# Patient Record
Sex: FEMALE | Race: WHITE | NOT HISPANIC OR LATINO | Employment: OTHER | URBAN - NONMETROPOLITAN AREA
[De-identification: names, ages, dates, MRNs, and addresses within clinical notes are randomized per-mention and may not be internally consistent; named-entity substitution may affect disease eponyms.]

---

## 2017-01-06 DIAGNOSIS — I27.0 PRIMARY PULMONARY HYPERTENSION (H): Primary | ICD-10-CM

## 2017-01-06 RX ORDER — SPIRONOLACTONE 25 MG/1
TABLET ORAL
Qty: 45 TABLET | Refills: 3 | Status: SHIPPED | OUTPATIENT
Start: 2017-01-06 | End: 2017-11-27

## 2017-02-22 ENCOUNTER — TRANSFERRED RECORDS (OUTPATIENT)
Dept: HEALTH INFORMATION MANAGEMENT | Facility: HOSPITAL | Age: 82
End: 2017-02-22

## 2017-03-21 ENCOUNTER — ALLIED HEALTH/NURSE VISIT (OUTPATIENT)
Dept: CARDIOLOGY | Facility: CLINIC | Age: 82
End: 2017-03-21
Attending: INTERNAL MEDICINE
Payer: MEDICARE

## 2017-03-21 DIAGNOSIS — I42.8 NON-ISCHEMIC CARDIOMYOPATHY (H): Primary | ICD-10-CM

## 2017-03-21 PROCEDURE — 93295 DEV INTERROG REMOTE 1/2/MLT: CPT | Performed by: INTERNAL MEDICINE

## 2017-03-21 PROCEDURE — 93296 REM INTERROG EVL PM/IDS: CPT | Mod: ZF

## 2017-03-21 NOTE — MR AVS SNAPSHOT
After Visit Summary   3/21/2017    Jacklyn Hein    MRN: [id number]           Patient Information     Date Of Birth          1935        Visit Information        Provider Department      3/21/2017 6:00 AM UC ICD REMOTE [facility]        Today's Diagnoses     Non-ischemic cardiomyopathy (H)    -  1       Follow-ups after your visit        Your next 10 appointments already scheduled     Jun 27, 2017  1:30 PM CDT   (Arrive by 1:15 PM)   Return Visit with  PACEMAKER   [facility] ([facility])    [address]  [facility] [address]   [phone]              Who to contact     If you have questions or need follow up information about today's clinic visit or your schedule please contact [facility] directly at [phone].  Normal or non-critical lab and imaging results will be communicated to you by MyChart, letter or phone within 4 business days after the clinic has received the results. If you do not hear from us within 7 days, please contact the clinic through MyChart or phone. If you have a critical or abnormal lab result, we will notify you by phone as soon as possible.  Submit refill requests through [employer] or call your pharmacy and they will forward the refill request to us. Please allow 3 business days for your refill to be completed.          Additional Information About Your Visit        MyChart Information     [employer] gives you secure access to your electronic health record. If you see a primary care provider, you can also send messages to your care team and make appointments. If you have questions, please call your primary care clinic.  If you do not have a primary care provider, please call [phone] and they will assist you.        Care EveryWhere ID     This is your Care EveryWhere ID. This could be used by other organizations to access your [place] medical records  [id number]         Blood Pressure from Last 3 Encounters:    12/27/16 123/73   11/29/16 108/52   10/20/16 100/75    Weight from Last 3 Encounters:   12/27/16 74.8 kg (165 lb)   11/29/16 74.8 kg (165 lb)   10/20/16 75 kg (165 lb 6.4 oz)              We Performed the Following     INTERROGATION DEVICE EVAL REMOTE, PACER/ICD        Primary Care Provider Office Phone # Fax #    R Josh Post [phone]       [facility] [place] [address] [place], [place] [address] [address]        Thank you!     Thank you for choosing [facility]  for your care. Our goal is always to provide you with excellent care. Hearing back from our patients is one way we can continue to improve our services. Please take a few minutes to complete the written survey that you may receive in the mail after your visit with us. Thank you!             Your Updated Medication List - Protect others around you: Learn how to safely use, store and throw away your medicines at www.disposemymeds.org.          This list is accurate as of: 3/21/17  2:02 PM.  Always use your most recent med list.                   Brand Name Dispense Instructions for use    ACETAMINOPHEN PM  MG tablet   Generic drug:  diphenhydrAMINE-acetaminophen      Take 2 tablets by mouth nightly as needed.       alendronate 70 MG tablet    FOSAMAX    12 tablet    TAKE 1 TABLET BY MOUTH EVERY 7 DAYS. TAKE WITH 8 OUNCES OF WATER AND STAY UPRIGHT FOR AT LEAST 30 MINUTES AFTER TAKING.       aspirin 81 MG tablet      Take 1 tablet by mouth daily       calcitonin (salmon) 200 UNIT/ACT nasal spray    MIACALCIN    1 Bottle    Spray 1 spray into one nostril alternating nostrils daily Alternate nostril each day.       CALCIUM 600 + D PO      Take 1 tablet by mouth daily       carvedilol 12.5 MG tablet    COREG    180 tablet    TAKE 1 TABLET BY MOUTH 2 TIMES DAILY WITH MEALS       FISH OIL      1 tablet daily.       ibuprofen 600 MG tablet    ADVIL/MOTRIN    30 tablet    TAKE 1 TABLET BY MOUTH EVERY 6 HOURS AS NEEDED FOR MODERATE  TO SEVEREP AIN       lisinopril 2.5 MG tablet    PRINIVIL/Zestril    91 tablet    Take 1 tablet (2.5 mg) by mouth daily       MULTI-VITAMIN DAILY PO      Take 1 tablet by mouth daily       * order for DME     1 Device    Equipment being ordered: Lumbar brace       * order for DME     1 each    Equipment being ordered: Walker       spironolactone 25 MG tablet    ALDACTONE    45 tablet    TAKE 1/2 TABLET BY MOUTH DAILY       Vitamin D3 2000 UNITS Tabs      Take 1 tablet by mouth daily       * Notice:  This list has 2 medication(s) that are the same as other medications prescribed for you. Read the directions carefully, and ask your doctor or other care provider to review them with you.

## 2017-03-21 NOTE — PROGRESS NOTES
Remote ICD transmission received and reviewed.  Device transmission sent per MD orders.  Patient has a Medtronic multi lead ICD.  Normal ICD function.  1 AT/AF episode recorded - 2 seconds in duration.  No ventricular arrythmias recorded.  Presenting EGM = AS- @ 66 bpm.  AP = 2.6%.   = 98.5%.  BVP = 98.1%.  OptiVol fluid index is slightly above baseline.  Estimated battery longevity to JAMIE = 6.7 years.  Patient notified of interrogation results.  Patient reports that she is feeling well and denies complaints.  Plan for patient to return to clinic in 3 months as scheduled.    Remote ICD transmission

## 2017-05-02 ENCOUNTER — OFFICE VISIT (OUTPATIENT)
Dept: FAMILY MEDICINE | Facility: OTHER | Age: 82
End: 2017-05-02
Attending: FAMILY MEDICINE
Payer: MEDICARE

## 2017-05-02 VITALS
TEMPERATURE: 97.6 F | WEIGHT: 172 LBS | HEART RATE: 72 BPM | HEIGHT: 65 IN | OXYGEN SATURATION: 96 % | SYSTOLIC BLOOD PRESSURE: 106 MMHG | BODY MASS INDEX: 28.66 KG/M2 | DIASTOLIC BLOOD PRESSURE: 68 MMHG

## 2017-05-02 DIAGNOSIS — R06.09 DYSPNEA ON EXERTION: Primary | ICD-10-CM

## 2017-05-02 DIAGNOSIS — I42.8 NON-ISCHEMIC CARDIOMYOPATHY (H): ICD-10-CM

## 2017-05-02 DIAGNOSIS — Z71.89 ACP (ADVANCE CARE PLANNING): Chronic | ICD-10-CM

## 2017-05-02 LAB
ALBUMIN SERPL-MCNC: 3.3 G/DL (ref 3.4–5)
ALP SERPL-CCNC: 60 U/L (ref 40–150)
ALT SERPL W P-5'-P-CCNC: 15 U/L (ref 0–50)
ANION GAP SERPL CALCULATED.3IONS-SCNC: 9 MMOL/L (ref 3–14)
AST SERPL W P-5'-P-CCNC: 17 U/L (ref 0–45)
BASOPHILS # BLD AUTO: 0.1 10E9/L (ref 0–0.2)
BASOPHILS NFR BLD AUTO: 0.5 %
BILIRUB SERPL-MCNC: 0.3 MG/DL (ref 0.2–1.3)
BUN SERPL-MCNC: 38 MG/DL (ref 7–30)
CALCIUM SERPL-MCNC: 8.6 MG/DL (ref 8.5–10.1)
CHLORIDE SERPL-SCNC: 108 MMOL/L (ref 94–109)
CO2 SERPL-SCNC: 24 MMOL/L (ref 20–32)
CREAT SERPL-MCNC: 0.98 MG/DL (ref 0.52–1.04)
DIFFERENTIAL METHOD BLD: ABNORMAL
EOSINOPHIL # BLD AUTO: 0.8 10E9/L (ref 0–0.7)
EOSINOPHIL NFR BLD AUTO: 9 %
ERYTHROCYTE [DISTWIDTH] IN BLOOD BY AUTOMATED COUNT: 12.9 % (ref 10–15)
GFR SERPL CREATININE-BSD FRML MDRD: 54 ML/MIN/1.7M2
GLUCOSE SERPL-MCNC: 94 MG/DL (ref 70–99)
HCT VFR BLD AUTO: 38.4 % (ref 35–47)
HGB BLD-MCNC: 12.6 G/DL (ref 11.7–15.7)
IMM GRANULOCYTES # BLD: 0 10E9/L (ref 0–0.4)
IMM GRANULOCYTES NFR BLD: 0.3 %
LYMPHOCYTES # BLD AUTO: 2.2 10E9/L (ref 0.8–5.3)
LYMPHOCYTES NFR BLD AUTO: 23.8 %
MCH RBC QN AUTO: 29.3 PG (ref 26.5–33)
MCHC RBC AUTO-ENTMCNC: 32.8 G/DL (ref 31.5–36.5)
MCV RBC AUTO: 89 FL (ref 78–100)
MONOCYTES # BLD AUTO: 0.6 10E9/L (ref 0–1.3)
MONOCYTES NFR BLD AUTO: 6 %
NEUTROPHILS # BLD AUTO: 5.5 10E9/L (ref 1.6–8.3)
NEUTROPHILS NFR BLD AUTO: 60.4 %
NRBC # BLD AUTO: 0 10*3/UL
NRBC BLD AUTO-RTO: 0 /100
NT-PROBNP SERPL-MCNC: 409 PG/ML (ref 0–450)
PLATELET # BLD AUTO: 213 10E9/L (ref 150–450)
POTASSIUM SERPL-SCNC: 4.6 MMOL/L (ref 3.4–5.3)
PROT SERPL-MCNC: 7.1 G/DL (ref 6.8–8.8)
RBC # BLD AUTO: 4.3 10E12/L (ref 3.8–5.2)
SODIUM SERPL-SCNC: 141 MMOL/L (ref 133–144)
WBC # BLD AUTO: 9.2 10E9/L (ref 4–11)

## 2017-05-02 PROCEDURE — 80053 COMPREHEN METABOLIC PANEL: CPT | Mod: ZL | Performed by: FAMILY MEDICINE

## 2017-05-02 PROCEDURE — 36415 COLL VENOUS BLD VENIPUNCTURE: CPT | Mod: ZL | Performed by: FAMILY MEDICINE

## 2017-05-02 PROCEDURE — 93010 ELECTROCARDIOGRAM REPORT: CPT | Performed by: INTERNAL MEDICINE

## 2017-05-02 PROCEDURE — 99212 OFFICE O/P EST SF 10 MIN: CPT | Mod: 25

## 2017-05-02 PROCEDURE — 83880 ASSAY OF NATRIURETIC PEPTIDE: CPT | Mod: ZL | Performed by: FAMILY MEDICINE

## 2017-05-02 PROCEDURE — 71020 ZZHC CHEST TWO VIEWS, FRONT/LAT: CPT | Mod: TC

## 2017-05-02 PROCEDURE — 93005 ELECTROCARDIOGRAM TRACING: CPT

## 2017-05-02 PROCEDURE — 85025 COMPLETE CBC W/AUTO DIFF WBC: CPT | Mod: ZL | Performed by: FAMILY MEDICINE

## 2017-05-02 PROCEDURE — 99214 OFFICE O/P EST MOD 30 MIN: CPT | Mod: 25 | Performed by: FAMILY MEDICINE

## 2017-05-02 ASSESSMENT — PAIN SCALES - GENERAL: PAINLEVEL: NO PAIN (0)

## 2017-05-02 NOTE — PROGRESS NOTES
[facility]    Jacklyn Hein, 81 year old, female presents with   Chief Complaint   Patient presents with     Breathing Problem     shortness of breath, wheezing, coughing, runny nose. history of heart failure. symptoms worsened since January. Seen Cardiologist in January.    1) Severe LV systolic dysfunction - her symptoms have improved with CRT     - Class II NYHA symptoms  - continue present therapy     2) Left subclavian vein thrombus - resolved and off warfarin     3) S/P ICD for primary prevention - I have personally reviewed the ICD interrogation from today. Her device is functioning normal.         Gastrointestinal Problem     upset stomach. Duration- Since Saturday. loose stool with belching.Daughter felt similar nausea.     Musculoskeletal Problem     Lower/mid back pain. Duration- Years. Seen by surgeon. 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     Insomnia, unspecified 01/01/2011     LBBB (left bundle branch block) 01/01/2011     Neck pain, chronic 01/01/2011     Non-ischemic cardiomyopathy (H) 12/10/2013     Obesity, unspecified 01/01/2011     Osteopenia 01/01/2011     Pacemaker      Pain in joint, lower leg 07/31/2006     Pure hypercholesterolemia 06/07/2002     Unspecified essential hypertension 01/01/2011       PAST SURGICAL HISTORY:  Past Surgical History:   Procedure Laterality Date     APPENDECTOMY       ARTHROSCOPY KNEE RT/LT  2009    RT     CARDIAC SURGERY  05/06/2014    Pacemaker     Cataract extraction  2009    Bilateral     CHOLECYSTECTOMY      open      COLONOSCOPY  05/2001    Normal      COLONOSCOPY N/A 10/20/2016    Procedure: COLONOSCOPY;  Surgeon: Charan Montejo MD;  Location: [facility] OR     colonoscopy  with polypectomy      Repeat 3 years      CT CORONARY ANGIOGRAM      normal     HERPES ZOSTER PCR ([facility])       left eye scar tissue       normal echo      fen-phen use       x6       SURGICAL RADIOLOGY PROCEDURE N/A 2016    Procedure: SURGICAL RADIOLOGY PROCEDURE;  Surgeon: Provider, [facility];  Location: [facility] OR       MEDICATIONS:  Prior to Admission medications    Medication Sig Start Date End Date Taking? Authorizing Provider   spironolactone (ALDACTONE) 25 MG tablet TAKE 1/2 TABLET BY MOUTH DAILY 17  Yes ANTHONY Post MD   alendronate (FOSAMAX) 70 MG tablet TAKE 1 TABLET BY MOUTH EVERY 7 DAYS. TAKE WITH 8 OUNCES OF WATER AND STAY UPRIGHT FOR AT LEAST 30 MINUTES AFTER TAKING. 16  Yes ANTHONY Post MD   ibuprofen (ADVIL/MOTRIN) 600 MG tablet TAKE 1 TABLET BY MOUTH EVERY 6 HOURS AS NEEDED FOR MODERATE TO SEVEREP AIN 16  Yes ANTHONY Post MD   carvedilol (COREG) 12.5 MG tablet TAKE 1 TABLET BY MOUTH 2 TIMES DAILY WITH MEALS 10/28/16  Yes ANTHONY Post MD   order for DME Equipment being ordered: Walker 16  Yes ANTHONY Post MD   lisinopril (PRINIVIL,ZESTRIL) 2.5 MG tablet Take 1 tablet (2.5 mg) by mouth daily 16  Yes Ishmael Orlando MD   calcitonin, salmon, (MIACALCIN) 200 UNIT/ACT nasal spray Spray 1 spray into one nostril alternating nostrils daily Alternate nostril each day. 5/19/15  Yes ANTHONY Post MD   ORDER FOR DME Equipment being ordered: Lumbar brace 5/18/15  Yes ANTHONY Post MD   aspirin 81 MG tablet Take 1 tablet by mouth daily   Yes Reported, Patient   Cholecalciferol (VITAMIN D3) 2000 UNITS TABS Take 1 tablet by mouth daily   Yes Reported, Patient   Multiple Vitamin (MULTI-VITAMIN DAILY PO) Take 1 tablet by mouth daily   Yes Reported, Patient   Calcium Carbonate-Vitamin D (CALCIUM 600 + D OR) Take 1 tablet by mouth daily   Yes Reported, Patient   diphenhydrAMINE-acetaminophen (ACETAMINOPHEN PM)  MG tablet  "Take 2 tablets by mouth nightly as needed.   Yes Reported, Patient       ALLERGIES:   No Known Allergies    ROS:  Constitutional, neuro, ENT, endocrine, pulmonary, cardiac, gastrointestinal, genitourinary, musculoskeletal, integument and psychiatric systems are negative, except as otherwise noted.      EXAM:  /68 (BP Location: Left arm, Patient Position: Chair, Cuff Size: Adult Regular)  Pulse 72  Temp 97.6  F (36.4  C) (Tympanic)  Ht 5' 5\" (1.651 m)  Wt 172 lb (78 kg)  SpO2 96%  BMI 28.62 kg/m2 Body mass index is 28.62 kg/(m^2).   GENERAL APPEARANCE: healthy, alert and no distress  EYES: Eyes grossly normal to inspection, PERRL and conjunctivae and sclerae normal  NECK: no adenopathy, no asymmetry, masses, or scars and thyroid normal to palpation  RESP: lungs clear to auscultation - no rales, rhonchi or wheezes  CV: regular rates and rhythm, normal S1 S2, no S3 or S4 and no murmur, click or rub  ABDOMEN: soft, nontender, without hepatosplenomegaly or masses and bowel sounds normal  Lab/ X-ray EKG shows a paced rhythm, chest x-ray no infiltrate  Results for orders placed or performed in visit on 05/02/17 (from the past 24 hour(s))   CBC with platelets differential   Result Value Ref Range    WBC 9.2 4.0 - 11.0 10e9/L    RBC Count 4.30 3.8 - 5.2 10e12/L    Hemoglobin 12.6 11.7 - 15.7 g/dL    Hematocrit 38.4 35.0 - 47.0 %    MCV 89 78 - 100 fl    MCH 29.3 26.5 - 33.0 pg    MCHC 32.8 31.5 - 36.5 g/dL    RDW 12.9 10.0 - 15.0 %    Platelet Count 213 150 - 450 10e9/L    Diff Method Automated Method     % Neutrophils 60.4 %    % Lymphocytes 23.8 %    % Monocytes 6.0 %    % Eosinophils 9.0 %    % Basophils 0.5 %    % Immature Granulocytes 0.3 %    Nucleated RBCs 0 0 /100    Absolute Neutrophil 5.5 1.6 - 8.3 10e9/L    Absolute Lymphocytes 2.2 0.8 - 5.3 10e9/L    Absolute Monocytes 0.6 0.0 - 1.3 10e9/L    Absolute Eosinophils 0.8 (H) 0.0 - 0.7 10e9/L    Absolute Basophils 0.1 0.0 - 0.2 10e9/L    Abs Immature " Granulocytes 0.0 0 - 0.4 10e9/L    Absolute Nucleated RBC 0.0        ASSESSMENT/PLAN:    ICD-10-CM    1. Dyspnea on exertion R06.09 CBC with platelets differential     Comprehensive metabolic panel     N terminal pro BNP outpatient     XR Chest 2 Views     EKG 12-lead complete w/read - Clinics     CARDIOLOGY EVAL ADULT REFERRAL. Patient has a history of skin cardiomyopathy and congestive heart failure with a pacemaker.  Did see cardiology in January.  Will have patient see Dr. Garcia tomorrow in the clinic.  The labs can be reviewed at that point.  In regards to her  back she has significant spinal stenosis.  She needs to use better positioning when she is doing work in the kitchen.  Also regarding the GI upset probably a little gastroenteritis   2. ACP (advance care planning) Z71.89    3. CHF (congestive heart failure), NYHA class II (H) I50.9 See #1   4. Non-ischemic cardiomyopathy (H) I42.9 CARDIOLOGY EVAL ADULT REFERRAL         YUE Post MD  May 2, 2017

## 2017-05-02 NOTE — NURSING NOTE
"Chief Complaint   Patient presents with     Breathing Problem     shortness of breath, wheezing, coughing, runny nose. history of heart failure. symptoms worsened since January. Seen Cardiologist in January.      Gastrointestinal Problem     upset stomach. Duration- Since Saturday. loose stool with belching.     Musculoskeletal Problem     Lower/mid back pain. Duration- Years.        Initial /68 (BP Location: Left arm, Patient Position: Chair, Cuff Size: Adult Regular)  Pulse 72  Temp 97.6  F (36.4  C) (Tympanic)  Ht 5' 5\" (1.651 m)  Wt 172 lb (78 kg)  SpO2 96%  BMI 28.62 kg/m2 Estimated body mass index is 28.62 kg/(m^2) as calculated from the following:    Height as of this encounter: 5' 5\" (1.651 m).    Weight as of this encounter: 172 lb (78 kg).  Medication Reconciliation: complete     NURYS COTTO      "

## 2017-05-02 NOTE — MR AVS SNAPSHOT
After Visit Summary   5/2/2017    Jacklyn Hein    MRN: [id number]           Patient Information     Date Of Birth          1935        Visit Information        Provider Department      5/2/2017 1:15 PM ANTHONY Post MD [facility] [place]        Today's Diagnoses     Dyspnea on exertion    -  1    ACP (advance care planning)        CHF (congestive heart failure), NYHA class II (H)        Non-ischemic cardiomyopathy (H)          Care Instructions    See Dr. Garcia tomorrow        Follow-ups after your visit        Additional Services     CARDIOLOGY EVAL ADULT REFERRAL       Your provider has referred you to:  Rigo Garcia     Please be aware that coverage of these services is subject to the terms and limitations of your health insurance plan.  Call member services at your health plan with any benefit or coverage questions.      Type of Referral:  New Cardiology Consult    Timeframe requested:  Less than 1 week    Please bring the following to your appointment:  >>   Any x-rays, CTs or MRIs which have been performed.  Contact the facility where they were done to arrange for  prior to your scheduled appointment.    >>   List of current medications  >>   This referral request   >>   Any documents/labs given to you for this referral                  Your next 10 appointments already scheduled     May 03, 2017  3:00 PM CDT   (Arrive by 2:45 PM)   New Visit with Kodi Garcia, DO [facility]    [address] [place] [address]   [phone]            Jun 27, 2017  1:30 PM CDT   (Arrive by 1:15 PM)   Return Visit with  PACEMAKER   [facility] ([facility])    [address] [place] [address]   [phone]              Who to contact     If you have questions or need follow up information about today's clinic visit or your schedule please contact [facility] directly at [phone].  Normal or  "non-critical lab and imaging results will be communicated to you by MyChart, letter or phone within 4 business days after the clinic has received the results. If you do not hear from us within 7 days, please contact the clinic through [employer] or phone. If you have a critical or abnormal lab result, we will notify you by phone as soon as possible.  Submit refill requests through [employer] or call your pharmacy and they will forward the refill request to us. Please allow 3 business days for your refill to be completed.          Additional Information About Your Visit        [employer] Information     [employer] gives you secure access to your electronic health record. If you see a primary care provider, you can also send messages to your care team and make appointments. If you have questions, please call your primary care clinic.  If you do not have a primary care provider, please call [phone] and they will assist you.        Care EveryWhere ID     This is your Care EveryWhere ID. This could be used by other organizations to access your [place] medical records  [id number]        Your Vitals Were     Pulse Temperature Height Pulse Oximetry BMI (Body Mass Index)       72 97.6  F (36.4  C) (Tympanic) 5' 5\" (1.651 m) 96% 28.62 kg/m2        Blood Pressure from Last 3 Encounters:   05/02/17 106/68   12/27/16 123/73   11/29/16 108/52    Weight from Last 3 Encounters:   05/02/17 172 lb (78 kg)   12/27/16 165 lb (74.8 kg)   11/29/16 165 lb (74.8 kg)              We Performed the Following     CARDIOLOGY EVAL ADULT REFERRAL     CBC with platelets differential     Comprehensive metabolic panel     EKG 12-lead complete w/read - Clinics     N terminal pro BNP outpatient     XR Chest 2 Views          Today's Medication Changes          These changes are accurate as of: 5/2/17  1:56 PM.  If you have any questions, ask your nurse or doctor.               Stop taking these medicines if you haven't already. Please contact your care " team if you have questions.     FISH OIL   Stopped by:  ANTHONY Post MD                    Primary Care Provider Office Phone # Fax #    ANTHONY Post [phone]       [facility] [place] [address] [place] [address]        Thank you!     [facility] you [facility]  [facility] your [facility]. Our goal is always to provide you with excellent care. Hearing back from our patients is one way we can continue to improve our services. Please take a few minutes to complete the written survey that you may receive in the mail after your visit with us. Thank you!             Your Updated Medication List - Protect others around you: Learn how to safely use, store and throw away your medicines at www.disposemymeds.org.          This list is accurate as of: 5/2/17  1:56 PM.  Always use your most recent med list.                   Brand Name Dispense Instructions for use    ACETAMINOPHEN PM  MG tablet   Generic drug:  diphenhydrAMINE-acetaminophen      Take 2 tablets by mouth nightly as needed.       alendronate 70 MG tablet    FOSAMAX    12 tablet    TAKE 1 TABLET BY MOUTH EVERY 7 DAYS. TAKE WITH 8 OUNCES OF WATER AND STAY UPRIGHT FOR AT LEAST 30 MINUTES AFTER TAKING.       aspirin 81 MG tablet      Take 1 tablet by mouth daily       calcitonin (salmon) 200 UNIT/ACT nasal spray    MIACALCIN    1 Bottle    Spray 1 spray into one nostril alternating nostrils daily Alternate nostril each day.       CALCIUM 600 + D PO      Take 1 tablet by mouth daily       carvedilol 12.5 MG tablet    COREG    180 tablet    TAKE 1 TABLET BY MOUTH 2 TIMES DAILY WITH MEALS       ibuprofen 600 MG tablet    ADVIL/MOTRIN    30 tablet    TAKE 1 TABLET BY MOUTH EVERY 6 HOURS AS NEEDED FOR MODERATE TO SEVEREP AIN       lisinopril 2.5 MG tablet    PRINIVIL/Zestril    91 tablet    Take 1 tablet (2.5 mg) by mouth daily       MULTI-VITAMIN DAILY PO      Take 1 tablet by mouth daily       * order for DME     1  Device    Equipment being ordered: Lumbar brace       * order for DME     1 each    Equipment being ordered: Walker       spironolactone 25 MG tablet    ALDACTONE    45 tablet    TAKE 1/2 TABLET BY MOUTH DAILY       Vitamin D3 2000 UNITS Tabs      Take 1 tablet by mouth daily       * Notice:  This list has 2 medication(s) that are the same as other medications prescribed for you. Read the directions carefully, and ask your doctor or other care provider to review them with you.

## 2017-05-03 ENCOUNTER — OFFICE VISIT (OUTPATIENT)
Dept: CARDIOLOGY | Facility: OTHER | Age: 82
End: 2017-05-03
Attending: INTERNAL MEDICINE
Payer: COMMERCIAL

## 2017-05-03 VITALS
TEMPERATURE: 98.4 F | SYSTOLIC BLOOD PRESSURE: 108 MMHG | RESPIRATION RATE: 24 BRPM | HEART RATE: 80 BPM | OXYGEN SATURATION: 96 % | BODY MASS INDEX: 28.32 KG/M2 | WEIGHT: 170 LBS | HEIGHT: 65 IN | DIASTOLIC BLOOD PRESSURE: 60 MMHG

## 2017-05-03 DIAGNOSIS — I42.8 NON-ISCHEMIC CARDIOMYOPATHY (H): ICD-10-CM

## 2017-05-03 DIAGNOSIS — Z87.891 HISTORY OF TOBACCO ABUSE: ICD-10-CM

## 2017-05-03 DIAGNOSIS — R06.09 DYSPNEA ON EXERTION: ICD-10-CM

## 2017-05-03 DIAGNOSIS — I50.42 CHRONIC COMBINED SYSTOLIC AND DIASTOLIC CONGESTIVE HEART FAILURE (H): Primary | ICD-10-CM

## 2017-05-03 PROCEDURE — 99212 OFFICE O/P EST SF 10 MIN: CPT

## 2017-05-03 PROCEDURE — 99214 OFFICE O/P EST MOD 30 MIN: CPT | Performed by: INTERNAL MEDICINE

## 2017-05-03 PROCEDURE — 99203 OFFICE O/P NEW LOW 30 MIN: CPT

## 2017-05-03 ASSESSMENT — PAIN SCALES - GENERAL: PAINLEVEL: NO PAIN (0)

## 2017-05-03 NOTE — MR AVS SNAPSHOT
After Visit Summary   5/3/2017    Jacklyn Hein    MRN: [id number]           Patient Information     Date Of Birth          1935        Visit Information        Provider Department      5/3/2017 3:00 PM Kodi Garcia, DO [facility] [place]        Today's Diagnoses     Chronic combined systolic and diastolic congestive heart failure (H)    -  1    Dyspnea on exertion        Non-ischemic cardiomyopathy (H)          Care Instructions    You were seen by Dr. Garcia 5/3/2017     1. You will follow up in with Dr. Garcia in 1 month.    2. You will be scheduled for pulmonary function testing, you will be contacted by the hospital to schedule.     3. You will be scheduled for an Echocardiogram, you will be contacted by the imaging department to schedule.     4. Continue daily weights, please contact the Cardiology RN with a 2 pound weight gain in 24 hours or 5 pounds in one week.     5. Monitor salt intake, avoid adding salt at the dinner table and consider a salt substitute.     6. Have device check 6/27/17 with sergey to check your pacemaker.     7.  Monitor blood pressure at home while resting for at least 20 minutes.     8.  Continue all medications as prescribed.     Osiris SANFORD RN-BSN  Cardiology   [facility]  [phone]          Follow-ups after your visit        Your next 10 appointments already scheduled     Jun 27, 2017  1:30 PM CDT   (Arrive by 1:15 PM)   Return Visit with  PACEMAKER   [place] [facility] [facility] ([facility])    [address]  [place] [address]   [phone]              Who to contact     If you have questions or need follow up information about today's clinic visit or your schedule please contact [facility] directly at [phone].  Normal or non-critical lab and imaging results will be communicated to you by MyChart, letter or phone within 4 business days after the clinic has received the results. If you do not hear  "from us within 7 days, please contact the clinic through [facility] or phone. If you have a critical or abnormal lab result, we will notify you by phone as soon as possible.  Submit refill requests through [facility] [facility] or call your pharmacy and they will forward the refill request to us. Please allow 3 business days for your refill to be completed.          Additional Information About Your Visit        [email or web address] Information     [facility] gives you secure access to your electronic health record. If you see a primary care provider, you can also send messages to your care team and make appointments. If you have questions, please call your primary care clinic.  If you do not have a primary care provider, please call [phone] and they will assist you.        Care EveryWhere ID     This is your Care EveryWhere ID. This could be used by other organizations to access your [facility] medical records  [id number]        Your Vitals Were     Pulse Temperature Respirations Height Pulse Oximetry BMI (Body Mass Index)    80 98.4  F (36.9  C) (Tympanic) 24 1.651 m (5' 5\") 96% 28.29 kg/m2       Blood Pressure from Last 3 Encounters:   05/03/17 108/60   05/02/17 106/68   12/27/16 123/73    Weight from Last 3 Encounters:   05/03/17 77.1 kg (170 lb)   05/02/17 78 kg (172 lb)   12/27/16 74.8 kg (165 lb)              Today, you had the following     No orders found for display       Primary Care Provider Office Phone # Fax #    R Josh Post [phone]       [facility] [place] [address] [place] [id number]        Thank you!     [facility] you [facility]  [facility]. Our goal is always to provide you with excellent care. Hearing back from our patients is one way we can continue to improve our services. Please take a few minutes to complete the written survey that you may receive in the mail after your visit with us. Thank you!             Your Updated Medication List - Protect others " around you: Learn how to safely use, store and throw away your medicines at www.disposemymeds.org.          This list is accurate as of: 5/3/17  4:04 PM.  Always use your most recent med list.                   Brand Name Dispense Instructions for use    ACETAMINOPHEN PM  MG tablet   Generic drug:  diphenhydrAMINE-acetaminophen      Take 2 tablets by mouth nightly as needed.       alendronate 70 MG tablet    FOSAMAX    12 tablet    TAKE 1 TABLET BY MOUTH EVERY 7 DAYS. TAKE WITH 8 OUNCES OF WATER AND STAY UPRIGHT FOR AT LEAST 30 MINUTES AFTER TAKING.       aspirin 81 MG tablet      Take 1 tablet by mouth daily       CALCIUM 600 + D PO      Take 1 tablet by mouth daily       carvedilol 12.5 MG tablet    COREG    180 tablet    TAKE 1 TABLET BY MOUTH 2 TIMES DAILY WITH MEALS       ibuprofen 600 MG tablet    ADVIL/MOTRIN    30 tablet    TAKE 1 TABLET BY MOUTH EVERY 6 HOURS AS NEEDED FOR MODERATE TO SEVEREP AIN       lisinopril 2.5 MG tablet    PRINIVIL/Zestril    91 tablet    Take 1 tablet (2.5 mg) by mouth daily       MULTI-VITAMIN DAILY PO      Take 1 tablet by mouth daily       * order for DME     1 Device    Equipment being ordered: Lumbar brace       * order for DME     1 each    Equipment being ordered: Walker       spironolactone 25 MG tablet    ALDACTONE    45 tablet    TAKE 1/2 TABLET BY MOUTH DAILY       Vitamin D3 2000 UNITS Tabs      Take 1 tablet by mouth daily       * Notice:  This list has 2 medication(s) that are the same as other medications prescribed for you. Read the directions carefully, and ask your doctor or other care provider to review them with you.

## 2017-05-03 NOTE — NURSING NOTE
"Chief Complaint   Patient presents with     Establish Care     Referral Dr. FERNANDA Post, CHF afib       Initial /60 (BP Location: Right arm, Cuff Size: Adult Large)  Pulse 80  Temp 98.4  F (36.9  C) (Tympanic)  Resp 24  Ht 1.651 m (5' 5\")  Wt 77.1 kg (170 lb)  SpO2 96%  BMI 28.29 kg/m2 Estimated body mass index is 28.29 kg/(m^2) as calculated from the following:    Height as of this encounter: 1.651 m (5' 5\").    Weight as of this encounter: 77.1 kg (170 lb).  Medication Reconciliation: complete   Lamar Chatterjee      "

## 2017-05-03 NOTE — PROGRESS NOTES
Cardiology Consultation       Assessment & Plan    Shortness of breath which could be the result of chronic diastolic and systolic heart failure versus poor lung function.    She reports being short of breath for many months. He does have diastolic dysfunction as well as an ejection fraction of 35%.  She is on appropriate medications which include Coreg 12.5 mg, lisinopril 2.5 mg, Toprol-XL 12.5 mg and aspirin 81 mg. Ideally, she should on Coreg 25 mg for a twice a day with an increased dose of lisinopril. However, she does run low blood pressures.  The spironolactone could be discontinued.  Entresto was discussed with the patient as this medication pulse to improve symptoms of heart failure.  In addition, she should have pulmonary function test as she appears to have reduced air movement on her physical exam and she has a history of tobacco use. If this test comes back negative, would consider starting her on Entresto. She would have to check her blood pressures on a regular basis as this medication can lower her blood pressure. Her potassium would have to be checked as this medication can raise her potassium.  She would have to be off of lisinopril for a minimum of 36 hours prior to starting this medication. She'll be seen back in one month.    Thank you for lying me to participate in the care of this patient!        Patient Active Problem List    Diagnosis Date Noted     Lumbar spine pain 10/27/2015     Priority: Medium     Non-ischemic cardiomyopathy (H) 12/10/2013     Priority: Medium     CHF (congestive heart failure), NYHA class II (H) 12/10/2013     Priority: Medium     Automatic implantable cardioverter-defibrillator - [employer]onic multi lead ICD 11/11/2014     Implant date - 5/6/14  Problem list name updated by automated process. Provider to review       ACP (advance care planning) 10/26/2012     Advance Care Planning 5/2/2017: ACP Review of Chart / Resources Provided:  Reviewed chart for advance care plan.   Jacklyn Hein has been provided information and resources to begin or update their advance care plan.  Added by NURYS COTTO             LBBB (left bundle branch block) 01/01/2011     Insomnia 01/01/2011     Problem list name updated by automated process. Provider to review       Disorder of bone and cartilage 01/01/2011     Problem list name updated by automated process. Provider to review       Essential hypertension 01/01/2011     Problem list name updated by automated process. Provider to review       Neck pain, chronic 01/01/2011     Pure hypercholesterolemia 06/07/2002       Kodi Garcia    Reason for Consult   Reason for consult: shortness of breath    Primary Care Physician   ANTHONY Post    Chief Complaint   Shortness of breath with history of diastolic and systolic heart failure    History is obtained from the patient    History of Present Illness   Jacklyn Hein is a 81 year old female who I was asked to see the patient for shortness of breath.  She has diastolic and systolic heart failure with [place] heart classification of 3, with an AICD, nonischemic cardiomyopathy, and hypercholesterolemia.    Mrs. Hein is a 81-year-old female who is being seen by shortness of breath potentially related to heart failure versus her pulmonary function status.    She is known to have severe systolic heart failure with an ejection fraction in February of 2015 at 35%. She currently has an AICD in place that is BI-V. Previous to placement of this device she had an ejection fraction of just shy of 30%.  She is short of breath with minimal activity placing her at [place] heart classification of 3.  She appears relatively euvolemic.  She is on lisinopril 2.5 mg, Coreg 12.5 mg twice a day, aspirin 81 mg daily, and spironolactone 12.5 mg daily.  She reports her weight has largely been consistent and does not waver much. She does salt her food and is not careful with her salt intake. She does not  notice edema to her lower extremities.  She can walk approximately 10-15 feet before she can become short of breath.  She does not have any palpitations.  She does not have any chest pain.  She denies having a heart attack previously as she does have ischemic cardiomyopathy. Her shortness of breath has been going on for several months and this includes her prior visit in January of 2017.  She does have a remote history of smoking quitting approximately 20 years ago. She had a blood clot in her arm following placement of her pacemaker.  However, she's not had a blood count previous is time frame.    Past Medical History   I have reviewed this patient's medical history and updated it with pertinent information if needed.   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  Past Surgical History:   Procedure Laterality Date     APPENDECTOMY       ARTHROSCOPY KNEE RT/LT  2009    RT     CARDIAC SURGERY  05/06/2014    Pacemaker     Cataract extraction  2009    Bilateral     CHOLECYSTECTOMY      open      COLONOSCOPY  05/2001    Normal      COLONOSCOPY N/A 10/20/2016    Procedure: COLONOSCOPY;  Surgeon: Charan Montejo MD;  Location: [facility] OR     colonoscopy with polypectomy  2011    Repeat 3 years      CT CORONARY ANGIOGRAM  1999    normal      "HERPES ZOSTER PCR ([facility])       left eye scar tissue       normal echo      fen-phen use       x6       SURGICAL RADIOLOGY PROCEDURE N/A 2016    Procedure: SURGICAL RADIOLOGY PROCEDURE;  Surgeon: Provider, Generic Perianesthesia Nursing;  Location: [facility] OR       Prior to Admission Medications   Cannot display prior to admission medications because the patient has not been admitted in this contact.     Allergies   No Known Allergies    Social History   I have reviewed this patient's social history and updated it with pertinent information if needed. Jacklyn Hein  reports that she has quit smoking. Her smoking use included Cigarettes. She has never used smokeless tobacco. She reports that she drinks alcohol. She reports that she does not use illicit drugs.    Family History   I have reviewed this patient's family history and updated it with pertinent information if needed.   Family History   Problem Relation Age of Onset     CANCER Mother      lung (cause of death)      Peptic Ulcer Disease Father      gastric        Review of Systems   CONSTITUTIONAL:  negative  EYES:  negative  HEENT:  negative  RESPIRATORY:  Shortness of breath with exertion  CARDIOVASCULAR:  negative  GASTROINTESTINAL:  negative  GENITOURINARY:  Deferred   INTEGUMENT/BREAST:  deferred  HEMATOLOGIC/LYMPHATIC:  negative  MUSCULOSKELETAL:  negative  NEUROLOGICAL:  negative  BEHAVIOR/PSYCH:  negative    Physical Exam   Temp: 98.4  F (36.9  C) Temp src: Tympanic BP: 108/60 Pulse: 80   Resp: 24 SpO2: 96 %      Vital Signs with Ranges  /60 (BP Location: Right arm, Cuff Size: Adult Large)  Pulse 80  Temp 98.4  F (36.9  C) (Tympanic)  Resp 24  Ht 1.651 m (5' 5\")  Wt 77.1 kg (170 lb)  SpO2 96%  BMI 28.29 kg/m2  170 lbs 0 oz    Constitutional: awake, alert, cooperative, no apparent distress, and appears stated age  Eyes: Lids and lashes normal, sclera clear, conjunctiva normal  ENT: Normocephalic, without obvious " abnormality  Hematologic / Lymphatic: 2/4 pulses to her lower extremities  Respiratory: No increased work of breathing, reduced air exchange, clear to auscultation bilaterally, no crackles or wheezing  Cardiovascular: Normal apical impulse, regular rate and rhythm, normal S1 and S2, no S3 or S4, and no murmur noted  GI: No scars, normal bowel sounds, soft, non-distended, non-tender  Genitounirinary: deferred  Skin: no obvious bruises or rashes  Musculoskeletal: no lower extremity pitting edema present  Neurologic: Awake, alert, oriented to name, place and time.    Neuropsychiatric: appears calm    Data   No results found for this or any previous visit (from the past 24 hour(s)).  Most Recent 3 CBC's:  Recent Labs   Lab Test  05/02/17   1335  10/28/15   0755  10/28/14   0737   WBC  9.2  8.5  7.0   HGB  12.6  13.2  13.9   MCV  89  88  88   PLT  213  212  196     Most Recent 3 BMP's:  Recent Labs   Lab Test  05/02/17   1335  08/18/16   1045  10/28/15   0755  10/28/14   0737   NA  141   --   138  140   POTASSIUM  4.6  4.9  4.5  4.4   CHLORIDE  108   --   106  107   CO2  24   --   28  28   BUN  38*   --   24  30   CR  0.98  1.00  0.89  0.78   ANIONGAP  9   --   4  5   LORRAINE  8.6   --   8.9  8.9   GLC  94   --   99  100*     Most Recent 3 Creatinines:  Recent Labs   Lab Test  05/02/17   1335  08/18/16   1045  10/28/15   0755   CR  0.98  1.00  0.89     Most Recent 3 BNP's:  Recent Labs   Lab Test  05/02/17   1335  03/04/14   1016  12/30/13   1113   NTBNP  409  223  313     Most Recent Cholesterol Panel:  Recent Labs   Lab Test  10/28/15   0755   CHOL  217*   LDL  139*   HDL  54   TRIG  119     Most Recent TSH and T4:No lab results found.

## 2017-05-10 ENCOUNTER — HOSPITAL ENCOUNTER (OUTPATIENT)
Dept: ULTRASOUND IMAGING | Facility: HOSPITAL | Age: 82
Discharge: HOME | End: 2017-05-10
Attending: INTERNAL MEDICINE | Admitting: INTERNAL MEDICINE
Payer: MEDICARE

## 2017-05-10 PROCEDURE — 93306 TTE W/DOPPLER COMPLETE: CPT | Mod: 26 | Performed by: INTERNAL MEDICINE

## 2017-05-10 PROCEDURE — 93306 TTE W/DOPPLER COMPLETE: CPT | Mod: TC

## 2017-05-17 ENCOUNTER — HOSPITAL ENCOUNTER (OUTPATIENT)
Dept: RESPIRATORY THERAPY | Facility: HOSPITAL | Age: 82
Discharge: HOME | End: 2017-05-17
Attending: INTERNAL MEDICINE | Admitting: INTERNAL MEDICINE
Payer: MEDICARE

## 2017-05-17 DIAGNOSIS — R06.09 DYSPNEA ON EXERTION: ICD-10-CM

## 2017-05-17 DIAGNOSIS — Z87.891 HISTORY OF TOBACCO ABUSE: ICD-10-CM

## 2017-05-17 LAB
COHGB MFR BLD: 2.4 % (ref 0–2)
HGB BLD-MCNC: 11.2 G/DL (ref 11.7–15.7)

## 2017-05-17 PROCEDURE — 85018 HEMOGLOBIN: CPT | Performed by: INTERNAL MEDICINE

## 2017-05-17 PROCEDURE — 94729 DIFFUSING CAPACITY: CPT

## 2017-05-17 PROCEDURE — 25000125 ZZHC RX 250: Performed by: INTERNAL MEDICINE

## 2017-05-17 PROCEDURE — 36415 COLL VENOUS BLD VENIPUNCTURE: CPT | Performed by: INTERNAL MEDICINE

## 2017-05-17 PROCEDURE — 94060 EVALUATION OF WHEEZING: CPT | Mod: 26 | Performed by: INTERNAL MEDICINE

## 2017-05-17 PROCEDURE — 94060 EVALUATION OF WHEEZING: CPT

## 2017-05-17 PROCEDURE — 82375 ASSAY CARBOXYHB QUANT: CPT | Performed by: INTERNAL MEDICINE

## 2017-05-17 PROCEDURE — 94726 PLETHYSMOGRAPHY LUNG VOLUMES: CPT

## 2017-05-17 PROCEDURE — 94729 DIFFUSING CAPACITY: CPT | Mod: 26 | Performed by: INTERNAL MEDICINE

## 2017-05-17 PROCEDURE — 94726 PLETHYSMOGRAPHY LUNG VOLUMES: CPT | Mod: 26 | Performed by: INTERNAL MEDICINE

## 2017-05-17 RX ORDER — ALBUTEROL SULFATE 0.83 MG/ML
2.5 SOLUTION RESPIRATORY (INHALATION) ONCE
Status: COMPLETED | OUTPATIENT
Start: 2017-05-17 | End: 2017-05-17

## 2017-05-17 RX ADMIN — ALBUTEROL SULFATE 2.5 MG: 2.5 SOLUTION RESPIRATORY (INHALATION) at 14:57

## 2017-05-31 ENCOUNTER — OFFICE VISIT (OUTPATIENT)
Dept: CARDIOLOGY | Facility: OTHER | Age: 82
End: 2017-05-31
Attending: FAMILY MEDICINE
Payer: COMMERCIAL

## 2017-05-31 VITALS
SYSTOLIC BLOOD PRESSURE: 108 MMHG | TEMPERATURE: 98.6 F | WEIGHT: 170 LBS | BODY MASS INDEX: 28.32 KG/M2 | OXYGEN SATURATION: 95 % | HEART RATE: 74 BPM | HEIGHT: 65 IN | DIASTOLIC BLOOD PRESSURE: 56 MMHG

## 2017-05-31 DIAGNOSIS — I50.22 CHF (CONGESTIVE HEART FAILURE), NYHA CLASS II, CHRONIC, SYSTOLIC (H): ICD-10-CM

## 2017-05-31 DIAGNOSIS — E78.00 PURE HYPERCHOLESTEROLEMIA: ICD-10-CM

## 2017-05-31 DIAGNOSIS — I42.8 NON-ISCHEMIC CARDIOMYOPATHY (H): ICD-10-CM

## 2017-05-31 DIAGNOSIS — Z95.810 AUTOMATIC IMPLANTABLE CARDIOVERTER-DEFIBRILLATOR IN SITU: ICD-10-CM

## 2017-05-31 DIAGNOSIS — J43.9 PULMONARY EMPHYSEMA, UNSPECIFIED EMPHYSEMA TYPE (H): Primary | ICD-10-CM

## 2017-05-31 DIAGNOSIS — I10 ESSENTIAL HYPERTENSION: ICD-10-CM

## 2017-05-31 DIAGNOSIS — I50.22 CHRONIC SYSTOLIC CONGESTIVE HEART FAILURE (H): ICD-10-CM

## 2017-05-31 PROCEDURE — 99214 OFFICE O/P EST MOD 30 MIN: CPT | Performed by: INTERNAL MEDICINE

## 2017-05-31 PROCEDURE — 99212 OFFICE O/P EST SF 10 MIN: CPT

## 2017-05-31 ASSESSMENT — PAIN SCALES - GENERAL: PAINLEVEL: NO PAIN (0)

## 2017-05-31 NOTE — NURSING NOTE
"Chief Complaint   Patient presents with     RECHECK     Follow AFIB and CHF. Also pulmonary test results.       Initial /56 (BP Location: Left arm, Patient Position: Chair, Cuff Size: Adult Regular)  Pulse 74  Temp 98.6  F (37  C) (Tympanic)  Ht 1.651 m (5' 5\")  Wt 77.1 kg (170 lb)  SpO2 95%  BMI 28.29 kg/m2 Estimated body mass index is 28.29 kg/(m^2) as calculated from the following:    Height as of this encounter: 1.651 m (5' 5\").    Weight as of this encounter: 77.1 kg (170 lb).  Medication Reconciliation: complete   Lora Quarles LPN      "

## 2017-05-31 NOTE — PROGRESS NOTES
Cardiology Progress Note     Assessment & Plan   Jacklyn Hein is a 81 year old female who He is being seen by cardiology for dyspnea with chronic systolic heart failure.    Impression:  1.  Chronic systolic heart failure with an ejection fraction of 40%.  2. New diagnosis of severe COPD based on PFTs completed here on 5/17/17.  3. Nonischemic cardiomyopathy with [place] heart classification 2-3.  4.  She currently has an ICD in place.  This was placed 11/11/2014.  5.  Hypertension.  6.  Hyperglyceridemia.  7.  Remote history of tobacco abuse.    Plan:  #1.  It appears her symptoms are related to COPD.She is currently not on any breathing treatments.  She will be referred to pulmonary for treatment of COPD.Her appointment was made for June 6, 2017 at 10 AM.    #2. No changes made to her medications today. She'll continue on spironolactone 12.5 mg daily, Coreg 12.5 mg twice a day, Lisinopril 2.5 mg daily. These will be as her blood pressure is able to tolerate.  However, her blood pressures is 108/56.    #3.  She'll be seen in 1 year follow up or sooner should problems.    Kodi Garcia    Interval History   Mrs. Hein Is a 81-year-old who is being seen by cardiology in follow-up as she has a history of systolic heart failure at 40%. She reports being increasingly short of breath over the last few years.  She describes stability related to her heart failure and on her heart failure medications.  She is careful to watch her salt Intake and weight on a daily basis.  She has noted that she is dyspneic with exertion.  She has a remote history of tobacco abuse in which she initially started smoking for weight loss. Her weight has been stable.  She has not noted any swelling toher lower ext. Her ejection fraction has actually improved from 35% to 40%. He was noted she had poor air movement on exam.  A PFT as well as the above-noted echo were performed and she is here for follow-up.She went on to have  PFTs on 5/17/17 which showed severe COPD with a reversible component.      Physical Exam   Temp: 98.6  F (37  C) Temp src: Tympanic BP: 108/56 Pulse: 74     SpO2: 95 %      Vitals:    05/31/17 1257   Weight: 77.1 kg (170 lb)     Vital Signs with Ranges  Temp:  [98.6  F (37  C)] 98.6  F (37  C)  Pulse:  [74] 74  BP: (108)/(56) 108/56  SpO2:  [95 %] 95 %  [unfilled]    Incision/Surgical Site 05/06/14 Left Chest (Active)   Number of days:1121       Constitutional: awake, alert, cooperative, no apparent distress, and appears stated age  Eyes: Lids and lashes normal, pupils equal, sclera clear, conjunctiva normal  ENT: Normocephalic, without obvious abnormality, atraumatic.  Respiratory: Positive increased work of breathing, Poor air exchange, but clear to auscultation bilaterally, no crackles or wheezing  Cardiovascular: Normal apical impulse, regular rate and rhythm, normal S1 and S2, no S3 or S4, and no murmur noted  GI: No scars, normal bowel sounds, soft, non-distended, non-tender  Neurologic: Awake, alert, oriented to name, place and time. Neuropsychiatric: General: normal, calm and normal eye contact    Medications         Data   No results found for this or any previous visit (from the past 24 hour(s)).

## 2017-05-31 NOTE — MR AVS SNAPSHOT
After Visit Summary   5/31/2017    Jacklyn Hein    MRN: [id number]           Patient Information     Date Of Birth          1935        Visit Information        Provider Department      5/31/2017 1:00 PM Kodi Garcia, DO [facility]        Care Instructions    You were seen by Dr. Garcia, 5/31/2017.     1.  You will be referred to a pulmonologist this appointment will be on 6/6/17 Tuesday at 10:00 am with Dr. Newell. Report to the [facility] near the [facility].     2. Continue to monitor weights daily. Reduce the amount of sodium in your diet, as this can cause you to retain fluid. Also monitor the amount of fluids you take in, this can also cause increases swelling if you if you are drinking excessive amounts of water.     3. Continue the current medications as they have been prescribed.     You will follow up with Dr. Garcia in 1 year.       Please call the cardiology office with problems, questions, or concerns at [phone].    If you experience chest pain, chest pressure, chest tightness, shortness of breath, fainting, lightheadedness, nausea, vomiting, or other concerning symptoms, please report to the Emergency Department or call 911. These symptoms may be emergent, and best treated in the Emergency Department.       Osiris SANFORD RN-BSN  Cardiology   [facility]  [phone]          What Is Emphysema?  Emphysema is a lung disease that limits the movement of air in and out of your lungs, making breathing harder. Emphysema is most often caused by heavy, long-time cigarette smoking. Emphysema is one of a group of conditions called chronic obstructive pulmonary disease (COPD).  Healthy Lungs      Inside the lungs are branching airways made of stretchy tissue. Each airway is wrapped with bands of muscle that help keep it open. Air travels in and out of the lungs through these airways.    The tubes branch into smaller passages called  bronchioles. These end in clusters of balloon-like sacs called alveoli.    Blood vessels surrounding the alveoli move oxygen into the blood. At the same time, the alveoli remove carbon dioxide or waste from the blood. The carbon dioxide is then exhaled.  When You Have Emphysema      Airways become damaged. When the lung tissue loses its stretchiness, the surrounding airways collapse more easily and trap air in the lungs.     Damaged airways collapse when you exhale, causing air to get trapped in the alveoli. This trapped air makes breathing harder.    Over time, the air sacs lose their clustered shape and don't work well. And, less oxygen enters the blood.    The air sacs enlarge and the diaphragm flattens. This makes it even harder for the lungs to move air in and out.    1525-7943 [employer]. [address] [place] [address], [place] [address]. All rights reserved. This information is not intended as a substitute for professional medical care. Always follow your healthcare professional's instructions.        What is COPD?  COPD stands for chronic obstructive pulmonary disease. It means the airways in your lungs are blocked (obstructed). Because of this, it is hard to breathe. You may have trouble with daily activities because of shortness of breath. Over time the shortness of breath usually worsens making it more and more difficult to take care of yourself and take part in activities. Chronic bronchitis and emphysema are two common types of COPD.  What happens in chronic bronchitis?    The cells in the airways make more mucus than normal. The mucus builds up, narrowing the airways. This means less air travels into and out of the lungs. The lining of the airways may also become inflamed (swollen) and causes the airways to narrow even more.        What happens in emphysema?    The small airways are damaged and lose their stretchiness. The airways collapse when you exhale, causing air to get trapped in the air  sacs. This means that less oxygen enters the blood vessels and less oxygen is delivered to all of the cells of your body. This makes it hard to breathe.     Damage to cilia    Cilia are small hairs that line and protect the airways. Smoking damages the cilia. Damaged cilia can t sweep mucus and particles away. Some of the cilia are destroyed. This damage worsens COPD.  How did I get COPD?  Most people get COPD from smoking. Cigarette smoke damages lungs, which can develop into COPD over many years.  How COPD affects you  COPD makes you work harder to breathe. Air may get trapped in the lungs, which prevents your lungs from filling completely when you inhale (breathe in). It s harder to take deep breaths. Over time, your lungs may become enlarged. This makes it more difficult for the lungs to expand fully in the chest. These problems cause you to have shortness of breath (also called dyspnea). Wheezing (hoarse, whistling breathing), chronic cough, and fatigue (feeling tired and worn out) are also common.    4115-5814 [employer]. [address] [place] [address]. All rights reserved. This information is not intended as a substitute for professional medical care. Always follow your healthcare professional's instructions.                Follow-ups after your visit        Your next 10 appointments already scheduled     Jun 06, 2017 10:00 AM CDT   New Visit with Min Newlel MD   [facility] ([facility] )    [address] [address] [phone]            Jun 27, 2017  1:30 PM CDT   (Arrive by 1:15 PM)   Return Visit with  PACEMAKER   [facility] ([facility])    [address] [place] [address]   [phone]              Who to contact     If you have questions or need follow up information about today's clinic visit or your schedule please contact [facility] directly at [phone].  Normal or non-critical lab and imaging  "results will be communicated to you by MyChart, letter or phone within 4 business days after the clinic has received the results. If you do not hear from us within 7 days, please contact the clinic through TiVo or phone. If you have a critical or abnormal lab result, we will notify you by phone as soon as possible.  Submit refill requests through TiVo or call your pharmacy and they will forward the refill request to us. Please allow 3 business days for your refill to be completed.          Additional Information About Your Visit        [employer]  Information     TiVo gives you secure access to your electronic health record. If you see a primary care provider, you can also send messages to your care team and make appointments. If you have questions, please call your primary care clinic.  If you do not have a primary care provider, please call [phone] and they will assist you.        Care EveryWhere ID     This is your Care EveryWhere ID. This could be used by other organizations to access your [place] medical records  [id number]        Your Vitals Were     Pulse Temperature Height Pulse Oximetry BMI (Body Mass Index)       74 98.6  F (37  C) (Tympanic) 1.651 m (5' 5\") 95% 28.29 kg/m2        Blood Pressure from Last 3 Encounters:   05/31/17 108/56   05/03/17 108/60   05/02/17 106/68    Weight from Last 3 Encounters:   05/31/17 77.1 kg (170 lb)   05/03/17 77.1 kg (170 lb)   05/02/17 78 kg (172 lb)              Today, you had the following     No orders found for display       Primary Care Provider Office Phone # Fax #    R Josh Post [phone]       [facility] [place] [address] [place] [address]        Thank you!     Thank you for choosing [facility]  for your care. Our goal is always to provide you with excellent care. Hearing back from our patients is one way we can continue to improve our services. Please take a few minutes to complete the written " survey that you may receive in the mail after your visit with us. Thank you!             Your Updated Medication List - Protect others around you: Learn how to safely use, store and throw away your medicines at www.disposemymeds.org.          This list is accurate as of: 5/31/17  1:39 PM.  Always use your most recent med list.                   Brand Name Dispense Instructions for use    ACETAMINOPHEN PM  MG tablet   Generic drug:  diphenhydrAMINE-acetaminophen      Take 2 tablets by mouth nightly as needed.       alendronate 70 MG tablet    FOSAMAX    12 tablet    TAKE 1 TABLET BY MOUTH EVERY 7 DAYS. TAKE WITH 8 OUNCES OF WATER AND STAY UPRIGHT FOR AT LEAST 30 MINUTES AFTER TAKING.       aspirin 81 MG tablet      Take 1 tablet by mouth daily       CALCIUM 600 + D PO      Take 1 tablet by mouth daily       carvedilol 12.5 MG tablet    COREG    180 tablet    TAKE 1 TABLET BY MOUTH 2 TIMES DAILY WITH MEALS       ibuprofen 600 MG tablet    ADVIL/MOTRIN    30 tablet    TAKE 1 TABLET BY MOUTH EVERY 6 HOURS AS NEEDED FOR MODERATE TO SEVEREP AIN       lisinopril 2.5 MG tablet    PRINIVIL/Zestril    91 tablet    Take 1 tablet (2.5 mg) by mouth daily       MULTI-VITAMIN DAILY PO      Take 1 tablet by mouth daily       * order for DME     1 Device    Equipment being ordered: Lumbar brace       * order for DME     1 each    Equipment being ordered: Walker       spironolactone 25 MG tablet    ALDACTONE    45 tablet    TAKE 1/2 TABLET BY MOUTH DAILY       Vitamin D3 2000 UNITS Tabs      Take 1 tablet by mouth daily       * Notice:  This list has 2 medication(s) that are the same as other medications prescribed for you. Read the directions carefully, and ask your doctor or other care provider to review them with you.

## 2017-06-05 DIAGNOSIS — M54.50 MIDLINE LOW BACK PAIN WITHOUT SCIATICA: ICD-10-CM

## 2017-06-05 DIAGNOSIS — M81.0 OSTEOPOROSIS: ICD-10-CM

## 2017-06-06 ENCOUNTER — OFFICE VISIT (OUTPATIENT)
Dept: SLEEP MEDICINE | Facility: HOSPITAL | Age: 82
End: 2017-06-06
Attending: INTERNAL MEDICINE
Payer: COMMERCIAL

## 2017-06-06 VITALS
RESPIRATION RATE: 16 BRPM | BODY MASS INDEX: 27.99 KG/M2 | SYSTOLIC BLOOD PRESSURE: 98 MMHG | HEART RATE: 75 BPM | OXYGEN SATURATION: 95 % | HEIGHT: 65 IN | DIASTOLIC BLOOD PRESSURE: 58 MMHG | WEIGHT: 168 LBS

## 2017-06-06 DIAGNOSIS — J43.8 OTHER EMPHYSEMA (H): ICD-10-CM

## 2017-06-06 DIAGNOSIS — J44.0 CHRONIC OBSTRUCTIVE PULMONARY DISEASE WITH ACUTE LOWER RESPIRATORY INFECTION (H): Primary | ICD-10-CM

## 2017-06-06 PROCEDURE — 99213 OFFICE O/P EST LOW 20 MIN: CPT | Performed by: INTERNAL MEDICINE

## 2017-06-06 PROCEDURE — 99211 OFF/OP EST MAY X REQ PHY/QHP: CPT

## 2017-06-06 RX ORDER — ALBUTEROL SULFATE 90 UG/1
2 AEROSOL, METERED RESPIRATORY (INHALATION) EVERY 6 HOURS PRN
Qty: 1 INHALER | Refills: 1 | Status: SHIPPED | OUTPATIENT
Start: 2017-06-06 | End: 2017-10-17

## 2017-06-06 RX ORDER — BUDESONIDE AND FORMOTEROL FUMARATE DIHYDRATE 160; 4.5 UG/1; UG/1
2 AEROSOL RESPIRATORY (INHALATION) 2 TIMES DAILY
Qty: 1 INHALER | Refills: 1 | Status: SHIPPED | OUTPATIENT
Start: 2017-06-06 | End: 2017-06-06 | Stop reason: ALTCHOICE

## 2017-06-06 RX ORDER — BUDESONIDE AND FORMOTEROL FUMARATE DIHYDRATE 160; 4.5 UG/1; UG/1
2 AEROSOL RESPIRATORY (INHALATION) 2 TIMES DAILY
Qty: 1 INHALER | Refills: 1 | Status: SHIPPED | OUTPATIENT
Start: 2017-06-06 | End: 2017-11-03

## 2017-06-06 NOTE — MR AVS SNAPSHOT
After Visit Summary   6/6/2017    Jacklyn Hein    MRN: [id number]           Patient Information     Date Of Birth          1935        Visit Information        Provider Department      6/6/2017 10:00 AM Min Newell MD [facility]        Today's Diagnoses     Chronic obstructive pulmonary disease with acute lower respiratory infection (H)    -  1    Other emphysema (H)           Follow-ups after your visit        Your next 10 appointments already scheduled     Jun 27, 2017  1:00 PM CDT   Return Visit with Min Newell MD   [facility] ([facility] )    [address] [address]   [phone]            Jun 27, 2017  1:30 PM CDT   (Arrive by 1:15 PM)   Return Visit with [facility] ([facility])    [address] [facility] [address]   [phone]            Jun 01, 2018  1:00 PM CDT   (Arrive by 12:45 PM)   Return Visit with Kodi Garcia DO   [facility] ([facility])    [address] [place] [address]   [phone]              Who to contact     If you have questions or need follow up information about today's clinic visit or your schedule please contact [facility] directly at [phone].  Normal or non-critical lab and imaging results will be communicated to you by MyChart, letter or phone within 4 business days after the clinic has received the results. If you do not hear from us within 7 days, please contact the clinic through MyChart or phone. If you have a critical or abnormal lab result, we will notify you by phone as soon as possible.  Submit refill requests through Rezora or call your pharmacy and they will forward the refill request to us. Please allow 3 business days for your refill to be completed.          Additional Information About Your Visit        [employer] Information     Rezora gives you secure access to your electronic health record. If you see a primary care  "provider, you can also send messages to your care team and make appointments. If you have questions, please call your primary care clinic.  If you do not have a primary care provider, please call [phone] and they will assist you.        Care EveryWhere ID     This is your Care EveryWhere ID. This could be used by other organizations to access your [place] medical records  [id number]        Your Vitals Were     Pulse Respirations Height Pulse Oximetry BMI (Body Mass Index)       75 16 5' 5\" (1.651 m) 95% 27.96 kg/m2        Blood Pressure from Last 3 Encounters:   06/06/17 98/58   05/31/17 108/56   05/03/17 108/60    Weight from Last 3 Encounters:   06/06/17 168 lb (76.2 kg)   05/31/17 170 lb (77.1 kg)   05/03/17 170 lb (77.1 kg)              Today, you had the following     No orders found for display         Today's Medication Changes          These changes are accurate as of: 6/6/17 12:24 PM.  If you have any questions, ask your nurse or doctor.               Start taking these medicines.        Dose/Directions    albuterol 108 (90 BASE) MCG/ACT Inhaler   Commonly known as:  PROAIR HFA/PROVENTIL HFA/VENTOLIN HFA   Used for:  Other emphysema (H)        Dose:  2 puff   Inhale 2 puffs into the lungs every 6 hours as needed for shortness of breath / dyspnea or wheezing   Quantity:  1 Inhaler   Refills:  1       budesonide-formoterol 160-4.5 MCG/ACT Inhaler   Commonly known as:  SYMBICORT   Used for:  Other emphysema (H)        Dose:  2 puff   Inhale 2 puffs into the lungs 2 times daily   Quantity:  1 Inhaler   Refills:  1            Where to get your medicines      These medications were sent to [facility] - [address]  [address] [place] [address]     Phone:  [phone]     albuterol 108 (90 BASE) MCG/ACT Inhaler    budesonide-formoterol 160-4.5 MCG/ACT Inhaler                Primary Care Provider Office Phone # Fax #    R Josh Post [phone]    "    [facility] [place] [address] [facility]  [place] [address]        Thank you!     Thank you for choosing [facility]  for your care. Our goal is always to provide you with excellent care. Hearing back from our patients is one way we can continue to improve our services. Please take a few minutes to complete the written survey that you may receive in the mail after your visit with us. Thank you!             Your Updated Medication List - Protect others around you: Learn how to safely use, store and throw away your medicines at www.disposemymeds.org.          This list is accurate as of: 6/6/17 12:24 PM.  Always use your most recent med list.                   Brand Name Dispense Instructions for use    ACETAMINOPHEN PM  MG tablet   Generic drug:  diphenhydrAMINE-acetaminophen      Take 2 tablets by mouth nightly as needed.       albuterol 108 (90 BASE) MCG/ACT Inhaler    PROAIR HFA/PROVENTIL HFA/VENTOLIN HFA    1 Inhaler    Inhale 2 puffs into the lungs every 6 hours as needed for shortness of breath / dyspnea or wheezing       alendronate 70 MG tablet    FOSAMAX    12 tablet    TAKE 1 TABLET BY MOUTH EVERY 7 DAYS. TAKE WITH 8 OUNCES OF WATER AND STAY UPRIGHT FOR AT LEAST 30 MINUTES AFTER TAKING.       aspirin 81 MG tablet      Take 1 tablet by mouth daily       budesonide-formoterol 160-4.5 MCG/ACT Inhaler    SYMBICORT    1 Inhaler    Inhale 2 puffs into the lungs 2 times daily       CALCIUM 600 + D PO      Take 1 tablet by mouth daily       carvedilol 12.5 MG tablet    COREG    180 tablet    TAKE 1 TABLET BY MOUTH 2 TIMES DAILY WITH MEALS       ibuprofen 600 MG tablet    ADVIL/MOTRIN    30 tablet    TAKE 1 TABLET BY MOUTH EVERY 6 HOURS AS NEEDED FOR MODERATE TO SEVEREP AIN       lisinopril 2.5 MG tablet    PRINIVIL/Zestril    91 tablet    Take 1 tablet (2.5 mg) by mouth daily       MULTI-VITAMIN DAILY PO      Take 1 tablet by mouth daily       * order for DME     1 Device    Equipment being ordered:  Lumbar brace       * order for DME     1 each    Equipment being ordered: Walker       spironolactone 25 MG tablet    ALDACTONE    45 tablet    TAKE 1/2 TABLET BY MOUTH DAILY       Vitamin D3 2000 UNITS Tabs      Take 1 tablet by mouth daily       * Notice:  This list has 2 medication(s) that are the same as other medications prescribed for you. Read the directions carefully, and ask your doctor or other care provider to review them with you.

## 2017-06-07 RX ORDER — IBUPROFEN 600 MG/1
TABLET, FILM COATED ORAL
Qty: 30 TABLET | Refills: 3 | Status: SHIPPED | OUTPATIENT
Start: 2017-06-07 | End: 2017-11-27

## 2017-06-07 RX ORDER — ALENDRONATE SODIUM 70 MG/1
TABLET ORAL
Qty: 12 TABLET | Refills: 1 | Status: SHIPPED | OUTPATIENT
Start: 2017-06-07 | End: 2018-02-26

## 2017-06-29 ENCOUNTER — TELEPHONE (OUTPATIENT)
Dept: FAMILY MEDICINE | Facility: OTHER | Age: 82
End: 2017-06-29

## 2017-06-29 DIAGNOSIS — M54.50 NONSPECIFIC LOW BACK PAIN: Primary | ICD-10-CM

## 2017-07-05 ENCOUNTER — OFFICE VISIT (OUTPATIENT)
Dept: SLEEP MEDICINE | Facility: HOSPITAL | Age: 82
End: 2017-07-05
Attending: INTERNAL MEDICINE
Payer: MEDICARE

## 2017-07-05 VITALS
DIASTOLIC BLOOD PRESSURE: 68 MMHG | HEART RATE: 64 BPM | SYSTOLIC BLOOD PRESSURE: 120 MMHG | OXYGEN SATURATION: 96 % | RESPIRATION RATE: 12 BRPM

## 2017-07-05 DIAGNOSIS — J45.30 MILD PERSISTENT ASTHMA WITHOUT COMPLICATION: Primary | ICD-10-CM

## 2017-07-05 PROCEDURE — 99211 OFF/OP EST MAY X REQ PHY/QHP: CPT

## 2017-07-05 PROCEDURE — 99212 OFFICE O/P EST SF 10 MIN: CPT | Performed by: INTERNAL MEDICINE

## 2017-07-05 NOTE — PROGRESS NOTES
Nice response to Symbicort, dyspnea largely resolved suggesting asthma. Rarely uses her rescue inhaler. Some mild hoarseness.  /68  Pulse 64  Resp 12  SpO2 96%  resp no wheeze    A/ Prob asthma cont symbicort  F/u prn.

## 2017-07-05 NOTE — MR AVS SNAPSHOT
After Visit Summary   7/5/2017    Jacklyn Hein    MRN: [id number]           Patient Information     Date Of Birth          1935        Visit Information        Provider Department      7/5/2017 11:00 AM Min Newell MD [facility]        Today's Diagnoses     Mild persistent asthma without complication    -  1       Follow-ups after your visit        Your next 10 appointments already scheduled     Oct 10, 2017 11:30 AM CDT   (Arrive by 11:15 AM)   Return Visit with HC PACEMAKER   [facility] [place] ([facility] [place] )    [address]  [place] [address]   [phone]            Jun 01, 2018  1:00 PM CDT   (Arrive by 12:45 PM)   Return Visit with Kodi Garcia,    [facility] [place] ([facility] [place] )    [address]  [place] [address]   [phone]              Who to contact     If you have questions or need follow up information about today's clinic visit or your schedule please contact [facility] directly at [phone].  Normal or non-critical lab and imaging results will be communicated to you by Witelhart, letter or phone within 4 business days after the clinic has received the results. If you do not hear from us within 7 days, please contact the clinic through Sensoristt or phone. If you have a critical or abnormal lab result, we will notify you by phone as soon as possible.  Submit refill requests through Cloutex or call your pharmacy and they will forward the refill request to us. Please allow 3 business days for your refill to be completed.          Additional Information About Your Visit        MyChart Information     [employer] gives you secure access to your electronic health record. If you see a primary care provider, you can also send messages to your care team and make appointments. If you have questions, please call your primary care clinic.  If you do not have a primary care provider, please call [phone]  and they will assist you.        Care EveryWhere ID     This is your Care EveryWhere ID. This could be used by other organizations to access your [place] medical records  [id number]        Your Vitals Were     Pulse Respirations Pulse Oximetry             64 12 96%          Blood Pressure from Last 3 Encounters:   07/05/17 120/68   06/06/17 98/58   05/31/17 108/56    Weight from Last 3 Encounters:   06/06/17 168 lb (76.2 kg)   05/31/17 170 lb (77.1 kg)   05/03/17 170 lb (77.1 kg)              Today, you had the following     No orders found for display       Primary Care Provider Office Phone # Fax #    R Josh Post [phone]       [facility] [place] [address] [place] [address]        Equal Access to Services     ANA MITCHELL : Hadii aad ku hadasho Soheidy, waaxda luqadaha, qaybta kaalmada troyyasoniya, pastor pérez . So [facility] [phone].    ATENCIÓN: Si habla español, tiene a jacome disposición servicios gratuitos de asistencia lingüística. June al [phone].    We comply with applicable federal civil rights laws and [place] laws. We do not discriminate on the basis of race, color, national origin, age, disability sex, sexual orientation or gender identity.            Thank you!     Thank you for choosing [facility]  for your care. Our goal is always to provide you with excellent care. Hearing back from our patients is one way we can continue to improve our services. Please take a few minutes to complete the written survey that you may receive in the mail after your visit with us. Thank you!             Your Updated Medication List - Protect others around you: Learn how to safely use, store and throw away your medicines at www.disposemymeds.org.          This list is accurate as of: 7/5/17 11:14 AM.  Always use your most recent med list.                   Brand Name Dispense Instructions for use Diagnosis    ACETAMINOPHEN PM  MG tablet    Generic drug:  diphenhydrAMINE-acetaminophen      Take 2 tablets by mouth nightly as needed.        albuterol 108 (90 BASE) MCG/ACT Inhaler    PROAIR HFA/PROVENTIL HFA/VENTOLIN HFA    1 Inhaler    Inhale 2 puffs into the lungs every 6 hours as needed for shortness of breath / dyspnea or wheezing    Other emphysema (H)       alendronate 70 MG tablet    FOSAMAX    12 tablet    TAKE 1 TABLET BY MOUTH EVERY 7 DAYS. TAKE WITH 8 OUNCES OF WATER AND S SUSHMA UPRIGHT FOR AT LEAST 30 MINUTES AFTER TAKING.    Osteoporosis       aspirin 81 MG tablet      Take 1 tablet by mouth daily        budesonide-formoterol 160-4.5 MCG/ACT Inhaler    SYMBICORT    1 Inhaler    Inhale 2 puffs into the lungs 2 times daily    Other emphysema (H)       CALCIUM 600 + D PO      Take 1 tablet by mouth daily        carvedilol 12.5 MG tablet    COREG    180 tablet    TAKE 1 TABLET BY MOUTH 2 TIMES DAILY WITH MEALS    Non-ischemic cardiomyopathy (H)       ibuprofen 600 MG tablet    ADVIL/MOTRIN    30 tablet    TAKE 1 TABLET BY MOUTH EVERY 6 HOURS AS NEEDED FOR MODERATE TO SEVEREPAIN    Midline low back pain without sciatica       lisinopril 2.5 MG tablet    PRINIVIL/Zestril    91 tablet    Take 1 tablet (2.5 mg) by mouth daily    Non-ischemic cardiomyopathy (H)       MULTI-VITAMIN DAILY PO      Take 1 tablet by mouth daily        * order for DME     1 Device    Equipment being ordered: Lumbar brace    Midline low back pain without sciatica       * order for DME     1 each    Equipment being ordered: Walker    Chronic midline low back pain without sciatica, CHF (congestive heart failure), NYHA class II, chronic, systolic (H)       spironolactone 25 MG tablet    ALDACTONE    45 tablet    TAKE 1/2 TABLET BY MOUTH DAILY    Primary pulmonary hypertension (H)       Vitamin D3 2000 UNITS Tabs      Take 1 tablet by mouth daily        * Notice:  This list has 2 medication(s) that are the same as other medications prescribed for you. Read the directions  carefully, and ask your doctor or other care provider to review them with you.

## 2017-07-11 ENCOUNTER — ALLIED HEALTH/NURSE VISIT (OUTPATIENT)
Dept: CARDIOLOGY | Facility: CLINIC | Age: 82
End: 2017-07-11
Attending: INTERNAL MEDICINE
Payer: MEDICARE

## 2017-07-11 DIAGNOSIS — I42.8 NON-ISCHEMIC CARDIOMYOPATHY (H): Primary | ICD-10-CM

## 2017-07-11 PROCEDURE — 93295 DEV INTERROG REMOTE 1/2/MLT: CPT | Performed by: INTERNAL MEDICINE

## 2017-07-11 PROCEDURE — 93296 REM INTERROG EVL PM/IDS: CPT | Mod: ZF

## 2017-07-11 NOTE — PROGRESS NOTES
Pt sent in a routine remote ICD check for evaluation per MD order.  Her ICD check does not show any episodes of atrial or ventricular arrhythmias, she bi V paces 97.5% of the time, her heart rate histograms show good heart rate variation and her optivol is at baseline.  Her ICD battery estimates 6.4 years left and her ICD is functioning normally.  Pt was called with the results and she reports feeling well and she does not offer any complaints.  We will see her back in clinic on 10/10/17 @ 11:30 at the [place] location.      Remote ICD  .

## 2017-07-11 NOTE — MR AVS SNAPSHOT
After Visit Summary   7/11/2017    Jacklyn Hein    MRN: [id number]           Patient Information     Date Of Birth          1935        Visit Information        Provider Department      7/11/2017 6:00 AM UC ICD REMOTE [facility]        Today's Diagnoses     Non-ischemic cardiomyopathy (H)    -  1       Follow-ups after your visit        Your next 10 appointments already scheduled     Oct 10, 2017 11:30 AM CDT   (Arrive by 11:15 AM)   Return Visit with HC PACEMAKER   [facility] [place] ([facility] [place] )    [address]  [place] [address]   [phone]            Jun 01, 2018  1:00 PM CDT   (Arrive by 12:45 PM)   Return Visit with Kodi Garcia, DO   [facility] [place] ([facility] [place] )    [address]  [place] [address]   [phone]              Who to contact     If you have questions or need follow up information about today's clinic visit or your schedule please contact [facility] directly at [phone].  Normal or non-critical lab and imaging results will be communicated to you by [employer], letter or phone within 4 business days after the clinic has received the results. If you do not hear from us within 7 days, please contact the clinic through [employer] or phone. If you have a critical or abnormal lab result, we will notify you by phone as soon as possible.  Submit refill requests through [employer] or call your pharmacy and they will forward the refill request to us. Please allow 3 business days for your refill to be completed.          Additional Information About Your Visit        [employer] Information     [employer] gives you secure access to your electronic health record. If you see a primary care provider, you can also send messages to your care team and make appointments. If you have questions, please call your primary care clinic.  If you do not have a primary care provider, please call [phone] and  they will assist you.        Care EveryWhere ID     This is your Care EveryWhere ID. This could be used by other organizations to access your [facility] medical records  [id number]         Blood Pressure from Last 3 Encounters:   07/05/17 120/68   06/06/17 98/58   05/31/17 108/56    Weight from Last 3 Encounters:   06/06/17 76.2 kg (168 lb)   05/31/17 77.1 kg (170 lb)   05/03/17 77.1 kg (170 lb)              We Performed the Following     INTERROGATION DEVICE EVAL REMOTE, PACER/ICD        Primary Care Provider Office Phone # Fax #    R Josh Post [phone]       [facility] [place] [address] [place] [address]        Equal Access to Services     ANA MITCHELL : Hadii aad ku hadasho Soomaali, waaxda luqadaha, qaybta kaalmada adeegyada, waxay idiin hayaan troy pérez . So [facility] [phone].    ATENCIÓN: Si habla español, tiene a jacome disposición servicios gratuitos de asistencia lingüística. Llame al [phone].    We comply with applicable federal civil rights laws and [place] laws. We do not discriminate on the basis of race, color, national origin, age, disability sex, sexual orientation or gender identity.            Thank you!     Thank you for choosing [facility]  for your care. Our goal is always to provide you with excellent care. Hearing back from our patients is one way we can continue to improve our services. Please take a few minutes to complete the written survey that you may receive in the mail after your visit with us. Thank you!             Your Updated Medication List - Protect others around you: Learn how to safely use, store and throw away your medicines at www.disposemymeds.org.          This list is accurate as of: 7/11/17  8:21 AM.  Always use your most recent med list.                   Brand Name Dispense Instructions for use Diagnosis    ACETAMINOPHEN PM  MG tablet   Generic drug:  diphenhydrAMINE-acetaminophen      Take 2 tablets  by mouth nightly as needed.        albuterol 108 (90 BASE) MCG/ACT Inhaler    PROAIR HFA/PROVENTIL HFA/VENTOLIN HFA    1 Inhaler    Inhale 2 puffs into the lungs every 6 hours as needed for shortness of breath / dyspnea or wheezing    Other emphysema (H)       alendronate 70 MG tablet    FOSAMAX    12 tablet    TAKE 1 TABLET BY MOUTH EVERY 7 DAYS. TAKE WITH 8 OUNCES OF WATER AND S SUSHMA UPRIGHT FOR AT LEAST 30 MINUTES AFTER TAKING.    Osteoporosis       aspirin 81 MG tablet      Take 1 tablet by mouth daily        budesonide-formoterol 160-4.5 MCG/ACT Inhaler    SYMBICORT    1 Inhaler    Inhale 2 puffs into the lungs 2 times daily    Other emphysema (H)       CALCIUM 600 + D PO      Take 1 tablet by mouth daily        carvedilol 12.5 MG tablet    COREG    180 tablet    TAKE 1 TABLET BY MOUTH 2 TIMES DAILY WITH MEALS    Non-ischemic cardiomyopathy (H)       ibuprofen 600 MG tablet    ADVIL/MOTRIN    30 tablet    TAKE 1 TABLET BY MOUTH EVERY 6 HOURS AS NEEDED FOR MODERATE TO SEVEREPAIN    Midline low back pain without sciatica       lisinopril 2.5 MG tablet    PRINIVIL/Zestril    91 tablet    Take 1 tablet (2.5 mg) by mouth daily    Non-ischemic cardiomyopathy (H)       MULTI-VITAMIN DAILY PO      Take 1 tablet by mouth daily        * order for DME     1 Device    Equipment being ordered: Lumbar brace    Midline low back pain without sciatica       * order for DME     1 each    Equipment being ordered: Walker    Chronic midline low back pain without sciatica, CHF (congestive heart failure), NYHA class II, chronic, systolic (H)       spironolactone 25 MG tablet    ALDACTONE    45 tablet    TAKE 1/2 TABLET BY MOUTH DAILY    Primary pulmonary hypertension (H)       Vitamin D3 2000 UNITS Tabs      Take 1 tablet by mouth daily        * Notice:  This list has 2 medication(s) that are the same as other medications prescribed for you. Read the directions carefully, and ask your doctor or other care provider to review them with  you.

## 2017-07-31 DIAGNOSIS — I42.8 NON-ISCHEMIC CARDIOMYOPATHY (H): ICD-10-CM

## 2017-08-02 RX ORDER — CARVEDILOL 12.5 MG/1
TABLET ORAL
Qty: 180 TABLET | Refills: 2 | Status: SHIPPED | OUTPATIENT
Start: 2017-08-02 | End: 2018-04-26

## 2017-10-05 DIAGNOSIS — I42.8 NON-ISCHEMIC CARDIOMYOPATHY (H): ICD-10-CM

## 2017-10-05 RX ORDER — LISINOPRIL 2.5 MG/1
2.5 TABLET ORAL DAILY
Qty: 90 TABLET | Refills: 1 | Status: SHIPPED | OUTPATIENT
Start: 2017-10-05 | End: 2018-03-28

## 2017-10-10 ENCOUNTER — OFFICE VISIT (OUTPATIENT)
Dept: CARDIOLOGY | Facility: OTHER | Age: 82
End: 2017-10-10
Attending: INTERNAL MEDICINE
Payer: COMMERCIAL

## 2017-10-10 DIAGNOSIS — I42.8 NON-ISCHEMIC CARDIOMYOPATHY (H): Primary | ICD-10-CM

## 2017-10-10 PROCEDURE — 93284 PRGRMG EVAL IMPLANTABLE DFB: CPT | Mod: TC

## 2017-10-10 PROCEDURE — 93284 PRGRMG EVAL IMPLANTABLE DFB: CPT | Mod: 26 | Performed by: INTERNAL MEDICINE

## 2017-10-10 NOTE — PROGRESS NOTES
Patient seen in clinic for evaluation and iterative programming of her Medtronic multi lead ICD per MD orders.  Normal ICD function.  6 AT/AF episodes recorded - 15 seconds in duration.  No ventricular arrythmias recorded.  Intrinsic rhythm = NSR @ 65 bpm.  AP = 4.5%.  BVP = 97.8%.  OptiVol fluid index is near baseline.  Estimated battery longevity to JAMIE = 6.1 years.  Patient reports that she is feeling much better after being diagnosed with asthma and started on inhalers.  Plan for patient to send a remote transmission in 3 months and return to clinic in 6 months.    Multi lead ICD

## 2017-10-10 NOTE — MR AVS SNAPSHOT
After Visit Summary   10/10/2017    Jacklyn Hein    MRN: [id number]           Patient Information     Date Of Birth          1935        Visit Information        Provider Department      10/10/2017 11:30 AM HC PACEMAKER [facility] [place]        Today's Diagnoses     Non-ischemic cardiomyopathy (H)    -  1      Care Instructions    It was a pleasure to see you in clinic today.  Please do not hesitate to call with any questions or concerns.  You are scheduled for a remote transmission on 1/11/18.  We look forward to seeing you in clinic at your next device check in 6 months.    Desiree Luevano, RN, MS, CCRN  Electrophysiology Nurse Clinician  [facility]    During Business Hours Please Call:  [phone]  After Hours Please Call:  [phone] - select option #4 and ask for job code [id number]                        Follow-ups after your visit        Follow-up notes from your care team     Return in about 6 months (around 4/10/2018) for ICD Check.      Your next 10 appointments already scheduled     Apr 10, 2018  1:00 PM CDT   (Arrive by 12:45 PM)   Return Visit with HC PACEMAKER   [facility] [place] ([facility] [place] )    [address]  [place] [address]   [phone]            Jun 01, 2018  1:00 PM CDT   (Arrive by 12:45 PM)   Return Visit with Kodi Garcia, DO   [facility] [place] ([facility] [place] )    [address]  [place] [address]   [phone]              Who to contact     If you have questions or need follow up information about today's clinic visit or your schedule please contact [facility] directly at [phone].  Normal or non-critical lab and imaging results will be communicated to you by MyChart, letter or phone within 4 business days after the clinic has received the results. If you do not hear from us within 7 days, please contact the clinic through MyChart or phone. If you  have a critical or abnormal lab result, we will notify you by phone as soon as possible.  Submit refill requests through [employer] or call your pharmacy and they will forward the refill request to us. Please allow 3 business days for your refill to be completed.          Additional Information About Your Visit        [employer] Information     [employer] gives you secure access to your electronic health record. If you see a primary care provider, you can also send messages to your care team and make appointments. If you have questions, please call your primary care clinic.  If you do not have a primary care provider, please call [phone] and they will assist you.        Care EveryWhere ID     This is your Care EveryWhere ID. This could be used by other organizations to access your [facility] medical records  [id number]         Blood Pressure from Last 3 Encounters:   07/05/17 120/68   06/06/17 98/58   05/31/17 108/56    Weight from Last 3 Encounters:   06/06/17 76.2 kg (168 lb)   05/31/17 77.1 kg (170 lb)   05/03/17 77.1 kg (170 lb)              We Performed the Following     ICD DEVICE PROGRAMMING EVAL, MULTI LEAD ICD        Primary Care Provider Office Phone # Fax #    R Josh Post [phone]       [facility] [place] [address] [place] [address]        Equal Access to Services     BETH MITCHELL : Hadii aad ku hadasho Soomaali, waaxda luqadaha, qaybta kaalmada adeegyada, waxay idiin hayeliud pérez . So [facility] [phone].    ATENCIÓN: Si habla español, tiene a jacome disposición servicios gratuitos de asistencia lingüística. Llame al [phone].    We comply with applicable federal civil rights laws and [place] laws. We do not discriminate on the basis of race, color, national origin, age, disability, sex, sexual orientation, or gender identity.            Thank you!     [facility]. Our goal is always to provide you  with excellent care. Hearing back from our patients is one way we can continue to improve our services. Please take a few minutes to complete the written survey that you may receive in the mail after your visit with us. Thank you!             Your Updated Medication List - Protect others around you: Learn how to safely use, store and throw away your medicines at www.disposemymeds.org.          This list is accurate as of: 10/10/17 12:41 PM.  Always use your most recent med list.                   Brand Name Dispense Instructions for use Diagnosis    ACETAMINOPHEN PM  MG tablet   Generic drug:  diphenhydrAMINE-acetaminophen      Take 2 tablets by mouth nightly as needed.        albuterol 108 (90 BASE) MCG/ACT Inhaler    PROAIR HFA/PROVENTIL HFA/VENTOLIN HFA    1 Inhaler    Inhale 2 puffs into the lungs every 6 hours as needed for shortness of breath / dyspnea or wheezing    Other emphysema (H)       alendronate 70 MG tablet    FOSAMAX    12 tablet    TAKE 1 TABLET BY MOUTH EVERY 7 DAYS. TAKE WITH 8 OUNCES OF WATER AND S SUSHMA UPRIGHT FOR AT LEAST 30 MINUTES AFTER TAKING.    Osteoporosis       aspirin 81 MG tablet      Take 1 tablet by mouth daily        budesonide-formoterol 160-4.5 MCG/ACT Inhaler    SYMBICORT    1 Inhaler    Inhale 2 puffs into the lungs 2 times daily    Other emphysema (H)       CALCIUM 600 + D PO      Take 1 tablet by mouth daily        carvedilol 12.5 MG tablet    COREG    180 tablet    TAKE 1 TABLET BY MOUTH 2 TIMES DAILY WITH MEALS    Non-ischemic cardiomyopathy (H)       ibuprofen 600 MG tablet    ADVIL/MOTRIN    30 tablet    TAKE 1 TABLET BY MOUTH EVERY 6 HOURS AS NEEDED FOR MODERATE TO SEVEREPAIN    Midline low back pain without sciatica       lisinopril 2.5 MG tablet    PRINIVIL/Zestril    90 tablet    Take 1 tablet (2.5 mg) by mouth daily    Non-ischemic cardiomyopathy (H)       MULTI-VITAMIN DAILY PO      Take 1 tablet by mouth daily        * order for DME     1 Device    Equipment  being ordered: Lumbar brace    Midline low back pain without sciatica       * order for DME     1 each    Equipment being ordered: Walker    Chronic midline low back pain without sciatica, CHF (congestive heart failure), NYHA class II, chronic, systolic (H)       spironolactone 25 MG tablet    ALDACTONE    45 tablet    TAKE 1/2 TABLET BY MOUTH DAILY    Primary pulmonary hypertension (H)       Vitamin D3 2000 UNITS Tabs      Take 1 tablet by mouth daily        * Notice:  This list has 2 medication(s) that are the same as other medications prescribed for you. Read the directions carefully, and ask your doctor or other care provider to review them with you.

## 2017-10-17 DIAGNOSIS — J43.8 OTHER EMPHYSEMA (H): ICD-10-CM

## 2017-10-17 RX ORDER — ALBUTEROL SULFATE 90 UG/1
2 AEROSOL, METERED RESPIRATORY (INHALATION) EVERY 6 HOURS PRN
Qty: 1 INHALER | Refills: 1 | Status: SHIPPED | OUTPATIENT
Start: 2017-10-17 | End: 2020-02-28

## 2017-11-03 DIAGNOSIS — J43.8 OTHER EMPHYSEMA (H): ICD-10-CM

## 2017-11-07 RX ORDER — BUDESONIDE AND FORMOTEROL FUMARATE DIHYDRATE 160; 4.5 UG/1; UG/1
2 AEROSOL RESPIRATORY (INHALATION) 2 TIMES DAILY
Qty: 1 INHALER | Refills: 1 | Status: SHIPPED | OUTPATIENT
Start: 2017-11-07 | End: 2018-01-09

## 2017-11-27 DIAGNOSIS — I27.0 PRIMARY PULMONARY HYPERTENSION (H): ICD-10-CM

## 2017-11-27 DIAGNOSIS — G89.29 CHRONIC MIDLINE LOW BACK PAIN WITHOUT SCIATICA: Primary | ICD-10-CM

## 2017-11-27 DIAGNOSIS — M54.50 MIDLINE LOW BACK PAIN WITHOUT SCIATICA: ICD-10-CM

## 2017-11-27 DIAGNOSIS — M54.50 CHRONIC MIDLINE LOW BACK PAIN WITHOUT SCIATICA: Primary | ICD-10-CM

## 2017-11-30 RX ORDER — IBUPROFEN 600 MG/1
TABLET, FILM COATED ORAL
Qty: 30 TABLET | Refills: 1 | Status: SHIPPED | OUTPATIENT
Start: 2017-11-30 | End: 2018-01-02

## 2017-11-30 RX ORDER — SPIRONOLACTONE 25 MG/1
TABLET ORAL
Qty: 45 TABLET | Refills: 1 | Status: SHIPPED | OUTPATIENT
Start: 2017-11-30 | End: 2018-06-02

## 2017-11-30 NOTE — TELEPHONE ENCOUNTER
Patient states it wasn't her that called it was the pharmacy. Needs ibuprofen filled.  NURYS COTTO

## 2017-11-30 NOTE — TELEPHONE ENCOUNTER
Ibuprofen 600      Last Written Prescription Date: 6/7/2017  Last Fill Quantity: 30,  # refills: 3   Last Office Visit with [facility] or [facility] prescribing provider: 11/29/2016, Dr. FERNANDA Post, 5/31/2017 Dr. Garcia    spironolactone (ALDACTONE)  Last Written Prescription Date: 1/6/2017  Last Fill Quantity: 45,  # refills: 3   Last Office Visit with [facility], [facility] or [facility] prescribing provider: 11/29/2016 Dr FERNANDA Post, 5/31/2017 Dr. Garcia

## 2018-01-02 DIAGNOSIS — M54.50 CHRONIC MIDLINE LOW BACK PAIN WITHOUT SCIATICA: ICD-10-CM

## 2018-01-02 DIAGNOSIS — G89.29 CHRONIC MIDLINE LOW BACK PAIN WITHOUT SCIATICA: ICD-10-CM

## 2018-01-03 RX ORDER — IBUPROFEN 600 MG/1
TABLET, FILM COATED ORAL
Qty: 30 TABLET | Refills: 1 | Status: SHIPPED | OUTPATIENT
Start: 2018-01-03 | End: 2018-02-26

## 2018-01-03 NOTE — TELEPHONE ENCOUNTER
Ibuprofen       Last Written Prescription Date: 1  Last Fill Quantity: 11/30/2017,  # refills: 30   Last Office Visit with [facility] or [facility] prescribing provider: 5/02/2017

## 2018-01-09 DIAGNOSIS — J43.8 OTHER EMPHYSEMA (H): ICD-10-CM

## 2018-01-09 RX ORDER — BUDESONIDE AND FORMOTEROL FUMARATE DIHYDRATE 160; 4.5 UG/1; UG/1
2 AEROSOL RESPIRATORY (INHALATION) 2 TIMES DAILY
Qty: 1 INHALER | Refills: 1 | Status: SHIPPED | OUTPATIENT
Start: 2018-01-09 | End: 2018-03-14

## 2018-01-11 ENCOUNTER — ALLIED HEALTH/NURSE VISIT (OUTPATIENT)
Dept: CARDIOLOGY | Facility: CLINIC | Age: 83
End: 2018-01-11
Attending: INTERNAL MEDICINE
Payer: MEDICARE

## 2018-01-11 DIAGNOSIS — I50.22 CHRONIC SYSTOLIC CONGESTIVE HEART FAILURE, NYHA CLASS 2 (H): Primary | ICD-10-CM

## 2018-01-11 PROCEDURE — 93296 REM INTERROG EVL PM/IDS: CPT | Mod: ZF

## 2018-01-11 PROCEDURE — 93295 DEV INTERROG REMOTE 1/2/MLT: CPT | Performed by: INTERNAL MEDICINE

## 2018-01-11 NOTE — MR AVS SNAPSHOT
After Visit Summary   1/11/2018    Jacklyn Hein    MRN: [id number]           Patient Information     Date Of Birth          1935        Visit Information        Provider Department      1/11/2018 6:00 AM UC ICD REMOTE [facility]        Today's Diagnoses     Chronic systolic congestive heart failure, NYHA class 2 (H)    -  1       Follow-ups after your visit        Your next 10 appointments already scheduled     Apr 10, 2018  1:00 PM CDT   (Arrive by 12:45 PM)   Return Visit with HC PACEMAKER   [facility] [place] ([facility] [place] )    [address]  [place] [address]   [phone]            Jun 01, 2018  1:00 PM CDT   (Arrive by 12:45 PM)   Return Visit with Kodi Garcia,    [facility] [place] ([facility] [place] )    [address]  [place] [address]   [phone]              Who to contact     If you have questions or need follow up information about today's clinic visit or your schedule please contact [facility] directly at [phone].  Normal or non-critical lab and imaging results will be communicated to you by [employer], letter or phone within 4 business days after the clinic has received the results. If you do not hear from us within 7 days, please contact the clinic through [employer] or phone. If you have a critical or abnormal lab result, we will notify you by phone as soon as possible.  Submit refill requests through [employer] or call your pharmacy and they will forward the refill request to us. Please allow 3 business days for your refill to be completed.          Additional Information About Your Visit        MyChart Information     [employer] gives you secure access to your electronic health record. If you see a primary care provider, you can also send messages to your care team and make appointments. If you have questions, please call your primary care clinic.  If you do not have a primary care provider,  please call [phone] and they will assist you.        Care EveryWhere ID     This is your Care EveryWhere ID. This could be used by other organizations to access your [facility] medical records  [id number]         Blood Pressure from Last 3 Encounters:   07/05/17 120/68   06/06/17 98/58   05/31/17 108/56    Weight from Last 3 Encounters:   06/06/17 76.2 kg (168 lb)   05/31/17 77.1 kg (170 lb)   05/03/17 77.1 kg (170 lb)              We Performed the Following     INTERROGATION DEVICE EVAL REMOTE, PACER/ICD        Primary Care Provider Office Phone # Fax #    R Josh Post [phone]       [facility] [place] [address] [place], [place] [id number]        Equal Access to Services     [facility] STEPHEN : Hadii aad ku hadasho Soomaali, waaxda luqadaha, qaybta kaalmada adeegyada, pastor rangel hayeliud pérez . So [facility] [place] [facility] [phone].    ATENCIÓN: Si habla español, tiene a jacome disposición servicios gratuitos de asistencia lingüística. [facility] [phone].    We comply with applicable federal civil rights laws and [place] laws. We do not discriminate on the basis of race, color, national origin, age, disability, sex, sexual orientation, or gender identity.            Thank you!     Thank you for choosing [facility]  for your care. Our goal is always to provide you with excellent care. Hearing back from our patients is one way we can continue to improve our services. Please take a few minutes to complete the written survey that you may receive in the mail after your visit with us. Thank you!             Your Updated Medication List - Protect others around you: Learn how to safely use, store and throw away your medicines at www.disposemymeds.org.          This list is accurate as of: 1/11/18 10:11 AM.  Always use your most recent med list.                   Brand Name Dispense Instructions for use Diagnosis    ACETAMINOPHEN PM  MG tablet   Generic drug:   diphenhydrAMINE-acetaminophen      Take 2 tablets by mouth nightly as needed.        albuterol 108 (90 BASE) MCG/ACT Inhaler    PROAIR HFA/PROVENTIL HFA/VENTOLIN HFA    1 Inhaler    Inhale 2 puffs into the lungs every 6 hours as needed for shortness of breath / dyspnea or wheezing    Other emphysema (H)       alendronate 70 MG tablet    FOSAMAX    12 tablet    TAKE 1 TABLET BY MOUTH EVERY 7 DAYS. TAKE WITH 8 OUNCES OF WATER AND S SUSHMA UPRIGHT FOR AT LEAST 30 MINUTES AFTER TAKING.    Osteoporosis       aspirin 81 MG tablet      Take 1 tablet by mouth daily        budesonide-formoterol 160-4.5 MCG/ACT Inhaler    SYMBICORT    1 Inhaler    Inhale 2 puffs into the lungs 2 times daily    Other emphysema (H)       CALCIUM 600 + D PO      Take 1 tablet by mouth daily        carvedilol 12.5 MG tablet    COREG    180 tablet    TAKE 1 TABLET BY MOUTH 2 TIMES DAILY WITH MEALS    Non-ischemic cardiomyopathy (H)       ibuprofen 600 MG tablet    ADVIL/MOTRIN    30 tablet    TAKE 1 TABLET BY MOUTH EVERY 6 HOURS AS NEEDED FOR MODERATE TO SEVEREPAIN    Chronic midline low back pain without sciatica       lisinopril 2.5 MG tablet    PRINIVIL/Zestril    90 tablet    Take 1 tablet (2.5 mg) by mouth daily    Non-ischemic cardiomyopathy (H)       MULTI-VITAMIN DAILY PO      Take 1 tablet by mouth daily        * order for DME     1 Device    Equipment being ordered: Lumbar brace    Midline low back pain without sciatica       * order for DME     1 each    Equipment being ordered: Walker    Chronic midline low back pain without sciatica, CHF (congestive heart failure), NYHA class II, chronic, systolic (H)       spironolactone 25 MG tablet    ALDACTONE    45 tablet    TAKE 1/2 TABLET BY MOUTH DAILY    Primary pulmonary hypertension (H)       Vitamin D3 2000 UNITS Tabs      Take 1 tablet by mouth daily        * Notice:  This list has 2 medication(s) that are the same as other medications prescribed for you. Read the directions carefully, and  ask your doctor or other care provider to review them with you.

## 2018-01-11 NOTE — PROGRESS NOTES
Remote ICD transmission received and reviewed.  Device transmission sent per MD orders.  Patient has a Medtronic multi  lead ICD.  Normal ICD function.  No episodes recorded.  Presenting EGM = AS/ @ 62 bpm.  AP = 6.1%.   = 1.8%.  BVP = 97.7%.  OptiVol fluid index is near baseline.  Estimated battery longevity to JAMIE = 5.8 years.  RV lead impedance trends appear stable.  Patient notified of interrogation results.  Patient reports that she is feeling well and denies complaints.  Plan for patient to return to clinic in 3 months as scheduled.  4/10/2018    Remote ICD transmission

## 2018-01-29 ENCOUNTER — OFFICE VISIT (OUTPATIENT)
Dept: FAMILY MEDICINE | Facility: OTHER | Age: 83
End: 2018-01-29
Attending: FAMILY MEDICINE
Payer: COMMERCIAL

## 2018-01-29 VITALS
BODY MASS INDEX: 28.32 KG/M2 | WEIGHT: 170 LBS | OXYGEN SATURATION: 96 % | HEART RATE: 78 BPM | DIASTOLIC BLOOD PRESSURE: 70 MMHG | TEMPERATURE: 96.7 F | SYSTOLIC BLOOD PRESSURE: 122 MMHG | HEIGHT: 65 IN

## 2018-01-29 DIAGNOSIS — I10 ESSENTIAL HYPERTENSION: ICD-10-CM

## 2018-01-29 DIAGNOSIS — H61.21 IMPACTED CERUMEN OF RIGHT EAR: ICD-10-CM

## 2018-01-29 DIAGNOSIS — M54.6 CHRONIC MIDLINE THORACIC BACK PAIN: ICD-10-CM

## 2018-01-29 DIAGNOSIS — E78.00 HYPERCHOLESTEROLEMIA: Primary | ICD-10-CM

## 2018-01-29 DIAGNOSIS — G89.29 CHRONIC MIDLINE THORACIC BACK PAIN: ICD-10-CM

## 2018-01-29 PROCEDURE — G0463 HOSPITAL OUTPT CLINIC VISIT: HCPCS

## 2018-01-29 PROCEDURE — 99214 OFFICE O/P EST MOD 30 MIN: CPT | Performed by: FAMILY MEDICINE

## 2018-01-29 ASSESSMENT — ANXIETY QUESTIONNAIRES
5. BEING SO RESTLESS THAT IT IS HARD TO SIT STILL: NOT AT ALL
3. WORRYING TOO MUCH ABOUT DIFFERENT THINGS: NOT AT ALL
7. FEELING AFRAID AS IF SOMETHING AWFUL MIGHT HAPPEN: NOT AT ALL
1. FEELING NERVOUS, ANXIOUS, OR ON EDGE: NOT AT ALL
4. TROUBLE RELAXING: NOT AT ALL
GAD7 TOTAL SCORE: 0
2. NOT BEING ABLE TO STOP OR CONTROL WORRYING: NOT AT ALL
6. BECOMING EASILY ANNOYED OR IRRITABLE: NOT AT ALL

## 2018-01-29 ASSESSMENT — PAIN SCALES - GENERAL: PAINLEVEL: MILD PAIN (3)

## 2018-01-29 NOTE — MR AVS SNAPSHOT
After Visit Summary   1/29/2018    Jacklyn Hein    MRN: [id number]           Patient Information     Date Of Birth          1935        Visit Information        Provider Department      1/29/2018 2:15 PM ANTHONY Post MD [facility]        Today's Diagnoses     Hypercholesterolemia    -  1    Chronic midline thoracic back pain        Essential hypertension        Impacted cerumen of right ear           Follow-ups after your visit        Additional Services     OTOLARYNGOLOGY REFERRAL       [facility]: [facility] [phone]   [email or web address]    Please be aware that coverage of these services is subject to the terms and limitations of your health insurance plan.  Call member services at your health plan with any benefit or coverage questions.      Please bring the following to your appointment:  >>   Any x-rays, CTs or MRIs which have been performed.  Contact the facility where they were done to arrange for  prior to your scheduled appointment.  Any new CT, MRI or other procedures ordered by your specialist must be performed at a [place] facility or coordinated by your clinic's referral office.    >>   List of current medications   >>   This referral request   >>   Any documents/labs given to you for this referral                  Your next 10 appointments already scheduled     Apr 10, 2018  1:00 PM CDT   (Arrive by 12:45 PM)   Return Visit with  FELA   [facility] ([facility] [place] )    [address]  [place] [address]   [phone]            Jun 01, 2018  1:00 PM CDT   (Arrive by 12:45 PM)   Return Visit with Kodi Garcia,    [facility] ([facility] [place] )    [address]  [place] [address]   [phone]              Future tests that were ordered for you today     Open Future Orders  "       Priority Expected Expires Ordered    Basic metabolic panel Routine 2/1/2018 3/28/2018 1/29/2018    CT Thoracic Spine w/o Contrast Routine  1/29/2019 1/29/2018    CBC with platelets differential Routine 2/1/2018 3/28/2018 1/29/2018    Lipid Profile Routine 2/1/2018 3/28/2018 1/29/2018            Who to contact     If you have questions or need follow up information about today's clinic visit or your schedule please contact [facility] directly at [phone].  Normal or non-critical lab and imaging results will be communicated to you by Thoughtlyhart, letter or phone within 4 business days after the clinic has received the results. If you do not hear from us within 7 days, please contact the clinic through KXENt or phone. If you have a critical or abnormal lab result, we will notify you by phone as soon as possible.  Submit refill requests through [employer] or call your pharmacy and they will forward the refill request to us. Please allow 3 business days for your refill to be completed.          Additional Information About Your Visit        ThoughtlyharOwlTing ??? Information     [employer] gives you secure access to your electronic health record. If you see a primary care provider, you can also send messages to your care team and make appointments. If you have questions, please call your primary care clinic.  If you do not have a primary care provider, please call [phone] and they will assist you.        Care EveryWhere ID     This is your Care EveryWhere ID. This could be used by other organizations to access your [facility] medical records  [id number]        Your Vitals Were     Pulse Temperature Height Pulse Oximetry Breastfeeding? BMI (Body Mass Index)    78 96.7  F (35.9  C) (Tympanic) 5' 4.5\" (1.638 m) 96% No 28.73 kg/m2       Blood Pressure from Last 3 Encounters:   01/29/18 122/70   07/05/17 120/68   06/06/17 98/58    Weight from Last 3 Encounters:   01/29/18 170 lb (77.1 kg)   06/06/17 168 lb (76.2 kg) "   05/31/17 170 lb (77.1 kg)              We Performed the Following     OTOLARYNGOLOGY REFERRAL        Primary Care Provider Office Phone # Fax #    R Josh Post [phone]       [facility] [place] [address] [place] [address]        Equal Access to Services     [facility] : Hadii aad ku hadasho Soomaali, waaxda luqadaha, qaybta kaalmada adeegyada, waxay idiin hayaan adeeg khnataliiash la'aan . So [facility] [phone].    ATENCIÓN: Si habla español, tiene a jacome disposición servicios gratuitos de asistencia lingüística. Llame al [phone].    We comply with applicable federal civil rights laws and [place] laws. We do not discriminate on the basis of race, color, national origin, age, disability, sex, sexual orientation, or gender identity.            Thank you!     Thank you for choosing [facility]  for your care. Our goal is always to provide you with excellent care. Hearing back from our patients is one way we can continue to improve our services. Please take a few minutes to complete the written survey that you may receive in the mail after your visit with us. Thank you!             Your Updated Medication List - Protect others around you: Learn how to safely use, store and throw away your medicines at www.disposemymeds.org.          This list is accurate as of 1/29/18  2:28 PM.  Always use your most recent med list.                   Brand Name Dispense Instructions for use Diagnosis    ACETAMINOPHEN PM  MG tablet   Generic drug:  diphenhydrAMINE-acetaminophen      Take 2 tablets by mouth nightly as needed.        albuterol 108 (90 BASE) MCG/ACT Inhaler    PROAIR HFA/PROVENTIL HFA/VENTOLIN HFA    1 Inhaler    Inhale 2 puffs into the lungs every 6 hours as needed for shortness of breath / dyspnea or wheezing    Other emphysema (H)       alendronate 70 MG tablet    FOSAMAX    12 tablet    TAKE 1 TABLET BY MOUTH EVERY 7 DAYS. TAKE WITH 8 OUNCES OF WATER AND S  SUSHMA UPRIGHT FOR AT LEAST 30 MINUTES AFTER TAKING.    Osteoporosis       aspirin 81 MG tablet      Take 81 mg by mouth daily        budesonide-formoterol 160-4.5 MCG/ACT Inhaler    SYMBICORT    1 Inhaler    Inhale 2 puffs into the lungs 2 times daily    Other emphysema (H)       CALCIUM 600 + D PO      Take 1 tablet by mouth daily        carvedilol 12.5 MG tablet    COREG    180 tablet    TAKE 1 TABLET BY MOUTH 2 TIMES DAILY WITH MEALS    Non-ischemic cardiomyopathy (H)       ibuprofen 600 MG tablet    ADVIL/MOTRIN    30 tablet    TAKE 1 TABLET BY MOUTH EVERY 6 HOURS AS NEEDED FOR MODERATE TO SEVEREPAIN    Chronic midline low back pain without sciatica       lisinopril 2.5 MG tablet    PRINIVIL/Zestril    90 tablet    Take 1 tablet (2.5 mg) by mouth daily    Non-ischemic cardiomyopathy (H)       MULTI-VITAMIN DAILY PO      Take 1 tablet by mouth daily        * order for DME     1 Device    Equipment being ordered: Lumbar brace    Midline low back pain without sciatica       * order for DME     1 each    Equipment being ordered: Walker    Chronic midline low back pain without sciatica, CHF (congestive heart failure), NYHA class II, chronic, systolic (H)       spironolactone 25 MG tablet    ALDACTONE    45 tablet    TAKE 1/2 TABLET BY MOUTH DAILY    Primary pulmonary hypertension (H)       Vitamin D3 2000 UNITS Tabs      Take 1 tablet by mouth daily        * Notice:  This list has 2 medication(s) that are the same as other medications prescribed for you. Read the directions carefully, and ask your doctor or other care provider to review them with you.

## 2018-01-29 NOTE — NURSING NOTE
"Chief Complaint   Patient presents with     Musculoskeletal Problem     back pain        Initial /70 (BP Location: Right arm, Patient Position: Chair, Cuff Size: Adult Large)  Pulse 78  Temp 96.7  F (35.9  C) (Tympanic)  Ht 5' 4.5\" (1.638 m)  Wt 170 lb (77.1 kg)  SpO2 96%  Breastfeeding? No  BMI 28.73 kg/m2 Estimated body mass index is 28.73 kg/(m^2) as calculated from the following:    Height as of this encounter: 5' 4.5\" (1.638 m).    Weight as of this encounter: 170 lb (77.1 kg).  Medication Reconciliation: complete   Mayra Napier CMA(AAMA)   "

## 2018-01-29 NOTE — PROGRESS NOTES
SUBJECTIVE:   Jacklyn Hein is a 82 year old female who presents to clinic today for the following health issues:        Musculoskeletal problem/pain      Duration: 3-4 years     Description  Location: back     Intensity:  3/10    Accompanying signs and symptoms: sharp pains and feels like back is on fire when the pain is really bad     History  Previous similar problem: YES  Previous evaluation:  x-ray    Precipitating or alleviating factors:  Trauma or overuse: no   Aggravating factors include: any type of activity     Therapies tried and outcome: rest/inactivity    In the past has had kyphoplasty.  When she sits there is no pain but with standing does have pain.  She did see Dr. Price who did not feel she had any surgical lesion on her back.  She cannot get MRIs because of her pacemaker.  She is also concerned because she is on inhaled steroids now which really helps her breathing but she is also on Fosamax calcium and vitamin D.  She also did see physical medicine rehab and they recommended an exercise program that did not help.  She is wondering if she could be evaluated for ES injections    Her right ear is been plugged.  She also would like labs done but has eaten today.  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     Insomnia, unspecified 01/01/2011     LBBB (left bundle branch block) 01/01/2011     Neck pain, chronic 01/01/2011     Non-ischemic cardiomyopathy (H) 12/10/2013     Obesity, unspecified 01/01/2011     Osteopenia 01/01/2011     Pacemaker      Pain in joint, lower leg 07/31/2006     Pure hypercholesterolemia 06/07/2002     Unspecified essential hypertension 01/01/2011       PAST SURGICAL HISTORY:  Past Surgical History:   Procedure Laterality Date     APPENDECTOMY        ARTHROSCOPY KNEE RT/LT      RT     CARDIAC SURGERY  2014    Pacemaker     Cataract extraction  2009    Bilateral     CHOLECYSTECTOMY      open      COLONOSCOPY  2001    Normal      COLONOSCOPY N/A 10/20/2016    Procedure: COLONOSCOPY;  Surgeon: Charan Montejo MD;  Location: [facility]     colonoscopy with polypectomy      Repeat 3 years      CT CORONARY ANGIOGRAM      normal     HERPES ZOSTER PCR ([facility])       left eye scar tissue       normal echo      fen-phen use       x6       SURGICAL RADIOLOGY PROCEDURE N/A 2016    Procedure: SURGICAL RADIOLOGY PROCEDURE;  Surgeon: Provider, [facility]ianest[facility] Nursing;  Location: [facility] OR       MEDICATIONS:  Prior to Admission medications    Medication Sig Start Date End Date Taking? Authorizing Provider   budesonide-formoterol (SYMBICORT) 160-4.5 MCG/ACT Inhaler Inhale 2 puffs into the lungs 2 times daily 18  Yes Min Newell MD   ibuprofen (ADVIL/MOTRIN) 600 MG tablet TAKE 1 TABLET BY MOUTH EVERY 6 HOURS AS NEEDED FOR MODERATE TO SEVEREPAIN 1/3/18  Yes ANTHONY Post MD   spironolactone (ALDACTONE) 25 MG tablet TAKE 1/2 TABLET BY MOUTH DAILY 17  Yes ANTHONY Post MD   albuterol (PROAIR HFA/PROVENTIL HFA/VENTOLIN HFA) 108 (90 BASE) MCG/ACT Inhaler Inhale 2 puffs into the lungs every 6 hours as needed for shortness of breath / dyspnea or wheezing 10/17/17  Yes Min Newell MD   lisinopril (PRINIVIL/ZESTRIL) 2.5 MG tablet Take 1 tablet (2.5 mg) by mouth daily 10/5/17  Yes Ishmael Orlando MD   carvedilol (COREG) 12.5 MG tablet TAKE 1 TABLET BY MOUTH 2 TIMES DAILY WITH MEALS 17  Yes ANTHONY Post MD   alendronate (FOSAMAX) 70 MG tablet TAKE 1 TABLET BY MOUTH EVERY 7 DAYS. TAKE WITH 8 OUNCES OF WATER AND S SUSHMA UPRIGHT FOR AT LEAST 30 MINUTES AFTER TAKING. 17  Yes ANTHONY Post MD   order for DME Equipment being ordered: Walker 16  Yes ANTHONY Post MD   ORDER FOR DME Equipment being ordered:  "Lumbar brace 5/18/15  Yes ANTHONY Post MD   aspirin 81 MG tablet Take 81 mg by mouth daily    Yes Reported, Patient   Cholecalciferol (VITAMIN D3) 2000 UNITS TABS Take 1 tablet by mouth daily   Yes Reported, Patient   Multiple Vitamin (MULTI-VITAMIN DAILY PO) Take 1 tablet by mouth daily   Yes Reported, Patient   Calcium Carbonate-Vitamin D (CALCIUM 600 + D OR) Take 1 tablet by mouth daily   Yes Reported, Patient   diphenhydrAMINE-acetaminophen (ACETAMINOPHEN PM)  MG tablet Take 2 tablets by mouth nightly as needed.   Yes Reported, Patient       ALLERGIES:   No Known Allergies    ROS:  Constitutional, neuro, ENT, endocrine, pulmonary, cardiac, gastrointestinal, genitourinary, musculoskeletal, integument and psychiatric systems are negative, except as otherwise noted.      EXAM:  /70 (BP Location: Right arm, Patient Position: Chair, Cuff Size: Adult Large)  Pulse 78  Temp 96.7  F (35.9  C) (Tympanic)  Ht 5' 4.5\" (1.638 m)  Wt 170 lb (77.1 kg)  SpO2 96%  Breastfeeding? No  BMI 28.73 kg/m2 Body mass index is 28.73 kg/(m^2).   GENERAL APPEARANCE: healthy, alert and no distress  EYES: Eyes grossly normal to inspection, PERRL and conjunctivae and sclerae normal  HENT: nose and mouth without ulcers or lesions and right canal is occluded with wax left canal is normal and TM is normal  NECK: no adenopathy, no asymmetry, masses, or scars and thyroid normal to palpation  RESP: lungs clear to auscultation - no rales, rhonchi or wheezes  CV: regular rates and rhythm, normal S1 S2, no S3 or S4 and no murmur, click or rub  MS: Has midline thoracic spine tenderness  Lab/ X-ray  No results found for this or any previous visit (from the past 24 hour(s)).  SEBASTIÁN-7 SCORE 1/29/2018   Total Score 0     PHQ-9 SCORE 10/24/2014 10/27/2015 1/29/2018   Total Score 0 - -   Total Score - 0 0       ASSESSMENT/PLAN:    ICD-10-CM    1. Hypercholesterolemia E78.00 Lipid Profile   2. Chronic midline thoracic back pain M54.6 CT " Thoracic Spine w/o Contrast was ordered will call her with results.  She will come in for CBC BMP and lipid to check hypercholesterolemia and hypertension.  Also refer to ENT because of the right cerumen impaction.    G89.29    3. Essential hypertension I10 CBC with platelets differential     Basic metabolic panel   4. Impacted cerumen of right ear H61.21 OTOLARYNGOLOGY REFERRAL         YUE Post MD  January 29, 2018

## 2018-01-30 ASSESSMENT — ANXIETY QUESTIONNAIRES: GAD7 TOTAL SCORE: 0

## 2018-01-30 ASSESSMENT — PATIENT HEALTH QUESTIONNAIRE - PHQ9: SUM OF ALL RESPONSES TO PHQ QUESTIONS 1-9: 0

## 2018-01-31 ENCOUNTER — HOSPITAL ENCOUNTER (OUTPATIENT)
Dept: CT IMAGING | Facility: HOSPITAL | Age: 83
Discharge: HOME | End: 2018-01-31
Attending: FAMILY MEDICINE | Admitting: FAMILY MEDICINE
Payer: MEDICARE

## 2018-01-31 ENCOUNTER — OFFICE VISIT (OUTPATIENT)
Dept: OTOLARYNGOLOGY | Facility: OTHER | Age: 83
End: 2018-01-31
Attending: FAMILY MEDICINE
Payer: MEDICARE

## 2018-01-31 VITALS
SYSTOLIC BLOOD PRESSURE: 120 MMHG | WEIGHT: 170 LBS | OXYGEN SATURATION: 95 % | HEIGHT: 65 IN | BODY MASS INDEX: 28.32 KG/M2 | DIASTOLIC BLOOD PRESSURE: 64 MMHG | HEART RATE: 77 BPM | TEMPERATURE: 97.8 F

## 2018-01-31 DIAGNOSIS — G89.29 CHRONIC MIDLINE THORACIC BACK PAIN: ICD-10-CM

## 2018-01-31 DIAGNOSIS — H91.93 BILATERAL HEARING LOSS, UNSPECIFIED HEARING LOSS TYPE: ICD-10-CM

## 2018-01-31 DIAGNOSIS — M54.6 CHRONIC MIDLINE THORACIC BACK PAIN: ICD-10-CM

## 2018-01-31 DIAGNOSIS — H61.23 BILATERAL IMPACTED CERUMEN: Primary | ICD-10-CM

## 2018-01-31 PROCEDURE — G0463 HOSPITAL OUTPT CLINIC VISIT: HCPCS

## 2018-01-31 PROCEDURE — 69399 UNLISTED PX EXTERNAL EAR: CPT | Performed by: NURSE PRACTITIONER

## 2018-01-31 PROCEDURE — 72128 CT CHEST SPINE W/O DYE: CPT | Mod: TC

## 2018-01-31 PROCEDURE — 92504 EAR MICROSCOPY EXAMINATION: CPT | Performed by: NURSE PRACTITIONER

## 2018-01-31 PROCEDURE — 99213 OFFICE O/P EST LOW 20 MIN: CPT | Mod: 25 | Performed by: NURSE PRACTITIONER

## 2018-01-31 PROCEDURE — G0463 HOSPITAL OUTPT CLINIC VISIT: HCPCS | Mod: 25

## 2018-01-31 ASSESSMENT — PAIN SCALES - GENERAL: PAINLEVEL: NO PAIN (0)

## 2018-01-31 NOTE — MR AVS SNAPSHOT
After Visit Summary   1/31/2018    Jacklyn Hein    MRN: [id number]           Patient Information     Date Of Birth          1935        Visit Information        Provider Department      1/31/2018 2:00 PM Feng, Raine A, APRN CNP [facility] [place]        Today's Diagnoses     Impacted cerumen of right ear          Care Instructions    Ears cleaned today, they look good.    Recommend annual audiograms, sooner with any hearing changes.    Follow up in ENT as needed.     Thank you for allowing Raine Feng CNP and our ENT team to participate in your care.  If your medications are too expensive, please give the nurse a call.  We can possibly change this medication.  If you have a scheduling or an appointment question please contact [facility] Unit Coordinator at their direct line [phone].   ALL nursing questions or concerns can be directed to your ENT nurse at: [phone] - [place]            Follow-ups after your visit        Follow-up notes from your care team     Return if symptoms worsen or fail to improve.      Your next 10 appointments already scheduled     Apr 10, 2018  1:00 PM CDT   (Arrive by 12:45 PM)   Return Visit with HC PACEMAKER   [facility] [place] ([facility] [place] )    [address]  [place] [address]   [phone]            Jun 01, 2018  1:00 PM CDT   (Arrive by 12:45 PM)   Return Visit with Kodi Garcia,    [facility] ([facility] [place] )    [address]  [place] [address]   [phone]              Who to contact     If you have questions or need follow up information about today's clinic visit or your schedule please contact [facility] directly at [phone].  Normal or non-critical lab and imaging results will be communicated to you by MyChart, letter or phone within 4 business days after the clinic has received the results. If you do not hear from us within 7 days,  "please contact the clinic through [employer] or phone. If you have a critical or abnormal lab result, we will notify you by phone as soon as possible.  Submit refill requests through [employer] or call your pharmacy and they will forward the refill request to us. Please allow 3 business days for your refill to be completed.          Additional Information About Your Visit        Donordonuthart Information     [employer] gives you secure access to your electronic health record. If you see a primary care provider, you can also send messages to your care team and make appointments. If you have questions, please call your primary care clinic.  If you do not have a primary care provider, please call [phone] and they will assist you.        Care EveryWhere ID     This is your Care EveryWhere ID. This could be used by other organizations to access your [facility] medical records  [id number]        Your Vitals Were     Pulse Temperature Height Pulse Oximetry BMI (Body Mass Index)       77 97.8  F (36.6  C) (Tympanic) 5' 4.5\" (1.638 m) 95% 28.73 kg/m2        Blood Pressure from Last 3 Encounters:   01/31/18 120/64   01/29/18 122/70   07/05/17 120/68    Weight from Last 3 Encounters:   01/31/18 170 lb (77.1 kg)   01/29/18 170 lb (77.1 kg)   06/06/17 168 lb (76.2 kg)              Today, you had the following     No orders found for display       Primary Care Provider Office Phone # Fax #    R Josh Post [phone]       [facility] [place] [address] [place] [address]        Equal Access to Services     [facility]: Hadii faizan long hadashirma Soheidy, waaxda luqadaha, qaybta kaalmapastor zhou. So [facility] [phone].    ATENCIÓN: Si habla español, tiene a jacome disposición servicios gratuitos de asistencia lingüística. Llame al [phone].    We comply with applicable federal civil rights laws and [place] laws. We do not discriminate on the basis of race, color, " national origin, age, disability, sex, sexual orientation, or gender identity.            Thank you!     Thank you for choosing [facility]  for your care. Our goal is always to provide you with excellent care. Hearing back from our patients is one way we can continue to improve our services. Please take a few minutes to complete the written survey that you may receive in the mail after your visit with us. Thank you!             Your Updated Medication List - Protect others around you: Learn how to safely use, store and throw away your medicines at www.disposemymeds.org.          This list is accurate as of 1/31/18  2:31 PM.  Always use your most recent med list.                   Brand Name Dispense Instructions for use Diagnosis    ACETAMINOPHEN PM  MG tablet   Generic drug:  diphenhydrAMINE-acetaminophen      Take 2 tablets by mouth nightly as needed.        albuterol 108 (90 BASE) MCG/ACT Inhaler    PROAIR HFA/PROVENTIL HFA/VENTOLIN HFA    1 Inhaler    Inhale 2 puffs into the lungs every 6 hours as needed for shortness of breath / dyspnea or wheezing    Other emphysema (H)       alendronate 70 MG tablet    FOSAMAX    12 tablet    TAKE 1 TABLET BY MOUTH EVERY 7 DAYS. TAKE WITH 8 OUNCES OF WATER AND S SUSHMA UPRIGHT FOR AT LEAST 30 MINUTES AFTER TAKING.    Osteoporosis       aspirin 81 MG tablet      Take 81 mg by mouth daily        budesonide-formoterol 160-4.5 MCG/ACT Inhaler    SYMBICORT    1 Inhaler    Inhale 2 puffs into the lungs 2 times daily    Other emphysema (H)       CALCIUM 600 + D PO      Take 1 tablet by mouth daily        carvedilol 12.5 MG tablet    COREG    180 tablet    TAKE 1 TABLET BY MOUTH 2 TIMES DAILY WITH MEALS    Non-ischemic cardiomyopathy (H)       ibuprofen 600 MG tablet    ADVIL/MOTRIN    30 tablet    TAKE 1 TABLET BY MOUTH EVERY 6 HOURS AS NEEDED FOR MODERATE TO SEVEREPAIN    Chronic midline low back pain without sciatica       lisinopril 2.5 MG tablet     PRINIVIL/Zestril    90 tablet    Take 1 tablet (2.5 mg) by mouth daily    Non-ischemic cardiomyopathy (H)       MULTI-VITAMIN DAILY PO      Take 1 tablet by mouth daily        * order for DME     1 Device    Equipment being ordered: Lumbar brace    Midline low back pain without sciatica       * order for DME     1 each    Equipment being ordered: Walker    Chronic midline low back pain without sciatica, CHF (congestive heart failure), NYHA class II, chronic, systolic (H)       spironolactone 25 MG tablet    ALDACTONE    45 tablet    TAKE 1/2 TABLET BY MOUTH DAILY    Primary pulmonary hypertension (H)       Vitamin D3 2000 UNITS Tabs      Take 1 tablet by mouth daily        * Notice:  This list has 2 medication(s) that are the same as other medications prescribed for you. Read the directions carefully, and ask your doctor or other care provider to review them with you.

## 2018-01-31 NOTE — PATIENT INSTRUCTIONS
Ears cleaned today, they look good.    Recommend annual audiograms, sooner with any hearing changes.    Follow up in ENT as needed.     Thank you for allowing Raine Feng CNP and our ENT team to participate in your care.  If your medications are too expensive, please give the nurse a call.  We can possibly change this medication.  If you have a scheduling or an appointment question please contact [facility] Unit Coordinator at their direct line [phone].   ALL nursing questions or concerns can be directed to your ENT nurse at: [phone] - alex

## 2018-01-31 NOTE — NURSING NOTE
"Chief Complaint   Patient presents with     Ear Problem     ear cleaning       Initial /64 (BP Location: Right arm, Cuff Size: Adult Regular)  Pulse 77  Temp 97.8  F (36.6  C) (Tympanic)  Ht 5' 4.5\" (1.638 m)  Wt 170 lb (77.1 kg)  SpO2 95%  BMI 28.73 kg/m2 Estimated body mass index is 28.73 kg/(m^2) as calculated from the following:    Height as of this encounter: 5' 4.5\" (1.638 m).    Weight as of this encounter: 170 lb (77.1 kg).  Medication Reconciliation: complete     Camila Montaño      "

## 2018-01-31 NOTE — PROGRESS NOTES
Otolaryngology Note    Patient: Jacklyn Hein  : 1935         Chief Complaint:     Patient presents with:  Ear Problem: ear cleaning         History of Present Illness:     Jacklyn Hein is a 82 year old female seen today for ear cleaning. Over the last few weeks, feels her hearing has been more muffled  Recent ear irrigation in family practice was unsuccessful in complete right ear cerumen debridement.     Hearing loss has been gradual over the years with no acute changes. No fluctuating hearing loss.  No otalgia, otorrhea. Right ear feels full due the cerumen.   Vertigo: none  Tinnitus: none  There is no facial weakness, facial numbness or dysphagia.  No COM, otologic surgeries or trauma.  No history of significant noise exposure.   Dad with hearing loss, not sure when this started.     No current audiogram         Medications:     Current Outpatient Rx   Medication Sig Dispense Refill     budesonide-formoterol (SYMBICORT) 160-4.5 MCG/ACT Inhaler Inhale 2 puffs into the lungs 2 times daily 1 Inhaler 1     ibuprofen (ADVIL/MOTRIN) 600 MG tablet TAKE 1 TABLET BY MOUTH EVERY 6 HOURS AS NEEDED FOR MODERATE TO SEVEREPAIN 30 tablet 1     spironolactone (ALDACTONE) 25 MG tablet TAKE 1/2 TABLET BY MOUTH DAILY 45 tablet 1     albuterol (PROAIR HFA/PROVENTIL HFA/VENTOLIN HFA) 108 (90 BASE) MCG/ACT Inhaler Inhale 2 puffs into the lungs every 6 hours as needed for shortness of breath / dyspnea or wheezing 1 Inhaler 1     lisinopril (PRINIVIL/ZESTRIL) 2.5 MG tablet Take 1 tablet (2.5 mg) by mouth daily 90 tablet 1     carvedilol (COREG) 12.5 MG tablet TAKE 1 TABLET BY MOUTH 2 TIMES DAILY WITH MEALS 180 tablet 2     alendronate (FOSAMAX) 70 MG tablet TAKE 1 TABLET BY MOUTH EVERY 7 DAYS. TAKE WITH 8 OUNCES OF WATER AND S SUSHMA UPRIGHT FOR AT LEAST 30 MINUTES AFTER TAKING. 12 tablet 1     order for DME Equipment being ordered: Walker 1 each 0     ORDER FOR DME Equipment being ordered: Lumbar brace 1 Device 0      aspirin 81 MG tablet Take 81 mg by mouth daily        Cholecalciferol (VITAMIN D3) 2000 UNITS TABS Take 1 tablet by mouth daily       Multiple Vitamin (MULTI-VITAMIN DAILY PO) Take 1 tablet by mouth daily       Calcium Carbonate-Vitamin D (CALCIUM 600 + D OR) Take 1 tablet by mouth daily       diphenhydrAMINE-acetaminophen (ACETAMINOPHEN PM)  MG tablet Take 2 tablets by mouth nightly as needed.              Allergies:     Allergies: Review of patient's allergies indicates no known allergies.          Past Medical History:     Past Medical History:   Diagnosis Date     Angina pectoris 2011     CHF (congestive heart failure), NYHA class II (H) 12/10/2013     CHF (congestive heart failure), NYHA class III (H) 12/10/2013     Hyperhydrosis disorder 2011     Insomnia, unspecified 2011     LBBB (left bundle branch block) 2011     Neck pain, chronic 2011     Non-ischemic cardiomyopathy (H) 12/10/2013     Obesity, unspecified 2011     Osteopenia 2011     Pacemaker      Pain in joint, lower leg 2006     Pure hypercholesterolemia 2002     Unspecified essential hypertension 2011            Past Surgical History:     Past Surgical History:   Procedure Laterality Date     APPENDECTOMY       ARTHROSCOPY KNEE RT/LT      RT     CARDIAC SURGERY  2014    Pacemaker     Cataract extraction  2009    Bilateral     CHOLECYSTECTOMY      open      COLONOSCOPY  2001    Normal      COLONOSCOPY N/A 10/20/2016    Procedure: COLONOSCOPY;  Surgeon: Charan Montejo MD;  Location: [facility]     colonoscopy with polypectomy      Repeat 3 years      CT CORONARY ANGIOGRAM      normal     HERPES ZOSTER PCR ([facility])       left eye scar tissue       normal echo      fen-phen use       x6       SURGICAL RADIOLOGY PROCEDURE N/A 2016    Procedure: SURGICAL RADIOLOGY PROCEDURE;  Surgeon: Provider, Generic Perianesthesia Nursing;  Location: [facility] OR       ENT  "family history reviewed         Social History:     Social History   Substance Use Topics     Smoking status: Former Smoker     Packs/day: 1.00     Years: 15.00     Types: Cigarettes     Quit date: 2/1/1998     Smokeless tobacco: Never Used      Comment: Passive Exposure: No; Longest Tobacco Free: 15 years      Alcohol use Yes      Comment: Occasionally             Review of Systems:     ROS: 10 point ROS neg other than the symptoms noted above in the HPI.         Physical Exam:     /64 (BP Location: Right arm, Cuff Size: Adult Regular)  Pulse 77  Temp 97.8  F (36.6  C) (Tympanic)  Ht 5' 4.5\" (1.638 m)  Wt 170 lb (77.1 kg)  SpO2 95%  BMI 28.73 kg/m2  General - The patient is well nourished and well developed, and appears to have good nutritional status.  Alert and oriented to person and place, answers questions and cooperates with examination appropriately.   Head and Face - Normocephalic and atraumatic, with no gross asymmetry noted.  The facial nerve is intact, with strong symmetric movements.  Voice and Breathing - The patient was breathing comfortably without the use of accessory muscles. There was no wheezing, stridor. The patients voice was clear and strong, and had appropriate pitch and quality.  Ears - External ear normal. The ear canals were examined underneath the operating microscope and with an otologic speculum. The canals were cleaned of all debris with gently suctioning and use of alligator forceps. There is no granulation tissue or sign of cholesteatoma. The patient tolerates this well. TM's are intact without effusion. Patient noted improvement in plugged sensation right ear after removal.   Eyes - Extraocular movements intact, and the pupils were reactive to light. Sclera were not icteric or injected, conjunctiva were pink and moist.  Mouth - Examination of the oral cavity showed pink, healthy oral mucosa. Dentition in good condition. No lesions or ulcerations noted. The tongue was " mobile and midline.   Throat - The walls of the oropharynx were smooth, pink, moist, symmetric, and had no lesions or ulcerations.  The tonsillar pillars and soft palate were symmetric. The uvula was midline on elevation.    Neck - Normal midline excursion of the laryngotracheal complex during swallowing.  Full range of motion on passive movement.  Palpation of the occipital, submental, submandibular, internal jugular chain, and supraclavicular nodes did not demonstrate any abnormal lymph nodes or masses.  Palpation of the thyroid was soft and smooth, with no nodules or goiter appreciated.  The trachea was mobile and midline.  Nose - External contour is symmetric, no gross deflection or scars.  Nasal mucosa is pink and moist with no abnormal mucus.  No polyps, masses, or purulence noted on examination.         Assessment and Plan:       ICD-10-CM    1. Bilateral impacted cerumen H61.23    2. Bilateral hearing loss, unspecified hearing loss type H91.93      The ears were cleaned today. Aural hygiene for the ear canals was discussed.  Avoidance of Q-tips was highly encouraged. The patient was told to avoid flushing the ear canal as there is a risk of perforating the ear drum.      Recommend annual audiograms, sooner with any acute changes in hearing. The patient may return here as needed.      Raine Feng NP  ENT  [facility], [place]  [phone]

## 2018-01-31 NOTE — LETTER
2018         RE: Jacklyn Hein  [address]  [place] [address]        Dear Colleague,    Thank you for referring your patient, Jacklyn Hein, to the [facility] [place]. Please see a copy of my visit note below.    Otolaryngology Note    Patient: Jacklyn Hein  : 1935         Chief Complaint:     Patient presents with:  Ear Problem: ear cleaning         History of Present Illness:     Jacklyn Hein is a 82 year old female seen today for ear cleaning. Over the last few weeks, feels her hearing has been more muffled  Recent ear irrigation in family practice was unsuccessful in complete right ear cerumen debridement.     Hearing loss has been gradual over the years with no acute changes. No fluctuating hearing loss.  No otalgia, otorrhea. Right ear feels full due the cerumen.   Vertigo: none  Tinnitus: none  There is no facial weakness, facial numbness or dysphagia.  No COM, otologic surgeries or trauma.  No history of significant noise exposure.   Dad with hearing loss, not sure when this started.     No current audiogram         Medications:     Current Outpatient Rx   Medication Sig Dispense Refill     budesonide-formoterol (SYMBICORT) 160-4.5 MCG/ACT Inhaler Inhale 2 puffs into the lungs 2 times daily 1 Inhaler 1     ibuprofen (ADVIL/MOTRIN) 600 MG tablet TAKE 1 TABLET BY MOUTH EVERY 6 HOURS AS NEEDED FOR MODERATE TO SEVEREPAIN 30 tablet 1     spironolactone (ALDACTONE) 25 MG tablet TAKE 1/2 TABLET BY MOUTH DAILY 45 tablet 1     albuterol (PROAIR HFA/PROVENTIL HFA/VENTOLIN HFA) 108 (90 BASE) MCG/ACT Inhaler Inhale 2 puffs into the lungs every 6 hours as needed for shortness of breath / dyspnea or wheezing 1 Inhaler 1     lisinopril (PRINIVIL/ZESTRIL) 2.5 MG tablet Take 1 tablet (2.5 mg) by mouth daily 90 tablet 1     carvedilol (COREG) 12.5 MG tablet TAKE 1 TABLET BY MOUTH 2 TIMES DAILY WITH MEALS 180 tablet 2     alendronate (FOSAMAX) 70 MG tablet TAKE 1  TABLET BY MOUTH EVERY 7 DAYS. TAKE WITH 8 OUNCES OF WATER AND S SUSHMA UPRIGHT FOR AT LEAST 30 MINUTES AFTER TAKING. 12 tablet 1     order for DME Equipment being ordered: Walker 1 each 0     ORDER FOR DME Equipment being ordered: Lumbar brace 1 Device 0     aspirin 81 MG tablet Take 81 mg by mouth daily        Cholecalciferol (VITAMIN D3) 2000 UNITS TABS Take 1 tablet by mouth daily       Multiple Vitamin (MULTI-VITAMIN DAILY PO) Take 1 tablet by mouth daily       Calcium Carbonate-Vitamin D (CALCIUM 600 + D OR) Take 1 tablet by mouth daily       diphenhydrAMINE-acetaminophen (ACETAMINOPHEN PM)  MG tablet Take 2 tablets by mouth nightly as needed.              Allergies:     Allergies: Review of patient's allergies indicates no known allergies.          Past Medical History:     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     Insomnia, unspecified 01/01/2011     LBBB (left bundle branch block) 01/01/2011     Neck pain, chronic 01/01/2011     Non-ischemic cardiomyopathy (H) 12/10/2013     Obesity, unspecified 01/01/2011     Osteopenia 01/01/2011     Pacemaker      Pain in joint, lower leg 07/31/2006     Pure hypercholesterolemia 06/07/2002     Unspecified essential hypertension 01/01/2011            Past Surgical History:     Past Surgical History:   Procedure Laterality Date     APPENDECTOMY       ARTHROSCOPY KNEE RT/LT  2009    RT     CARDIAC SURGERY  05/06/2014    Pacemaker     Cataract extraction  2009    Bilateral     CHOLECYSTECTOMY      open      COLONOSCOPY  05/2001    Normal      COLONOSCOPY N/A 10/20/2016    Procedure: COLONOSCOPY;  Surgeon: Charan Montejo MD;  Location: [facility]     colonoscopy with polypectomy  2011    Repeat 3 years      CT CORONARY ANGIOGRAM  1999    normal     HERPES ZOSTER PCR ([facility])  "      left eye scar tissue       normal echo      fen-phen use       x6       SURGICAL RADIOLOGY PROCEDURE N/A 2016    Procedure: SURGICAL RADIOLOGY PROCEDURE;  Surgeon: Provider, Generic Perianesthesia Nursing;  Location: [facility] OR       ENT family history reviewed         Social History:     Social History   Substance Use Topics     Smoking status: Former Smoker     Packs/day: 1.00     Years: 15.00     Types: Cigarettes     Quit date: 1998     Smokeless tobacco: Never Used      Comment: Passive Exposure: No; Longest Tobacco Free: 15 years      Alcohol use Yes      Comment: Occasionally             Review of Systems:     ROS: 10 point ROS neg other than the symptoms noted above in the HPI.         Physical Exam:     /64 (BP Location: Right arm, Cuff Size: Adult Regular)  Pulse 77  Temp 97.8  F (36.6  C) (Tympanic)  Ht 5' 4.5\" (1.638 m)  Wt 170 lb (77.1 kg)  SpO2 95%  BMI 28.73 kg/m2  General - The patient is well nourished and well developed, and appears to have good nutritional status.  Alert and oriented to person and place, answers questions and cooperates with examination appropriately.   Head and Face - Normocephalic and atraumatic, with no gross asymmetry noted.  The facial nerve is intact, with strong symmetric movements.  Voice and Breathing - The patient was breathing comfortably without the use of accessory muscles. There was no wheezing, stridor. The patients voice was clear and strong, and had appropriate pitch and quality.  Ears - External ear normal. The ear canals were examined underneath the operating microscope and with an otologic speculum. The canals were cleaned of all debris with gently suctioning and use of alligator forceps. There is no granulation tissue or sign of cholesteatoma. The patient tolerates this well. TM's are intact without effusion. Patient noted improvement in plugged sensation right ear after removal.   Eyes - Extraocular movements intact, and the " pupils were reactive to light. Sclera were not icteric or injected, conjunctiva were pink and moist.  Mouth - Examination of the oral cavity showed pink, healthy oral mucosa. Dentition in good condition. No lesions or ulcerations noted. The tongue was mobile and midline.   Throat - The walls of the oropharynx were smooth, pink, moist, symmetric, and had no lesions or ulcerations.  The tonsillar pillars and soft palate were symmetric. The uvula was midline on elevation.    Neck - Normal midline excursion of the laryngotracheal complex during swallowing.  Full range of motion on passive movement.  Palpation of the occipital, submental, submandibular, internal jugular chain, and supraclavicular nodes did not demonstrate any abnormal lymph nodes or masses.  Palpation of the thyroid was soft and smooth, with no nodules or goiter appreciated.  The trachea was mobile and midline.  Nose - External contour is symmetric, no gross deflection or scars.  Nasal mucosa is pink and moist with no abnormal mucus.  No polyps, masses, or purulence noted on examination.         Assessment and Plan:       ICD-10-CM    1. Bilateral impacted cerumen H61.23    2. Bilateral hearing loss, unspecified hearing loss type H91.93      The ears were cleaned today. Aural hygiene for the ear canals was discussed.  Avoidance of Q-tips was highly encouraged. The patient was told to avoid flushing the ear canal as there is a risk of perforating the ear drum.      Recommend annual audiograms, sooner with any acute changes in hearing. The patient may return here as needed.      Raine Feng NP  ENT  [facility], [place]  [phone]      Again, thank you for allowing me to participate in the care of your patient.        Sincerely,        Raine Feng, SUSAN CNP

## 2018-02-01 ENCOUNTER — TELEPHONE (OUTPATIENT)
Dept: FAMILY MEDICINE | Facility: OTHER | Age: 83
End: 2018-02-01

## 2018-02-01 DIAGNOSIS — I10 ESSENTIAL HYPERTENSION: ICD-10-CM

## 2018-02-01 DIAGNOSIS — E78.2 MIXED HYPERLIPIDEMIA: Primary | ICD-10-CM

## 2018-02-01 DIAGNOSIS — E78.00 HYPERCHOLESTEROLEMIA: ICD-10-CM

## 2018-02-01 LAB
ANION GAP SERPL CALCULATED.3IONS-SCNC: 5 MMOL/L (ref 3–14)
BASOPHILS # BLD AUTO: 0.1 10E9/L (ref 0–0.2)
BASOPHILS NFR BLD AUTO: 0.9 %
BUN SERPL-MCNC: 26 MG/DL (ref 7–30)
CALCIUM SERPL-MCNC: 8.6 MG/DL (ref 8.5–10.1)
CHLORIDE SERPL-SCNC: 108 MMOL/L (ref 94–109)
CHOLEST SERPL-MCNC: 215 MG/DL
CO2 SERPL-SCNC: 28 MMOL/L (ref 20–32)
CREAT SERPL-MCNC: 0.93 MG/DL (ref 0.52–1.04)
DIFFERENTIAL METHOD BLD: NORMAL
EOSINOPHIL # BLD AUTO: 0.3 10E9/L (ref 0–0.7)
EOSINOPHIL NFR BLD AUTO: 4.8 %
ERYTHROCYTE [DISTWIDTH] IN BLOOD BY AUTOMATED COUNT: 13.1 % (ref 10–15)
GFR SERPL CREATININE-BSD FRML MDRD: 58 ML/MIN/1.7M2
GLUCOSE SERPL-MCNC: 93 MG/DL (ref 70–99)
HCT VFR BLD AUTO: 38.6 % (ref 35–47)
HDLC SERPL-MCNC: 59 MG/DL
HGB BLD-MCNC: 12.9 G/DL (ref 11.7–15.7)
IMM GRANULOCYTES # BLD: 0 10E9/L (ref 0–0.4)
IMM GRANULOCYTES NFR BLD: 0.3 %
LDLC SERPL CALC-MCNC: 137 MG/DL
LYMPHOCYTES # BLD AUTO: 1.3 10E9/L (ref 0.8–5.3)
LYMPHOCYTES NFR BLD AUTO: 19.5 %
MCH RBC QN AUTO: 30.1 PG (ref 26.5–33)
MCHC RBC AUTO-ENTMCNC: 33.4 G/DL (ref 31.5–36.5)
MCV RBC AUTO: 90 FL (ref 78–100)
MONOCYTES # BLD AUTO: 0.6 10E9/L (ref 0–1.3)
MONOCYTES NFR BLD AUTO: 8.4 %
NEUTROPHILS # BLD AUTO: 4.4 10E9/L (ref 1.6–8.3)
NEUTROPHILS NFR BLD AUTO: 66.1 %
NONHDLC SERPL-MCNC: 156 MG/DL
NRBC # BLD AUTO: 0 10*3/UL
NRBC BLD AUTO-RTO: 0 /100
PLATELET # BLD AUTO: 233 10E9/L (ref 150–450)
POTASSIUM SERPL-SCNC: 4.8 MMOL/L (ref 3.4–5.3)
RBC # BLD AUTO: 4.28 10E12/L (ref 3.8–5.2)
SODIUM SERPL-SCNC: 141 MMOL/L (ref 133–144)
TRIGL SERPL-MCNC: 94 MG/DL
WBC # BLD AUTO: 6.7 10E9/L (ref 4–11)

## 2018-02-01 PROCEDURE — 80061 LIPID PANEL: CPT | Mod: ZL | Performed by: FAMILY MEDICINE

## 2018-02-01 PROCEDURE — 80048 BASIC METABOLIC PNL TOTAL CA: CPT | Mod: ZL | Performed by: FAMILY MEDICINE

## 2018-02-01 PROCEDURE — 36415 COLL VENOUS BLD VENIPUNCTURE: CPT | Mod: ZL | Performed by: FAMILY MEDICINE

## 2018-02-01 PROCEDURE — 85025 COMPLETE CBC W/AUTO DIFF WBC: CPT | Mod: ZL | Performed by: FAMILY MEDICINE

## 2018-02-01 RX ORDER — ATORVASTATIN CALCIUM 10 MG/1
10 TABLET, FILM COATED ORAL DAILY
Qty: 90 TABLET | Refills: 1 | Status: SHIPPED | OUTPATIENT
Start: 2018-02-01 | End: 2018-04-26

## 2018-03-14 DIAGNOSIS — J43.8 OTHER EMPHYSEMA (H): ICD-10-CM

## 2018-03-20 RX ORDER — BUDESONIDE AND FORMOTEROL FUMARATE DIHYDRATE 160; 4.5 UG/1; UG/1
2 AEROSOL RESPIRATORY (INHALATION) 2 TIMES DAILY
Qty: 1 INHALER | Refills: 1 | Status: SHIPPED | OUTPATIENT
Start: 2018-03-20 | End: 2018-06-19

## 2018-03-28 DIAGNOSIS — I42.8 NON-ISCHEMIC CARDIOMYOPATHY (H): ICD-10-CM

## 2018-03-28 RX ORDER — LISINOPRIL 2.5 MG/1
2.5 TABLET ORAL DAILY
Qty: 90 TABLET | Refills: 1 | Status: SHIPPED | OUTPATIENT
Start: 2018-03-28 | End: 2018-12-26

## 2018-04-03 ENCOUNTER — OFFICE VISIT (OUTPATIENT)
Dept: CARDIOLOGY | Facility: OTHER | Age: 83
End: 2018-04-03
Attending: INTERNAL MEDICINE
Payer: COMMERCIAL

## 2018-04-03 DIAGNOSIS — I42.8 NON-ISCHEMIC CARDIOMYOPATHY (H): Primary | ICD-10-CM

## 2018-04-03 PROCEDURE — 93284 PRGRMG EVAL IMPLANTABLE DFB: CPT | Mod: 26 | Performed by: INTERNAL MEDICINE

## 2018-04-03 PROCEDURE — 93284 PRGRMG EVAL IMPLANTABLE DFB: CPT | Mod: TC

## 2018-04-03 NOTE — PATIENT INSTRUCTIONS
It was a pleasure to see you in clinic today. Please do not hesitate to call with any questions or concerns. You are scheduled for a remote ICD transmission on 7/10/18. This will happen automatically in the night. We will call in 1-2 business days to discuss the results with you. We look forward to seeing you in clinic at your next device check in 6 months.    Gilda Amado RN  Electrophysiology Nurse Clinician  [facility]  During business hours call:  [phone]  After business hours please call: [phone]- select option #4 and ask for job code [id number].

## 2018-04-03 NOTE — MR AVS SNAPSHOT
After Visit Summary   4/3/2018    Jacklyn Hein    MRN: [id number]           Patient Information     Date Of Birth          1935        Visit Information        Provider Department      4/3/2018 2:00 PM HC PACEMAKER [facility]        Today's Diagnoses     Non-ischemic cardiomyopathy (H)    -  1      Care Instructions    It was a pleasure to see you in clinic today. Please do not hesitate to call with any questions or concerns. You are scheduled for a remote ICD transmission on 7/10/18. This will happen automatically in the night. We will call in 1-2 business days to discuss the results with you. We look forward to seeing you in clinic at your next device check in 6 months.    Gilda Amado, RN  Electrophysiology Nurse Clinician  [facility]  During business hours call:  [phone]  After business hours please call: [phone]- select option #4 and ask for job code [id number].            Follow-ups after your visit        Your next 10 appointments already scheduled     Jun 01, 2018  1:00 PM CDT   (Arrive by 12:45 PM)   Return Visit with Kodi Garcia,    [facility] [place] ([facility] [place] )    [address]  [place] [address]   [phone]            Oct 09, 2018  1:30 PM CDT   (Arrive by 1:15 PM)   Return Visit with  PACEMAKER   [facility] [place] ([facility] [place] )    [address]  [place] [address]   [phone]              Who to contact     If you have questions or need follow up information about today's clinic visit or your schedule please contact [facility] directly at [phone].  Normal or non-critical lab and imaging results will be communicated to you by MyChart, letter or phone within 4 business days after the clinic has received the results. If you do not hear from us within 7 days, please contact the clinic through MyChart or phone. If you have a critical  or abnormal lab result, we will notify you by phone as soon as possible.  Submit refill requests through [employer] or call your pharmacy and they will forward the refill request to us. Please allow 3 business days for your refill to be completed.          Additional Information About Your Visit        [employer] [employer] Information     [employer] gives you secure access to your electronic health record. If you see a primary care provider, you can also send messages to your care team and make appointments. If you have questions, please call your primary care clinic.  If you do not have a primary care provider, please call [phone] and they will assist you.        Care EveryWhere ID     This is your Care EveryWhere ID. This could be used by other organizations to access your [place] medical records  [id number]         Blood Pressure from Last 3 Encounters:   01/31/18 120/64   01/29/18 122/70   07/05/17 120/68    Weight from Last 3 Encounters:   01/31/18 77.1 kg (170 lb)   01/29/18 77.1 kg (170 lb)   06/06/17 76.2 kg (168 lb)              We Performed the Following     ICD DEVICE PROGRAMMING EVAL, MULTI LEAD ICD        Primary Care Provider Office Phone # Fax #    R Josh Post [phone]       [address] [place] [address]        Equal Access to Services     ANA MITCHELL : Hadii aad ku hadasho Soomaali, waaxda luqadaha, qaybta kaalmada adeegyada, waxay minervain hayeliud pérez . So [facility] [phone].    ATENCIÓN: Si habla español, tiene a jacome disposición servicios gratuitos de asistencia lingüística. Llame al [phone].    We comply with applicable federal civil rights laws and [place] laws. We do not discriminate on the basis of race, color, national origin, age, disability, sex, sexual orientation, or gender identity.            Thank you!     Thank you for choosing [facility]  for your care. Our goal is always to provide you with excellent care. Hearing back from our  patients is one way we can continue to improve our services. Please take a few minutes to complete the written survey that you may receive in the mail after your visit with us. Thank you!             Your Updated Medication List - Protect others around you: Learn how to safely use, store and throw away your medicines at www.disposemymeds.org.          This list is accurate as of 4/3/18  2:05 PM.  Always use your most recent med list.                   Brand Name Dispense Instructions for use Diagnosis    ACETAMINOPHEN PM  MG tablet   Generic drug:  diphenhydrAMINE-acetaminophen      Take 2 tablets by mouth nightly as needed.        albuterol 108 (90 BASE) MCG/ACT Inhaler    PROAIR HFA/PROVENTIL HFA/VENTOLIN HFA    1 Inhaler    Inhale 2 puffs into the lungs every 6 hours as needed for shortness of breath / dyspnea or wheezing    Other emphysema (H)       alendronate 70 MG tablet    FOSAMAX    12 tablet    TAKE 1 TABLET BY MOUTH EVERY 7 DAYS. TAKE WITH 8 OUNCES OF WATER AND STAY UPRIGHT FOR AT LEAST 30 MINUTES AFTER TAKING.    Age-related osteoporosis without current pathological fracture       aspirin 81 MG tablet      Take 81 mg by mouth daily        atorvastatin 10 MG tablet    LIPITOR    90 tablet    Take 1 tablet (10 mg) by mouth daily    Mixed hyperlipidemia       budesonide-formoterol 160-4.5 MCG/ACT Inhaler    SYMBICORT    1 Inhaler    Inhale 2 puffs into the lungs 2 times daily    Other emphysema (H)       CALCIUM 600 + D PO      Take 1 tablet by mouth daily        carvedilol 12.5 MG tablet    COREG    180 tablet    TAKE 1 TABLET BY MOUTH 2 TIMES DAILY WITH MEALS    Non-ischemic cardiomyopathy (H)        MG tablet   Generic drug:  ibuprofen     30 tablet    TAKE 1 TABLET BY MOUTH EVERY 6 HOURS AS NEEDED FOR MODERATE TO SEVEREPAIN    Chronic midline low back pain without sciatica       lisinopril 2.5 MG tablet    PRINIVIL/Zestril    90 tablet    Take 1 tablet (2.5 mg) by mouth daily     Non-ischemic cardiomyopathy (H)       MULTI-VITAMIN DAILY PO      Take 1 tablet by mouth daily        * order for DME     1 Device    Equipment being ordered: Lumbar brace    Midline low back pain without sciatica       * order for DME     1 each    Equipment being ordered: Walker    Chronic midline low back pain without sciatica, CHF (congestive heart failure), NYHA class II, chronic, systolic (H)       spironolactone 25 MG tablet    ALDACTONE    45 tablet    TAKE 1/2 TABLET BY MOUTH DAILY    Primary pulmonary hypertension (H)       Vitamin D3 2000 UNITS Tabs      Take 1 tablet by mouth daily        * Notice:  This list has 2 medication(s) that are the same as other medications prescribed for you. Read the directions carefully, and ask your doctor or other care provider to review them with you.

## 2018-04-03 NOTE — PROGRESS NOTES
Preliminary Device Interrogation Results.  Final physician signed paceart report to be scanned and attached.    Pt seen in the [facility] for evaluation and iterative programming of a Medtronic, Bi-Ventricular lead ICD, per MD orders. Today her intrinsic rhythm is SR 72 bpm. Normal ICD function. 16 V-Sensing episodes recorded. The available marker channels show 1:1 AV conduction. No arrhythmias recorded. OptiVol thoracic impedance is above her reference line. She denies symptoms of fluid retention. AP= 6% BVP= 97.6% and VSR Pacing= 1.8%. No short v-v intervals recorded. Lead trends appear stable. Pt reports that she is feeling well. Battery estimates 5.5 years to [facility]. Plan for pt to send a remote transmission on 7/10/18 and RTC in 6 months.  Multi lead ICD

## 2018-04-11 DIAGNOSIS — M54.50 CHRONIC MIDLINE LOW BACK PAIN WITHOUT SCIATICA: ICD-10-CM

## 2018-04-11 DIAGNOSIS — G89.29 CHRONIC MIDLINE LOW BACK PAIN WITHOUT SCIATICA: ICD-10-CM

## 2018-04-11 NOTE — TELEPHONE ENCOUNTER
IBUPROFEN 600 MG TABLET   Last Written Prescription Date:  2/28/2018  Last Fill Quantity: 30,   # refills: 1  Last Office Visit: 1/29/2018  Future Office visit:    Next 5 appointments (look out 90 days)     Jun 01, 2018  1:00 PM CDT   (Arrive by 12:45 PM)   Return Visit with Kodi Garcia DO   [facility] ([facility] )    [address] [place] [address]   [phone]

## 2018-04-12 RX ORDER — IBUPROFEN 600 MG/1
TABLET ORAL
Qty: 30 TABLET | Refills: 1 | Status: SHIPPED | OUTPATIENT
Start: 2018-04-12 | End: 2018-07-16

## 2018-06-01 ENCOUNTER — OFFICE VISIT (OUTPATIENT)
Dept: CARDIOLOGY | Facility: OTHER | Age: 83
End: 2018-06-01
Attending: INTERNAL MEDICINE
Payer: COMMERCIAL

## 2018-06-01 VITALS
SYSTOLIC BLOOD PRESSURE: 143 MMHG | BODY MASS INDEX: 29.16 KG/M2 | TEMPERATURE: 97.7 F | OXYGEN SATURATION: 98 % | WEIGHT: 175 LBS | DIASTOLIC BLOOD PRESSURE: 45 MMHG | HEIGHT: 65 IN | HEART RATE: 67 BPM | RESPIRATION RATE: 24 BRPM

## 2018-06-01 DIAGNOSIS — I50.42 CHRONIC COMBINED SYSTOLIC AND DIASTOLIC CONGESTIVE HEART FAILURE, NYHA CLASS 2 (H): Primary | ICD-10-CM

## 2018-06-01 DIAGNOSIS — I44.7 LBBB (LEFT BUNDLE BRANCH BLOCK): ICD-10-CM

## 2018-06-01 DIAGNOSIS — I42.8 NON-ISCHEMIC CARDIOMYOPATHY (H): ICD-10-CM

## 2018-06-01 DIAGNOSIS — J44.9 CHRONIC OBSTRUCTIVE PULMONARY DISEASE, UNSPECIFIED COPD TYPE (H): ICD-10-CM

## 2018-06-01 DIAGNOSIS — Z95.810 AUTOMATIC IMPLANTABLE CARDIOVERTER-DEFIBRILLATOR IN SITU: ICD-10-CM

## 2018-06-01 DIAGNOSIS — R06.02 SOB (SHORTNESS OF BREATH): ICD-10-CM

## 2018-06-01 DIAGNOSIS — I10 ESSENTIAL HYPERTENSION: ICD-10-CM

## 2018-06-01 DIAGNOSIS — Z87.891 HISTORY OF TOBACCO ABUSE: ICD-10-CM

## 2018-06-01 DIAGNOSIS — J45.20 MILD INTERMITTENT ASTHMA, UNSPECIFIED WHETHER COMPLICATED: ICD-10-CM

## 2018-06-01 DIAGNOSIS — E78.00 PURE HYPERCHOLESTEROLEMIA: ICD-10-CM

## 2018-06-01 PROCEDURE — 99214 OFFICE O/P EST MOD 30 MIN: CPT | Performed by: INTERNAL MEDICINE

## 2018-06-01 PROCEDURE — G0463 HOSPITAL OUTPT CLINIC VISIT: HCPCS

## 2018-06-01 ASSESSMENT — PAIN SCALES - GENERAL: PAINLEVEL: NO PAIN (0)

## 2018-06-01 NOTE — MR AVS SNAPSHOT
After Visit Summary   6/1/2018    Jacklyn Hein    MRN: [id number]           Patient Information     Date Of Birth          1935        Visit Information        Provider Department      6/1/2018 1:00 PM Kodi Garcia, DO [facility]        Today's Diagnoses     Chronic combined systolic and diastolic congestive heart failure, NYHA class 2 (H)    -  1    Non-ischemic cardiomyopathy (H)        LBBB (left bundle branch block)          Care Instructions    You were seen by Dr. Garcia, 6/1/2018.      1.  Please continue all medications as they have been prescribed.       2. Please continue with exercise and activity as this have been shown to help with diastolic dysfunction or stiffening of the heart.      3. Please limit to sodium to 2500 mg per day.  Sodium causes water retention and swelling to the extremities.  Limit fluids to 2 Liters of fluid daily, as this will aide in controlling the symptoms of heart failure.      4. Please monitor you weight daily.  If you experience a 2-3 pound increase in weight in  24 hours, or 5 pounds in one week. Please call the cardiology office as you may need your medications adjusted or you may need to be seen.       You will follow up with Dr. Garcia in 1 year sooner if your symptoms worsen or change.         Please call the cardiology office with problems, questions, or concerns at [phone].     If you experience chest pain, chest pressure, chest tightness, shortness of breath, fainting, lightheadedness, nausea, vomiting, or other concerning symptoms, please report to the Emergency Department or call 911. These symptoms may be emergent, and best treated in the Emergency Department.         Osiris SANFORD RN-BSN  Cardiology   [facility]  [phone]                 What is Heart Failure?  The heart is a muscle that pumps oxygen-rich blood to all parts of the body. When you have heart failure, the heart is not able to pump as  well as it should. Blood and fluid may back up into the lungs, and some parts of the body don t get enough oxygen-rich blood to work normally. These problems lead to the symptoms you feel.  When you have heart failure  With heart failure, not enough oxygen-rich blood leaves the heart with each beat. There are 2 types of heart failure. Both affect the ventricles  ability to pump blood. You may have 1 or both types.       Systolic heart failure. The heart muscle becomes weak and enlarged. It can t pump enough oxygen-rich blood forward to the rest of the body when the ventricles contract. The measurement of how much blood your heart pushes out when it beats is called ejection fraction. In systolic heart failure, the ejection fraction is lower than normal. This can cause blood to back up into the lungs and cause shortness of breath and eventually ankle swelling (edema).  This is also called heart failure with reduced ejection fraction, or  HFrEF.    Diastolic heart failure. The heart muscle becomes stiff. It doesn t relax normally between contractions, which keeps the ventricles from filling with blood. Ejection fraction is often in the normal range. This can still lead to the backup of blood into the body and affect the organs such as the liver. This is also called heart failure with preserved ejection fraction or HFpEF.     Recognizing heart failure symptoms  When you have heart failure, you need to pay close attention to your body and how you feel, every single day. That way, if a problem occurs, you can get help before it becomes too severe. You'll need to watch for changes in your symptoms. As long as symptoms stay about the same from one day to the next, your heart failure is stable. But if symptoms start to get worse, it's time to take action.  Signs and symptoms of worsening heart failure include:    Rapid weight gain    Shortness of breath    Swelling (edema)    Fatigue  How heart failure affects your  body  When the heart doesn't pump enough blood, hormones (body chemicals) are sent to increase the amount of work the heart does. Some hormones make the heart grow larger. Others tell the heart to pump faster. As a result, the heart may pump more blood at first, but it can't keep up with the ongoing demands. So, the heart muscle becomes even more weak. Over time, even less blood is pumped through the heart. This leads to problems throughout the body as organs began to feel the effects of a long-term lack of oxygen. Eventually, if untreated, this can cause problems with your lungs, liver, kidneys, and loss of elasticity in the skin, and skin changes in the lower legs. A weak heart itself can eventually cause a severe decline in health and possible death if left untreated.  What is ejection fraction?  Ejection fraction (EF) measures how much blood the heart pumps out (ejects). This is measured to help diagnose heart failure. A healthy heart pumps at least half of the blood from the ventricles with each beat. This means a normal ejection fraction is around 50% to 70%. Your doctor will calculate ejection fraction from an echocardiogram.     My ejection fraction     Date: ______________________  Ejection fraction: _____________  Test used: __________________  Date Last Reviewed: 3/15/2016    2456-2082 [employer]. [address] [place] [address]. All rights reserved. This information is not intended as a substitute for professional medical care. Always follow your healthcare professional's instructions.                Follow-ups after your visit        Your next 10 appointments already scheduled     Oct 09, 2018  1:30 PM CDT   (Arrive by 1:15 PM)   Return Visit with  PACEMAKER   [facility] ([facility] )    [place] [address]   [phone]              Who to contact     If you have questions or need follow up information about today's clinic  "visit or your schedule please contact [facility] directly at [phone].  Normal or non-critical lab and imaging results will be communicated to you by MyChart, letter or phone within 4 business days after the clinic has received the results. If you do not hear from us within 7 days, please contact the clinic through [employer] or phone. If you have a critical or abnormal lab result, we will notify you by phone as soon as possible.  Submit refill requests through [employer] or call your pharmacy and they will forward the refill request to us. Please allow 3 business days for your refill to be completed.          Additional Information About Your Visit        [employer] Information     [employer] gives you secure access to your electronic health record. If you see a primary care provider, you can also send messages to your care team and make appointments. If you have questions, please call your primary care clinic.  If you do not have a primary care provider, please call [phone] and they will assist you.        Care EveryWhere ID     This is your Care EveryWhere ID. This could be used by other organizations to access your [facility] medical records  [id number]        Your Vitals Were     Pulse Temperature Respirations Height Pulse Oximetry BMI (Body Mass Index)    67 97.7  F (36.5  C) (Tympanic) 24 1.651 m (5' 5\") 98% 29.12 kg/m2       Blood Pressure from Last 3 Encounters:   06/01/18 143/45   01/31/18 120/64   01/29/18 122/70    Weight from Last 3 Encounters:   06/01/18 79.4 kg (175 lb)   01/31/18 77.1 kg (170 lb)   01/29/18 77.1 kg (170 lb)              Today, you had the following     No orders found for display       Primary Care Provider Office Phone # Fax #    R Josh Post [phone]       [facility] [address]        Equal Access to Services     ANA MITCHELL AH: Vannesa Lane, wafaviola espinosaqdandre, qaybta kaaltania zurita, pastor simmons " shaynenataliiakirt jamesaamargarita ah. So [facility] [phone].    ATENCIÓN: Si vinnie shultz, tiene a jacome disposición servicios gratuitos de asistencia lingüística. June diaz [phone].    We comply with applicable federal civil rights laws and [place] laws. We do not discriminate on the basis of race, color, national origin, age, disability, sex, sexual orientation, or gender identity.            Thank you!     Thank you for choosing [facility]  [facility] your care. Our goal is always to provide you with excellent care. Hearing back from our patients is one way we can continue to improve our services. Please take a few minutes to complete the written survey that you may receive in the mail after your visit with us. Thank you!             Your Updated Medication List - Protect others around you: Learn how to safely use, store and throw away your medicines at www.disposemymeds.org.          This list is accurate as of 6/1/18  1:40 PM.  Always use your most recent med list.                   Brand Name Dispense Instructions for use Diagnosis    ACETAMINOPHEN PM  MG tablet   Generic drug:  diphenhydrAMINE-acetaminophen      Take 2 tablets by mouth nightly as needed.        albuterol 108 (90 Base) MCG/ACT Inhaler    PROAIR HFA/PROVENTIL HFA/VENTOLIN HFA    1 Inhaler    Inhale 2 puffs into the lungs every 6 hours as needed for shortness of breath / dyspnea or wheezing    Other emphysema (H)       alendronate 70 MG tablet    FOSAMAX    12 tablet    TAKE 1 TABLET BY MOUTH EVERY 7 DAYS. TAKE WITH 8 OUNCES OF WATER AND STAY UPRIGHT FOR AT LEAST 30 MINUTES AFTER TAKING.    Age-related osteoporosis without current pathological fracture       aspirin 81 MG tablet      Take 81 mg by mouth daily        atorvastatin 10 MG tablet    LIPITOR    90 tablet    TAKE 1 TABLET BY MOUTH DAILY    Mixed hyperlipidemia       budesonide-formoterol 160-4.5 MCG/ACT Inhaler    SYMBICORT    1 Inhaler    Inhale 2 puffs into the lungs 2 times daily     Other emphysema (H)       CALCIUM 600 + D PO      Take 1 tablet by mouth daily        carvedilol 12.5 MG tablet    COREG    180 tablet    TAKE 1 TABLET BY MOUTH 2 TIMES DAILY WITH MEALS    Non-ischemic cardiomyopathy (H)        MG tablet   Generic drug:  ibuprofen     30 tablet    TAKE 1 TABLET BY MOUTH EVERY 6 HOURS AS NEEDED FOR MODERATE TO SEVEREPAIN    Chronic midline low back pain without sciatica       lisinopril 2.5 MG tablet    PRINIVIL/Zestril    90 tablet    Take 1 tablet (2.5 mg) by mouth daily    Non-ischemic cardiomyopathy (H)       MULTI-VITAMIN DAILY PO      Take 1 tablet by mouth daily        * order for DME     1 Device    Equipment being ordered: Lumbar brace    Midline low back pain without sciatica       * order for DME     1 each    Equipment being ordered: Walker    Chronic midline low back pain without sciatica, CHF (congestive heart failure), NYHA class II, chronic, systolic (H)       spironolactone 25 MG tablet    ALDACTONE    45 tablet    TAKE 1/2 TABLET BY MOUTH DAILY    Primary pulmonary hypertension (H)       Vitamin D3 2000 units Tabs      Take 1 tablet by mouth daily        * Notice:  This list has 2 medication(s) that are the same as other medications prescribed for you. Read the directions carefully, and ask your doctor or other care provider to review them with you.

## 2018-06-01 NOTE — PROGRESS NOTES
Cardiology Progress Note     Assessment & Plan   Jacklyn Hein is a 81 year old female who is being seen by cardiology for dyspnea with chronic systolic and diastolic heart failure.  She has a history of severe nonischemic cardiomyopathy status post ICD placement.  She has been short of breath with a diagnosis of asthma and her level of COPD has been downgraded by the pulmonologist to mild to moderate from the report which indicated severe.  For the most part, she is doing well today without significant complaints.     Impression:  1.  Chronic systolic and diastolic heart failure with an ejection fraction of 40% as noted on echo from 5/10/17.  2.  Reduction in severity of COPD from severe to mild to moderate per pulmonary and concern for asthma based on PFTs from 5/17/17.  3.  Nonischemic cardiomyopathy with New York heart classification 2.  4.  ICD placement on 11/11/2014.  5.  Hypertension.  6.  Hyperglyceridemia.  7.  Remote history of tobacco abuse quitting 25 years ago.  8.  Left bundle branch block.  9.  Shortness of breath now significantly improved.       Plan:  1.  Medications will be continued as prescribed.  2.  She has been encouraged to increase her activity, and strive for weight loss secondary to diastolic heart failure.  3.  Fluid restriction of 2 L or less a day.  4.  Sodium restriction at less than 2500 mg daily.  5.  Daily weights.  6.  She will continue to have her ICD checked here locally.    7.  She will be seen in approximately 1 year follow-up.    Kodi Garcia    Interval History   Jacklyn is being seen for a one-year follow-up.  She is being seen for a history of severe nonischemic cardiomyopathy with a previously ejection fraction at less than 35%.  More recently, her EF on 5/10/17 was 40%.  She also has diastolic heart failure, ICD placed relative to 11/11/16, her recent visit with pulmonary secondary to what was thought to be severe COPD but was downgraded to mild to moderate  with a greater concern for asthma, per the patient.    She was started on some breathing treatments for asthma by pulmonary.  With these treatments, she says she feels approximately she is hardly short of breath at all.  She is approximately 90% better.  With having diastolic and systolic heart failure, she has minimal to no lower extremity edema.  Her activity has been limited secondary to chronic back pain.  She describes gaining 5 pounds which she feels is unusual in her 80s.    She had her ICD checked on that she was 1/11/18.  It showed there she was bi-V paced 97.7% of the time with 5.8 years left on her battery.  Her device i was s described as functioning appropriately.    Today, her blood pressure is mildly elevated at 143/45 with a heart rate of 67.  She is currently on Coreg 12.5 twice a day, lisinopril 2.5 milligrams daily, and spironolactone 12.5 daily.  Previously, her blood pressure has been controlled and she feels her blood pressure has been controlled at home.  She has noticed complaints.        Physical Exam   Temp: 97.7  F (36.5  C) Temp src: Tympanic BP: 143/45 Pulse: 67   Resp: 24 SpO2: 98 %      Vitals:    06/01/18 1305   Weight: 79.4 kg (175 lb)     Vital Signs with Ranges  Temp:  [97.7  F (36.5  C)] 97.7  F (36.5  C)  Pulse:  [67] 67  Resp:  [24] 24  BP: (143)/(45) 143/45  SpO2:  [98 %] 98 %  ROS negative except that which is non-HPI.    Incision/Surgical Site 05/06/14 Left Chest (Active)   Number of days:1487       Constitutional: awake, alert, cooperative, no apparent distress, and appears stated age  Eyes: Lids and lashes normal, sclera clear, conjunctiva normal  ENT: Normocephalic, without obvious abnormality.  Respiratory: No increased work of breathing, good air exchange, clear to auscultation bilaterally, no crackles or wheezing  Cardiovascular: Normal apical impulse, regular rate and rhythm, normal S1 and S2, no S3 or S4, and no murmur noted  GI: No scars, normal bowel sounds,  soft.  Musculoskeletal: Scant lower extremity pitting edema present  Neurologic: Awake, alert, oriented to name, place and time.  Neuropsychiatric: General: normal, calm and normal eye contact    Medications         Data   No results found for this or any previous visit (from the past 24 hour(s)).

## 2018-06-01 NOTE — NURSING NOTE
"Chief Complaint   Patient presents with     RECHECK     1 year follow-up       Initial /45 (BP Location: Left arm, Patient Position: Left side, Cuff Size: Adult Large)  Pulse 67  Temp 97.7  F (36.5  C) (Tympanic)  Resp 24  Ht 1.651 m (5' 5\")  Wt 79.4 kg (175 lb)  SpO2 98%  BMI 29.12 kg/m2 Estimated body mass index is 29.12 kg/(m^2) as calculated from the following:    Height as of this encounter: 1.651 m (5' 5\").    Weight as of this encounter: 79.4 kg (175 lb).  Medication Reconciliation: complete    Lamar Chatterjee LPN    "

## 2018-06-01 NOTE — PATIENT INSTRUCTIONS
You were seen by Dr. Garcia, 6/1/2018.      1.  Please continue all medications as they have been prescribed.       2. Please continue with exercise and activity as this have been shown to help with diastolic dysfunction or stiffening of the heart.      3. Please limit to sodium to 2500 mg per day.  Sodium causes water retention and swelling to the extremities.  Limit fluids to 2 Liters of fluid daily, as this will aide in controlling the symptoms of heart failure.      4. Please monitor you weight daily.  If you experience a 2-3 pound increase in weight in  24 hours, or 5 pounds in one week. Please call the cardiology office as you may need your medications adjusted or you may need to be seen.       You will follow up with Dr. Garcia in 1 year sooner if your symptoms worsen or change.         Please call the cardiology office with problems, questions, or concerns at [phone].     If you experience chest pain, chest pressure, chest tightness, shortness of breath, fainting, lightheadedness, nausea, vomiting, or other concerning symptoms, please report to the Emergency Department or call 911. These symptoms may be emergent, and best treated in the Emergency Department.         Osiris SANFORD RN-BSN  Cardiology   [facility] [place] [facility]  [phone]                 What is Heart Failure?  The heart is a muscle that pumps oxygen-rich blood to all parts of the body. When you have heart failure, the heart is not able to pump as well as it should. Blood and fluid may back up into the lungs, and some parts of the body don t get enough oxygen-rich blood to work normally. These problems lead to the symptoms you feel.  When you have heart failure  With heart failure, not enough oxygen-rich blood leaves the heart with each beat. There are 2 types of heart failure. Both affect the ventricles  ability to pump blood. You may have 1 or both types.       Systolic heart failure. The heart muscle becomes weak and enlarged. It can t  pump enough oxygen-rich blood forward to the rest of the body when the ventricles contract. The measurement of how much blood your heart pushes out when it beats is called ejection fraction. In systolic heart failure, the ejection fraction is lower than normal. This can cause blood to back up into the lungs and cause shortness of breath and eventually ankle swelling (edema).  This is also called heart failure with reduced ejection fraction, or  HFrEF.    Diastolic heart failure. The heart muscle becomes stiff. It doesn t relax normally between contractions, which keeps the ventricles from filling with blood. Ejection fraction is often in the normal range. This can still lead to the backup of blood into the body and affect the organs such as the liver. This is also called heart failure with preserved ejection fraction or HFpEF.     Recognizing heart failure symptoms  When you have heart failure, you need to pay close attention to your body and how you feel, every single day. That way, if a problem occurs, you can get help before it becomes too severe. You'll need to watch for changes in your symptoms. As long as symptoms stay about the same from one day to the next, your heart failure is stable. But if symptoms start to get worse, it's time to take action.  Signs and symptoms of worsening heart failure include:    Rapid weight gain    Shortness of breath    Swelling (edema)    Fatigue  How heart failure affects your body  When the heart doesn't pump enough blood, hormones (body chemicals) are sent to increase the amount of work the heart does. Some hormones make the heart grow larger. Others tell the heart to pump faster. As a result, the heart may pump more blood at first, but it can't keep up with the ongoing demands. So, the heart muscle becomes even more weak. Over time, even less blood is pumped through the heart. This leads to problems throughout the body as organs began to feel the effects of a long-term lack  of oxygen. Eventually, if untreated, this can cause problems with your lungs, liver, kidneys, and loss of elasticity in the skin, and skin changes in the lower legs. A weak heart itself can eventually cause a severe decline in health and possible death if left untreated.  What is ejection fraction?  Ejection fraction (EF) measures how much blood the heart pumps out (ejects). This is measured to help diagnose heart failure. A healthy heart pumps at least half of the blood from the ventricles with each beat. This means a normal ejection fraction is around 50% to 70%. Your doctor will calculate ejection fraction from an echocardiogram.     My ejection fraction     Date: ______________________  Ejection fraction: _____________  Test used: __________________  Date Last Reviewed: 3/15/2016    4997-2482 [employer]. [address] [place] [address], [place] [address]. All rights reserved. This information is not intended as a substitute for professional medical care. Always follow your healthcare professional's instructions.

## 2018-07-10 ENCOUNTER — ALLIED HEALTH/NURSE VISIT (OUTPATIENT)
Dept: CARDIOLOGY | Facility: CLINIC | Age: 83
End: 2018-07-10
Attending: INTERNAL MEDICINE
Payer: MEDICARE

## 2018-07-10 DIAGNOSIS — I42.8 NON-ISCHEMIC CARDIOMYOPATHY (H): Primary | ICD-10-CM

## 2018-07-10 PROCEDURE — 93296 REM INTERROG EVL PM/IDS: CPT | Mod: ZF

## 2018-07-10 PROCEDURE — 93295 DEV INTERROG REMOTE 1/2/MLT: CPT | Performed by: INTERNAL MEDICINE

## 2018-07-10 NOTE — MR AVS SNAPSHOT
After Visit Summary   7/10/2018    Jacklyn Hein    MRN: [id number]           Patient Information     Date Of Birth          1935        Visit Information        Provider Department      7/10/2018 6:00 AM UC ICD REMOTE [facility]        Today's Diagnoses     Non-ischemic cardiomyopathy (H)    -  1       Follow-ups after your visit        Your next 10 appointments already scheduled     Oct 09, 2018  1:30 PM CDT   (Arrive by 1:15 PM)   Return Visit with HC PACEMAKER   [facility] [place] ([facility] [place] )    [address]  [place] [address]   [phone]            Jun 03, 2019  1:00 PM CDT   (Arrive by 12:45 PM)   Return Visit with Kodi Garcia, DO   [facility] [place] ([facility] [place] )    [address]  [place] [address]   [phone]              Who to contact     If you have questions or need follow up information about today's clinic visit or your schedule please contact [facility] directly at [phone].  Normal or non-critical lab and imaging results will be communicated to you by Garlikhart, letter or phone within 4 business days after the clinic has received the results. If you do not hear from us within 7 days, please contact the clinic through Pleyt or phone. If you have a critical or abnormal lab result, we will notify you by phone as soon as possible.  Submit refill requests through BzzAgent or call your pharmacy and they will forward the refill request to us. Please allow 3 business days for your refill to be completed.          Additional Information About Your Visit        Garlikhart Information     BzzAgent gives you secure access to your electronic health record. If you see a primary care provider, you can also send messages to your care team and make appointments. If you have questions, please call your primary care clinic.  If you do not have a primary care provider, please call [phone] and  they will assist you.        Care EveryWhere ID     This is your Care EveryWhere ID. This could be used by other organizations to access your [facility] medical records  [id number]         Blood Pressure from Last 3 Encounters:   06/01/18 143/45   01/31/18 120/64   01/29/18 122/70    Weight from Last 3 Encounters:   06/01/18 79.4 kg (175 lb)   01/31/18 77.1 kg (170 lb)   01/29/18 77.1 kg (170 lb)              We Performed the Following     INTERROGATION DEVICE EVAL REMOTE, PACER/ICD        Primary Care Provider Office Phone # Fax #    R Josh Post [phone]       [address] [place] [address]        Equal Access to Services     ANA MITCHELL : Hadii aad ku hadasho Sothaliaali, waaxda luqadaha, qaybta kaalmada adeegyada, pastor pérez . So [facility] [phone].    ATENCIÓN: Si habla español, tiene a jacome disposición servicios gratuitos de asistencia lingüística. [facility] [phone].    We comply with applicable federal civil rights laws and [place] laws. We do not discriminate on the basis of race, color, national origin, age, disability, sex, sexual orientation, or gender identity.            Thank you!     Thank you for choosing [facility]  for your care. Our goal is always to provide you with excellent care. Hearing back from our patients is one way we can continue to improve our services. Please take a few minutes to complete the written survey that you may receive in the mail after your visit with us. Thank you!             Your Updated Medication List - Protect others around you: Learn how to safely use, store and throw away your medicines at www.disposemymeds.org.          This list is accurate as of 7/10/18 11:59 PM.  Always use your most recent med list.                   Brand Name Dispense Instructions for use Diagnosis    ACETAMINOPHEN PM  MG tablet   Generic drug:  diphenhydrAMINE-acetaminophen      Take 2 tablets by mouth nightly as  needed.        albuterol 108 (90 Base) MCG/ACT Inhaler    PROAIR HFA/PROVENTIL HFA/VENTOLIN HFA    1 Inhaler    Inhale 2 puffs into the lungs every 6 hours as needed for shortness of breath / dyspnea or wheezing    Other emphysema (H)       alendronate 70 MG tablet    FOSAMAX    12 tablet    TAKE 1 TABLET BY MOUTH EVERY 7 DAYS. TAKE WITH 8 OUNCES OF WATER AND STAY UPRIGHT FOR AT LEAST 30 MINUTES AFTER TAKING.    Age-related osteoporosis without current pathological fracture       aspirin 81 MG tablet      Take 81 mg by mouth daily        atorvastatin 10 MG tablet    LIPITOR    90 tablet    TAKE 1 TABLET BY MOUTH DAILY    Mixed hyperlipidemia       CALCIUM 600 + D PO      Take 1 tablet by mouth daily        carvedilol 12.5 MG tablet    COREG    180 tablet    TAKE 1 TABLET BY MOUTH 2 TIMES DAILY WITH MEALS    Non-ischemic cardiomyopathy (H)       lisinopril 2.5 MG tablet    PRINIVIL/Zestril    90 tablet    Take 1 tablet (2.5 mg) by mouth daily    Non-ischemic cardiomyopathy (H)       MULTI-VITAMIN DAILY PO      Take 1 tablet by mouth daily        * order for DME     1 Device    Equipment being ordered: Lumbar brace    Midline low back pain without sciatica       * order for DME     1 each    Equipment being ordered: Walker    Chronic midline low back pain without sciatica, CHF (congestive heart failure), NYHA class II, chronic, systolic (H)       spironolactone 25 MG tablet    ALDACTONE    45 tablet    TAKE 1/2 TABLET BY MOUTH DAILY    Primary pulmonary hypertension (H)       SYMBICORT 160-4.5 MCG/ACT Inhaler   Generic drug:  budesonide-formoterol     10.2 g    INHALE 2 PUFFS INTO THE LUNGS 2 TIMES DAILY    Other emphysema (H)       Vitamin D3 2000 units Tabs      Take 1 tablet by mouth daily        * Notice:  This list has 2 medication(s) that are the same as other medications prescribed for you. Read the directions carefully, and ask your doctor or other care provider to review them with you.

## 2018-07-25 NOTE — PROGRESS NOTES
Preliminary Device Interrogation Results.  Final physician signed paceart report to be scanned and attached.    Scheduled Medtronic, Bi-Ventricular ICD, remote transmission received and reviewed. Device transmission sent per MD orders. Her presenting rhythm is as/ @ 64 bpm. No arrhythmias recorded. Normal device function. AP= 7%  BVP= 97.5% and VSR pacing= 1.8%. No short V-V intervals recorded. Lead trends appear stable. OptiVol thoracic impedance is near her baseline. Battery estimates 5 years to JAMIE. Patient notified of interrogation results.  Patient reports that she is feeling well and denies complaints.  Plan for patient to return to clinic in 3 months as scheduled.  Remote ICD transmission

## 2018-07-30 DIAGNOSIS — M81.0 AGE-RELATED OSTEOPOROSIS WITHOUT CURRENT PATHOLOGICAL FRACTURE: ICD-10-CM

## 2018-07-31 RX ORDER — ALENDRONATE SODIUM 70 MG/1
TABLET ORAL
Qty: 12 TABLET | Refills: 0 | Status: SHIPPED | OUTPATIENT
Start: 2018-07-31 | End: 2018-10-15

## 2018-07-31 NOTE — TELEPHONE ENCOUNTER
Alendronate Sodium      Last Written Prescription Date:  2/2/2018  Last Fill Quantity: 12,   # refills: 0  Last Office Visit: 1/29/2018  Future Office visit:    Next 5 appointments (look out 90 days)     Oct 09, 2018  1:30 PM CDT   (Arrive by 1:15 PM)   Return Visit with  PACEMAKER   [facility] ([facility] [place] )    [address] [place] [address]   [phone]

## 2018-09-12 DIAGNOSIS — G89.29 CHRONIC MIDLINE LOW BACK PAIN WITHOUT SCIATICA: ICD-10-CM

## 2018-09-12 DIAGNOSIS — M54.50 CHRONIC MIDLINE LOW BACK PAIN WITHOUT SCIATICA: ICD-10-CM

## 2018-09-13 RX ORDER — IBUPROFEN 600 MG/1
TABLET ORAL
Qty: 30 TABLET | Refills: 0 | Status: SHIPPED | OUTPATIENT
Start: 2018-09-13 | End: 2018-10-15

## 2018-10-01 ENCOUNTER — OFFICE VISIT (OUTPATIENT)
Dept: OTOLARYNGOLOGY | Facility: OTHER | Age: 83
End: 2018-10-01
Attending: NURSE PRACTITIONER
Payer: COMMERCIAL

## 2018-10-01 VITALS
TEMPERATURE: 97.1 F | OXYGEN SATURATION: 98 % | HEIGHT: 65 IN | WEIGHT: 175 LBS | SYSTOLIC BLOOD PRESSURE: 120 MMHG | HEART RATE: 72 BPM | DIASTOLIC BLOOD PRESSURE: 70 MMHG | BODY MASS INDEX: 29.16 KG/M2

## 2018-10-01 DIAGNOSIS — H61.23 BILATERAL IMPACTED CERUMEN: Primary | ICD-10-CM

## 2018-10-01 DIAGNOSIS — H90.5 SENSORINEURAL HEARING LOSS (SNHL), UNSPECIFIED LATERALITY: ICD-10-CM

## 2018-10-01 PROCEDURE — 92504 EAR MICROSCOPY EXAMINATION: CPT | Performed by: NURSE PRACTITIONER

## 2018-10-01 PROCEDURE — G0463 HOSPITAL OUTPT CLINIC VISIT: HCPCS

## 2018-10-01 PROCEDURE — 99213 OFFICE O/P EST LOW 20 MIN: CPT | Mod: 25 | Performed by: NURSE PRACTITIONER

## 2018-10-01 PROCEDURE — 69210 REMOVE IMPACTED EAR WAX UNI: CPT | Performed by: NURSE PRACTITIONER

## 2018-10-01 ASSESSMENT — PAIN SCALES - GENERAL: PAINLEVEL: NO PAIN (0)

## 2018-10-01 NOTE — PATIENT INSTRUCTIONS
Thank you for allowing Raine Feng CNP and our ENT team to participate in your care.  If your medications are too expensive, please give the nurse a call.  We can possibly change this medication.  If you have a scheduling or an appointment question please contact [facility] Unit Coordinator at their direct line [phone].   ALL nursing questions or concerns can be directed to your ENT nurse at: [phone] - [place]    Follow up with ENT as needed for routine ear cleaning.

## 2018-10-01 NOTE — MR AVS SNAPSHOT
After Visit Summary   10/1/2018    Jacklyn Hein    MRN: [id number]           Patient Information     Date Of Birth          1935        Visit Information        Provider Department      10/1/2018 11:15 AM Raine Feng APRN CNP [facility] [place]        Care Instructions    Thank you for allowing Raine Feng CNP and our ENT team to participate in your care.  If your medications are too expensive, please give the nurse a call.  We can possibly change this medication.  If you have a scheduling or an appointment question please contact [facility] Unit Coordinator at their direct line [phone].   ALL nursing questions or concerns can be directed to your ENT nurse at: [phone] - [place]    Follow up with ENT as needed for routine ear cleaning.              Follow-ups after your visit        Your next 10 appointments already scheduled     Oct 09, 2018  1:30 PM CDT   (Arrive by 1:15 PM)   Return Visit with  FELA   [facility] [place] [facility] [place] [facility]    [address]  [place] [address]   [phone]            Jun 03, 2019  1:00 PM CDT   (Arrive by 12:45 PM)   Return Visit with Kodi Garcia,    [facility] [place] ([facility] [place] )    [address]  [place] [address]   [phone]              Who to contact     If you have questions or need follow up information about today's clinic visit or your schedule please contact [facility] at [phone].  Normal or non-critical lab and imaging results will be communicated to you by MyChart, letter or phone within 4 business days after the clinic has received the results. If you do not hear from us within 7 days, please contact the clinic through MyChart or phone. If you have a critical or abnormal lab result, we will notify you by phone as soon as possible.  Submit refill requests through Mofibohart or  "call your pharmacy and they will forward the refill request to us. Please allow 3 business days for your refill to be completed.          Additional Information About Your Visit        MyChart Information     OYO Sportstoyshart gives you secure access to your electronic health record. If you see a primary care provider, you can also send messages to your care team and make appointments. If you have questions, please call your primary care clinic.  If you do not have a primary care provider, please call [phone] and they will assist you.        Care EveryWhere ID     This is your Care EveryWhere ID. This could be used by other organizations to access your [facility] medical records  [id number]        Your Vitals Were     Pulse Temperature Height Pulse Oximetry BMI (Body Mass Index)       72 97.1  F (36.2  C) (Tympanic) 5' 5\" (1.651 m) 98% 29.12 kg/m2        Blood Pressure from Last 3 Encounters:   10/01/18 120/70   06/01/18 143/45   01/31/18 120/64    Weight from Last 3 Encounters:   10/01/18 175 lb (79.4 kg)   06/01/18 175 lb (79.4 kg)   01/31/18 170 lb (77.1 kg)              Today, you had the following     No orders found for display       Primary Care Provider Office Phone # Fax #    R Josh Post [phone] [address] [id number]        Equal Access to Services     [facility]: Hadii faizan long hadasho Soheidy, waaxda luqadaha, qaybta kaalmada yudith, pastor pérez . So [facility] [phone].    ATENCIÓN: Si habla español, tiene a jacome disposición servicios gratuitos de asistencia lingüística. Alessiaame al [phone].    We comply with applicable federal civil rights laws and [place] laws. We do not discriminate on the basis of race, color, national origin, age, disability, sex, sexual orientation, or gender identity.            Thank you!     Thank you for choosing [facility]  for your care. Our goal is always to provide you with " excellent care. Hearing back from our patients is one way we can continue to improve our services. Please take a few minutes to complete the written survey that you may receive in the mail after your visit with us. Thank you!             Your Updated Medication List - Protect others around you: Learn how to safely use, store and throw away your medicines at www.disposemymeds.org.          This list is accurate as of 10/1/18 11:34 AM.  Always use your most recent med list.                   Brand Name Dispense Instructions for use Diagnosis    ACETAMINOPHEN PM  MG tablet   Generic drug:  diphenhydrAMINE-acetaminophen      Take 2 tablets by mouth nightly as needed.        albuterol 108 (90 Base) MCG/ACT inhaler    PROAIR HFA/PROVENTIL HFA/VENTOLIN HFA    1 Inhaler    Inhale 2 puffs into the lungs every 6 hours as needed for shortness of breath / dyspnea or wheezing    Other emphysema (H)       alendronate 70 MG tablet    FOSAMAX    12 tablet    TAKE 1 TABLET BY MOUTH EVERY 7 DAYS. TAKE WITH 8 OUNCES OF WATER AND STAY UPRIGHT FOR AT LEAST 30 MINUTES AFTER TAKING.    Age-related osteoporosis without current pathological fracture       aspirin 81 MG tablet      Take 81 mg by mouth daily        atorvastatin 10 MG tablet    LIPITOR    90 tablet    TAKE 1 TABLET BY MOUTH DAILY    Mixed hyperlipidemia       CALCIUM 600 + D PO      Take 1 tablet by mouth daily        carvedilol 12.5 MG tablet    COREG    180 tablet    TAKE 1 TABLET BY MOUTH 2 TIMES DAILY WITH MEALS    Non-ischemic cardiomyopathy (H)        MG tablet   Generic drug:  ibuprofen     30 tablet    TAKE 1 TABLET BY MOUTH EVERY 6 HOURS AS NEEDED FOR MODERATE TO SEVEREPAIN    Chronic midline low back pain without sciatica       lisinopril 2.5 MG tablet    PRINIVIL/Zestril    90 tablet    Take 1 tablet (2.5 mg) by mouth daily    Non-ischemic cardiomyopathy (H)       MULTI-VITAMIN DAILY PO      Take 1 tablet by mouth daily        * order for DME     1  Device    Equipment being ordered: Lumbar brace    Midline low back pain without sciatica       * order for DME     1 each    Equipment being ordered: Walker    Chronic midline low back pain without sciatica, CHF (congestive heart failure), NYHA class II, chronic, systolic (H)       spironolactone 25 MG tablet    ALDACTONE    45 tablet    TAKE 1/2 TABLET BY MOUTH DAILY    Primary pulmonary hypertension (H)       SYMBICORT 160-4.5 MCG/ACT Inhaler   Generic drug:  budesonide-formoterol     10.2 g    INHALE 2 PUFFS INTO THE LUNGS 2 TIMES DAILY    Other emphysema (H)       Vitamin D3 2000 units Tabs      Take 1 tablet by mouth daily        * Notice:  This list has 2 medication(s) that are the same as other medications prescribed for you. Read the directions carefully, and ask your doctor or other care provider to review them with you.

## 2018-10-01 NOTE — NURSING NOTE
"Chief Complaint   Patient presents with     RECHECK     Ear cleaning       Initial /70  Pulse 72  Temp 97.1  F (36.2  C) (Tympanic)  Ht 5' 5\" (1.651 m)  Wt 175 lb (79.4 kg)  SpO2 98%  BMI 29.12 kg/m2 Estimated body mass index is 29.12 kg/(m^2) as calculated from the following:    Height as of this encounter: 5' 5\" (1.651 m).    Weight as of this encounter: 175 lb (79.4 kg).  Medication Reconciliation: complete    Nubia Mcintosh LPN    "

## 2018-10-01 NOTE — LETTER
"    10/1/2018         RE: Jacklyn Hein  [address] [place] [address]        Dear Colleague,    Thank you for referring your patient, Jacklyn Hein, to the [facility]. Please see a copy of my visit note below.    Otolaryngology Progress Note           Chief Complaint:     Patient presents with:  RECHECK: Ear cleaning         History of Present Illness:     Jacklyn Hein is a 83 year old female here for an ear cleaning. I last saw her 1/31/18 for cerumen debridement. More recently, she has felt her right ear being more itchy and feeling plugged.    Hearing loss has been gradual over the years with no acute changes. No fluctuating hearing loss.  No otalgia, otorrhea.   Vertigo: none  Tinnitus: none  There is no facial weakness, facial numbness or dysphagia.  No COM, otologic surgeries or trauma.  No history of significant noise exposure.   Dad with hearing loss, not sure when this started.     No audiogram and she defers future audiogram.          Review of Systems:     See HPI         Physical Exam:     /70  Pulse 72  Temp 97.1  F (36.2  C) (Tympanic)  Ht 5' 5\" (1.651 m)  Wt 175 lb (79.4 kg)  SpO2 98%  BMI 29.12 kg/m2  General - The patient is well nourished and well developed, and appears to have good nutritional status.  Alert and oriented to person and place, interactive.  Head and Face - Normocephalic and atraumatic, with no gross asymmetry noted of the contour of the facial features.  The facial nerve is intact, with strong symmetric movements.  Neck-no palpable lymphadenopathy or thyroid mass.  Trachea is midline.  Eyes - Extraocular movements intact.   Ears- External ears normal. The ear canals were examined underneath the operating microscope and with an otologic speculum. The canals were cleaned of all debris with  use of alligator forceps. There is no granulation tissue or sign of cholesteatoma. The patient tolerates this well. TM's are intact " without effusion.  Nose - Nasal mucosa is pink and moist with no abnormal mucus.  The septum was grossly midline and non-obstructive, turbinates of normal size and position.  No polyps, masses, or purulence noted on examination.  Mouth - Examination of the oral cavity shows pink, healthy, moist mucosa. Dentition in good condition.  No lesions or ulceration noted. The tongue is mobile and midline.    Throat - The walls of the oropharynx were smooth, pink, moist, symmetric, and had no lesions or ulcerations.  The tonsillar pillars and soft palate were symmetric.  The uvula was midline on elevation.           Assessment and Plan:       ICD-10-CM    1. Bilateral impacted cerumen H61.23    2. Sensorineural hearing loss (SNHL), unspecified laterality H90.5      The ears were cleaned today. Aural hygiene for the ear canals was discussed.  Avoidance of Q-tips was highly encouraged. The patient was told to avoid flushing the ear canal as there is a risk of perforating the ear drum.      Recommend annual audiograms, sooner with any acute changes in hearing. (she defers audiograms at this time)    The patient may return here as needed.    Raine Feng NP  ENT  [facility] [place]  [phone]      Again, thank you for allowing me to participate in the care of your patient.        Sincerely,        SUSAN Leigh CNP

## 2018-10-09 ENCOUNTER — OFFICE VISIT (OUTPATIENT)
Dept: CARDIOLOGY | Facility: OTHER | Age: 83
End: 2018-10-09
Attending: FAMILY MEDICINE
Payer: MEDICARE

## 2018-10-09 ENCOUNTER — ALLIED HEALTH/NURSE VISIT (OUTPATIENT)
Dept: FAMILY MEDICINE | Facility: OTHER | Age: 83
End: 2018-10-09
Attending: FAMILY MEDICINE
Payer: COMMERCIAL

## 2018-10-09 DIAGNOSIS — I42.8 NON-ISCHEMIC CARDIOMYOPATHY (H): Primary | ICD-10-CM

## 2018-10-09 DIAGNOSIS — Z23 NEED FOR PROPHYLACTIC VACCINATION AND INOCULATION AGAINST INFLUENZA: Primary | ICD-10-CM

## 2018-10-09 PROCEDURE — 93284 PRGRMG EVAL IMPLANTABLE DFB: CPT | Mod: TC

## 2018-10-09 PROCEDURE — 93284 PRGRMG EVAL IMPLANTABLE DFB: CPT | Mod: 26 | Performed by: INTERNAL MEDICINE

## 2018-10-09 PROCEDURE — 90662 IIV NO PRSV INCREASED AG IM: CPT

## 2018-10-09 PROCEDURE — 90471 IMMUNIZATION ADMIN: CPT

## 2018-10-09 NOTE — PROGRESS NOTES
Injectable Influenza Immunization Documentation    1.  Is the person to be vaccinated sick today?   No    2. Does the person to be vaccinated have an allergy to a component   of the vaccine?   No  Egg Allergy Algorithm Link    3. Has the person to be vaccinated ever had a serious reaction   to influenza vaccine in the past?   No    4. Has the person to be vaccinated ever had Guillain-Barré syndrome?   No    Form completed by Lexi Vaughn LPN    Prior to injection verified patient identity using patient's name and date of birth.    Lexi Vaughn LPN

## 2018-10-09 NOTE — MR AVS SNAPSHOT
After Visit Summary   10/9/2018    Jacklyn Hein    MRN: [id number]           Patient Information     Date Of Birth          1935        Visit Information        Provider Department      10/9/2018 1:20 PM HC FLU SHOT CLINIC [facility] [place]        Today's Diagnoses     Need for prophylactic vaccination and inoculation against influenza    -  1       Follow-ups after your visit        Your next 10 appointments already scheduled     Oct 09, 2018  1:20 PM CDT   (Arrive by 1:05 PM)   Flu Shot with HC FLU SHOT CLINIC   [facility] [place] [facility] [place] [facility]    [address]  [place] [address]   [phone]            Oct 09, 2018  1:30 PM CDT   (Arrive by 1:15 PM)   Return Visit with HC PACEMAKER   [facility] [place] ([facility] [place] [facility]    [address]  [place] [address]   [phone]            Jun 03, 2019  1:00 PM CDT   (Arrive by 12:45 PM)   Return Visit with Kodi Garcia,    [facility] [place] ([facility] [place] [facility]    [address]  [place] [address]   [phone]              Who to contact     If you have questions or need follow up information about today's clinic visit or your schedule please contact [facility] directly at [phone].  Normal or non-critical lab and imaging results will be communicated to you by MyChart, letter or phone within 4 business days after the clinic has received the results. If you do not hear from us within 7 days, please contact the clinic through MyChart or phone. If you have a critical or abnormal lab result, we will notify you by phone as soon as possible.  Submit refill requests through [employer] or call your pharmacy and they will forward the refill request to us. Please allow 3 business days for your refill to be completed.          Additional Information About Your Visit        MyChart Information      Attender gives you secure access to your electronic health record. If you see a primary care provider, you can also send messages to your care team and make appointments. If you have questions, please call your primary care clinic.  If you do not have a primary care provider, please call [phone] and they will assist you.        Care EveryWhere ID     This is your Care EveryWhere ID. This could be used by other organizations to access your [facility] medical records  [id number]         Blood Pressure from Last 3 Encounters:   10/01/18 120/70   06/01/18 143/45   01/31/18 120/64    Weight from Last 3 Encounters:   10/01/18 175 lb (79.4 kg)   06/01/18 175 lb (79.4 kg)   01/31/18 170 lb (77.1 kg)              We Performed the Following     HC FLU VACCINE, INCREASED ANTIGEN, PRESV FREE     Vaccine Administration, Initial [[id number]]        Primary Care Provider Office Phone # Fax #    R Josh Post [phone]       [facility] [place] [id number]        Equal Access to Services     [facility]: Hadii aad ku hadasho Soomaali, waaxda luqadaha, qaybta kaalmada adekirstinyasoniya, pastor pérez . So [facility] [phone].    ATENCIÓN: Si habla español, tiene a jacome disposición servicios gratuitos de asistencia lingüística. [facility] [phone].    We comply with applicable federal civil rights laws and [place] laws. We do not discriminate on the basis of race, color, national origin, age, disability, sex, sexual orientation, or gender identity.            Thank you!     Thank you for choosing [facility]  for your care. Our goal is always to provide you with excellent care. Hearing back from our patients is one way we can continue to improve our services. Please take a few minutes to complete the written survey that you may receive in the mail after your visit with us. Thank you!             Your Updated Medication List - Protect others around you: Learn how to  safely use, store and throw away your medicines at www.disposemymeds.org.          This list is accurate as of 10/9/18  1:02 PM.  Always use your most recent med list.                   Brand Name Dispense Instructions for use Diagnosis    ACETAMINOPHEN PM  MG tablet   Generic drug:  diphenhydrAMINE-acetaminophen      Take 2 tablets by mouth nightly as needed.        albuterol 108 (90 Base) MCG/ACT inhaler    PROAIR HFA/PROVENTIL HFA/VENTOLIN HFA    1 Inhaler    Inhale 2 puffs into the lungs every 6 hours as needed for shortness of breath / dyspnea or wheezing    Other emphysema (H)       alendronate 70 MG tablet    FOSAMAX    12 tablet    TAKE 1 TABLET BY MOUTH EVERY 7 DAYS. TAKE WITH 8 OUNCES OF WATER AND STAY UPRIGHT FOR AT LEAST 30 MINUTES AFTER TAKING.    Age-related osteoporosis without current pathological fracture       aspirin 81 MG tablet      Take 81 mg by mouth daily        atorvastatin 10 MG tablet    LIPITOR    90 tablet    TAKE 1 TABLET BY MOUTH DAILY    Mixed hyperlipidemia       CALCIUM 600 + D PO      Take 1 tablet by mouth daily        carvedilol 12.5 MG tablet    COREG    180 tablet    TAKE 1 TABLET BY MOUTH 2 TIMES DAILY WITH MEALS    Non-ischemic cardiomyopathy (H)        MG tablet   Generic drug:  ibuprofen     30 tablet    TAKE 1 TABLET BY MOUTH EVERY 6 HOURS AS NEEDED FOR MODERATE TO SEVEREPAIN    Chronic midline low back pain without sciatica       lisinopril 2.5 MG tablet    PRINIVIL/Zestril    90 tablet    Take 1 tablet (2.5 mg) by mouth daily    Non-ischemic cardiomyopathy (H)       MULTI-VITAMIN DAILY PO      Take 1 tablet by mouth daily        * order for DME     1 Device    Equipment being ordered: Lumbar brace    Midline low back pain without sciatica       * order for DME     1 each    Equipment being ordered: Walker    Chronic midline low back pain without sciatica, CHF (congestive heart failure), NYHA class II, chronic, systolic (H)       spironolactone 25 MG tablet     ALDACTONE    45 tablet    TAKE 1/2 TABLET BY MOUTH DAILY    Primary pulmonary hypertension (H)       SYMBICORT 160-4.5 MCG/ACT Inhaler   Generic drug:  budesonide-formoterol     10.2 g    INHALE 2 PUFFS INTO THE LUNGS 2 TIMES DAILY    Other emphysema (H)       Vitamin D3 2000 units Tabs      Take 1 tablet by mouth daily        * Notice:  This list has 2 medication(s) that are the same as other medications prescribed for you. Read the directions carefully, and ask your doctor or other care provider to review them with you.

## 2018-10-09 NOTE — PATIENT INSTRUCTIONS
It was a pleasure to see you in clinic today.  Please do not hesitate to call with any questions or concerns.  You are scheduled for a remote transmission on 1/15/19.  We look forward to seeing you in clinic at your next device check in 6 months.    Desiree Luevano, RN, MS, CCRN  Electrophysiology Nurse Clinician  [facility]    During Business Hours Please Call:  [phone]  After Hours Please Call:  [phone] - select option #4 and ask for job code [phone]

## 2018-10-09 NOTE — MR AVS SNAPSHOT
After Visit Summary   10/9/2018    Jacklyn Hein    MRN: [id number]           Patient Information     Date Of Birth          1935        Visit Information        Provider Department      10/9/2018 1:30 PM HC PACEMAKER [facility] [place]        Today's Diagnoses     Non-ischemic cardiomyopathy (H)    -  1      Care Instructions    It was a pleasure to see you in clinic today.  Please do not hesitate to call with any questions or concerns.  You are scheduled for a remote transmission on 1/15/19.  We look forward to seeing you in clinic at your next device check in 6 months.    Desiree Luevano, RN, MS, CCRN  Electrophysiology Nurse Clinician  [facility]    During Business Hours Please Call:  [phone]  After Hours Please Call:  [phone] - select option #4 and ask for job code [phone]                        Follow-ups after your visit        Your next 10 appointments already scheduled     Apr 09, 2019  2:30 PM CDT   (Arrive by 2:15 PM)   Return Visit with HC PACEMAKER   [facility] [place] ([facility] [place] )    [address] [place] [address]   [phone]            Jun 03, 2019  1:00 PM CDT   (Arrive by 12:45 PM)   Return Visit with Kodi Garcia,    [facility] [place] ([facility] [place] )    [address]  [place] [address]   [phone]              Who to contact     If you have questions or need follow up information about today's clinic visit or your schedule please contact [facility] directly at [phone].  Normal or non-critical lab and imaging results will be communicated to you by MyChart, letter or phone within 4 business days after the clinic has received the results. If you do not hear from us within 7 days, please contact the clinic through MyChart or phone. If you have a critical or abnormal lab result, we will notify you by phone as  soon as possible.  Submit refill requests through [employer] ([employer]) or call your pharmacy and they will forward the refill request to us. Please allow 3 business days for your refill to be completed.          Additional Information About Your Visit        "[employer]hart Information     [employer] ([employer]) gives you secure access to your electronic health record. If you see a primary care provider, you can also send messages to your care team and make appointments. If you have questions, please call your primary care clinic.  If you do not have a primary care provider, please call [phone] and they will assist you.        Care EveryWhere ID     This is your Care EveryWhere ID. This could be used by other organizations to access your [facility] medical records  [id number]         Blood Pressure from Last 3 Encounters:   10/01/18 120/70   06/01/18 143/45   01/31/18 120/64    Weight from Last 3 Encounters:   10/01/18 79.4 kg (175 lb)   06/01/18 79.4 kg (175 lb)   01/31/18 77.1 kg (170 lb)              We Performed the Following     ICD DEVICE PROGRAMMING EVAL, MULTI LEAD ICD        Primary Care Provider Office Phone # Fax #    R Josh Post [phone]       [address] [place], [place] [id number]        Equal Access to Services     ANA MITCHELL : Hadii aad ku hadasho Soomaali, waaxda luqadaha, qaybta kaalmada adeegyada, waxay juan carlos morris. So [facility] [phone].    ATENCIÓN: Si habla español, tiene a jacome disposición servicios gratuitos de asistencia lingüística. Llame al [phone].    We comply with applicable federal civil rights laws and [place] laws. We do not discriminate on the basis of race, color, national origin, age, disability, sex, sexual orientation, or gender identity.            Thank you!     Thank you for choosing [facility]  for your care. Our goal is always to provide you with excellent care. Hearing back from our patients is one way we can continue to improve  our services. Please take a few minutes to complete the written survey that you may receive in the mail after your visit with us. Thank you!             Your Updated Medication List - Protect others around you: Learn how to safely use, store and throw away your medicines at www.disposemymeds.org.          This list is accurate as of 10/9/18  2:50 PM.  Always use your most recent med list.                   Brand Name Dispense Instructions for use Diagnosis    ACETAMINOPHEN PM  MG tablet   Generic drug:  diphenhydrAMINE-acetaminophen      Take 2 tablets by mouth nightly as needed.        albuterol 108 (90 Base) MCG/ACT inhaler    PROAIR HFA/PROVENTIL HFA/VENTOLIN HFA    1 Inhaler    Inhale 2 puffs into the lungs every 6 hours as needed for shortness of breath / dyspnea or wheezing    Other emphysema (H)       alendronate 70 MG tablet    FOSAMAX    12 tablet    TAKE 1 TABLET BY MOUTH EVERY 7 DAYS. TAKE WITH 8 OUNCES OF WATER AND STAY UPRIGHT FOR AT LEAST 30 MINUTES AFTER TAKING.    Age-related osteoporosis without current pathological fracture       aspirin 81 MG tablet      Take 81 mg by mouth daily        atorvastatin 10 MG tablet    LIPITOR    90 tablet    TAKE 1 TABLET BY MOUTH DAILY    Mixed hyperlipidemia       CALCIUM 600 + D PO      Take 1 tablet by mouth daily        carvedilol 12.5 MG tablet    COREG    180 tablet    TAKE 1 TABLET BY MOUTH 2 TIMES DAILY WITH MEALS    Non-ischemic cardiomyopathy (H)        MG tablet   Generic drug:  ibuprofen     30 tablet    TAKE 1 TABLET BY MOUTH EVERY 6 HOURS AS NEEDED FOR MODERATE TO SEVEREPAIN    Chronic midline low back pain without sciatica       lisinopril 2.5 MG tablet    PRINIVIL/Zestril    90 tablet    Take 1 tablet (2.5 mg) by mouth daily    Non-ischemic cardiomyopathy (H)       MULTI-VITAMIN DAILY PO      Take 1 tablet by mouth daily        * order for DME     1 Device    Equipment being ordered: Lumbar brace    Midline low back pain without sciatica        * order for DME     1 each    Equipment being ordered: Walker    Chronic midline low back pain without sciatica, CHF (congestive heart failure), NYHA class II, chronic, systolic (H)       spironolactone 25 MG tablet    ALDACTONE    45 tablet    TAKE 1/2 TABLET BY MOUTH DAILY    Primary pulmonary hypertension (H)       SYMBICORT 160-4.5 MCG/ACT Inhaler   Generic drug:  budesonide-formoterol     10.2 g    INHALE 2 PUFFS INTO THE LUNGS 2 TIMES DAILY    Other emphysema (H)       Vitamin D3 2000 units Tabs      Take 1 tablet by mouth daily        * Notice:  This list has 2 medication(s) that are the same as other medications prescribed for you. Read the directions carefully, and ask your doctor or other care provider to review them with you.

## 2018-10-09 NOTE — PROGRESS NOTES
"Preliminary Device Interrogation Results.  Final physician signed paceart report to be scanned and attached.    Patient seen in clinic for evaluation and iterative programming of her Medtronic multi lead ICD per MD orders.  Normal ICD function.  1 AT/AF episode recorded - 1 second.  No ventricular arrhythmias recorded.  Intrinsic rhythm = NSR @ 75 bpm.  AP = 6.7%.   = 98.2%.  BVP = 97.6%.  VSR pace = 1.8%.OptiVol fluid index is at baseline.  Estimated battery longevity to JAMIE = 4.6 years.  No short v-v intervals recorded.  RV lead impedance trends appear stable.  Patient reports that she is feeing winded with activity.  Patient's daughter reports that she is not doing much activity because she gets very winded with exertion.  Activity threshold programmed from medium low to low.  Patient ambulated in hallway while talking with minimal SOB.  Patient's daughter reports that after that level of activity she would normally be \"panting for breath\".  Plan for patient to send a remote transmission in 3 months and return to clinic in 6 months.    Multi lead ICD    "

## 2018-10-15 DIAGNOSIS — G89.29 CHRONIC MIDLINE LOW BACK PAIN WITHOUT SCIATICA: ICD-10-CM

## 2018-10-15 DIAGNOSIS — M81.0 AGE-RELATED OSTEOPOROSIS WITHOUT CURRENT PATHOLOGICAL FRACTURE: ICD-10-CM

## 2018-10-15 DIAGNOSIS — M54.50 CHRONIC MIDLINE LOW BACK PAIN WITHOUT SCIATICA: ICD-10-CM

## 2018-10-17 RX ORDER — IBUPROFEN 600 MG/1
TABLET ORAL
Qty: 30 TABLET | Refills: 0 | Status: SHIPPED | OUTPATIENT
Start: 2018-10-17 | End: 2018-11-20

## 2018-10-17 NOTE — TELEPHONE ENCOUNTER
ibuprophen     Last Written Prescription Date:  9/13/18  Last Fill Quantity: 30,   # refills: 0  Last Office Visit: 1/29/18  Future Office visit:

## 2018-10-17 NOTE — TELEPHONE ENCOUNTER
alendronate      Last Written Prescription Date:  7/31/18  Last Fill Quantity: 12,   # refills: 0  Last Office Visit: 1/29/18  Future Office visit:

## 2018-10-18 RX ORDER — ALENDRONATE SODIUM 70 MG/1
TABLET ORAL
Qty: 12 TABLET | Refills: 0 | Status: SHIPPED | OUTPATIENT
Start: 2018-10-18 | End: 2019-01-07

## 2018-10-29 DIAGNOSIS — I42.8 NON-ISCHEMIC CARDIOMYOPATHY (H): ICD-10-CM

## 2018-10-29 RX ORDER — CARVEDILOL 12.5 MG/1
TABLET ORAL
Qty: 180 TABLET | Refills: 0 | Status: SHIPPED | OUTPATIENT
Start: 2018-10-29 | End: 2019-01-28

## 2018-11-20 DIAGNOSIS — J43.8 OTHER EMPHYSEMA (H): ICD-10-CM

## 2018-11-21 RX ORDER — BUDESONIDE AND FORMOTEROL FUMARATE DIHYDRATE 160; 4.5 UG/1; UG/1
AEROSOL RESPIRATORY (INHALATION)
Qty: 10.2 G | Refills: 1 | Status: SHIPPED | OUTPATIENT
Start: 2018-11-21 | End: 2019-02-01

## 2018-12-06 NOTE — PROGRESS NOTES
SUBJECTIVE:   Jacklyn Hein is a 83 year old female who presents to clinic today for the following health issues:      Hyperlipidemia Follow-Up      Rate your low fat/cholesterol diet?: not monitoring fat    Taking statin?  Yes, no muscle aches from statin    Other lipid medications/supplements?:  none    Hypertension Follow-up      Outpatient blood pressures are not being checked.    Low Salt Diet: low salt      Amount of exercise or physical activity: None    Problems taking medications regularly: No    Medication side effects: none    Diet: regular (no restrictions) and low salt    His chronic back pain does have known osteoporosis had a kyphoplasty in the past that she did not feel helped.  Is on calcium vitamin D and Fosamax.  Still has dyspnea and has known COPD.  Is on Symbicort and albuterol.  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     Insomnia, unspecified 01/01/2011     LBBB (left bundle branch block) 01/01/2011     Neck pain, chronic 01/01/2011     Non-ischemic cardiomyopathy (H) 12/10/2013     Obesity, unspecified 01/01/2011     Osteopenia 01/01/2011     Pacemaker      Pain in joint, lower leg 07/31/2006     Pure hypercholesterolemia 06/07/2002     Unspecified essential hypertension 01/01/2011       PAST SURGICAL HISTORY:  Past Surgical History:   Procedure Laterality Date     APPENDECTOMY       ARTHROSCOPY KNEE RT/LT  2009    RT     CARDIAC SURGERY  05/06/2014    Pacemaker     Cataract extraction  2009    Bilateral     CHOLECYSTECTOMY      open      COLONOSCOPY  05/2001    Normal      COLONOSCOPY N/A 10/20/2016     Procedure: COLONOSCOPY;  Surgeon: Charan Montejo MD;  Location: [facility] OR     colonoscopy with polypectomy      Repeat 3 years      CT CORONARY ANGIOGRAM      normal     HERPES ZOSTER PCR ([facility])       left eye scar tissue       normal echo      fen-phen use       x6       SURGICAL RADIOLOGY PROCEDURE N/A 2016    Procedure: SURGICAL RADIOLOGY PROCEDURE;  Surgeon: Provider, Generic Perianesthesia Nursing;  Location: [facility] OR       MEDICATIONS:  Prior to Admission medications    Medication Sig Start Date End Date Taking? Authorizing Provider   albuterol (PROAIR HFA/PROVENTIL HFA/VENTOLIN HFA) 108 (90 BASE) MCG/ACT Inhaler Inhale 2 puffs into the lungs every 6 hours as needed for shortness of breath / dyspnea or wheezing 10/17/17  Yes Min Newell MD   alendronate (FOSAMAX) 70 MG tablet TAKE 1 TABLET BY MOUTH EVERY 7 DAYS. TAKE WITH 8 OUNCES OF WATER AND STAY UPRIGHT FOR AT LEAST 30 MINUTES AFTER TAKING. 10/18/18  Yes Keo Hull MD   aspirin 81 MG tablet Take 81 mg by mouth daily    Yes Reported, Patient   atorvastatin (LIPITOR) 10 MG tablet TAKE 1 TABLET BY MOUTH DAILY 18  Yes ANTHONY Post MD   budesonide-formoterol (SYMBICORT) 160-4.5 MCG/ACT Inhaler INHALE 2 PUFFS INTO THE LUNGS 2 TIMES DAILY 18  Yes ANTHONY Post MD   Calcium Carbonate-Vitamin D (CALCIUM 600 + D OR) Take 1 tablet by mouth daily   Yes Reported, Patient   carvedilol (COREG) 12.5 MG tablet TAKE 1 TABLET BY MOUTH 2 TIMES DAILY WITH MEALS 10/29/18  Yes ANTHONY Post MD   Cholecalciferol (VITAMIN D3) 2000 UNITS TABS Take 1 tablet by mouth daily   Yes Reported, Patient   diphenhydrAMINE-acetaminophen (ACETAMINOPHEN PM)  MG tablet Take 2 tablets by mouth nightly as needed.   Yes Reported, Patient    MG tablet TAKE 1 TABLET BY MOUTH EVERY 6 HOURS AS NEEDED FOR MODERATE TO SEVEREPAIN 18  Yes Keo Hull MD   lisinopril (PRINIVIL/ZESTRIL) 2.5 MG tablet Take 1 tablet (2.5 mg) by mouth  daily 3/28/18  Yes Kodi Garcia, DO   Multiple Vitamin (MULTI-VITAMIN DAILY PO) Take 1 tablet by mouth daily   Yes Reported, Patient   order for DME Equipment being ordered: Walker 7/26/16  Yes ANTHONY Post MD   ORDER FOR DME Equipment being ordered: Lumbar brace 5/18/15  Yes ANTHONY Post MD   spironolactone (ALDACTONE) 25 MG tablet TAKE 1/2 TABLET BY MOUTH DAILY 6/4/18  Yes ANTHONY Post MD   tiotropium (SPIRIVA RESPIMAT) 2.5 MCG/ACT inhaler Inhale 2 puffs into the lungs daily 12/11/18 12/11/19 Yes ANTHONY Post MD       ALLERGIES:   No Known Allergies    ROS:  Constitutional, neuro, ENT, endocrine, pulmonary, cardiac, gastrointestinal, genitourinary, musculoskeletal, integument and psychiatric systems are negative, except as otherwise noted.      EXAM:  /68 (Patient Position: Sitting)   Pulse 77   Wt 80.3 kg (177 lb)   SpO2 96%   BMI 29.45 kg/m   Body mass index is 29.45 kg/m .   GENERAL APPEARANCE: healthy, alert and no distress  EYES: Eyes grossly normal to inspection, PERRL and conjunctivae and sclerae normal  HENT: nose and mouth without ulcers or lesions  NECK: no adenopathy, no asymmetry, masses, or scars and thyroid normal to palpation  RESP: prolonged expiratory phase  CV: regular rates and rhythm, normal S1 S2, no S3 or S4 and no murmur, click or rub  MS: extremities normal- no gross deformities noted, has some mild tenderness in the midline thoracic spine she did have a CT done of the area earlier this year  NEURO: Normal strength and tone, mentation intact and speech normal  PSYCH: mentation appears normal and affect normal/bright  Lab/ X-ray  No results found for this or any previous visit (from the past 24 hour(s)).    ASSESSMENT/PLAN:    ICD-10-CM    1. Congestive heart failure, NYHA class 2, unspecified congestive heart failure type (H) I50.9 Basic metabolic panel     N terminal pro BNP outpatient   2. Essential hypertension I10    3. Pure hypercholesterolemia E78.00 Lipid  Profile (Chol, Trig, HDL, LDL calc)     AST   4. Lumbar spine pain M54.5    5. Chronic obstructive pulmonary disease, unspecified COPD type (H) J44.9 tiotropium (SPIRIVA RESPIMAT) 2.5 MCG/ACT inhaler   Will check a BMP follow-up with the CHF as well as a BNP.  Has hypertension that stable has hypercholesterolemia we will check lipid AST has chronic lumbar spine pain had a failed kyphoplasty does have osteoporosis is on Fosamax calcium and vitamin D.  For the COPD keep with the Symbicort and albuterol but will add Spiriva.  She does have an appointment in the spring with cardiology.      YUE Post MD  December 11, 2018

## 2018-12-11 ENCOUNTER — TELEPHONE (OUTPATIENT)
Dept: FAMILY MEDICINE | Facility: OTHER | Age: 83
End: 2018-12-11

## 2018-12-11 ENCOUNTER — OFFICE VISIT (OUTPATIENT)
Dept: FAMILY MEDICINE | Facility: OTHER | Age: 83
End: 2018-12-11
Attending: FAMILY MEDICINE
Payer: MEDICARE

## 2018-12-11 VITALS
DIASTOLIC BLOOD PRESSURE: 68 MMHG | SYSTOLIC BLOOD PRESSURE: 122 MMHG | WEIGHT: 177 LBS | BODY MASS INDEX: 29.45 KG/M2 | HEART RATE: 77 BPM | OXYGEN SATURATION: 96 %

## 2018-12-11 DIAGNOSIS — M54.50 LUMBAR SPINE PAIN: ICD-10-CM

## 2018-12-11 DIAGNOSIS — E78.00 PURE HYPERCHOLESTEROLEMIA: ICD-10-CM

## 2018-12-11 DIAGNOSIS — I10 ESSENTIAL HYPERTENSION: ICD-10-CM

## 2018-12-11 DIAGNOSIS — J44.9 CHRONIC OBSTRUCTIVE PULMONARY DISEASE, UNSPECIFIED COPD TYPE (H): ICD-10-CM

## 2018-12-11 DIAGNOSIS — I50.9 CONGESTIVE HEART FAILURE, NYHA CLASS 2, UNSPECIFIED CONGESTIVE HEART FAILURE TYPE (H): Primary | ICD-10-CM

## 2018-12-11 LAB
ANION GAP SERPL CALCULATED.3IONS-SCNC: 4 MMOL/L (ref 3–14)
AST SERPL W P-5'-P-CCNC: 17 U/L (ref 0–45)
BUN SERPL-MCNC: 31 MG/DL (ref 7–30)
CALCIUM SERPL-MCNC: 8.7 MG/DL (ref 8.5–10.1)
CHLORIDE SERPL-SCNC: 109 MMOL/L (ref 94–109)
CHOLEST SERPL-MCNC: 134 MG/DL
CO2 SERPL-SCNC: 26 MMOL/L (ref 20–32)
CREAT SERPL-MCNC: 1.14 MG/DL (ref 0.52–1.04)
GFR SERPL CREATININE-BSD FRML MDRD: 46 ML/MIN/1.7M2
GLUCOSE SERPL-MCNC: 89 MG/DL (ref 70–99)
HDLC SERPL-MCNC: 56 MG/DL
LDLC SERPL CALC-MCNC: 58 MG/DL
NONHDLC SERPL-MCNC: 78 MG/DL
NT-PROBNP SERPL-MCNC: 562 PG/ML (ref 0–450)
POTASSIUM SERPL-SCNC: 4.9 MMOL/L (ref 3.4–5.3)
SODIUM SERPL-SCNC: 139 MMOL/L (ref 133–144)
TRIGL SERPL-MCNC: 101 MG/DL

## 2018-12-11 PROCEDURE — 80061 LIPID PANEL: CPT | Mod: ZL | Performed by: FAMILY MEDICINE

## 2018-12-11 PROCEDURE — 83880 ASSAY OF NATRIURETIC PEPTIDE: CPT | Mod: ZL | Performed by: FAMILY MEDICINE

## 2018-12-11 PROCEDURE — 84450 TRANSFERASE (AST) (SGOT): CPT | Mod: ZL | Performed by: FAMILY MEDICINE

## 2018-12-11 PROCEDURE — 99214 OFFICE O/P EST MOD 30 MIN: CPT | Performed by: FAMILY MEDICINE

## 2018-12-11 PROCEDURE — 80048 BASIC METABOLIC PNL TOTAL CA: CPT | Mod: ZL | Performed by: FAMILY MEDICINE

## 2018-12-11 PROCEDURE — 36415 COLL VENOUS BLD VENIPUNCTURE: CPT | Mod: ZL | Performed by: FAMILY MEDICINE

## 2018-12-11 PROCEDURE — G0463 HOSPITAL OUTPT CLINIC VISIT: HCPCS

## 2018-12-11 ASSESSMENT — ANXIETY QUESTIONNAIRES
2. NOT BEING ABLE TO STOP OR CONTROL WORRYING: NOT AT ALL
7. FEELING AFRAID AS IF SOMETHING AWFUL MIGHT HAPPEN: NOT AT ALL
3. WORRYING TOO MUCH ABOUT DIFFERENT THINGS: NOT AT ALL
6. BECOMING EASILY ANNOYED OR IRRITABLE: NOT AT ALL
5. BEING SO RESTLESS THAT IT IS HARD TO SIT STILL: NOT AT ALL
4. TROUBLE RELAXING: NOT AT ALL
GAD7 TOTAL SCORE: 0
1. FEELING NERVOUS, ANXIOUS, OR ON EDGE: NOT AT ALL

## 2018-12-11 ASSESSMENT — PAIN SCALES - GENERAL: PAINLEVEL: NO PAIN (0)

## 2018-12-11 ASSESSMENT — PATIENT HEALTH QUESTIONNAIRE - PHQ9: SUM OF ALL RESPONSES TO PHQ QUESTIONS 1-9: 4

## 2018-12-11 NOTE — TELEPHONE ENCOUNTER
APPROVAL - 12/11/2018 - received PA request from 's for spiriva respimat.  Submitted thru [facility] as urgent request.  Received immediate APPROVAL.  Approval dates:  09/12/2018 thru 12/11/2019.  Pharmacy advised.  Documentation scanned to Epic. Millie Cordova, HIS Specialist.

## 2018-12-11 NOTE — NURSING NOTE
"Chief Complaint   Patient presents with     Lipids     Hypertension       Initial /68 (Patient Position: Sitting)   Pulse 77   Wt 80.3 kg (177 lb)   SpO2 96%   BMI 29.45 kg/m   Estimated body mass index is 29.45 kg/m  as calculated from the following:    Height as of 10/1/18: 1.651 m (5' 5\").    Weight as of this encounter: 80.3 kg (177 lb).  Medication Reconciliation: complete    Deena Fletcher LPN  "

## 2018-12-11 NOTE — LETTER
My COPD Action Plan     Name: Jacklyn Hein    YOB: 1935   Date: 12/6/2018    My doctor: ANTHONY Post MD   My clinic: [facility] [place]    [facility] [address]  [phone]  My Controller Medicine: Formoterol/Budesonide (Symbicort)   Dose: 160-4.5mcg/ACT 2 puffs 2 times daily     My Rescue Medicine: Albuterol (Proair/Ventolin/Proventil) inhaler   Dose: 108(90)base MCG/ACT 2 puffs every 6 hours as needed     My Flare Up Medicine: none   Dose: none     My COPD Severity: Mild = FeV1 > 80%      Use of Oxygen: Oxygen Not Prescribed      Make sure you've had your pneumonia   vaccines.          GREEN ZONE       Doing well today      Usual level of activity and exercise    Usual amount of cough and mucus    No shortness of breath    Usual level of health (thinking clearly, sleeping well, feel like eating) Actions:      Take daily medicines    Use oxygen as prescribed    Follow regular exercise and diet plan    Avoid cigarette smoke and other irritants that harm the lungs           YELLOW ZONE          Having a bad day or flare up      Short of breath more than usual    A lot more sputum (mucus) than usual    Sputum looks yellow, green, tan, brown or bloody    More coughing or wheezing    Fever or chills    Less energy; trouble completing activities    Trouble thinking or focusing    Using quick relief inhaler or nebulizer more often    Poor sleep; symptoms wake me up    Do not feel like eating Actions:      Get plenty of rest    Take daily medicines    Use quick relief inhaler every 6 hours    If you use oxygen, call you doctor to see if you should adjust your oxygen    Do breathing exercises or other things to help you relax    Let a loved one, friend or neighbor know you are feeling worse    Call your care team if you have 2 or more symptoms.  Start taking steroids or antibiotics if directed by your care team           RED ZONE       Need medical care  now      Severe shortness of breath (feel you can't breathe)    Fever, chills    Not enough breath to do any activity    Trouble coughing up mucus, walking or talking    Blood in mucus    Frequent coughing   Rescue medicines are not working    Not able to sleep because of breathing    Feel confused or drowsy    Chest pain    Actions:      Call your health care team.  If you cannot reach your care team, call 911 or go to the emergency room.        Annual Reminders:  Meet with Care Team, Flu Shot every Fall  Pharmacy: [facility] - [place], [place] - [address]

## 2018-12-12 ASSESSMENT — ANXIETY QUESTIONNAIRES: GAD7 TOTAL SCORE: 0

## 2018-12-24 ENCOUNTER — HOSPITAL ENCOUNTER (EMERGENCY)
Facility: HOSPITAL | Age: 83
Discharge: HOME | End: 2018-12-24
Attending: PHYSICIAN ASSISTANT | Admitting: PHYSICIAN ASSISTANT
Payer: MEDICARE

## 2018-12-24 ENCOUNTER — APPOINTMENT (OUTPATIENT)
Dept: GENERAL RADIOLOGY | Facility: HOSPITAL | Age: 83
End: 2018-12-24
Attending: PHYSICIAN ASSISTANT
Payer: MEDICARE

## 2018-12-24 VITALS
OXYGEN SATURATION: 94 % | RESPIRATION RATE: 16 BRPM | TEMPERATURE: 100.9 F | DIASTOLIC BLOOD PRESSURE: 56 MMHG | HEART RATE: 102 BPM | SYSTOLIC BLOOD PRESSURE: 121 MMHG

## 2018-12-24 DIAGNOSIS — J18.9 RLL PNEUMONIA: ICD-10-CM

## 2018-12-24 LAB
ANION GAP SERPL CALCULATED.3IONS-SCNC: 6 MMOL/L (ref 3–14)
BASOPHILS # BLD AUTO: 0.1 10E9/L (ref 0–0.2)
BASOPHILS NFR BLD AUTO: 0.4 %
BUN SERPL-MCNC: 25 MG/DL (ref 7–30)
CALCIUM SERPL-MCNC: 8.4 MG/DL (ref 8.5–10.1)
CHLORIDE SERPL-SCNC: 105 MMOL/L (ref 94–109)
CO2 SERPL-SCNC: 25 MMOL/L (ref 20–32)
CREAT SERPL-MCNC: 1.2 MG/DL (ref 0.52–1.04)
DIFFERENTIAL METHOD BLD: ABNORMAL
EOSINOPHIL # BLD AUTO: 0.1 10E9/L (ref 0–0.7)
EOSINOPHIL NFR BLD AUTO: 0.6 %
ERYTHROCYTE [DISTWIDTH] IN BLOOD BY AUTOMATED COUNT: 12.7 % (ref 10–15)
FLUAV+FLUBV RNA SPEC QL NAA+PROBE: NEGATIVE
FLUAV+FLUBV RNA SPEC QL NAA+PROBE: NEGATIVE
GFR SERPL CREATININE-BSD FRML MDRD: 42 ML/MIN/{1.73_M2}
GLUCOSE SERPL-MCNC: 122 MG/DL (ref 70–99)
HCT VFR BLD AUTO: 34.4 % (ref 35–47)
HGB BLD-MCNC: 11.3 G/DL (ref 11.7–15.7)
IMM GRANULOCYTES # BLD: 0.1 10E9/L (ref 0–0.4)
IMM GRANULOCYTES NFR BLD: 0.6 %
LYMPHOCYTES # BLD AUTO: 0.9 10E9/L (ref 0.8–5.3)
LYMPHOCYTES NFR BLD AUTO: 7.1 %
MCH RBC QN AUTO: 29.6 PG (ref 26.5–33)
MCHC RBC AUTO-ENTMCNC: 32.8 G/DL (ref 31.5–36.5)
MCV RBC AUTO: 90 FL (ref 78–100)
MONOCYTES # BLD AUTO: 1 10E9/L (ref 0–1.3)
MONOCYTES NFR BLD AUTO: 8.1 %
NEUTROPHILS # BLD AUTO: 10.5 10E9/L (ref 1.6–8.3)
NEUTROPHILS NFR BLD AUTO: 83.2 %
NRBC # BLD AUTO: 0 10*3/UL
NRBC BLD AUTO-RTO: 0 /100
PLATELET # BLD AUTO: 194 10E9/L (ref 150–450)
POTASSIUM SERPL-SCNC: 4.2 MMOL/L (ref 3.4–5.3)
RBC # BLD AUTO: 3.82 10E12/L (ref 3.8–5.2)
RSV RNA SPEC NAA+PROBE: NEGATIVE
SODIUM SERPL-SCNC: 136 MMOL/L (ref 133–144)
SPECIMEN SOURCE: NORMAL
WBC # BLD AUTO: 12.7 10E9/L (ref 4–11)

## 2018-12-24 PROCEDURE — 25000128 H RX IP 250 OP 636: Performed by: PHYSICIAN ASSISTANT

## 2018-12-24 PROCEDURE — 71046 X-RAY EXAM CHEST 2 VIEWS: CPT | Mod: TC

## 2018-12-24 PROCEDURE — 96360 HYDRATION IV INFUSION INIT: CPT

## 2018-12-24 PROCEDURE — 99284 EMERGENCY DEPT VISIT MOD MDM: CPT | Mod: Z6 | Performed by: PHYSICIAN ASSISTANT

## 2018-12-24 PROCEDURE — A9270 NON-COVERED ITEM OR SERVICE: HCPCS | Mod: GY | Performed by: PHYSICIAN ASSISTANT

## 2018-12-24 PROCEDURE — 25000132 ZZH RX MED GY IP 250 OP 250 PS 637: Mod: GY | Performed by: PHYSICIAN ASSISTANT

## 2018-12-24 PROCEDURE — 99284 EMERGENCY DEPT VISIT MOD MDM: CPT | Mod: 25

## 2018-12-24 PROCEDURE — 87631 RESP VIRUS 3-5 TARGETS: CPT | Performed by: PHYSICIAN ASSISTANT

## 2018-12-24 PROCEDURE — 85025 COMPLETE CBC W/AUTO DIFF WBC: CPT | Performed by: PHYSICIAN ASSISTANT

## 2018-12-24 PROCEDURE — 36415 COLL VENOUS BLD VENIPUNCTURE: CPT | Performed by: PHYSICIAN ASSISTANT

## 2018-12-24 PROCEDURE — 80048 BASIC METABOLIC PNL TOTAL CA: CPT | Performed by: PHYSICIAN ASSISTANT

## 2018-12-24 RX ORDER — ACETAMINOPHEN 325 MG/1
650 TABLET ORAL ONCE
Status: COMPLETED | OUTPATIENT
Start: 2018-12-24 | End: 2018-12-24

## 2018-12-24 RX ORDER — BENZONATATE 200 MG/1
200 CAPSULE ORAL 3 TIMES DAILY PRN
Qty: 20 CAPSULE | Refills: 0 | Status: SHIPPED | OUTPATIENT
Start: 2018-12-24 | End: 2019-06-03

## 2018-12-24 RX ORDER — AZITHROMYCIN 250 MG/1
500 TABLET, FILM COATED ORAL ONCE
Status: COMPLETED | OUTPATIENT
Start: 2018-12-24 | End: 2018-12-24

## 2018-12-24 RX ORDER — BENZONATATE 100 MG/1
100 CAPSULE ORAL 3 TIMES DAILY PRN
Status: DISCONTINUED | OUTPATIENT
Start: 2018-12-24 | End: 2018-12-24 | Stop reason: HOSPADM

## 2018-12-24 RX ORDER — AZITHROMYCIN 250 MG/1
TABLET, FILM COATED ORAL
Qty: 4 TABLET | Refills: 0 | Status: SHIPPED | OUTPATIENT
Start: 2018-12-24 | End: 2019-06-03

## 2018-12-24 RX ADMIN — SODIUM CHLORIDE 500 ML: 9 INJECTION, SOLUTION INTRAVENOUS at 14:44

## 2018-12-24 RX ADMIN — BENZONATATE 100 MG: 100 CAPSULE, LIQUID FILLED ORAL at 16:03

## 2018-12-24 RX ADMIN — ACETAMINOPHEN 650 MG: 325 TABLET, FILM COATED ORAL at 16:03

## 2018-12-24 RX ADMIN — AZITHROMYCIN 500 MG: 250 TABLET, FILM COATED ORAL at 16:03

## 2018-12-24 ASSESSMENT — ENCOUNTER SYMPTOMS
CARDIOVASCULAR NEGATIVE: 1
CHILLS: 1
FATIGUE: 1
GASTROINTESTINAL NEGATIVE: 1
NEUROLOGICAL NEGATIVE: 1
MYALGIAS: 1
COUGH: 1
SHORTNESS OF BREATH: 1
FEVER: 1
PSYCHIATRIC NEGATIVE: 1

## 2018-12-24 NOTE — ED NOTES
Discharge instructions reviewed with patient. 2 prescriptions e-scribed to [facility]  Encouraged pt and spouse to go and p/u Rx's ASAP as they close at 1800 and are not open tomorrow.  Encouraged to return with new or worsening symptoms. Copy of AVS given

## 2018-12-24 NOTE — ED PROVIDER NOTES
"  History     Chief Complaint   Patient presents with     Cough     Fatigue     HPI  Jacklyn Hein is a 83 year old female with Hx COPD, HTN, CHF who presents ambulatory with her  to the ER c/o cough, fevers, weakness. She tells me symptoms started with a cold, over the past 48hrs she developed fevers and weakness/fatigue. She is winded easily. Cough is described as hacking and comes in \"jags\". Denies wheezing.  is getting over a URI, but his was not this severe. She tried OTC cough medicine that helped little.     Problem List:    Patient Active Problem List    Diagnosis Date Noted     LBBB (left bundle branch block) 06/01/2018     Priority: Medium     History of tobacco abuse quitting 25 years ago 06/01/2018     Priority: Medium     Mild intermittent asthma, unspecified whether complicated 06/01/2018     Priority: Medium     Chronic obstructive pulmonary disease, unspecified COPD type (H) 06/01/2018     Priority: Medium     SOB (shortness of breath) 06/01/2018     Priority: Medium     Lumbar spine pain 10/27/2015     Priority: Medium     Automatic implantable cardioverter-defibrillator - Med[employer]onic multi lead ICD 11/11/2014     Priority: Medium     Implant date - 5/6/14  Problem list name updated by automated process. Provider to review       Non-ischemic cardiomyopathy (H) 12/10/2013     Priority: Medium     CHF (congestive heart failure), NYHA class II (H) 12/10/2013     Priority: Medium     ACP (advance care planning) 10/26/2012     Priority: Medium     Advance Care Planning 5/2/2017: ACP Review of Chart / Resources Provided:  Reviewed chart for advance care plan.  Jacklyn Hein has been provided information and resources to begin or update their advance care plan.  Added by NURYS COTTO             Insomnia 01/01/2011     Priority: Medium     Problem list name updated by automated process. Provider to review       Disorder of bone and cartilage 01/01/2011     Priority: Medium    "  Problem list name updated by automated process. Provider to review       Essential hypertension 2011     Priority: Medium     Problem list name updated by automated process. Provider to review       Neck pain, chronic 2011     Priority: Medium     Pure hypercholesterolemia 2002     Priority: Medium        Past Medical History:    Past Medical History:   Diagnosis Date     Angina pectoris 2011     CHF (congestive heart failure), NYHA class II (H) 12/10/2013     CHF (congestive heart failure), NYHA class III (H) 12/10/2013     Hyperhydrosis disorder 2011     Insomnia, unspecified 2011     LBBB (left bundle branch block) 2011     Neck pain, chronic 2011     Non-ischemic cardiomyopathy (H) 12/10/2013     Obesity, unspecified 2011     Osteopenia 2011     Pacemaker      Pain in joint, lower leg 2006     Pure hypercholesterolemia 2002     Unspecified essential hypertension 2011       Past Surgical History:    Past Surgical History:   Procedure Laterality Date     APPENDECTOMY       ARTHROSCOPY KNEE RT/LT      RT     CARDIAC SURGERY  2014    Pacemaker     Cataract extraction  2009    Bilateral     CHOLECYSTECTOMY      open      COLONOSCOPY  2001    Normal      COLONOSCOPY N/A 10/20/2016    Procedure: COLONOSCOPY;  Surgeon: Charan Montejo MD;  Location: [facility] OR     colonoscopy with polypectomy      Repeat 3 years      CT CORONARY ANGIOGRAM      normal     HER[facility]OST[facility]       left eye scar tissue       normal echo      fen-phen use       x6       SURGICAL RADIOLOGY PROCEDURE N/A 2016    Procedure: SURGICAL RADIOLOGY PROCEDURE;  Surgeon: Provider, Generic Perianesthesia Nursing;  Location: [facility] OR       Family History:    Family History   Problem Relation Age of Onset     Cancer Mother         lung (cause of death)      Peptic Ulcer Disease Father         gastric        Social History:  Marital  Status:   [2]  Social History     Tobacco Use     Smoking status: Former Smoker     Packs/day: 1.00     Years: 15.00     Pack years: 15.00     Types: Cigarettes     Last attempt to quit: 1998     Years since quittin.9     Smokeless tobacco: Never Used     Tobacco comment: Passive Exposure: No; Longest Tobacco Free: 15 years    Substance Use Topics     Alcohol use: Yes     Comment: Occasionally      Drug use: No        Medications:      alendronate (FOSAMAX) 70 MG tablet   aspirin 81 MG tablet   atorvastatin (LIPITOR) 10 MG tablet   azithromycin (ZITHROMAX Z-GERSON) 250 MG tablet   benzonatate (TESSALON) 200 MG capsule   budesonide-formoterol (SYMBICORT) 160-4.5 MCG/ACT Inhaler   Calcium Carbonate-Vitamin D (CALCIUM 600 + D OR)   carvedilol (COREG) 12.5 MG tablet   diphenhydrAMINE-acetaminophen (ACETAMINOPHEN PM)  MG tablet   lisinopril (PRINIVIL/ZESTRIL) 2.5 MG tablet   Multiple Vitamin (MULTI-VITAMIN DAILY PO)   spironolactone (ALDACTONE) 25 MG tablet   tiotropium (SPIRIVA RESPIMAT) 2.5 MCG/ACT inhaler   albuterol (PROAIR HFA/PROVENTIL HFA/VENTOLIN HFA) 108 (90 BASE) MCG/ACT Inhaler   order for DME   ORDER FOR DME         Review of Systems   Constitutional: Positive for chills, fatigue and fever.   HENT: Positive for congestion (improving).    Respiratory: Positive for cough and shortness of breath.    Cardiovascular: Negative.  Negative for chest pain and leg swelling.   Gastrointestinal: Negative.    Genitourinary: Negative.    Musculoskeletal: Positive for myalgias.   Skin: Negative.    Neurological: Negative.    Psychiatric/Behavioral: Negative.        Physical Exam   BP: 128/66  Pulse: 102  Heart Rate: (!) 37  Temp: 99.9  F (37.7  C)  Resp: 20  SpO2: 96 %      Physical Exam   Constitutional: She is oriented to person, place, and time. She appears well-developed and well-nourished.   Pleasant female appears stated age.    HENT:   Head: Normocephalic and atraumatic.   Mouth/Throat: Oropharynx  is clear and moist.   Eyes: Conjunctivae and EOM are normal. Pupils are equal, round, and reactive to light.   Cardiovascular: Normal rate, regular rhythm and normal heart sounds.   Pulmonary/Chest: Effort normal. She has rales (RLL crackles).   Abdominal: Soft. Bowel sounds are normal. There is no tenderness.   Neurological: She is alert and oriented to person, place, and time.   Skin: Skin is warm and dry.   Psychiatric: She has a normal mood and affect.   Nursing note and vitals reviewed.      ED Course        Procedures        Results for orders placed or performed during the hospital encounter of 12/24/18 (from the past 24 hour(s))   Basic metabolic panel   Result Value Ref Range    Sodium 136 133 - 144 mmol/L    Potassium 4.2 3.4 - 5.3 mmol/L    Chloride 105 94 - 109 mmol/L    Carbon Dioxide 25 20 - 32 mmol/L    Anion Gap 6 3 - 14 mmol/L    Glucose 122 (H) 70 - 99 mg/dL    Urea Nitrogen 25 7 - 30 mg/dL    Creatinine 1.20 (H) 0.52 - 1.04 mg/dL    GFR Estimate 42 (L) >60 mL/min/[1.73_m2]    GFR Estimate If Black 49 (L) >60 mL/min/[1.73_m2]    Calcium 8.4 (L) 8.5 - 10.1 mg/dL   CBC with platelets differential   Result Value Ref Range    WBC 12.7 (H) 4.0 - 11.0 10e9/L    RBC Count 3.82 3.8 - 5.2 10e12/L    Hemoglobin 11.3 (L) 11.7 - 15.7 g/dL    Hematocrit 34.4 (L) 35.0 - 47.0 %    MCV 90 78 - 100 fl    MCH 29.6 26.5 - 33.0 pg    MCHC 32.8 31.5 - 36.5 g/dL    RDW 12.7 10.0 - 15.0 %    Platelet Count 194 150 - 450 10e9/L    Diff Method Automated Method     % Neutrophils 83.2 %    % Lymphocytes 7.1 %    % Monocytes 8.1 %    % Eosinophils 0.6 %    % Basophils 0.4 %    % Immature Granulocytes 0.6 %    Nucleated RBCs 0 0 /100    Absolute Neutrophil 10.5 (H) 1.6 - 8.3 10e9/L    Absolute Lymphocytes 0.9 0.8 - 5.3 10e9/L    Absolute Monocytes 1.0 0.0 - 1.3 10e9/L    Absolute Eosinophils 0.1 0.0 - 0.7 10e9/L    Absolute Basophils 0.1 0.0 - 0.2 10e9/L    Abs Immature Granulocytes 0.1 0 - 0.4 10e9/L    Absolute Nucleated RBC  0.0    Influenza A and B and RSV PCR   Result Value Ref Range    Specimen Description Nasopharyngeal     Influenza A PCR Negative NEG^Negative    Influenza B PCR Negative NEG^Negative    Resp Syncytial Virus Negative NEG^Negative   XR Chest 2 Views    Narrative    EXAM:XR CHEST 2 VW     CLINICAL HISTORY: Patient Age  83 years Additional clinical info:  cough, fever     COMPARISON: Chest x-ray 5/2/2017      TECHNIQUE: 2 views      Impression    IMPRESSION: New small focal infiltrate in the right lung base and  slight infiltrate or atelectasis in the left lung base. No other  appreciable change. Pacemaker. Aortic calcifications. Postoperative  changes of vertebroplasty at the thoracolumbar junction. Chest  otherwise normal.    WAYNE BOYD MD       Medications   0.9% sodium chloride BOLUS (0 mLs Intravenous Stopped 12/24/18 1559)   acetaminophen (TYLENOL) tablet 650 mg (650 mg Oral Given 12/24/18 1603)   azithromycin (ZITHROMAX) tablet 500 mg (500 mg Oral Given 12/24/18 1603)       Assessments & Plan (with Medical Decision Making)     I have reviewed the nursing notes.    I have reviewed the findings, diagnosis, plan and need for follow up with the patient.         Medication List      Started    azithromycin 250 MG tablet  Commonly known as:  ZITHROMAX Z-GERSON  Take 1 tablet by mouth daily for 4 days starting on 12/25/2018.     benzonatate 200 MG capsule  Commonly known as:  TESSALON  200 mg, Oral, 3 TIMES DAILY PRN            Final diagnoses:   RLL pneumonia (H)   Adele received IVF and tylenol. CXR confirms pneumonia and zithromax started in addition to first dose of tessalon. WBC 12.7, Cr stable from previous visit. She is observed over 2.5 hours and feels stable to return home. We discussed deep breathing and avoiding suppressing cough unless she needs to sleep. Plenty of fluids and tylenol PRN for pain/fevers. I advised close f/u with Dr Post, returning here sooner with ANY worsening or concerns. She and her   are agreeable to plan and she is discharged home in stable condition.     12/24/2018   [facility]     Omid Jorge PA  12/25/18 0754

## 2018-12-24 NOTE — ED AVS SNAPSHOT
[facility]  [address] [address]  Phone:  [phone]                                    Jacklyn Hein   MRN: [id number]    Department:  [facility]   Date of Visit:  12/24/2018           After Visit Summary Signature Page    I have received my discharge instructions, and my questions have been answered. I have discussed any challenges I see with this plan with the nurse or doctor.    ..........................................................................................................................................  Patient/Patient Representative Signature      ..........................................................................................................................................  Patient Representative Print Name and Relationship to Patient    ..................................................               ................................................  Date                                   Time    ..........................................................................................................................................  Reviewed by Signature/Title    ...................................................              ..............................................  Date                                               Time          22EPIC Rev 08/18

## 2018-12-24 NOTE — ED TRIAGE NOTES
"Onset of URI Wednesday. Notes cough, fatigue, and reports history of \"heart problems\". History of pacemaker/defibrillator.  "

## 2018-12-24 NOTE — ED NOTES
Pt to ED with spouse.  Patient has been sick with a dry cough since Thursday but Friday started to get worse.  Fever noticed yesterday 100.3 at the highest.  Pt came in today due the fact she was feeling worse and weak.  Decreased energy.

## 2018-12-24 NOTE — DISCHARGE INSTRUCTIONS
- DEEP breathing exercises  - Fluids, rest, may use tylenol for pain/fevers  - Start the zithromax by mouth tomorrow  - Tessalon for the cough.   * Follow with Dr Post within the week, back here with ANY worsening.

## 2018-12-26 DIAGNOSIS — I42.8 NON-ISCHEMIC CARDIOMYOPATHY (H): ICD-10-CM

## 2018-12-27 RX ORDER — LISINOPRIL 2.5 MG/1
TABLET ORAL
Qty: 90 TABLET | Refills: 1 | Status: SHIPPED | OUTPATIENT
Start: 2018-12-27 | End: 2019-12-04 | Stop reason: ALTCHOICE

## 2019-01-07 DIAGNOSIS — M81.0 AGE-RELATED OSTEOPOROSIS WITHOUT CURRENT PATHOLOGICAL FRACTURE: ICD-10-CM

## 2019-01-07 NOTE — TELEPHONE ENCOUNTER
Fosamax       Last Written Prescription Date:  10/18/2018  Last Fill Quantity: 12,   # refills: 0  Last Office Visit: 12/11/2018  Future Office visit:

## 2019-01-08 RX ORDER — ALENDRONATE SODIUM 70 MG/1
TABLET ORAL
Qty: 12 TABLET | Refills: 0 | Status: SHIPPED | OUTPATIENT
Start: 2019-01-08 | End: 2019-04-08

## 2019-01-08 NOTE — TELEPHONE ENCOUNTER
DEXA STUDY    5/21/15 12:45 PM 5/21/15  1:10 PM [id number] [facility]       Due for Dexa scan per protocol.Please advise.

## 2019-01-09 ENCOUNTER — ANCILLARY PROCEDURE (OUTPATIENT)
Dept: CARDIOLOGY | Facility: CLINIC | Age: 84
End: 2019-01-09
Attending: INTERNAL MEDICINE
Payer: MEDICARE

## 2019-01-09 DIAGNOSIS — I42.9 CARDIOMYOPATHY (H): ICD-10-CM

## 2019-01-09 PROCEDURE — 93295 DEV INTERROG REMOTE 1/2/MLT: CPT | Performed by: INTERNAL MEDICINE

## 2019-01-09 PROCEDURE — 93296 REM INTERROG EVL PM/IDS: CPT | Mod: ZF

## 2019-01-21 LAB
MDC_IDC_EPISODE_DTM: NORMAL
MDC_IDC_EPISODE_DURATION: 4 S
MDC_IDC_EPISODE_DURATION: 5 S
MDC_IDC_EPISODE_DURATION: 5 S
MDC_IDC_EPISODE_DURATION: 6 S
MDC_IDC_EPISODE_DURATION: 6 S
MDC_IDC_EPISODE_DURATION: 71 S
MDC_IDC_EPISODE_DURATION: 8 S
MDC_IDC_EPISODE_ID: 658
MDC_IDC_EPISODE_ID: 659
MDC_IDC_EPISODE_ID: 660
MDC_IDC_EPISODE_ID: 661
MDC_IDC_EPISODE_ID: 662
MDC_IDC_EPISODE_ID: 663
MDC_IDC_EPISODE_ID: 664
MDC_IDC_EPISODE_TYPE: NORMAL
MDC_IDC_LEAD_IMPLANT_DT: NORMAL
MDC_IDC_LEAD_LOCATION: NORMAL
MDC_IDC_LEAD_LOCATION_DETAIL_1: NORMAL
MDC_IDC_LEAD_MFG: NORMAL
MDC_IDC_LEAD_MODEL: NORMAL
MDC_IDC_LEAD_POLARITY_TYPE: NORMAL
MDC_IDC_LEAD_SERIAL: NORMAL
MDC_IDC_MSMT_BATTERY_DTM: NORMAL
MDC_IDC_MSMT_BATTERY_REMAINING_LONGEVITY: 52 MO
MDC_IDC_MSMT_BATTERY_RRT_TRIGGER: 2.73
MDC_IDC_MSMT_BATTERY_STATUS: NORMAL
MDC_IDC_MSMT_BATTERY_VOLTAGE: 2.97 V
MDC_IDC_MSMT_CAP_CHARGE_DTM: NORMAL
MDC_IDC_MSMT_CAP_CHARGE_ENERGY: 18 J
MDC_IDC_MSMT_CAP_CHARGE_TIME: 4.01
MDC_IDC_MSMT_CAP_CHARGE_TYPE: NORMAL
MDC_IDC_MSMT_LEADCHNL_LV_IMPEDANCE_VALUE: 456 OHM
MDC_IDC_MSMT_LEADCHNL_LV_IMPEDANCE_VALUE: 646 OHM
MDC_IDC_MSMT_LEADCHNL_LV_IMPEDANCE_VALUE: 969 OHM
MDC_IDC_MSMT_LEADCHNL_LV_PACING_THRESHOLD_AMPLITUDE: 0.62 V
MDC_IDC_MSMT_LEADCHNL_LV_PACING_THRESHOLD_PULSEWIDTH: 0.6 MS
MDC_IDC_MSMT_LEADCHNL_RA_IMPEDANCE_VALUE: 399 OHM
MDC_IDC_MSMT_LEADCHNL_RA_PACING_THRESHOLD_AMPLITUDE: 0.38 V
MDC_IDC_MSMT_LEADCHNL_RA_PACING_THRESHOLD_PULSEWIDTH: 0.4 MS
MDC_IDC_MSMT_LEADCHNL_RA_SENSING_INTR_AMPL: 2.25 MV
MDC_IDC_MSMT_LEADCHNL_RA_SENSING_INTR_AMPL: 2.25 MV
MDC_IDC_MSMT_LEADCHNL_RV_IMPEDANCE_VALUE: 513 OHM
MDC_IDC_MSMT_LEADCHNL_RV_IMPEDANCE_VALUE: 589 OHM
MDC_IDC_MSMT_LEADCHNL_RV_PACING_THRESHOLD_AMPLITUDE: 0.5 V
MDC_IDC_MSMT_LEADCHNL_RV_PACING_THRESHOLD_PULSEWIDTH: 0.4 MS
MDC_IDC_MSMT_LEADCHNL_RV_SENSING_INTR_AMPL: 9.88 MV
MDC_IDC_MSMT_LEADCHNL_RV_SENSING_INTR_AMPL: 9.88 MV
MDC_IDC_PG_IMPLANT_DTM: NORMAL
MDC_IDC_PG_MFG: NORMAL
MDC_IDC_PG_MODEL: NORMAL
MDC_IDC_PG_SERIAL: NORMAL
MDC_IDC_PG_TYPE: NORMAL
MDC_IDC_SESS_CLINIC_NAME: NORMAL
MDC_IDC_SESS_DTM: NORMAL
MDC_IDC_SESS_TYPE: NORMAL
MDC_IDC_SET_BRADY_AT_MODE_SWITCH_RATE: 171 {BEATS}/MIN
MDC_IDC_SET_BRADY_LOWRATE: 60 {BEATS}/MIN
MDC_IDC_SET_BRADY_MAX_SENSOR_RATE: 130 {BEATS}/MIN
MDC_IDC_SET_BRADY_MAX_TRACKING_RATE: 130 {BEATS}/MIN
MDC_IDC_SET_BRADY_MODE: NORMAL
MDC_IDC_SET_BRADY_PAV_DELAY_LOW: 170 MS
MDC_IDC_SET_BRADY_SAV_DELAY_LOW: 140 MS
MDC_IDC_SET_CRT_PACED_CHAMBERS: NORMAL
MDC_IDC_SET_LEADCHNL_LV_PACING_AMPLITUDE: 1.5 V
MDC_IDC_SET_LEADCHNL_LV_PACING_ANODE_ELECTRODE_1: NORMAL
MDC_IDC_SET_LEADCHNL_LV_PACING_ANODE_LOCATION_1: NORMAL
MDC_IDC_SET_LEADCHNL_LV_PACING_CAPTURE_MODE: NORMAL
MDC_IDC_SET_LEADCHNL_LV_PACING_CATHODE_ELECTRODE_1: NORMAL
MDC_IDC_SET_LEADCHNL_LV_PACING_CATHODE_LOCATION_1: NORMAL
MDC_IDC_SET_LEADCHNL_LV_PACING_POLARITY: NORMAL
MDC_IDC_SET_LEADCHNL_LV_PACING_PULSEWIDTH: 0.6 MS
MDC_IDC_SET_LEADCHNL_RA_PACING_AMPLITUDE: 1.5 V
MDC_IDC_SET_LEADCHNL_RA_PACING_ANODE_ELECTRODE_1: NORMAL
MDC_IDC_SET_LEADCHNL_RA_PACING_ANODE_LOCATION_1: NORMAL
MDC_IDC_SET_LEADCHNL_RA_PACING_CAPTURE_MODE: NORMAL
MDC_IDC_SET_LEADCHNL_RA_PACING_CATHODE_ELECTRODE_1: NORMAL
MDC_IDC_SET_LEADCHNL_RA_PACING_CATHODE_LOCATION_1: NORMAL
MDC_IDC_SET_LEADCHNL_RA_PACING_POLARITY: NORMAL
MDC_IDC_SET_LEADCHNL_RA_PACING_PULSEWIDTH: 0.4 MS
MDC_IDC_SET_LEADCHNL_RA_SENSING_ANODE_ELECTRODE_1: NORMAL
MDC_IDC_SET_LEADCHNL_RA_SENSING_ANODE_LOCATION_1: NORMAL
MDC_IDC_SET_LEADCHNL_RA_SENSING_CATHODE_ELECTRODE_1: NORMAL
MDC_IDC_SET_LEADCHNL_RA_SENSING_CATHODE_LOCATION_1: NORMAL
MDC_IDC_SET_LEADCHNL_RA_SENSING_POLARITY: NORMAL
MDC_IDC_SET_LEADCHNL_RA_SENSING_SENSITIVITY: 0.3 MV
MDC_IDC_SET_LEADCHNL_RV_PACING_AMPLITUDE: 2 V
MDC_IDC_SET_LEADCHNL_RV_PACING_ANODE_ELECTRODE_1: NORMAL
MDC_IDC_SET_LEADCHNL_RV_PACING_ANODE_LOCATION_1: NORMAL
MDC_IDC_SET_LEADCHNL_RV_PACING_CAPTURE_MODE: NORMAL
MDC_IDC_SET_LEADCHNL_RV_PACING_CATHODE_ELECTRODE_1: NORMAL
MDC_IDC_SET_LEADCHNL_RV_PACING_CATHODE_LOCATION_1: NORMAL
MDC_IDC_SET_LEADCHNL_RV_PACING_POLARITY: NORMAL
MDC_IDC_SET_LEADCHNL_RV_PACING_PULSEWIDTH: 0.4 MS
MDC_IDC_SET_LEADCHNL_RV_SENSING_ANODE_ELECTRODE_1: NORMAL
MDC_IDC_SET_LEADCHNL_RV_SENSING_ANODE_LOCATION_1: NORMAL
MDC_IDC_SET_LEADCHNL_RV_SENSING_CATHODE_ELECTRODE_1: NORMAL
MDC_IDC_SET_LEADCHNL_RV_SENSING_CATHODE_LOCATION_1: NORMAL
MDC_IDC_SET_LEADCHNL_RV_SENSING_POLARITY: NORMAL
MDC_IDC_SET_LEADCHNL_RV_SENSING_SENSITIVITY: 0.3 MV
MDC_IDC_SET_ZONE_DETECTION_BEATS_DENOMINATOR: 40 {BEATS}
MDC_IDC_SET_ZONE_DETECTION_BEATS_NUMERATOR: 30 {BEATS}
MDC_IDC_SET_ZONE_DETECTION_INTERVAL: 320 MS
MDC_IDC_SET_ZONE_DETECTION_INTERVAL: 350 MS
MDC_IDC_SET_ZONE_DETECTION_INTERVAL: 360 MS
MDC_IDC_SET_ZONE_DETECTION_INTERVAL: 400 MS
MDC_IDC_SET_ZONE_DETECTION_INTERVAL: NORMAL
MDC_IDC_SET_ZONE_TYPE: NORMAL
MDC_IDC_STAT_AT_BURDEN_PERCENT: 0 %
MDC_IDC_STAT_AT_DTM_END: NORMAL
MDC_IDC_STAT_AT_DTM_START: NORMAL
MDC_IDC_STAT_BRADY_AP_VP_PERCENT: 11.83 %
MDC_IDC_STAT_BRADY_AP_VS_PERCENT: 0.21 %
MDC_IDC_STAT_BRADY_AS_VP_PERCENT: 86.39 %
MDC_IDC_STAT_BRADY_AS_VS_PERCENT: 1.58 %
MDC_IDC_STAT_BRADY_DTM_END: NORMAL
MDC_IDC_STAT_BRADY_DTM_START: NORMAL
MDC_IDC_STAT_BRADY_RA_PERCENT_PACED: 11.95 %
MDC_IDC_STAT_BRADY_RV_PERCENT_PACED: 5.84 %
MDC_IDC_STAT_CRT_DTM_END: NORMAL
MDC_IDC_STAT_CRT_DTM_START: NORMAL
MDC_IDC_STAT_CRT_LV_PERCENT_PACED: 97.42 %
MDC_IDC_STAT_CRT_PERCENT_PACED: 97.42 %
MDC_IDC_STAT_EPISODE_RECENT_COUNT: 0
MDC_IDC_STAT_EPISODE_RECENT_COUNT_DTM_END: NORMAL
MDC_IDC_STAT_EPISODE_RECENT_COUNT_DTM_START: NORMAL
MDC_IDC_STAT_EPISODE_TOTAL_COUNT: 0
MDC_IDC_STAT_EPISODE_TOTAL_COUNT: 1
MDC_IDC_STAT_EPISODE_TOTAL_COUNT: 65
MDC_IDC_STAT_EPISODE_TOTAL_COUNT_DTM_END: NORMAL
MDC_IDC_STAT_EPISODE_TOTAL_COUNT_DTM_START: NORMAL
MDC_IDC_STAT_EPISODE_TYPE: NORMAL
MDC_IDC_STAT_TACHYTHERAPY_ATP_DELIVERED_RECENT: 0
MDC_IDC_STAT_TACHYTHERAPY_ATP_DELIVERED_TOTAL: 0
MDC_IDC_STAT_TACHYTHERAPY_RECENT_DTM_END: NORMAL
MDC_IDC_STAT_TACHYTHERAPY_RECENT_DTM_START: NORMAL
MDC_IDC_STAT_TACHYTHERAPY_SHOCKS_ABORTED_RECENT: 0
MDC_IDC_STAT_TACHYTHERAPY_SHOCKS_ABORTED_TOTAL: 0
MDC_IDC_STAT_TACHYTHERAPY_SHOCKS_DELIVERED_RECENT: 0
MDC_IDC_STAT_TACHYTHERAPY_SHOCKS_DELIVERED_TOTAL: 1
MDC_IDC_STAT_TACHYTHERAPY_TOTAL_DTM_END: NORMAL
MDC_IDC_STAT_TACHYTHERAPY_TOTAL_DTM_START: NORMAL

## 2019-01-28 DIAGNOSIS — I42.8 NON-ISCHEMIC CARDIOMYOPATHY (H): ICD-10-CM

## 2019-01-28 RX ORDER — CARVEDILOL 12.5 MG/1
TABLET ORAL
Qty: 180 TABLET | Refills: 1 | Status: SHIPPED | OUTPATIENT
Start: 2019-01-28 | End: 2019-08-01

## 2019-01-28 NOTE — TELEPHONE ENCOUNTER
coreg      Last Written Prescription Date:  10/29/2018  Last Fill Quantity: 180,   # refills: 0  Last Office Visit: 12/11/2018  Future Office visit:

## 2019-02-01 DIAGNOSIS — J43.8 OTHER EMPHYSEMA (H): ICD-10-CM

## 2019-02-04 RX ORDER — BUDESONIDE AND FORMOTEROL FUMARATE DIHYDRATE 160; 4.5 UG/1; UG/1
AEROSOL RESPIRATORY (INHALATION)
Qty: 10.2 G | Refills: 0 | Status: SHIPPED | OUTPATIENT
Start: 2019-02-04 | End: 2019-03-11

## 2019-02-26 ENCOUNTER — TELEPHONE (OUTPATIENT)
Dept: FAMILY MEDICINE | Facility: OTHER | Age: 84
End: 2019-02-26

## 2019-02-26 NOTE — TELEPHONE ENCOUNTER
Pharmacy called stating patient states she is no longer taking Atorvastatin. Medication is still on current medication list. Last LDL completed 12/11/18 was 58.   Pharmacist wondering if patient should still be on medication. Pharmacy states jemima last filled medication in October. Please advise.

## 2019-02-26 NOTE — TELEPHONE ENCOUNTER
Spoke with PCP.  Advised him that pharmacist states patient refuses taking medication.  PCP stated it is patient's right to refuse. Medication taken off current medication list by writer.

## 2019-03-11 DIAGNOSIS — J43.8 OTHER EMPHYSEMA (H): ICD-10-CM

## 2019-03-13 RX ORDER — BUDESONIDE AND FORMOTEROL FUMARATE DIHYDRATE 160; 4.5 UG/1; UG/1
AEROSOL RESPIRATORY (INHALATION)
Qty: 10.2 G | Refills: 1 | Status: SHIPPED | OUTPATIENT
Start: 2019-03-13 | End: 2019-05-20

## 2019-04-09 ENCOUNTER — ANCILLARY PROCEDURE (OUTPATIENT)
Dept: CARDIOLOGY | Facility: OTHER | Age: 84
End: 2019-04-09
Attending: INTERNAL MEDICINE
Payer: COMMERCIAL

## 2019-04-09 DIAGNOSIS — I42.9 CARDIOMYOPATHY (H): ICD-10-CM

## 2019-04-09 PROCEDURE — 93284 PRGRMG EVAL IMPLANTABLE DFB: CPT | Mod: TC | Performed by: INTERNAL MEDICINE

## 2019-04-09 NOTE — PATIENT INSTRUCTIONS
It was a pleasure to see you in clinic today.  Please do not hesitate to call with any questions or concerns.  You are scheduled for a remote transmission on 7/11/19.  We look forward to seeing you in clinic at your next device check in 6 months.    Desiree Luevano, RN, MS, CCRN  Electrophysiology Nurse Clinician  [facility]    During Business Hours Please Call:  [phone]  After Hours Please Call:  [phone] - select option #[phone] and ask for job code [phone]

## 2019-06-03 ENCOUNTER — OFFICE VISIT (OUTPATIENT)
Dept: CARDIOLOGY | Facility: OTHER | Age: 84
End: 2019-06-03
Attending: INTERNAL MEDICINE
Payer: COMMERCIAL

## 2019-06-03 VITALS
SYSTOLIC BLOOD PRESSURE: 133 MMHG | HEART RATE: 80 BPM | WEIGHT: 180 LBS | TEMPERATURE: 97.5 F | OXYGEN SATURATION: 97 % | HEIGHT: 65 IN | RESPIRATION RATE: 20 BRPM | DIASTOLIC BLOOD PRESSURE: 69 MMHG | BODY MASS INDEX: 29.99 KG/M2

## 2019-06-03 DIAGNOSIS — R06.02 SOB (SHORTNESS OF BREATH): ICD-10-CM

## 2019-06-03 DIAGNOSIS — I51.89 COMBINED SYSTOLIC AND DIASTOLIC CARDIAC DYSFUNCTION: ICD-10-CM

## 2019-06-03 DIAGNOSIS — J44.9 CHRONIC OBSTRUCTIVE PULMONARY DISEASE, UNSPECIFIED COPD TYPE (H): ICD-10-CM

## 2019-06-03 DIAGNOSIS — I44.7 LBBB (LEFT BUNDLE BRANCH BLOCK): ICD-10-CM

## 2019-06-03 DIAGNOSIS — I42.8 NON-ISCHEMIC CARDIOMYOPATHY (H): Primary | ICD-10-CM

## 2019-06-03 DIAGNOSIS — Z87.891 HISTORY OF TOBACCO ABUSE: ICD-10-CM

## 2019-06-03 DIAGNOSIS — E78.2 MIXED HYPERLIPIDEMIA: ICD-10-CM

## 2019-06-03 DIAGNOSIS — I50.9 CONGESTIVE HEART FAILURE, NYHA CLASS 2, UNSPECIFIED CONGESTIVE HEART FAILURE TYPE (H): ICD-10-CM

## 2019-06-03 DIAGNOSIS — I10 ESSENTIAL HYPERTENSION: ICD-10-CM

## 2019-06-03 DIAGNOSIS — Z95.810 AUTOMATIC IMPLANTABLE CARDIOVERTER-DEFIBRILLATOR IN SITU: ICD-10-CM

## 2019-06-03 PROCEDURE — G0463 HOSPITAL OUTPT CLINIC VISIT: HCPCS

## 2019-06-03 PROCEDURE — 99214 OFFICE O/P EST MOD 30 MIN: CPT | Performed by: INTERNAL MEDICINE

## 2019-06-03 ASSESSMENT — MIFFLIN-ST. JEOR: SCORE: 1277.35

## 2019-06-03 ASSESSMENT — PAIN SCALES - GENERAL: PAINLEVEL: MODERATE PAIN (5)

## 2019-06-03 NOTE — NURSING NOTE
"Chief Complaint   Patient presents with     RECHECK     1 year cardiology follow-up;  Patient states she has had a \"dry cough and watery itchy eyes\".  Patient wants to discuss if she can try taking any allergy medication.  Patient states that she has had \"swelling in her left ankle and pain in upper right leg for 2 weeks\".        Initial /69 (BP Location: Left arm, Patient Position: Chair, Cuff Size: Adult Large)   Pulse 80   Temp 97.5  F (36.4  C) (Tympanic)   Resp 20   Ht 1.651 m (5' 5\")   Wt 81.6 kg (180 lb)   SpO2 97%   BMI 29.95 kg/m   Estimated body mass index is 29.95 kg/m  as calculated from the following:    Height as of this encounter: 1.651 m (5' 5\").    Weight as of this encounter: 81.6 kg (180 lb).  Medication Reconciliation: complete    Lamar Chatterjee LPN    "

## 2019-06-03 NOTE — PATIENT INSTRUCTIONS
You were seen by Dr. Garcia, 6/3/2019.     1.  Please monitor you weight daily.  If you experience a 2-3 pound increase in weight in  24 hours, or 5 pounds in one week. Please call the cardiology office as you may need your medications adjusted or you may need to be seen.       2. Please limit sodium to 2500 mg per day and fluid to 64 ounces.  This will help to control swelling in the extremities.     3.  Please continue all medications as prescribed.     4.  If you develop new or worsening symptoms please call the cardiology office as you may need to be seen sooner.           You will follow up with Dr. Garcia in 6 months.       Please call the cardiology office with problems, questions, or concerns at [phone].    If you experience chest pain, chest pressure, chest tightness, shortness of breath, fainting, lightheadedness, nausea, vomiting, or other concerning symptoms, please report to the Emergency Department or call 911. These symptoms may be emergent, and best treated in the Emergency Department.       Osiris SANFORD RN-BSN  Cardiology   [facility]  [phone]

## 2019-06-03 NOTE — PROGRESS NOTES
Cardiology Progress Note     Assessment & Plan   Jacklyn Hein is a 83 year old female who is being seen by cardiology for dyspnea with chronic systolic EF of 40% and evidence for diastolic heart failure on 5/3/17. She has a history of severe nonischemic cardiomyopathy status post ICD placement on 11/11/2014.  She has been short of breath with a diagnosis of asthma.  Her level of COPD has been downgraded by the pulmonologist to mild to moderate from the report which indicated severe.  For the most part, she is doing well today without significant complaints.     Impression:  1.  Chronic systolic at 40% and diastolic heart failure as noted on echo from 5/10/17.  2.  Reduction in severity of COPD from severe to mild to moderate per pulmonary and concern for asthma based on PFT's from 5/17/17.  3.  Nonischemic cardiomyopathy with NYHA classification 2.  4.  ICD placement on 11/11/2014.  5.  Hypertension-controlled.  6.  Hyperlipidemia-controlled.  7.  Remote history of tobacco abuse quitting on 2/1/1998  8.  LBBB.  9.  SOB now significantly improved.  10.  CKD 3.  11.  Hypocalcemia.  12.  Brief runs of A. fib at 23 seconds.       Plan:  1.  Medications will be continued as prescribed.  2.  She has been encouraged to increase her activity and strive for weight loss secondary to diastolic heart failure.  3.  Fluid restriction of 2 L or less a day.  4.  Sodium restriction at less than 2500 mg daily.  5.  Daily weights.  6.  She will continue to have her ICD checked here locally.    7.  She is let us know if she is having palpitations with identification of A. fib and 23 seconds on 4/9/2019.  8.  She will be seen in approximately 6 months follow-up.    Kodi Garcia    Interval History   She is being seen for a history of severe nonischemic cardiomyopathy with a previously ejection fraction at less than 35%. More recently, her EF on 5/10/17 was 40%.  She also has diastolic heart failure, ICD placed on  11/11/16, her recent visit with pulmonary secondary to what was thought to be severe COPD but was downgraded to mild to moderate with a greater concern for asthma, per the patient.    She was started on some breathing treatments for asthma by pulmonary.  With these treatments, she says she feels hardly short of breath at all.  She is approximately 90% better.  With having diastolic and systolic heart failure, she has minimal to no lower extremity edema.  Her activity has been limited secondary to chronic back pain.  It was not for back pain, she would be doing really well.    She had her ICD checked on 4/9/2019.  It showed there she was bi-V paced 97.4% of the time with 3.8 years left on her battery.  = 98.3%. BVP = 97.4%. VSR pace = 1.7%. She had 23 seconds of A. fib with a total of 6 episodes.  Her device was described as functioning appropriately.    Today, her blood pressure her blood pressure is controlled at 133/69 with a heart rate of 67.  She is currently on Coreg 12.5 twice a day, lisinopril 2.5 mgs daily, and spironolactone 12.5 daily.  Previously, her blood pressure has been controlled and she feels her blood pressure has been controlled at home.  She has noticed complaints.        Physical Exam                      There were no vitals filed for this visit.  Vital Signs with Ranges     ROS negative except that which is non-HPI.    Incision/Surgical Site 05/06/14 Left Chest (Active)   Number of days: 1853       Constitutional: awake, alert, cooperative, no apparent distress, and appears stated age  Eyes: Lids and lashes normal, sclera clear, conjunctiva normal  ENT: Normocephalic, without obvious abnormality.  Respiratory: No increased work of breathing, good air exchange, clear to auscultation bilaterally, no crackles or wheezing  Cardiovascular: Normal apical impulse, regular rate and rhythm, normal S1 and S2, no S3 or S4, and no murmur noted  GI: No scars, normal bowel sounds, soft.  Musculoskeletal:  Scant lower extremity pitting edema present  Neurologic: Awake, alert, oriented to name, place and time.  Neuropsychiatric: General: normal, calm and normal eye contact    Medications         Data   No results found for this or any previous visit (from the past 24 hour(s)).

## 2019-06-11 ENCOUNTER — OFFICE VISIT (OUTPATIENT)
Dept: FAMILY MEDICINE | Facility: OTHER | Age: 84
End: 2019-06-11
Attending: FAMILY MEDICINE
Payer: MEDICARE

## 2019-06-11 ENCOUNTER — ANCILLARY PROCEDURE (OUTPATIENT)
Dept: GENERAL RADIOLOGY | Facility: OTHER | Age: 84
End: 2019-06-11
Attending: FAMILY MEDICINE
Payer: MEDICARE

## 2019-06-11 VITALS
HEART RATE: 78 BPM | TEMPERATURE: 99.1 F | DIASTOLIC BLOOD PRESSURE: 61 MMHG | OXYGEN SATURATION: 96 % | HEIGHT: 65 IN | BODY MASS INDEX: 29.95 KG/M2 | SYSTOLIC BLOOD PRESSURE: 119 MMHG

## 2019-06-11 DIAGNOSIS — M54.41 CHRONIC MIDLINE LOW BACK PAIN WITH RIGHT-SIDED SCIATICA: ICD-10-CM

## 2019-06-11 DIAGNOSIS — G89.29 CHRONIC MIDLINE LOW BACK PAIN WITH RIGHT-SIDED SCIATICA: Primary | ICD-10-CM

## 2019-06-11 DIAGNOSIS — G89.29 CHRONIC MIDLINE LOW BACK PAIN WITH RIGHT-SIDED SCIATICA: ICD-10-CM

## 2019-06-11 DIAGNOSIS — M25.551 HIP PAIN, RIGHT: ICD-10-CM

## 2019-06-11 DIAGNOSIS — M54.41 CHRONIC MIDLINE LOW BACK PAIN WITH RIGHT-SIDED SCIATICA: Primary | ICD-10-CM

## 2019-06-11 PROCEDURE — 72100 X-RAY EXAM L-S SPINE 2/3 VWS: CPT | Mod: TC

## 2019-06-11 PROCEDURE — 99213 OFFICE O/P EST LOW 20 MIN: CPT | Performed by: FAMILY MEDICINE

## 2019-06-11 PROCEDURE — G0463 HOSPITAL OUTPT CLINIC VISIT: HCPCS

## 2019-06-11 PROCEDURE — 73502 X-RAY EXAM HIP UNI 2-3 VIEWS: CPT | Mod: TC

## 2019-06-11 ASSESSMENT — PAIN SCALES - GENERAL: PAINLEVEL: NO PAIN (0)

## 2019-06-11 NOTE — PROGRESS NOTES
Subjective     Jacklyn Hein is a 83 year old female who presents to clinic today for the following health issues:    HPI   Musculoskeletal problem/pain      Duration: over 1 month    Description  Location: Right thigh     Intensity:  moderate, severe    Accompanying signs and symptoms: radiation of pain to knee and hip     History  Previous similar problem: no   Previous evaluation:  none    Precipitating or alleviating factors:  Trauma or overuse: no   Aggravating factors include: walking, climbing stairs and overuse    Therapies tried and outcome: acetaminophen and aleve, ice and heat   No fever chills unexplained weight loss.  She did have spinal surgery several years ago.  No acute bowel or bladder change.  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     Insomnia, unspecified 01/01/2011     LBBB (left bundle branch block) 01/01/2011     Neck pain, chronic 01/01/2011     Non-ischemic cardiomyopathy (H) 12/10/2013     Obesity, unspecified 01/01/2011     Osteopenia 01/01/2011     Pacemaker      Pain in joint, lower leg 07/31/2006     Pure hypercholesterolemia 06/07/2002     Unspecified essential hypertension 01/01/2011       PAST SURGICAL HISTORY:  Past Surgical History:   Procedure Laterality Date     APPENDECTOMY       ARTHROSCOPY KNEE RT/LT  2009    RT     CARDIAC SURGERY  05/06/2014    Pacemaker     Cataract extraction  2009    Bilateral     CHOLECYSTECTOMY      open      COLONOSCOPY  05/2001    Normal      COLONOSCOPY N/A 10/20/2016    Procedure: COLONOSCOPY;  Surgeon: Charan Montejo MD;  Location: [facility]     colonoscopy with polypectomy  2011    Repeat 3 years      CT CORONARY ANGIOGRAM  1999    normal     HERPES ZOSTER PCR ([facility])  2007     left eye  scar tissue       normal echo      fen-phen use       x6       SURGICAL RADIOLOGY PROCEDURE N/A 2016    Procedure: SURGICAL RADIOLOGY PROCEDURE;  Surgeon: Provider, Generic Perianesthesia Nursing;  Location: [facility]       MEDICATIONS:  Prior to Admission medications    Medication Sig Start Date End Date Taking? Authorizing Provider   albuterol (PROAIR HFA/PROVENTIL HFA/VENTOLIN HFA) 108 (90 BASE) MCG/ACT Inhaler Inhale 2 puffs into the lungs every 6 hours as needed for shortness of breath / dyspnea or wheezing 10/17/17  Yes Min Newell MD   alendronate (FOSAMAX) 70 MG tablet TAKE 1 TABLET BY MOUTH EVERY 7 DAYS. TAKE WITH 8 OUNCES OF WATER AND STAY UPRIGHT FOR AT LEAST 30 MINUTES AFTER TAKING. 4/10/19  Yes Millie Benitez PA   aspirin 81 MG tablet Take 81 mg by mouth daily    Yes Reported, Patient   budesonide-formoterol (SYMBICORT) 160-4.5 MCG/ACT Inhaler INHALE 2 PUFFS INTO THE LUNGS 2 TIMES DAILY 19  Yes ANTHONY Post MD   Calcium Carbonate-Vitamin D (CALCIUM 600 + D OR) Take 1 tablet by mouth daily   Yes Reported, Patient   carvedilol (COREG) 12.5 MG tablet TAKE 1 TABLET BY MOUTH 2 TIMES DAILY WITH MEALS 19  Yes ANTHONY Post MD   diphenhydrAMINE-acetaminophen (ACETAMINOPHEN PM)  MG tablet Take 2 tablets by mouth nightly as needed.   Yes Reported, Patient   lisinopril (PRINIVIL/ZESTRIL) 2.5 MG tablet TAKE 1 TABLET BY MOUTH DAILY 18  Yes Kodi Garcia, DO   Multiple Vitamin (MULTI-VITAMIN DAILY PO) Take 1 tablet by mouth daily   Yes Reported, Patient   order for DME Equipment being ordered: Walker 16  Yes ANTHONY Post MD   spironolactone (ALDACTONE) 25 MG tablet TAKE 1/2 TABLET BY MOUTH DAILY 19  Yes ANTHONY Post MD   tiotropium (SPIRIVA RESPIMAT) 2.5 MCG/ACT inhaler Inhale 2 puffs into the lungs daily 18 Yes ANTHONY Post MD       ALLERGIES:   No Known Allergies    ROS:  Constitutional, HEENT, cardiovascular, pulmonary, gi and gu  "systems are negative, except as otherwise noted.      EXAM:  /61 (BP Location: Right arm, Patient Position: Chair, Cuff Size: Adult Regular)   Pulse 78   Temp 99.1  F (37.3  C) (Tympanic)   Ht 1.651 m (5' 5\")   SpO2 96%   BMI 29.95 kg/m   Body mass index is 29.95 kg/m .   GENERAL APPEARANCE: healthy, alert and no distress  EYES: Eyes grossly normal to inspection, PERRL and conjunctivae and sclerae normal  RESP: Breathing comfortably  ORTHO: She has tenderness over the lateral aspect of the hip.  Also some lumbar tenderness.  Negative straight leg test.  No pain to internal/external rotation of right thigh.  Intact heel and toe gait  Lab/ X-ray  X-rays show no acute fracture dislocation.    ASSESSMENT/PLAN:    ICD-10-CM    1. Chronic midline low back pain with right-sided sciatica M54.41 XR Lumbar Spine 2/3 Views    G89.29 PHYSICAL THERAPY REFERRAL   2. Hip pain, right M25.551 XR Hip Right 2-3 Views     PHYSICAL THERAPY REFERRAL   But with her chronic back pain now with this right hip pain and right anterior thigh pain suspect a combination of both.  We will set up physical therapy for evaluation and treatment.  If pain resolves no follow-up as needed.  If not will return and can do further imaging or refer on to Ortho.  Patient and her daughter felt comfortable with this plan.  For now will use Tylenol for pain she takes one   500 mg tablet a day and I told her she can take up to 3000 mg a day.      YUE Post MD  June 11, 2019                        "

## 2019-06-11 NOTE — NURSING NOTE
"Chief Complaint   Patient presents with     Musculoskeletal Problem       Initial /61 (BP Location: Right arm, Patient Position: Chair, Cuff Size: Adult Regular)   Pulse 78   Temp 99.1  F (37.3  C) (Tympanic)   Ht 1.651 m (5' 5\")   SpO2 96%   BMI 29.95 kg/m   Estimated body mass index is 29.95 kg/m  as calculated from the following:    Height as of this encounter: 1.651 m (5' 5\").    Weight as of 6/3/19: 81.6 kg (180 lb).  Medication Reconciliation: complete    NURYS COTTO LPN  "

## 2019-06-20 DIAGNOSIS — I42.8 NON-ISCHEMIC CARDIOMYOPATHY (H): Primary | ICD-10-CM

## 2019-06-20 DIAGNOSIS — J44.9 CHRONIC OBSTRUCTIVE PULMONARY DISEASE, UNSPECIFIED COPD TYPE (H): ICD-10-CM

## 2019-06-20 RX ORDER — LISINOPRIL 2.5 MG/1
TABLET ORAL
Qty: 90 TABLET | Refills: 1 | Status: SHIPPED | OUTPATIENT
Start: 2019-06-20 | End: 2019-12-04

## 2019-06-20 RX ORDER — TIOTROPIUM BROMIDE INHALATION SPRAY 3.12 UG/1
SPRAY, METERED RESPIRATORY (INHALATION)
Qty: 4 G | Refills: 0 | Status: SHIPPED | OUTPATIENT
Start: 2019-06-20 | End: 2019-07-22

## 2019-07-01 DIAGNOSIS — M81.0 AGE-RELATED OSTEOPOROSIS WITHOUT CURRENT PATHOLOGICAL FRACTURE: ICD-10-CM

## 2019-07-02 RX ORDER — ALENDRONATE SODIUM 70 MG/1
TABLET ORAL
Qty: 12 TABLET | Refills: 0 | Status: SHIPPED | OUTPATIENT
Start: 2019-07-02 | End: 2019-10-09

## 2019-07-02 NOTE — TELEPHONE ENCOUNTER
alendronate (FOSAMAX) 70 MG tablet     Last Written Prescription Date:  04/10/2019  Last Fill Quantity: 12,   # refills: 0  Last Office Visit: 06/11/2019  Future Office visit:       Routing refill request to provider for review/approval because:

## 2019-07-08 ENCOUNTER — HOSPITAL ENCOUNTER (OUTPATIENT)
Dept: PHYSICAL THERAPY | Facility: HOSPITAL | Age: 84
Setting detail: THERAPIES SERIES
End: 2019-07-08
Attending: FAMILY MEDICINE
Payer: MEDICARE

## 2019-07-08 DIAGNOSIS — G89.29 CHRONIC MIDLINE LOW BACK PAIN WITH RIGHT-SIDED SCIATICA: ICD-10-CM

## 2019-07-08 DIAGNOSIS — M25.551 HIP PAIN, RIGHT: ICD-10-CM

## 2019-07-08 DIAGNOSIS — M54.41 CHRONIC MIDLINE LOW BACK PAIN WITH RIGHT-SIDED SCIATICA: ICD-10-CM

## 2019-07-08 PROCEDURE — 97161 PT EVAL LOW COMPLEX 20 MIN: CPT | Mod: GP

## 2019-07-08 PROCEDURE — 97110 THERAPEUTIC EXERCISES: CPT | Mod: GP

## 2019-07-08 NOTE — PROGRESS NOTES
Initial Physical Therapy Evaluations       Name: Jacklyn Hein MRN# [id number]   Age: 83 year old YOB: 1935     Date of Consultation: July 8, 2019  Primary care provider: ANTHONY Post    Referring Physician: ANTHONY Post   Orders: Eval and Treat  Medical Diagnosis: Low Back Pain, Hip Pain   Onset of Illness/Injury: 7/8/19    Reason for PT Visit: Patient is a 83 year old female who presents to therapy w/ low back and hip pain.  She has a long history of low back pain, 3 years prior had a T12 and L2 fracture due to osteoporosis.  Patient had a fusion at T12 due to this fracture.  Patient notes she is doing well today, as she has rested very well over the long weekend.     Prior Level of Function: Patient cleans and dusts, keeps up her home by herself.  Her daughter helps at times.       Community Support/Living Environment/Employment History: Patient lives at home alone, patient has difficulty coming up the stairs.       Patient/Family Goal: To move better and easier around the home     Fall Screen:   Have you fallen 2 or more times in the last year? No  Have you fallen and had an injury in the last year? No  Timed up & go: n/a  Is patient a fall risk? 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     Insomnia, unspecified 01/01/2011     LBBB (left bundle branch block) 01/01/2011     Neck pain, chronic 01/01/2011     Non-ischemic cardiomyopathy (H) 12/10/2013     Obesity, unspecified 01/01/2011     Osteopenia 01/01/2011     Pacemaker      Pain in joint, lower leg 07/31/2006     Pure hypercholesterolemia 06/07/2002     Unspecified essential hypertension 01/01/2011       Past Surgical History:  Past Surgical History:   Procedure Laterality Date     APPENDECTOMY        ARTHROSCOPY KNEE RT/LT  2009    RT     CARDIAC SURGERY  2014    Pacemaker     Cataract extraction  2009    Bilateral     CHOLECYSTECTOMY      open      COLONOSCOPY  2001    Normal      COLONOSCOPY N/A 10/20/2016    Procedure: COLONOSCOPY;  Surgeon: Charan Montejo MD;  Location: [facility]     colonoscopy with polypectomy      Repeat 3 years      CT CORONARY ANGIOGRAM      normal     HERPES ZOSTER PCR ([facility])       left eye scar tissue       normal echo      fen-phen use       x6       SURGICAL RADIOLOGY PROCEDURE N/A 2016    Procedure: SURGICAL RADIOLOGY PROCEDURE;  Surgeon: Provider, Generic Perianesthesia Nursing;  Location: [facility]       Medications:   Current Outpatient Medications   Medication Sig     albuterol (PROAIR HFA/PROVENTIL HFA/VENTOLIN HFA) 108 (90 BASE) MCG/ACT Inhaler Inhale 2 puffs into the lungs every 6 hours as needed for shortness of breath / dyspnea or wheezing     alendronate (FOSAMAX) 70 MG tablet TAKE 1 TABLET BY MOUTH EVERY 7 DAYS. TAKE WITH 8 OUNCES OF WATER AND STAY UPRIGHT FOR AT LEAST 30 MINUTES AFTER TAKING.     aspirin 81 MG tablet Take 81 mg by mouth daily      budesonide-formoterol (SYMBICORT) 160-4.5 MCG/ACT Inhaler INHALE 2 PUFFS INTO THE LUNGS 2 TIMES DAILY     Calcium Carbonate-Vitamin D (CALCIUM 600 + D OR) Take 1 tablet by mouth daily     carvedilol (COREG) 12.5 MG tablet TAKE 1 TABLET BY MOUTH 2 TIMES DAILY WITH MEALS     diphenhydrAMINE-acetaminophen (ACETAMINOPHEN PM)  MG tablet Take 2 tablets by mouth nightly as needed.     lisinopril (PRINIVIL/ZESTRIL) 2.5 MG tablet TAKE 1 TABLET BY MOUTH DAILY     lisinopril (PRINIVIL/ZESTRIL) 2.5 MG tablet TAKE 1 TABLET BY MOUTH DAILY     Multiple Vitamin (MULTI-VITAMIN DAILY PO) Take 1 tablet by mouth daily     order for DME Equipment being ordered: Walker     SPIRIVA RESPIMAT 2.5 MCG/ACT inhaler INHALE 2 DOSES INTO THE LUNGS ONCE DAILY     spironolactone (ALDACTONE) 25 MG tablet TAKE 1/2 TABLET BY  MOUTH DAILY     No current facility-administered medications for this encounter.        Imaging:     Musculoskeletal Findings:   Patient goals: to walk and move better      OBJECTIVE  Pain at rest: 3/10  Pain with activity: 3/10  Aggravating Factors: Standing for 10 minutes, Walking   Relieving Factors: Sitting down relieves, Laying down on her side, Using a 4WW        Pain with coughing: No  Pain with sneezing: No  Pain with straining: No  Change in bowel/bladder: No  Numbness or tingling in groin area: No    Palpation: Significant pain over R hip over hip abductors and near GT      Gait: unsteady    Assistive devices: wheeled walker and single end cane - Decreased stance time      Functional mobility   Ambulates independently with an assistive device     Posture: Slightly stooped posture.  Sitting Posture: good  Standing Posture: fair  Correction of posture: same     Neurological Assessment:   Reflexes - Right:   Patellar: Normal   Achilles: Normal   Reflexes - Left:   Patellar: Normal   Achilles: Normal      Myotomes:   Right lower extremity:negative  Left lower extremity: negative     Dermatomes:   Right lower extremity: negative  Left lower extremity: negative     Range of Motion:  Active Lumbar Spine       Flexion: Slight restricted - able to bend over to touch the ground       Extension: not tested       Right sidebend: NT       Left sidebend: NT       Right rotation: NT       Left rotation: NT     Right Lower Extremity Range of Motion: within normal limits   Left Lower Extremity Range of Motion: within normal limits     Strength:  Abdominal Strength: NT  Right Lower Extremity Strength: 5/5 except Hip Flexion 4/5, Hip Ext 3/5, Hip Abduction 3-/5  Left Lower Extremity Strength: 5/5 except Hip Ext 4/5, Hip Abd 4/5    Special Tests:  Spine Special Tests:  Slump:  Left: Neg              Right: Neg  Straight Leg Raise:           Left:   Neg    Right: neg   Traction:              Left:       Right:  Prone Knee  Bend:            Left:       Right:  Compression:    Left:       Right:  Repeated Movement Testing:      Passive Intervertebral Accessory Movements:      Prone Instability Test:         Minimally able to tolerate supine position, unable to tolerate prone position.  Did not push special testing as patient has a history of osteoporotic compression fracture and currently has a L2 fracture at this time that is subacute per x-ray     Hip Special Tests:                  Right      Left  Jamari:    neg          STACEY:     neg               Scour Test:      neg                      Chaka Test:                       Leg Length:   Equal                  Trendelenburg s Sign:                         Hip PROM:     L LE: WFLs     R LE: WFLs     Patient's Concordant Sign: Standing, Walking, transition sit to stand      Assessment: Patient exhibiting R LE weakness in hip abductors and hip flexor muscles w/ inability to perform SLS on R side and decreased WB tolerance.  PROM of R hip demonstrates minimal discomfort, though patient unable to tolerate supine position.  Unable to determine if back or hip is cause of pain, but likely back may be primary cause of hip pain occurring.  Further assessment is required.     Outcome Measures:   Pepe STarT Sub-Score (Q5-9): 4  Pepe STarT Total Score (all 9): 8  Oswestry Score (%): 51.11 %     Goals:   1. Patient will be consistent in HEP and using of 4WW to reduce low back stress and improve gait 2-4 weeks  2.  Patient will be able to stand for a period of 10 min w/o increase in low back pain while working on chores in the home 3-6 weeks.   3. Patient will be able to return to ambulation w/ SEC around the home and in the community w/ normalized gait.    Planned Interventions: Therapeutic Exercise, Therapeutic Activity, Manual Therapy, Gait Training    Clinical Impressions:  Criteria for Skilled Therapeutic Intervention Met: Yes  PT Diagnosis: Low back and R hip pain  Influenced by the  following impairments: Decreased R LE strength, Decreased core strength, Decreased trunk motion  Functional limitations due to impairment: Impaired Balance, Standing, Walking, Bending, Transfers  Clinical presentation: Evolving/Changing  Clinical presentation rationale: Therapist DIscretion  Clinical Decision making (complexity): Moderate  Predicted Duration of Therapy Intervention (days/wks): 2x  Risks and Benefits of therapy have been explained: Yes  Patient, Family & other staff in agreement with plan of care: Yes  Comments: N/a    Total Evaluation Time: 30

## 2019-07-10 ENCOUNTER — HOSPITAL ENCOUNTER (OUTPATIENT)
Dept: PHYSICAL THERAPY | Facility: HOSPITAL | Age: 84
Setting detail: THERAPIES SERIES
End: 2019-07-10
Attending: FAMILY MEDICINE
Payer: MEDICARE

## 2019-07-10 PROCEDURE — 97110 THERAPEUTIC EXERCISES: CPT | Mod: GP

## 2019-07-11 ENCOUNTER — ANCILLARY PROCEDURE (OUTPATIENT)
Dept: CARDIOLOGY | Facility: CLINIC | Age: 84
End: 2019-07-11
Attending: INTERNAL MEDICINE
Payer: MEDICARE

## 2019-07-11 DIAGNOSIS — I42.9 CARDIOMYOPATHY (H): ICD-10-CM

## 2019-07-11 PROCEDURE — 93295 DEV INTERROG REMOTE 1/2/MLT: CPT | Performed by: INTERNAL MEDICINE

## 2019-07-11 PROCEDURE — 93296 REM INTERROG EVL PM/IDS: CPT | Mod: ZF

## 2019-07-12 NOTE — PROGRESS NOTES
Outpatient Physical Therapy Progress Note     Patient: Jacklyn Hein  : 1935    Beginning/End Dates of Reporting Period:  19 to 2019    Referring Provider: Dr. Post    Therapy Diagnosis: Low Back and Hip Pain     Client Self Report: Seen for initial eval - Patient has a lumbar end plate fracture, previous history of kyphoplasty at T12 w/ low back and hip pain significant limp    Outcome Measures (most recent score):  Pepe STarT Sub-Score (Q5-9): 4  Pepe STarT Total Score (all 9): 8  Oswestry Score (%): 51.11 %    Progress:  Patient walking w/ significant antalgic gait and decreased stance on R side w/ minimal WB during sit to stand on R side.  Due to history of fractures in spine and osteoporosis, patient may benefit from additional imaging on hip and pelvis based on gait.  PROM performed on hip with minimal discomfort w/ the WB seems to be main source of hip pain.      Plan:  Plan to continue w/ therapy, with general strengthening    Discharge:  No

## 2019-07-15 ENCOUNTER — HOSPITAL ENCOUNTER (OUTPATIENT)
Dept: PHYSICAL THERAPY | Facility: HOSPITAL | Age: 84
Setting detail: THERAPIES SERIES
End: 2019-07-15
Attending: FAMILY MEDICINE
Payer: MEDICARE

## 2019-07-15 PROCEDURE — 97110 THERAPEUTIC EXERCISES: CPT | Mod: GP

## 2019-07-16 NOTE — PATIENT INSTRUCTIONS
It was a pleasure to see you in clinic today.  Please do not hesitate to call with any questions or concerns.  You are scheduled for a remote transmission on 1/11/18.  We look forward to seeing you in clinic at your next device check in 6 months.    Desiree Luevano, RN, MS, CCRN  Electrophysiology Nurse Clinician  [facility]    During Business Hours Please Call:  [phone]  After Hours Please Call:  [phone] - select option #4 and ask for job code [phone]                 chaplaincy/clergy/

## 2019-07-22 DIAGNOSIS — J43.8 OTHER EMPHYSEMA (H): ICD-10-CM

## 2019-07-22 DIAGNOSIS — J44.9 CHRONIC OBSTRUCTIVE PULMONARY DISEASE, UNSPECIFIED COPD TYPE (H): ICD-10-CM

## 2019-07-22 RX ORDER — BUDESONIDE AND FORMOTEROL FUMARATE DIHYDRATE 160; 4.5 UG/1; UG/1
AEROSOL RESPIRATORY (INHALATION)
Qty: 10.2 G | Refills: 1 | Status: SHIPPED | OUTPATIENT
Start: 2019-07-22 | End: 2019-09-20

## 2019-07-22 RX ORDER — TIOTROPIUM BROMIDE INHALATION SPRAY 3.12 UG/1
SPRAY, METERED RESPIRATORY (INHALATION)
Qty: 4 G | Refills: 0 | Status: SHIPPED | OUTPATIENT
Start: 2019-07-22 | End: 2019-12-04

## 2019-07-22 NOTE — TELEPHONE ENCOUNTER
spiriva  Last Written Prescription Date: 6/20/19  Last Fill Quantity: 4g # of Refills: 0  Last Office Visit: 6/11/19    symbicort  Last Written Prescription Date: 5/21/19  Last Fill Quantity: 10.2g # of Refills: 1  Last Office Visit: 6/11/19      
Statement Selected

## 2019-07-26 LAB
MDC_IDC_EPISODE_DTM: NORMAL
MDC_IDC_EPISODE_DURATION: 4 S
MDC_IDC_EPISODE_DURATION: 4 S
MDC_IDC_EPISODE_DURATION: 5 S
MDC_IDC_EPISODE_DURATION: 5 S
MDC_IDC_EPISODE_DURATION: 6 S
MDC_IDC_EPISODE_DURATION: 7 S
MDC_IDC_EPISODE_DURATION: 8 S
MDC_IDC_EPISODE_ID: 673
MDC_IDC_EPISODE_ID: 674
MDC_IDC_EPISODE_ID: 675
MDC_IDC_EPISODE_ID: 676
MDC_IDC_EPISODE_ID: 677
MDC_IDC_EPISODE_ID: 678
MDC_IDC_EPISODE_ID: 679
MDC_IDC_EPISODE_TYPE: NORMAL
MDC_IDC_LEAD_IMPLANT_DT: NORMAL
MDC_IDC_LEAD_LOCATION: NORMAL
MDC_IDC_LEAD_LOCATION_DETAIL_1: NORMAL
MDC_IDC_LEAD_MFG: NORMAL
MDC_IDC_LEAD_MODEL: NORMAL
MDC_IDC_LEAD_POLARITY_TYPE: NORMAL
MDC_IDC_LEAD_SERIAL: NORMAL
MDC_IDC_MSMT_BATTERY_DTM: NORMAL
MDC_IDC_MSMT_BATTERY_REMAINING_LONGEVITY: 41 MO
MDC_IDC_MSMT_BATTERY_RRT_TRIGGER: 2.73
MDC_IDC_MSMT_BATTERY_STATUS: NORMAL
MDC_IDC_MSMT_BATTERY_VOLTAGE: 2.97 V
MDC_IDC_MSMT_CAP_CHARGE_DTM: NORMAL
MDC_IDC_MSMT_CAP_CHARGE_ENERGY: 18 J
MDC_IDC_MSMT_CAP_CHARGE_TIME: 4.07
MDC_IDC_MSMT_CAP_CHARGE_TYPE: NORMAL
MDC_IDC_MSMT_LEADCHNL_LV_IMPEDANCE_VALUE: 456 OHM
MDC_IDC_MSMT_LEADCHNL_LV_IMPEDANCE_VALUE: 589 OHM
MDC_IDC_MSMT_LEADCHNL_LV_IMPEDANCE_VALUE: 912 OHM
MDC_IDC_MSMT_LEADCHNL_LV_PACING_THRESHOLD_AMPLITUDE: 0.62 V
MDC_IDC_MSMT_LEADCHNL_LV_PACING_THRESHOLD_PULSEWIDTH: 0.6 MS
MDC_IDC_MSMT_LEADCHNL_RA_IMPEDANCE_VALUE: 399 OHM
MDC_IDC_MSMT_LEADCHNL_RA_PACING_THRESHOLD_AMPLITUDE: 0.38 V
MDC_IDC_MSMT_LEADCHNL_RA_PACING_THRESHOLD_PULSEWIDTH: 0.4 MS
MDC_IDC_MSMT_LEADCHNL_RA_SENSING_INTR_AMPL: 2.25 MV
MDC_IDC_MSMT_LEADCHNL_RA_SENSING_INTR_AMPL: 2.25 MV
MDC_IDC_MSMT_LEADCHNL_RV_IMPEDANCE_VALUE: 532 OHM
MDC_IDC_MSMT_LEADCHNL_RV_IMPEDANCE_VALUE: 589 OHM
MDC_IDC_MSMT_LEADCHNL_RV_PACING_THRESHOLD_AMPLITUDE: 0.5 V
MDC_IDC_MSMT_LEADCHNL_RV_PACING_THRESHOLD_PULSEWIDTH: 0.4 MS
MDC_IDC_MSMT_LEADCHNL_RV_SENSING_INTR_AMPL: 7.88 MV
MDC_IDC_MSMT_LEADCHNL_RV_SENSING_INTR_AMPL: 7.88 MV
MDC_IDC_PG_IMPLANT_DTM: NORMAL
MDC_IDC_PG_MFG: NORMAL
MDC_IDC_PG_MODEL: NORMAL
MDC_IDC_PG_SERIAL: NORMAL
MDC_IDC_PG_TYPE: NORMAL
MDC_IDC_SESS_CLINIC_NAME: NORMAL
MDC_IDC_SESS_DTM: NORMAL
MDC_IDC_SESS_TYPE: NORMAL
MDC_IDC_SET_BRADY_AT_MODE_SWITCH_RATE: 171 {BEATS}/MIN
MDC_IDC_SET_BRADY_LOWRATE: 60 {BEATS}/MIN
MDC_IDC_SET_BRADY_MAX_SENSOR_RATE: 130 {BEATS}/MIN
MDC_IDC_SET_BRADY_MAX_TRACKING_RATE: 130 {BEATS}/MIN
MDC_IDC_SET_BRADY_MODE: NORMAL
MDC_IDC_SET_BRADY_PAV_DELAY_LOW: 150 MS
MDC_IDC_SET_BRADY_SAV_DELAY_LOW: 140 MS
MDC_IDC_SET_CRT_PACED_CHAMBERS: NORMAL
MDC_IDC_SET_LEADCHNL_LV_PACING_AMPLITUDE: 1.5 V
MDC_IDC_SET_LEADCHNL_LV_PACING_ANODE_ELECTRODE_1: NORMAL
MDC_IDC_SET_LEADCHNL_LV_PACING_ANODE_LOCATION_1: NORMAL
MDC_IDC_SET_LEADCHNL_LV_PACING_CAPTURE_MODE: NORMAL
MDC_IDC_SET_LEADCHNL_LV_PACING_CATHODE_ELECTRODE_1: NORMAL
MDC_IDC_SET_LEADCHNL_LV_PACING_CATHODE_LOCATION_1: NORMAL
MDC_IDC_SET_LEADCHNL_LV_PACING_POLARITY: NORMAL
MDC_IDC_SET_LEADCHNL_LV_PACING_PULSEWIDTH: 0.6 MS
MDC_IDC_SET_LEADCHNL_RA_PACING_AMPLITUDE: 1.5 V
MDC_IDC_SET_LEADCHNL_RA_PACING_ANODE_ELECTRODE_1: NORMAL
MDC_IDC_SET_LEADCHNL_RA_PACING_ANODE_LOCATION_1: NORMAL
MDC_IDC_SET_LEADCHNL_RA_PACING_CAPTURE_MODE: NORMAL
MDC_IDC_SET_LEADCHNL_RA_PACING_CATHODE_ELECTRODE_1: NORMAL
MDC_IDC_SET_LEADCHNL_RA_PACING_CATHODE_LOCATION_1: NORMAL
MDC_IDC_SET_LEADCHNL_RA_PACING_POLARITY: NORMAL
MDC_IDC_SET_LEADCHNL_RA_PACING_PULSEWIDTH: 0.4 MS
MDC_IDC_SET_LEADCHNL_RA_SENSING_ANODE_ELECTRODE_1: NORMAL
MDC_IDC_SET_LEADCHNL_RA_SENSING_ANODE_LOCATION_1: NORMAL
MDC_IDC_SET_LEADCHNL_RA_SENSING_CATHODE_ELECTRODE_1: NORMAL
MDC_IDC_SET_LEADCHNL_RA_SENSING_CATHODE_LOCATION_1: NORMAL
MDC_IDC_SET_LEADCHNL_RA_SENSING_POLARITY: NORMAL
MDC_IDC_SET_LEADCHNL_RA_SENSING_SENSITIVITY: 0.3 MV
MDC_IDC_SET_LEADCHNL_RV_PACING_AMPLITUDE: 2 V
MDC_IDC_SET_LEADCHNL_RV_PACING_ANODE_ELECTRODE_1: NORMAL
MDC_IDC_SET_LEADCHNL_RV_PACING_ANODE_LOCATION_1: NORMAL
MDC_IDC_SET_LEADCHNL_RV_PACING_CAPTURE_MODE: NORMAL
MDC_IDC_SET_LEADCHNL_RV_PACING_CATHODE_ELECTRODE_1: NORMAL
MDC_IDC_SET_LEADCHNL_RV_PACING_CATHODE_LOCATION_1: NORMAL
MDC_IDC_SET_LEADCHNL_RV_PACING_POLARITY: NORMAL
MDC_IDC_SET_LEADCHNL_RV_PACING_PULSEWIDTH: 0.4 MS
MDC_IDC_SET_LEADCHNL_RV_SENSING_ANODE_ELECTRODE_1: NORMAL
MDC_IDC_SET_LEADCHNL_RV_SENSING_ANODE_LOCATION_1: NORMAL
MDC_IDC_SET_LEADCHNL_RV_SENSING_CATHODE_ELECTRODE_1: NORMAL
MDC_IDC_SET_LEADCHNL_RV_SENSING_CATHODE_LOCATION_1: NORMAL
MDC_IDC_SET_LEADCHNL_RV_SENSING_POLARITY: NORMAL
MDC_IDC_SET_LEADCHNL_RV_SENSING_SENSITIVITY: 0.3 MV
MDC_IDC_SET_ZONE_DETECTION_BEATS_DENOMINATOR: 40 {BEATS}
MDC_IDC_SET_ZONE_DETECTION_BEATS_NUMERATOR: 30 {BEATS}
MDC_IDC_SET_ZONE_DETECTION_INTERVAL: 320 MS
MDC_IDC_SET_ZONE_DETECTION_INTERVAL: 350 MS
MDC_IDC_SET_ZONE_DETECTION_INTERVAL: 360 MS
MDC_IDC_SET_ZONE_DETECTION_INTERVAL: 400 MS
MDC_IDC_SET_ZONE_DETECTION_INTERVAL: NORMAL
MDC_IDC_SET_ZONE_TYPE: NORMAL
MDC_IDC_STAT_AT_BURDEN_PERCENT: 0 %
MDC_IDC_STAT_AT_DTM_END: NORMAL
MDC_IDC_STAT_AT_DTM_START: NORMAL
MDC_IDC_STAT_BRADY_AP_VP_PERCENT: 11.69 %
MDC_IDC_STAT_BRADY_AP_VS_PERCENT: 0.18 %
MDC_IDC_STAT_BRADY_AS_VP_PERCENT: 86.22 %
MDC_IDC_STAT_BRADY_AS_VS_PERCENT: 1.91 %
MDC_IDC_STAT_BRADY_DTM_END: NORMAL
MDC_IDC_STAT_BRADY_DTM_START: NORMAL
MDC_IDC_STAT_BRADY_RA_PERCENT_PACED: 11.69 %
MDC_IDC_STAT_BRADY_RV_PERCENT_PACED: 5.72 %
MDC_IDC_STAT_CRT_DTM_END: NORMAL
MDC_IDC_STAT_CRT_DTM_START: NORMAL
MDC_IDC_STAT_CRT_LV_PERCENT_PACED: 96.33 %
MDC_IDC_STAT_CRT_PERCENT_PACED: 96.33 %
MDC_IDC_STAT_EPISODE_RECENT_COUNT: 0
MDC_IDC_STAT_EPISODE_RECENT_COUNT_DTM_END: NORMAL
MDC_IDC_STAT_EPISODE_RECENT_COUNT_DTM_START: NORMAL
MDC_IDC_STAT_EPISODE_TOTAL_COUNT: 0
MDC_IDC_STAT_EPISODE_TOTAL_COUNT: 1
MDC_IDC_STAT_EPISODE_TOTAL_COUNT: 65
MDC_IDC_STAT_EPISODE_TOTAL_COUNT_DTM_END: NORMAL
MDC_IDC_STAT_EPISODE_TOTAL_COUNT_DTM_START: NORMAL
MDC_IDC_STAT_EPISODE_TYPE: NORMAL
MDC_IDC_STAT_TACHYTHERAPY_ATP_DELIVERED_RECENT: 0
MDC_IDC_STAT_TACHYTHERAPY_ATP_DELIVERED_TOTAL: 0
MDC_IDC_STAT_TACHYTHERAPY_RECENT_DTM_END: NORMAL
MDC_IDC_STAT_TACHYTHERAPY_RECENT_DTM_START: NORMAL
MDC_IDC_STAT_TACHYTHERAPY_SHOCKS_ABORTED_RECENT: 0
MDC_IDC_STAT_TACHYTHERAPY_SHOCKS_ABORTED_TOTAL: 0
MDC_IDC_STAT_TACHYTHERAPY_SHOCKS_DELIVERED_RECENT: 0
MDC_IDC_STAT_TACHYTHERAPY_SHOCKS_DELIVERED_TOTAL: 1
MDC_IDC_STAT_TACHYTHERAPY_TOTAL_DTM_END: NORMAL
MDC_IDC_STAT_TACHYTHERAPY_TOTAL_DTM_START: NORMAL

## 2019-08-01 DIAGNOSIS — I42.8 NON-ISCHEMIC CARDIOMYOPATHY (H): ICD-10-CM

## 2019-08-01 RX ORDER — CARVEDILOL 12.5 MG/1
TABLET ORAL
Qty: 180 TABLET | Refills: 0 | Status: SHIPPED | OUTPATIENT
Start: 2019-08-01 | End: 2020-01-03

## 2019-08-12 ENCOUNTER — TELEPHONE (OUTPATIENT)
Dept: FAMILY MEDICINE | Facility: OTHER | Age: 84
End: 2019-08-12

## 2019-08-12 DIAGNOSIS — M54.41 CHRONIC MIDLINE LOW BACK PAIN WITH RIGHT-SIDED SCIATICA: Primary | ICD-10-CM

## 2019-08-12 DIAGNOSIS — G89.29 CHRONIC MIDLINE LOW BACK PAIN WITH RIGHT-SIDED SCIATICA: Primary | ICD-10-CM

## 2019-08-12 NOTE — TELEPHONE ENCOUNTER
Below is patients message on MYCHART.       As discussed our last visit, I went to PT and have continued exercises at home. There is no conceiveable improvement and I feel, perhaps, a CT scan may help identify my problem. This has been going on for about three months. Thank you. Adele Hein      Order pended for ct  . Please advise.     Kiarra Oliver LPN

## 2019-08-12 NOTE — TELEPHONE ENCOUNTER
Call patient and see where the pain is. A neck CT was ordered but she was getting PT for low back pain. Also see if she has any contraindications to having an MRI-that would be a better test

## 2019-08-12 NOTE — TELEPHONE ENCOUNTER
Spoke with patient, her pain is in her low back, upper thigh (siatica) . She states she is fine having an MRI, if that isn't contraindicated with her pacemaker.  Kiarra Oliver

## 2019-08-15 ENCOUNTER — HOSPITAL ENCOUNTER (OUTPATIENT)
Dept: CT IMAGING | Facility: HOSPITAL | Age: 84
Discharge: HOME | End: 2019-08-15
Attending: FAMILY MEDICINE | Admitting: FAMILY MEDICINE
Payer: MEDICARE

## 2019-08-15 DIAGNOSIS — M54.41 CHRONIC MIDLINE LOW BACK PAIN WITH RIGHT-SIDED SCIATICA: ICD-10-CM

## 2019-08-15 DIAGNOSIS — G89.29 CHRONIC MIDLINE LOW BACK PAIN WITH RIGHT-SIDED SCIATICA: ICD-10-CM

## 2019-08-15 PROCEDURE — 72131 CT LUMBAR SPINE W/O DYE: CPT | Mod: TC

## 2019-08-16 ENCOUNTER — TELEPHONE (OUTPATIENT)
Dept: FAMILY MEDICINE | Facility: OTHER | Age: 84
End: 2019-08-16

## 2019-08-16 DIAGNOSIS — G89.29 CHRONIC MIDLINE LOW BACK PAIN WITH RIGHT-SIDED SCIATICA: Primary | ICD-10-CM

## 2019-08-16 DIAGNOSIS — M54.41 CHRONIC MIDLINE LOW BACK PAIN WITH RIGHT-SIDED SCIATICA: Primary | ICD-10-CM

## 2019-08-16 NOTE — TELEPHONE ENCOUNTER
Spoke with patient about ct results and about referral to [facility] cites spine , she wants us to put it in.     Kiarra Oliver LPN

## 2019-08-23 ENCOUNTER — TELEPHONE (OUTPATIENT)
Dept: FAMILY MEDICINE | Facility: OTHER | Age: 84
End: 2019-08-23

## 2019-08-23 DIAGNOSIS — J44.9 CHRONIC OBSTRUCTIVE PULMONARY DISEASE, UNSPECIFIED COPD TYPE (H): ICD-10-CM

## 2019-08-27 RX ORDER — TIOTROPIUM BROMIDE INHALATION SPRAY 3.12 UG/1
SPRAY, METERED RESPIRATORY (INHALATION)
Qty: 4 G | Refills: 1 | Status: SHIPPED | OUTPATIENT
Start: 2019-08-27 | End: 2019-10-26

## 2019-08-27 NOTE — TELEPHONE ENCOUNTER
Spiriva  Last Written Prescription Date: 7/22/19  Last Fill Quantity: 4g # of Refills: 0  Last Office Visit: 6/11/19

## 2019-09-17 ENCOUNTER — TRANSFERRED RECORDS (OUTPATIENT)
Dept: HEALTH INFORMATION MANAGEMENT | Facility: CLINIC | Age: 84
End: 2019-09-17

## 2019-09-17 ENCOUNTER — MEDICAL CORRESPONDENCE (OUTPATIENT)
Dept: HEALTH INFORMATION MANAGEMENT | Facility: CLINIC | Age: 84
End: 2019-09-17

## 2019-09-20 DIAGNOSIS — J43.8 OTHER EMPHYSEMA (H): ICD-10-CM

## 2019-09-20 RX ORDER — BUDESONIDE AND FORMOTEROL FUMARATE DIHYDRATE 160; 4.5 UG/1; UG/1
AEROSOL RESPIRATORY (INHALATION)
Qty: 10.2 G | Refills: 0 | Status: CANCELLED | OUTPATIENT
Start: 2019-09-20

## 2019-09-20 RX ORDER — BUDESONIDE AND FORMOTEROL FUMARATE DIHYDRATE 160; 4.5 UG/1; UG/1
AEROSOL RESPIRATORY (INHALATION)
Qty: 10.2 G | Refills: 1 | Status: SHIPPED | OUTPATIENT
Start: 2019-09-20 | End: 2019-11-29

## 2019-09-23 LAB
MDC_IDC_EPISODE_DTM: NORMAL
MDC_IDC_EPISODE_DURATION: 4 S
MDC_IDC_EPISODE_DURATION: 5 S
MDC_IDC_EPISODE_DURATION: 6 S
MDC_IDC_EPISODE_ID: 665
MDC_IDC_EPISODE_ID: 666
MDC_IDC_EPISODE_ID: 667
MDC_IDC_EPISODE_ID: 668
MDC_IDC_EPISODE_ID: 669
MDC_IDC_EPISODE_TYPE: NORMAL
MDC_IDC_LEAD_IMPLANT_DT: NORMAL
MDC_IDC_LEAD_LOCATION: NORMAL
MDC_IDC_LEAD_LOCATION_DETAIL_1: NORMAL
MDC_IDC_LEAD_MFG: NORMAL
MDC_IDC_LEAD_MODEL: NORMAL
MDC_IDC_LEAD_POLARITY_TYPE: NORMAL
MDC_IDC_LEAD_SERIAL: NORMAL
MDC_IDC_MSMT_BATTERY_DTM: NORMAL
MDC_IDC_MSMT_BATTERY_REMAINING_LONGEVITY: 46 MO
MDC_IDC_MSMT_BATTERY_RRT_TRIGGER: 2.73
MDC_IDC_MSMT_BATTERY_STATUS: NORMAL
MDC_IDC_MSMT_BATTERY_VOLTAGE: 2.97 V
MDC_IDC_MSMT_CAP_CHARGE_DTM: NORMAL
MDC_IDC_MSMT_CAP_CHARGE_ENERGY: 18 J
MDC_IDC_MSMT_CAP_CHARGE_TIME: 4.03
MDC_IDC_MSMT_CAP_CHARGE_TYPE: NORMAL
MDC_IDC_MSMT_LEADCHNL_LV_IMPEDANCE_VALUE: 456 OHM
MDC_IDC_MSMT_LEADCHNL_LV_IMPEDANCE_VALUE: 589 OHM
MDC_IDC_MSMT_LEADCHNL_LV_IMPEDANCE_VALUE: 912 OHM
MDC_IDC_MSMT_LEADCHNL_LV_PACING_THRESHOLD_AMPLITUDE: 0.75 V
MDC_IDC_MSMT_LEADCHNL_LV_PACING_THRESHOLD_PULSEWIDTH: 0.4 MS
MDC_IDC_MSMT_LEADCHNL_RA_IMPEDANCE_VALUE: 399 OHM
MDC_IDC_MSMT_LEADCHNL_RA_PACING_THRESHOLD_AMPLITUDE: 0.5 V
MDC_IDC_MSMT_LEADCHNL_RA_PACING_THRESHOLD_PULSEWIDTH: 0.4 MS
MDC_IDC_MSMT_LEADCHNL_RA_SENSING_INTR_AMPL: 2.12 MV
MDC_IDC_MSMT_LEADCHNL_RA_SENSING_INTR_AMPL: 2.62 MV
MDC_IDC_MSMT_LEADCHNL_RV_IMPEDANCE_VALUE: 513 OHM
MDC_IDC_MSMT_LEADCHNL_RV_IMPEDANCE_VALUE: 627 OHM
MDC_IDC_MSMT_LEADCHNL_RV_PACING_THRESHOLD_AMPLITUDE: 0.5 V
MDC_IDC_MSMT_LEADCHNL_RV_PACING_THRESHOLD_PULSEWIDTH: 0.4 MS
MDC_IDC_MSMT_LEADCHNL_RV_SENSING_INTR_AMPL: 5.75 MV
MDC_IDC_MSMT_LEADCHNL_RV_SENSING_INTR_AMPL: 8.5 MV
MDC_IDC_PG_IMPLANT_DTM: NORMAL
MDC_IDC_PG_MFG: NORMAL
MDC_IDC_PG_MODEL: NORMAL
MDC_IDC_PG_SERIAL: NORMAL
MDC_IDC_PG_TYPE: NORMAL
MDC_IDC_SESS_CLINIC_NAME: NORMAL
MDC_IDC_SESS_DTM: NORMAL
MDC_IDC_SESS_TYPE: NORMAL
MDC_IDC_SET_BRADY_AT_MODE_SWITCH_RATE: 171 {BEATS}/MIN
MDC_IDC_SET_BRADY_LOWRATE: 60 {BEATS}/MIN
MDC_IDC_SET_BRADY_MAX_SENSOR_RATE: 130 {BEATS}/MIN
MDC_IDC_SET_BRADY_MAX_TRACKING_RATE: 130 {BEATS}/MIN
MDC_IDC_SET_BRADY_MODE: NORMAL
MDC_IDC_SET_BRADY_PAV_DELAY_LOW: 150 MS
MDC_IDC_SET_BRADY_SAV_DELAY_LOW: 130 MS
MDC_IDC_SET_CRT_PACED_CHAMBERS: NORMAL
MDC_IDC_SET_LEADCHNL_LV_PACING_AMPLITUDE: 1.5 V
MDC_IDC_SET_LEADCHNL_LV_PACING_ANODE_ELECTRODE_1: NORMAL
MDC_IDC_SET_LEADCHNL_LV_PACING_ANODE_LOCATION_1: NORMAL
MDC_IDC_SET_LEADCHNL_LV_PACING_CAPTURE_MODE: NORMAL
MDC_IDC_SET_LEADCHNL_LV_PACING_CATHODE_ELECTRODE_1: NORMAL
MDC_IDC_SET_LEADCHNL_LV_PACING_CATHODE_LOCATION_1: NORMAL
MDC_IDC_SET_LEADCHNL_LV_PACING_POLARITY: NORMAL
MDC_IDC_SET_LEADCHNL_LV_PACING_PULSEWIDTH: 0.6 MS
MDC_IDC_SET_LEADCHNL_RA_PACING_AMPLITUDE: 1.5 V
MDC_IDC_SET_LEADCHNL_RA_PACING_ANODE_ELECTRODE_1: NORMAL
MDC_IDC_SET_LEADCHNL_RA_PACING_ANODE_LOCATION_1: NORMAL
MDC_IDC_SET_LEADCHNL_RA_PACING_CAPTURE_MODE: NORMAL
MDC_IDC_SET_LEADCHNL_RA_PACING_CATHODE_ELECTRODE_1: NORMAL
MDC_IDC_SET_LEADCHNL_RA_PACING_CATHODE_LOCATION_1: NORMAL
MDC_IDC_SET_LEADCHNL_RA_PACING_POLARITY: NORMAL
MDC_IDC_SET_LEADCHNL_RA_PACING_PULSEWIDTH: 0.4 MS
MDC_IDC_SET_LEADCHNL_RA_SENSING_ANODE_ELECTRODE_1: NORMAL
MDC_IDC_SET_LEADCHNL_RA_SENSING_ANODE_LOCATION_1: NORMAL
MDC_IDC_SET_LEADCHNL_RA_SENSING_CATHODE_ELECTRODE_1: NORMAL
MDC_IDC_SET_LEADCHNL_RA_SENSING_CATHODE_LOCATION_1: NORMAL
MDC_IDC_SET_LEADCHNL_RA_SENSING_POLARITY: NORMAL
MDC_IDC_SET_LEADCHNL_RA_SENSING_SENSITIVITY: 0.3 MV
MDC_IDC_SET_LEADCHNL_RV_PACING_AMPLITUDE: 2 V
MDC_IDC_SET_LEADCHNL_RV_PACING_ANODE_ELECTRODE_1: NORMAL
MDC_IDC_SET_LEADCHNL_RV_PACING_ANODE_LOCATION_1: NORMAL
MDC_IDC_SET_LEADCHNL_RV_PACING_CAPTURE_MODE: NORMAL
MDC_IDC_SET_LEADCHNL_RV_PACING_CATHODE_ELECTRODE_1: NORMAL
MDC_IDC_SET_LEADCHNL_RV_PACING_CATHODE_LOCATION_1: NORMAL
MDC_IDC_SET_LEADCHNL_RV_PACING_POLARITY: NORMAL
MDC_IDC_SET_LEADCHNL_RV_PACING_PULSEWIDTH: 0.4 MS
MDC_IDC_SET_LEADCHNL_RV_SENSING_ANODE_ELECTRODE_1: NORMAL
MDC_IDC_SET_LEADCHNL_RV_SENSING_ANODE_LOCATION_1: NORMAL
MDC_IDC_SET_LEADCHNL_RV_SENSING_CATHODE_ELECTRODE_1: NORMAL
MDC_IDC_SET_LEADCHNL_RV_SENSING_CATHODE_LOCATION_1: NORMAL
MDC_IDC_SET_LEADCHNL_RV_SENSING_POLARITY: NORMAL
MDC_IDC_SET_LEADCHNL_RV_SENSING_SENSITIVITY: 0.3 MV
MDC_IDC_SET_ZONE_DETECTION_BEATS_DENOMINATOR: 40 {BEATS}
MDC_IDC_SET_ZONE_DETECTION_BEATS_NUMERATOR: 30 {BEATS}
MDC_IDC_SET_ZONE_DETECTION_INTERVAL: 320 MS
MDC_IDC_SET_ZONE_DETECTION_INTERVAL: 350 MS
MDC_IDC_SET_ZONE_DETECTION_INTERVAL: 360 MS
MDC_IDC_SET_ZONE_DETECTION_INTERVAL: 400 MS
MDC_IDC_SET_ZONE_DETECTION_INTERVAL: NORMAL
MDC_IDC_SET_ZONE_TYPE: NORMAL
MDC_IDC_STAT_AT_BURDEN_PERCENT: 0.1 %
MDC_IDC_STAT_AT_DTM_END: NORMAL
MDC_IDC_STAT_AT_DTM_START: NORMAL
MDC_IDC_STAT_BRADY_AP_VP_PERCENT: 10.46 %
MDC_IDC_STAT_BRADY_AP_VS_PERCENT: 0.18 %
MDC_IDC_STAT_BRADY_AS_VP_PERCENT: 87.83 %
MDC_IDC_STAT_BRADY_AS_VS_PERCENT: 1.53 %
MDC_IDC_STAT_BRADY_DTM_END: NORMAL
MDC_IDC_STAT_BRADY_DTM_START: NORMAL
MDC_IDC_STAT_BRADY_RA_PERCENT_PACED: 10.55 %
MDC_IDC_STAT_BRADY_RV_PERCENT_PACED: 6.24 %
MDC_IDC_STAT_CRT_DTM_END: NORMAL
MDC_IDC_STAT_CRT_DTM_START: NORMAL
MDC_IDC_STAT_CRT_LV_PERCENT_PACED: 97.4 %
MDC_IDC_STAT_CRT_PERCENT_PACED: 97.4 %
MDC_IDC_STAT_EPISODE_RECENT_COUNT: 0
MDC_IDC_STAT_EPISODE_RECENT_COUNT: 6
MDC_IDC_STAT_EPISODE_RECENT_COUNT_DTM_END: NORMAL
MDC_IDC_STAT_EPISODE_RECENT_COUNT_DTM_START: NORMAL
MDC_IDC_STAT_EPISODE_TOTAL_COUNT: 0
MDC_IDC_STAT_EPISODE_TOTAL_COUNT: 1
MDC_IDC_STAT_EPISODE_TOTAL_COUNT: 65
MDC_IDC_STAT_EPISODE_TOTAL_COUNT_DTM_END: NORMAL
MDC_IDC_STAT_EPISODE_TOTAL_COUNT_DTM_START: NORMAL
MDC_IDC_STAT_EPISODE_TYPE: NORMAL
MDC_IDC_STAT_TACHYTHERAPY_ATP_DELIVERED_RECENT: 0
MDC_IDC_STAT_TACHYTHERAPY_ATP_DELIVERED_TOTAL: 0
MDC_IDC_STAT_TACHYTHERAPY_RECENT_DTM_END: NORMAL
MDC_IDC_STAT_TACHYTHERAPY_RECENT_DTM_START: NORMAL
MDC_IDC_STAT_TACHYTHERAPY_SHOCKS_ABORTED_RECENT: 0
MDC_IDC_STAT_TACHYTHERAPY_SHOCKS_ABORTED_TOTAL: 0
MDC_IDC_STAT_TACHYTHERAPY_SHOCKS_DELIVERED_RECENT: 0
MDC_IDC_STAT_TACHYTHERAPY_SHOCKS_DELIVERED_TOTAL: 1
MDC_IDC_STAT_TACHYTHERAPY_TOTAL_DTM_END: NORMAL
MDC_IDC_STAT_TACHYTHERAPY_TOTAL_DTM_START: NORMAL

## 2019-10-09 DIAGNOSIS — M81.0 AGE-RELATED OSTEOPOROSIS WITHOUT CURRENT PATHOLOGICAL FRACTURE: ICD-10-CM

## 2019-10-10 RX ORDER — ALENDRONATE SODIUM 70 MG/1
TABLET ORAL
Qty: 12 TABLET | Refills: 0 | Status: SHIPPED | OUTPATIENT
Start: 2019-10-10 | End: 2020-01-03

## 2019-10-10 NOTE — TELEPHONE ENCOUNTER
Alendronate      Last Written Prescription Date:  7/2/2019  Last Fill Quantity: 12,   # refills: 0  Last Office Visit: 6/11/2019  Future Office visit:    Next 5 appointments (look out 90 days)    Dec 04, 2019  1:00 PM CST  (Arrive by 12:45 PM)  Return Visit with Kodi Garcia DO  [facility] ([facility] ) [address]  [place] [address]  [phone]           Routing refill request to provider for review/approval because:  Failed protocol due to patient due for for DEXA scan. Please advise

## 2019-10-22 ENCOUNTER — ANCILLARY PROCEDURE (OUTPATIENT)
Dept: CARDIOLOGY | Facility: OTHER | Age: 84
End: 2019-10-22
Attending: INTERNAL MEDICINE
Payer: MEDICARE

## 2019-10-22 ENCOUNTER — TELEPHONE (OUTPATIENT)
Dept: CARDIOLOGY | Facility: OTHER | Age: 84
End: 2019-10-22
Payer: COMMERCIAL

## 2019-10-22 DIAGNOSIS — R07.9 CHEST PAIN: Primary | ICD-10-CM

## 2019-10-22 DIAGNOSIS — I42.9 CARDIOMYOPATHY (H): ICD-10-CM

## 2019-10-22 LAB
BASOPHILS # BLD AUTO: 0.1 10E9/L (ref 0–0.2)
BASOPHILS NFR BLD AUTO: 0.4 %
DIFFERENTIAL METHOD BLD: ABNORMAL
EOSINOPHIL # BLD AUTO: 0 10E9/L (ref 0–0.7)
EOSINOPHIL NFR BLD AUTO: 0.3 %
ERYTHROCYTE [DISTWIDTH] IN BLOOD BY AUTOMATED COUNT: 13.5 % (ref 10–15)
HCT VFR BLD AUTO: 38.2 % (ref 35–47)
HGB BLD-MCNC: 12.6 G/DL (ref 11.7–15.7)
IMM GRANULOCYTES # BLD: 0.1 10E9/L (ref 0–0.4)
IMM GRANULOCYTES NFR BLD: 0.5 %
LYMPHOCYTES # BLD AUTO: 1.5 10E9/L (ref 0.8–5.3)
LYMPHOCYTES NFR BLD AUTO: 13.3 %
MCH RBC QN AUTO: 29.5 PG (ref 26.5–33)
MCHC RBC AUTO-ENTMCNC: 33 G/DL (ref 31.5–36.5)
MCV RBC AUTO: 90 FL (ref 78–100)
MONOCYTES # BLD AUTO: 1 10E9/L (ref 0–1.3)
MONOCYTES NFR BLD AUTO: 8.9 %
NEUTROPHILS # BLD AUTO: 8.8 10E9/L (ref 1.6–8.3)
NEUTROPHILS NFR BLD AUTO: 76.6 %
NRBC # BLD AUTO: 0 10*3/UL
NRBC BLD AUTO-RTO: 0 /100
PLATELET # BLD AUTO: 177 10E9/L (ref 150–450)
RBC # BLD AUTO: 4.27 10E12/L (ref 3.8–5.2)
TROPONIN I SERPL-MCNC: <0.015 UG/L (ref 0–0.04)
WBC # BLD AUTO: 11.5 10E9/L (ref 4–11)

## 2019-10-22 PROCEDURE — 84484 ASSAY OF TROPONIN QUANT: CPT | Performed by: INTERNAL MEDICINE

## 2019-10-22 PROCEDURE — 85025 COMPLETE CBC W/AUTO DIFF WBC: CPT | Performed by: INTERNAL MEDICINE

## 2019-10-22 PROCEDURE — 36415 COLL VENOUS BLD VENIPUNCTURE: CPT | Performed by: INTERNAL MEDICINE

## 2019-10-22 PROCEDURE — 93284 PRGRMG EVAL IMPLANTABLE DFB: CPT | Mod: TC | Performed by: INTERNAL MEDICINE

## 2019-10-23 NOTE — PATIENT INSTRUCTIONS
It was a pleasure to see you in clinic today.  Please do not hesitate to call with any questions or concerns.  You are scheduled for a remote transmission on 1/23/20.  We look forward to seeing you in clinic at your next device check in 6 months.    Desiree Luevano, RN, MS, CCRN  Electrophysiology Nurse Clinician  [facility]    During Business Hours Please Call:  [phone]  After Hours Please Call:  [phone] - select option #4 and ask for job code [phone]                 airbag depoyment

## 2019-10-25 LAB
MDC_IDC_EPISODE_DTM: NORMAL
MDC_IDC_EPISODE_DURATION: 14 S
MDC_IDC_EPISODE_DURATION: 16 S
MDC_IDC_EPISODE_DURATION: 20 S
MDC_IDC_EPISODE_DURATION: 4 S
MDC_IDC_EPISODE_DURATION: 5 S
MDC_IDC_EPISODE_DURATION: 6 S
MDC_IDC_EPISODE_DURATION: 9 S
MDC_IDC_EPISODE_DURATION: 982 S
MDC_IDC_EPISODE_ID: 684
MDC_IDC_EPISODE_ID: 685
MDC_IDC_EPISODE_ID: 686
MDC_IDC_EPISODE_ID: 687
MDC_IDC_EPISODE_ID: 688
MDC_IDC_EPISODE_ID: 689
MDC_IDC_EPISODE_ID: 690
MDC_IDC_EPISODE_ID: 71
MDC_IDC_EPISODE_TYPE: NORMAL
MDC_IDC_LEAD_IMPLANT_DT: NORMAL
MDC_IDC_LEAD_LOCATION: NORMAL
MDC_IDC_LEAD_LOCATION_DETAIL_1: NORMAL
MDC_IDC_LEAD_MFG: NORMAL
MDC_IDC_LEAD_MODEL: NORMAL
MDC_IDC_LEAD_POLARITY_TYPE: NORMAL
MDC_IDC_LEAD_SERIAL: NORMAL
MDC_IDC_MSMT_BATTERY_DTM: NORMAL
MDC_IDC_MSMT_BATTERY_REMAINING_LONGEVITY: 36 MO
MDC_IDC_MSMT_BATTERY_RRT_TRIGGER: 2.73
MDC_IDC_MSMT_BATTERY_STATUS: NORMAL
MDC_IDC_MSMT_BATTERY_VOLTAGE: 2.96 V
MDC_IDC_MSMT_CAP_CHARGE_DTM: NORMAL
MDC_IDC_MSMT_CAP_CHARGE_ENERGY: 18 J
MDC_IDC_MSMT_CAP_CHARGE_TIME: 4.15
MDC_IDC_MSMT_CAP_CHARGE_TYPE: NORMAL
MDC_IDC_MSMT_LEADCHNL_LV_IMPEDANCE_VALUE: 494 OHM
MDC_IDC_MSMT_LEADCHNL_LV_IMPEDANCE_VALUE: 665 OHM
MDC_IDC_MSMT_LEADCHNL_LV_IMPEDANCE_VALUE: 988 OHM
MDC_IDC_MSMT_LEADCHNL_LV_PACING_THRESHOLD_AMPLITUDE: 0.62 V
MDC_IDC_MSMT_LEADCHNL_LV_PACING_THRESHOLD_PULSEWIDTH: 0.6 MS
MDC_IDC_MSMT_LEADCHNL_RA_IMPEDANCE_VALUE: 437 OHM
MDC_IDC_MSMT_LEADCHNL_RA_PACING_THRESHOLD_AMPLITUDE: 0.38 V
MDC_IDC_MSMT_LEADCHNL_RA_PACING_THRESHOLD_PULSEWIDTH: 0.4 MS
MDC_IDC_MSMT_LEADCHNL_RA_SENSING_INTR_AMPL: 2.25 MV
MDC_IDC_MSMT_LEADCHNL_RA_SENSING_INTR_AMPL: 3 MV
MDC_IDC_MSMT_LEADCHNL_RV_IMPEDANCE_VALUE: 570 OHM
MDC_IDC_MSMT_LEADCHNL_RV_IMPEDANCE_VALUE: 646 OHM
MDC_IDC_MSMT_LEADCHNL_RV_PACING_THRESHOLD_AMPLITUDE: 0.5 V
MDC_IDC_MSMT_LEADCHNL_RV_PACING_THRESHOLD_PULSEWIDTH: 0.4 MS
MDC_IDC_MSMT_LEADCHNL_RV_SENSING_INTR_AMPL: 7 MV
MDC_IDC_MSMT_LEADCHNL_RV_SENSING_INTR_AMPL: 8.62 MV
MDC_IDC_PG_IMPLANT_DTM: NORMAL
MDC_IDC_PG_MFG: NORMAL
MDC_IDC_PG_MODEL: NORMAL
MDC_IDC_PG_SERIAL: NORMAL
MDC_IDC_PG_TYPE: NORMAL
MDC_IDC_SESS_CLINIC_NAME: NORMAL
MDC_IDC_SESS_DTM: NORMAL
MDC_IDC_SESS_TYPE: NORMAL
MDC_IDC_SET_BRADY_AT_MODE_SWITCH_RATE: 171 {BEATS}/MIN
MDC_IDC_SET_BRADY_LOWRATE: 60 {BEATS}/MIN
MDC_IDC_SET_BRADY_MAX_SENSOR_RATE: 130 {BEATS}/MIN
MDC_IDC_SET_BRADY_MAX_TRACKING_RATE: 130 {BEATS}/MIN
MDC_IDC_SET_BRADY_MODE: NORMAL
MDC_IDC_SET_BRADY_PAV_DELAY_LOW: 150 MS
MDC_IDC_SET_BRADY_SAV_DELAY_LOW: 130 MS
MDC_IDC_SET_CRT_PACED_CHAMBERS: NORMAL
MDC_IDC_SET_LEADCHNL_LV_PACING_AMPLITUDE: 1.5 V
MDC_IDC_SET_LEADCHNL_LV_PACING_ANODE_ELECTRODE_1: NORMAL
MDC_IDC_SET_LEADCHNL_LV_PACING_ANODE_LOCATION_1: NORMAL
MDC_IDC_SET_LEADCHNL_LV_PACING_CAPTURE_MODE: NORMAL
MDC_IDC_SET_LEADCHNL_LV_PACING_CATHODE_ELECTRODE_1: NORMAL
MDC_IDC_SET_LEADCHNL_LV_PACING_CATHODE_LOCATION_1: NORMAL
MDC_IDC_SET_LEADCHNL_LV_PACING_POLARITY: NORMAL
MDC_IDC_SET_LEADCHNL_LV_PACING_PULSEWIDTH: 0.6 MS
MDC_IDC_SET_LEADCHNL_RA_PACING_AMPLITUDE: 1.5 V
MDC_IDC_SET_LEADCHNL_RA_PACING_ANODE_ELECTRODE_1: NORMAL
MDC_IDC_SET_LEADCHNL_RA_PACING_ANODE_LOCATION_1: NORMAL
MDC_IDC_SET_LEADCHNL_RA_PACING_CAPTURE_MODE: NORMAL
MDC_IDC_SET_LEADCHNL_RA_PACING_CATHODE_ELECTRODE_1: NORMAL
MDC_IDC_SET_LEADCHNL_RA_PACING_CATHODE_LOCATION_1: NORMAL
MDC_IDC_SET_LEADCHNL_RA_PACING_POLARITY: NORMAL
MDC_IDC_SET_LEADCHNL_RA_PACING_PULSEWIDTH: 0.4 MS
MDC_IDC_SET_LEADCHNL_RA_SENSING_ANODE_ELECTRODE_1: NORMAL
MDC_IDC_SET_LEADCHNL_RA_SENSING_ANODE_LOCATION_1: NORMAL
MDC_IDC_SET_LEADCHNL_RA_SENSING_CATHODE_ELECTRODE_1: NORMAL
MDC_IDC_SET_LEADCHNL_RA_SENSING_CATHODE_LOCATION_1: NORMAL
MDC_IDC_SET_LEADCHNL_RA_SENSING_POLARITY: NORMAL
MDC_IDC_SET_LEADCHNL_RA_SENSING_SENSITIVITY: 0.3 MV
MDC_IDC_SET_LEADCHNL_RV_PACING_AMPLITUDE: 2 V
MDC_IDC_SET_LEADCHNL_RV_PACING_ANODE_ELECTRODE_1: NORMAL
MDC_IDC_SET_LEADCHNL_RV_PACING_ANODE_LOCATION_1: NORMAL
MDC_IDC_SET_LEADCHNL_RV_PACING_CAPTURE_MODE: NORMAL
MDC_IDC_SET_LEADCHNL_RV_PACING_CATHODE_ELECTRODE_1: NORMAL
MDC_IDC_SET_LEADCHNL_RV_PACING_CATHODE_LOCATION_1: NORMAL
MDC_IDC_SET_LEADCHNL_RV_PACING_POLARITY: NORMAL
MDC_IDC_SET_LEADCHNL_RV_PACING_PULSEWIDTH: 0.4 MS
MDC_IDC_SET_LEADCHNL_RV_SENSING_ANODE_ELECTRODE_1: NORMAL
MDC_IDC_SET_LEADCHNL_RV_SENSING_ANODE_LOCATION_1: NORMAL
MDC_IDC_SET_LEADCHNL_RV_SENSING_CATHODE_ELECTRODE_1: NORMAL
MDC_IDC_SET_LEADCHNL_RV_SENSING_CATHODE_LOCATION_1: NORMAL
MDC_IDC_SET_LEADCHNL_RV_SENSING_POLARITY: NORMAL
MDC_IDC_SET_LEADCHNL_RV_SENSING_SENSITIVITY: 0.3 MV
MDC_IDC_SET_ZONE_DETECTION_BEATS_DENOMINATOR: 40 {BEATS}
MDC_IDC_SET_ZONE_DETECTION_BEATS_NUMERATOR: 30 {BEATS}
MDC_IDC_SET_ZONE_DETECTION_INTERVAL: 320 MS
MDC_IDC_SET_ZONE_DETECTION_INTERVAL: 350 MS
MDC_IDC_SET_ZONE_DETECTION_INTERVAL: 360 MS
MDC_IDC_SET_ZONE_DETECTION_INTERVAL: 400 MS
MDC_IDC_SET_ZONE_DETECTION_INTERVAL: NORMAL
MDC_IDC_SET_ZONE_TYPE: NORMAL
MDC_IDC_STAT_AT_BURDEN_PERCENT: 0.1 %
MDC_IDC_STAT_AT_DTM_END: NORMAL
MDC_IDC_STAT_AT_DTM_START: NORMAL
MDC_IDC_STAT_BRADY_AP_VP_PERCENT: 10.95 %
MDC_IDC_STAT_BRADY_AP_VS_PERCENT: 0.18 %
MDC_IDC_STAT_BRADY_AS_VP_PERCENT: 87.13 %
MDC_IDC_STAT_BRADY_AS_VS_PERCENT: 1.75 %
MDC_IDC_STAT_BRADY_DTM_END: NORMAL
MDC_IDC_STAT_BRADY_DTM_START: NORMAL
MDC_IDC_STAT_BRADY_RA_PERCENT_PACED: 10.99 %
MDC_IDC_STAT_BRADY_RV_PERCENT_PACED: 9.54 %
MDC_IDC_STAT_CRT_DTM_END: NORMAL
MDC_IDC_STAT_CRT_DTM_START: NORMAL
MDC_IDC_STAT_CRT_LV_PERCENT_PACED: 96.74 %
MDC_IDC_STAT_CRT_PERCENT_PACED: 96.74 %
MDC_IDC_STAT_EPISODE_RECENT_COUNT: 0
MDC_IDC_STAT_EPISODE_RECENT_COUNT: 1
MDC_IDC_STAT_EPISODE_RECENT_COUNT: 3
MDC_IDC_STAT_EPISODE_RECENT_COUNT_DTM_END: NORMAL
MDC_IDC_STAT_EPISODE_RECENT_COUNT_DTM_START: NORMAL
MDC_IDC_STAT_EPISODE_TOTAL_COUNT: 0
MDC_IDC_STAT_EPISODE_TOTAL_COUNT: 1
MDC_IDC_STAT_EPISODE_TOTAL_COUNT: 65
MDC_IDC_STAT_EPISODE_TOTAL_COUNT_DTM_END: NORMAL
MDC_IDC_STAT_EPISODE_TOTAL_COUNT_DTM_START: NORMAL
MDC_IDC_STAT_EPISODE_TYPE: NORMAL
MDC_IDC_STAT_TACHYTHERAPY_ATP_DELIVERED_RECENT: 0
MDC_IDC_STAT_TACHYTHERAPY_ATP_DELIVERED_TOTAL: 0
MDC_IDC_STAT_TACHYTHERAPY_RECENT_DTM_END: NORMAL
MDC_IDC_STAT_TACHYTHERAPY_RECENT_DTM_START: NORMAL
MDC_IDC_STAT_TACHYTHERAPY_SHOCKS_ABORTED_RECENT: 0
MDC_IDC_STAT_TACHYTHERAPY_SHOCKS_ABORTED_TOTAL: 0
MDC_IDC_STAT_TACHYTHERAPY_SHOCKS_DELIVERED_RECENT: 0
MDC_IDC_STAT_TACHYTHERAPY_SHOCKS_DELIVERED_TOTAL: 1
MDC_IDC_STAT_TACHYTHERAPY_TOTAL_DTM_END: NORMAL
MDC_IDC_STAT_TACHYTHERAPY_TOTAL_DTM_START: NORMAL

## 2019-10-26 DIAGNOSIS — J44.9 CHRONIC OBSTRUCTIVE PULMONARY DISEASE, UNSPECIFIED COPD TYPE (H): ICD-10-CM

## 2019-10-29 RX ORDER — TIOTROPIUM BROMIDE INHALATION SPRAY 3.12 UG/1
SPRAY, METERED RESPIRATORY (INHALATION)
Qty: 4 G | Refills: 3 | Status: SHIPPED | OUTPATIENT
Start: 2019-10-29 | End: 2020-03-17

## 2019-11-29 DIAGNOSIS — J43.8 OTHER EMPHYSEMA (H): ICD-10-CM

## 2019-12-02 DIAGNOSIS — J43.8 OTHER EMPHYSEMA (H): ICD-10-CM

## 2019-12-03 RX ORDER — BUDESONIDE AND FORMOTEROL FUMARATE DIHYDRATE 160; 4.5 UG/1; UG/1
AEROSOL RESPIRATORY (INHALATION)
Qty: 10.2 G | Refills: 0 | Status: SHIPPED | OUTPATIENT
Start: 2019-12-03 | End: 2019-12-06

## 2019-12-04 ENCOUNTER — OFFICE VISIT (OUTPATIENT)
Dept: CARDIOLOGY | Facility: OTHER | Age: 84
End: 2019-12-04
Attending: INTERNAL MEDICINE
Payer: MEDICARE

## 2019-12-04 VITALS
WEIGHT: 172 LBS | SYSTOLIC BLOOD PRESSURE: 125 MMHG | BODY MASS INDEX: 29.37 KG/M2 | RESPIRATION RATE: 14 BRPM | HEIGHT: 64 IN | TEMPERATURE: 97.8 F | HEART RATE: 81 BPM | DIASTOLIC BLOOD PRESSURE: 75 MMHG | OXYGEN SATURATION: 97 %

## 2019-12-04 DIAGNOSIS — R06.02 SOB (SHORTNESS OF BREATH): Primary | ICD-10-CM

## 2019-12-04 DIAGNOSIS — I51.89 COMBINED SYSTOLIC AND DIASTOLIC CARDIAC DYSFUNCTION: ICD-10-CM

## 2019-12-04 DIAGNOSIS — I44.7 LBBB (LEFT BUNDLE BRANCH BLOCK): ICD-10-CM

## 2019-12-04 DIAGNOSIS — I42.8 NON-ISCHEMIC CARDIOMYOPATHY (H): ICD-10-CM

## 2019-12-04 DIAGNOSIS — I48.0 PAROXYSMAL ATRIAL FIBRILLATION (H): ICD-10-CM

## 2019-12-04 DIAGNOSIS — E78.2 MIXED HYPERLIPIDEMIA: Primary | ICD-10-CM

## 2019-12-04 DIAGNOSIS — R73.9 HYPERGLYCEMIA: ICD-10-CM

## 2019-12-04 DIAGNOSIS — I10 ESSENTIAL HYPERTENSION: ICD-10-CM

## 2019-12-04 DIAGNOSIS — I50.9 CONGESTIVE HEART FAILURE, NYHA CLASS 2, UNSPECIFIED CONGESTIVE HEART FAILURE TYPE (H): ICD-10-CM

## 2019-12-04 DIAGNOSIS — E78.2 MIXED HYPERLIPIDEMIA: ICD-10-CM

## 2019-12-04 DIAGNOSIS — N18.30 CKD (CHRONIC KIDNEY DISEASE) STAGE 3, GFR 30-59 ML/MIN (H): ICD-10-CM

## 2019-12-04 DIAGNOSIS — J44.9 CHRONIC OBSTRUCTIVE PULMONARY DISEASE, UNSPECIFIED COPD TYPE (H): ICD-10-CM

## 2019-12-04 DIAGNOSIS — Z87.891 HISTORY OF TOBACCO ABUSE: ICD-10-CM

## 2019-12-04 DIAGNOSIS — J45.20 MILD INTERMITTENT ASTHMA, UNSPECIFIED WHETHER COMPLICATED: ICD-10-CM

## 2019-12-04 LAB
ANION GAP SERPL CALCULATED.3IONS-SCNC: 5 MMOL/L (ref 3–14)
BUN SERPL-MCNC: 29 MG/DL (ref 7–30)
CALCIUM SERPL-MCNC: 9 MG/DL (ref 8.5–10.1)
CHLORIDE SERPL-SCNC: 108 MMOL/L (ref 94–109)
CHOLEST SERPL-MCNC: 218 MG/DL
CO2 SERPL-SCNC: 27 MMOL/L (ref 20–32)
CREAT SERPL-MCNC: 1 MG/DL (ref 0.52–1.04)
ERYTHROCYTE [DISTWIDTH] IN BLOOD BY AUTOMATED COUNT: 13.4 % (ref 10–15)
EST. AVERAGE GLUCOSE BLD GHB EST-MCNC: 111 MG/DL
GFR SERPL CREATININE-BSD FRML MDRD: 52 ML/MIN/{1.73_M2}
GLUCOSE SERPL-MCNC: 99 MG/DL (ref 70–99)
HBA1C MFR BLD: 5.5 % (ref 0–5.6)
HCT VFR BLD AUTO: 39.9 % (ref 35–47)
HDLC SERPL-MCNC: 56 MG/DL
HGB BLD-MCNC: 13 G/DL (ref 11.7–15.7)
LDLC SERPL CALC-MCNC: 138 MG/DL
MCH RBC QN AUTO: 29.3 PG (ref 26.5–33)
MCHC RBC AUTO-ENTMCNC: 32.6 G/DL (ref 31.5–36.5)
MCV RBC AUTO: 90 FL (ref 78–100)
NONHDLC SERPL-MCNC: 162 MG/DL
NT-PROBNP SERPL-MCNC: 499 PG/ML (ref 0–450)
PLATELET # BLD AUTO: 228 10E9/L (ref 150–450)
POTASSIUM SERPL-SCNC: 4.7 MMOL/L (ref 3.4–5.3)
RBC # BLD AUTO: 4.43 10E12/L (ref 3.8–5.2)
SODIUM SERPL-SCNC: 140 MMOL/L (ref 133–144)
TRIGL SERPL-MCNC: 118 MG/DL
WBC # BLD AUTO: 6.1 10E9/L (ref 4–11)

## 2019-12-04 PROCEDURE — 36415 COLL VENOUS BLD VENIPUNCTURE: CPT | Mod: ZL | Performed by: INTERNAL MEDICINE

## 2019-12-04 PROCEDURE — 80061 LIPID PANEL: CPT | Mod: ZL | Performed by: INTERNAL MEDICINE

## 2019-12-04 PROCEDURE — G0463 HOSPITAL OUTPT CLINIC VISIT: HCPCS

## 2019-12-04 PROCEDURE — 83880 ASSAY OF NATRIURETIC PEPTIDE: CPT | Mod: ZL | Performed by: INTERNAL MEDICINE

## 2019-12-04 PROCEDURE — 80048 BASIC METABOLIC PNL TOTAL CA: CPT | Mod: ZL | Performed by: INTERNAL MEDICINE

## 2019-12-04 PROCEDURE — 85027 COMPLETE CBC AUTOMATED: CPT | Mod: ZL | Performed by: INTERNAL MEDICINE

## 2019-12-04 PROCEDURE — 83036 HEMOGLOBIN GLYCOSYLATED A1C: CPT | Mod: ZL | Performed by: INTERNAL MEDICINE

## 2019-12-04 PROCEDURE — 40000788 ZZHCL STATISTIC ESTIMATED AVERAGE GLUCOSE: Mod: ZL | Performed by: INTERNAL MEDICINE

## 2019-12-04 PROCEDURE — 99214 OFFICE O/P EST MOD 30 MIN: CPT | Performed by: INTERNAL MEDICINE

## 2019-12-04 RX ORDER — ROSUVASTATIN CALCIUM 10 MG/1
10 TABLET, COATED ORAL DAILY
Qty: 90 TABLET | Refills: 3 | Status: SHIPPED | OUTPATIENT
Start: 2019-12-04 | End: 2020-11-30

## 2019-12-04 ASSESSMENT — PAIN SCALES - GENERAL: PAINLEVEL: NO PAIN (0)

## 2019-12-04 ASSESSMENT — MIFFLIN-ST. JEOR: SCORE: 1220.19

## 2019-12-04 NOTE — NURSING NOTE
"Chief Complaint   Patient presents with     RECHECK     6 month cardiology follow-up;  Patient states that she has \"shortness of breath with activity\".  Patient states that she had a \"spinal epidural shot 3 months ago for her back and leg pain\".        Initial /75 (BP Location: Left arm, Patient Position: Chair, Cuff Size: Adult Regular)   Pulse 81   Temp 97.8  F (36.6  C) (Tympanic)   Resp 14   Ht 1.626 m (5' 4\")   Wt 78 kg (172 lb)   SpO2 97%   BMI 29.52 kg/m   Estimated body mass index is 29.52 kg/m  as calculated from the following:    Height as of this encounter: 1.626 m (5' 4\").    Weight as of this encounter: 78 kg (172 lb).  Medication Reconciliation: complete  Lamar Chatterjee LPN    "

## 2019-12-04 NOTE — PROGRESS NOTES
Cardiology Progress Note     Assessment & Plan   Jacklyn Hein is a 84 year old female who is being seen by cardiology for dyspnea with chronic systolic EF of 40% and evidence for diastolic heart failure on 5/3/17.  She was last seen on 6/3/2019.    She has been short of breath with a diagnosis of asthma and COPD.  She is being seen for a history of severe nonischemic cardiomyopathy with a previously ejection fraction at less than 35%. More recently, her EF on 5/10/17 was 40%.  She also has diastolic heart failure, ICD placed on 11/11/16, her recent visit with pulmonary secondary to what was thought to be severe COPD but was downgraded to mild to moderate with a greater concern for asthma, per the patient.    She was started on some breathing treatments for asthma/COPD by pulmonary.  With these treatments, she says she feels much better but remains short of breath.      With having diastolic and systolic heart failure, she has minimal to no lower extremity edema.  Her activity has been limited secondary to chronic back pain and generalized weakness.  It was not for back pain, she would be doing really well.    She has a history of severe nonischemic cardiomyopathy S/P ICD placement on 11/11/2014.  She had her ICD checked on 10/22/2019.  It showed there she was bi-V paced 96.8% of the time with 3.0 years left on her battery.  = 98.1%. She had up to 20 seconds of A. fib with a total of 3 episodes.  Her device was described as functioning appropriately.      12/4/2019:  She continues to be short of breath with activity with a history of COPD and asthma.  She has been using her breathing treatments with improvement.  She has a history of systolic heart failure currently on Coreg, spironolactone, and lisinopril.  It was suggested she consider Entresto today which she was agreeable.  She understands she has to stop lisinopril for essentially 48 hours prior to initiating Entresto.  Also, it was suggested that  she have an echocardiogram.  She continues to have sputtering episodes of A. fib identified on her pacemaker.  Nothing greater than 6 minutes.  We did discuss this finding.  She is somewhat concerned about having a stroke.  Eliquis 5 mg twice a day was initiated.  She does not qualify for the lower dose.  She is due for labs and will have these completed.  She is to be off aspirin and lisinopril.         Impression:  1.  Chronic systolic at 40% and diastolic heart failure as noted on echo from 5/10/17.  2.  Reduction in severity of COPD from severe to mild to moderate per pulmonary and concern for asthma based on PFT's from 5/17/17.  3.  Nonischemic cardiomyopathy with NYHA classification 2.  4.  ICD placement on 11/11/2014.  5.  Hypertension-controlled.  6.  Hyperlipidemia-controlled.  7.  Remote h/o tobacco abuse quitting on 2/1/1998  8.  LBBB.  9.  SOB now significantly improved.  10.  CKD 3.  11.  Hypocalcemia.  12.  Brief runs of A. fib at 23 seconds.  13.  CHADSVASC score of 3.        Plan:  1.  Stop aspirin 81 mg daily.  2.  Start on Eliquis 5 mg twice a day.  She does not qualify for 2.5 mg daily.  3.  Stop lisinopril 2.5 mg daily.  4.  Initiate Entresto 24/26 mg 2 times a day.  She understands that there should be a 36 and was told essentially 48 hours to make things simple washout period.  She should be off lisinopril essentially 2 days before starting Entresto.  She understands there are 3 stages and the dose will be increased in the future.  5.  Labs today which will include BMP, BNP, CBC, A1c, and lipid profile.  6.  Will be seen in 1 month follow-up to assess medication changes and echo results.    Kodi Garcia            Physical Exam   Temp: 97.8  F (36.6  C) Temp src: Tympanic BP: 125/75 Pulse: 81(palpate)   Resp: 14 SpO2: 97 %      Vitals:    12/04/19 1307   Weight: 78 kg (172 lb)     Vital Signs with Ranges  Temp:  [97.8  F (36.6  C)] 97.8  F (36.6  C)  Pulse:  [81-82] 81  Resp:  [14] 14  BP:  (125)/(75) 125/75  SpO2:  [97 %] 97 %  ROS negative except that which is non-HPI.    Incision/Surgical Site 05/06/14 Left Chest (Active)   Number of days: 2038       Constitutional: awake, alert, cooperative, no apparent distress, and appears stated age  Eyes: Lids and lashes normal, sclera clear, conjunctiva normal  ENT: Normocephalic, without obvious abnormality.  Respiratory: No increased work of breathing, good air exchange, clear to auscultation bilaterally, no crackles or wheezing  Cardiovascular: Normal apical impulse, regular rate and rhythm, normal S1 and S2, no S3 or S4, and no murmur noted  GI: No scars, normal bowel sounds, soft.  Musculoskeletal: Scant lower extremity pitting edema present  Neurologic: Awake, alert, oriented to name, place and time.  Neuropsychiatric: General: normal, calm and normal eye contact    Medications         Data   No results found for this or any previous visit (from the past 24 hour(s)).

## 2019-12-04 NOTE — PATIENT INSTRUCTIONS
You were seen by Dr. Garcia, 12/4/2019.     1.  Please stop the Lisinopril 12/5/19.     2.  On Saturday 12/7/19, you will begin Entresto .     3.  You will have an echocardiogram performed.  This is an ultrasound of the heart, that evaluates heart function.  The hospital scheduling department will call to schedule you for this test.     4.  Please continue all other medication as prescribed.     5.  If you develop new or worsening symptoms please call the cardiology office as you may need to be seen sooner.     6. You will begin Eliquis 5 mg twice daily.  This medication will reduce the risk of stroke related to your history of atrial fibrillation.     7. You will have lab 12/4/2019.  You will be notified of results as they become available.     8. You may discontinue Aspirin 81 mg daily.      9.  Increase activity as tolerated.     You will follow up with Dr. Garcia in 1 month.       Please call the cardiology office with problems, questions, or concerns at [phone].    If you experience chest pain, chest pressure, chest tightness, shortness of breath, fainting, lightheadedness, nausea, vomiting, or other concerning symptoms, please report to the Emergency Department or call 911. These symptoms may be emergent, and best treated in the Emergency Department.       Osiris SANFORD RN-BSN  Cardiology   [facility]  [phone]

## 2019-12-06 RX ORDER — BUDESONIDE AND FORMOTEROL FUMARATE DIHYDRATE 160; 4.5 UG/1; UG/1
AEROSOL RESPIRATORY (INHALATION)
Qty: 10.2 G | Refills: 0 | Status: SHIPPED | OUTPATIENT
Start: 2019-12-06 | End: 2020-03-17

## 2019-12-06 RX ORDER — BUDESONIDE AND FORMOTEROL FUMARATE DIHYDRATE 160; 4.5 UG/1; UG/1
AEROSOL RESPIRATORY (INHALATION)
Qty: 10.2 G | Refills: 0 | OUTPATIENT
Start: 2019-12-06

## 2019-12-11 ENCOUNTER — TELEPHONE (OUTPATIENT)
Dept: FAMILY MEDICINE | Facility: OTHER | Age: 84
End: 2019-12-11

## 2019-12-11 NOTE — TELEPHONE ENCOUNTER
Received a PA from [facility] renewal program for Spiriva Respimat 2.5 mcg/act inhaler. Submitted on [facility]. Waiting for a response.

## 2019-12-11 NOTE — TELEPHONE ENCOUNTER
Received an APPROVAL From [employer] for Spiriva Respimat 2.5 mcg/act Inhaler. Effective 09/12/2019 to 12/10/2020. Forms scanned to Epic.

## 2019-12-19 RX ORDER — SODIUM CHLORIDE 9 MG/ML
100 INJECTION, SOLUTION INTRAVENOUS CONTINUOUS
Status: CANCELLED | OUTPATIENT
Start: 2019-12-19 | End: 2019-12-19

## 2019-12-23 NOTE — PATIENT INSTRUCTIONS
Patient Education   Personalized Prevention Plan  You are due for the preventive services outlined below.  Your care team is available to assist you in scheduling these services.  If you have already completed any of these items, please share that information with your care team to update in your medical record.  Health Maintenance Due   Topic Date Due     Heart Failure Action Plan  1935     Asthma Action Plan - yearly  1935     Asthma Control Test  1935     COPD Action Plan  1935     Zoster (Shingles) Vaccine (1 of 2) 07/16/1986     Annual Wellness Visit  07/16/2001     Diptheria Tetanus Pertussis (DTAP/TDAP/TD) Vaccine (2 - Td) 08/01/2015     Liver Monitoring Lab  05/02/2018

## 2019-12-23 NOTE — PROGRESS NOTES
"  SUBJECTIVE:   Jacklyn Hein is a 84 year old female who presents for Preventive Visit.      Are you in the first 12 months of your Medicare Part B coverage?  No    Physical Health:    In general, how would you rate your overall physical health? fair    Outside of work, how many days during the week do you exercise? 4-5 days/week    Outside of work, approximately how many minutes a day do you exercise?less than 15 minutes    If you drink alcohol do you typically have >3 drinks per day or >7 drinks per week? Not Applicable    Do you usually eat at least 4 servings of fruit and vegetables a day, include whole grains & fiber and avoid regularly eating high fat or \"junk\" foods? NO    Do you have any problems taking medications regularly?  No    Do you have any side effects from medications? none    Needs assistance for the following daily activities: housework    Which of the following safety concerns are present in your home?  none identified     Hearing impairment: Yes, Difficulty following a conversation in a noisy restaurant or crowded room.    In the past 6 months, have you been bothered by leaking of urine? no  Patient has had back pain was seen at [facility] spine got an epidural steroid injection that helped her right leg pain but not her back pain.  She will be contacting them for follow-up after getting her CT myelogram.  She did see Dr. Garcia in December and will be seeing him tomorrow for follow-up cardiology.  Mental Health:    In general, how would you rate your overall mental or emotional health? excellent  PHQ-2 Score:      Do you feel safe in your environment? YES    Have you ever done Advance Care Planning? (For example, a Health Directive, POLST, or a discussion with a medical provider or your loved ones about your wishes): yes needs a new copy to fill outAdditional concerns to address?  No    Fall risk:  Fallen 2 or more times in the past year?: No  Any fall with injury in the past year?: " No  {If any of the above assessments are answered yes, consider ordering appropriate referrals Cognitive Screenin) Repeat 3 items (Leader, Season, Table)    2) Clock draw:   3) 3 item recall:   Results:     Mini-CogTM Copyright MYRNA Mendoza. Licensed by the author for use in [facility]; reprinted with permission (anabela@[facility]). All rights reserved.      Do you have sleep apnea, excessive snoring or daytime drowsiness?: no            Reviewed and updated as needed this visit by clinical staff         Reviewed and updated as needed this visit by Provider        Social History     Tobacco Use     Smoking status: Former Smoker     Packs/day: 1.00     Years: 15.00     Pack years: 15.00     Types: Cigarettes     Last attempt to quit: 1998     Years since quittin.9     Smokeless tobacco: Never Used     Tobacco comment: Passive Exposure: No; Longest Tobacco Free: 15 years    Substance Use Topics     Alcohol use: Yes     Comment: Occasionally                            Current providers sharing in care for this patient include:   Patient Care Team:  ANTHONY Post MD as PCP - General (Family Practice)  ANTHONY Post MD as Assigned PCP  Ishmael Orlando MD as MD (Clinical Cardiac Electrophysiology)  Kodi Garcia DO as MD (Cardiology)    The following health maintenance items are reviewed in Epic and correct as of today:  Health Maintenance   Topic Date Due     HF ACTION PLAN  1935     ASTHMA ACTION PLAN  1935     ASTHMA CONTROL TEST  1935     COPD ACTION PLAN  1935     ZOSTER IMMUNIZATION (1 of 2) 1986     MEDICARE ANNUAL WELLNESS VISIT  2001     DTAP/TDAP/TD IMMUNIZATION (2 - Td) 2015     ALT  2018     BMP  2020     LIPID  2020     FALL RISK ASSESSMENT  2020     CBC  2020     ADVANCE CARE PLANNING  2022     DEXA  Completed     SPIROMETRY  Completed     PHQ-2  Completed     INFLUENZA VACCINE  Completed      PNEUMOCOCCAL IMMUNIZATION 65+ LOW/MEDIUM RISK  Completed     IPV IMMUNIZATION  Aged Out     MENINGITIS IMMUNIZATION  Aged Out     BP Readings from Last 3 Encounters:   20 122/72   19 114/58   19 125/75    Wt Readings from Last 3 Encounters:   20 77.1 kg (170 lb)   19 78 kg (172 lb)   19 81.6 kg (180 lb)                  Patient Active Problem List   Diagnosis     ACP (advance care planning)     Insomnia     Disorder of bone and cartilage     Essential hypertension     Neck pain, chronic     Mixed hyperlipidemia     Non-ischemic cardiomyopathy (H)     CHF (congestive heart failure), NYHA class II (H)     Automatic implantable cardioverter-defibrillator - Medtronic multi lead ICD on 2014     Lumbar spine pain     LBBB (left bundle branch block)     History of tobacco abuse quitting on 1998     Mild intermittent asthma, unspecified whether complicated     Chronic obstructive pulmonary disease, unspecified COPD type (H)     SOB (shortness of breath)     Combined systolic at 40% and diastolic cardiac dysfunction on 5/10/2017     Paroxysmal atrial fibrillation (H)     CKD (chronic kidney disease) stage 3, GFR 30-59 ml/min (H)     Past Surgical History:   Procedure Laterality Date     APPENDECTOMY       ARTHROSCOPY KNEE RT/LT      RT     CARDIAC SURGERY  2014    Pacemaker     Cataract extraction  2009    Bilateral     CHOLECYSTECTOMY      open      COLONOSCOPY  2001    Normal      COLONOSCOPY N/A 10/20/2016    Procedure: COLONOSCOPY;  Surgeon: Charan Montejo MD;  Location: [facility] OR     colonoscopy with polypectomy      Repeat 3 years      CT CORONARY ANGIOGRAM      normal     HERPES ZOSTER PCR ([facility])       left eye scar tissue       normal echo      fen-phen use       x6       SURGICAL RADIOLOGY PROCEDURE N/A 2016    Procedure: SURGICAL RADIOLOGY PROCEDURE;  Surgeon: Provider, [facility];  Location: [facility] OR       Social  History     Tobacco Use     Smoking status: Former Smoker     Packs/day: 1.00     Years: 15.00     Pack years: 15.00     Types: Cigarettes     Last attempt to quit: 1998     Years since quittin.9     Smokeless tobacco: Never Used     Tobacco comment: Passive Exposure: No; Longest Tobacco Free: 15 years    Substance Use Topics     Alcohol use: Yes     Comment: Occasionally      Family History   Problem Relation Age of Onset     Cancer Mother         lung (cause of death)      Peptic Ulcer Disease Father         gastric          Current Outpatient Medications   Medication Sig Dispense Refill     albuterol (PROAIR HFA/PROVENTIL HFA/VENTOLIN HFA) 108 (90 BASE) MCG/ACT Inhaler Inhale 2 puffs into the lungs every 6 hours as needed for shortness of breath / dyspnea or wheezing 1 Inhaler 1     alendronate (FOSAMAX) 70 MG tablet TAKE 1 TABLET BY MOUTH EVERY 7 DAYS. TAKE WITH 8 OUNCES OF WATER AND STAY UPRIGHT FOR AT LEAST 30 MINUTES AFTER TAKING. 12 tablet 0     apixaban ANTICOAGULANT (ELIQUIS ANTICOAGULANT) 5 MG tablet Take 1 tablet (5 mg) by mouth 2 times daily 180 tablet 3     budesonide-formoterol (SYMBICORT) 160-4.5 MCG/ACT Inhaler INHALE 2 PUFFS INTO THE LUNGS 2 TIMES DAILY 10.2 g 0     Calcium Carbonate-Vitamin D (CALCIUM 600 + D OR) Take 1 tablet by mouth daily       carvedilol (COREG) 12.5 MG tablet TAKE 1 TABLET BY MOUTH 2 TIMES DAILY WITH MEALS 180 tablet 0     diphenhydrAMINE-acetaminophen (ACETAMINOPHEN PM)  MG tablet Take 2 tablets by mouth nightly as needed.       Multiple Vitamin (MULTI-VITAMIN DAILY PO) Take 1 tablet by mouth daily       order for DME Equipment being ordered: Walker 1 each 0     rosuvastatin (CRESTOR) 10 MG tablet Take 1 tablet (10 mg) by mouth daily 90 tablet 3     sacubitril-valsartan (ENTRESTO) 24-26 MG per tablet Take 1 tablet by mouth 2 times daily 60 tablet 3     SPIRIVA RESPIMAT 2.5 MCG/ACT inhaler INHALE 2 DOSES INTO THE LUNGS ONCE DAILY 4 g 3     spironolactone  "(ALDACTONE) 25 MG tablet TAKE 1/2 TABLET BY MOUTH DAILY 45 tablet 0     No Known Allergies      ROS:  Constitutional, HEENT, cardiovascular, pulmonary, GI, , musculoskeletal, neuro, skin, endocrine and psych systems are negative, except as otherwise noted.    OBJECTIVE:   /72 (BP Location: Right arm, Patient Position: Sitting, Cuff Size: Adult Regular)   Pulse 90   Temp 96.9  F (36.1  C) (Tympanic)   Ht 1.626 m (5' 4\")   Wt 77.1 kg (170 lb)   SpO2 97%   BMI 29.18 kg/m   Estimated body mass index is 29.52 kg/m  as calculated from the following:    Height as of 12/4/19: 1.626 m (5' 4\").    Weight as of 12/4/19: 78 kg (172 lb).  EXAM:   GENERAL: healthy, alert and no distress  EYES: Eyes grossly normal to inspection, PERRL and conjunctivae and sclerae normal  HENT: normal cephalic/atraumatic, nose and mouth without ulcers or lesions and oropharynx clear  NECK: no adenopathy, no asymmetry, masses, or scars and thyroid normal to palpation  RESP: lungs clear to auscultation - no rales, rhonchi or wheezes  CV: regular rate and rhythm, normal S1 S2, no S3 or S4, no murmur, click or rub, no peripheral edema and peripheral pulses strong  ABDOMEN: soft, nontender, no hepatosplenomegaly, no masses and bowel sounds normal  MS: no gross musculoskeletal defects noted, no edema  SKIN: no suspicious lesions or rashes  NEURO: Normal strength and tone, mentation intact and speech normal  PSYCH: mentation appears normal, affect normal/bright    Diagnostic Test Results:  none      ASSESSMENT / PLAN:       ICD-10-CM    1. Encounter for Medicare annual wellness exam Z00.00    2. Chronic obstructive pulmonary disease, unspecified COPD type (H) J44.9    3. Lumbar spine pain M54.5    4. Paroxysmal atrial fibrillation (H) I48.0    5. Essential hypertension I10    She follows cardiology and will be seeing Dr. Garcia tomorrow.  She has no cardiac complaints now.  Vitals are stable.  She will fill out her healthcare directive and " "will return that.  We will call over and see if her CT myelogram was sent down to [facility] spine.  She will follow-up with her back surgeon there.  She will see her cardiologist tomorrow for follow-up of her cardiac disease.  If she needs more of her inhalers we can refill those she is not sure if she needs any refilled today.  She is here with her daughter and fill was in for her back pain she be feeling great.    COUNSELING:  Reviewed preventive health counseling, as reflected in patient instructions       Regular exercise       Healthy diet/nutrition       Fall risk prevention    Estimated body mass index is 29.52 kg/m  as calculated from the following:    Height as of 12/4/19: 1.626 m (5' 4\").    Weight as of 12/4/19: 78 kg (172 lb).         reports that she quit smoking about 21 years ago. Her smoking use included cigarettes. She has a 15.00 pack-year smoking history. She has never used smokeless tobacco.      Appropriate preventive services were discussed with this patient, including applicable screening as appropriate for cardiovascular disease, diabetes, osteopenia/osteoporosis, and glaucoma.  As appropriate for age/gender, discussed screening for colorectal cancer, prostate cancer, breast cancer, and cervical cancer. Checklist reviewing preventive services available has been given to the patient.    Reviewed patients plan of care and provided an AVS. The Basic Care Plan (routine screening as documented in Health Maintenance) for Jacklyn meets the Care Plan requirement. This Care Plan has been established and reviewed with the Patient.    Counseling Resources:  ATP IV Guidelines  Pooled Cohorts Equation Calculator  Breast Cancer Risk Calculator  FRAX Risk Assessment  ICSI Preventive Guidelines  Dietary Guidelines for Americans, 2010  USDA's MyPlate  ASA Prophylaxis  Lung CA Screening    R Josh Post MD  [facility] [place]  "

## 2019-12-31 ENCOUNTER — HOSPITAL ENCOUNTER (OUTPATIENT)
Dept: INTERVENTIONAL RADIOLOGY/VASCULAR | Facility: HOSPITAL | Age: 84
Discharge: HOME | End: 2019-12-31
Attending: ORTHOPAEDIC SURGERY | Admitting: ORTHOPAEDIC SURGERY
Payer: MEDICARE

## 2019-12-31 ENCOUNTER — HOSPITAL ENCOUNTER (OUTPATIENT)
Dept: CARDIOLOGY | Facility: HOSPITAL | Age: 84
End: 2019-12-31
Attending: INTERNAL MEDICINE
Payer: MEDICARE

## 2019-12-31 ENCOUNTER — HOSPITAL ENCOUNTER (OUTPATIENT)
Dept: CT IMAGING | Facility: HOSPITAL | Age: 84
End: 2019-12-31
Attending: ORTHOPAEDIC SURGERY
Payer: MEDICARE

## 2019-12-31 VITALS
HEART RATE: 84 BPM | OXYGEN SATURATION: 95 % | RESPIRATION RATE: 16 BRPM | TEMPERATURE: 98.2 F | SYSTOLIC BLOOD PRESSURE: 114 MMHG | DIASTOLIC BLOOD PRESSURE: 58 MMHG

## 2019-12-31 DIAGNOSIS — I51.89 COMBINED SYSTOLIC AND DIASTOLIC CARDIAC DYSFUNCTION: ICD-10-CM

## 2019-12-31 DIAGNOSIS — I50.9 CONGESTIVE HEART FAILURE, NYHA CLASS 2, UNSPECIFIED CONGESTIVE HEART FAILURE TYPE (H): ICD-10-CM

## 2019-12-31 DIAGNOSIS — M54.50 PAIN, LUMBAR REGION: ICD-10-CM

## 2019-12-31 DIAGNOSIS — R06.02 SOB (SHORTNESS OF BREATH): ICD-10-CM

## 2019-12-31 DIAGNOSIS — I42.8 NON-ISCHEMIC CARDIOMYOPATHY (H): ICD-10-CM

## 2019-12-31 PROCEDURE — 25500064 ZZH RX 255 OP 636: Performed by: RADIOLOGY

## 2019-12-31 PROCEDURE — 62304 MYELOGRAPHY LUMBAR INJECTION: CPT | Mod: TC

## 2019-12-31 PROCEDURE — 72132 CT LUMBAR SPINE W/DYE: CPT | Mod: TC

## 2019-12-31 PROCEDURE — 93306 TTE W/DOPPLER COMPLETE: CPT | Mod: 26 | Performed by: INTERNAL MEDICINE

## 2019-12-31 PROCEDURE — 25000125 ZZHC RX 250: Performed by: RADIOLOGY

## 2019-12-31 PROCEDURE — 93306 TTE W/DOPPLER COMPLETE: CPT | Mod: TC

## 2019-12-31 RX ORDER — IOPAMIDOL 408 MG/ML
20 INJECTION, SOLUTION INTRATHECAL ONCE
Status: COMPLETED | OUTPATIENT
Start: 2019-12-31 | End: 2019-12-31

## 2019-12-31 RX ORDER — SODIUM CHLORIDE 9 MG/ML
100 INJECTION, SOLUTION INTRAVENOUS CONTINUOUS
Status: ACTIVE | OUTPATIENT
Start: 2019-12-31 | End: 2019-12-31

## 2019-12-31 RX ORDER — LIDOCAINE HYDROCHLORIDE 10 MG/ML
INJECTION, SOLUTION EPIDURAL; INFILTRATION; INTRACAUDAL; PERINEURAL
Status: DISPENSED
Start: 2019-12-31 | End: 2019-12-31

## 2019-12-31 RX ADMIN — IOPAMIDOL 10 ML: 408 INJECTION, SOLUTION INTRATHECAL at 10:13

## 2019-12-31 RX ADMIN — LIDOCAINE HYDROCHLORIDE 5 ML: 10 INJECTION, SOLUTION EPIDURAL; INFILTRATION; INTRACAUDAL; PERINEURAL at 10:14

## 2019-12-31 RX ADMIN — LIDOCAINE HYDROCHLORIDE 5 ML: 10 INJECTION, SOLUTION EPIDURAL; INFILTRATION; INTRACAUDAL; PERINEURAL at 10:13

## 2019-12-31 NOTE — PROGRESS NOTES
VSS.  HOB at 30 degrees.  Denies pain.  Watching TV, eating and drinking without problems.  Band aide to lumbar area clean, dry and intact.   Will continue to monitor.

## 2019-12-31 NOTE — IP AVS SNAPSHOT
[facility]  [address] [address]  Phone:  [phone]  Fax:  [phone]                                    After Visit Summary   12/31/2019    Jacklyn Hein    MRN: [id number]           After Visit Summary Signature Page    I have received my discharge instructions, and my questions have been answered. I have discussed any challenges I see with this plan with the nurse or doctor.    ..........................................................................................................................................  Patient/Patient Representative Signature      ..........................................................................................................................................  Patient Representative Print Name and Relationship to Patient    ..................................................               ................................................  Date                                   Time    ..........................................................................................................................................  Reviewed by Signature/Title    ...................................................              ..............................................  Date                                               Time          22EPIC Rev 08/18

## 2019-12-31 NOTE — IP AVS SNAPSHOT
MRN:[id number]                      After Visit Summary   12/31/2019    Jacklyn Hein    MRN: [id number]           Visit Information        Provider Department      12/31/2019  9:30 AM HIIRRAD; HIXRRN; HIIR1 HI INTERVENTIONAL RAD           Review of your medicines      UNREVIEWED medicines. Ask your doctor about these medicines       Dose / Directions   Acetaminophen PM  MG tablet  Generic drug:  diphenhydrAMINE-acetaminophen      Dose:  2 tablet  Take 2 tablets by mouth nightly as needed.  Refills:  0     albuterol 108 (90 Base) MCG/ACT inhaler  Commonly known as:  PROAIR HFA/PROVENTIL HFA/VENTOLIN HFA  Used for:  Other emphysema (H)      Dose:  2 puff  Inhale 2 puffs into the lungs every 6 hours as needed for shortness of breath / dyspnea or wheezing  Quantity:  1 Inhaler  Refills:  1     alendronate 70 MG tablet  Commonly known as:  FOSAMAX  Used for:  Age-related osteoporosis without current pathological fracture      TAKE 1 TABLET BY MOUTH EVERY 7 DAYS. TAKE WITH 8 OUNCES OF WATER AND STAY UPRIGHT FOR AT LEAST 30 MINUTES AFTER TAKING.  Quantity:  12 tablet  Refills:  0     apixaban ANTICOAGULANT 5 MG tablet  Commonly known as:  ELIQUIS ANTICOAGULANT  Used for:  Paroxysmal atrial fibrillation (H)      Dose:  5 mg  Take 1 tablet (5 mg) by mouth 2 times daily  Quantity:  180 tablet  Refills:  3     budesonide-formoterol 160-4.5 MCG/ACT Inhaler  Commonly known as:  Symbicort  Used for:  Other emphysema (H)      INHALE 2 PUFFS INTO THE LUNGS 2 TIMES DAILY  Quantity:  10.2 g  Refills:  0     CALCIUM 600 + D PO      Dose:  1 tablet  Take 1 tablet by mouth daily  Refills:  0     carvedilol 12.5 MG tablet  Commonly known as:  COREG  Used for:  Non-ischemic cardiomyopathy (H)      TAKE 1 TABLET BY MOUTH 2 TIMES DAILY WITH MEALS  Quantity:  180 tablet  Refills:  0     MULTI-VITAMIN DAILY PO      Dose:  1 tablet  Take 1 tablet by mouth daily  Refills:  0     rosuvastatin 10 MG  tablet  Commonly known as:  Crestor  Used for:  Mixed hyperlipidemia      Dose:  10 mg  Take 1 tablet (10 mg) by mouth daily  Quantity:  90 tablet  Refills:  3     sacubitril-valsartan 24-26 MG per tablet  Commonly known as:  Entresto  Used for:  SOB (shortness of breath), Non-ischemic cardiomyopathy (H), Combined systolic at 40% and diastolic cardiac dysfunction on 5/10/2017, Congestive heart failure, NYHA class 2, unspecified congestive heart failure type (H)      Dose:  1 tablet  Take 1 tablet by mouth 2 times daily  Quantity:  60 tablet  Refills:  3     Spiriva Respimat 2.5 MCG/ACT inhaler  Used for:  Chronic obstructive pulmonary disease, unspecified COPD type (H)  Generic drug:  tiotropium      INHALE 2 DOSES INTO THE LUNGS ONCE DAILY  Quantity:  4 g  Refills:  3     spironolactone 25 MG tablet  Commonly known as:  ALDACTONE  Used for:  Primary pulmonary hypertension (H)      TAKE 1/2 TABLET BY MOUTH DAILY  Quantity:  45 tablet  Refills:  0        CONTINUE these medicines which have NOT CHANGED       Dose / Directions   order for DME  Used for:  Chronic midline low back pain without sciatica, CHF (congestive heart failure), NYHA class II, chronic, systolic (H)      Equipment being ordered: Walker  Quantity:  1 each  Refills:  0              Protect others around you: Learn how to safely use, store and throw away your medicines at www.disposemymeds.org.       Follow-ups after your visit       Your next 10 appointments already scheduled    Jan 02, 2020  1:30 PM CST  (Arrive by 1:15 PM)  SHORT with ANTHONY Post MD  [facility] ) [address]  [place] [address]  [phone]      Jan 03, 2020  4:00 PM CST  (Arrive by 3:45 PM)  Return Visit with Kodi Garcia DO  [facility] ([facility] ) [address]  [place] [address]  [phone]      Jan 23, 2020 12:00 AM CST  CARDIAC DEVICE CHECK - REMOTE with   ICD REMOTE  [facility] ([facility]) [address]  [address]  [facility] [address]  [phone]      Apr 14, 2020  8:30 AM CDT  (Arrive by 8:15 AM)  CARDIAC DEVICE CHECK - IN CLINIC with HC PACEMAKER  [facility] ([facility] ) [address] [place] [address]  [phone]         Care Instructions       Further instructions from your care team             DISCHARGE INSTRUCTIONS  Myelogram      IMPORTANT: As you prepare for discharge, the following information will help you return to your best level of health.               This Information Is About Your Follow Up Care  Call your doctor if you do not get better. Call sooner if you feel worse. You can reach your doctor by calling their clinic phone number.                                    This Information Is About Procedures    MYELOGRAM.  In this procedure, a dye was injected through a hollow needle into the space around your spinal cord. An X-ray was then taken to see if you had any spinal problems. This will help diagnose the cause of your back pain.    Do the following:    Drink 8-10 glasses of fluids for the next 2 days unless your doctor has limited your fluids.    Slowly return to your normal activity.    Remain in bed with your head elevated 30 degrees until the following morning to prevent headaches from occuring.    No strenuous activity, heavy lifting or bending for the next 24 hours after your myelogram.      The following day you may resume your regular work/activity schedule if you feel alright.    You need someone to drive you home and stay with you for 24 hours.    Contact your doctor for your test results in 1 to 2 days.    Call your doctor if you have:    a severe headache.    any trouble moving your legs or walking.    any new problems or concerns.    fever and chills    nausea or vomiting                        This Information Is About Your  Illness and Diagnosis    GENERAL BACK PAIN.  The back is prone to injury. Back pain can be caused by strains and injuries. It can involve the muscles, ligaments or bones. Today's exam did not show any obvious sign of bone (spine) or nerve damage.    Follow these instructions:    Rest more than usual for the next few days.    Apply a heating pad to your back for no longer than 30 minutes three to four times a day.    Take pain medicine as prescribed by the doctor.    Once the pain has lessened, resume your normal activities.    Do not lift heavy objects until the pain is gone.    Avoid any activity that causes pain.    Call your doctor if you:    have numbness, weakness or tingling in their fingers, arms or legs.    are not completely recovered in 2 weeks.    have any new or severe symptoms.                                                                                                            Additional Information About Your Visit       [employer] Information    [employer] gives you secure access to your electronic health record. If you see a primary care provider, you can also send messages to your care team and make appointments. If you have questions, please call your primary care clinic.  If you do not have a primary care provider, please call [phone] and they will assist you.       Care EveryWhere ID    This is your Care EveryWhere ID. This could be used by other organizations to access your [facility] medical records  [id number]       Your Vitals Were  Most recent update: 12/31/2019 10:39 AM    Blood Pressure   146/64    Pulse   73    Temperature   98.2  F (36.8  C) (Tympanic)    Respirations   16    Pulse Oximetry   96%          Primary Care Provider Office Phone # Fax #    R Josh Post [phone]      Equal Access to Services    [facility] : Vannesa Lane, waaxda luqadaha, qaybta kaaltania zurita, pastor morris. So [facility]  [phone].    ATENCIÓN: Si vinnie shultz, tiene a jacome disposición servicios gratuitos de asistencia lingüística. June diaz [phone].    We comply with applicable federal and state civil rights laws, including the [place] Human Rights Act. We do not discriminate on the basis of race, color, creed, Baptist, national origin, marital status, age, disability, sex, sexual orientation, or gender identity.       Thank you!    Thank you for choosing [facility] for your care. Our goal is always to provide you with excellent care. Hearing back from our patients is one way we can continue to improve our services. Please take a few minutes to complete the written survey that you may receive in the mail after you visit with us. Thank you!            Medication List      Medications          Morning Afternoon Evening Bedtime As Needed    order for DME  INSTRUCTIONS:  Equipment being ordered: Walker                       ASK your doctor about these medications          Morning Afternoon Evening Bedtime As Needed    Acetaminophen PM  MG tablet  INSTRUCTIONS:  Take 2 tablets by mouth nightly as needed.  Generic drug:  diphenhydrAMINE-acetaminophen                     albuterol 108 (90 Base) MCG/ACT inhaler  Also known as:  PROAIR HFA/PROVENTIL HFA/VENTOLIN HFA  INSTRUCTIONS:  Inhale 2 puffs into the lungs every 6 hours as needed for shortness of breath / dyspnea or wheezing                     alendronate 70 MG tablet  Also known as:  FOSAMAX  INSTRUCTIONS:  TAKE 1 TABLET BY MOUTH EVERY 7 DAYS. TAKE WITH 8 OUNCES OF WATER AND STAY UPRIGHT FOR AT LEAST 30 MINUTES AFTER TAKING.                     apixaban ANTICOAGULANT 5 MG tablet  Also known as:  ELIQUIS ANTICOAGULANT  INSTRUCTIONS:  Take 1 tablet (5 mg) by mouth 2 times daily                     budesonide-formoterol 160-4.5 MCG/ACT Inhaler  Also known as:  Symbicort  INSTRUCTIONS:  INHALE 2 PUFFS INTO THE LUNGS 2 TIMES DAILY                     CALCIUM 600 + D  PO  INSTRUCTIONS:  Take 1 tablet by mouth daily                     carvedilol 12.5 MG tablet  Also known as:  COREG  INSTRUCTIONS:  TAKE 1 TABLET BY MOUTH 2 TIMES DAILY WITH MEALS                     MULTI-VITAMIN DAILY PO  INSTRUCTIONS:  Take 1 tablet by mouth daily                     rosuvastatin 10 MG tablet  Also known as:  Crestor  INSTRUCTIONS:  Take 1 tablet (10 mg) by mouth daily                     sacubitril-valsartan 24-26 MG per tablet  Also known as:  Entresto  INSTRUCTIONS:  Take 1 tablet by mouth 2 times daily                     Spiriva Respimat 2.5 MCG/ACT inhaler  INSTRUCTIONS:  INHALE 2 DOSES INTO THE LUNGS ONCE DAILY  Doctor's comments:  $  Generic drug:  tiotropium                     spironolactone 25 MG tablet  Also known as:  ALDACTONE  INSTRUCTIONS:  TAKE 1/2 TABLET BY MOUTH DAILY  Doctor's comments:  $

## 2019-12-31 NOTE — PROGRESS NOTES
Patients Vss. No c/o pain, headache, or bleeding . Discharge instructions given and understood . Nurse escorted patient to exit via w/ch and her daughter drove her home

## 2020-01-02 ENCOUNTER — OFFICE VISIT (OUTPATIENT)
Dept: FAMILY MEDICINE | Facility: OTHER | Age: 85
End: 2020-01-02
Attending: FAMILY MEDICINE
Payer: COMMERCIAL

## 2020-01-02 ENCOUNTER — TELEPHONE (OUTPATIENT)
Dept: INTERVENTIONAL RADIOLOGY/VASCULAR | Facility: HOSPITAL | Age: 85
End: 2020-01-02

## 2020-01-02 VITALS
SYSTOLIC BLOOD PRESSURE: 122 MMHG | WEIGHT: 170 LBS | BODY MASS INDEX: 29.02 KG/M2 | HEART RATE: 90 BPM | TEMPERATURE: 96.9 F | OXYGEN SATURATION: 97 % | DIASTOLIC BLOOD PRESSURE: 72 MMHG | HEIGHT: 64 IN

## 2020-01-02 DIAGNOSIS — I48.0 PAROXYSMAL ATRIAL FIBRILLATION (H): ICD-10-CM

## 2020-01-02 DIAGNOSIS — J44.9 CHRONIC OBSTRUCTIVE PULMONARY DISEASE, UNSPECIFIED COPD TYPE (H): ICD-10-CM

## 2020-01-02 DIAGNOSIS — I10 ESSENTIAL HYPERTENSION: ICD-10-CM

## 2020-01-02 DIAGNOSIS — M54.50 LUMBAR SPINE PAIN: ICD-10-CM

## 2020-01-02 DIAGNOSIS — Z00.00 ENCOUNTER FOR MEDICARE ANNUAL WELLNESS EXAM: Primary | ICD-10-CM

## 2020-01-02 DIAGNOSIS — M81.0 AGE-RELATED OSTEOPOROSIS WITHOUT CURRENT PATHOLOGICAL FRACTURE: ICD-10-CM

## 2020-01-02 PROCEDURE — G0439 PPPS, SUBSEQ VISIT: HCPCS | Performed by: FAMILY MEDICINE

## 2020-01-02 ASSESSMENT — PAIN SCALES - GENERAL: PAINLEVEL: MODERATE PAIN (5)

## 2020-01-02 ASSESSMENT — MIFFLIN-ST. JEOR: SCORE: 1211.11

## 2020-01-02 NOTE — PROGRESS NOTES
Cardiology Progress Note     Assessment & Plan   Jacklyn Hein is a 84 year old female who is being seen by cardiology for dyspnea with chronic systolic EF of 40-45 % and evidence for diastolic heart failure on 12/31/2019.  She was last seen on 12/4/19.    She has been short of breath with a diagnosis of asthma and COPD.  She is followed for a history of severe nonischemic cardiomyopathy with a previously ejection fraction at less than 35%. More recently, her EF on 12/31/2019 was 40-45 %.  She also has diastolic heart failure, ICD placed on 11/11/16, her recent visit with pulmonary secondary to what was thought to be severe COPD but was downgraded to mild to moderate with a greater concern for asthma, per the patient.    She was started on some breathing treatments for asthma/COPD by pulmonary.  With these treatments, she says she feels much better but remains short of breath.      With having diastolic and systolic heart failure, she has minimal to no lower extremity edema.  Her activity has been limited secondary to chronic back pain and generalized weakness.  It was not for back pain, she would be doing really well.    She has a history of severe nonischemic cardiomyopathy S/P ICD placement on 11/11/2014.  She had her ICD checked on 10/22/2019.  It showed she was bi-V paced 96.8% of the time with 3.0 years left on her battery.  = 98.1%. She had up to 20 seconds of A. fib with a total of 3 episodes.  Her device was described as functioning appropriately.    Echo from 12/31/2019.  She has an EF of 40% to 45%, grade 1 diastolic dysfunction, mild left atrial enlargement, mild to moderate AI, and mild MI.      12/4/2019:  She continues to be short of breath with activity with a history of COPD and asthma.  She has been using her breathing treatments with improvement.  She has a history of systolic heart failure currently on Coreg, spironolactone, and lisinopril.  It was suggested she consider Entresto  today which she was agreeable.  She understands she has to stop lisinopril for essentially 48 hours prior to initiating Entresto.  Also, it was suggested that she have an echocardiogram.  She continues to have sputtering episodes of A. fib identified on her pacemaker.  Nothing greater than 6 minutes.  We did discuss this finding.  She is somewhat concerned about having a stroke.  Eliquis 5 mg twice a day was initiated.  She does not qualify for the lower dose.  She is due for labs and will have these completed.  She is to be off aspirin and lisinopril.    1/3/2020:  Secondary to heart failure, she was taken off the lisinopril 2.5 mg daily and started Entresto 24/26 mg twice a day.  She has continued Coreg 12.5 mg twice a day and spironolactone 12.5 mg daily.  She has not seen a significant difference in her symptoms.  We discussed additional changes to her medications as described below.  She needs labs completed after these changes are performed.  With a history of A. fib, she was taken off aspirin 81 mg daily and started on Eliquis 5 mg twice a day.  She has not had symptoms of A. fib.  She would like to have back surgery as this is limiting abilities ambulate.  She does not need additional testing prior to having back surgery.  She should hold Eliquis 72 hours / 3 days prior to the procedure.  She continues to have her ICD checked here locally.         Impression:  1.  Chronic systolic at 40-45 % and diastolic heart failure as noted on echo from 12/01/2019.  2.  Reduction in severity of COPD from severe to mild to moderate per pulmonary and concern for asthma based on PFT's from 5/17/17.  3.  Nonischemic cardiomyopathy with NYHA classification 2.  4.  ICD placement on 11/11/2014.  5.  Hypertension-controlled.  6.  Hyperlipidemia-controlled.  7.  Remote h/o tobacco abuse quitting on 2/1/1998  8.  LBBB.  9.  SOB now significantly improved.  10.  CKD 3.  11.  Hypocalcemia.  12.  Brief runs of A. fib up to 23  seconds.  13.  CHADSVASC score of 3.        Plan:  1.  Secondary to A. fib, continue Eliquis 5 mg twice a day and Coreg.  2.  Secondary to heart failure, will increase Coreg from 12.5 mg twice a day to 25 mg twice a day.  3.  We will increase Entresto from 24/26 mg twice a day to 49/51 mg twice a day for a month.  After completion of the month supply of the 49/51 mg will increase to the 97/103 mg twice a day indefinitely.  4.  She is okay to proceed with anticipated back surgery without additional work-up.  5.  He should stop Eliquis 72 hours / 3 days prior to planned procedure.  6.  Labs in 1 to 2 weeks after starting the higher dose of Entresto 49/51 twice a day.  Orders placed.  7.  She will be seen in 6 months follow-up or sooner with issues.    Kodi Garcia            Physical Exam                      There were no vitals filed for this visit.  Vital Signs with Ranges  Temp:  [96.9  F (36.1  C)] 96.9  F (36.1  C)  Pulse:  [90] 90  BP: (122)/(72) 122/72  SpO2:  [97 %] 97 %  ROS negative except that which is non-HPI.    Incision/Surgical Site 05/06/14 Left Chest (Active)   Number of days: 2067       Constitutional: awake, alert, cooperative, no apparent distress, and appears stated age  Eyes: Lids and lashes normal, sclera clear, conjunctiva normal  ENT: Normocephalic, without obvious abnormality.  Respiratory: No increased work of breathing, good air exchange, clear to auscultation bilaterally, no crackles or wheezing  Cardiovascular: Normal apical impulse, irregular rate irregular rhythm, normal S1 and S2, no S3 or S4, and no murmur noted  GI: Soft.  Musculoskeletal: Scant lower extremity pitting edema present  Neurologic: Awake, alert.  Neuropsychiatric: General: normal, calm and normal eye contact    Medications         Data   No results found for this or any previous visit (from the past 24 hour(s)).

## 2020-01-02 NOTE — NURSING NOTE
"Chief Complaint   Patient presents with     Physical       Initial /72 (BP Location: Right arm, Patient Position: Sitting, Cuff Size: Adult Regular)   Pulse 90   Temp 96.9  F (36.1  C) (Tympanic)   Ht 1.626 m (5' 4\")   Wt 77.1 kg (170 lb)   SpO2 97%   BMI 29.18 kg/m   Estimated body mass index is 29.18 kg/m  as calculated from the following:    Height as of this encounter: 1.626 m (5' 4\").    Weight as of this encounter: 77.1 kg (170 lb).  Medication Reconciliation: complete  Dalia Heart LPN  "

## 2020-01-03 ENCOUNTER — OFFICE VISIT (OUTPATIENT)
Dept: CARDIOLOGY | Facility: OTHER | Age: 85
End: 2020-01-03
Attending: INTERNAL MEDICINE
Payer: COMMERCIAL

## 2020-01-03 VITALS
HEIGHT: 64 IN | BODY MASS INDEX: 29.37 KG/M2 | SYSTOLIC BLOOD PRESSURE: 102 MMHG | TEMPERATURE: 98.1 F | HEART RATE: 69 BPM | WEIGHT: 172 LBS | OXYGEN SATURATION: 96 % | DIASTOLIC BLOOD PRESSURE: 69 MMHG | RESPIRATION RATE: 16 BRPM

## 2020-01-03 DIAGNOSIS — I48.0 PAROXYSMAL ATRIAL FIBRILLATION (H): ICD-10-CM

## 2020-01-03 DIAGNOSIS — J44.9 CHRONIC OBSTRUCTIVE PULMONARY DISEASE, UNSPECIFIED COPD TYPE (H): ICD-10-CM

## 2020-01-03 DIAGNOSIS — I10 ESSENTIAL HYPERTENSION: ICD-10-CM

## 2020-01-03 DIAGNOSIS — I50.9 CONGESTIVE HEART FAILURE, NYHA CLASS 2, UNSPECIFIED CONGESTIVE HEART FAILURE TYPE (H): ICD-10-CM

## 2020-01-03 DIAGNOSIS — I51.89 COMBINED SYSTOLIC AND DIASTOLIC CARDIAC DYSFUNCTION: ICD-10-CM

## 2020-01-03 DIAGNOSIS — Z87.891 HISTORY OF TOBACCO ABUSE: ICD-10-CM

## 2020-01-03 DIAGNOSIS — I44.7 LBBB (LEFT BUNDLE BRANCH BLOCK): ICD-10-CM

## 2020-01-03 DIAGNOSIS — N18.30 CKD (CHRONIC KIDNEY DISEASE) STAGE 3, GFR 30-59 ML/MIN (H): Primary | ICD-10-CM

## 2020-01-03 DIAGNOSIS — E78.2 MIXED HYPERLIPIDEMIA: ICD-10-CM

## 2020-01-03 DIAGNOSIS — R06.02 SOB (SHORTNESS OF BREATH): ICD-10-CM

## 2020-01-03 DIAGNOSIS — J45.20 MILD INTERMITTENT ASTHMA, UNSPECIFIED WHETHER COMPLICATED: ICD-10-CM

## 2020-01-03 DIAGNOSIS — Z95.810 AUTOMATIC IMPLANTABLE CARDIOVERTER-DEFIBRILLATOR IN SITU: ICD-10-CM

## 2020-01-03 DIAGNOSIS — I42.8 NON-ISCHEMIC CARDIOMYOPATHY (H): ICD-10-CM

## 2020-01-03 PROCEDURE — G0463 HOSPITAL OUTPT CLINIC VISIT: HCPCS

## 2020-01-03 PROCEDURE — 99215 OFFICE O/P EST HI 40 MIN: CPT | Performed by: INTERNAL MEDICINE

## 2020-01-03 RX ORDER — CARVEDILOL 25 MG/1
25 TABLET ORAL 2 TIMES DAILY WITH MEALS
Qty: 180 TABLET | Refills: 3 | Status: SHIPPED | OUTPATIENT
Start: 2020-01-03 | End: 2020-03-03

## 2020-01-03 RX ORDER — ALENDRONATE SODIUM 70 MG/1
TABLET ORAL
Qty: 12 TABLET | Refills: 0 | Status: SHIPPED | OUTPATIENT
Start: 2020-01-03 | End: 2020-03-31

## 2020-01-03 ASSESSMENT — MIFFLIN-ST. JEOR: SCORE: 1220.19

## 2020-01-03 ASSESSMENT — PAIN SCALES - GENERAL: PAINLEVEL: MODERATE PAIN (5)

## 2020-01-03 NOTE — NURSING NOTE
"Chief Complaint   Patient presents with     RECHECK     1 month cardiology follow-up and follow-up test results;  Patient has questions about possible back surgery.        Initial BP (!) 141/66 (BP Location: Left arm, Patient Position: Chair, Cuff Size: Adult Large)   Pulse 79   Temp 98.1  F (36.7  C) (Tympanic)   Resp 16   Ht 1.626 m (5' 4\")   Wt 78 kg (172 lb)   SpO2 96%   BMI 29.52 kg/m   Estimated body mass index is 29.52 kg/m  as calculated from the following:    Height as of this encounter: 1.626 m (5' 4\").    Weight as of this encounter: 78 kg (172 lb).  Medication Reconciliation: complete  Lamar Chatterjee LPN    "

## 2020-01-03 NOTE — PATIENT INSTRUCTIONS
You were seen by Dr. Garcia, 01/03/20.     1.  Please increase your coreg from 12.5 mg twice per day to coreg 25 mg twice per day.      2. Please increase your Entresto to 49/51 for 1 month then increase your Entresto to 97/103.    3. Hold your Eliquis for 72 hours prior to surgery.     4. Please return to the clinic for labs in February. We will notify you of the results.     5. Please continue all other medications as they have been prescribed.     6. If you develop new or worsening symptoms, please call the cardiology office as you may need to be evaluated.     You will follow up with Dr. Garcia in 6 months.       Please call the cardiology office with problems, questions, or concerns at [phone].    If you experience chest pain, chest pressure, chest tightness, shortness of breath, fainting, lightheadedness, nausea, vomiting, or other concerning symptoms, please report to the Emergency Department or call 911. These symptoms may be emergent, and best treated in the Emergency Department.       Brittney ARCE RN  Cardiology   [facility]  [phone]       Breathing spontaneous and unlabored. Breath sounds clear and equal bilaterally with regular rhythm.

## 2020-01-07 ENCOUNTER — DOCUMENTATION ONLY (OUTPATIENT)
Dept: OTHER | Facility: CLINIC | Age: 85
End: 2020-01-07

## 2020-01-13 DIAGNOSIS — I42.8 NON-ISCHEMIC CARDIOMYOPATHY (H): ICD-10-CM

## 2020-01-13 RX ORDER — CARVEDILOL 12.5 MG/1
TABLET ORAL
Qty: 180 TABLET | Refills: 0 | Status: SHIPPED | OUTPATIENT
Start: 2020-01-13 | End: 2020-01-31 | Stop reason: DRUGHIGH

## 2020-01-13 NOTE — TELEPHONE ENCOUNTER
Coreg  Last Written Prescription Date: 1/3/20  Last Fill Quantity: 180 # of Refills: 3  Last Office Visit: 1/2/20

## 2020-01-21 ENCOUNTER — TRANSFERRED RECORDS (OUTPATIENT)
Dept: HEALTH INFORMATION MANAGEMENT | Facility: CLINIC | Age: 85
End: 2020-01-21

## 2020-01-23 ENCOUNTER — ANCILLARY PROCEDURE (OUTPATIENT)
Dept: CARDIOLOGY | Facility: CLINIC | Age: 85
End: 2020-01-23
Attending: INTERNAL MEDICINE
Payer: MEDICARE

## 2020-01-23 DIAGNOSIS — I42.9 CARDIOMYOPATHY (H): ICD-10-CM

## 2020-01-23 PROCEDURE — 93296 REM INTERROG EVL PM/IDS: CPT | Mod: ZF

## 2020-01-23 PROCEDURE — 93295 DEV INTERROG REMOTE 1/2/MLT: CPT | Mod: ZP | Performed by: INTERNAL MEDICINE

## 2020-01-28 NOTE — PATIENT INSTRUCTIONS
Before Your Surgery      Call your surgeon if there is any change in your health. This includes signs of a cold or flu (such as a sore throat, runny nose, cough, rash or fever).    Do not smoke, drink alcohol or take over the counter medicine (unless your surgeon or primary care doctor tells you to) for the 24 hours before and after surgery.    If you take prescribed drugs: Follow your doctor s orders about which medicines to take and which to stop until after surgery.    Eating and drinking prior to surgery: follow the instructions from your surgeon    Take a shower or bath the night before surgery. Use the soap your surgeon gave you to gently clean your skin. If you do not have soap from your surgeon, use your regular soap. Do not shave or scrub the surgery site.  Wear clean pajamas and have clean sheets on your bed.     Take the Entresto and Coreg the morning of the procedure  Use your inhalers  No food or drink after midnight

## 2020-01-28 NOTE — PROGRESS NOTES
[facility] - [place]  [address]  [place] [address]  [phone]  Dept: [phone]    PRE-OP EVALUATION:  Today's date: 2020    Jacklyn BONNER Melvina (: 1935) presents for pre-operative evaluation assessment as requested by Dr. Torres.  She requires evaluation and anesthesia risk assessment prior to undergoing surgery/procedure for treatment of back pain .    Proposed Surgery/ Procedure: decompression- laminectomy L3-5  Date of Surgery/ Procedure: 2020  Time of Surgery/ Procedure: [facility]  Hospital/Surgical Facility: [facility]   Primary Physician: ANTHONY Post  Type of Anesthesia Anticipated: General    Patient has a Health Care Directive or Living Will:  NO    1. NO - Do you have a history of heart attack, stroke, stent, bypass or surgery on an artery in the head, neck, heart or legs?  2. NO - Do you ever have any pain or discomfort in your chest?  3. YES - Do you have a history of  Heart Failure?  4. YES - Are you troubled by shortness of breath when: walking on the level, up a slight hill or at night?  5. NO - Do you currently have a cold, bronchitis or other respiratory infection?  6. NO - Do you have a cough, shortness of breath or wheezing?  7. NO - Do you sometimes get pains in the calves of your legs when you walk?  8. YES - Do you or anyone in your family have previous history of blood clots?  9. NO - Do you or does anyone in your family have a serious bleeding problem such as prolonged bleeding following surgeries or cuts?  10. YES - Have you ever had problems with anemia or been told to take iron pills?  11. NO - Have you had any abnormal blood loss such as black, tarry or bloody stools, or abnormal vaginal bleeding?  12. NO - Have you ever had a blood transfusion?  13. NO - Have you or any of your relatives ever had problems with anesthesia?  14. NO - Do you have sleep apnea, excessive snoring or daytime drowsiness?  15. NO - Do you have any prosthetic heart  valves?  16. NO - Do you have prosthetic joints?  17. NO - Is there any chance that you may be pregnant?      HPI:     HPI related to upcoming procedure: Patient has had neurogenic claudication and will proceed with decompression laminectomy L3-5      See problem list for active medical problems.  Problems all longstanding and stable, except as noted/documented.  See ROS for pertinent symptoms related to these conditions.      MEDICAL HISTORY:     Patient Active Problem List    Diagnosis Date Noted     Paroxysmal atrial fibrillation (H) 12/04/2019     Priority: Medium     CKD (chronic kidney disease) stage 3, GFR 30-59 ml/min (H) 12/04/2019     Priority: Medium     Combined systolic at 40% and diastolic cardiac dysfunction on 5/10/2017 06/03/2019     Priority: Medium     LBBB (left bundle branch block) 06/01/2018     Priority: Medium     History of tobacco abuse quitting on 2/1/1998 06/01/2018     Priority: Medium     Mild intermittent asthma, unspecified whether complicated 06/01/2018     Priority: Medium     Chronic obstructive pulmonary disease, unspecified COPD type (H) 06/01/2018     Priority: Medium     SOB (shortness of breath) 06/01/2018     Priority: Medium     Lumbar spine pain 10/27/2015     Priority: Medium     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 Provider to review       Non-ischemic cardiomyopathy (H) 12/10/2013     Priority: Medium     CHF (congestive heart failure), NYHA class II (H) 12/10/2013     Priority: Medium     Insomnia 01/01/2011     Priority: Medium     Problem list name updated by automated process. Provider to review       Disorder of bone and cartilage 01/01/2011     Priority: Medium     Problem list name updated by automated process. Provider to review       Essential hypertension 01/01/2011     Priority: Medium     Problem list  name updated by automated process. Provider to review       Neck pain, chronic 2011     Priority: Medium     Mixed hyperlipidemia 2002     Priority: Medium      Past Medical History:   Diagnosis Date     Angina pectoris 2011     CHF (congestive heart failure), NYHA class II (H) 12/10/2013     CHF (congestive heart failure), NYHA class III (H) 12/10/2013     Hyperhydrosis disorder 2011     Insomnia, unspecified 2011     LBBB (left bundle branch block) 2011     Neck pain, chronic 2011     Non-ischemic cardiomyopathy (H) 12/10/2013     Obesity, unspecified 2011     Osteopenia 2011     Pacemaker      Pain in joint, lower leg 2006     Pure hypercholesterolemia 2002     Unspecified essential hypertension 2011     Past Surgical History:   Procedure Laterality Date     APPENDECTOMY       ARTHROSCOPY KNEE RT/LT      RT     CARDIAC SURGERY  2014    Pacemaker     Cataract extraction  2009    Bilateral     CHOLECYSTECTOMY      open      COLONOSCOPY  2001    Normal      COLONOSCOPY N/A 10/20/2016    Procedure: COLONOSCOPY;  Surgeon: Charan Montejo MD;  Location: [facility] OR     colonoscopy with polypectomy      Repeat 3 years      CT CORONARY ANGIOGRAM      normal     HERPES ZOSTER PCR ([facility])       left eye scar tissue       normal echo      fen-phen use       x6       SURGICAL RADIOLOGY PROCEDURE N/A 2016    Procedure: SURGICAL RADIOLOGY PROCEDURE;  Surgeon: Provider, [facility];  Location: [facility] OR     Current Outpatient Medications   Medication Sig Dispense Refill     albuterol (PROAIR HFA/PROVENTIL HFA/VENTOLIN HFA) 108 (90 BASE) MCG/ACT Inhaler Inhale 2 puffs into the lungs every 6 hours as needed for shortness of breath / dyspnea or wheezing 1 Inhaler 1     alendronate (FOSAMAX) 70 MG tablet TAKE 1 TABLET BY MOUTH EVERY 7 DAYS. TAKE WITH 8 OUNCES OF WATER AND STAY UPRIGHT FOR AT LEAST 30 MINUTES  AFTER TAKING. 12 tablet 0     apixaban ANTICOAGULANT (ELIQUIS ANTICOAGULANT) 5 MG tablet Take 1 tablet (5 mg) by mouth 2 times daily 180 tablet 3     budesonide-formoterol (SYMBICORT) 160-4.5 MCG/ACT Inhaler INHALE 2 PUFFS INTO THE LUNGS 2 TIMES DAILY 10.2 g 0     Calcium Carbonate-Vitamin D (CALCIUM 600 + D OR) Take 1 tablet by mouth daily       carvedilol (COREG) 12.5 MG tablet TAKE 1 TABLET BY MOUTH 2 TIMES DAILY WITH MEALS 180 tablet 0     carvedilol (COREG) 25 MG tablet Take 1 tablet (25 mg) by mouth 2 times daily (with meals) 180 tablet 3     diphenhydrAMINE-acetaminophen (ACETAMINOPHEN PM)  MG tablet Take 2 tablets by mouth nightly as needed.       Multiple Vitamin (MULTI-VITAMIN DAILY PO) Take 1 tablet by mouth daily       order for DME Equipment being ordered: Walker 1 each 0     rosuvastatin (CRESTOR) 10 MG tablet Take 1 tablet (10 mg) by mouth daily 90 tablet 3     sacubitril-valsartan (ENTRESTO)  MG per tablet Take 1 tablet by mouth 2 times daily 180 tablet 3     SPIRIVA RESPIMAT 2.5 MCG/ACT inhaler INHALE 2 DOSES INTO THE LUNGS ONCE DAILY 4 g 3     spironolactone (ALDACTONE) 25 MG tablet TAKE 1/2 TABLET BY MOUTH DAILY 45 tablet 0     OTC products: None, except as noted above    No Known Allergies   Latex Allergy: NO    Social History     Tobacco Use     Smoking status: Former Smoker     Packs/day: 1.00     Years: 15.00     Pack years: 15.00     Types: Cigarettes     Last attempt to quit: 1998     Years since quittin.0     Smokeless tobacco: Never Used     Tobacco comment: Passive Exposure: No; Longest Tobacco Free: 15 years    Substance Use Topics     Alcohol use: Yes     Comment: Occasionally      History   Drug Use No       REVIEW OF SYSTEMS:   CONSTITUTIONAL: NEGATIVE for fever, chills, change in weight  INTEGUMENTARY/SKIN: NEGATIVE for worrisome rashes, moles or lesions  EYES: NEGATIVE for vision changes or irritation  ENT/MOUTH: NEGATIVE for ear, mouth and throat  "problems  RESP: NEGATIVE for significant cough or SOB  BREAST: NEGATIVE for masses, tenderness or discharge  CV: NEGATIVE for chest pain, palpitations or peripheral edema  GI: NEGATIVE for nausea, abdominal pain, heartburn, or change in bowel habits  : NEGATIVE for frequency, dysuria, or hematuria  MUSCULOSKELETAL: NEGATIVE for significant arthralgias or myalgia  NEURO: NEGATIVE for weakness, dizziness or paresthesias  ENDOCRINE: NEGATIVE for temperature intolerance, skin/hair changes  HEME: NEGATIVE for bleeding problems  PSYCHIATRIC: NEGATIVE for changes in mood or affect    EXAM:   /58 (BP Location: Right arm, Patient Position: Sitting, Cuff Size: Adult Regular)   Pulse 83   Temp 96  F (35.6  C) (Tympanic)   Resp 20   Ht 1.626 m (5' 4\")   Wt 78.5 kg (173 lb)   SpO2 98%   BMI 29.70 kg/m      GENERAL APPEARANCE: healthy, alert and no distress     EYES: EOMI, PERRL     HENT: ear canals have a little bit of wax but the TM's are normal and nose and mouth without ulcers or lesions     NECK: no adenopathy, no asymmetry, masses, or scars and thyroid normal to palpation     RESP: lungs clear to auscultation - no rales, rhonchi or wheezes, is moving air comfortably     CV: regular rates and rhythm, normal S1 S2, no S3 or S4 and no murmur, click or rub, no obvious lower extremity edema     ABDOMEN:  soft, nontender, no HSM or masses and bowel sounds normal     MS: extremities normal- no gross deformities noted, no evidence of inflammation in joints, FROM in all extremities.     SKIN: no suspicious lesions or rashes     NEURO: Normal strength and tone, sensory exam grossly normal, mentation intact and speech normal     PSYCH: mentation appears normal. and affect normal/bright     LYMPHATICS: No cervical adenopathy    DIAGNOSTICS:     Results for orders placed or performed in visit on 01/31/20   CBC with platelets differential     Status: None   Result Value Ref Range    WBC 7.5 4.0 - 11.0 10e9/L    RBC Count " 4.14 3.8 - 5.2 10e12/L    Hemoglobin 12.3 11.7 - 15.7 g/dL    Hematocrit 37.2 35.0 - 47.0 %    MCV 90 78 - 100 fl    MCH 29.7 26.5 - 33.0 pg    MCHC 33.1 31.5 - 36.5 g/dL    RDW 12.8 10.0 - 15.0 %    Platelet Count 191 150 - 450 10e9/L    Diff Method Automated Method     % Neutrophils 76.3 %    % Lymphocytes 12.9 %    % Monocytes 8.5 %    % Eosinophils 1.5 %    % Basophils 0.5 %    % Immature Granulocytes 0.3 %    Nucleated RBCs 0 0 /100    Absolute Neutrophil 5.7 1.6 - 8.3 10e9/L    Absolute Lymphocytes 1.0 0.8 - 5.3 10e9/L    Absolute Monocytes 0.6 0.0 - 1.3 10e9/L    Absolute Eosinophils 0.1 0.0 - 0.7 10e9/L    Absolute Basophils 0.0 0.0 - 0.2 10e9/L    Abs Immature Granulocytes 0.0 0 - 0.4 10e9/L    Absolute Nucleated RBC 0.0    Basic metabolic panel     Status: Abnormal   Result Value Ref Range    Sodium 141 133 - 144 mmol/L    Potassium 4.8 3.4 - 5.3 mmol/L    Chloride 110 (H) 94 - 109 mmol/L    Carbon Dioxide 26 20 - 32 mmol/L    Anion Gap 5 3 - 14 mmol/L    Glucose 113 (H) 70 - 99 mg/dL    Urea Nitrogen 28 7 - 30 mg/dL    Creatinine 1.12 (H) 0.52 - 1.04 mg/dL    GFR Estimate 45 (L) >60 mL/min/[1.73_m2]    GFR Estimate If Black 52 (L) >60 mL/min/[1.73_m2]    Calcium 9.1 8.5 - 10.1 mg/dL           ICD interrogation:  Date Exam Time Exam Date Exam Time Accession # Performing Department Results    1/23/20  6:22 AM 1/28/20  1:16 PM [id number] [facility]    Narrative & Impression     Remote ICD transmission received and reviewed. Device transmission sent per MD orders. Patient has a Medtronic multi lead ICD. Normal ICD function. 1 episode recorded as NSVT that appears to be SVT - 1 sec, 194 bpm. 1 AT/AF episode recorded - 4 seconds in duration. Presenting EGM = AS- @ 65 bpm. AP = 9.1%.  = 98.2%. BVP = 97.5%. VSR pace = 1.8%. OptiVol fluid index is near baseline. Estimated battery longevity to JAMIE = 2.7 years. No short v-v intervals recorded. Lead trends appear stable. Patient notified of  interrogation results. Patient reports that she is feeling well and denies complaints. Plan for patient to return to clinic in 3 months as scheduled. HA Luevano RN     Remote ICD transmission       IMPRESSION:   Reason for surgery/procedure: Neurogenic claudication we will proceed with decompressive surgery  Diagnosis/reason for consult: Cardiopulmonary clearance    The proposed surgical procedure is considered LOW risk.    REVISED CARDIAC RISK INDEX  The patient has the following serious cardiovascular risks for perioperative complications such as (MI, PE, VFib and 3  AV Block):  No serious cardiac risks  INTERPRETATION: 0 risks: Class I (very low risk - 0.4% complication rate)    The patient has the following additional risks for perioperative complications:  No identified additional risks      ICD-10-CM    1. Preop general physical exam Z01.818 CBC with platelets differential   2. Non-ischemic cardiomyopathy (H) I42.8    3. Essential hypertension I10 Basic metabolic panel   4. Chronic obstructive pulmonary disease, unspecified COPD type (H) J44.9    5. Paroxysmal atrial fibrillation (H) I48.0    6. CKD (chronic kidney disease) stage 3, GFR 30-59 ml/min (H) N18.3    7. Automatic implantable cardioverter-defibrillator - Medtronic multi lead ICD on 11/11/2014 Z95.810        RECOMMENDATIONS:             APPROVAL GIVEN to proceed with proposed procedure, without further diagnostic evaluation.  When she does activities does not get chest pain.  Patient is chronically short of breath is followed by cardiology has noted cardiomyopathy, chronic kidney disease, does have a cardioverter-defibrillator placed the recent interrogation results are included in this note.  She does have COPD will do her inhalers the morning of the procedure.  She is on Eliquis and that has now been stopped for her procedure.  She will take her Entresto and Coreg the morning of the procedure.       Signed Electronically by: ANTHONY Post  MD    Copy of this evaluation report is provided to requesting physician.    [facility] Preop Guidelines    Revised Cardiac Risk Index

## 2020-01-31 ENCOUNTER — TELEPHONE (OUTPATIENT)
Dept: FAMILY MEDICINE | Facility: OTHER | Age: 85
End: 2020-01-31

## 2020-01-31 ENCOUNTER — OFFICE VISIT (OUTPATIENT)
Dept: FAMILY MEDICINE | Facility: OTHER | Age: 85
End: 2020-01-31
Attending: FAMILY MEDICINE
Payer: COMMERCIAL

## 2020-01-31 VITALS
HEART RATE: 83 BPM | TEMPERATURE: 96 F | WEIGHT: 173 LBS | OXYGEN SATURATION: 98 % | HEIGHT: 64 IN | DIASTOLIC BLOOD PRESSURE: 58 MMHG | SYSTOLIC BLOOD PRESSURE: 106 MMHG | RESPIRATION RATE: 20 BRPM | BODY MASS INDEX: 29.53 KG/M2

## 2020-01-31 DIAGNOSIS — N18.30 CKD (CHRONIC KIDNEY DISEASE) STAGE 3, GFR 30-59 ML/MIN (H): ICD-10-CM

## 2020-01-31 DIAGNOSIS — Z95.810 AUTOMATIC IMPLANTABLE CARDIOVERTER-DEFIBRILLATOR IN SITU: ICD-10-CM

## 2020-01-31 DIAGNOSIS — J44.9 CHRONIC OBSTRUCTIVE PULMONARY DISEASE, UNSPECIFIED COPD TYPE (H): ICD-10-CM

## 2020-01-31 DIAGNOSIS — I48.0 PAROXYSMAL ATRIAL FIBRILLATION (H): ICD-10-CM

## 2020-01-31 DIAGNOSIS — Z01.818 PREOP GENERAL PHYSICAL EXAM: Primary | ICD-10-CM

## 2020-01-31 DIAGNOSIS — I10 ESSENTIAL HYPERTENSION: ICD-10-CM

## 2020-01-31 DIAGNOSIS — I42.8 NON-ISCHEMIC CARDIOMYOPATHY (H): ICD-10-CM

## 2020-01-31 LAB
ANION GAP SERPL CALCULATED.3IONS-SCNC: 5 MMOL/L (ref 3–14)
BASOPHILS # BLD AUTO: 0 10E9/L (ref 0–0.2)
BASOPHILS NFR BLD AUTO: 0.5 %
BUN SERPL-MCNC: 28 MG/DL (ref 7–30)
CALCIUM SERPL-MCNC: 9.1 MG/DL (ref 8.5–10.1)
CHLORIDE SERPL-SCNC: 110 MMOL/L (ref 94–109)
CO2 SERPL-SCNC: 26 MMOL/L (ref 20–32)
CREAT SERPL-MCNC: 1.12 MG/DL (ref 0.52–1.04)
DIFFERENTIAL METHOD BLD: NORMAL
EOSINOPHIL # BLD AUTO: 0.1 10E9/L (ref 0–0.7)
EOSINOPHIL NFR BLD AUTO: 1.5 %
ERYTHROCYTE [DISTWIDTH] IN BLOOD BY AUTOMATED COUNT: 12.8 % (ref 10–15)
GFR SERPL CREATININE-BSD FRML MDRD: 45 ML/MIN/{1.73_M2}
GLUCOSE SERPL-MCNC: 113 MG/DL (ref 70–99)
HCT VFR BLD AUTO: 37.2 % (ref 35–47)
HGB BLD-MCNC: 12.3 G/DL (ref 11.7–15.7)
IMM GRANULOCYTES # BLD: 0 10E9/L (ref 0–0.4)
IMM GRANULOCYTES NFR BLD: 0.3 %
LYMPHOCYTES # BLD AUTO: 1 10E9/L (ref 0.8–5.3)
LYMPHOCYTES NFR BLD AUTO: 12.9 %
MCH RBC QN AUTO: 29.7 PG (ref 26.5–33)
MCHC RBC AUTO-ENTMCNC: 33.1 G/DL (ref 31.5–36.5)
MCV RBC AUTO: 90 FL (ref 78–100)
MONOCYTES # BLD AUTO: 0.6 10E9/L (ref 0–1.3)
MONOCYTES NFR BLD AUTO: 8.5 %
NEUTROPHILS # BLD AUTO: 5.7 10E9/L (ref 1.6–8.3)
NEUTROPHILS NFR BLD AUTO: 76.3 %
NRBC # BLD AUTO: 0 10*3/UL
NRBC BLD AUTO-RTO: 0 /100
PLATELET # BLD AUTO: 191 10E9/L (ref 150–450)
POTASSIUM SERPL-SCNC: 4.8 MMOL/L (ref 3.4–5.3)
RBC # BLD AUTO: 4.14 10E12/L (ref 3.8–5.2)
SODIUM SERPL-SCNC: 141 MMOL/L (ref 133–144)
WBC # BLD AUTO: 7.5 10E9/L (ref 4–11)

## 2020-01-31 PROCEDURE — 99214 OFFICE O/P EST MOD 30 MIN: CPT | Performed by: FAMILY MEDICINE

## 2020-01-31 PROCEDURE — 85025 COMPLETE CBC W/AUTO DIFF WBC: CPT | Mod: ZL | Performed by: FAMILY MEDICINE

## 2020-01-31 PROCEDURE — G0463 HOSPITAL OUTPT CLINIC VISIT: HCPCS

## 2020-01-31 PROCEDURE — 80048 BASIC METABOLIC PNL TOTAL CA: CPT | Mod: ZL | Performed by: FAMILY MEDICINE

## 2020-01-31 PROCEDURE — 36415 COLL VENOUS BLD VENIPUNCTURE: CPT | Mod: ZL | Performed by: FAMILY MEDICINE

## 2020-01-31 ASSESSMENT — ASTHMA QUESTIONNAIRES
QUESTION_2 LAST FOUR WEEKS HOW OFTEN HAVE YOU HAD SHORTNESS OF BREATH: MORE THAN ONCE A DAY
QUESTION_1 LAST FOUR WEEKS HOW MUCH OF THE TIME DID YOUR ASTHMA KEEP YOU FROM GETTING AS MUCH DONE AT WORK, SCHOOL OR AT HOME: SOME OF THE TIME
ACT_TOTALSCORE: 19
QUESTION_4 LAST FOUR WEEKS HOW OFTEN HAVE YOU USED YOUR RESCUE INHALER OR NEBULIZER MEDICATION (SUCH AS ALBUTEROL): NOT AT ALL
QUESTION_5 LAST FOUR WEEKS HOW WOULD YOU RATE YOUR ASTHMA CONTROL: COMPLETELY CONTROLLED
QUESTION_3 LAST FOUR WEEKS HOW OFTEN DID YOUR ASTHMA SYMPTOMS (WHEEZING, COUGHING, SHORTNESS OF BREATH, CHEST TIGHTNESS OR PAIN) WAKE YOU UP AT NIGHT OR EARLIER THAN USUAL IN THE MORNING: NOT AT ALL

## 2020-01-31 ASSESSMENT — PAIN SCALES - GENERAL: PAINLEVEL: MILD PAIN (3)

## 2020-01-31 ASSESSMENT — MIFFLIN-ST. JEOR: SCORE: 1224.72

## 2020-01-31 NOTE — NURSING NOTE
"Chief Complaint   Patient presents with     Pre-Op Exam       Initial /58 (BP Location: Right arm, Patient Position: Sitting, Cuff Size: Adult Regular)   Pulse 83   Temp 96  F (35.6  C) (Tympanic)   Resp 20   Ht 1.626 m (5' 4\")   Wt 78.5 kg (173 lb)   SpO2 98%   BMI 29.70 kg/m   Estimated body mass index is 29.7 kg/m  as calculated from the following:    Height as of this encounter: 1.626 m (5' 4\").    Weight as of this encounter: 78.5 kg (173 lb).  Medication Reconciliation: complete  Roxann Gross LPN  "

## 2020-02-01 ASSESSMENT — ASTHMA QUESTIONNAIRES: ACT_TOTALSCORE: 19

## 2020-02-06 ENCOUNTER — TRANSFERRED RECORDS (OUTPATIENT)
Dept: HEALTH INFORMATION MANAGEMENT | Facility: CLINIC | Age: 85
End: 2020-02-06

## 2020-02-21 DIAGNOSIS — I27.0 PRIMARY PULMONARY HYPERTENSION (H): ICD-10-CM

## 2020-02-24 RX ORDER — SPIRONOLACTONE 25 MG/1
TABLET ORAL
Qty: 45 TABLET | Refills: 1 | Status: SHIPPED | OUTPATIENT
Start: 2020-02-24 | End: 2020-07-23

## 2020-02-24 NOTE — TELEPHONE ENCOUNTER
Spironolactone  Last Written Prescription Date: 11/12/19  Last Fill Quantity: 45 # of Refills: 0  Last Office Visit: 1/3/20

## 2020-02-26 LAB
MDC_IDC_EPISODE_DTM: NORMAL
MDC_IDC_EPISODE_DURATION: 1 S
MDC_IDC_EPISODE_DURATION: 4 S
MDC_IDC_EPISODE_DURATION: 4 S
MDC_IDC_EPISODE_DURATION: 5 S
MDC_IDC_EPISODE_DURATION: 6 S
MDC_IDC_EPISODE_DURATION: 7 S
MDC_IDC_EPISODE_ID: 693
MDC_IDC_EPISODE_ID: 694
MDC_IDC_EPISODE_ID: 695
MDC_IDC_EPISODE_ID: 696
MDC_IDC_EPISODE_ID: 697
MDC_IDC_EPISODE_ID: 698
MDC_IDC_EPISODE_ID: 699
MDC_IDC_EPISODE_ID: 72
MDC_IDC_EPISODE_TYPE: NORMAL
MDC_IDC_LEAD_IMPLANT_DT: NORMAL
MDC_IDC_LEAD_LOCATION: NORMAL
MDC_IDC_LEAD_LOCATION_DETAIL_1: NORMAL
MDC_IDC_LEAD_MFG: NORMAL
MDC_IDC_LEAD_MODEL: NORMAL
MDC_IDC_LEAD_POLARITY_TYPE: NORMAL
MDC_IDC_LEAD_SERIAL: NORMAL
MDC_IDC_MSMT_BATTERY_DTM: NORMAL
MDC_IDC_MSMT_BATTERY_REMAINING_LONGEVITY: 32 MO
MDC_IDC_MSMT_BATTERY_RRT_TRIGGER: 2.73
MDC_IDC_MSMT_BATTERY_STATUS: NORMAL
MDC_IDC_MSMT_BATTERY_VOLTAGE: 2.95 V
MDC_IDC_MSMT_CAP_CHARGE_DTM: NORMAL
MDC_IDC_MSMT_CAP_CHARGE_ENERGY: 18 J
MDC_IDC_MSMT_CAP_CHARGE_TIME: 4.17
MDC_IDC_MSMT_CAP_CHARGE_TYPE: NORMAL
MDC_IDC_MSMT_LEADCHNL_LV_IMPEDANCE_VALUE: 437 OHM
MDC_IDC_MSMT_LEADCHNL_LV_IMPEDANCE_VALUE: 570 OHM
MDC_IDC_MSMT_LEADCHNL_LV_IMPEDANCE_VALUE: 855 OHM
MDC_IDC_MSMT_LEADCHNL_LV_PACING_THRESHOLD_AMPLITUDE: 0.62 V
MDC_IDC_MSMT_LEADCHNL_LV_PACING_THRESHOLD_PULSEWIDTH: 0.6 MS
MDC_IDC_MSMT_LEADCHNL_RA_IMPEDANCE_VALUE: 399 OHM
MDC_IDC_MSMT_LEADCHNL_RA_PACING_THRESHOLD_AMPLITUDE: 0.38 V
MDC_IDC_MSMT_LEADCHNL_RA_PACING_THRESHOLD_PULSEWIDTH: 0.4 MS
MDC_IDC_MSMT_LEADCHNL_RA_SENSING_INTR_AMPL: 2.25 MV
MDC_IDC_MSMT_LEADCHNL_RA_SENSING_INTR_AMPL: 2.25 MV
MDC_IDC_MSMT_LEADCHNL_RV_IMPEDANCE_VALUE: 513 OHM
MDC_IDC_MSMT_LEADCHNL_RV_IMPEDANCE_VALUE: 570 OHM
MDC_IDC_MSMT_LEADCHNL_RV_PACING_THRESHOLD_AMPLITUDE: 0.38 V
MDC_IDC_MSMT_LEADCHNL_RV_PACING_THRESHOLD_PULSEWIDTH: 0.4 MS
MDC_IDC_MSMT_LEADCHNL_RV_SENSING_INTR_AMPL: 9 MV
MDC_IDC_MSMT_LEADCHNL_RV_SENSING_INTR_AMPL: 9 MV
MDC_IDC_PG_IMPLANT_DTM: NORMAL
MDC_IDC_PG_MFG: NORMAL
MDC_IDC_PG_MODEL: NORMAL
MDC_IDC_PG_SERIAL: NORMAL
MDC_IDC_PG_TYPE: NORMAL
MDC_IDC_SESS_CLINIC_NAME: NORMAL
MDC_IDC_SESS_DTM: NORMAL
MDC_IDC_SESS_TYPE: NORMAL
MDC_IDC_SET_BRADY_AT_MODE_SWITCH_RATE: 171 {BEATS}/MIN
MDC_IDC_SET_BRADY_LOWRATE: 60 {BEATS}/MIN
MDC_IDC_SET_BRADY_MAX_SENSOR_RATE: 130 {BEATS}/MIN
MDC_IDC_SET_BRADY_MAX_TRACKING_RATE: 130 {BEATS}/MIN
MDC_IDC_SET_BRADY_MODE: NORMAL
MDC_IDC_SET_BRADY_PAV_DELAY_LOW: 170 MS
MDC_IDC_SET_BRADY_SAV_DELAY_LOW: 140 MS
MDC_IDC_SET_CRT_PACED_CHAMBERS: NORMAL
MDC_IDC_SET_LEADCHNL_LV_PACING_AMPLITUDE: 1.5 V
MDC_IDC_SET_LEADCHNL_LV_PACING_ANODE_ELECTRODE_1: NORMAL
MDC_IDC_SET_LEADCHNL_LV_PACING_ANODE_LOCATION_1: NORMAL
MDC_IDC_SET_LEADCHNL_LV_PACING_CAPTURE_MODE: NORMAL
MDC_IDC_SET_LEADCHNL_LV_PACING_CATHODE_ELECTRODE_1: NORMAL
MDC_IDC_SET_LEADCHNL_LV_PACING_CATHODE_LOCATION_1: NORMAL
MDC_IDC_SET_LEADCHNL_LV_PACING_POLARITY: NORMAL
MDC_IDC_SET_LEADCHNL_LV_PACING_PULSEWIDTH: 0.6 MS
MDC_IDC_SET_LEADCHNL_RA_PACING_AMPLITUDE: 1.5 V
MDC_IDC_SET_LEADCHNL_RA_PACING_ANODE_ELECTRODE_1: NORMAL
MDC_IDC_SET_LEADCHNL_RA_PACING_ANODE_LOCATION_1: NORMAL
MDC_IDC_SET_LEADCHNL_RA_PACING_CAPTURE_MODE: NORMAL
MDC_IDC_SET_LEADCHNL_RA_PACING_CATHODE_ELECTRODE_1: NORMAL
MDC_IDC_SET_LEADCHNL_RA_PACING_CATHODE_LOCATION_1: NORMAL
MDC_IDC_SET_LEADCHNL_RA_PACING_POLARITY: NORMAL
MDC_IDC_SET_LEADCHNL_RA_PACING_PULSEWIDTH: 0.4 MS
MDC_IDC_SET_LEADCHNL_RA_SENSING_ANODE_ELECTRODE_1: NORMAL
MDC_IDC_SET_LEADCHNL_RA_SENSING_ANODE_LOCATION_1: NORMAL
MDC_IDC_SET_LEADCHNL_RA_SENSING_CATHODE_ELECTRODE_1: NORMAL
MDC_IDC_SET_LEADCHNL_RA_SENSING_CATHODE_LOCATION_1: NORMAL
MDC_IDC_SET_LEADCHNL_RA_SENSING_POLARITY: NORMAL
MDC_IDC_SET_LEADCHNL_RA_SENSING_SENSITIVITY: 0.3 MV
MDC_IDC_SET_LEADCHNL_RV_PACING_AMPLITUDE: 2 V
MDC_IDC_SET_LEADCHNL_RV_PACING_ANODE_ELECTRODE_1: NORMAL
MDC_IDC_SET_LEADCHNL_RV_PACING_ANODE_LOCATION_1: NORMAL
MDC_IDC_SET_LEADCHNL_RV_PACING_CAPTURE_MODE: NORMAL
MDC_IDC_SET_LEADCHNL_RV_PACING_CATHODE_ELECTRODE_1: NORMAL
MDC_IDC_SET_LEADCHNL_RV_PACING_CATHODE_LOCATION_1: NORMAL
MDC_IDC_SET_LEADCHNL_RV_PACING_POLARITY: NORMAL
MDC_IDC_SET_LEADCHNL_RV_PACING_PULSEWIDTH: 0.4 MS
MDC_IDC_SET_LEADCHNL_RV_SENSING_ANODE_ELECTRODE_1: NORMAL
MDC_IDC_SET_LEADCHNL_RV_SENSING_ANODE_LOCATION_1: NORMAL
MDC_IDC_SET_LEADCHNL_RV_SENSING_CATHODE_ELECTRODE_1: NORMAL
MDC_IDC_SET_LEADCHNL_RV_SENSING_CATHODE_LOCATION_1: NORMAL
MDC_IDC_SET_LEADCHNL_RV_SENSING_POLARITY: NORMAL
MDC_IDC_SET_LEADCHNL_RV_SENSING_SENSITIVITY: 0.3 MV
MDC_IDC_SET_ZONE_DETECTION_BEATS_DENOMINATOR: 40 {BEATS}
MDC_IDC_SET_ZONE_DETECTION_BEATS_NUMERATOR: 30 {BEATS}
MDC_IDC_SET_ZONE_DETECTION_INTERVAL: 320 MS
MDC_IDC_SET_ZONE_DETECTION_INTERVAL: 350 MS
MDC_IDC_SET_ZONE_DETECTION_INTERVAL: 360 MS
MDC_IDC_SET_ZONE_DETECTION_INTERVAL: 400 MS
MDC_IDC_SET_ZONE_DETECTION_INTERVAL: NORMAL
MDC_IDC_SET_ZONE_TYPE: NORMAL
MDC_IDC_STAT_AT_BURDEN_PERCENT: 0.1 %
MDC_IDC_STAT_AT_DTM_END: NORMAL
MDC_IDC_STAT_AT_DTM_START: NORMAL
MDC_IDC_STAT_BRADY_AP_VP_PERCENT: 8.92 %
MDC_IDC_STAT_BRADY_AP_VS_PERCENT: 0.16 %
MDC_IDC_STAT_BRADY_AS_VP_PERCENT: 89.27 %
MDC_IDC_STAT_BRADY_AS_VS_PERCENT: 1.64 %
MDC_IDC_STAT_BRADY_DTM_END: NORMAL
MDC_IDC_STAT_BRADY_DTM_START: NORMAL
MDC_IDC_STAT_BRADY_RA_PERCENT_PACED: 9.04 %
MDC_IDC_STAT_BRADY_RV_PERCENT_PACED: 9.54 %
MDC_IDC_STAT_CRT_DTM_END: NORMAL
MDC_IDC_STAT_CRT_DTM_START: NORMAL
MDC_IDC_STAT_CRT_LV_PERCENT_PACED: 97.48 %
MDC_IDC_STAT_CRT_PERCENT_PACED: 97.48 %
MDC_IDC_STAT_EPISODE_RECENT_COUNT: 0
MDC_IDC_STAT_EPISODE_RECENT_COUNT: 1
MDC_IDC_STAT_EPISODE_RECENT_COUNT: 1
MDC_IDC_STAT_EPISODE_RECENT_COUNT_DTM_END: NORMAL
MDC_IDC_STAT_EPISODE_RECENT_COUNT_DTM_START: NORMAL
MDC_IDC_STAT_EPISODE_TOTAL_COUNT: 0
MDC_IDC_STAT_EPISODE_TOTAL_COUNT: 1
MDC_IDC_STAT_EPISODE_TOTAL_COUNT: 66
MDC_IDC_STAT_EPISODE_TOTAL_COUNT_DTM_END: NORMAL
MDC_IDC_STAT_EPISODE_TOTAL_COUNT_DTM_START: NORMAL
MDC_IDC_STAT_EPISODE_TYPE: NORMAL
MDC_IDC_STAT_TACHYTHERAPY_ATP_DELIVERED_RECENT: 0
MDC_IDC_STAT_TACHYTHERAPY_ATP_DELIVERED_TOTAL: 0
MDC_IDC_STAT_TACHYTHERAPY_RECENT_DTM_END: NORMAL
MDC_IDC_STAT_TACHYTHERAPY_RECENT_DTM_START: NORMAL
MDC_IDC_STAT_TACHYTHERAPY_SHOCKS_ABORTED_RECENT: 0
MDC_IDC_STAT_TACHYTHERAPY_SHOCKS_ABORTED_TOTAL: 0
MDC_IDC_STAT_TACHYTHERAPY_SHOCKS_DELIVERED_RECENT: 0
MDC_IDC_STAT_TACHYTHERAPY_SHOCKS_DELIVERED_TOTAL: 1
MDC_IDC_STAT_TACHYTHERAPY_TOTAL_DTM_END: NORMAL
MDC_IDC_STAT_TACHYTHERAPY_TOTAL_DTM_START: NORMAL

## 2020-02-27 DIAGNOSIS — J43.8 OTHER EMPHYSEMA (H): ICD-10-CM

## 2020-02-28 RX ORDER — ALBUTEROL SULFATE 90 UG/1
AEROSOL, METERED RESPIRATORY (INHALATION)
Qty: 18 G | Refills: 0 | Status: SHIPPED | OUTPATIENT
Start: 2020-02-28 | End: 2023-01-01

## 2020-02-28 NOTE — TELEPHONE ENCOUNTER
albuterol (PROAIR HFA/PROVENTIL HFA/VENTOLIN HFA) 108 (90 BASE) MCG/ACT Inhaler      Last Written Prescription Date:  10/17/17  Last Fill Quantity: 1,   # refills: 1  Last Office Visit: 1/31/20  Future Office visit:       Routing refill request to provider for review/approval because:  Last signed by Dr Newell. Please advise. Thank you!

## 2020-03-03 ENCOUNTER — HOSPITAL ENCOUNTER (EMERGENCY)
Facility: HOSPITAL | Age: 85
Discharge: HOME | End: 2020-03-03
Attending: EMERGENCY MEDICINE | Admitting: EMERGENCY MEDICINE
Payer: MEDICARE

## 2020-03-03 ENCOUNTER — APPOINTMENT (OUTPATIENT)
Dept: CT IMAGING | Facility: HOSPITAL | Age: 85
End: 2020-03-03
Attending: EMERGENCY MEDICINE
Payer: MEDICARE

## 2020-03-03 ENCOUNTER — APPOINTMENT (OUTPATIENT)
Dept: GENERAL RADIOLOGY | Facility: HOSPITAL | Age: 85
End: 2020-03-03
Attending: EMERGENCY MEDICINE
Payer: MEDICARE

## 2020-03-03 VITALS
WEIGHT: 169 LBS | TEMPERATURE: 98 F | BODY MASS INDEX: 29.01 KG/M2 | RESPIRATION RATE: 18 BRPM | DIASTOLIC BLOOD PRESSURE: 58 MMHG | OXYGEN SATURATION: 97 % | HEART RATE: 65 BPM | SYSTOLIC BLOOD PRESSURE: 122 MMHG

## 2020-03-03 DIAGNOSIS — I42.8 NON-ISCHEMIC CARDIOMYOPATHY (H): ICD-10-CM

## 2020-03-03 DIAGNOSIS — I50.9 CONGESTIVE HEART FAILURE, NYHA CLASS 2, UNSPECIFIED CONGESTIVE HEART FAILURE TYPE (H): ICD-10-CM

## 2020-03-03 DIAGNOSIS — H61.23 BILATERAL IMPACTED CERUMEN: ICD-10-CM

## 2020-03-03 DIAGNOSIS — I95.1 POSTURAL HYPOTENSION: Primary | ICD-10-CM

## 2020-03-03 DIAGNOSIS — I51.89 COMBINED SYSTOLIC AND DIASTOLIC CARDIAC DYSFUNCTION: ICD-10-CM

## 2020-03-03 DIAGNOSIS — R06.02 SOB (SHORTNESS OF BREATH): ICD-10-CM

## 2020-03-03 LAB
ALBUMIN SERPL-MCNC: 3.1 G/DL (ref 3.4–5)
ALP SERPL-CCNC: 62 U/L (ref 40–150)
ALT SERPL W P-5'-P-CCNC: 15 U/L (ref 0–50)
ANION GAP SERPL CALCULATED.3IONS-SCNC: 6 MMOL/L (ref 3–14)
AST SERPL W P-5'-P-CCNC: 15 U/L (ref 0–45)
BASOPHILS # BLD AUTO: 0.1 10E9/L (ref 0–0.2)
BASOPHILS NFR BLD AUTO: 0.8 %
BILIRUB SERPL-MCNC: 0.5 MG/DL (ref 0.2–1.3)
BUN SERPL-MCNC: 32 MG/DL (ref 7–30)
CALCIUM SERPL-MCNC: 8.9 MG/DL (ref 8.5–10.1)
CHLORIDE SERPL-SCNC: 109 MMOL/L (ref 94–109)
CO2 SERPL-SCNC: 24 MMOL/L (ref 20–32)
CREAT SERPL-MCNC: 1.15 MG/DL (ref 0.52–1.04)
CRP SERPL-MCNC: <2.9 MG/L (ref 0–8)
D DIMER PPP FEU-MCNC: 1.6 UG/ML FEU (ref 0–0.5)
DIFFERENTIAL METHOD BLD: ABNORMAL
EOSINOPHIL # BLD AUTO: 0.3 10E9/L (ref 0–0.7)
EOSINOPHIL NFR BLD AUTO: 3.8 %
ERYTHROCYTE [DISTWIDTH] IN BLOOD BY AUTOMATED COUNT: 13.2 % (ref 10–15)
GFR SERPL CREATININE-BSD FRML MDRD: 44 ML/MIN/{1.73_M2}
GLUCOSE SERPL-MCNC: 82 MG/DL (ref 70–99)
HCT VFR BLD AUTO: 33.7 % (ref 35–47)
HGB BLD-MCNC: 10.8 G/DL (ref 11.7–15.7)
IMM GRANULOCYTES # BLD: 0 10E9/L (ref 0–0.4)
IMM GRANULOCYTES NFR BLD: 0.6 %
LACTATE BLD-SCNC: 1.5 MMOL/L (ref 0.7–2)
LYMPHOCYTES # BLD AUTO: 1.3 10E9/L (ref 0.8–5.3)
LYMPHOCYTES NFR BLD AUTO: 19.8 %
MCH RBC QN AUTO: 29.4 PG (ref 26.5–33)
MCHC RBC AUTO-ENTMCNC: 32 G/DL (ref 31.5–36.5)
MCV RBC AUTO: 92 FL (ref 78–100)
MONOCYTES # BLD AUTO: 0.6 10E9/L (ref 0–1.3)
MONOCYTES NFR BLD AUTO: 9.7 %
NEUTROPHILS # BLD AUTO: 4.3 10E9/L (ref 1.6–8.3)
NEUTROPHILS NFR BLD AUTO: 65.3 %
NRBC # BLD AUTO: 0 10*3/UL
NRBC BLD AUTO-RTO: 0 /100
PLATELET # BLD AUTO: 223 10E9/L (ref 150–450)
POTASSIUM SERPL-SCNC: 4.8 MMOL/L (ref 3.4–5.3)
PROT SERPL-MCNC: 6.8 G/DL (ref 6.8–8.8)
RBC # BLD AUTO: 3.67 10E12/L (ref 3.8–5.2)
SODIUM SERPL-SCNC: 139 MMOL/L (ref 133–144)
TROPONIN I SERPL-MCNC: <0.015 UG/L (ref 0–0.04)
WBC # BLD AUTO: 6.6 10E9/L (ref 4–11)

## 2020-03-03 PROCEDURE — 96361 HYDRATE IV INFUSION ADD-ON: CPT

## 2020-03-03 PROCEDURE — 83605 ASSAY OF LACTIC ACID: CPT | Performed by: EMERGENCY MEDICINE

## 2020-03-03 PROCEDURE — 70450 CT HEAD/BRAIN W/O DYE: CPT | Mod: TC

## 2020-03-03 PROCEDURE — 96360 HYDRATION IV INFUSION INIT: CPT

## 2020-03-03 PROCEDURE — 25800030 ZZH RX IP 258 OP 636: Performed by: EMERGENCY MEDICINE

## 2020-03-03 PROCEDURE — 99285 EMERGENCY DEPT VISIT HI MDM: CPT | Mod: Z6 | Performed by: EMERGENCY MEDICINE

## 2020-03-03 PROCEDURE — 71046 X-RAY EXAM CHEST 2 VIEWS: CPT | Mod: TC

## 2020-03-03 PROCEDURE — 86140 C-REACTIVE PROTEIN: CPT | Performed by: EMERGENCY MEDICINE

## 2020-03-03 PROCEDURE — 93010 ELECTROCARDIOGRAM REPORT: CPT | Performed by: INTERNAL MEDICINE

## 2020-03-03 PROCEDURE — 80053 COMPREHEN METABOLIC PANEL: CPT | Performed by: EMERGENCY MEDICINE

## 2020-03-03 PROCEDURE — 85025 COMPLETE CBC W/AUTO DIFF WBC: CPT | Performed by: EMERGENCY MEDICINE

## 2020-03-03 PROCEDURE — 84484 ASSAY OF TROPONIN QUANT: CPT | Performed by: EMERGENCY MEDICINE

## 2020-03-03 PROCEDURE — 85379 FIBRIN DEGRADATION QUANT: CPT | Performed by: EMERGENCY MEDICINE

## 2020-03-03 PROCEDURE — 99285 EMERGENCY DEPT VISIT HI MDM: CPT | Mod: 25

## 2020-03-03 PROCEDURE — 36415 COLL VENOUS BLD VENIPUNCTURE: CPT | Performed by: EMERGENCY MEDICINE

## 2020-03-03 PROCEDURE — 93005 ELECTROCARDIOGRAM TRACING: CPT

## 2020-03-03 RX ORDER — SODIUM CHLORIDE 9 MG/ML
1000 INJECTION, SOLUTION INTRAVENOUS CONTINUOUS
Status: DISCONTINUED | OUTPATIENT
Start: 2020-03-03 | End: 2020-03-03

## 2020-03-03 RX ORDER — CARVEDILOL 25 MG/1
12.5 TABLET ORAL 2 TIMES DAILY WITH MEALS
Qty: 90 TABLET | Refills: 3 | Status: SHIPPED | OUTPATIENT
Start: 2020-03-03 | End: 2020-07-16

## 2020-03-03 RX ORDER — SODIUM CHLORIDE 9 MG/ML
1000 INJECTION, SOLUTION INTRAVENOUS CONTINUOUS
Status: DISCONTINUED | OUTPATIENT
Start: 2020-03-03 | End: 2020-03-03 | Stop reason: HOSPADM

## 2020-03-03 RX ADMIN — SODIUM CHLORIDE 1000 ML: 9 INJECTION, SOLUTION INTRAVENOUS at 09:53

## 2020-03-03 RX ADMIN — SODIUM CHLORIDE 500 ML: 9 INJECTION, SOLUTION INTRAVENOUS at 10:48

## 2020-03-03 RX ADMIN — SODIUM CHLORIDE 1000 ML: 9 INJECTION, SOLUTION INTRAVENOUS at 10:49

## 2020-03-03 ASSESSMENT — ENCOUNTER SYMPTOMS
SHORTNESS OF BREATH: 0
HEADACHES: 0
EYE REDNESS: 0
FEVER: 0
DIFFICULTY URINATING: 0
COLOR CHANGE: 0
CONFUSION: 0
DIZZINESS: 1
ARTHRALGIAS: 0
ABDOMINAL PAIN: 0
NECK STIFFNESS: 0

## 2020-03-03 NOTE — ED AVS SNAPSHOT
[facility]  [address] [place] [address]  Phone:  [phone]                                    Jacklyn Hein   MRN: [id number]    Department:  [facility]   Date of Visit:  3/3/2020           After Visit Summary Signature Page    I have received my discharge instructions, and my questions have been answered. I have discussed any challenges I see with this plan with the nurse or doctor.    ..........................................................................................................................................  Patient/Patient Representative Signature      ..........................................................................................................................................  Patient Representative Print Name and Relationship to Patient    ..................................................               ................................................  Date                                   Time    ..........................................................................................................................................  Reviewed by Signature/Title    ...................................................              ..............................................  Date                                               Time          22EPIC Rev 08/18

## 2020-03-03 NOTE — ED PROVIDER NOTES
History     Chief Complaint   Patient presents with     Dizziness     intermittent lightheadedness for last week     Shortness of Breath     HPI  Jacklyn Hein is a 84 year old female who presented to the emergency department with complaints of recurrent episodes of this for more than 2 weeks.  The dizziness is worsened by rising up from sitting or lying down.  Is not affected by moving head from side to side.  Patient denies nausea, vomiting, fever, chills, palpitations.  No recent URI symptoms.  She had lower back surgery recently before the symptoms started and thought it would go away on its own.  Patient denies any facial droop, slurring of speech, blood in stool.  The blood pressure was noted to be low upon arrival at the ED at 97/46 mmHg.    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 on 5/10/2017 06/03/2019     Priority: Medium     LBBB (left bundle branch block) 06/01/2018     Priority: Medium     History of tobacco abuse quitting on 2/1/1998 06/01/2018     Priority: Medium     Mild intermittent asthma, unspecified whether complicated 06/01/2018     Priority: Medium     Chronic obstructive pulmonary disease, unspecified COPD type (H) 06/01/2018     Priority: Medium     SOB (shortness of breath) 06/01/2018     Priority: Medium     Lumbar spine pain 10/27/2015     Priority: Medium     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 Provider to review       Non-ischemic cardiomyopathy (H) 12/10/2013     Priority: Medium     CHF (congestive heart failure), NYHA class II (H)  12/10/2013     Priority: Medium     Insomnia 2011     Priority: Medium     Problem list name updated by automated process. Provider to review       Disorder of bone and cartilage 2011     Priority: Medium     Problem list name updated by automated process. Provider to review       Essential hypertension 2011     Priority: Medium     Problem list name updated by automated process. Provider to review       Neck pain, chronic 2011     Priority: Medium     Mixed hyperlipidemia 2002     Priority: Medium        Past Medical History:    Past Medical History:   Diagnosis Date     Angina pectoris 2011     CHF (congestive heart failure), NYHA class II (H) 12/10/2013     CHF (congestive heart failure), NYHA class III (H) 12/10/2013     Hyperhydrosis disorder 2011     Insomnia, unspecified 2011     LBBB (left bundle branch block) 2011     Neck pain, chronic 2011     Non-ischemic cardiomyopathy (H) 12/10/2013     Obesity, unspecified 2011     Osteopenia 2011     Pacemaker      Pain in joint, lower leg 2006     Pure hypercholesterolemia 2002     Unspecified essential hypertension 2011       Past Surgical History:    Past Surgical History:   Procedure Laterality Date     APPENDECTOMY       ARTHROSCOPY KNEE RT/LT      RT     CARDIAC SURGERY  2014    Pacemaker     Cataract extraction  2009    Bilateral     CHOLECYSTECTOMY      open      COLONOSCOPY  2001    Normal      COLONOSCOPY N/A 10/20/2016    Procedure: COLONOSCOPY;  Surgeon: Charan Montejo MD;  Location: [facility]     colonoscopy with polypectomy      Repeat 3 years      CT CORONARY ANGIOGRAM      normal     HERPES ZOSTER PCR ([facility])       left eye scar tissue       normal echo      fen-phen use       x6       SURGICAL RADIOLOGY PROCEDURE N/A 2016    Procedure: SURGICAL RADIOLOGY PROCEDURE;  Surgeon: Provider, Generic Perianesthesia Nursing;   Location: [facility]       Family History:    Family History   Problem Relation Age of Onset     Cancer Mother         lung (cause of death)      Peptic Ulcer Disease Father         gastric        Social History:  Marital Status:   [2]  Social History     Tobacco Use     Smoking status: Former Smoker     Packs/day: 1.00     Years: 15.00     Pack years: 15.00     Types: Cigarettes     Last attempt to quit: 1998     Years since quittin.0     Smokeless tobacco: Never Used     Tobacco comment: Passive Exposure: No; Longest Tobacco Free: 15 years    Substance Use Topics     Alcohol use: Yes     Comment: Occasionally      Drug use: No        Medications:    carvedilol (COREG) 25 MG tablet  alendronate (FOSAMAX) 70 MG tablet  apixaban ANTICOAGULANT (ELIQUIS ANTICOAGULANT) 5 MG tablet  budesonide-formoterol (SYMBICORT) 160-4.5 MCG/ACT Inhaler  Calcium Carbonate-Vitamin D (CALCIUM 600 + D OR)  diphenhydrAMINE-acetaminophen (ACETAMINOPHEN PM)  MG tablet  Multiple Vitamin (MULTI-VITAMIN DAILY PO)  order for DME  rosuvastatin (CRESTOR) 10 MG tablet  sacubitril-valsartan (ENTRESTO)  MG per tablet  SPIRIVA RESPIMAT 2.5 MCG/ACT inhaler  spironolactone (ALDACTONE) 25 MG tablet  VENTOLIN  (90 Base) MCG/ACT inhaler          Review of Systems   Constitutional: Negative for fever.   HENT: Negative for congestion.    Eyes: Negative for redness.   Respiratory: Negative for shortness of breath.    Cardiovascular: Negative for chest pain.   Gastrointestinal: Negative for abdominal pain.   Genitourinary: Negative for difficulty urinating.   Musculoskeletal: Negative for arthralgias and neck stiffness.   Skin: Negative for color change.   Neurological: Positive for dizziness. Negative for headaches.   Psychiatric/Behavioral: Negative for confusion.   All other systems reviewed and are negative.      Physical Exam   BP: (!) 88/36(R arm)  Pulse: 72  Heart Rate: 82  Temp: 98  F (36.7  C)  Resp: 18  Weight: 76.7 kg  (169 lb)  SpO2: 97 %      Physical Exam  Vitals signs and nursing note reviewed.   Constitutional:       General: She is not in acute distress.     Appearance: She is well-developed. She is not diaphoretic.   HENT:      Head: Atraumatic.      Mouth/Throat:      Pharynx: No oropharyngeal exudate.   Eyes:      General: No scleral icterus.     Pupils: Pupils are equal, round, and reactive to light.   Cardiovascular:      Rate and Rhythm: Regular rhythm.      Heart sounds: Normal heart sounds.   Pulmonary:      Effort: No respiratory distress.      Breath sounds: Normal breath sounds.   Chest:      Chest wall: No tenderness.   Abdominal:      General: Bowel sounds are normal.      Palpations: Abdomen is soft.      Tenderness: There is no abdominal tenderness.   Musculoskeletal:         General: No tenderness.   Skin:     General: Skin is warm.      Findings: No rash.   Neurological:      Mental Status: She is alert and oriented to person, place, and time.         ED Course   Evaluated upon arrival at the ED and laboratory tests ordered.  Started on IV fluid hydration.    Procedures       EKG Interpretation:      Interpreted by Sean Salcedo MD  Time reviewed: 10 AM  Symptoms at time of EKG: Dizziness  Rhythm: Paced rhythm  Rate: normal  Axis: normal  Ectopy: none  Conduction: normal  ST Segments/ T Waves: No ST-T wave changes  Q Waves: none  Comparison to prior: No old EKG available    Clinical Impression: Electronic ventricular pacemaker        Results for orders placed or performed during the hospital encounter of 03/03/20 (from the past 24 hour(s))   CBC with platelets differential   Result Value Ref Range    WBC 6.6 4.0 - 11.0 10e9/L    RBC Count 3.67 (L) 3.8 - 5.2 10e12/L    Hemoglobin 10.8 (L) 11.7 - 15.7 g/dL    Hematocrit 33.7 (L) 35.0 - 47.0 %    MCV 92 78 - 100 fl    MCH 29.4 26.5 - 33.0 pg    MCHC 32.0 31.5 - 36.5 g/dL    RDW 13.2 10.0 - 15.0 %    Platelet Count 223 150 - 450 10e9/L    Diff Method  Automated Method     % Neutrophils 65.3 %    % Lymphocytes 19.8 %    % Monocytes 9.7 %    % Eosinophils 3.8 %    % Basophils 0.8 %    % Immature Granulocytes 0.6 %    Nucleated RBCs 0 0 /100    Absolute Neutrophil 4.3 1.6 - 8.3 10e9/L    Absolute Lymphocytes 1.3 0.8 - 5.3 10e9/L    Absolute Monocytes 0.6 0.0 - 1.3 10e9/L    Absolute Eosinophils 0.3 0.0 - 0.7 10e9/L    Absolute Basophils 0.1 0.0 - 0.2 10e9/L    Abs Immature Granulocytes 0.0 0 - 0.4 10e9/L    Absolute Nucleated RBC 0.0    Comprehensive metabolic panel   Result Value Ref Range    Sodium 139 133 - 144 mmol/L    Potassium 4.8 3.4 - 5.3 mmol/L    Chloride 109 94 - 109 mmol/L    Carbon Dioxide 24 20 - 32 mmol/L    Anion Gap 6 3 - 14 mmol/L    Glucose 82 70 - 99 mg/dL    Urea Nitrogen 32 (H) 7 - 30 mg/dL    Creatinine 1.15 (H) 0.52 - 1.04 mg/dL    GFR Estimate 44 (L) >60 mL/min/[1.73_m2]    GFR Estimate If Black 51 (L) >60 mL/min/[1.73_m2]    Calcium 8.9 8.5 - 10.1 mg/dL    Bilirubin Total 0.5 0.2 - 1.3 mg/dL    Albumin 3.1 (L) 3.4 - 5.0 g/dL    Protein Total 6.8 6.8 - 8.8 g/dL    Alkaline Phosphatase 62 40 - 150 U/L    ALT 15 0 - 50 U/L    AST 15 0 - 45 U/L   Lactic acid whole blood   Result Value Ref Range    Lactic Acid 1.5 0.7 - 2.0 mmol/L   Troponin I   Result Value Ref Range    Troponin I ES <0.015 0.000 - 0.045 ug/L   D dimer quantitative   Result Value Ref Range    D Dimer 1.6 (H) 0.0 - 0.50 ug/ml FEU   CRP inflammation   Result Value Ref Range    CRP Inflammation <2.9 0.0 - 8.0 mg/L   Head CT w/o contrast    Narrative    PROCEDURE: CT HEAD W/O CONTRAST     HISTORY: Recurrent dizziness.    COMPARISON: None.    TECHNIQUE:  Helical images of the head from the foramen magnum to the  vertex were obtained without contrast.    FINDINGS: The ventricles and sulci are normal in volume. No acute  intracranial hemorrhage, mass effect, midline shift, hydrocephalus or  basilar cystern effacement are present.    The grey-white matter interface is  preserved.    The calvarium is intact. The mastoid air cells are clear.  The  visualized paranasal sinuses are clear.      Impression    IMPRESSION: Normal brain      MARII GARCIA MD   XR Chest 2 Views    Narrative    PROCEDURE: XR CHEST 2 VW 3/3/2020 10:17 AM    HISTORY: Shortness of breath with recurrent dizziness    COMPARISONS: 12/24/2018.    TECHNIQUE: 2 views.    FINDINGS: Pacer defibrillator is in place. Heart is not enlarged.  Lungs are clear and no pleural effusion is seen.    There is been a previous kyphoplasty procedure in the lower thoracic  spine. This was seen previously.         Impression    IMPRESSION: No acute disease.    ULYSSES MONROY MD       Medications   0.9% sodium chloride BOLUS (0 mLs Intravenous Stopped 3/3/20 1129)     Followed by   sodium chloride 0.9% infusion (0 mLs Intravenous Stopped 3/3/20 1127)       Assessments & Plan (with Medical Decision Making)   Postural hypotension: Patient presented to the ED with complaints of recurrent episodes of dizziness.  Initial blood pressure upon arrival was noted to be low at 70/45 mmHg.  Received a bolus of 1 L normal saline at the ED and blood pressure improved.  Blood pressure was 122/58 mmHg at the time of discharge without any dizziness.  The dose of patient's Coreg was reduced from 25 mg twice daily to 12.5 mg twice daily.  Laboratory test done today showed normal CBC, CMP, chest x-ray and CT scan of the head.  Normal CRP, d-dimer, troponin and lactic acid.  Also advised on blood pressure monitoring at home and follow-up with PCP in the next 2 to 3 days.  Return to ED if the dizziness worsens or does not improve with above changes.  Bilateral cerumen impaction: Advised to buy OTC ceruminolytic and follow-up with PCP in 3 days.  I have reviewed the nursing notes.    I have reviewed the findings, diagnosis, plan and need for follow up with the patient.      Discharge Medication List as of 3/3/2020 11:29 AM          Final diagnoses:    Postural hypotension   Bilateral impacted cerumen       3/3/2020   [facility]     Sean Salcedo MD  03/03/20 5600

## 2020-03-06 DIAGNOSIS — R06.02 SOB (SHORTNESS OF BREATH): ICD-10-CM

## 2020-03-06 DIAGNOSIS — I42.8 NON-ISCHEMIC CARDIOMYOPATHY (H): ICD-10-CM

## 2020-03-06 DIAGNOSIS — I50.9 CONGESTIVE HEART FAILURE, NYHA CLASS 2, UNSPECIFIED CONGESTIVE HEART FAILURE TYPE (H): ICD-10-CM

## 2020-03-06 DIAGNOSIS — I51.89 COMBINED SYSTOLIC AND DIASTOLIC CARDIAC DYSFUNCTION: ICD-10-CM

## 2020-03-06 NOTE — TELEPHONE ENCOUNTER
entresto  Last Written Prescription Date: 1/3/2020  Last Fill Quantity: 180 # of Refills: 3  Last Office Visit: 1/3/2020

## 2020-03-09 ENCOUNTER — TELEPHONE (OUTPATIENT)
Dept: FAMILY MEDICINE | Facility: OTHER | Age: 85
End: 2020-03-09

## 2020-03-09 NOTE — TELEPHONE ENCOUNTER
Pt scheduled  Next 5 appointments (look out 90 days)    Mar 10, 2020  3:00 PM CDT  (Arrive by 2:45 PM)  SHORT with R Josh Post MD  [facility] ([facility] ) [address]  [place] [address]  [phone]

## 2020-03-09 NOTE — TELEPHONE ENCOUNTER
4:21 PM    Reason for Call: OVERBOOK    Patient is having the following symptoms:er f/u    The patient is requesting an appointment for was in er last week with Dr FERNANDA Post.    Was an appointment offered for this call? No  If yes : Appointment type              Date    Preferred method for responding to this message: Telephone Call  What is your phone number ?[phone]    If we cannot reach you directly, may we leave a detailed response at the number you provided? Yes    Can this message wait until your PCP/provider returns, if unavailable today? Not applicable    Alexandra Lyons

## 2020-03-10 ENCOUNTER — OFFICE VISIT (OUTPATIENT)
Dept: FAMILY MEDICINE | Facility: OTHER | Age: 85
End: 2020-03-10
Attending: FAMILY MEDICINE
Payer: COMMERCIAL

## 2020-03-10 VITALS
RESPIRATION RATE: 18 BRPM | DIASTOLIC BLOOD PRESSURE: 62 MMHG | SYSTOLIC BLOOD PRESSURE: 116 MMHG | BODY MASS INDEX: 28.85 KG/M2 | TEMPERATURE: 97.6 F | OXYGEN SATURATION: 98 % | HEART RATE: 85 BPM | WEIGHT: 169 LBS | HEIGHT: 64 IN

## 2020-03-10 DIAGNOSIS — I50.9 CONGESTIVE HEART FAILURE, NYHA CLASS 2, UNSPECIFIED CONGESTIVE HEART FAILURE TYPE (H): ICD-10-CM

## 2020-03-10 DIAGNOSIS — R06.02 SOB (SHORTNESS OF BREATH): ICD-10-CM

## 2020-03-10 DIAGNOSIS — I51.89 COMBINED SYSTOLIC AND DIASTOLIC CARDIAC DYSFUNCTION: ICD-10-CM

## 2020-03-10 DIAGNOSIS — I42.8 NON-ISCHEMIC CARDIOMYOPATHY (H): ICD-10-CM

## 2020-03-10 PROCEDURE — 99214 OFFICE O/P EST MOD 30 MIN: CPT | Performed by: FAMILY MEDICINE

## 2020-03-10 PROCEDURE — G0463 HOSPITAL OUTPT CLINIC VISIT: HCPCS

## 2020-03-10 RX ORDER — SACUBITRIL AND VALSARTAN 24; 26 MG/1; MG/1
1 TABLET, FILM COATED ORAL 2 TIMES DAILY
Qty: 60 TABLET | Refills: 5 | Status: SHIPPED | OUTPATIENT
Start: 2020-03-10 | End: 2020-09-08

## 2020-03-10 RX ORDER — TIZANIDINE 2 MG/1
TABLET ORAL
COMMUNITY
Start: 2020-02-07 | End: 2020-05-21

## 2020-03-10 ASSESSMENT — PAIN SCALES - GENERAL: PAINLEVEL: NO PAIN (0)

## 2020-03-10 ASSESSMENT — MIFFLIN-ST. JEOR: SCORE: 1206.58

## 2020-03-10 NOTE — PROGRESS NOTES
Subjective     Jacklyn Hein is a 84 year old female who presents to clinic today for the following health issues:    HPI   ED/UC Followup:    Facility:  [facility] [place]  Date of visit: 3/3/2020  Reason for visit: Postural hypotension, SOB  Current Status: Pt states she is feeling pretty good,  Has cut back on medication per Dr Salcedo  ER summary note  Postural hypotension: Patient presented to the ED with complaints of recurrent episodes of dizziness.  Initial blood pressure upon arrival was noted to be low at 70/45 mmHg.  Received a bolus of 1 L normal saline at the ED and blood pressure improved.  Blood pressure was 122/58 mmHg at the time of discharge without any dizziness.  The dose of patient's Coreg was reduced from 25 mg twice daily to 12.5 mg twice daily.  Laboratory test done today showed normal CBC, CMP, chest x-ray and CT scan of the head.  Normal CRP, d-dimer, troponin and lactic acid.  Also advised on blood pressure monitoring at home           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     Insomnia, unspecified 01/01/2011     LBBB (left bundle branch block) 01/01/2011     Neck pain, chronic 01/01/2011     Non-ischemic cardiomyopathy (H) 12/10/2013     Obesity, unspecified 01/01/2011     Osteopenia 01/01/2011     Pacemaker      Pain in joint, lower leg 07/31/2006     Pure hypercholesterolemia 06/07/2002     Unspecified essential hypertension 01/01/2011       PAST SURGICAL HISTORY:  Past Surgical History:   Procedure Laterality Date     APPENDECTOMY       ARTHROSCOPY KNEE RT/LT  2009    RT     CARDIAC SURGERY  05/06/2014    Pacemaker     Cataract extraction  2009    Bilateral     CHOLECYSTECTOMY      open      COLONOSCOPY  05/2001    Normal      COLONOSCOPY N/A 10/20/2016    Procedure:  COLONOSCOPY;  Surgeon: Charan Montejo MD;  Location: [facility]     colonoscopy with polypectomy      Repeat 3 years      CT CORONARY ANGIOGRAM      normal     HERPES ZOSTER PCR ([facility])       left eye scar tissue       normal echo      fen-phen use       x6       SURGICAL RADIOLOGY PROCEDURE N/A 2016    Procedure: SURGICAL RADIOLOGY PROCEDURE;  Surgeon: Provider, Generic Perianesthesia Nursing;  Location: [facility]       MEDICATIONS:  Prior to Admission medications    Medication Sig Start Date End Date Taking? Authorizing Provider   sacubitril-valsartan (ENTRESTO) 24-26 MG per tablet Take 1 tablet by mouth 2 times daily 3/10/20  Yes ANTHONY Post MD   alendronate (FOSAMAX) 70 MG tablet TAKE 1 TABLET BY MOUTH EVERY 7 DAYS. TAKE WITH 8 OUNCES OF WATER AND STAY UPRIGHT FOR AT LEAST 30 MINUTES AFTER TAKING. 1/3/20   ANTHONY Post MD   apixaban ANTICOAGULANT (ELIQUIS ANTICOAGULANT) 5 MG tablet Take 1 tablet (5 mg) by mouth 2 times daily 19   Kodi Garcia, DO   budesonide-formoterol (SYMBICORT) 160-4.5 MCG/ACT Inhaler INHALE 2 PUFFS INTO THE LUNGS 2 TIMES DAILY 19   ANTHONY Post MD   Calcium Carbonate-Vitamin D (CALCIUM 600 + D OR) Take 1 tablet by mouth daily    Reported, Patient   carvedilol (COREG) 25 MG tablet Take 0.5 tablets (12.5 mg) by mouth 2 times daily (with meals) 3/3/20   Sean Salcedo MD   diphenhydrAMINE-acetaminophen (ACETAMINOPHEN PM)  MG tablet Take 2 tablets by mouth nightly as needed.    Reported, Patient   Multiple Vitamin (MULTI-VITAMIN DAILY PO) Take 1 tablet by mouth daily    Reported, Patient   order for DME Equipment being ordered: Walker 16   ANTHONY Post MD   rosuvastatin (CRESTOR) 10 MG tablet Take 1 tablet (10 mg) by mouth daily 19   Kodi Garcia, DO   SPIRIVA RESPIMAT 2.5 MCG/ACT inhaler INHALE 2 DOSES INTO THE LUNGS ONCE DAILY 10/29/19   ANTHONY Post MD   spironolactone (ALDACTONE) 25 MG tablet TAKE 1/2 TABLET BY  "MOUTH DAILY 2/24/20   ANTHONY Post MD   tiZANidine (ZANAFLEX) 2 MG tablet  2/7/20   Reported, Patient   VENTOLIN  (90 Base) MCG/ACT inhaler INHALE 2 PUFFS INTO THE LUNGS EVERY 6 HOURS AS NEEDED FOR SHORTNESS OF BREATH/DYSPNEA/WHEEZING 2/28/20   ANTHONY Post MD       ALLERGIES:   No Known Allergies    ROS:  Constitutional, HEENT, cardiovascular, pulmonary, gi and gu systems are negative, except as otherwise noted.      EXAM:  /62   Pulse 85   Temp 97.6  F (36.4  C) (Tympanic)   Resp 18   Ht 1.626 m (5' 4\")   Wt 76.7 kg (169 lb)   SpO2 98%   BMI 29.01 kg/m   Body mass index is 29.01 kg/m .   GENERAL APPEARANCE: healthy, alert and no distress  EYES: Eyes grossly normal to inspection, PERRL and conjunctivae and sclerae normal  NECK: no adenopathy, no asymmetry, masses, or scars and thyroid normal to palpation  RESP: lungs clear to auscultation - no rales, rhonchi or wheezes  CV: regular rates and rhythm, normal S1 S2, no S3 or S4 and no murmur, click or rub  NEURO: Normal strength and tone, mentation intact and speech normal  Lab/ X-ray  No results found for this or any previous visit (from the past 24 hour(s)).    ASSESSMENT/PLAN:    ICD-10-CM    1. SOB (shortness of breath)  R06.02    2. Non-ischemic cardiomyopathy (H)  I42.8    3. Combined systolic at 40-45% and diastolic cardiac dysfunction on 5/10/2017  I51.89    4. Congestive heart failure, NYHA class 2, unspecified congestive heart failure type (H)  I50.9 sacubitril-valsartan (ENTRESTO) 24-26 MG per tablet   Patient feels fine now.  She was instructed to drop the Coreg to half of the 25 mg twice a day.  She has a new prescription to do the 97 of Entresto but was on the 49 twice a day of Entresto we discussed I would drop her down to the 24  Entresto and take that twice a day.  Her blood work was fine in the ER.  She has no dizziness now I told her to avoid salt.  She has an appointment in May with Dr. Garcia.  She definitely has room to " go up on either the Entresto or the Coreg if she needs to but again it could lower her blood pressure if we do so for now we will keep at these doses and her blood pressure looks good today.  Patient is here with her daughter and felt comfortable with that plan.  She will continue with her Eliquis to prevent clots.  She does have a couple bruises on her hand I told her if she has nosebleeds or bleeding from her gums or blood in the urine or stool to seek medical attention but occasional bruise here there is not unusual on that medicine especially in her age group.      YUE Post MD  March 10, 2020

## 2020-03-10 NOTE — NURSING NOTE
"Chief Complaint   Patient presents with     ER F/U       Initial /62   Pulse 85   Temp 97.6  F (36.4  C) (Tympanic)   Resp 18   Ht 1.626 m (5' 4\")   Wt 76.7 kg (169 lb)   SpO2 98%   BMI 29.01 kg/m   Estimated body mass index is 29.01 kg/m  as calculated from the following:    Height as of this encounter: 1.626 m (5' 4\").    Weight as of this encounter: 76.7 kg (169 lb).  Medication Reconciliation: complete  Chika Cano LPN  "

## 2020-03-13 DIAGNOSIS — J43.8 OTHER EMPHYSEMA (H): ICD-10-CM

## 2020-03-13 DIAGNOSIS — J44.9 CHRONIC OBSTRUCTIVE PULMONARY DISEASE, UNSPECIFIED COPD TYPE (H): ICD-10-CM

## 2020-03-13 NOTE — TELEPHONE ENCOUNTER
Budesonide-formoterol 160-4.5 MCG/ACT      Last Written Prescription Date:  12-6-19  Last Fill Quantity: 10.2 G,   # refills: 0  Last Office Visit: 3-    spiriva respimat inhaler 2.5 MCG/ACT     Last Written Prescription Date:  10-29-19  Last Fill Quantity: 4 G ,   # refills: 3  Last Office Visit: 3-  Future Office visit:       Routing refill request to provider for review/approval because:

## 2020-03-17 RX ORDER — TIOTROPIUM BROMIDE INHALATION SPRAY 3.12 UG/1
SPRAY, METERED RESPIRATORY (INHALATION)
Qty: 4 G | Refills: 0 | Status: SHIPPED | OUTPATIENT
Start: 2020-03-17 | End: 2020-04-08

## 2020-03-17 RX ORDER — BUDESONIDE AND FORMOTEROL FUMARATE DIHYDRATE 160; 4.5 UG/1; UG/1
AEROSOL RESPIRATORY (INHALATION)
Qty: 10.2 G | Refills: 0 | Status: SHIPPED | OUTPATIENT
Start: 2020-03-17 | End: 2020-04-08

## 2020-03-31 DIAGNOSIS — M81.0 AGE-RELATED OSTEOPOROSIS WITHOUT CURRENT PATHOLOGICAL FRACTURE: ICD-10-CM

## 2020-03-31 RX ORDER — ALENDRONATE SODIUM 70 MG/1
TABLET ORAL
Qty: 12 TABLET | Refills: 0 | Status: SHIPPED | OUTPATIENT
Start: 2020-03-31 | End: 2020-06-22

## 2020-03-31 NOTE — TELEPHONE ENCOUNTER
alendronate (FOSAMAX) 70 MG tablet         Last Written Prescription Date:  1/3/20  Last Fill Quantity: 12,   # refills: 0  Last Office Visit: 3/10/20  Future Office visit:       Routing refill request to provider for review/approval because:  Bisphosphonates Failed      Dexa on file within past 2 years    Normal serum creatinine on file within past 12 months    Creatinine   Date Value Ref Range Status   03/03/2020 1.15 (H) 0.52 - 1.04 mg/dL Final

## 2020-04-08 DIAGNOSIS — J43.8 OTHER EMPHYSEMA (H): ICD-10-CM

## 2020-04-08 DIAGNOSIS — J44.9 CHRONIC OBSTRUCTIVE PULMONARY DISEASE, UNSPECIFIED COPD TYPE (H): ICD-10-CM

## 2020-04-08 RX ORDER — BUDESONIDE AND FORMOTEROL FUMARATE DIHYDRATE 160; 4.5 UG/1; UG/1
AEROSOL RESPIRATORY (INHALATION)
Qty: 10.2 G | Refills: 0 | Status: SHIPPED | OUTPATIENT
Start: 2020-04-08 | End: 2020-05-21

## 2020-04-08 RX ORDER — TIOTROPIUM BROMIDE INHALATION SPRAY 3.12 UG/1
SPRAY, METERED RESPIRATORY (INHALATION)
Qty: 4 G | Refills: 0 | Status: SHIPPED | OUTPATIENT
Start: 2020-04-08 | End: 2020-05-21

## 2020-04-08 NOTE — TELEPHONE ENCOUNTER
SPIRIVA      Last Written Prescription Date:  03/17/2020  Last Fill Quantity: 4 G,   # refills: 0  Last Office Visit: 03/10/2020    SYMBICORT      Last Written Prescription Date:  03/17/2020  Last Fill Quantity: 10.2 G,   # refills: 0  Last Office Visit: 03/10/2020

## 2020-05-04 ENCOUNTER — ANCILLARY PROCEDURE (OUTPATIENT)
Dept: CARDIOLOGY | Facility: CLINIC | Age: 85
End: 2020-05-04
Attending: INTERNAL MEDICINE
Payer: MEDICARE

## 2020-05-04 DIAGNOSIS — I42.9 CARDIOMYOPATHY (H): ICD-10-CM

## 2020-05-04 PROCEDURE — 93295 DEV INTERROG REMOTE 1/2/MLT: CPT | Performed by: INTERNAL MEDICINE

## 2020-05-04 PROCEDURE — 93296 REM INTERROG EVL PM/IDS: CPT | Mod: ZF

## 2020-05-19 DIAGNOSIS — J44.9 CHRONIC OBSTRUCTIVE PULMONARY DISEASE, UNSPECIFIED COPD TYPE (H): ICD-10-CM

## 2020-05-19 DIAGNOSIS — J43.8 OTHER EMPHYSEMA (H): ICD-10-CM

## 2020-05-19 NOTE — TELEPHONE ENCOUNTER
spiriva      Last Written Prescription Date:  04/08/2020  Last Fill Quantity: 4g,   # refills: 0  Last Office Visit: 03/10/2020  Future Office visit:    Next 5 appointments (look out 90 days)    Jul 08, 2020  1:30 PM CDT  (Arrive by 1:15 PM)  Return Visit with Kodi Garcia DO  [facility] ([facility] ) [address]  [facility] [address]  [phone]         symbicort      Last Written Prescription Date:  04/08/2020  Last Fill Quantity: 10.2g,   # refills: 0

## 2020-05-21 ENCOUNTER — HOSPITAL ENCOUNTER (EMERGENCY)
Facility: HOSPITAL | Age: 85
Discharge: HOME | End: 2020-05-21
Attending: PHYSICIAN ASSISTANT | Admitting: PHYSICIAN ASSISTANT
Payer: MEDICARE

## 2020-05-21 ENCOUNTER — APPOINTMENT (OUTPATIENT)
Dept: CT IMAGING | Facility: HOSPITAL | Age: 85
End: 2020-05-21
Attending: PHYSICIAN ASSISTANT
Payer: MEDICARE

## 2020-05-21 VITALS
TEMPERATURE: 97 F | HEART RATE: 67 BPM | DIASTOLIC BLOOD PRESSURE: 65 MMHG | RESPIRATION RATE: 16 BRPM | OXYGEN SATURATION: 96 % | SYSTOLIC BLOOD PRESSURE: 142 MMHG

## 2020-05-21 DIAGNOSIS — J43.8 OTHER EMPHYSEMA (H): ICD-10-CM

## 2020-05-21 DIAGNOSIS — M54.50 BILATERAL LOW BACK PAIN WITHOUT SCIATICA: ICD-10-CM

## 2020-05-21 PROCEDURE — 96372 THER/PROPH/DIAG INJ SC/IM: CPT

## 2020-05-21 PROCEDURE — 72131 CT LUMBAR SPINE W/O DYE: CPT | Mod: TC

## 2020-05-21 PROCEDURE — 25000128 H RX IP 250 OP 636: Performed by: PHYSICIAN ASSISTANT

## 2020-05-21 PROCEDURE — 99283 EMERGENCY DEPT VISIT LOW MDM: CPT | Mod: Z6 | Performed by: PHYSICIAN ASSISTANT

## 2020-05-21 PROCEDURE — 99284 EMERGENCY DEPT VISIT MOD MDM: CPT | Mod: 25

## 2020-05-21 RX ORDER — HYDROMORPHONE HYDROCHLORIDE 1 MG/ML
0.5 INJECTION, SOLUTION INTRAMUSCULAR; INTRAVENOUS; SUBCUTANEOUS ONCE
Status: COMPLETED | OUTPATIENT
Start: 2020-05-21 | End: 2020-05-21

## 2020-05-21 RX ORDER — BUDESONIDE AND FORMOTEROL FUMARATE DIHYDRATE 160; 4.5 UG/1; UG/1
AEROSOL RESPIRATORY (INHALATION)
Qty: 10.2 G | Refills: 0 | Status: SHIPPED | OUTPATIENT
Start: 2020-05-21 | End: 2020-06-22

## 2020-05-21 RX ORDER — HYDROCODONE BITARTRATE AND ACETAMINOPHEN 5; 325 MG/1; MG/1
1 TABLET ORAL EVERY 8 HOURS PRN
Qty: 12 TABLET | Refills: 0 | Status: SHIPPED | OUTPATIENT
Start: 2020-05-21 | End: 2020-05-26

## 2020-05-21 RX ORDER — POLYETHYLENE GLYCOL 3350 17 G/17G
1 POWDER, FOR SOLUTION ORAL DAILY
Qty: 527 G | Refills: 0 | Status: SHIPPED | OUTPATIENT
Start: 2020-05-21 | End: 2020-07-16

## 2020-05-21 RX ORDER — TIOTROPIUM BROMIDE INHALATION SPRAY 3.12 UG/1
SPRAY, METERED RESPIRATORY (INHALATION)
Qty: 4 G | Refills: 0 | Status: SHIPPED | OUTPATIENT
Start: 2020-05-21 | End: 2020-06-22

## 2020-05-21 RX ADMIN — HYDROMORPHONE HYDROCHLORIDE 0.5 MG: 1 INJECTION, SOLUTION INTRAMUSCULAR; INTRAVENOUS; SUBCUTANEOUS at 14:40

## 2020-05-21 ASSESSMENT — ENCOUNTER SYMPTOMS
DIFFICULTY URINATING: 0
CONSTIPATION: 0
NECK STIFFNESS: 0
NEUROLOGICAL NEGATIVE: 1
CHILLS: 0
SHORTNESS OF BREATH: 0
ABDOMINAL PAIN: 0
NECK PAIN: 0
FEVER: 0
BACK PAIN: 1
FATIGUE: 0

## 2020-05-21 NOTE — DISCHARGE INSTRUCTIONS
Start pain medications as prescribed.    Please take MiraLAX with pain medications to avoid constipation.    Your CT scan shows compression of the nerve roots of your back that is likely causing her pain.    Please return here for any fevers, difficulty with bowel movements or urination, incontinence, worsening pain, vomiting, or other concerns.  Please follow-up in the clinic.  Please call your spinal surgeon.

## 2020-05-21 NOTE — TELEPHONE ENCOUNTER
Inhaled Steroids Protocol [id number]/19/2020 10:21 AM   Asthma control assessment score within normal limits in last 6 months     Long-Acting Beta Agonist Inhalers Protocol Failed   Asthma control assessment score within normal limits in last 6 months Protocol Details    Order for Serevent, Striverdi, or Foradil and pt has steroid inhaler          Inhaled Steroids Protocol [id number]/19/2020 10:21 AM   Asthma control assessment score within normal limits in last 6 months     Asthma Maintenance Inhalers - Anticholinergics Failed   Asthma control assessment score within normal limits in last 6 months      1/31/2020     ACT TOTAL SCORE (Goal Greater than or Equal to 20) 19

## 2020-05-21 NOTE — ED TRIAGE NOTES
Pt presents today with c/o back pain, lower. Started 2-3 weeks ago per pt. Pt states it is getting worse, more difficulty to walk and stand. Steady ache per pt. History of back surgery in february. Pt took ibuprofen at 0700 this morning. No issues going to bathroom, last BM this morning. Denies abdominal pain.

## 2020-05-21 NOTE — ED PROVIDER NOTES
History     Chief Complaint   Patient presents with     Back Pain     The history is provided by the patient.     Jacklyn Hein is a 84 year old female who presented to the emergency department for evaluation of low back pain.  She is status post decompression laminectomy in February 2020.  Worsening pain over the last 2 weeks.  Denies any bowel or bladder dysfunction.  Denies any unilateral weakness.  Denies any difficulty with ambulation except for pain.  She does take Eliquis for paroxysmal atrial fibrillation.  Denies any trauma or fall.  Denies any radiation of pain.  Described bilateral low back.    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 on 5/10/2017 06/03/2019     Priority: Medium     LBBB (left bundle branch block) 06/01/2018     Priority: Medium     History of tobacco abuse quitting on 2/1/1998 06/01/2018     Priority: Medium     Mild intermittent asthma, unspecified whether complicated 06/01/2018     Priority: Medium     Chronic obstructive pulmonary disease, unspecified COPD type (H) 06/01/2018     Priority: Medium     SOB (shortness of breath) 06/01/2018     Priority: Medium     Lumbar spine pain 10/27/2015     Priority: Medium     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 Provider to review       Non-ischemic cardiomyopathy (H) 12/10/2013     Priority: Medium     CHF (congestive heart failure), NYHA class II (H) 12/10/2013     Priority: Medium     Insomnia 01/01/2011     Priority: Medium     Problem list name updated by automated process. Provider to  review       Disorder of bone and cartilage 2011     Priority: Medium     Problem list name updated by automated process. Provider to review       Essential hypertension 2011     Priority: Medium     Problem list name updated by automated process. Provider to review       Neck pain, chronic 2011     Priority: Medium     Mixed hyperlipidemia 2002     Priority: Medium        Past Medical History:    Past Medical History:   Diagnosis Date     Angina pectoris 2011     CHF (congestive heart failure), NYHA class II (H) 12/10/2013     CHF (congestive heart failure), NYHA class III (H) 12/10/2013     Hyperhydrosis disorder 2011     Insomnia, unspecified 2011     LBBB (left bundle branch block) 2011     Neck pain, chronic 2011     Non-ischemic cardiomyopathy (H) 12/10/2013     Obesity, unspecified 2011     Osteopenia 2011     Pacemaker      Pain in joint, lower leg 2006     Pure hypercholesterolemia 2002     Unspecified essential hypertension 2011       Past Surgical History:    Past Surgical History:   Procedure Laterality Date     APPENDECTOMY       ARTHROSCOPY KNEE RT/LT      RT     CARDIAC SURGERY  2014    Pacemaker     Cataract extraction  2009    Bilateral     CHOLECYSTECTOMY      open      COLONOSCOPY  2001    Normal      COLONOSCOPY N/A 10/20/2016    Procedure: COLONOSCOPY;  Surgeon: Charan Montejo MD;  Location: [facility]     colonoscopy with polypectomy      Repeat 3 years      CT CORONARY ANGIOGRAM      normal     HERPES ZOSTER PCR ([facility])  2007     left eye scar tissue       normal echo      fen-phen use       x6       SURGICAL RADIOLOGY PROCEDURE N/A 2016    Procedure: SURGICAL RADIOLOGY PROCEDURE;  Surgeon: Provider, Generic Perianesthesia Nursing;  Location: [facility]       Family History:    Family History   Problem Relation Age of Onset     Cancer Mother         lung (cause of death)      Peptic  Ulcer Disease Father         gastric        Social History:  Marital Status:   [2]  Social History     Tobacco Use     Smoking status: Former Smoker     Packs/day: 1.00     Years: 15.00     Pack years: 15.00     Types: Cigarettes     Last attempt to quit: 1998     Years since quittin.3     Smokeless tobacco: Never Used     Tobacco comment: Passive Exposure: No; Longest Tobacco Free: 15 years    Substance Use Topics     Alcohol use: Yes     Comment: Occasionally      Drug use: No        Medications:    alendronate (FOSAMAX) 70 MG tablet  apixaban ANTICOAGULANT (ELIQUIS ANTICOAGULANT) 5 MG tablet  budesonide-formoterol (SYMBICORT) 160-4.5 MCG/ACT Inhaler  Calcium Carbonate-Vitamin D (CALCIUM 600 + D OR)  carvedilol (COREG) 25 MG tablet  HYDROcodone-acetaminophen (NORCO) 5-325 MG tablet  Multiple Vitamin (MULTI-VITAMIN DAILY PO)  polyethylene glycol (MIRALAX) 17 GM/SCOOP powder  rosuvastatin (CRESTOR) 10 MG tablet  sacubitril-valsartan (ENTRESTO) 24-26 MG per tablet  SPIRIVA RESPIMAT 2.5 MCG/ACT inhaler  spironolactone (ALDACTONE) 25 MG tablet  VENTOLIN  (90 Base) MCG/ACT inhaler  diphenhydrAMINE-acetaminophen (ACETAMINOPHEN PM)  MG tablet  order for DME          Review of Systems   Constitutional: Negative for chills, fatigue and fever.   Respiratory: Negative for shortness of breath.    Gastrointestinal: Negative for abdominal pain and constipation.   Genitourinary: Negative for decreased urine volume and difficulty urinating.   Musculoskeletal: Positive for back pain. Negative for neck pain and neck stiffness.   Skin: Negative.    Neurological: Negative.        Physical Exam   BP: 122/55  Pulse: 61  Heart Rate: 68  Temp: 97  F (36.1  C)  Resp: 16  SpO2: 97 %      Physical Exam  Vitals signs and nursing note reviewed.   Constitutional:       Appearance: Normal appearance. She is normal weight.   Cardiovascular:      Rate and Rhythm: Normal rate and regular rhythm.   Musculoskeletal:         Back:       Comments: Increasing pain upon palpation of the low back/lower lumbar region.  She states that this is the exact spot of her pain.  There is no edema or evidence of cellulitis.   Skin:     General: Skin is warm and dry.      Capillary Refill: Capillary refill takes less than 2 seconds.   Neurological:      General: No focal deficit present.      Mental Status: She is alert and oriented to person, place, and time.         ED Course        Procedures               Critical Care time:  none               Results for orders placed or performed during the hospital encounter of 05/21/20 (from the past 24 hour(s))   CT Lumbar Spine w/o Contrast    Narrative    PROCEDURE: CT LUMBAR SPINE W/O CONTRAST 5/21/2020 2:58 PM    HISTORY: low back pain. Previous surgery    COMPARISONS: December 2019    Meds/Dose Given:    TECHNIQUE: CT scan of the lumbar spine with sagittal and coronal  reconstructions    FINDINGS: There is a T12 compression fracture previously treated with  kyphoplasty stable from December 2019.    The T12-L1 disc is normal in height. There is mild retrolisthesis of  L1 on L2 with gaseous degeneration of the disc.    The L2-L3 disc appears normal.    There are facet joint degenerative changes at L3-L4 with gaseous  degeneration of the disc. There is a lateral protrusion on the left  narrowing the neural foramina of the left L3 nerve root.    Postoperative changes are noted at L4    There is decrease in height in the L4-L5 disc with a posterior lateral  osteophyte on the right moderately compressing the right L4 nerve root  in the neural foramina. There is lateral disc protrusion on the left  mildly impinging on the left L4 nerve root. Facet joint degenerative  changes are noted    There is decrease in height in the L5-S1 disc with a posterior lateral  osteophyte on the right moderately compressing the right L5 nerve root  in the neural foramina. Facet joint degenerative changes are noted.    No  intradural abnormalities are seen. Atherosclerotic calcifications  are seen in the abdominal aorta         Impression    IMPRESSION: Posterior lateral osteophytes at L4-L5 and L5-S1  moderately compress the right L4 and L5 nerve roots in the neural  foramina    MARII GARCIA MD       Medications   HYDROmorphone (PF) (DILAUDID) injection 0.5 mg (0.5 mg Intramuscular Given 5/21/20 1440)       Assessments & Plan (with Medical Decision Making)   CT scan as above.  Excellent control of pain with intramuscular pain medications.  No red flag signs or symptoms.  I do not believe she has pain from an epidural hematoma or the like.  We will trial outpatient pain medications and early follow-up in the clinic and spine surgery.  She is to return here for any worsening symptoms, new symptoms, or other concerns.  Long detailed discussion regarding red flag signs and symptoms requiring prompt return.  The patient voiced complete understanding of the work-up and plan was quite happy and agreeable.    This document was prepared using a combination of typing and voice generated software.  While every attempt was made for accuracy, spelling and grammatical errors may exist.    I have reviewed the nursing notes.    I have reviewed the findings, diagnosis, plan and need for follow up with the patient.       New Prescriptions    HYDROCODONE-ACETAMINOPHEN (NORCO) 5-325 MG TABLET    Take 1 tablet by mouth every 8 hours as needed for pain    POLYETHYLENE GLYCOL (MIRALAX) 17 GM/SCOOP POWDER    Take 17 g (1 capful) by mouth daily       Final diagnoses:   Bilateral low back pain without sciatica       5/21/2020   [facility]     Munira Lafleur PA-C  05/21/20 [id number]

## 2020-05-21 NOTE — TELEPHONE ENCOUNTER
symbicort      Last Written Prescription Date:  04/08/2020  Last Fill Quantity: 10.2g,   # refills: 0  Last Office Visit: 03/10/2020  Future Office visit:    Next 5 appointments (look out 90 days)    Jul 08, 2020  1:30 PM CDT  (Arrive by 1:15 PM)  Return Visit with Kodi Garcia DO  [facility] ([facility] [place] ) [address]  [place] [address]  [phone]

## 2020-05-21 NOTE — ED AVS SNAPSHOT
[facility]  [address] [address]  Phone:  [phone]                                    Jacklyn Hein   MRN: [id number]    Department:  [facility]   Date of Visit:  5/21/2020           After Visit Summary Signature Page    I have received my discharge instructions, and my questions have been answered. I have discussed any challenges I see with this plan with the nurse or doctor.    ..........................................................................................................................................  Patient/Patient Representative Signature      ..........................................................................................................................................  Patient Representative Print Name and Relationship to Patient    ..................................................               ................................................  Date                                   Time    ..........................................................................................................................................  Reviewed by Signature/Title    ...................................................              ..............................................  Date                                               Time          22EPIC Rev 08/18

## 2020-05-22 ENCOUNTER — TELEPHONE (OUTPATIENT)
Dept: FAMILY MEDICINE | Facility: OTHER | Age: 85
End: 2020-05-22

## 2020-05-22 RX ORDER — BUDESONIDE AND FORMOTEROL FUMARATE DIHYDRATE 160; 4.5 UG/1; UG/1
AEROSOL RESPIRATORY (INHALATION)
Qty: 10.2 G | Refills: 0 | OUTPATIENT
Start: 2020-05-22

## 2020-05-26 ENCOUNTER — TELEPHONE (OUTPATIENT)
Dept: FAMILY MEDICINE | Facility: OTHER | Age: 85
End: 2020-05-26

## 2020-05-26 ENCOUNTER — VIRTUAL VISIT (OUTPATIENT)
Dept: FAMILY MEDICINE | Facility: OTHER | Age: 85
End: 2020-05-26
Attending: FAMILY MEDICINE
Payer: COMMERCIAL

## 2020-05-26 VITALS — WEIGHT: 167 LBS | BODY MASS INDEX: 28.67 KG/M2

## 2020-05-26 DIAGNOSIS — M54.50 LUMBAR SPINE PAIN: Primary | ICD-10-CM

## 2020-05-26 DIAGNOSIS — Z98.890 HX OF DECOMPRESSIVE LUMBAR LAMINECTOMY: ICD-10-CM

## 2020-05-26 PROCEDURE — 99213 OFFICE O/P EST LOW 20 MIN: CPT | Mod: 95 | Performed by: FAMILY MEDICINE

## 2020-05-26 RX ORDER — HYDROCODONE BITARTRATE AND ACETAMINOPHEN 5; 325 MG/1; MG/1
TABLET ORAL
Qty: 60 TABLET | Refills: 0 | Status: SHIPPED | OUTPATIENT
Start: 2020-05-26 | End: 2020-06-10

## 2020-05-26 ASSESSMENT — PAIN SCALES - GENERAL: PAINLEVEL: WORST PAIN (10)

## 2020-05-26 NOTE — Clinical Note
I placed a referral for patient to see neurosurgery at [facility].  In February of this year had a decompression L3-L5 for stenosis with neurogenic claudication at [facility].  She had a CT of her spine in May 21 of this year.  If you could get an appointment with [facility] neurosurgery and try to get that surgical report from February and the CT from May sent down

## 2020-05-26 NOTE — PROGRESS NOTES
"Jacklyn Hein is a 84 year old female who is being evaluated via a billable telephone visit.      The patient has been notified of following:     \"This telephone visit will be conducted via a call between you and your physician/provider. We have found that certain health care needs can be provided without the need for a physical exam.  This service lets us provide the care you need with a short phone conversation.  If a prescription is necessary we can send it directly to your pharmacy.  If lab work is needed we can place an order for that and you can then stop by our lab to have the test done at a later time.    Telephone visits are billed at different rates depending on your insurance coverage. During this emergency period, for some insurers they may be billed the same as an in-person visit.  Please reach out to your insurance provider with any questions.    If during the course of the call the physician/provider feels a telephone visit is not appropriate, you will not be charged for this service.\"    Patient has given verbal consent for Telephone visit?  Yes    What phone number would you like to be contacted at? [phone]    How would you like to obtain your AVS? Александр Schulte     Jacklyn Hein is a 84 year old female who presents via phone visit today for the following health issues:    HPI  Home Care Referral- Face to Face      Duration: n/a    Description (location/character/radiation):patient would like to discuss getting a home care referral-  due to patient having constant pain she is needing home care for housekeeping, ADL's- needs help getting into the bathtub to shower.     Intensity:  moderate    Accompanying signs and symptoms:states she can set up her own medications. lower back pain pain 10/10 currently     History (similar episodes/previous evaluation): yes    Precipitating or alleviating factors: None    Therapies tried and outcome: Norco              Patient Active " Problem List   Diagnosis     Insomnia     Disorder of bone and cartilage     Essential hypertension     Neck pain, chronic     Mixed hyperlipidemia     Non-ischemic cardiomyopathy (H)     CHF (congestive heart failure), NYHA class II (H)     Automatic implantable cardioverter-defibrillator - Medtronic multi lead ICD on 2014     Lumbar spine pain     LBBB (left bundle branch block)     History of tobacco abuse quitting on 1998     Mild intermittent asthma, unspecified whether complicated     Chronic obstructive pulmonary disease, unspecified COPD type (H)     SOB (shortness of breath)     Combined systolic at 40% and diastolic cardiac dysfunction on 5/10/2017     Paroxysmal atrial fibrillation (H)     CKD (chronic kidney disease) stage 3, GFR 30-59 ml/min (H)     Past Surgical History:   Procedure Laterality Date     APPENDECTOMY       ARTHROSCOPY KNEE RT/LT      RT     BACK SURGERY  2020     CARDIAC SURGERY  2014    Pacemaker     Cataract extraction  2009    Bilateral     CHOLECYSTECTOMY      open      COLONOSCOPY  2001    Normal      COLONOSCOPY N/A 10/20/2016    Procedure: COLONOSCOPY;  Surgeon: Charan Montejo MD;  Location: [facility]     colonoscopy with polypectomy      Repeat 3 years      CT CORONARY ANGIOGRAM      normal     HERPES ZOSTER PCR ([facility])  2007     left eye scar tissue       normal echo      fen-phen use       x6       SURGICAL RADIOLOGY PROCEDURE N/A 2016    Procedure: SURGICAL RADIOLOGY PROCEDURE;  Surgeon: Provider, Generic Perianesthesia Nursing;  Location: [facility]       Social History     Tobacco Use     Smoking status: Former Smoker     Packs/day: 1.00     Years: 15.00     Pack years: 15.00     Types: Cigarettes     Last attempt to quit: 1998     Years since quittin.3     Smokeless tobacco: Never Used     Tobacco comment: Passive Exposure: No; Longest Tobacco Free: 15 years    Substance Use Topics     Alcohol use: Yes     Comment:  Occasionally      Family History   Problem Relation Age of Onset     Cancer Mother         lung (cause of death)      Peptic Ulcer Disease Father         gastric          Current Outpatient Medications   Medication Sig Dispense Refill     alendronate (FOSAMAX) 70 MG tablet TAKE 1 TABLET BY MOUTH EVERY 7 DAYS. TAKE WITH 8 OUNCES OF WATER AND STAY UPRIGHT FOR AT LEAST 30 MINUTES AFTER TAKING. 12 tablet 0     apixaban ANTICOAGULANT (ELIQUIS ANTICOAGULANT) 5 MG tablet Take 1 tablet (5 mg) by mouth 2 times daily 180 tablet 3     budesonide-formoterol (SYMBICORT) 160-4.5 MCG/ACT Inhaler INHALE 2 PUFFS INTO THE LUNGS 2 TIMES DAILY 10.2 g 0     Calcium Carbonate-Vitamin D (CALCIUM 600 + D OR) Take 1 tablet by mouth daily       carvedilol (COREG) 25 MG tablet Take 0.5 tablets (12.5 mg) by mouth 2 times daily (with meals) 90 tablet 3     diphenhydrAMINE-acetaminophen (ACETAMINOPHEN PM)  MG tablet Take 2 tablets by mouth nightly as needed.       HYDROcodone-acetaminophen (NORCO) 5-325 MG tablet Take 1-2 tablets every 6 hrs as needed 60 tablet 0     Multiple Vitamin (MULTI-VITAMIN DAILY PO) Take 1 tablet by mouth daily       order for DME Equipment being ordered: Walker 1 each 0     polyethylene glycol (MIRALAX) 17 GM/SCOOP powder Take 17 g (1 capful) by mouth daily 527 g 0     rosuvastatin (CRESTOR) 10 MG tablet Take 1 tablet (10 mg) by mouth daily 90 tablet 3     sacubitril-valsartan (ENTRESTO) 24-26 MG per tablet Take 1 tablet by mouth 2 times daily 60 tablet 5     SPIRIVA RESPIMAT 2.5 MCG/ACT inhaler INHALE 2 DOSES INTO THE LUNGS ONCE DAILY 4 g 0     spironolactone (ALDACTONE) 25 MG tablet TAKE 1/2 TABLET BY MOUTH DAILY 45 tablet 1     VENTOLIN  (90 Base) MCG/ACT inhaler INHALE 2 PUFFS INTO THE LUNGS EVERY 6 HOURS AS NEEDED FOR SHORTNESS OF BREATH/DYSPNEA/WHEEZING 18 g 0     No Known Allergies    Reviewed and updated as needed this visit by Provider         Review of Systems   Constitutional, HEENT,  cardiovascular, pulmonary, gi and gu systems are negative, except as otherwise noted.       Objective   Reported vitals:  There were no vitals taken for this visit.   healthy, alert and no distress  PSYCH: Alert and oriented times 3; coherent speech, normal   rate and volume, able to articulate logical thoughts, able   to abstract reason, no tangential thoughts, no hallucinations   or delusions  Her affect is normal  RESP: No cough, no audible wheezing, able to talk in full sentences  Remainder of exam unable to be completed due to telephone visits    Diagnostic Test Results:  Labs reviewed in Epic        Assessment/Plan:  1. Lumbar spine pain    - HOME CARE NURSING REFERRAL    2. Hx of decompressive lumbar laminectomy    - HOME CARE NURSING REFERRAL    Patient had decompression L3-L5 for stenosis with neurogenic claudication at [facility] spine with Dr. Torres February 6, 2020.  Recently had recurrence of low back pain was in the emergency room had a CT on May 21.  She is requesting more pain medicine and someone to come into the house to help.  Referral placed for home nursing eval and pain medication prescription was sent in this morning.  Referral placed for [facility] neurosurgery and I informed the [facility] I would like that appointment and the notes from the surgery from February 6+ the CT from May 21 sent to [facility]surgery.      Phone call duration:  8 minutes    ANTHONY Post MD

## 2020-05-26 NOTE — TELEPHONE ENCOUNTER
Pt called, reports extreme back pain. Rates at 10/10. Was seen in ER for this issue and was prescribed norco 5/325mg. Pt does report that these help with pain but is requesting a stronger medication. Would norco 10/325mg be appropriate?      Pt also reports that she would like to have a referral placed for home care as she is having difficulty caring for herself. Pended referral. Please advise. Thank you!

## 2020-05-27 ENCOUNTER — TELEPHONE (OUTPATIENT)
Dept: FAMILY MEDICINE | Facility: OTHER | Age: 85
End: 2020-05-27

## 2020-05-27 DIAGNOSIS — M54.50 LUMBAR SPINE PAIN: ICD-10-CM

## 2020-05-27 DIAGNOSIS — Z98.890 HX OF DECOMPRESSIVE LUMBAR LAMINECTOMY: Primary | ICD-10-CM

## 2020-05-27 NOTE — TELEPHONE ENCOUNTER
Pt calling and would like referral for an epidural for her back pain.She had this at [facility] before.She would like this where ever she can.    She does not want to wait until she sees the doctor in [place].      Liz Gracia RN      Call back [phone]

## 2020-05-27 NOTE — TELEPHONE ENCOUNTER
Patient sent a my chart message for ER back pain.  Looks like she called and Gera is taking care of it.

## 2020-06-02 DIAGNOSIS — I42.8 NON-ISCHEMIC CARDIOMYOPATHY (H): ICD-10-CM

## 2020-06-02 NOTE — TELEPHONE ENCOUNTER
carvedilol      Last Written Prescription Date:  03/03/2020  Last Fill Quantity: 90,   # refills: 3  Last Office Visit: 05/26/2020  Future Office visit:    Next 5 appointments (look out 90 days)    Jul 08, 2020  1:30 PM CDT  (Arrive by 1:15 PM)  Return Visit with Kodi Garcia DO  [facility] [place] ([facility] [place] ) [address]  [place] [address]  [phone]

## 2020-06-04 RX ORDER — CARVEDILOL 12.5 MG/1
TABLET ORAL
Qty: 180 TABLET | Refills: 0 | Status: SHIPPED | OUTPATIENT
Start: 2020-06-04 | End: 2020-08-27

## 2020-06-10 DIAGNOSIS — M54.50 LUMBAR SPINE PAIN: ICD-10-CM

## 2020-06-10 LAB
MDC_IDC_EPISODE_DTM: NORMAL
MDC_IDC_EPISODE_DURATION: 12 S
MDC_IDC_EPISODE_DURATION: 4 S
MDC_IDC_EPISODE_DURATION: 5 S
MDC_IDC_EPISODE_DURATION: 9 S
MDC_IDC_EPISODE_ID: 704
MDC_IDC_EPISODE_ID: 705
MDC_IDC_EPISODE_ID: 706
MDC_IDC_EPISODE_ID: 707
MDC_IDC_EPISODE_ID: 708
MDC_IDC_EPISODE_ID: 709
MDC_IDC_EPISODE_ID: 710
MDC_IDC_EPISODE_TYPE: NORMAL
MDC_IDC_LEAD_IMPLANT_DT: NORMAL
MDC_IDC_LEAD_LOCATION: NORMAL
MDC_IDC_LEAD_LOCATION_DETAIL_1: NORMAL
MDC_IDC_LEAD_MFG: NORMAL
MDC_IDC_LEAD_MODEL: NORMAL
MDC_IDC_LEAD_POLARITY_TYPE: NORMAL
MDC_IDC_LEAD_SERIAL: NORMAL
MDC_IDC_MSMT_BATTERY_DTM: NORMAL
MDC_IDC_MSMT_BATTERY_REMAINING_LONGEVITY: 28 MO
MDC_IDC_MSMT_BATTERY_RRT_TRIGGER: 2.73
MDC_IDC_MSMT_BATTERY_STATUS: NORMAL
MDC_IDC_MSMT_BATTERY_VOLTAGE: 2.94 V
MDC_IDC_MSMT_CAP_CHARGE_DTM: NORMAL
MDC_IDC_MSMT_CAP_CHARGE_ENERGY: 18 J
MDC_IDC_MSMT_CAP_CHARGE_TIME: 4.24
MDC_IDC_MSMT_CAP_CHARGE_TYPE: NORMAL
MDC_IDC_MSMT_LEADCHNL_LV_IMPEDANCE_VALUE: 456 OHM
MDC_IDC_MSMT_LEADCHNL_LV_IMPEDANCE_VALUE: 627 OHM
MDC_IDC_MSMT_LEADCHNL_LV_IMPEDANCE_VALUE: 912 OHM
MDC_IDC_MSMT_LEADCHNL_LV_PACING_THRESHOLD_AMPLITUDE: 0.62 V
MDC_IDC_MSMT_LEADCHNL_LV_PACING_THRESHOLD_PULSEWIDTH: 0.6 MS
MDC_IDC_MSMT_LEADCHNL_RA_IMPEDANCE_VALUE: 437 OHM
MDC_IDC_MSMT_LEADCHNL_RA_PACING_THRESHOLD_AMPLITUDE: 0.38 V
MDC_IDC_MSMT_LEADCHNL_RA_PACING_THRESHOLD_PULSEWIDTH: 0.4 MS
MDC_IDC_MSMT_LEADCHNL_RA_SENSING_INTR_AMPL: 1.75 MV
MDC_IDC_MSMT_LEADCHNL_RA_SENSING_INTR_AMPL: 1.75 MV
MDC_IDC_MSMT_LEADCHNL_RV_IMPEDANCE_VALUE: 513 OHM
MDC_IDC_MSMT_LEADCHNL_RV_IMPEDANCE_VALUE: 589 OHM
MDC_IDC_MSMT_LEADCHNL_RV_PACING_THRESHOLD_AMPLITUDE: 0.38 V
MDC_IDC_MSMT_LEADCHNL_RV_PACING_THRESHOLD_PULSEWIDTH: 0.4 MS
MDC_IDC_MSMT_LEADCHNL_RV_SENSING_INTR_AMPL: 9.5 MV
MDC_IDC_MSMT_LEADCHNL_RV_SENSING_INTR_AMPL: 9.5 MV
MDC_IDC_PG_IMPLANT_DTM: NORMAL
MDC_IDC_PG_MFG: NORMAL
MDC_IDC_PG_MODEL: NORMAL
MDC_IDC_PG_SERIAL: NORMAL
MDC_IDC_PG_TYPE: NORMAL
MDC_IDC_SESS_CLINIC_NAME: NORMAL
MDC_IDC_SESS_DTM: NORMAL
MDC_IDC_SESS_TYPE: NORMAL
MDC_IDC_SET_BRADY_AT_MODE_SWITCH_RATE: 171 {BEATS}/MIN
MDC_IDC_SET_BRADY_LOWRATE: 60 {BEATS}/MIN
MDC_IDC_SET_BRADY_MAX_SENSOR_RATE: 130 {BEATS}/MIN
MDC_IDC_SET_BRADY_MAX_TRACKING_RATE: 130 {BEATS}/MIN
MDC_IDC_SET_BRADY_MODE: NORMAL
MDC_IDC_SET_BRADY_PAV_DELAY_LOW: 150 MS
MDC_IDC_SET_BRADY_SAV_DELAY_LOW: 100 MS
MDC_IDC_SET_CRT_LVRV_DELAY: 0 MS
MDC_IDC_SET_CRT_PACED_CHAMBERS: NORMAL
MDC_IDC_SET_LEADCHNL_LV_PACING_AMPLITUDE: 1.5 V
MDC_IDC_SET_LEADCHNL_LV_PACING_ANODE_ELECTRODE_1: NORMAL
MDC_IDC_SET_LEADCHNL_LV_PACING_ANODE_LOCATION_1: NORMAL
MDC_IDC_SET_LEADCHNL_LV_PACING_CAPTURE_MODE: NORMAL
MDC_IDC_SET_LEADCHNL_LV_PACING_CATHODE_ELECTRODE_1: NORMAL
MDC_IDC_SET_LEADCHNL_LV_PACING_CATHODE_LOCATION_1: NORMAL
MDC_IDC_SET_LEADCHNL_LV_PACING_POLARITY: NORMAL
MDC_IDC_SET_LEADCHNL_LV_PACING_PULSEWIDTH: 0.6 MS
MDC_IDC_SET_LEADCHNL_RA_PACING_AMPLITUDE: 1.5 V
MDC_IDC_SET_LEADCHNL_RA_PACING_ANODE_ELECTRODE_1: NORMAL
MDC_IDC_SET_LEADCHNL_RA_PACING_ANODE_LOCATION_1: NORMAL
MDC_IDC_SET_LEADCHNL_RA_PACING_CAPTURE_MODE: NORMAL
MDC_IDC_SET_LEADCHNL_RA_PACING_CATHODE_ELECTRODE_1: NORMAL
MDC_IDC_SET_LEADCHNL_RA_PACING_CATHODE_LOCATION_1: NORMAL
MDC_IDC_SET_LEADCHNL_RA_PACING_POLARITY: NORMAL
MDC_IDC_SET_LEADCHNL_RA_PACING_PULSEWIDTH: 0.4 MS
MDC_IDC_SET_LEADCHNL_RA_SENSING_ANODE_ELECTRODE_1: NORMAL
MDC_IDC_SET_LEADCHNL_RA_SENSING_ANODE_LOCATION_1: NORMAL
MDC_IDC_SET_LEADCHNL_RA_SENSING_CATHODE_ELECTRODE_1: NORMAL
MDC_IDC_SET_LEADCHNL_RA_SENSING_CATHODE_LOCATION_1: NORMAL
MDC_IDC_SET_LEADCHNL_RA_SENSING_POLARITY: NORMAL
MDC_IDC_SET_LEADCHNL_RA_SENSING_SENSITIVITY: 0.3 MV
MDC_IDC_SET_LEADCHNL_RV_PACING_AMPLITUDE: 2 V
MDC_IDC_SET_LEADCHNL_RV_PACING_ANODE_ELECTRODE_1: NORMAL
MDC_IDC_SET_LEADCHNL_RV_PACING_ANODE_LOCATION_1: NORMAL
MDC_IDC_SET_LEADCHNL_RV_PACING_CAPTURE_MODE: NORMAL
MDC_IDC_SET_LEADCHNL_RV_PACING_CATHODE_ELECTRODE_1: NORMAL
MDC_IDC_SET_LEADCHNL_RV_PACING_CATHODE_LOCATION_1: NORMAL
MDC_IDC_SET_LEADCHNL_RV_PACING_POLARITY: NORMAL
MDC_IDC_SET_LEADCHNL_RV_PACING_PULSEWIDTH: 0.4 MS
MDC_IDC_SET_LEADCHNL_RV_SENSING_ANODE_ELECTRODE_1: NORMAL
MDC_IDC_SET_LEADCHNL_RV_SENSING_ANODE_LOCATION_1: NORMAL
MDC_IDC_SET_LEADCHNL_RV_SENSING_CATHODE_ELECTRODE_1: NORMAL
MDC_IDC_SET_LEADCHNL_RV_SENSING_CATHODE_LOCATION_1: NORMAL
MDC_IDC_SET_LEADCHNL_RV_SENSING_POLARITY: NORMAL
MDC_IDC_SET_LEADCHNL_RV_SENSING_SENSITIVITY: 0.3 MV
MDC_IDC_SET_ZONE_DETECTION_BEATS_DENOMINATOR: 40 {BEATS}
MDC_IDC_SET_ZONE_DETECTION_BEATS_NUMERATOR: 30 {BEATS}
MDC_IDC_SET_ZONE_DETECTION_INTERVAL: 320 MS
MDC_IDC_SET_ZONE_DETECTION_INTERVAL: 350 MS
MDC_IDC_SET_ZONE_DETECTION_INTERVAL: 360 MS
MDC_IDC_SET_ZONE_DETECTION_INTERVAL: 400 MS
MDC_IDC_SET_ZONE_DETECTION_INTERVAL: NORMAL
MDC_IDC_SET_ZONE_TYPE: NORMAL
MDC_IDC_STAT_AT_BURDEN_PERCENT: 0 %
MDC_IDC_STAT_AT_DTM_END: NORMAL
MDC_IDC_STAT_AT_DTM_START: NORMAL
MDC_IDC_STAT_BRADY_AP_VP_PERCENT: 9.32 %
MDC_IDC_STAT_BRADY_AP_VS_PERCENT: 0.19 %
MDC_IDC_STAT_BRADY_AS_VP_PERCENT: 88.85 %
MDC_IDC_STAT_BRADY_AS_VS_PERCENT: 1.64 %
MDC_IDC_STAT_BRADY_DTM_END: NORMAL
MDC_IDC_STAT_BRADY_DTM_START: NORMAL
MDC_IDC_STAT_BRADY_RA_PERCENT_PACED: 9.48 %
MDC_IDC_STAT_BRADY_RV_PERCENT_PACED: 8.64 %
MDC_IDC_STAT_CRT_DTM_END: NORMAL
MDC_IDC_STAT_CRT_DTM_START: NORMAL
MDC_IDC_STAT_CRT_LV_PERCENT_PACED: 97.64 %
MDC_IDC_STAT_CRT_PERCENT_PACED: 97.64 %
MDC_IDC_STAT_EPISODE_RECENT_COUNT: 0
MDC_IDC_STAT_EPISODE_RECENT_COUNT: 1
MDC_IDC_STAT_EPISODE_RECENT_COUNT_DTM_END: NORMAL
MDC_IDC_STAT_EPISODE_RECENT_COUNT_DTM_START: NORMAL
MDC_IDC_STAT_EPISODE_TOTAL_COUNT: 0
MDC_IDC_STAT_EPISODE_TOTAL_COUNT: 1
MDC_IDC_STAT_EPISODE_TOTAL_COUNT: 66
MDC_IDC_STAT_EPISODE_TOTAL_COUNT_DTM_END: NORMAL
MDC_IDC_STAT_EPISODE_TOTAL_COUNT_DTM_START: NORMAL
MDC_IDC_STAT_EPISODE_TYPE: NORMAL
MDC_IDC_STAT_TACHYTHERAPY_ATP_DELIVERED_RECENT: 0
MDC_IDC_STAT_TACHYTHERAPY_ATP_DELIVERED_TOTAL: 0
MDC_IDC_STAT_TACHYTHERAPY_RECENT_DTM_END: NORMAL
MDC_IDC_STAT_TACHYTHERAPY_RECENT_DTM_START: NORMAL
MDC_IDC_STAT_TACHYTHERAPY_SHOCKS_ABORTED_RECENT: 0
MDC_IDC_STAT_TACHYTHERAPY_SHOCKS_ABORTED_TOTAL: 0
MDC_IDC_STAT_TACHYTHERAPY_SHOCKS_DELIVERED_RECENT: 0
MDC_IDC_STAT_TACHYTHERAPY_SHOCKS_DELIVERED_TOTAL: 1
MDC_IDC_STAT_TACHYTHERAPY_TOTAL_DTM_END: NORMAL
MDC_IDC_STAT_TACHYTHERAPY_TOTAL_DTM_START: NORMAL

## 2020-06-10 RX ORDER — HYDROCODONE BITARTRATE AND ACETAMINOPHEN 5; 325 MG/1; MG/1
TABLET ORAL
Qty: 60 TABLET | Refills: 0 | Status: SHIPPED | OUTPATIENT
Start: 2020-06-10 | End: 2020-06-18

## 2020-06-10 NOTE — TELEPHONE ENCOUNTER
Hydrocodone-apap 5-325mg      Last Written Prescription Date:  5/26/20  Last Fill Quantity: 60,   # refills: 0  Last Office Visit: 5/26/2020  Future Office visit:    Next 5 appointments (look out 90 days)    Jul 08, 2020  1:30 PM CDT  (Arrive by 1:15 PM)  Return Visit with Kodi Garcia,   [facility] [place] ([facility] [place] ) [address]  [place] [address]  [phone]           Routing refill request to provider for review/approval because:  Drug not on the [facility], [facility] or [facility] refill protocol or controlled substance  Patient has not heard from referral of neurology. Patient reporting pain and requesting a refill. Please advise

## 2020-06-18 DIAGNOSIS — M54.50 LUMBAR SPINE PAIN: ICD-10-CM

## 2020-06-18 RX ORDER — HYDROCODONE BITARTRATE AND ACETAMINOPHEN 5; 325 MG/1; MG/1
TABLET ORAL
Qty: 60 TABLET | Refills: 0 | Status: SHIPPED | OUTPATIENT
Start: 2020-06-18 | End: 2020-07-01

## 2020-06-18 NOTE — TELEPHONE ENCOUNTER
Pt called wanting refill stating back pain is severe, and still hasn't heard about getting her epidural shot.     Norco      Last Written Prescription Date:  6/10/20  Last Fill Quantity: 60,   # refills: 0  Last Office Visit: 5/26/20  Future Office visit:    Next 5 appointments (look out 90 days)    Jul 08, 2020  1:30 PM CDT  (Arrive by 1:15 PM)  Return Visit with Kodi Garcia,   [facility] ([facility] [place] ) [address]  [place] [address]  [phone]           Routing refill request to provider for review/approval because:

## 2020-06-18 NOTE — TELEPHONE ENCOUNTER
Please see note below that patient called requesting refill.  Last refilled 6/10 #60.    Looks like she is waiting for injection.  I called [facility] today and they are beginning injections now.  Michelle Gomez RN

## 2020-06-19 DIAGNOSIS — J43.8 OTHER EMPHYSEMA (H): ICD-10-CM

## 2020-06-19 DIAGNOSIS — J44.9 CHRONIC OBSTRUCTIVE PULMONARY DISEASE, UNSPECIFIED COPD TYPE (H): ICD-10-CM

## 2020-06-19 DIAGNOSIS — M81.0 AGE-RELATED OSTEOPOROSIS WITHOUT CURRENT PATHOLOGICAL FRACTURE: ICD-10-CM

## 2020-06-19 NOTE — TELEPHONE ENCOUNTER
Symbicort      Last Written Prescription Date:  5-21-20  Last Fill Quantity: 10.2g,   # refills: 0  Last Office Visit: 5-26-20  Future Office visit:    Next 5 appointments (look out 90 days)    Jul 08, 2020  1:30 PM CDT  (Arrive by 1:15 PM)  Return Visit with Kodi Garcia DO  [facility] [place] ([facility] [place] ) [address]  [place] [address]  [phone]           Fosamax      Last Written Prescription Date:  3-31-20  Last Fill Quantity: 12,   # refills: 0  Last Office Visit: 5-26-20  Future Office visit:    Next 5 appointments (look out 90 days)    Jul 08, 2020  1:30 PM CDT  (Arrive by 1:15 PM)  Return Visit with Kodi Garcia DO  [facility] [place] ([facility] [place] ) [address]  [place] [address]  [phone]           Spiriva      Last Written Prescription Date:  5-21-20  Last Fill Quantity: 4g,   # refills: 0  Last Office Visit: 5-26-20  Future Office visit:    Next 5 appointments (look out 90 days)    Jul 08, 2020  1:30 PM CDT  (Arrive by 1:15 PM)  Return Visit with Kodi Garcia DO  [facility] [place] ([facility] [place] ) [address]  [place] [address]  [phone]

## 2020-06-22 RX ORDER — ALENDRONATE SODIUM 70 MG/1
TABLET ORAL
Qty: 12 TABLET | Refills: 0 | Status: SHIPPED | OUTPATIENT
Start: 2020-06-22 | End: 2020-09-28

## 2020-06-22 RX ORDER — BUDESONIDE AND FORMOTEROL FUMARATE DIHYDRATE 160; 4.5 UG/1; UG/1
AEROSOL RESPIRATORY (INHALATION)
Qty: 10.2 G | Refills: 0 | Status: SHIPPED | OUTPATIENT
Start: 2020-06-22 | End: 2020-07-23

## 2020-06-22 RX ORDER — TIOTROPIUM BROMIDE INHALATION SPRAY 3.12 UG/1
SPRAY, METERED RESPIRATORY (INHALATION)
Qty: 4 G | Refills: 0 | Status: SHIPPED | OUTPATIENT
Start: 2020-06-22 | End: 2020-07-23

## 2020-06-22 NOTE — TELEPHONE ENCOUNTER
Failed protocol due to    Dexa on file within past 2 years Protocol Details    Normal serum creatinine on file within past 12 months      ACT Total Scores 1/31/2020   ACT TOTAL SCORE (Goal Greater than or Equal to 20) 19   In the past 12 months, how many times did you visit the emergency room for your asthma without being admitted to the hospital? 0   In the past 12 months, how many times were you hospitalized overnight because of your asthma? 0

## 2020-06-23 ENCOUNTER — HOSPITAL ENCOUNTER (OUTPATIENT)
Dept: INTERVENTIONAL RADIOLOGY/VASCULAR | Facility: HOSPITAL | Age: 85
Discharge: HOME | End: 2020-06-23
Attending: FAMILY MEDICINE | Admitting: FAMILY MEDICINE
Payer: MEDICARE

## 2020-06-23 DIAGNOSIS — M54.50 LUMBAR SPINE PAIN: ICD-10-CM

## 2020-06-23 DIAGNOSIS — Z98.890 HX OF DECOMPRESSIVE LUMBAR LAMINECTOMY: ICD-10-CM

## 2020-06-23 PROCEDURE — G0463 HOSPITAL OUTPT CLINIC VISIT: HCPCS | Mod: TC

## 2020-06-29 ENCOUNTER — TELEPHONE (OUTPATIENT)
Dept: FAMILY MEDICINE | Facility: OTHER | Age: 85
End: 2020-06-29

## 2020-06-29 NOTE — TELEPHONE ENCOUNTER
Patient calling. [facility] still has not contacted her. She is calling to request the nurse/provider give them a little push to call her sooner. Asked that I send a message back for her. Ashley LeChevalier LPN

## 2020-07-01 ENCOUNTER — TELEPHONE (OUTPATIENT)
Dept: INTERVENTIONAL RADIOLOGY/VASCULAR | Facility: HOSPITAL | Age: 85
End: 2020-07-01

## 2020-07-01 DIAGNOSIS — M54.50 LUMBAR SPINE PAIN: ICD-10-CM

## 2020-07-01 RX ORDER — HYDROCODONE BITARTRATE AND ACETAMINOPHEN 5; 325 MG/1; MG/1
TABLET ORAL
Qty: 60 TABLET | Refills: 0 | Status: SHIPPED | OUTPATIENT
Start: 2020-07-01 | End: 2020-07-15

## 2020-07-01 NOTE — TELEPHONE ENCOUNTER
Controlled Substance Refill Request for norco  Problem List Complete:  No     PROVIDER TO CONSIDER COMPLETION OF PROBLEM LIST AND OVERVIEW/CONTROLLED SUBSTANCE AGREEMENT    Last Written Prescription Date:  6/18/20  Last Fill Quantity: 60,   # refills: 0    THE MOST RECENT OFFICE VISIT MUST BE WITHIN THE PAST 3 MONTHS. AT LEAST ONE FACE TO FACE VISIT MUST OCCUR EVERY 6 MONTHS. ADDITIONAL VISITS CAN BE VIRTUAL.  (THIS STATEMENT SHOULD BE DELETED.)    Last Office Visit with [facility] primary care provider: 5/26/20    Future Office visit:   Next 5 appointments (look out 90 days)    Aug 12, 2020  2:30 PM CDT  (Arrive by 2:15 PM)  Return Visit with Kodi Garcia DO  [facility] [address]  [place] [address]  [phone]          Controlled substance agreement:   Encounter-Level CSA:    There are no encounter-level csa.     Patient-Level CSA:    There are no patient-level csa.         Last Urine Drug Screen: No results found for: CDAUT, No results found for: COMDAT, No results found for: THC13, PCP13, COC13, MAMP13, OPI13, AMP13, BZO13, TCA13, MTD13, BAR13, OXY13, PPX13, BUP13     Processing:  Rx to be electronically transmitted to pharmacy by provider      [email or web address]       checked in past 3 months?

## 2020-07-01 NOTE — TELEPHONE ENCOUNTER
Message left on pts voicemail to hold Eloquis 48 hrs prior to back injection. If any questions call.

## 2020-07-08 PROBLEM — I50.22 CHRONIC SYSTOLIC CHF (CONGESTIVE HEART FAILURE) (H): Status: ACTIVE | Noted: 2020-02-06

## 2020-07-08 PROBLEM — M48.062 SPINAL STENOSIS OF LUMBAR REGION WITH NEUROGENIC CLAUDICATION: Status: ACTIVE | Noted: 2020-02-06

## 2020-07-15 DIAGNOSIS — M54.50 LUMBAR SPINE PAIN: ICD-10-CM

## 2020-07-15 RX ORDER — HYDROCODONE BITARTRATE AND ACETAMINOPHEN 5; 325 MG/1; MG/1
TABLET ORAL
Qty: 60 TABLET | Refills: 0 | Status: SHIPPED | OUTPATIENT
Start: 2020-07-15 | End: 2020-07-31

## 2020-07-15 NOTE — TELEPHONE ENCOUNTER
HYDROcodone-acetaminophen (NORCO) 5-325 MG tablet       Last Written Prescription Date:  7/1/20  Last Fill Quantity: 60,   # refills: 0  Last Office Visit: 5/26/20 virtual  Future Office visit:    Next 5 appointments (look out 90 days)    Aug 09, 2020 10:00 AM CDT  (Arrive by 9:45 AM)  [facility] with  [facility]  [facility] [address]  [place] [address]  [phone]   Aug 12, 2020  2:30 PM CDT  (Arrive by 2:15 PM)  Return Visit with Kodi Garcia DO  [facility] ([facility] ) [address]  [place] [address]  [phone]           Routing refill request to provider for review/approval because:  Drug not on the [facility], P or [facility] refill protocol or controlled substance

## 2020-07-16 ENCOUNTER — OFFICE VISIT (OUTPATIENT)
Dept: OTOLARYNGOLOGY | Facility: OTHER | Age: 85
End: 2020-07-16
Attending: PHYSICIAN ASSISTANT
Payer: MEDICARE

## 2020-07-16 VITALS
WEIGHT: 163 LBS | HEART RATE: 79 BPM | BODY MASS INDEX: 27.83 KG/M2 | HEIGHT: 64 IN | DIASTOLIC BLOOD PRESSURE: 62 MMHG | SYSTOLIC BLOOD PRESSURE: 126 MMHG | OXYGEN SATURATION: 96 % | TEMPERATURE: 97.2 F

## 2020-07-16 DIAGNOSIS — H90.5 SENSORINEURAL HEARING LOSS (SNHL), UNSPECIFIED LATERALITY: ICD-10-CM

## 2020-07-16 DIAGNOSIS — H61.23 BILATERAL IMPACTED CERUMEN: Primary | ICD-10-CM

## 2020-07-16 PROCEDURE — G0463 HOSPITAL OUTPT CLINIC VISIT: HCPCS

## 2020-07-16 PROCEDURE — 92504 EAR MICROSCOPY EXAMINATION: CPT | Performed by: PHYSICIAN ASSISTANT

## 2020-07-16 PROCEDURE — 69210 REMOVE IMPACTED EAR WAX UNI: CPT | Mod: 50 | Performed by: PHYSICIAN ASSISTANT

## 2020-07-16 RX ORDER — NAPROXEN SODIUM 220 MG
220 TABLET ORAL 2 TIMES DAILY WITH MEALS
COMMUNITY
End: 2022-06-29

## 2020-07-16 ASSESSMENT — PAIN SCALES - GENERAL: PAINLEVEL: NO PAIN (0)

## 2020-07-16 ASSESSMENT — MIFFLIN-ST. JEOR: SCORE: 1174.36

## 2020-07-16 NOTE — NURSING NOTE
"Chief Complaint   Patient presents with     Cerumen Impaction     Pt is here for an ear cleaning.       Initial /62 (BP Location: Right arm, Cuff Size: Adult Large)   Pulse 79   Temp 97.2  F (36.2  C) (Tympanic)   Ht 1.626 m (5' 4\")   Wt 73.9 kg (163 lb)   SpO2 96%   BMI 27.98 kg/m   Estimated body mass index is 27.98 kg/m  as calculated from the following:    Height as of this encounter: 1.626 m (5' 4\").    Weight as of this encounter: 73.9 kg (163 lb).  Medication Reconciliation: complete  Rasheeda Hollingsworth LPN    "

## 2020-07-16 NOTE — LETTER
2020         RE: Jacklyn Hein  [address] [place] [address]        Dear Colleague,    Thank you for referring your patient, Jacklyn Hein, to the [facility]. Please see a copy of my visit note below.    Otolaryngology Consultation    Patient: Jacklyn Hein  : 1935    Patient presents with:  Cerumen Impaction: Pt is here for an ear cleaning.      HPI:  Jacklyn Hein is a 85 year old female seen today for ear cleaning. She reports her ears are full of cerumen. She was in the ED and noted to have cerumen impaction.   She had her ears cleaned before at her Raine Feng NP.       Denies COM or otologic surgeries.   Denies otalgia, otorrhea  Denies worrisome tinnitus  Denies fluctuating hearing loss or tinnitus.   She does have hearing loss, but declines audiogram/ aids.   Denies vertigo or facial paraesthesia. Denies dysphagia.       Current Outpatient Rx   Medication Sig Dispense Refill     alendronate (FOSAMAX) 70 MG tablet TAKE 1 TABLET BY MOUTH EVERY 7 DAYS. TAKE WITH 8 OUNCES OF WATER AND STAY UPRIGHT FOR AT LEAST 30 MINUTES AFTER TAKING. 12 tablet 0     apixaban ANTICOAGULANT (ELIQUIS ANTICOAGULANT) 5 MG tablet Take 1 tablet (5 mg) by mouth 2 times daily 180 tablet 3     budesonide-formoterol (SYMBICORT) 160-4.5 MCG/ACT Inhaler INHALE 2 PUFFS INTO THE LUNGS 2 TIMES DAILY 10.2 g 0     Calcium Carbonate-Vitamin D (CALCIUM 600 + D OR) Take 1 tablet by mouth daily       carvedilol (COREG) 12.5 MG tablet TAKE 1 TABLET BY MOUTH 2 TIMES DAILY WITH MEALS 180 tablet 0     HYDROcodone-acetaminophen (NORCO) 5-325 MG tablet Take 1-2 tablets every 6 hrs as needed 60 tablet 0     Multiple Vitamin (MULTI-VITAMIN DAILY PO) Take 1 tablet by mouth daily       naproxen sodium (ANAPROX) 220 MG tablet Take 220 mg by mouth 2 times daily (with meals)       order for DME Equipment being ordered: Walker 1 each 0     rosuvastatin (CRESTOR) 10 MG tablet Take 1  tablet (10 mg) by mouth daily 90 tablet 3     sacubitril-valsartan (ENTRESTO) 24-26 MG per tablet Take 1 tablet by mouth 2 times daily 60 tablet 5     SPIRIVA RESPIMAT 2.5 MCG/ACT inhaler INHALE 2 DOSES INTO THE LUNGS ONCE DAILY 4 g 0     spironolactone (ALDACTONE) 25 MG tablet TAKE 1/2 TABLET BY MOUTH DAILY 45 tablet 1     VENTOLIN  (90 Base) MCG/ACT inhaler INHALE 2 PUFFS INTO THE LUNGS EVERY 6 HOURS AS NEEDED FOR SHORTNESS OF BREATH/DYSPNEA/WHEEZING 18 g 0       Allergies: Patient has no known allergies.     Past Medical History:   Diagnosis Date     Angina pectoris 2011     CHF (congestive heart failure), NYHA class II (H) 12/10/2013     CHF (congestive heart failure), NYHA class III (H) 12/10/2013     Hyperhydrosis disorder 2011     Insomnia, unspecified 2011     LBBB (left bundle branch block) 2011     Neck pain, chronic 2011     Non-ischemic cardiomyopathy (H) 12/10/2013     Obesity, unspecified 2011     Osteopenia 2011     Pacemaker      Pain in joint, lower leg 2006     Pure hypercholesterolemia 2002     Unspecified essential hypertension 2011       Past Surgical History:   Procedure Laterality Date     APPENDECTOMY       ARTHROSCOPY KNEE RT/LT      RT     BACK SURGERY  2020     CARDIAC SURGERY  2014    Pacemaker     Cataract extraction  2009    Bilateral     CHOLECYSTECTOMY      open      COLONOSCOPY  2001    Normal      COLONOSCOPY N/A 10/20/2016    Procedure: COLONOSCOPY;  Surgeon: Charan Montejo MD;  Location: [facility]     colonoscopy with polypectomy      Repeat 3 years      CT CORONARY ANGIOGRAM      normal     HERPES ZOSTER PCR ([facility])       IR CONSULTATION FOR IR EXAM  2020     left eye scar tissue       normal echo      fen-phen use       x6       SURGICAL RADIOLOGY PROCEDURE N/A 2016    Procedure: SURGICAL RADIOLOGY PROCEDURE;  Surgeon: Provider, Generic Perianesthesia Nursing;   "Location: [facility]       ENT family history reviewed    Social History     Tobacco Use     Smoking status: Former Smoker     Packs/day: 1.00     Years: 15.00     Pack years: 15.00     Types: Cigarettes     Last attempt to quit: 1998     Years since quittin.4     Smokeless tobacco: Never Used     Tobacco comment: Passive Exposure: No; Longest Tobacco Free: 15 years    Substance Use Topics     Alcohol use: Yes     Comment: Occasionally      Drug use: No       Review of Systems  ROS: 10 point ROS neg other than the symptoms noted above in the HPI     Physical Exam  /62 (BP Location: Right arm, Cuff Size: Adult Large)   Pulse 79   Temp 97.2  F (36.2  C) (Tympanic)   Ht 1.626 m (5' 4\")   Wt 73.9 kg (163 lb)   SpO2 96%   BMI 27.98 kg/m    General - The patient is well nourished and well developed, and appears to have good nutritional status.  Alert and oriented to person and place, answers questions and cooperates with examination appropriately.   Head and Face - Normocephalic and atraumatic, with no gross asymmetry noted.  The facial nerve is intact, with strong symmetric movements.  Voice and Breathing - The patient was breathing comfortably without the use of accessory muscles. There was no wheezing, stridor, or stertor.  The patients voice was clear and strong, and had appropriate pitch and quality.  On examination of the ears, I found that the ear(s) were completely impacted with dense cerumen.  Therefore, I positioned them in the examination chair in a semi-supine position, beginning with the right side.  I used the binocular surgical microscope to perform cerumen removal.  I began by using a cerumen loop to gently lift the edges of the cerumen mass away from the walls of the external canal.   Once the mass was loose enough, the entire plug was pulled from the canal.  The tympanic membrane was intact, no sign of perforation or middle ear effusion.    I turned my attention to the contralateral side " once again using the binocular surgical microscope to perform cerumen removal.  I began by using a cerumen loop to gently lift the edges of the cerumen mass away from the walls of the external canal.   Once the mass was loose enough, the entire plug was pulled from the canal.  The tympanic membrane was intact, no sign of perforation or middle ear effusion.    Eyes - Extraocular movements intact, and the pupils were reactive to light.  Sclera were not icteric or injected, conjunctiva were pink and moist.  Mouth - Examination of the oral cavity showed pink, healthy oral mucosa. No lesions or ulcerations noted.  The tongue was mobile and midline, and the dentition were in good condition.    Throat - The walls of the oropharynx were smooth, pink, moist, symmetric, and had no lesions or ulcerations.  The tonsillar pillars and soft palate were symmetric.  The uvula was midline on elevation.    Neck - Normal midline excursion of the laryngotracheal complex during swallowing.  Full range of motion on passive movement.  Palpation of the occipital, submental, submandibular, internal jugular chain, and supraclavicular nodes did not demonstrate any abnormal lymph nodes or masses.  Palpation of the thyroid was soft and smooth, with no nodules or goiter appreciated.  The trachea was mobile and midline.      ASSESSMENT:    ICD-10-CM    1. Bilateral impacted cerumen  H61.23    2. Sensorineural hearing loss (SNHL), unspecified laterality  H90.5      Reassured normal ear exam.   Return in 6 months.       The ears were cleaned today.  The patient may return here as needed or with their primary physician.  Aural hygiene was discussed and brochure was given to the patient highlighting care of the ear canal.  Avoidance of Q-tips was reiterated.  The patient was told to avoid flushing the ear canal if there is a possibility of a hole or perforation in the tympanic membrane.  Dry ear precautions were also reviewed.  If there are any  unresolved concerns regarding hearing loss an audiogram is recommended.      Brandi Zhang PA-C  ENT  [facility]  [phone]      Again, thank you for allowing me to participate in the care of your patient.        Sincerely,        Brandi Zhang PA-C

## 2020-07-16 NOTE — PATIENT INSTRUCTIONS
Ears were cleaned  Follow up in 6-12 months for ear cleaning.     Ears look well. No fluid or infection.       Thank you for allowing Brandi Zhang PA-C and our ENT team to participate in your care.  If your medications are too expensive, please give the nurse a call.  We can possibly change this medication.  If you have a scheduling or an appointment question please contact our Health Unit Coordinator at their direct line [phone].   ALL nursing questions or concerns can be directed to your ENT nurse at: [phone] Rasheeda

## 2020-07-16 NOTE — PROGRESS NOTES
Otolaryngology Consultation    Patient: Jacklyn Hein  : 1935    Patient presents with:  Cerumen Impaction: Pt is here for an ear cleaning.      HPI:  Jacklyn Hein is a 85 year old female seen today for ear cleaning. She reports her ears are full of cerumen. She was in the ED and noted to have cerumen impaction.   She had her ears cleaned before at her Raine Feng NP.       Denies COM or otologic surgeries.   Denies otalgia, otorrhea  Denies worrisome tinnitus  Denies fluctuating hearing loss or tinnitus.   She does have hearing loss, but declines audiogram/ aids.   Denies vertigo or facial paraesthesia. Denies dysphagia.       Current Outpatient Rx   Medication Sig Dispense Refill     alendronate (FOSAMAX) 70 MG tablet TAKE 1 TABLET BY MOUTH EVERY 7 DAYS. TAKE WITH 8 OUNCES OF WATER AND STAY UPRIGHT FOR AT LEAST 30 MINUTES AFTER TAKING. 12 tablet 0     apixaban ANTICOAGULANT (ELIQUIS ANTICOAGULANT) 5 MG tablet Take 1 tablet (5 mg) by mouth 2 times daily 180 tablet 3     budesonide-formoterol (SYMBICORT) 160-4.5 MCG/ACT Inhaler INHALE 2 PUFFS INTO THE LUNGS 2 TIMES DAILY 10.2 g 0     Calcium Carbonate-Vitamin D (CALCIUM 600 + D OR) Take 1 tablet by mouth daily       carvedilol (COREG) 12.5 MG tablet TAKE 1 TABLET BY MOUTH 2 TIMES DAILY WITH MEALS 180 tablet 0     HYDROcodone-acetaminophen (NORCO) 5-325 MG tablet Take 1-2 tablets every 6 hrs as needed 60 tablet 0     Multiple Vitamin (MULTI-VITAMIN DAILY PO) Take 1 tablet by mouth daily       naproxen sodium (ANAPROX) 220 MG tablet Take 220 mg by mouth 2 times daily (with meals)       order for DME Equipment being ordered: Walker 1 each 0     rosuvastatin (CRESTOR) 10 MG tablet Take 1 tablet (10 mg) by mouth daily 90 tablet 3     sacubitril-valsartan (ENTRESTO) 24-26 MG per tablet Take 1 tablet by mouth 2 times daily 60 tablet 5     SPIRIVA RESPIMAT 2.5 MCG/ACT inhaler INHALE 2 DOSES INTO THE LUNGS ONCE DAILY 4 g 0     spironolactone  (ALDACTONE) 25 MG tablet TAKE 1/2 TABLET BY MOUTH DAILY 45 tablet 1     VENTOLIN  (90 Base) MCG/ACT inhaler INHALE 2 PUFFS INTO THE LUNGS EVERY 6 HOURS AS NEEDED FOR SHORTNESS OF BREATH/DYSPNEA/WHEEZING 18 g 0       Allergies: Patient has no known allergies.     Past Medical History:   Diagnosis Date     Angina pectoris 2011     CHF (congestive heart failure), NYHA class II (H) 12/10/2013     CHF (congestive heart failure), NYHA class III (H) 12/10/2013     Hyperhydrosis disorder 2011     Insomnia, unspecified 2011     LBBB (left bundle branch block) 2011     Neck pain, chronic 2011     Non-ischemic cardiomyopathy (H) 12/10/2013     Obesity, unspecified 2011     Osteopenia 2011     Pacemaker      Pain in joint, lower leg 2006     Pure hypercholesterolemia 2002     Unspecified essential hypertension 2011       Past Surgical History:   Procedure Laterality Date     APPENDECTOMY       ARTHROSCOPY KNEE RT/LT      RT     BACK SURGERY  2020     CARDIAC SURGERY  2014    Pacemaker     Cataract extraction  2009    Bilateral     CHOLECYSTECTOMY      open      COLONOSCOPY  2001    Normal      COLONOSCOPY N/A 10/20/2016    Procedure: COLONOSCOPY;  Surgeon: Charan Montejo MD;  Location: [facility]     colonoscopy with polypectomy      Repeat 3 years      CT CORONARY ANGIOGRAM      normal     HERPES ZOSTER PCR (Health system)       IR CONSULTATION FOR IR EXAM  2020     left eye scar tissue       normal echo      fen-phen use       x6       SURGICAL RADIOLOGY PROCEDURE N/A 2016    Procedure: SURGICAL RADIOLOGY PROCEDURE;  Surgeon: Provider, Generic Perianesthesia Nursing;  Location: [facility]       ENT family history reviewed    Social History     Tobacco Use     Smoking status: Former Smoker     Packs/day: 1.00     Years: 15.00     Pack years: 15.00     Types: Cigarettes     Last attempt to quit: 1998     Years since  "quittin.4     Smokeless tobacco: Never Used     Tobacco comment: Passive Exposure: No; Longest Tobacco Free: 15 years    Substance Use Topics     Alcohol use: Yes     Comment: Occasionally      Drug use: No       Review of Systems  ROS: 10 point ROS neg other than the symptoms noted above in the HPI     Physical Exam  /62 (BP Location: Right arm, Cuff Size: Adult Large)   Pulse 79   Temp 97.2  F (36.2  C) (Tympanic)   Ht 1.626 m (5' 4\")   Wt 73.9 kg (163 lb)   SpO2 96%   BMI 27.98 kg/m    General - The patient is well nourished and well developed, and appears to have good nutritional status.  Alert and oriented to person and place, answers questions and cooperates with examination appropriately.   Head and Face - Normocephalic and atraumatic, with no gross asymmetry noted.  The facial nerve is intact, with strong symmetric movements.  Voice and Breathing - The patient was breathing comfortably without the use of accessory muscles. There was no wheezing, stridor, or stertor.  The patients voice was clear and strong, and had appropriate pitch and quality.  On examination of the ears, I found that the ear(s) were completely impacted with dense cerumen.  Therefore, I positioned them in the examination chair in a semi-supine position, beginning with the right side.  I used the binocular surgical microscope to perform cerumen removal.  I began by using a cerumen loop to gently lift the edges of the cerumen mass away from the walls of the external canal.   Once the mass was loose enough, the entire plug was pulled from the canal.  The tympanic membrane was intact, no sign of perforation or middle ear effusion.    I turned my attention to the contralateral side once again using the binocular surgical microscope to perform cerumen removal.  I began by using a cerumen loop to gently lift the edges of the cerumen mass away from the walls of the external canal.   Once the mass was loose enough, the entire plug " was pulled from the canal.  The tympanic membrane was intact, no sign of perforation or middle ear effusion.    Eyes - Extraocular movements intact, and the pupils were reactive to light.  Sclera were not icteric or injected, conjunctiva were pink and moist.  Mouth - Examination of the oral cavity showed pink, healthy oral mucosa. No lesions or ulcerations noted.  The tongue was mobile and midline, and the dentition were in good condition.    Throat - The walls of the oropharynx were smooth, pink, moist, symmetric, and had no lesions or ulcerations.  The tonsillar pillars and soft palate were symmetric.  The uvula was midline on elevation.    Neck - Normal midline excursion of the laryngotracheal complex during swallowing.  Full range of motion on passive movement.  Palpation of the occipital, submental, submandibular, internal jugular chain, and supraclavicular nodes did not demonstrate any abnormal lymph nodes or masses.  Palpation of the thyroid was soft and smooth, with no nodules or goiter appreciated.  The trachea was mobile and midline.      ASSESSMENT:    ICD-10-CM    1. Bilateral impacted cerumen  H61.23    2. Sensorineural hearing loss (SNHL), unspecified laterality  H90.5      Reassured normal ear exam.   Return in 6 months.       The ears were cleaned today.  The patient may return here as needed or with their primary physician.  Aural hygiene was discussed and brochure was given to the patient highlighting care of the ear canal.  Avoidance of Q-tips was reiterated.  The patient was told to avoid flushing the ear canal if there is a possibility of a hole or perforation in the tympanic membrane.  Dry ear precautions were also reviewed.  If there are any unresolved concerns regarding hearing loss an audiogram is recommended.      Brandi Zhang PA-C  ENT  [facility]  [phone]

## 2020-07-23 DIAGNOSIS — I27.0 PRIMARY PULMONARY HYPERTENSION (H): ICD-10-CM

## 2020-07-23 DIAGNOSIS — J43.8 OTHER EMPHYSEMA (H): ICD-10-CM

## 2020-07-23 DIAGNOSIS — J44.9 CHRONIC OBSTRUCTIVE PULMONARY DISEASE, UNSPECIFIED COPD TYPE (H): ICD-10-CM

## 2020-07-23 RX ORDER — BUDESONIDE AND FORMOTEROL FUMARATE DIHYDRATE 160; 4.5 UG/1; UG/1
AEROSOL RESPIRATORY (INHALATION)
Qty: 10.2 G | Refills: 0 | Status: SHIPPED | OUTPATIENT
Start: 2020-07-23 | End: 2020-08-27

## 2020-07-23 RX ORDER — SPIRONOLACTONE 25 MG/1
TABLET ORAL
Qty: 45 TABLET | Refills: 0 | Status: SHIPPED | OUTPATIENT
Start: 2020-07-23 | End: 2020-10-29

## 2020-07-23 RX ORDER — TIOTROPIUM BROMIDE INHALATION SPRAY 3.12 UG/1
SPRAY, METERED RESPIRATORY (INHALATION)
Qty: 4 G | Refills: 0 | Status: SHIPPED | OUTPATIENT
Start: 2020-07-23 | End: 2020-08-27

## 2020-07-31 DIAGNOSIS — M54.50 LUMBAR SPINE PAIN: ICD-10-CM

## 2020-07-31 RX ORDER — HYDROCODONE BITARTRATE AND ACETAMINOPHEN 5; 325 MG/1; MG/1
TABLET ORAL
Qty: 60 TABLET | Refills: 0 | Status: SHIPPED | OUTPATIENT
Start: 2020-07-31 | End: 2020-08-19

## 2020-07-31 NOTE — TELEPHONE ENCOUNTER
HYDROcodone-acetaminophen (NORCO) 5-325 MG tablet       Last Written Prescription Date:  7/15/20  Last Fill Quantity: 60,   # refills: 0  Last Office Visit: 5/26/20  Future Office visit:    Next 5 appointments (look out 90 days)    Aug 09, 2020 10:00 AM CDT  (Arrive by 9:45 AM)  [facility]  [facility] [address]  [place] [address]  [phone]   Aug 12, 2020  2:30 PM CDT  (Arrive by 2:15 PM)  Return Visit with Kodi Garcia DO  [facility] [address]  [place] [address]  [phone]           Routing refill request to provider for review/approval because:  Drug not on the [facility], P or [facility] refill protocol or controlled substance

## 2020-08-04 ENCOUNTER — ANCILLARY PROCEDURE (OUTPATIENT)
Dept: CARDIOLOGY | Facility: CLINIC | Age: 85
End: 2020-08-04
Attending: INTERNAL MEDICINE
Payer: MEDICARE

## 2020-08-04 DIAGNOSIS — I42.9 CARDIOMYOPATHY (H): ICD-10-CM

## 2020-08-04 PROCEDURE — 93295 DEV INTERROG REMOTE 1/2/MLT: CPT | Performed by: INTERNAL MEDICINE

## 2020-08-04 PROCEDURE — 93296 REM INTERROG EVL PM/IDS: CPT | Mod: ZF

## 2020-08-07 LAB
MDC_IDC_EPISODE_DTM: NORMAL
MDC_IDC_EPISODE_DURATION: 18 S
MDC_IDC_EPISODE_DURATION: 3 S
MDC_IDC_EPISODE_DURATION: 4 S
MDC_IDC_EPISODE_DURATION: 4 S
MDC_IDC_EPISODE_DURATION: 6 S
MDC_IDC_EPISODE_DURATION: 9 S
MDC_IDC_EPISODE_ID: 711
MDC_IDC_EPISODE_ID: 712
MDC_IDC_EPISODE_ID: 713
MDC_IDC_EPISODE_ID: 714
MDC_IDC_EPISODE_ID: 715
MDC_IDC_EPISODE_ID: 716
MDC_IDC_EPISODE_TYPE: NORMAL
MDC_IDC_LEAD_IMPLANT_DT: NORMAL
MDC_IDC_LEAD_LOCATION: NORMAL
MDC_IDC_LEAD_LOCATION_DETAIL_1: NORMAL
MDC_IDC_LEAD_MFG: NORMAL
MDC_IDC_LEAD_MODEL: NORMAL
MDC_IDC_LEAD_POLARITY_TYPE: NORMAL
MDC_IDC_LEAD_SERIAL: NORMAL
MDC_IDC_MSMT_BATTERY_DTM: NORMAL
MDC_IDC_MSMT_BATTERY_REMAINING_LONGEVITY: 26 MO
MDC_IDC_MSMT_BATTERY_RRT_TRIGGER: 2.73
MDC_IDC_MSMT_BATTERY_STATUS: NORMAL
MDC_IDC_MSMT_BATTERY_VOLTAGE: 2.94 V
MDC_IDC_MSMT_CAP_CHARGE_DTM: NORMAL
MDC_IDC_MSMT_CAP_CHARGE_ENERGY: 18 J
MDC_IDC_MSMT_CAP_CHARGE_TIME: 4.31
MDC_IDC_MSMT_CAP_CHARGE_TYPE: NORMAL
MDC_IDC_MSMT_LEADCHNL_LV_IMPEDANCE_VALUE: 456 OHM
MDC_IDC_MSMT_LEADCHNL_LV_IMPEDANCE_VALUE: 570 OHM
MDC_IDC_MSMT_LEADCHNL_LV_IMPEDANCE_VALUE: 893 OHM
MDC_IDC_MSMT_LEADCHNL_LV_PACING_THRESHOLD_AMPLITUDE: 0.5 V
MDC_IDC_MSMT_LEADCHNL_LV_PACING_THRESHOLD_PULSEWIDTH: 0.6 MS
MDC_IDC_MSMT_LEADCHNL_RA_IMPEDANCE_VALUE: 437 OHM
MDC_IDC_MSMT_LEADCHNL_RA_PACING_THRESHOLD_AMPLITUDE: 0.38 V
MDC_IDC_MSMT_LEADCHNL_RA_PACING_THRESHOLD_PULSEWIDTH: 0.4 MS
MDC_IDC_MSMT_LEADCHNL_RA_SENSING_INTR_AMPL: 2.38 MV
MDC_IDC_MSMT_LEADCHNL_RA_SENSING_INTR_AMPL: 2.38 MV
MDC_IDC_MSMT_LEADCHNL_RV_IMPEDANCE_VALUE: 323 OHM
MDC_IDC_MSMT_LEADCHNL_RV_IMPEDANCE_VALUE: 399 OHM
MDC_IDC_MSMT_LEADCHNL_RV_PACING_THRESHOLD_AMPLITUDE: 0.62 V
MDC_IDC_MSMT_LEADCHNL_RV_PACING_THRESHOLD_PULSEWIDTH: 0.4 MS
MDC_IDC_MSMT_LEADCHNL_RV_SENSING_INTR_AMPL: 5 MV
MDC_IDC_MSMT_LEADCHNL_RV_SENSING_INTR_AMPL: 5 MV
MDC_IDC_PG_IMPLANT_DTM: NORMAL
MDC_IDC_PG_MFG: NORMAL
MDC_IDC_PG_MODEL: NORMAL
MDC_IDC_PG_SERIAL: NORMAL
MDC_IDC_PG_TYPE: NORMAL
MDC_IDC_SESS_CLINIC_NAME: NORMAL
MDC_IDC_SESS_DTM: NORMAL
MDC_IDC_SESS_TYPE: NORMAL
MDC_IDC_SET_BRADY_AT_MODE_SWITCH_RATE: 171 {BEATS}/MIN
MDC_IDC_SET_BRADY_LOWRATE: 60 {BEATS}/MIN
MDC_IDC_SET_BRADY_MAX_SENSOR_RATE: 130 {BEATS}/MIN
MDC_IDC_SET_BRADY_MAX_TRACKING_RATE: 130 {BEATS}/MIN
MDC_IDC_SET_BRADY_MODE: NORMAL
MDC_IDC_SET_BRADY_PAV_DELAY_LOW: 160 MS
MDC_IDC_SET_BRADY_SAV_DELAY_LOW: 140 MS
MDC_IDC_SET_CRT_PACED_CHAMBERS: NORMAL
MDC_IDC_SET_LEADCHNL_LV_PACING_AMPLITUDE: 1.5 V
MDC_IDC_SET_LEADCHNL_LV_PACING_ANODE_ELECTRODE_1: NORMAL
MDC_IDC_SET_LEADCHNL_LV_PACING_ANODE_LOCATION_1: NORMAL
MDC_IDC_SET_LEADCHNL_LV_PACING_CAPTURE_MODE: NORMAL
MDC_IDC_SET_LEADCHNL_LV_PACING_CATHODE_ELECTRODE_1: NORMAL
MDC_IDC_SET_LEADCHNL_LV_PACING_CATHODE_LOCATION_1: NORMAL
MDC_IDC_SET_LEADCHNL_LV_PACING_POLARITY: NORMAL
MDC_IDC_SET_LEADCHNL_LV_PACING_PULSEWIDTH: 0.6 MS
MDC_IDC_SET_LEADCHNL_RA_PACING_AMPLITUDE: 1.5 V
MDC_IDC_SET_LEADCHNL_RA_PACING_ANODE_ELECTRODE_1: NORMAL
MDC_IDC_SET_LEADCHNL_RA_PACING_ANODE_LOCATION_1: NORMAL
MDC_IDC_SET_LEADCHNL_RA_PACING_CAPTURE_MODE: NORMAL
MDC_IDC_SET_LEADCHNL_RA_PACING_CATHODE_ELECTRODE_1: NORMAL
MDC_IDC_SET_LEADCHNL_RA_PACING_CATHODE_LOCATION_1: NORMAL
MDC_IDC_SET_LEADCHNL_RA_PACING_POLARITY: NORMAL
MDC_IDC_SET_LEADCHNL_RA_PACING_PULSEWIDTH: 0.4 MS
MDC_IDC_SET_LEADCHNL_RA_SENSING_ANODE_ELECTRODE_1: NORMAL
MDC_IDC_SET_LEADCHNL_RA_SENSING_ANODE_LOCATION_1: NORMAL
MDC_IDC_SET_LEADCHNL_RA_SENSING_CATHODE_ELECTRODE_1: NORMAL
MDC_IDC_SET_LEADCHNL_RA_SENSING_CATHODE_LOCATION_1: NORMAL
MDC_IDC_SET_LEADCHNL_RA_SENSING_POLARITY: NORMAL
MDC_IDC_SET_LEADCHNL_RA_SENSING_SENSITIVITY: 0.3 MV
MDC_IDC_SET_LEADCHNL_RV_PACING_AMPLITUDE: 2 V
MDC_IDC_SET_LEADCHNL_RV_PACING_ANODE_ELECTRODE_1: NORMAL
MDC_IDC_SET_LEADCHNL_RV_PACING_ANODE_LOCATION_1: NORMAL
MDC_IDC_SET_LEADCHNL_RV_PACING_CAPTURE_MODE: NORMAL
MDC_IDC_SET_LEADCHNL_RV_PACING_CATHODE_ELECTRODE_1: NORMAL
MDC_IDC_SET_LEADCHNL_RV_PACING_CATHODE_LOCATION_1: NORMAL
MDC_IDC_SET_LEADCHNL_RV_PACING_POLARITY: NORMAL
MDC_IDC_SET_LEADCHNL_RV_PACING_PULSEWIDTH: 0.4 MS
MDC_IDC_SET_LEADCHNL_RV_SENSING_ANODE_ELECTRODE_1: NORMAL
MDC_IDC_SET_LEADCHNL_RV_SENSING_ANODE_LOCATION_1: NORMAL
MDC_IDC_SET_LEADCHNL_RV_SENSING_CATHODE_ELECTRODE_1: NORMAL
MDC_IDC_SET_LEADCHNL_RV_SENSING_CATHODE_LOCATION_1: NORMAL
MDC_IDC_SET_LEADCHNL_RV_SENSING_POLARITY: NORMAL
MDC_IDC_SET_LEADCHNL_RV_SENSING_SENSITIVITY: 0.3 MV
MDC_IDC_SET_ZONE_DETECTION_BEATS_DENOMINATOR: 40 {BEATS}
MDC_IDC_SET_ZONE_DETECTION_BEATS_NUMERATOR: 30 {BEATS}
MDC_IDC_SET_ZONE_DETECTION_INTERVAL: 320 MS
MDC_IDC_SET_ZONE_DETECTION_INTERVAL: 350 MS
MDC_IDC_SET_ZONE_DETECTION_INTERVAL: 360 MS
MDC_IDC_SET_ZONE_DETECTION_INTERVAL: 400 MS
MDC_IDC_SET_ZONE_DETECTION_INTERVAL: NORMAL
MDC_IDC_SET_ZONE_TYPE: NORMAL
MDC_IDC_STAT_AT_BURDEN_PERCENT: 0.1 %
MDC_IDC_STAT_AT_DTM_END: NORMAL
MDC_IDC_STAT_AT_DTM_START: NORMAL
MDC_IDC_STAT_BRADY_AP_VP_PERCENT: 9.75 %
MDC_IDC_STAT_BRADY_AP_VS_PERCENT: 0.19 %
MDC_IDC_STAT_BRADY_AS_VP_PERCENT: 88.58 %
MDC_IDC_STAT_BRADY_AS_VS_PERCENT: 1.47 %
MDC_IDC_STAT_BRADY_DTM_END: NORMAL
MDC_IDC_STAT_BRADY_DTM_START: NORMAL
MDC_IDC_STAT_BRADY_RA_PERCENT_PACED: 9.93 %
MDC_IDC_STAT_BRADY_RV_PERCENT_PACED: 7.92 %
MDC_IDC_STAT_CRT_DTM_END: NORMAL
MDC_IDC_STAT_CRT_DTM_START: NORMAL
MDC_IDC_STAT_CRT_LV_PERCENT_PACED: 98.09 %
MDC_IDC_STAT_CRT_PERCENT_PACED: 98.09 %
MDC_IDC_STAT_EPISODE_RECENT_COUNT: 0
MDC_IDC_STAT_EPISODE_RECENT_COUNT: 1
MDC_IDC_STAT_EPISODE_RECENT_COUNT_DTM_END: NORMAL
MDC_IDC_STAT_EPISODE_RECENT_COUNT_DTM_START: NORMAL
MDC_IDC_STAT_EPISODE_TOTAL_COUNT: 0
MDC_IDC_STAT_EPISODE_TOTAL_COUNT: 1
MDC_IDC_STAT_EPISODE_TOTAL_COUNT: 66
MDC_IDC_STAT_EPISODE_TOTAL_COUNT_DTM_END: NORMAL
MDC_IDC_STAT_EPISODE_TOTAL_COUNT_DTM_START: NORMAL
MDC_IDC_STAT_EPISODE_TYPE: NORMAL
MDC_IDC_STAT_TACHYTHERAPY_ATP_DELIVERED_RECENT: 0
MDC_IDC_STAT_TACHYTHERAPY_ATP_DELIVERED_TOTAL: 0
MDC_IDC_STAT_TACHYTHERAPY_RECENT_DTM_END: NORMAL
MDC_IDC_STAT_TACHYTHERAPY_RECENT_DTM_START: NORMAL
MDC_IDC_STAT_TACHYTHERAPY_SHOCKS_ABORTED_RECENT: 0
MDC_IDC_STAT_TACHYTHERAPY_SHOCKS_ABORTED_TOTAL: 0
MDC_IDC_STAT_TACHYTHERAPY_SHOCKS_DELIVERED_RECENT: 0
MDC_IDC_STAT_TACHYTHERAPY_SHOCKS_DELIVERED_TOTAL: 1
MDC_IDC_STAT_TACHYTHERAPY_TOTAL_DTM_END: NORMAL
MDC_IDC_STAT_TACHYTHERAPY_TOTAL_DTM_START: NORMAL

## 2020-08-08 ENCOUNTER — OFFICE VISIT (OUTPATIENT)
Dept: FAMILY MEDICINE | Facility: OTHER | Age: 85
End: 2020-08-08
Attending: FAMILY MEDICINE
Payer: MEDICARE

## 2020-08-08 DIAGNOSIS — Z01.818 PREOP GENERAL PHYSICAL EXAM: Primary | ICD-10-CM

## 2020-08-08 PROCEDURE — U0003 INFECTIOUS AGENT DETECTION BY NUCLEIC ACID (DNA OR RNA); SEVERE ACUTE RESPIRATORY SYNDROME CORONAVIRUS 2 (SARS-COV-2) (CORONAVIRUS DISEASE [COVID-19]), AMPLIFIED PROBE TECHNIQUE, MAKING USE OF HIGH THROUGHPUT TECHNOLOGIES AS DESCRIBED BY CMS-2020-01-R: HCPCS | Mod: ZL | Performed by: FAMILY MEDICINE

## 2020-08-09 LAB
LABORATORY COMMENT REPORT: NORMAL
SARS-COV-2 RNA SPEC QL NAA+PROBE: NEGATIVE
SARS-COV-2 RNA SPEC QL NAA+PROBE: NORMAL
SPECIMEN SOURCE: NORMAL
SPECIMEN SOURCE: NORMAL

## 2020-08-11 ENCOUNTER — TELEPHONE (OUTPATIENT)
Dept: INTERVENTIONAL RADIOLOGY/VASCULAR | Facility: HOSPITAL | Age: 85
End: 2020-08-11

## 2020-08-11 ENCOUNTER — TELEPHONE (OUTPATIENT)
Dept: CARDIOLOGY | Facility: OTHER | Age: 85
End: 2020-08-11

## 2020-08-12 ENCOUNTER — HOSPITAL ENCOUNTER (OUTPATIENT)
Facility: HOSPITAL | Age: 85
Discharge: HOME | End: 2020-08-13
Attending: RADIOLOGY | Admitting: RADIOLOGY
Payer: MEDICARE

## 2020-08-12 ENCOUNTER — HOSPITAL ENCOUNTER (OUTPATIENT)
Dept: INTERVENTIONAL RADIOLOGY/VASCULAR | Facility: HOSPITAL | Age: 85
End: 2020-08-12
Attending: FAMILY MEDICINE
Payer: MEDICARE

## 2020-08-12 DIAGNOSIS — M47.816 LUMBAR SPONDYLOSIS: ICD-10-CM

## 2020-08-12 PROCEDURE — 25000128 H RX IP 250 OP 636: Performed by: RADIOLOGY

## 2020-08-12 PROCEDURE — 64483 NJX AA&/STRD TFRM EPI L/S 1: CPT | Mod: TC,LT

## 2020-08-12 PROCEDURE — 25000125 ZZHC RX 250: Performed by: RADIOLOGY

## 2020-08-12 RX ORDER — METHYLPREDNISOLONE ACETATE 80 MG/ML
80 INJECTION, SUSPENSION INTRA-ARTICULAR; INTRALESIONAL; INTRAMUSCULAR; SOFT TISSUE ONCE
Status: DISCONTINUED | OUTPATIENT
Start: 2020-08-12 | End: 2020-08-13 | Stop reason: HOSPADM

## 2020-08-12 RX ORDER — DEXAMETHASONE SODIUM PHOSPHATE 10 MG/ML
10 INJECTION, SOLUTION INTRAMUSCULAR; INTRAVENOUS ONCE
Status: COMPLETED | OUTPATIENT
Start: 2020-08-12 | End: 2020-08-12

## 2020-08-12 RX ORDER — METHYLPREDNISOLONE ACETATE 80 MG/ML
INJECTION, SUSPENSION INTRA-ARTICULAR; INTRALESIONAL; INTRAMUSCULAR; SOFT TISSUE
Status: DISCONTINUED
Start: 2020-08-12 | End: 2020-08-13 | Stop reason: HOSPADM

## 2020-08-12 RX ORDER — IOPAMIDOL 612 MG/ML
15 INJECTION, SOLUTION INTRATHECAL ONCE
Status: COMPLETED | OUTPATIENT
Start: 2020-08-12 | End: 2020-08-12

## 2020-08-12 RX ORDER — DEXAMETHASONE SODIUM PHOSPHATE 10 MG/ML
INJECTION, SOLUTION INTRAMUSCULAR; INTRAVENOUS
Status: DISCONTINUED
Start: 2020-08-12 | End: 2020-08-13 | Stop reason: HOSPADM

## 2020-08-12 RX ORDER — LIDOCAINE HYDROCHLORIDE 10 MG/ML
INJECTION, SOLUTION EPIDURAL; INFILTRATION; INTRACAUDAL; PERINEURAL
Status: DISCONTINUED
Start: 2020-08-12 | End: 2020-08-13 | Stop reason: HOSPADM

## 2020-08-12 RX ADMIN — LIDOCAINE HYDROCHLORIDE 5 ML: 10 INJECTION, SOLUTION EPIDURAL; INFILTRATION; INTRACAUDAL; PERINEURAL at 14:10

## 2020-08-12 RX ADMIN — IOPAMIDOL 3 ML: 612 INJECTION, SOLUTION INTRATHECAL at 14:08

## 2020-08-12 RX ADMIN — DEXAMETHASONE SODIUM PHOSPHATE 10 MG: 10 INJECTION, SOLUTION INTRAMUSCULAR; INTRAVENOUS at 14:06

## 2020-08-12 NOTE — PROGRESS NOTES
Pt has numb legs bilaterally.  Tried standing with assist of two. Radiologist aware, placed in chair safely, will cont to monitor

## 2020-08-12 NOTE — IP AVS SNAPSHOT
[facility]  [address] [address]  Phone:  [phone]  Fax:  [phone]                                    After Visit Summary   8/12/2020    Jacklyn Hein    MRN: [id number]           After Visit Summary Signature Page    I have received my discharge instructions, and my questions have been answered. I have discussed any challenges I see with this plan with the nurse or doctor.    ..........................................................................................................................................  Patient/Patient Representative Signature      ..........................................................................................................................................  Patient Representative Print Name and Relationship to Patient    ..................................................               ................................................  Date                                   Time    ..........................................................................................................................................  Reviewed by Signature/Title    ...................................................              ..............................................  Date                                               Time          22EPIC Rev 08/18

## 2020-08-12 NOTE — DISCHARGE INSTRUCTIONS
Cell number on file:    Telephone Information:   Mobile [phone]     Is it ok to leave a message at this number(s)? Yes    Dr Vela completed your procedure on 8/12/2020.    Current Pain Level (0-10 Scale): 4/10  Post Pain Level (0-10):  0/10    Radiology Discharge instructions for Steroid Injection    Activity Level:     Do not do any heavy activity or exercise for 24 hours.   Do not drive for 4 hours after your injection.  Diet:   Return to your normal diet.  Medications:   If you have stopped taking your Aspirin, Coumadin/Warfarin, Ibuprofen, or any   other blood thinner for this procedure you may resume in the morning unless   your primary care provider has given you other instructions.    Diabetics may see an increase in blood sugar after steroid injections. If you are concerned about your blood sugar, please contact your family doctor.    Site Care:  Remove the bandage and bathe or shower the morning after the procedure.      This is a Pain Management procedure.  You will be contacted in two weeks for follow up.    Call your Primary Care Provider if you have the following (if your primary care provider is not available please seek emergency care):   Nausea with vomiting   Severe headache   Drowsiness or confusion   Redness or drainage at the injection or puncture site   Temperature over 101 degrees F   Other concerns   Worsening back pain   Stiff neck

## 2020-08-13 NOTE — PROGRESS NOTES
Pt in nook area.  Ambulated with walker to wheelchair with slight weakness in right leg.  States she feels ok to go home as her  and daughter will assist her in to house.

## 2020-08-19 ENCOUNTER — OFFICE VISIT (OUTPATIENT)
Dept: CARDIOLOGY | Facility: OTHER | Age: 85
End: 2020-08-19
Attending: INTERNAL MEDICINE
Payer: COMMERCIAL

## 2020-08-19 VITALS
HEART RATE: 76 BPM | OXYGEN SATURATION: 96 % | DIASTOLIC BLOOD PRESSURE: 67 MMHG | BODY MASS INDEX: 27.7 KG/M2 | SYSTOLIC BLOOD PRESSURE: 102 MMHG | WEIGHT: 161.4 LBS | TEMPERATURE: 98 F

## 2020-08-19 DIAGNOSIS — M54.50 LUMBAR SPINE PAIN: ICD-10-CM

## 2020-08-19 DIAGNOSIS — Z87.891 HISTORY OF TOBACCO ABUSE: ICD-10-CM

## 2020-08-19 DIAGNOSIS — I10 ESSENTIAL HYPERTENSION: ICD-10-CM

## 2020-08-19 DIAGNOSIS — I48.0 PAROXYSMAL ATRIAL FIBRILLATION (H): ICD-10-CM

## 2020-08-19 DIAGNOSIS — R06.02 SOB (SHORTNESS OF BREATH): Primary | ICD-10-CM

## 2020-08-19 DIAGNOSIS — I51.89 COMBINED SYSTOLIC AND DIASTOLIC CARDIAC DYSFUNCTION: ICD-10-CM

## 2020-08-19 DIAGNOSIS — I50.9 CONGESTIVE HEART FAILURE, NYHA CLASS 2, UNSPECIFIED CONGESTIVE HEART FAILURE TYPE (H): ICD-10-CM

## 2020-08-19 DIAGNOSIS — G62.9 NEUROPATHY: ICD-10-CM

## 2020-08-19 DIAGNOSIS — I44.7 LBBB (LEFT BUNDLE BRANCH BLOCK): ICD-10-CM

## 2020-08-19 DIAGNOSIS — I42.8 NON-ISCHEMIC CARDIOMYOPATHY (H): ICD-10-CM

## 2020-08-19 DIAGNOSIS — N18.30 CKD (CHRONIC KIDNEY DISEASE) STAGE 3, GFR 30-59 ML/MIN (H): ICD-10-CM

## 2020-08-19 DIAGNOSIS — I50.22 CHRONIC SYSTOLIC CHF (CONGESTIVE HEART FAILURE) (H): ICD-10-CM

## 2020-08-19 DIAGNOSIS — Z95.810 AUTOMATIC IMPLANTABLE CARDIOVERTER-DEFIBRILLATOR IN SITU: ICD-10-CM

## 2020-08-19 PROCEDURE — G0463 HOSPITAL OUTPT CLINIC VISIT: HCPCS

## 2020-08-19 PROCEDURE — 99214 OFFICE O/P EST MOD 30 MIN: CPT | Performed by: INTERNAL MEDICINE

## 2020-08-19 RX ORDER — GABAPENTIN 300 MG/1
300 CAPSULE ORAL 3 TIMES DAILY
Qty: 30 CAPSULE | Refills: 1 | Status: SHIPPED | OUTPATIENT
Start: 2020-08-19 | End: 2020-09-09

## 2020-08-19 RX ORDER — HYDROCODONE BITARTRATE AND ACETAMINOPHEN 5; 325 MG/1; MG/1
TABLET ORAL
Qty: 60 TABLET | Refills: 0 | Status: SHIPPED | OUTPATIENT
Start: 2020-08-19 | End: 2020-08-28

## 2020-08-19 ASSESSMENT — PAIN SCALES - GENERAL: PAINLEVEL: NO PAIN (0)

## 2020-08-19 NOTE — TELEPHONE ENCOUNTER
Reason for call:  Medication    1. Medication Name? Hyrocodone  2. Is this request for a refill? Yes  3. What Pharmacy do you use? [facility]  4. Have you contacted your pharmacy? No    5. If yes, when?  (Please note that the turn-around-time for prescriptions is 72 business hours; I am sending your request at this time. SEND TO  [facility]  )  Description: Pt is requesting a refill on Hydrocodone  Was an appointment offered for this a call? No   Preferred method for responding to this messageTelephone Call  If we cannot reach you directly, may we leave a detailed response at the number you provided? Yes  Can this message wait until your PCP/Provider returns if not available today? N/A, Provider is in today

## 2020-08-19 NOTE — PATIENT INSTRUCTIONS
Thank you for allowing Dr. Garcia and our  team to participate in your care. Please call our office at [phone] with scheduling questions or if you need to cancel or change your appointment. With any other questions or concerns you may call [facility] cardiology nurse at [phone].       If you experience chest pain, chest pressure, chest tightness, shortness of breath, fainting, lightheadedness, nausea, vomiting, or other concerning symptoms, please report to the Emergency Department or call 911. These symptoms may be emergent, and best treated in the Emergency Department.    Follow up in 6 months

## 2020-08-19 NOTE — PROGRESS NOTES
[facility] HEART CARE   CARDIOLOGY PROGRESS NOTE     Chief Complaint   Patient presents with     RECHECK          Diagnosis:  1.  Chronic systolic at 40-45% and diastolic heart failure as noted on echo from 12/01/2019.  2.  Reduction in severity of COPD from severe to mild to moderate per pulmonary and concern for asthma based on PFT's from 5/17/17.  3.  Nonischemic cardiomyopathy with NYHA classification 2.  4.  ICD placement on 11/11/2014.  5.  Hypertension-controlled.  6.  Hyperlipidemia-uncontrolled.  7.  Remote h/o tobacco abuse quitting on 2/1/1998  8.  LBBB.  9.  SOB, now significantly improved.  10.  CKD 3.  11.  Hypocalcemia.  12.  Brief runs of A. fib up to 23 seconds.  13.  CHADSVASC score of 4.     Assessment/Plan:    1.  She is concerned about being on Eliquis and bruising.  She is hoping to decrease the dose from 5 mg twice a day 2.5 mg twice a day.  Based on prescribing recommendations, she should be on 5 mg twice a day.  This was explained today.  Initially, she was willing to go down to the 2.5 mg knowing that she still be at risk for stroke.  After long conversation with this persuasion of her daughter Mihaela, she chose to continue on Eliquis 5 mg twice a day and Imdur the bruising she is having.  She has had no major bleeding.  2.  She is having some pain to her legs.  She is concerned it is related to the medications versus neuropathy.  She states she has a hard time sleeping.  She was encouraged to follow-up with her primary related to this.  She was given a low dose Neurontin 300 mg to be taken once a day with likely future adjustments/changes to be made by her primary.  Her legs have gotten worse since back surgery.  3.  Continue spironolactone 12.5 mg Entresto total dose 50 mg twice a day, and Coreg 12.5 mg twice a day for heart failure.  4.  Continue Eliquis 5 mg twice a day and Coreg 12.5 mg twice a day for A. Fib.  5.  Continue Crestor 10 mg daily for hyperlipidemia.  6.  Follow-up in 6  months or sooner with issues.      Interval history:  Jacklyn has been doing well.  Her main concern today is bruising to her arms and discomfort to her legs that she thinks is related to neuropathy.  Her legs are tender to palpation.  Her legs are also mildly swollen.  She was requesting a medication for neuropathy.  I explained to her that typically do not prescribe these.  She was given a prescription for Neurontin and was told to follow-up with her primary.  She has been stable from a heart failure standpoint.  We reviewed her ICD interrogation from 8/4/2020.  It shows 1 second of A. fib and being paced 98% of time.  She has 2.1 years left on her battery.  Patient doing fine without other issues.      HPI:    Jacklyn Hein is being seen by cardiology for dyspnea with chronic systolic EF of 40-45 % and evidence for diastolic heart failure on 12/31/2019.     She has been short of breath with a diagnosis of asthma and COPD.  She is followed for a history of severe nonischemic cardiomyopathy with a previously ejection fraction at less than 35%. More recently, her EF on 12/31/2019 was 40-45 %.  She also has diastolic heart failure, ICD placed on 11/11/16, her recent visit with pulmonary secondary to what was thought to be severe COPD but was downgraded to mild to moderate with a greater concern for asthma, per the patient.     She was started on some breathing treatments for asthma/COPD by pulmonary.  With these treatments, she says she feels much better but remains short of breath.       With having diastolic and systolic heart failure, she has minimal to no lower extremity edema. Her activity has been limited secondary to chronic back pain and generalized weakness.  It was not for back pain, she would be doing really well.     She has a history of severe nonischemic cardiomyopathy S/P ICD placement on 11/11/2014. She had her ICD checked on 10/22/2019.  It showed she was bi-V paced 96.8% of the time with 3.0  years left on her battery.  = 98.1%. She had up to 20 seconds of A. fib with a total of 3 episodes.  Her device was described as functioning appropriately.     Echo from 12/31/2019.  She has an EF of 40% to 45%, grade 1 diastolic dysfunction, mild left atrial enlargement, mild to moderate AI, and mild MI      Relevant testing:  Echo on 12/31/2019:  No pericardial effusion is present.  Left ventricular size is normal.  The Ejection Fraction is estimated at 40-45%.  Grade I or early diastolic dysfunction.  Mild diffuse hypokinesis is present.  The right ventricle is normal size.  Global right ventricular function is normal.  Mild left atrial enlargement is present.  Mild mitral insufficiency is present.  Mild to moderate aortic insufficiency is present.  The mean gradient across the aortic valve is 3.9 mmHg.  Trace tricuspid insufficiency is present.  Right ventricular systolic pressure is 20 mmHg above the right atrial pressure.  The pulmonic valve is normal.  The aorta root is normal.      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     Insomnia, unspecified 01/01/2011     LBBB (left bundle branch block) 01/01/2011     Neck pain, chronic 01/01/2011     Non-ischemic cardiomyopathy (H) 12/10/2013     Obesity, unspecified 01/01/2011     Osteopenia 01/01/2011     Pacemaker      Pain in joint, lower leg 07/31/2006     Pure hypercholesterolemia 06/07/2002     Unspecified essential hypertension 01/01/2011       Past Surgical History:   Procedure Laterality Date     APPENDECTOMY       ARTHROSCOPY KNEE RT/LT  2009    RT     BACK SURGERY  02/06/2020     CARDIAC SURGERY  05/06/2014    Pacemaker     Cataract extraction  2009    Bilateral     CHOLECYSTECTOMY      open      COLONOSCOPY  05/2001    Normal      COLONOSCOPY N/A 10/20/2016     Procedure: COLONOSCOPY;  Surgeon: Charan Montejo MD;  Location: [facility] OR     colonoscopy with polypectomy      Repeat 3 years      CT CORONARY ANGIOGRAM      normal     HERPES ZOSTER PCR ([facility])       IR CONSULTATION FOR IR EXAM  2020     left eye scar tissue       normal echo      fen-phen use       x6       SURGICAL RADIOLOGY PROCEDURE N/A 2016    Procedure: SURGICAL RADIOLOGY PROCEDURE;  Surgeon: Provider, [facility]ianest[facility] Nursing;  Location: [facility] OR       No Known Allergies    Current Outpatient Medications   Medication Sig Dispense Refill     alendronate (FOSAMAX) 70 MG tablet TAKE 1 TABLET BY MOUTH EVERY 7 DAYS. TAKE WITH 8 OUNCES OF WATER AND STAY UPRIGHT FOR AT LEAST 30 MINUTES AFTER TAKING. 12 tablet 0     apixaban ANTICOAGULANT (ELIQUIS ANTICOAGULANT) 5 MG tablet Take 1 tablet (5 mg) by mouth 2 times daily 180 tablet 3     budesonide-formoterol (SYMBICORT) 160-4.5 MCG/ACT Inhaler INHALE 2 PUFFS INTO THE LUNGS 2 TIMES DAILY 10.2 g 0     Calcium Carbonate-Vitamin D (CALCIUM 600 + D OR) Take 1 tablet by mouth daily       carvedilol (COREG) 12.5 MG tablet TAKE 1 TABLET BY MOUTH 2 TIMES DAILY WITH MEALS 180 tablet 0     gabapentin (NEURONTIN) 300 MG capsule Take 1 capsule (300 mg) by mouth 3 times daily 30 capsule 1     HYDROcodone-acetaminophen (NORCO) 5-325 MG tablet Take 1-2 tablets every 6 hrs as needed 60 tablet 0     Multiple Vitamin (MULTI-VITAMIN DAILY PO) Take 1 tablet by mouth daily       naproxen sodium (ANAPROX) 220 MG tablet Take 220 mg by mouth 2 times daily (with meals)       order for DME Equipment being ordered: Walker 1 each 0     rosuvastatin (CRESTOR) 10 MG tablet Take 1 tablet (10 mg) by mouth daily 90 tablet 3     sacubitril-valsartan (ENTRESTO) 24-26 MG per tablet Take 1 tablet by mouth 2 times daily 60 tablet 5     SPIRIVA RESPIMAT 2.5 MCG/ACT inhaler INHALE 2 DOSES INTO THE LUNGS ONCE DAILY 4 g 0     spironolactone (ALDACTONE) 25 MG tablet TAKE 1/2  TABLET BY MOUTH DAILY 45 tablet 0     VENTOLIN  (90 Base) MCG/ACT inhaler INHALE 2 PUFFS INTO THE LUNGS EVERY 6 HOURS AS NEEDED FOR SHORTNESS OF BREATH/DYSPNEA/WHEEZING 18 g 0       Social History     Socioeconomic History     Marital status:      Spouse name: Not on file     Number of children: Not on file     Years of education: Not on file     Highest education level: Not on file   Occupational History     Occupation:       Employer: RETIRED   Social Needs     Financial resource strain: Not on file     Food insecurity     Worry: Not on file     Inability: Not on file     Transportation needs     Medical: Not on file     Non-medical: Not on file   Tobacco Use     Smoking status: Former Smoker     Packs/day: 1.00     Years: 15.00     Pack years: 15.00     Types: Cigarettes     Last attempt to quit: 1998     Years since quittin.5     Smokeless tobacco: Never Used     Tobacco comment: Passive Exposure: No; Longest Tobacco Free: 15 years    Substance and Sexual Activity     Alcohol use: Yes     Comment: Occasionally      Drug use: No     Sexual activity: Not on file   Lifestyle     Physical activity     Days per week: Not on file     Minutes per session: Not on file     Stress: Not on file   Relationships     Social connections     Talks on phone: Not on file     Gets together: Not on file     Attends [employer] service: Not on file     Active member of club or organization: Not on file     Attends meetings of clubs or organizations: Not on file     Relationship status: Not on file     Intimate partner violence     Fear of current or ex partner: Not on file     Emotionally abused: Not on file     Physically abused: Not on file     Forced sexual activity: Not on file   Other Topics Concern      Service Not Asked     Blood Transfusions Not Asked     Caffeine Concern Yes     Comment: Coffee 5 cups daily      Occupational Exposure Not Asked     Hobby Hazards Not Asked     Sleep  Concern Not Asked     Stress Concern Not Asked     Weight Concern Not Asked     Special Diet Not Asked     Back Care Not Asked     Exercise Not Asked     Bike Helmet Not Asked     Seat Belt Not Asked     Self-Exams Not Asked     Parent/sibling w/ CABG, MI or angioplasty before 65F 55M? No   Social History Narrative     Not on file       LAB RESULTS:   Office Visit on 08/08/2020   Component Date Value Ref Range Status     COVID-19 Virus PCR to [facility] - So* 08/08/2020 Nasopharyngeal   Final     COVID-19 Virus PCR to [facility] - Re* 08/08/2020 Test received-See reflex to IDDL test SARS CoV2 (COVID-19) Virus RT-PCR   Final     SARS-CoV-2 Virus Specimen Source 08/08/2020 Nasopharyngeal   Final     SARS-CoV-2 PCR Result 08/08/2020 NEGATIVE   Final     SARS-CoV-2 PCR Comment 08/08/2020    Final                    Value:Testing was performed using the Aptima SARS-CoV-2 Assay on the [employer] Instrument System.   Additional information about this Emergency Use Authorization (EUA) assay can be found via   the Lab Guide.     Ancillary Procedure on 08/04/2020   Component Date Value Ref Range Status     Date Time Interrogation Session 08/05/2020 [id number]   Final     Implantable Pulse Generator Manufa* 08/05/2020 Medtronic   Final     Implantable Pulse Generator Model 08/05/2020 [id number] Viva XT CRT-D   Final     Implantable Pulse Generator Serial* 08/05/2020 [id number]   Final     Type Interrogation Session 08/05/2020 Remote    Final     Clinic Name 08/05/2020 [facility]   Final     Implantable Pulse Generator Type 08/05/2020 Cardiac Resynchronization Therapy - Defibrillator   Final     Implantable Pulse Generator Implan* 08/05/2020 [id number]   Final     Implantable Lead  08/05/2020 Medtronic   Final     Implantable Lead Model 08/05/2020 4296 Attain Ability Plus   Final     Implantable Lead Serial Number 08/05/2020 [id number]   Final     Implantable Lead Implant Date 08/05/2020 20140506    Final     Implantable Lead Polarity Type 08/05/2020 Bipolar Lead   Final     Implantable Lead Location Detail 1 08/05/2020 UNKNOWN   Final     Implantable Lead Location 08/05/2020 Left Ventricle   Final     Implantable Lead  08/05/2020 Medtronic   Final     Implantable Lead Model 08/05/2020 5076 CapSureFix Novus   Final     Implantable Lead Serial Number 08/05/2020 [id number]   Final     Implantable Lead Implant Date 08/05/2020 20140506   Final     Implantable Lead Polarity Type 08/05/2020 Bipolar Lead   Final     Implantable Lead Location Detail 1 08/05/2020 APPENDAGE   Final     Implantable Lead Location 08/05/2020 Right Atrium   Final     Implantable Lead  08/05/2020 Medtronic   Final     Implantable Lead Model 08/05/2020 6947 Sprint Quattro Secure   Final     Implantable Lead Serial Number 08/05/2020 [id number]   Final     Implantable Lead Implant Date 08/05/2020 20140506   Final     Implantable Lead Polarity Type 08/05/2020 Quadripolar Lead   Final     Implantable Lead Location Detail 1 08/05/2020 APEX   Final     Implantable Lead Location 08/05/2020 Right Ventricle   Final     Vamshi Setting Mode (NBG Code) 08/05/2020 DDDR   Final     Vamshi Setting Lower Rate Limit 08/05/2020 60  [beats]/min Final     Vamshi Setting Maximum Tracking Rate 08/05/2020 130  [beats]/min Final     Vamshi Setting Maximum Sensor Rate 08/05/2020 130  [beats]/min Final     Vamshi Setting NAJMA Delay Low 08/05/2020 140  ms Final     Vamshi Setting PAV Delay Low 08/05/2020 160  ms Final     Vamshi Setting AT Mode Switch Rate 08/05/2020 171  [beats]/min Final     Lead Channel Setting Sensing Polar* 08/05/2020 Bipolar   Final     Lead Channel Setting Sensing Anode* 08/05/2020 Right Atrium   Final     Lead Channel Setting Sensing Anode* 08/05/2020 Ring   Final     Lead Channel Setting Sensing Catho* 08/05/2020 Right Atrium   Final     Lead Channel Setting Sensing Catho* 08/05/2020 Tip   Final     Lead Channel Setting Sensing  Sensi* 08/05/2020 0.3  mV Final     Lead Channel Setting Sensing Polar* 08/05/2020 Bipolar   Final     Lead Channel Setting Sensing Anode* 08/05/2020 Right Ventricle   Final     Lead Channel Setting Sensing Anode* 08/05/2020 Coil   Final     Lead Channel Setting Sensing Catho* 08/05/2020 Right Ventricle   Final     Lead Channel Setting Sensing Catho* 08/05/2020 Tip   Final     Lead Channel Setting Sensing Sensi* 08/05/2020 0.3  mV Final     Ventricular chambers paced during * 08/05/2020 LVOnly   Final     Lead Channel Setting Pacing Polari* 08/05/2020 Bipolar   Final     Lead Channel Setting Pacing Anode * 08/05/2020 Right Atrium   Final     Lead Channel Setting Pacing Anode * 08/05/2020 Ring   Final     Lead Channel Setting Sensing Catho* 08/05/2020 Right Atrium   Final     Lead Channel Setting Sensing Catho* 08/05/2020 Tip   Final     Lead Channel Setting Pacing Pulse * 08/05/2020 0.4  ms Final     Lead Channel Setting Pacing Amplit* 08/05/2020 1.5  V Final     Lead Channel Setting Pacing Captur* 08/05/2020 Adaptive   Final     Lead Channel Setting Pacing Polari* 08/05/2020 Bipolar   Final     Lead Channel Setting Pacing Anode * 08/05/2020 Right Ventricle   Final     Lead Channel Setting Pacing Anode * 08/05/2020 Ring   Final     Lead Channel Setting Sensing Catho* 08/05/2020 Right Ventricle   Final     Lead Channel Setting Sensing Catho* 08/05/2020 Tip   Final     Lead Channel Setting Pacing Pulse * 08/05/2020 0.4  ms Final     Lead Channel Setting Pacing Amplit* 08/05/2020 2  V Final     Lead Channel Setting Pacing Captur* 08/05/2020 Adaptive   Final     Lead Channel Setting Pacing Polari* 08/05/2020 Bipolar   Final     Lead Channel Setting Pacing Anode * 08/05/2020 Right Ventricle   Final     Lead Channel Setting Pacing Anode * 08/05/2020 Coil   Final     Lead Channel Setting Sensing Catho* 08/05/2020 Left Ventricle   Final     Lead Channel Setting Sensing Catho* 08/05/2020 Tip   Final     Lead Channel Setting  Pacing Pulse * 08/05/2020 0.6  ms Final     Lead Channel Setting Pacing Amplit* 08/05/2020 1.5  V Final     Lead Channel Setting Pacing Captur* 08/05/2020 Adaptive   Final     Zone Setting Type Category 08/05/2020 VF   Final     Zone Setting Detection Interval 08/05/2020 320  ms Final     Zone Setting Detection Beats Numer* 08/05/2020 30  [beats] Final     Zone Setting Detection Beats Denom* 08/05/2020 40  [beats] Final     Zone Setting Type Category 08/05/2020 VT   Final     Zone Setting Detection Interval 08/05/2020 Blank   Final     Zone Setting Type Category 08/05/2020 VT   Final     Zone Setting Detection Interval 08/05/2020 360  ms Final     Zone Setting Type Category 08/05/2020 VT   Final     Zone Setting Detection Interval 08/05/2020 400  ms Final     Zone Setting Type Category 08/05/2020 ATRIAL_FIBRILLATION   Final     Zone Setting Type Category 08/05/2020 AT/AF   Final     Zone Setting Detection Interval 08/05/2020 350  ms Final     Lead Channel Impedance Value 08/05/2020 437  ohm Final     Lead Channel Sensing Intrinsic Amp* 08/05/2020 2.375  mV Final     Lead Channel Sensing Intrinsic Amp* 08/05/2020 2.375  mV Final     Lead Channel Pacing Threshold Ampl* 08/05/2020 0.375  V Final     Lead Channel Pacing Threshold Puls* 08/05/2020 0.4  ms Final     Lead Channel Impedance Value 08/05/2020 399  ohm Final     Lead Channel Impedance Value 08/05/2020 323  ohm Final     Lead Channel Sensing Intrinsic Amp* 08/05/2020 5  mV Final     Lead Channel Sensing Intrinsic Amp* 08/05/2020 5  mV Final     Lead Channel Pacing Threshold Ampl* 08/05/2020 0.625  V Final     Lead Channel Pacing Threshold Puls* 08/05/2020 0.4  ms Final     Lead Channel Impedance Value 08/05/2020 893  ohm Final     Lead Channel Impedance Value 08/05/2020 456  ohm Final     Lead Channel Impedance Value 08/05/2020 570  ohm Final     Lead Channel Pacing Threshold Ampl* 08/05/2020 0.5  V Final     Lead Channel Pacing Threshold Puls* 08/05/2020 0.6   ms Final     Battery Date Time of Measurements 08/05/2020 20200804012400   Final     Battery Status 08/05/2020 OK   Final     Battery RRT Trigger 08/05/2020 2.727   Final     Battery Remaining Longevity 08/05/2020 26  mo Final     Battery Voltage 08/05/2020 2.94  V Final     Capacitor Charge Type 08/05/2020 Reformation   Final     Capacitor Last Charge Date Time 08/05/2020 20200517090334   Final     Capacitor Charge Time 08/05/2020 4.314   Final     Capacitor Charge Energy 08/05/2020 18  J Final     Vamshi Statistic Date Time Start 08/05/2020 20200504124338   Final     Vamshi Statistic Date Time End 08/05/2020 20200804012403   Final     Vamshi Statistic RA Percent Paced 08/05/2020 9.93  % Final     Vamshi Statistic RV Percent Paced 08/05/2020 7.92  % Final     CRT Statistic LV Percent Paced 08/05/2020 98.09  % Final     Vamshi Statistic AP  Percent 08/05/2020 9.75  % Final     Vamshi Statistic AS  Percent 08/05/2020 88.58  % Final     Vamshi Statistic AP VS Percent 08/05/2020 0.19  % Final     Vamshi Statistic AS VS Percent 08/05/2020 1.47  % Final     CRT Statistic Date Time Start 08/05/2020 20200504124338   Final     CRT Statistic Date Time End 08/05/2020 20200804012403   Final     CRT Statistic CRT Percent Paced 08/05/2020 98.09  % Final     Atrial Tachy Statistic Date Time S* 08/05/2020 20200504124338   Final     Atrial Tachy Statistic Date Time E* 08/05/2020 20200804012403   Final     Atrial Tachy Statistic AT/AF Burde* 08/05/2020 0.1  % Final     Therapy Statistic Recent Shocks De* 08/05/2020 0   Final     Therapy Statistic Recent Shocks Ab* 08/05/2020 0   Final     Therapy Statistic Recent ATP Deliv* 08/05/2020 0   Final     Therapy Statistic Recent Date Time* 08/05/2020 20200504124338   Final     Therapy Statistic Recent Date Time* 08/05/2020 20200804012403   Final     Therapy Statistic Total Shocks Del* 08/05/2020 1   Final     Therapy Statistic Total Shocks Abo* 08/05/2020 0   Final     Therapy Statistic Total  ATP Delive* 08/05/2020 0   Final     Therapy Statistic Total  Date Time* 08/05/2020 20140506113519   Final     Therapy Statistic Total  Date Time* 08/05/2020 20200804012403   Final     Episode Statistic Recent Count 08/05/2020 1   Final     Episode Statistic Type Category 08/05/2020 AT/AF   Final     Episode Statistic Recent Count 08/05/2020 0   Final     Episode Statistic Type Category 08/05/2020 SVT   Final     Episode Statistic Recent Count 08/05/2020 0   Final     Episode Statistic Type Category 08/05/2020 VT   Final     Episode Statistic Recent Count 08/05/2020 0   Final     Episode Statistic Type Category 08/05/2020 VF   Final     Episode Statistic Recent Count 08/05/2020 0   Final     Episode Statistic Type Category 08/05/2020 VT   Final     Episode Statistic Recent Count 08/05/2020 0   Final     Episode Statistic Type Category 08/05/2020 VT   Final     Episode Statistic Recent Count 08/05/2020 0   Final     Episode Statistic Type Category 08/05/2020 VT   Final     Episode Statistic Recent Date Time* 08/05/2020 [id number]   Final     Episode Statistic Recent Date Time* 08/05/2020 20200804012403   Final     Episode Statistic Recent Date Time* 08/05/2020 20200504124338   Final     Episode Statistic Recent Date Time* 08/05/2020 20200804012403   Final     Episode Statistic Recent Date Time* 08/05/2020 20200504124338   Final     Episode Statistic Recent Date Time* 08/05/2020 20200804012403   Final     Episode Statistic Recent Date Time* 08/05/2020 20200504124338   Final     Episode Statistic Recent Date Time* 08/05/2020 20200804012403   Final     Episode Statistic Recent Date Time* 08/05/2020 20200504124338   Final     Episode Statistic Recent Date Time* 08/05/2020 20200804012403   Final     Episode Statistic Recent Date Time* 08/05/2020 20200504124338   Final     Episode Statistic Recent Date Time* 08/05/2020 20200804012403   Final     Episode Statistic Recent Date Time* 08/05/2020 20200504124338   Final      Episode Statistic Recent Date Time* 08/05/2020 20200804012403   Final     Episode Statistic Total Count 08/05/2020 0   Final     Episode Statistic Type Category 08/05/2020 AT/AF   Final     Episode Statistic Total Count 08/05/2020 0   Final     Episode Statistic Type Category 08/05/2020 SVT   Final     Episode Statistic Total Count 08/05/2020 66   Final     Episode Statistic Type Category 08/05/2020 VT   Final     Episode Statistic Total Count 08/05/2020 1   Final     Episode Statistic Type Category 08/05/2020 VF   Final     Episode Statistic Total Count 08/05/2020 0   Final     Episode Statistic Type Category 08/05/2020 VT   Final     Episode Statistic Total Count 08/05/2020 0   Final     Episode Statistic Type Category 08/05/2020 VT   Final     Episode Statistic Total Count 08/05/2020 0   Final     Episode Statistic Type Category 08/05/2020 VT   Final     Episode Statistic Total Date Time * 08/05/2020 20140506113519   Final     Episode Statistic Total Date Time * 08/05/2020 20200804012403   Final     Episode Statistic Total Date Time * 08/05/2020 20140506113519   Final     Episode Statistic Total Date Time * 08/05/2020 20200804012403   Final     Episode Statistic Total Date Time * 08/05/2020 20140506113519   Final     Episode Statistic Total Date Time * 08/05/2020 20200804012403   Final     Episode Statistic Total Date Time * 08/05/2020 20140506113519   Final     Episode Statistic Total Date Time * 08/05/2020 20200804012403   Final     Episode Statistic Total Date Time * 08/05/2020 20140506113519   Final     Episode Statistic Total Date Time * 08/05/2020 20200804012403   Final     Episode Statistic Total Date Time * 08/05/2020 20140506113519   Final     Episode Statistic Total Date Time * 08/05/2020 20200804012403   Final     Episode Statistic Total Date Time * 08/05/2020 20140506113519   Final     Episode Statistic Total Date Time * 08/05/2020 20200804012403   Final     Episode Identifier 08/05/2020 716   Final      Episode Type Category 08/05/2020 VSE   Final     Episode Date Time 08/05/2020 20200716092155   Final     Episode Duration 08/05/2020 9  s Final     Episode Identifier 08/05/2020 715   Final     Episode Type Category 08/05/2020 VSE   Final     Episode Date Time 08/05/2020 [id number]   Final     Episode Duration 08/05/2020 3  s Final     Episode Identifier 08/05/2020 714   Final     Episode Type Category 08/05/2020 VSE   Final     Episode Date Time 08/05/2020 20200625235723   Final     Episode Duration 08/05/2020 4  s Final     Episode Identifier 08/05/2020 713   Final     Episode Type Category 08/05/2020 VSE   Final     Episode Date Time 08/05/2020 [id number]   Final     Episode Duration 08/05/2020 6  s Final     Episode Identifier 08/05/2020 712   Final     Episode Type Category 08/05/2020 VSE   Final     Episode Date Time 08/05/2020 [id number]   Final     Episode Duration 08/05/2020 18  s Final     Episode Identifier 08/05/2020 711   Final     Episode Type Category 08/05/2020 VSE   Final     Episode Date Time 08/05/2020 [id number]   Final     Episode Duration 08/05/2020 4  s Final        Review of systems: Negative except that which was noted in the HPI.    Physical examination:  /67 (BP Location: Right arm, Patient Position: Sitting, Cuff Size: Adult Large)   Pulse 76   Temp 98  F (36.7  C) (Tympanic)   Wt 73.2 kg (161 lb 6.4 oz)   SpO2 96%   BMI 27.70 kg/m      GENERAL APPEARANCE: healthy, alert and no distress  HEENT: no icterus, no xanthelasmas, normal pupil size and reaction, no cyanosis.  NECK: no adenopathy, no asymmetry, masses, or scars.  CHEST: lungs clear to auscultation - no rales, rhonchi or wheezes, no use of accessory muscles, no retractions, respirations are unlabored, normal respiratory rate  CARDIOVASCULAR: Irregular rate irregular rhythm, normal S1 with physiologic split S2, no S3 or S4 and no murmur, click or rub  ABDOMEN: soft, non tender, bowel sounds normal.   Distant sounds.  EXTREMITIES: no clubbing, cyanosis but with mild to moderate lower extremity edema  NEURO: alert and oriented normal speech, and affect  VASC: No vascular bruits heard.  SKIN: no ecchymoses, no rashes      Thank you for allowing me to participate in the care of your patient. Please do not hesitate to contact me if you have any questions.     Kodi Garcia, DO

## 2020-08-19 NOTE — TELEPHONE ENCOUNTER
HYDROcodone-acetaminophen (NORCO) 5-325 MG tablet       Last Written Prescription Date:  7/31/20  Last Fill Quantity: 60,   # refills: 0  Last Office Visit: 5/26/20 virtual  Future Office visit:    Next 5 appointments (look out 90 days)    Aug 19, 2020  4:00 PM CDT  (Arrive by 3:45 PM)  Return Visit with Kodi Garcia DO  [facility] ([facility] [place] ) [address] [place] [address] AVE  [place] [address]  [phone]           Routing refill request to provider for review/approval because:  Drug not on the [facility], P or  Health refill protocol or controlled substance

## 2020-08-19 NOTE — NURSING NOTE
"Chief Complaint   Patient presents with     RECHECK       Initial /67 (BP Location: Right arm, Patient Position: Sitting, Cuff Size: Adult Large)   Pulse 76   Temp 98  F (36.7  C) (Tympanic)   Wt 73.2 kg (161 lb 6.4 oz)   SpO2 96%   BMI 27.70 kg/m   Estimated body mass index is 27.7 kg/m  as calculated from the following:    Height as of 7/16/20: 1.626 m (5' 4\").    Weight as of this encounter: 73.2 kg (161 lb 6.4 oz).  Medication Reconciliation: complete  Enedelia Rivas LPN    "

## 2020-08-26 ENCOUNTER — TELEPHONE (OUTPATIENT)
Dept: INTERVENTIONAL RADIOLOGY/VASCULAR | Facility: HOSPITAL | Age: 85
End: 2020-08-26

## 2020-08-26 DIAGNOSIS — I42.8 NON-ISCHEMIC CARDIOMYOPATHY (H): ICD-10-CM

## 2020-08-26 DIAGNOSIS — J44.9 CHRONIC OBSTRUCTIVE PULMONARY DISEASE, UNSPECIFIED COPD TYPE (H): ICD-10-CM

## 2020-08-26 DIAGNOSIS — I50.9 CONGESTIVE HEART FAILURE, NYHA CLASS 2, UNSPECIFIED CONGESTIVE HEART FAILURE TYPE (H): ICD-10-CM

## 2020-08-26 DIAGNOSIS — I51.89 COMBINED SYSTOLIC AND DIASTOLIC CARDIAC DYSFUNCTION: ICD-10-CM

## 2020-08-26 DIAGNOSIS — R06.02 SOB (SHORTNESS OF BREATH): ICD-10-CM

## 2020-08-26 DIAGNOSIS — J43.8 OTHER EMPHYSEMA (H): ICD-10-CM

## 2020-08-26 NOTE — TELEPHONE ENCOUNTER
CONSULT PATIENT  PAIN INJECTION POST CALL    Procedure: Epidural Left TF L4-5  Radiologist(s): Dr. Devin Vela  Date of Procedure: 8-12-20    I responded to the patient's questions/concerns.    Pre-procedure pain score was: 4 (See pre-procedure score)  Post-procedure pain score as of today is: 5-6  What % relief?0%    Would you say this injection has been beneficial?No, patient states her legs feel worse than prior to the injection.  If yes, for how long? Did not have any relief    Was there one injection that worked better than the other?Patient states she had an injection at a spine center about 4 months ago that helped for a short period of time. Unsure as far as what injection she had.    Where is the pain? Pain is located in the lower back on both sides/all across. Pain on the right radiates down to the right side of the buttock stopping there. Pain on the left side shoots down stopping at the knee. Left side is worse. Right sided buttock pain is a constant pain and the left sided pain comes and goes but is worse. No pain when sitting.  Can you describe the pain?sharp and shooting, unable to walk.  Does the pain radiate anywhere?Right side radiates to buttock. Left side radiates down to knee  If yes, where does it radiate and where does the pain stop?right side stops at the buttock, left side at the knee    Is this new pain? No    Patient would like to pursue another injection.      Krista Lucio

## 2020-08-27 RX ORDER — CARVEDILOL 12.5 MG/1
TABLET ORAL
Qty: 180 TABLET | Refills: 0 | Status: SHIPPED | OUTPATIENT
Start: 2020-08-27 | End: 2020-11-30

## 2020-08-27 RX ORDER — BUDESONIDE AND FORMOTEROL FUMARATE DIHYDRATE 160; 4.5 UG/1; UG/1
AEROSOL RESPIRATORY (INHALATION)
Qty: 10.2 G | Refills: 0 | Status: SHIPPED | OUTPATIENT
Start: 2020-08-27 | End: 2020-09-24

## 2020-08-27 RX ORDER — TIOTROPIUM BROMIDE INHALATION SPRAY 3.12 UG/1
SPRAY, METERED RESPIRATORY (INHALATION)
Qty: 4 G | Refills: 0 | Status: SHIPPED | OUTPATIENT
Start: 2020-08-27 | End: 2020-09-24

## 2020-08-27 RX ORDER — CARVEDILOL 25 MG/1
TABLET ORAL
Qty: 180 TABLET | Refills: 0 | Status: SHIPPED | OUTPATIENT
Start: 2020-08-27 | End: 2020-08-28

## 2020-08-27 NOTE — TELEPHONE ENCOUNTER
Sumbicort       Last Written Prescription Date:  7/23/2020  Last Fill Quantity: 10.2g,   # refills: 0    Spiriva       Last Written Prescription Date:  7/23/2020  Last Fill Quantity: 4g,   # refills: 0    Coreg       Last Written Prescription Date:  6/4/2020  Last Fill Quantity: 180,   # refills: 0  Last Office Visit: 8/19/2020  Future Office visit:    Next 5 appointments (look out 90 days)    Sep 03, 2020 11:30 AM CDT  (Arrive by 11:15 AM)  SHORT with ANTHONY Post MD  [facility] [place] ([facility] [place] ) [address]  [place] [address]  [phone]

## 2020-08-28 ENCOUNTER — TELEPHONE (OUTPATIENT)
Dept: FAMILY MEDICINE | Facility: OTHER | Age: 85
End: 2020-08-28

## 2020-08-28 DIAGNOSIS — M54.50 LUMBAR SPINE PAIN: ICD-10-CM

## 2020-08-28 RX ORDER — HYDROCODONE BITARTRATE AND ACETAMINOPHEN 5; 325 MG/1; MG/1
TABLET ORAL
Qty: 60 TABLET | Refills: 0 | Status: SHIPPED | OUTPATIENT
Start: 2020-08-28 | End: 2020-10-07

## 2020-08-28 NOTE — TELEPHONE ENCOUNTER
Kodi Garcia, DO  You 2 minutes ago (3:20 PM)      She should be on the 12.5 mg twice a day. Thanks!     Dr. Garcia    Message text

## 2020-08-28 NOTE — TELEPHONE ENCOUNTER
Both 12.5 mg BID and 25 mg BID of coreg signed on 8-.     Per LOV on 8-    3.  Continue spironolactone 12.5 mg Entresto total dose 50 mg twice a day, and Coreg 12.5 mg twice a day for heart failure.  4.  Continue Eliquis 5 mg twice a day and Coreg 12.5 mg twice a day for A. Fib.    Would like to clarify that you would like her to continue 12.5 mg BID.   Thank you.

## 2020-08-28 NOTE — TELEPHONE ENCOUNTER
Pharmacy calling needing clarification on what dose of carvedilol should she be on? Two doses sent to pharmacy yesterday.

## 2020-08-31 NOTE — PROGRESS NOTES
Subjective     Jacklyn Hein is a 85 year old female who presents to clinic today for the following health issues:    HPI     Musculoskeletal problem/pain      Duration: 1 month    Description  Location: bilateral leg- hips to thigh area    Intensity:  severe    Accompanying signs and symptoms: radiation of pain to legs, weakness, tingling    History  Previous similar problem: no   Previous evaluation:  No evaluation of this, started after back injections    Precipitating or alleviating factors:  Trauma or overuse: no   Aggravating factors include: standing and walking    Therapies tried and outcome: rest/inactivity and Prescribed [facility]    Jacklyn Hein, 85 year old, female presents with   Chief Complaint   Patient presents with     Musculoskeletal Problem       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     Insomnia, unspecified 01/01/2011     LBBB (left bundle branch block) 01/01/2011     Neck pain, chronic 01/01/2011     Non-ischemic cardiomyopathy (H) 12/10/2013     Obesity, unspecified 01/01/2011     Osteopenia 01/01/2011     Pacemaker      Pain in joint, lower leg 07/31/2006     Pure hypercholesterolemia 06/07/2002     Unspecified essential hypertension 01/01/2011       PAST SURGICAL HISTORY:  Past Surgical History:   Procedure Laterality Date     APPENDECTOMY       ARTHROSCOPY KNEE RT/LT  2009    RT     BACK SURGERY  02/06/2020     CARDIAC SURGERY  05/06/2014    Pacemaker     Cataract extraction  2009    Bilateral     CHOLECYSTECTOMY      open      COLONOSCOPY  05/2001    Normal      COLONOSCOPY N/A 10/20/2016    Procedure: COLONOSCOPY;  Surgeon: Charan Montejo MD;  Location: [facility] OR     colonoscopy with polypectomy  2011    Repeat 3 years       CT CORONARY ANGIOGRAM      normal     HERPES ZOSTER PCR ([facility])       IR CONSULTATION FOR IR EXAM  2020     left eye scar tissue       normal echo      fen-phen use       x6       SURGICAL RADIOLOGY PROCEDURE N/A 2016    Procedure: SURGICAL RADIOLOGY PROCEDURE;  Surgeon: Provider, [facility];  Location: [facility] OR       MEDICATIONS:  Prior to Admission medications    Medication Sig Start Date End Date Taking? Authorizing Provider   alendronate (FOSAMAX) 70 MG tablet TAKE 1 TABLET BY MOUTH EVERY 7 DAYS. TAKE WITH 8 OUNCES OF WATER AND STAY UPRIGHT FOR AT LEAST 30 MINUTES AFTER TAKING. 20  Yes ANTHONY Post MD   apixaban ANTICOAGULANT (ELIQUIS ANTICOAGULANT) 5 MG tablet Take 1 tablet (5 mg) by mouth 2 times daily 19  Yes Kodi Garcia, DO   budesonide-formoterol (SYMBICORT) 160-4.5 MCG/ACT Inhaler INHALE 2 PUFFS INTO THE LUNGS 2 TIMES DAILY 20  Yes ANTHONY Post MD   Calcium Carbonate-Vitamin D (CALCIUM 600 + D OR) Take 1 tablet by mouth daily   Yes Reported, Patient   carvedilol (COREG) 12.5 MG tablet TAKE 1 TABLET BY MOUTH 2 TIMES DAILY WITH MEALS 20  Yes ANTHONY Post MD   gabapentin (NEURONTIN) 300 MG capsule Take 1 capsule (300 mg) by mouth 3 times daily 20  Yes Kodi Garcia, DO   HYDROcodone-acetaminophen (NORCO) 5-325 MG tablet Take 1-2 tablets every 6 hrs as needed 20  Yes ANTHONY Post MD   Multiple Vitamin (MULTI-VITAMIN DAILY PO) Take 1 tablet by mouth daily   Yes Reported, Patient   naproxen sodium (ANAPROX) 220 MG tablet Take 220 mg by mouth 2 times daily (with meals)   Yes Reported, Patient   order for DME Equipment being ordered: Walker 16  Yes ANTHONY Post MD   rosuvastatin (CRESTOR) 10 MG tablet Take 1 tablet (10 mg) by mouth daily 19  Yes Kodi Garcia, DO   sacubitril-valsartan (ENTRESTO) 24-26 MG per tablet Take 1 tablet by mouth 2 times daily 3/10/20  Yes ANTHONY Post MD SPIRIVA  "RESPIMAT 2.5 MCG/ACT inhaler INHALE 2 DOSES INTO THE LUNGS ONCE DAILY 8/27/20  Yes ANTHONY Post MD   spironolactone (ALDACTONE) 25 MG tablet TAKE 1/2 TABLET BY MOUTH DAILY 7/23/20  Yes ANTHONY Post MD   VENTOLIN  (90 Base) MCG/ACT inhaler INHALE 2 PUFFS INTO THE LUNGS EVERY 6 HOURS AS NEEDED FOR SHORTNESS OF BREATH/DYSPNEA/WHEEZING 2/28/20  Yes ANTHONY Post MD       ALLERGIES:   No Known Allergies    ROS:  Constitutional, HEENT, cardiovascular, pulmonary, gi and gu systems are negative, except as otherwise noted.      EXAM:  /72   Pulse 69   Ht 1.651 m (5' 5\")   Wt 72.1 kg (159 lb)   SpO2 97%   BMI 26.46 kg/m   Body mass index is 26.46 kg/m .   GENERAL APPEARANCE: healthy, alert and no distress  EYES: Eyes grossly normal to inspection, PERRL and conjunctivae and sclerae normal  RESP: lungs clear to auscultation - no rales, rhonchi or wheezes  MS: Uses a walker for ambulation.  She can go to pretty good pace.  No focal weakness in her legs no swelling.  She does have paraspinal lumbar tenderness and SI tenderness  NEURO: Normal strength and tone, mentation intact and speech normal  Lab/ X-ray  No results found for this or any previous visit (from the past 24 hour(s)).    ASSESSMENT/PLAN:    ICD-10-CM    1. Spinal stenosis of lumbar region with neurogenic claudication  M48.062    She has seen the neurosurgeon has had an epidural steroid injection felt initially after the last one that the left side felt a little worse she is waiting to hear to get another epidural steroid injection.  For now she will use the hydrocodone as needed and I am okay refilling it as needed.  If after this next epidural she does not get relief she has the number and will contact her spine surgeon Dr. Pizano in the [facility].  She cannot get MRIs because of a pacemaker.      UYE Post MD  September 3, 2020                "

## 2020-09-03 ENCOUNTER — OFFICE VISIT (OUTPATIENT)
Dept: FAMILY MEDICINE | Facility: OTHER | Age: 85
End: 2020-09-03
Attending: FAMILY MEDICINE
Payer: COMMERCIAL

## 2020-09-03 VITALS
WEIGHT: 159 LBS | DIASTOLIC BLOOD PRESSURE: 72 MMHG | OXYGEN SATURATION: 97 % | SYSTOLIC BLOOD PRESSURE: 116 MMHG | HEIGHT: 65 IN | HEART RATE: 69 BPM | BODY MASS INDEX: 26.49 KG/M2

## 2020-09-03 DIAGNOSIS — M48.062 SPINAL STENOSIS OF LUMBAR REGION WITH NEUROGENIC CLAUDICATION: Primary | ICD-10-CM

## 2020-09-03 PROCEDURE — 99213 OFFICE O/P EST LOW 20 MIN: CPT | Performed by: FAMILY MEDICINE

## 2020-09-03 PROCEDURE — G0463 HOSPITAL OUTPT CLINIC VISIT: HCPCS

## 2020-09-03 ASSESSMENT — MIFFLIN-ST. JEOR: SCORE: 1172.1

## 2020-09-03 ASSESSMENT — PAIN SCALES - GENERAL: PAINLEVEL: NO PAIN (0)

## 2020-09-03 NOTE — NURSING NOTE
"Chief Complaint   Patient presents with     Musculoskeletal Problem       Initial /72   Pulse 69   Ht 1.651 m (5' 5\")   Wt 72.1 kg (159 lb)   SpO2 97%   BMI 26.46 kg/m   Estimated body mass index is 26.46 kg/m  as calculated from the following:    Height as of this encounter: 1.651 m (5' 5\").    Weight as of this encounter: 72.1 kg (159 lb).  Medication Reconciliation: complete  Chika Cano LPN  "

## 2020-09-03 NOTE — LETTER
"My Heart Failure Action Plan   Name: Jacklyn Hein    YOB: 1935   Date: 8/31/2020    My doctor: ANTHONY Post [facility] [place]     [address] [place] [address]  [phone]  My Diagnosis: {HF STAGES:794139}   My Exercise Goal: 30 minutes daily  .     My Weight Plan:   Wt Readings from Last 2 Encounters:   08/19/20 73.2 kg (161 lb 6.4 oz)   07/16/20 73.9 kg (163 lb)     Weigh yourself daily using the same scale. If you gain more than 2 pounds in 24 hours or 5 pounds in a week {HF WEIGHT GOAL:966703}    My Diet Goal: { :779136::\"No added salt\"}    Emergency Room Visits:    Our goal is to improve your quality of life and help you avoid a visit to the emergency room or hospital.  If we work together, we can achieve this goal. But, if you feel you need to call 911 or go to the emergency room, please do so.  If you go to the emergency room, please bring your list of medicines and your daily weight chart with you.       GREEN ZONE     Doing well today    Weight gained is no more than 2 pounds a day or 5 pounds a week.    No swelling in feet, ankles, legs or stomach.    No more swelling than usual.    No more trouble breathing than usual.    No change in my sleep.    No other problems. Actions:    I am doing fine.  I will take my medicine, follow my diet, see my doctor, exercise, and watch for symptoms.           YELLOW ZONE         Having a bad day or flare up    Weight gain of more than 2 pounds in one day or 5 pounds in one week.    New swelling in ankle, leg, knee or thigh.    Bloating in belly, pants feel tighter.    Swelling in hands or face.    Coughing or trouble breathing while walking or talking.    Harder to breathe last night.    Have trouble sleeping, wake up short of breath.    Much more tired than usual.    Not eating.    Pain in my chest or bad leg cramps.    Feel weak or dizzy. Actions:    I need to take action and call my doctor or nurse today.          "        RED ZONE         Need medical care now    Weight gain of 5 pounds overnight.    Chest pain or pressure that does not go away.    Feel less alert.    Wheezing or have trouble breathing when at rest.    Cannot sleep lying down.    Cannot take my water pill.    Pass out or faint. Actions:    I need to call my doctor or nurse now!    Call 911 if I have chest pain or cannot breathe.

## 2020-09-03 NOTE — LETTER
" My COPD Action Plan     Name: Jacklyn Hein    YOB: 1935   Date: 8/31/2020    My doctor: ANTHONY Post MD   My clinic: [facility] [place]    [address] [facility]  [place] [address]  [phone]  My Controller Medicine: { :155085}   Dose: ***     My Rescue Medicine: { :787550}   Dose: ***     My Flare Up Medicine: { :334821}   Dose: ***     My COPD Severity: { :343244}      Use of Oxygen: { :140355::\"Oxygen Not Prescribed \"}     Make sure you've had your pneumonia   vaccines.          GREEN ZONE       Doing well today      Usual level of activity and exercise    Usual amount of cough and mucus    No shortness of breath    Usual level of health (thinking clearly, sleeping well, feel like eating) Actions:      Take daily medicines    Use oxygen as prescribed    Follow regular exercise and diet plan    Avoid cigarette smoke and other irritants that harm the lungs           YELLOW ZONE          Having a bad day or flare up      Short of breath more than usual    A lot more sputum (mucus) than usual    Sputum looks yellow, green, tan, brown or bloody    More coughing or wheezing    Fever or chills    Less energy; trouble completing activities    Trouble thinking or focusing    Using quick relief inhaler or nebulizer more often    Poor sleep; symptoms wake me up    Do not feel like eating Actions:      Get plenty of rest    Take daily medicines    Use quick relief inhaler every *** hours    If you use oxygen, call you doctor to see if you should adjust your oxygen    Do breathing exercises or other things to help you relax    Let a loved one, friend or neighbor know you are feeling worse    Call your care team if you have 2 or more symptoms.  Start taking steroids or antibiotics if directed by your care team           RED ZONE       Need medical care now      Severe shortness of breath (feel you can't breathe)    Fever, chills    Not enough breath to do any activity    Trouble coughing " up mucus, walking or talking    Blood in mucus    Frequent coughing   Rescue medicines are not working    Not able to sleep because of breathing    Feel confused or drowsy    Chest pain    Actions:      Call your health care team.  If you cannot reach your care team, call 911 or go to the emergency room.        Annual Reminders:  Meet with Care Team, Flu Shot every Fall  [facility]    [facility] [place] [place] [facility] [address] [place], [place] - [address] [address]

## 2020-09-08 DIAGNOSIS — M48.062 SPINAL STENOSIS OF LUMBAR REGION WITH NEUROGENIC CLAUDICATION: Primary | ICD-10-CM

## 2020-09-08 DIAGNOSIS — I50.9 CONGESTIVE HEART FAILURE, NYHA CLASS 2, UNSPECIFIED CONGESTIVE HEART FAILURE TYPE (H): ICD-10-CM

## 2020-09-08 RX ORDER — SACUBITRIL AND VALSARTAN 24; 26 MG/1; MG/1
TABLET, FILM COATED ORAL
Qty: 60 TABLET | Refills: 0 | Status: SHIPPED | OUTPATIENT
Start: 2020-09-08 | End: 2020-10-07

## 2020-09-09 DIAGNOSIS — G62.9 NEUROPATHY: ICD-10-CM

## 2020-09-09 RX ORDER — GABAPENTIN 300 MG/1
CAPSULE ORAL
Qty: 30 CAPSULE | Refills: 0 | Status: SHIPPED | OUTPATIENT
Start: 2020-09-09 | End: 2020-09-24

## 2020-09-09 NOTE — TELEPHONE ENCOUNTER
gabapentin      Last Written Prescription Date:  8/19/20  Last Fill Quantity: 30,   # refills: 1  Last Office Visit: 9/3/20  Future Office visit:       Routing refill request to provider for review/approval because:  Drug not on the [facility] or [facility] refill protocol or controlled substance

## 2020-09-19 ENCOUNTER — OFFICE VISIT (OUTPATIENT)
Dept: FAMILY MEDICINE | Facility: OTHER | Age: 85
End: 2020-09-19
Attending: FAMILY MEDICINE
Payer: MEDICARE

## 2020-09-19 DIAGNOSIS — Z20.822 COVID-19 RULED OUT: Primary | ICD-10-CM

## 2020-09-19 PROCEDURE — U0003 INFECTIOUS AGENT DETECTION BY NUCLEIC ACID (DNA OR RNA); SEVERE ACUTE RESPIRATORY SYNDROME CORONAVIRUS 2 (SARS-COV-2) (CORONAVIRUS DISEASE [COVID-19]), AMPLIFIED PROBE TECHNIQUE, MAKING USE OF HIGH THROUGHPUT TECHNOLOGIES AS DESCRIBED BY CMS-2020-01-R: HCPCS | Mod: ZL | Performed by: FAMILY MEDICINE

## 2020-09-20 LAB
SARS-COV-2 RNA SPEC QL NAA+PROBE: NOT DETECTED
SPECIMEN SOURCE: NORMAL

## 2020-09-22 ENCOUNTER — TELEPHONE (OUTPATIENT)
Dept: INTERVENTIONAL RADIOLOGY/VASCULAR | Facility: HOSPITAL | Age: 85
End: 2020-09-22

## 2020-09-23 ENCOUNTER — HOSPITAL ENCOUNTER (OUTPATIENT)
Dept: CT IMAGING | Facility: HOSPITAL | Age: 85
End: 2020-09-23
Attending: FAMILY MEDICINE
Payer: MEDICARE

## 2020-09-23 ENCOUNTER — HOSPITAL ENCOUNTER (OUTPATIENT)
Dept: INTERVENTIONAL RADIOLOGY/VASCULAR | Facility: HOSPITAL | Age: 85
End: 2020-09-23
Attending: FAMILY MEDICINE
Payer: MEDICARE

## 2020-09-23 VITALS
SYSTOLIC BLOOD PRESSURE: 131 MMHG | HEART RATE: 70 BPM | RESPIRATION RATE: 20 BRPM | DIASTOLIC BLOOD PRESSURE: 59 MMHG | OXYGEN SATURATION: 95 % | TEMPERATURE: 97.6 F

## 2020-09-23 DIAGNOSIS — M47.816 LUMBAR SPONDYLOSIS: ICD-10-CM

## 2020-09-23 PROCEDURE — 72132 CT LUMBAR SPINE W/DYE: CPT | Mod: TC

## 2020-09-23 PROCEDURE — 27210779 XR MYELOGRAPHY LUMBAR SPINE: Mod: TC

## 2020-09-23 PROCEDURE — 25000125 ZZHC RX 250: Performed by: RADIOLOGY

## 2020-09-23 RX ORDER — LIDOCAINE HYDROCHLORIDE 10 MG/ML
5 INJECTION, SOLUTION INFILTRATION; PERINEURAL ONCE
Status: COMPLETED | OUTPATIENT
Start: 2020-09-23 | End: 2020-09-23

## 2020-09-23 RX ORDER — LIDOCAINE HYDROCHLORIDE 10 MG/ML
INJECTION, SOLUTION EPIDURAL; INFILTRATION; INTRACAUDAL; PERINEURAL
Status: DISCONTINUED
Start: 2020-09-23 | End: 2020-09-23 | Stop reason: HOSPADM

## 2020-09-23 RX ADMIN — LIDOCAINE HYDROCHLORIDE 2 ML: 10 INJECTION, SOLUTION EPIDURAL; INFILTRATION; INTRACAUDAL; PERINEURAL at 11:10

## 2020-09-23 NOTE — IP AVS SNAPSHOT
MRN:[id number]                      After Visit Summary   9/23/2020    Jacklyn Hein    MRN: [id number]           Visit Information        Provider Department      9/23/2020 10:30 AM HIXRRN; HIIRRAD; HIIR1 [facility]           Review of your medicines      UNREVIEWED medicines. Ask your doctor about these medicines       Dose / Directions   alendronate 70 MG tablet  Commonly known as:  FOSAMAX  Used for:  Age-related osteoporosis without current pathological fracture      TAKE 1 TABLET BY MOUTH EVERY 7 DAYS. TAKE WITH 8 OUNCES OF WATER AND STAY UPRIGHT FOR AT LEAST 30 MINUTES AFTER TAKING.  Quantity:  12 tablet  Refills:  0     apixaban ANTICOAGULANT 5 MG tablet  Commonly known as:  ELIQUIS ANTICOAGULANT  Used for:  Paroxysmal atrial fibrillation (H)      Dose:  5 mg  Take 1 tablet (5 mg) by mouth 2 times daily  Quantity:  180 tablet  Refills:  3     budesonide-formoterol 160-4.5 MCG/ACT Inhaler  Commonly known as:  Symbicort  Used for:  Other emphysema (H)      INHALE 2 PUFFS INTO THE LUNGS 2 TIMES DAILY  Quantity:  10.2 g  Refills:  0     CALCIUM 600 + D PO      Dose:  1 tablet  Take 1 tablet by mouth daily  Refills:  0     carvedilol 12.5 MG tablet  Commonly known as:  COREG  Used for:  Non-ischemic cardiomyopathy (H)      TAKE 1 TABLET BY MOUTH 2 TIMES DAILY WITH MEALS  Quantity:  180 tablet  Refills:  0     Entresto 24-26 MG per tablet  Used for:  Congestive heart failure, NYHA class 2, unspecified congestive heart failure type (H)  Generic drug:  sacubitril-valsartan      TAKE 1 TABLET BY MOUTH TWICE DAILY  Quantity:  60 tablet  Refills:  0     gabapentin 300 MG capsule  Commonly known as:  NEURONTIN  Used for:  Neuropathy      TAKE 1 CAPSULE BY MOUTH 3 TIMES DAILY  Quantity:  30 capsule  Refills:  0     HYDROcodone-acetaminophen 5-325 MG tablet  Commonly known as:  NORCO  Used for:  Lumbar spine pain      Take 1-2 tablets every 6 hrs as needed  Quantity:  60  tablet  Refills:  0     MULTI-VITAMIN DAILY PO      Dose:  1 tablet  Take 1 tablet by mouth daily  Refills:  0     naproxen sodium 220 MG tablet  Commonly known as:  ANAPROX      Dose:  220 mg  Take 220 mg by mouth 2 times daily (with meals)  Refills:  0     rosuvastatin 10 MG tablet  Commonly known as:  Crestor  Used for:  Mixed hyperlipidemia      Dose:  10 mg  Take 1 tablet (10 mg) by mouth daily  Quantity:  90 tablet  Refills:  3     Spiriva Respimat 2.5 MCG/ACT inhaler  Used for:  Chronic obstructive pulmonary disease, unspecified COPD type (H)  Generic drug:  tiotropium      INHALE 2 DOSES INTO THE LUNGS ONCE DAILY  Quantity:  4 g  Refills:  0     spironolactone 25 MG tablet  Commonly known as:  ALDACTONE  Used for:  Primary pulmonary hypertension (H)      TAKE 1/2 TABLET BY MOUTH DAILY  Quantity:  45 tablet  Refills:  0     Ventolin  (90 Base) MCG/ACT inhaler  Used for:  Other emphysema (H)  Generic drug:  albuterol      INHALE 2 PUFFS INTO THE LUNGS EVERY 6 HOURS AS NEEDED FOR SHORTNESS OF BREATH/DYSPNEA/WHEEZING  Quantity:  18 g  Refills:  0        CONTINUE these medicines which have NOT CHANGED       Dose / Directions   order for DME  Used for:  Chronic midline low back pain without sciatica, CHF (congestive heart failure), NYHA class II, chronic, systolic (H)      Equipment being ordered: Walker  Quantity:  1 each  Refills:  0              Protect others around you: Learn how to safely use, store and throw away your medicines at www.disposemymeds.org.       Follow-ups after your visit       Your next 10 appointments already scheduled    Nov 17, 2020 12:00 AM CST  CARDIAC DEVICE CHECK - REMOTE with Critical access hospital ([facility]) [address] [place] [address]  [address] [place] [id number]  [phone]      Feb 05, 2021  1:15 PM CST  (Arrive by 1:00 PM)  Procedure with Brandi Zhang PA-C  [facility] ([facility] ) [address] [place] [address]  [phone]      Feb 22, 2021 10:00 AM CST  (Arrive by 9:45 AM)  Return Visit with Kodi Garcia DO  [facility] ([facility] ) [address]  [place] [address]  [phone]         Care Instructions       Further instructions from your care team             DISCHARGE INSTRUCTIONS  Myelogram      IMPORTANT: As you prepare for discharge, the following information will help you return to your best level of health.               This Information Is About Your Follow Up Care  Call your doctor if you do not get better. Call sooner if you feel worse. You can reach your doctor by calling their clinic phone number.                                    This Information Is About Procedures    MYELOGRAM.  In this procedure, a dye was injected through a hollow needle into the space around your spinal cord. An X-ray was then taken to see if you had any spinal problems. This will help diagnose the cause of your back pain.    Do the following:    Drink 8-10 glasses of fluids for the next 2 days unless your doctor has limited your fluids.    Slowly return to your normal activity.    Remain in bed with your head elevated 30 degrees until the following morning to prevent headaches from occuring.    No strenuous activity, heavy lifting or bending for the next 24 hours after your myelogram.      The following day you may resume your regular work/activity schedule if you feel alright.    You need someone to drive you home and stay with you for 24 hours.    Contact your doctor for your test results in 1 to 2 days.    Call your doctor if you have:    a severe headache.    any trouble moving your legs or walking.    any new problems or concerns.    fever and chills    nausea or vomiting                        This Information Is About Your Illness and Diagnosis    GENERAL BACK PAIN.  The back is prone to injury. Back pain can be caused by strains and  injuries. It can involve the muscles, ligaments or bones. Today's exam did not show any obvious sign of bone (spine) or nerve damage.    Follow these instructions:    Rest more than usual for the next few days.    Apply a heating pad to your back for no longer than 30 minutes three to four times a day.    Take pain medicine as prescribed by the doctor.    Once the pain has lessened, resume your normal activities.    Do not lift heavy objects until the pain is gone.    Avoid any activity that causes pain.    Call your doctor if you:    have numbness, weakness or tingling in their fingers, arms or legs.    are not completely recovered in 2 weeks.    have any new or severe symptoms.                                                                                                            Additional Information About Your Visit       [employer] Information    [employer] gives you secure access to your electronic health record. If you see a primary care provider, you can also send messages to your care team and make appointments. If you have questions, please call your primary care clinic.  If you do not have a primary care provider, please call [phone] and they will assist you.       Care EveryWhere ID    This is your Care EveryWhere ID. This could be used by other organizations to access your [facility] medical records  [id number]       Your Vitals Were  Most recent update: 9/23/2020 10:36 AM    Blood Pressure   124/76 (BP Location: Left arm)    Pulse   71    Temperature   97.6  F (36.4  C) (Oral)    Respirations   20    Pulse Oximetry   97%          Primary Care Provider Office Phone # Fax #    R Josh Post [phone]      Equal Access to Services    [facility]: [address], wafaviola baldwin, qaybken kaalpastor mcdowell. So [facility] [phone].    ATENCIÓN: Si habla español, tiene a jacome disposición servicios gratuitos de asistencia  lingüísticaGary Watkins al [phone].    We comply with applicable federal and state civil rights laws, including the [place] Human Rights Act. We do not discriminate on the basis of race, color, creed, Sabianism, national origin, marital status, age, disability, sex, sexual orientation, or gender identity.       Thank you!    Thank you for choosing [facility] for your care. Our goal is always to provide you with excellent care. Hearing back from our patients is one way we can continue to improve our services. Please take a few minutes to complete the written survey that you may receive in the mail after you visit with us. Thank you!            Medication List      Medications          Morning Afternoon Evening Bedtime As Needed    order for DME  INSTRUCTIONS:  Equipment being ordered: Walker                       ASK your doctor about these medications          Morning Afternoon Evening Bedtime As Needed    alendronate 70 MG tablet  Also known as:  FOSAMAX  INSTRUCTIONS:  TAKE 1 TABLET BY MOUTH EVERY 7 DAYS. TAKE WITH 8 OUNCES OF WATER AND STAY UPRIGHT FOR AT LEAST 30 MINUTES AFTER TAKING.                     apixaban ANTICOAGULANT 5 MG tablet  Also known as:  ELIQUIS ANTICOAGULANT  INSTRUCTIONS:  Take 1 tablet (5 mg) by mouth 2 times daily                     budesonide-formoterol 160-4.5 MCG/ACT Inhaler  Also known as:  Symbicort  INSTRUCTIONS:  INHALE 2 PUFFS INTO THE LUNGS 2 TIMES DAILY                     CALCIUM 600 + D PO  INSTRUCTIONS:  Take 1 tablet by mouth daily                     carvedilol 12.5 MG tablet  Also known as:  COREG  INSTRUCTIONS:  TAKE 1 TABLET BY MOUTH 2 TIMES DAILY WITH MEALS                     Entresto 24-26 MG per tablet  INSTRUCTIONS:  TAKE 1 TABLET BY MOUTH TWICE DAILY  Generic drug:  sacubitril-valsartan                     gabapentin 300 MG capsule  Also known as:  NEURONTIN  INSTRUCTIONS:  TAKE 1 CAPSULE BY MOUTH 3 TIMES DAILY                     HYDROcodone-acetaminophen 5-325 MG  tablet  Also known as:  NORCO  INSTRUCTIONS:  Take 1-2 tablets every 6 hrs as needed                     MULTI-VITAMIN DAILY PO  INSTRUCTIONS:  Take 1 tablet by mouth daily                     naproxen sodium 220 MG tablet  Also known as:  ANAPROX  INSTRUCTIONS:  Take 220 mg by mouth 2 times daily (with meals)                     rosuvastatin 10 MG tablet  Also known as:  Crestor  INSTRUCTIONS:  Take 1 tablet (10 mg) by mouth daily                     Spiriva Respimat 2.5 MCG/ACT inhaler  INSTRUCTIONS:  INHALE 2 DOSES INTO THE LUNGS ONCE DAILY  Generic drug:  tiotropium                     spironolactone 25 MG tablet  Also known as:  ALDACTONE  INSTRUCTIONS:  TAKE 1/2 TABLET BY MOUTH DAILY                     Ventolin  (90 Base) MCG/ACT inhaler  INSTRUCTIONS:  INHALE 2 PUFFS INTO THE LUNGS EVERY 6 HOURS AS NEEDED FOR SHORTNESS OF BREATH/DYSPNEA/WHEEZING  Generic drug:  albuterol

## 2020-09-23 NOTE — IP AVS SNAPSHOT
HI INTERVENTIONAL RAD  [address] [address]  Phone:  [phone]  Fax:  [phone]                                    After Visit Summary   9/23/2020    Jacklyn Hein    MRN: [id number]           After Visit Summary Signature Page    I have received my discharge instructions, and my questions have been answered. I have discussed any challenges I see with this plan with the nurse or doctor.    ..........................................................................................................................................  Patient/Patient Representative Signature      ..........................................................................................................................................  Patient Representative Print Name and Relationship to Patient    ..................................................               ................................................  Date                                   Time    ..........................................................................................................................................  Reviewed by Signature/Title    ...................................................              ..............................................  Date                                               Time          22EPIC Rev 08/18

## 2020-09-23 NOTE — PROGRESS NOTES
Vss, 97 % via RA. Denies headache, procedure site CDI. Discharge instructions given and understood by patient and her daughter.To exit via w/ch by nurse .

## 2020-09-24 ENCOUNTER — TELEPHONE (OUTPATIENT)
Dept: INTERVENTIONAL RADIOLOGY/VASCULAR | Facility: HOSPITAL | Age: 85
End: 2020-09-24

## 2020-09-24 DIAGNOSIS — J43.8 OTHER EMPHYSEMA (H): ICD-10-CM

## 2020-09-24 DIAGNOSIS — G62.9 NEUROPATHY: ICD-10-CM

## 2020-09-24 DIAGNOSIS — J44.9 CHRONIC OBSTRUCTIVE PULMONARY DISEASE, UNSPECIFIED COPD TYPE (H): ICD-10-CM

## 2020-09-24 RX ORDER — GABAPENTIN 300 MG/1
CAPSULE ORAL
Qty: 30 CAPSULE | Refills: 0 | Status: SHIPPED | OUTPATIENT
Start: 2020-09-24 | End: 2020-10-07

## 2020-09-24 RX ORDER — TIOTROPIUM BROMIDE INHALATION SPRAY 3.12 UG/1
SPRAY, METERED RESPIRATORY (INHALATION)
Qty: 4 G | Refills: 0 | Status: SHIPPED | OUTPATIENT
Start: 2020-09-24 | End: 2020-11-03

## 2020-09-24 RX ORDER — BUDESONIDE AND FORMOTEROL FUMARATE DIHYDRATE 160; 4.5 UG/1; UG/1
AEROSOL RESPIRATORY (INHALATION)
Qty: 10.2 G | Refills: 0 | Status: SHIPPED | OUTPATIENT
Start: 2020-09-24 | End: 2020-11-03

## 2020-09-28 DIAGNOSIS — M81.0 AGE-RELATED OSTEOPOROSIS WITHOUT CURRENT PATHOLOGICAL FRACTURE: ICD-10-CM

## 2020-09-28 RX ORDER — ALENDRONATE SODIUM 70 MG/1
TABLET ORAL
Qty: 12 TABLET | Refills: 0 | Status: SHIPPED | OUTPATIENT
Start: 2020-09-28 | End: 2020-12-24

## 2020-09-28 NOTE — TELEPHONE ENCOUNTER
Fosamax  Last Written Prescription Date: 6/22/20  Last Fill Quantity: 12 # of Refills: 0  Last Office Visit: 9/3/20

## 2020-10-07 DIAGNOSIS — I50.9 CONGESTIVE HEART FAILURE, NYHA CLASS 2, UNSPECIFIED CONGESTIVE HEART FAILURE TYPE (H): ICD-10-CM

## 2020-10-07 DIAGNOSIS — M54.50 LUMBAR SPINE PAIN: ICD-10-CM

## 2020-10-07 DIAGNOSIS — G62.9 NEUROPATHY: ICD-10-CM

## 2020-10-07 RX ORDER — GABAPENTIN 300 MG/1
CAPSULE ORAL
Qty: 30 CAPSULE | Refills: 0 | Status: SHIPPED | OUTPATIENT
Start: 2020-10-07 | End: 2020-10-29

## 2020-10-07 RX ORDER — SACUBITRIL AND VALSARTAN 24; 26 MG/1; MG/1
TABLET, FILM COATED ORAL
Qty: 60 TABLET | Refills: 0 | Status: SHIPPED | OUTPATIENT
Start: 2020-10-07 | End: 2020-11-03

## 2020-10-07 RX ORDER — HYDROCODONE BITARTRATE AND ACETAMINOPHEN 5; 325 MG/1; MG/1
TABLET ORAL
Qty: 60 TABLET | Refills: 0 | Status: SHIPPED | OUTPATIENT
Start: 2020-10-07 | End: 2020-11-03

## 2020-10-07 NOTE — TELEPHONE ENCOUNTER
ENTRESTO 24-26 MG per tablet      Last Written Prescription Date:  9/8/20  Last Fill Quantity: 60,   # refills: 0    gabapentin (NEURONTIN) 300 MG capsule      Last Written Prescription Date:  9/24/20  Last Fill Quantity: 30,   # refills: 0    HYDROcodone-acetaminophen (NORCO) 5-325 MG tablet      Last Written Prescription Date:  8/28/20  Last Fill Quantity: 60,   # refills: 0    Last Office Visit: 9/3/20  Future Office visit:       Routing refill request to provider for review/approval because:  Drug not on the [facility], [facility] or [facility] refill protocol or controlled substance

## 2020-10-27 DIAGNOSIS — G62.9 NEUROPATHY: ICD-10-CM

## 2020-10-27 DIAGNOSIS — I27.0 PRIMARY PULMONARY HYPERTENSION (H): ICD-10-CM

## 2020-10-28 ENCOUNTER — ALLIED HEALTH/NURSE VISIT (OUTPATIENT)
Dept: FAMILY MEDICINE | Facility: OTHER | Age: 85
End: 2020-10-28
Attending: FAMILY MEDICINE
Payer: MEDICARE

## 2020-10-28 DIAGNOSIS — Z23 NEED FOR PROPHYLACTIC VACCINATION AND INOCULATION AGAINST INFLUENZA: Primary | ICD-10-CM

## 2020-10-28 PROCEDURE — G0008 ADMIN INFLUENZA VIRUS VAC: HCPCS

## 2020-10-28 NOTE — TELEPHONE ENCOUNTER
Gabapentin 300 mg      Last Written Prescription Date:  10-7-2020  Last Fill Quantity: 30,   # refills: 0  Last Office Visit: 9-3-2020  Future Office visit:

## 2020-10-29 RX ORDER — SPIRONOLACTONE 25 MG/1
TABLET ORAL
Qty: 45 TABLET | Refills: 0 | Status: SHIPPED | OUTPATIENT
Start: 2020-10-29 | End: 2021-01-20

## 2020-10-29 RX ORDER — GABAPENTIN 300 MG/1
CAPSULE ORAL
Qty: 30 CAPSULE | Refills: 0 | Status: SHIPPED | OUTPATIENT
Start: 2020-10-29 | End: 2020-11-10

## 2020-11-02 DIAGNOSIS — J44.9 CHRONIC OBSTRUCTIVE PULMONARY DISEASE, UNSPECIFIED COPD TYPE (H): ICD-10-CM

## 2020-11-02 DIAGNOSIS — J43.8 OTHER EMPHYSEMA (H): ICD-10-CM

## 2020-11-02 DIAGNOSIS — I50.9 CONGESTIVE HEART FAILURE, NYHA CLASS 2, UNSPECIFIED CONGESTIVE HEART FAILURE TYPE (H): ICD-10-CM

## 2020-11-02 DIAGNOSIS — M54.50 LUMBAR SPINE PAIN: ICD-10-CM

## 2020-11-02 NOTE — TELEPHONE ENCOUNTER
SPIRIVA      Last Written Prescription Date:  09/24/20  Last Fill Quantity: 4g,   # refills: 0  Last Office Visit: 09/03/20         SYMBICORT      Last Written Prescription Date:  09/24/20  Last Fill Quantity: 10.2g,   # refills: 0

## 2020-11-03 RX ORDER — TIOTROPIUM BROMIDE INHALATION SPRAY 3.12 UG/1
SPRAY, METERED RESPIRATORY (INHALATION)
Qty: 4 G | Refills: 0 | Status: SHIPPED | OUTPATIENT
Start: 2020-11-03 | End: 2020-11-30

## 2020-11-03 RX ORDER — HYDROCODONE BITARTRATE AND ACETAMINOPHEN 5; 325 MG/1; MG/1
TABLET ORAL
Qty: 60 TABLET | Refills: 0 | Status: SHIPPED | OUTPATIENT
Start: 2020-11-03 | End: 2020-11-25

## 2020-11-03 RX ORDER — SACUBITRIL AND VALSARTAN 24; 26 MG/1; MG/1
TABLET, FILM COATED ORAL
Qty: 60 TABLET | Refills: 0 | Status: SHIPPED | OUTPATIENT
Start: 2020-11-03 | End: 2020-12-10

## 2020-11-03 RX ORDER — BUDESONIDE AND FORMOTEROL FUMARATE DIHYDRATE 160; 4.5 UG/1; UG/1
AEROSOL RESPIRATORY (INHALATION)
Qty: 10.2 G | Refills: 0 | Status: SHIPPED | OUTPATIENT
Start: 2020-11-03 | End: 2020-11-30

## 2020-11-03 NOTE — TELEPHONE ENCOUNTER
Entresto      Last Written Prescription Date:  10/07/20  Last Fill Quantity: 60,   # refills: 0  Last Office Visit: 09/03/20  Future Office visit:           Norco      Last Written Prescription Date:  10/07/20  Last Fill Quantity: 60,   # refills: 0  Last Office Visit: 09/03/20  Future Office visit:

## 2020-11-10 DIAGNOSIS — G62.9 NEUROPATHY: ICD-10-CM

## 2020-11-10 RX ORDER — GABAPENTIN 300 MG/1
CAPSULE ORAL
Qty: 30 CAPSULE | Refills: 0 | Status: SHIPPED | OUTPATIENT
Start: 2020-11-10 | End: 2020-11-24

## 2020-11-10 NOTE — TELEPHONE ENCOUNTER
NEURONTIN      Last Written Prescription Date:  10-  Last Fill Quantity: 30,   # refills: 0  Last Office Visit: 9-3-2020  Future Office visit:

## 2020-11-17 ENCOUNTER — ANCILLARY PROCEDURE (OUTPATIENT)
Dept: CARDIOLOGY | Facility: CLINIC | Age: 85
End: 2020-11-17
Attending: INTERNAL MEDICINE
Payer: MEDICARE

## 2020-11-17 DIAGNOSIS — I42.9 CARDIOMYOPATHY (H): ICD-10-CM

## 2020-11-17 PROCEDURE — 93295 DEV INTERROG REMOTE 1/2/MLT: CPT | Performed by: INTERNAL MEDICINE

## 2020-11-17 PROCEDURE — 93296 REM INTERROG EVL PM/IDS: CPT

## 2020-11-23 DIAGNOSIS — G62.9 NEUROPATHY: ICD-10-CM

## 2020-11-23 LAB
MDC_IDC_EPISODE_DTM: NORMAL
MDC_IDC_EPISODE_DURATION: 15 S
MDC_IDC_EPISODE_DURATION: 4 S
MDC_IDC_EPISODE_DURATION: 5 S
MDC_IDC_EPISODE_DURATION: 5 S
MDC_IDC_EPISODE_ID: 717
MDC_IDC_EPISODE_ID: 718
MDC_IDC_EPISODE_ID: 719
MDC_IDC_EPISODE_ID: 720
MDC_IDC_EPISODE_ID: 721
MDC_IDC_EPISODE_ID: 722
MDC_IDC_EPISODE_ID: 723
MDC_IDC_EPISODE_TYPE: NORMAL
MDC_IDC_LEAD_IMPLANT_DT: NORMAL
MDC_IDC_LEAD_LOCATION: NORMAL
MDC_IDC_LEAD_LOCATION_DETAIL_1: NORMAL
MDC_IDC_LEAD_MFG: NORMAL
MDC_IDC_LEAD_MODEL: NORMAL
MDC_IDC_LEAD_POLARITY_TYPE: NORMAL
MDC_IDC_LEAD_SERIAL: NORMAL
MDC_IDC_MSMT_BATTERY_DTM: NORMAL
MDC_IDC_MSMT_BATTERY_REMAINING_LONGEVITY: 25 MO
MDC_IDC_MSMT_BATTERY_RRT_TRIGGER: 2.73
MDC_IDC_MSMT_BATTERY_STATUS: NORMAL
MDC_IDC_MSMT_BATTERY_VOLTAGE: 2.86 V
MDC_IDC_MSMT_CAP_CHARGE_DTM: NORMAL
MDC_IDC_MSMT_CAP_CHARGE_ENERGY: 18 J
MDC_IDC_MSMT_CAP_CHARGE_TIME: 4.38
MDC_IDC_MSMT_CAP_CHARGE_TYPE: NORMAL
MDC_IDC_MSMT_LEADCHNL_LV_IMPEDANCE_VALUE: 456 OHM
MDC_IDC_MSMT_LEADCHNL_LV_IMPEDANCE_VALUE: 570 OHM
MDC_IDC_MSMT_LEADCHNL_LV_IMPEDANCE_VALUE: 836 OHM
MDC_IDC_MSMT_LEADCHNL_LV_PACING_THRESHOLD_AMPLITUDE: 0.75 V
MDC_IDC_MSMT_LEADCHNL_LV_PACING_THRESHOLD_PULSEWIDTH: 0.6 MS
MDC_IDC_MSMT_LEADCHNL_RA_IMPEDANCE_VALUE: 399 OHM
MDC_IDC_MSMT_LEADCHNL_RA_PACING_THRESHOLD_AMPLITUDE: 0.5 V
MDC_IDC_MSMT_LEADCHNL_RA_PACING_THRESHOLD_PULSEWIDTH: 0.4 MS
MDC_IDC_MSMT_LEADCHNL_RA_SENSING_INTR_AMPL: 1.88 MV
MDC_IDC_MSMT_LEADCHNL_RA_SENSING_INTR_AMPL: 1.88 MV
MDC_IDC_MSMT_LEADCHNL_RV_IMPEDANCE_VALUE: 399 OHM
MDC_IDC_MSMT_LEADCHNL_RV_IMPEDANCE_VALUE: 456 OHM
MDC_IDC_MSMT_LEADCHNL_RV_PACING_THRESHOLD_AMPLITUDE: 0.62 V
MDC_IDC_MSMT_LEADCHNL_RV_PACING_THRESHOLD_PULSEWIDTH: 0.4 MS
MDC_IDC_MSMT_LEADCHNL_RV_SENSING_INTR_AMPL: 4.5 MV
MDC_IDC_MSMT_LEADCHNL_RV_SENSING_INTR_AMPL: 4.5 MV
MDC_IDC_PG_IMPLANT_DTM: NORMAL
MDC_IDC_PG_MFG: NORMAL
MDC_IDC_PG_MODEL: NORMAL
MDC_IDC_PG_SERIAL: NORMAL
MDC_IDC_PG_TYPE: NORMAL
MDC_IDC_SESS_CLINIC_NAME: NORMAL
MDC_IDC_SESS_DTM: NORMAL
MDC_IDC_SESS_TYPE: NORMAL
MDC_IDC_SET_BRADY_AT_MODE_SWITCH_RATE: 171 {BEATS}/MIN
MDC_IDC_SET_BRADY_LOWRATE: 60 {BEATS}/MIN
MDC_IDC_SET_BRADY_MAX_SENSOR_RATE: 130 {BEATS}/MIN
MDC_IDC_SET_BRADY_MAX_TRACKING_RATE: 130 {BEATS}/MIN
MDC_IDC_SET_BRADY_MODE: NORMAL
MDC_IDC_SET_BRADY_PAV_DELAY_LOW: 160 MS
MDC_IDC_SET_BRADY_SAV_DELAY_LOW: 140 MS
MDC_IDC_SET_CRT_PACED_CHAMBERS: NORMAL
MDC_IDC_SET_LEADCHNL_LV_PACING_AMPLITUDE: 1.25 V
MDC_IDC_SET_LEADCHNL_LV_PACING_ANODE_ELECTRODE_1: NORMAL
MDC_IDC_SET_LEADCHNL_LV_PACING_ANODE_LOCATION_1: NORMAL
MDC_IDC_SET_LEADCHNL_LV_PACING_CAPTURE_MODE: NORMAL
MDC_IDC_SET_LEADCHNL_LV_PACING_CATHODE_ELECTRODE_1: NORMAL
MDC_IDC_SET_LEADCHNL_LV_PACING_CATHODE_LOCATION_1: NORMAL
MDC_IDC_SET_LEADCHNL_LV_PACING_POLARITY: NORMAL
MDC_IDC_SET_LEADCHNL_LV_PACING_PULSEWIDTH: 0.6 MS
MDC_IDC_SET_LEADCHNL_RA_PACING_AMPLITUDE: 1.5 V
MDC_IDC_SET_LEADCHNL_RA_PACING_ANODE_ELECTRODE_1: NORMAL
MDC_IDC_SET_LEADCHNL_RA_PACING_ANODE_LOCATION_1: NORMAL
MDC_IDC_SET_LEADCHNL_RA_PACING_CAPTURE_MODE: NORMAL
MDC_IDC_SET_LEADCHNL_RA_PACING_CATHODE_ELECTRODE_1: NORMAL
MDC_IDC_SET_LEADCHNL_RA_PACING_CATHODE_LOCATION_1: NORMAL
MDC_IDC_SET_LEADCHNL_RA_PACING_POLARITY: NORMAL
MDC_IDC_SET_LEADCHNL_RA_PACING_PULSEWIDTH: 0.4 MS
MDC_IDC_SET_LEADCHNL_RA_SENSING_ANODE_ELECTRODE_1: NORMAL
MDC_IDC_SET_LEADCHNL_RA_SENSING_ANODE_LOCATION_1: NORMAL
MDC_IDC_SET_LEADCHNL_RA_SENSING_CATHODE_ELECTRODE_1: NORMAL
MDC_IDC_SET_LEADCHNL_RA_SENSING_CATHODE_LOCATION_1: NORMAL
MDC_IDC_SET_LEADCHNL_RA_SENSING_POLARITY: NORMAL
MDC_IDC_SET_LEADCHNL_RA_SENSING_SENSITIVITY: 0.3 MV
MDC_IDC_SET_LEADCHNL_RV_PACING_AMPLITUDE: 2 V
MDC_IDC_SET_LEADCHNL_RV_PACING_ANODE_ELECTRODE_1: NORMAL
MDC_IDC_SET_LEADCHNL_RV_PACING_ANODE_LOCATION_1: NORMAL
MDC_IDC_SET_LEADCHNL_RV_PACING_CAPTURE_MODE: NORMAL
MDC_IDC_SET_LEADCHNL_RV_PACING_CATHODE_ELECTRODE_1: NORMAL
MDC_IDC_SET_LEADCHNL_RV_PACING_CATHODE_LOCATION_1: NORMAL
MDC_IDC_SET_LEADCHNL_RV_PACING_POLARITY: NORMAL
MDC_IDC_SET_LEADCHNL_RV_PACING_PULSEWIDTH: 0.4 MS
MDC_IDC_SET_LEADCHNL_RV_SENSING_ANODE_ELECTRODE_1: NORMAL
MDC_IDC_SET_LEADCHNL_RV_SENSING_ANODE_LOCATION_1: NORMAL
MDC_IDC_SET_LEADCHNL_RV_SENSING_CATHODE_ELECTRODE_1: NORMAL
MDC_IDC_SET_LEADCHNL_RV_SENSING_CATHODE_LOCATION_1: NORMAL
MDC_IDC_SET_LEADCHNL_RV_SENSING_POLARITY: NORMAL
MDC_IDC_SET_LEADCHNL_RV_SENSING_SENSITIVITY: 0.3 MV
MDC_IDC_SET_ZONE_DETECTION_BEATS_DENOMINATOR: 40 {BEATS}
MDC_IDC_SET_ZONE_DETECTION_BEATS_NUMERATOR: 30 {BEATS}
MDC_IDC_SET_ZONE_DETECTION_INTERVAL: 320 MS
MDC_IDC_SET_ZONE_DETECTION_INTERVAL: 350 MS
MDC_IDC_SET_ZONE_DETECTION_INTERVAL: 360 MS
MDC_IDC_SET_ZONE_DETECTION_INTERVAL: 400 MS
MDC_IDC_SET_ZONE_DETECTION_INTERVAL: NORMAL
MDC_IDC_SET_ZONE_TYPE: NORMAL
MDC_IDC_STAT_AT_BURDEN_PERCENT: 0 %
MDC_IDC_STAT_AT_DTM_END: NORMAL
MDC_IDC_STAT_AT_DTM_START: NORMAL
MDC_IDC_STAT_BRADY_AP_VP_PERCENT: 16.11 %
MDC_IDC_STAT_BRADY_AP_VS_PERCENT: 0.27 %
MDC_IDC_STAT_BRADY_AS_VP_PERCENT: 77.14 %
MDC_IDC_STAT_BRADY_AS_VS_PERCENT: 6.47 %
MDC_IDC_STAT_BRADY_DTM_END: NORMAL
MDC_IDC_STAT_BRADY_DTM_START: NORMAL
MDC_IDC_STAT_BRADY_RA_PERCENT_PACED: 15.89 %
MDC_IDC_STAT_BRADY_RV_PERCENT_PACED: 5.83 %
MDC_IDC_STAT_CRT_DTM_END: NORMAL
MDC_IDC_STAT_CRT_DTM_START: NORMAL
MDC_IDC_STAT_CRT_LV_PERCENT_PACED: 91.12 %
MDC_IDC_STAT_CRT_PERCENT_PACED: 91.12 %
MDC_IDC_STAT_EPISODE_RECENT_COUNT: 0
MDC_IDC_STAT_EPISODE_RECENT_COUNT_DTM_END: NORMAL
MDC_IDC_STAT_EPISODE_RECENT_COUNT_DTM_START: NORMAL
MDC_IDC_STAT_EPISODE_TOTAL_COUNT: 0
MDC_IDC_STAT_EPISODE_TOTAL_COUNT: 1
MDC_IDC_STAT_EPISODE_TOTAL_COUNT: 66
MDC_IDC_STAT_EPISODE_TOTAL_COUNT_DTM_END: NORMAL
MDC_IDC_STAT_EPISODE_TOTAL_COUNT_DTM_START: NORMAL
MDC_IDC_STAT_EPISODE_TYPE: NORMAL
MDC_IDC_STAT_TACHYTHERAPY_ATP_DELIVERED_RECENT: 0
MDC_IDC_STAT_TACHYTHERAPY_ATP_DELIVERED_TOTAL: 0
MDC_IDC_STAT_TACHYTHERAPY_RECENT_DTM_END: NORMAL
MDC_IDC_STAT_TACHYTHERAPY_RECENT_DTM_START: NORMAL
MDC_IDC_STAT_TACHYTHERAPY_SHOCKS_ABORTED_RECENT: 0
MDC_IDC_STAT_TACHYTHERAPY_SHOCKS_ABORTED_TOTAL: 0
MDC_IDC_STAT_TACHYTHERAPY_SHOCKS_DELIVERED_RECENT: 0
MDC_IDC_STAT_TACHYTHERAPY_SHOCKS_DELIVERED_TOTAL: 1
MDC_IDC_STAT_TACHYTHERAPY_TOTAL_DTM_END: NORMAL
MDC_IDC_STAT_TACHYTHERAPY_TOTAL_DTM_START: NORMAL

## 2020-11-24 RX ORDER — GABAPENTIN 300 MG/1
CAPSULE ORAL
Qty: 90 CAPSULE | Refills: 0 | Status: SHIPPED | OUTPATIENT
Start: 2020-11-24 | End: 2020-12-24

## 2020-11-24 NOTE — TELEPHONE ENCOUNTER
Last refill was for 30 tablets. Instructions state to take 3 times daily. Pended medication for 90 tablets for 30 day supply.  Please advise, thank you.    gabapentin (NEURONTIN) 300 MG capsule      Last Written Prescription Date:  11/10/20  Last Fill Quantity: 30,   # refills: 0  Last Office Visit: 09/03/20  Future Office visit:    Next 5 appointments (look out 90 days)    Feb 22, 2021 10:00 AM  (Arrive by 9:45 AM)  Return Visit with Kodi Garcia DO  [facility] ([facility] [place] ) [address]  [place] [address]  [phone]           Routing refill request to provider for review/approval because:  Drug not on the G, P or [facility] refill protocol or controlled substance

## 2020-11-25 DIAGNOSIS — M54.50 LUMBAR SPINE PAIN: ICD-10-CM

## 2020-11-25 RX ORDER — HYDROCODONE BITARTRATE AND ACETAMINOPHEN 5; 325 MG/1; MG/1
TABLET ORAL
Qty: 60 TABLET | Refills: 0 | Status: SHIPPED | OUTPATIENT
Start: 2020-11-25 | End: 2020-12-22

## 2020-11-25 NOTE — TELEPHONE ENCOUNTER
micheleco      Last Written Prescription Date:  11/3/2020  Last Fill Quantity: 60,   # refills: 0  Last Office Visit: 9/3/2020  Future Office visit:    Next 5 appointments (look out 90 days)    Feb 22, 2021 10:00 AM  (Arrive by 9:45 AM)  Return Visit with Kodi Garcia DO  [facility] [place] ([facility] ) [address]  [place] [address]  [phone]

## 2020-11-30 DIAGNOSIS — E78.2 MIXED HYPERLIPIDEMIA: ICD-10-CM

## 2020-11-30 DIAGNOSIS — J44.9 CHRONIC OBSTRUCTIVE PULMONARY DISEASE, UNSPECIFIED COPD TYPE (H): ICD-10-CM

## 2020-11-30 DIAGNOSIS — I42.8 NON-ISCHEMIC CARDIOMYOPATHY (H): ICD-10-CM

## 2020-11-30 DIAGNOSIS — J43.8 OTHER EMPHYSEMA (H): ICD-10-CM

## 2020-11-30 RX ORDER — CARVEDILOL 12.5 MG/1
TABLET ORAL
Qty: 180 TABLET | Refills: 0 | Status: SHIPPED | OUTPATIENT
Start: 2020-11-30 | End: 2021-03-09

## 2020-11-30 RX ORDER — TIOTROPIUM BROMIDE INHALATION SPRAY 3.12 UG/1
SPRAY, METERED RESPIRATORY (INHALATION)
Qty: 4 G | Refills: 0 | Status: SHIPPED | OUTPATIENT
Start: 2020-11-30 | End: 2021-01-11

## 2020-11-30 RX ORDER — BUDESONIDE AND FORMOTEROL FUMARATE DIHYDRATE 160; 4.5 UG/1; UG/1
AEROSOL RESPIRATORY (INHALATION)
Qty: 10.2 G | Refills: 0 | Status: SHIPPED | OUTPATIENT
Start: 2020-11-30 | End: 2021-01-11

## 2020-11-30 RX ORDER — ROSUVASTATIN CALCIUM 10 MG/1
TABLET, COATED ORAL
Qty: 90 TABLET | Refills: 3 | Status: SHIPPED | OUTPATIENT
Start: 2020-11-30 | End: 2021-12-09

## 2020-11-30 NOTE — TELEPHONE ENCOUNTER
symbacort      Last Written Prescription Date:  11/3/2020  Last Fill Quantity: 10.2 g,   # refills: 0  Last Office Visit: 9/3/2020  Future Office visit:    Next 5 appointments (look out 90 days)    Feb 22, 2021 10:00 AM  (Arrive by 9:45 AM)  Return Visit with Kodi Garcia,   [facility] [place] ([facility] [place] ) [address]  [place] [address]  [phone]       spiriva      Last Written Prescription Date:  11/3/2020  Last Fill Quantity: 4 g,   # refills: 0  Last Office Visit: 9/3/2020  Future Office visit:    Next 5 appointments (look out 90 days)    Feb 22, 2021 10:00 AM  (Arrive by 9:45 AM)  Return Visit with Kodi Garcia DO  [facility] [place] ([facility] [place] ) [address]  [place] [address]  [phone]         coreg      Last Written Prescription Date:  8/27/2020  Last Fill Quantity: 180,   # refills: 0  Last Office Visit: 9/3/2020  Future Office visit:    Next 5 appointments (look out 90 days)    Feb 22, 2021 10:00 AM  (Arrive by 9:45 AM)  Return Visit with Kodi Garcia DO  [facility] [place] ([facility] [place] ) [address]  [place] [address]  [phone]

## 2020-11-30 NOTE — TELEPHONE ENCOUNTER
crestor      Last Written Prescription Date:  12/4/19  Last Fill Quantity: 90,   # refills: 3  Last Office Visit: 9/3/2020  Future Office visit:    Next 5 appointments (look out 90 days)    Feb 22, 2021 10:00 AM  (Arrive by 9:45 AM)  Return Visit with Kodi Garcia DO  [facility] [place] ([facility] [place] ) [address]  [place] [address]  [phone]

## 2020-12-09 DIAGNOSIS — I50.9 CONGESTIVE HEART FAILURE, NYHA CLASS 2, UNSPECIFIED CONGESTIVE HEART FAILURE TYPE (H): ICD-10-CM

## 2020-12-09 NOTE — TELEPHONE ENCOUNTER
Entresto      Last Written Prescription Date:  11/03/20  Last Fill Quantity: 60,   # refills: 0  Last Office Visit: 09/03/20  Future Office visit:    Next 5 appointments (look out 90 days)    Feb 22, 2021 10:00 AM  (Arrive by 9:45 AM)  Return Visit with Kodi Garcia DO  [facility] [place] ([facility] [place] ) [address]  [place] [address]  [phone]

## 2020-12-10 RX ORDER — SACUBITRIL AND VALSARTAN 24; 26 MG/1; MG/1
TABLET, FILM COATED ORAL
Qty: 60 TABLET | Refills: 0 | Status: SHIPPED | OUTPATIENT
Start: 2020-12-10 | End: 2021-01-11

## 2020-12-22 DIAGNOSIS — M54.50 LUMBAR SPINE PAIN: ICD-10-CM

## 2020-12-22 DIAGNOSIS — M81.0 AGE-RELATED OSTEOPOROSIS WITHOUT CURRENT PATHOLOGICAL FRACTURE: ICD-10-CM

## 2020-12-22 DIAGNOSIS — G62.9 NEUROPATHY: ICD-10-CM

## 2020-12-22 RX ORDER — HYDROCODONE BITARTRATE AND ACETAMINOPHEN 5; 325 MG/1; MG/1
TABLET ORAL
Qty: 60 TABLET | Refills: 0 | Status: SHIPPED | OUTPATIENT
Start: 2020-12-22 | End: 2021-01-20

## 2020-12-22 NOTE — TELEPHONE ENCOUNTER
HYDROcodone-acetaminophen (NORCO) 5-325 MG tablet    Last Written Prescription Date:  11/25/20  Last Fill Quantity: 60,   # refills: 0  Last Office Visit: 9/3/20  Future Office visit:    Next 5 appointments (look out 90 days)    Feb 22, 2021 10:00 AM  (Arrive by 9:45 AM)  Return Visit with Kodi Garcia DO  [facility] [place] ([facility] ) [address] [place] [address]  [phone]           Routing refill request to provider for review/approval

## 2020-12-24 DIAGNOSIS — I48.0 PAROXYSMAL ATRIAL FIBRILLATION (H): ICD-10-CM

## 2020-12-24 RX ORDER — GABAPENTIN 300 MG/1
CAPSULE ORAL
Qty: 90 CAPSULE | Refills: 0 | Status: SHIPPED | OUTPATIENT
Start: 2020-12-24 | End: 2021-01-27

## 2020-12-24 RX ORDER — APIXABAN 5 MG/1
TABLET, FILM COATED ORAL
Qty: 180 TABLET | Refills: 0 | Status: SHIPPED | OUTPATIENT
Start: 2020-12-24 | End: 2021-03-28

## 2020-12-24 RX ORDER — ALENDRONATE SODIUM 70 MG/1
TABLET ORAL
Qty: 12 TABLET | Refills: 0 | Status: SHIPPED | OUTPATIENT
Start: 2020-12-24 | End: 2021-01-27

## 2020-12-24 NOTE — TELEPHONE ENCOUNTER
Fosamax, Gabapentin      Last Written Prescription Date:  9.28.20, 11.24.20  Last Fill Quantity: #12, #90,   # refills: 0  Last Office Visit: 9.3.20  Future Office visit:    Next 5 appointments (look out 90 days)    Feb 22, 2021 10:00 AM  (Arrive by 9:45 AM)  Return Visit with Kodi Garcia DO  [facility] ([facility] ) [address] [place] [address] [place] [address]  [phone]           Routing refill request to provider for review/approval because:  Drug not on the [facility], [facility] or  Health refill protocol or controlled substance

## 2020-12-24 NOTE — TELEPHONE ENCOUNTER
Eliquis  Last Written Prescription Date: 12/4/19  Last Fill Quantity: 180 # of Refills: 3  Last Office Visit: 9/3/20

## 2021-01-05 ENCOUNTER — HOSPITAL ENCOUNTER (INPATIENT)
Facility: HOSPITAL | Age: 86
LOS: 3 days | Discharge: HOME | DRG: 871 | End: 2021-01-09
Attending: INTERNAL MEDICINE | Admitting: INTERNAL MEDICINE
Payer: MEDICARE

## 2021-01-05 DIAGNOSIS — A41.9 SEPTIC SHOCK (H): ICD-10-CM

## 2021-01-05 DIAGNOSIS — R65.21 SEPTIC SHOCK (H): ICD-10-CM

## 2021-01-05 LAB
ALBUMIN SERPL-MCNC: 2.8 G/DL (ref 3.4–5)
ALP SERPL-CCNC: 247 U/L (ref 40–150)
ALT SERPL W P-5'-P-CCNC: 536 U/L (ref 0–50)
ANION GAP SERPL CALCULATED.3IONS-SCNC: 10 MMOL/L (ref 3–14)
AST SERPL W P-5'-P-CCNC: 1015 U/L (ref 0–45)
BASOPHILS # BLD AUTO: 0 10E9/L (ref 0–0.2)
BASOPHILS NFR BLD AUTO: 0.2 %
BILIRUB SERPL-MCNC: 1.5 MG/DL (ref 0.2–1.3)
BUN SERPL-MCNC: 51 MG/DL (ref 7–30)
CALCIUM SERPL-MCNC: 8.8 MG/DL (ref 8.5–10.1)
CHLORIDE SERPL-SCNC: 110 MMOL/L (ref 94–109)
CK SERPL-CCNC: 55 U/L (ref 30–225)
CO2 SERPL-SCNC: 19 MMOL/L (ref 20–32)
CREAT SERPL-MCNC: 2.06 MG/DL (ref 0.52–1.04)
CRP SERPL-MCNC: 18.9 MG/L (ref 0–8)
DIFFERENTIAL METHOD BLD: ABNORMAL
EOSINOPHIL # BLD AUTO: 0 10E9/L (ref 0–0.7)
EOSINOPHIL NFR BLD AUTO: 0.3 %
ERYTHROCYTE [DISTWIDTH] IN BLOOD BY AUTOMATED COUNT: 13.4 % (ref 10–15)
GFR SERPL CREATININE-BSD FRML MDRD: 22 ML/MIN/{1.73_M2}
GLUCOSE SERPL-MCNC: 110 MG/DL (ref 70–99)
HCT VFR BLD AUTO: 32.1 % (ref 35–47)
HGB BLD-MCNC: 10.3 G/DL (ref 11.7–15.7)
IMM GRANULOCYTES # BLD: 0 10E9/L (ref 0–0.4)
IMM GRANULOCYTES NFR BLD: 0.3 %
LACTATE BLD-SCNC: 3.1 MMOL/L (ref 0.7–2)
LIPASE SERPL-CCNC: 107 U/L (ref 73–393)
LYMPHOCYTES # BLD AUTO: 0.2 10E9/L (ref 0.8–5.3)
LYMPHOCYTES NFR BLD AUTO: 3.1 %
MCH RBC QN AUTO: 29.3 PG (ref 26.5–33)
MCHC RBC AUTO-ENTMCNC: 32.1 G/DL (ref 31.5–36.5)
MCV RBC AUTO: 91 FL (ref 78–100)
MONOCYTES # BLD AUTO: 0.1 10E9/L (ref 0–1.3)
MONOCYTES NFR BLD AUTO: 1 %
NEUTROPHILS # BLD AUTO: 5.8 10E9/L (ref 1.6–8.3)
NEUTROPHILS NFR BLD AUTO: 95.1 %
NRBC # BLD AUTO: 0 10*3/UL
NRBC BLD AUTO-RTO: 0 /100
NT-PROBNP SERPL-MCNC: 1488 PG/ML (ref 0–1800)
PLATELET # BLD AUTO: 146 10E9/L (ref 150–450)
POTASSIUM SERPL-SCNC: 4.6 MMOL/L (ref 3.4–5.3)
PROCALCITONIN SERPL-MCNC: 26.54 NG/ML
PROT SERPL-MCNC: 6.3 G/DL (ref 6.8–8.8)
RBC # BLD AUTO: 3.52 10E12/L (ref 3.8–5.2)
SODIUM SERPL-SCNC: 139 MMOL/L (ref 133–144)
TROPONIN I SERPL-MCNC: <0.015 UG/L (ref 0–0.04)
WBC # BLD AUTO: 6.1 10E9/L (ref 4–11)

## 2021-01-05 PROCEDURE — 83605 ASSAY OF LACTIC ACID: CPT | Performed by: INTERNAL MEDICINE

## 2021-01-05 PROCEDURE — 99285 EMERGENCY DEPT VISIT HI MDM: CPT | Mod: 25

## 2021-01-05 PROCEDURE — 83880 ASSAY OF NATRIURETIC PEPTIDE: CPT | Performed by: INTERNAL MEDICINE

## 2021-01-05 PROCEDURE — 258N000003 HC RX IP 258 OP 636: Performed by: INTERNAL MEDICINE

## 2021-01-05 PROCEDURE — 93005 ELECTROCARDIOGRAM TRACING: CPT

## 2021-01-05 PROCEDURE — 86850 RBC ANTIBODY SCREEN: CPT | Performed by: INTERNAL MEDICINE

## 2021-01-05 PROCEDURE — 84145 PROCALCITONIN (PCT): CPT | Performed by: INTERNAL MEDICINE

## 2021-01-05 PROCEDURE — 93010 ELECTROCARDIOGRAM REPORT: CPT | Performed by: INTERNAL MEDICINE

## 2021-01-05 PROCEDURE — 82550 ASSAY OF CK (CPK): CPT | Performed by: INTERNAL MEDICINE

## 2021-01-05 PROCEDURE — 86140 C-REACTIVE PROTEIN: CPT | Performed by: INTERNAL MEDICINE

## 2021-01-05 PROCEDURE — 36415 COLL VENOUS BLD VENIPUNCTURE: CPT | Performed by: INTERNAL MEDICINE

## 2021-01-05 PROCEDURE — 86900 BLOOD TYPING SEROLOGIC ABO: CPT | Performed by: INTERNAL MEDICINE

## 2021-01-05 PROCEDURE — 84484 ASSAY OF TROPONIN QUANT: CPT | Performed by: INTERNAL MEDICINE

## 2021-01-05 PROCEDURE — 87636 SARSCOV2 & INF A&B AMP PRB: CPT | Performed by: INTERNAL MEDICINE

## 2021-01-05 PROCEDURE — 87077 CULTURE AEROBIC IDENTIFY: CPT | Performed by: INTERNAL MEDICINE

## 2021-01-05 PROCEDURE — 85025 COMPLETE CBC W/AUTO DIFF WBC: CPT | Performed by: INTERNAL MEDICINE

## 2021-01-05 PROCEDURE — 99285 EMERGENCY DEPT VISIT HI MDM: CPT | Performed by: INTERNAL MEDICINE

## 2021-01-05 PROCEDURE — 86901 BLOOD TYPING SEROLOGIC RH(D): CPT | Performed by: INTERNAL MEDICINE

## 2021-01-05 PROCEDURE — C9803 HOPD COVID-19 SPEC COLLECT: HCPCS

## 2021-01-05 PROCEDURE — 87186 SC STD MICRODIL/AGAR DIL: CPT | Performed by: INTERNAL MEDICINE

## 2021-01-05 PROCEDURE — 96361 HYDRATE IV INFUSION ADD-ON: CPT

## 2021-01-05 PROCEDURE — 87040 BLOOD CULTURE FOR BACTERIA: CPT | Performed by: INTERNAL MEDICINE

## 2021-01-05 PROCEDURE — 83690 ASSAY OF LIPASE: CPT | Performed by: INTERNAL MEDICINE

## 2021-01-05 PROCEDURE — 80053 COMPREHEN METABOLIC PANEL: CPT | Performed by: INTERNAL MEDICINE

## 2021-01-05 RX ADMIN — SODIUM CHLORIDE 1000 ML: 9 INJECTION, SOLUTION INTRAVENOUS at 23:57

## 2021-01-05 RX ADMIN — SODIUM CHLORIDE 1000 ML: 9 INJECTION, SOLUTION INTRAVENOUS at 23:19

## 2021-01-06 ENCOUNTER — APPOINTMENT (OUTPATIENT)
Dept: GENERAL RADIOLOGY | Facility: HOSPITAL | Age: 86
DRG: 871 | End: 2021-01-06
Attending: INTERNAL MEDICINE
Payer: MEDICARE

## 2021-01-06 ENCOUNTER — ANESTHESIA EVENT (OUTPATIENT)
Dept: INTENSIVE CARE | Facility: HOSPITAL | Age: 86
DRG: 871 | End: 2021-01-06
Payer: MEDICARE

## 2021-01-06 ENCOUNTER — APPOINTMENT (OUTPATIENT)
Dept: ULTRASOUND IMAGING | Facility: HOSPITAL | Age: 86
DRG: 871 | End: 2021-01-06
Attending: INTERNAL MEDICINE
Payer: MEDICARE

## 2021-01-06 ENCOUNTER — APPOINTMENT (OUTPATIENT)
Dept: CT IMAGING | Facility: HOSPITAL | Age: 86
DRG: 871 | End: 2021-01-06
Attending: INTERNAL MEDICINE
Payer: MEDICARE

## 2021-01-06 ENCOUNTER — ANESTHESIA (OUTPATIENT)
Dept: INTENSIVE CARE | Facility: HOSPITAL | Age: 86
DRG: 871 | End: 2021-01-06
Payer: MEDICARE

## 2021-01-06 ENCOUNTER — HOSPITAL ENCOUNTER (INPATIENT)
Dept: CARDIOLOGY | Facility: HOSPITAL | Age: 86
DRG: 871 | End: 2021-01-06
Attending: INTERNAL MEDICINE
Payer: MEDICARE

## 2021-01-06 PROBLEM — R82.90 ABNORMAL FINDING ON URINALYSIS: Status: ACTIVE | Noted: 2021-01-06

## 2021-01-06 PROBLEM — K72.00 SHOCK LIVER: Status: ACTIVE | Noted: 2021-01-06

## 2021-01-06 PROBLEM — M47.815 OSTEOARTHRITIS OF THORACOLUMBAR SPINE: Status: ACTIVE | Noted: 2021-01-06

## 2021-01-06 PROBLEM — N18.30 ACUTE RENAL FAILURE SUPERIMPOSED ON STAGE 3 CHRONIC KIDNEY DISEASE (H): Status: ACTIVE | Noted: 2021-01-06

## 2021-01-06 PROBLEM — I48.0 PAROXYSMAL ATRIAL FIBRILLATION (H): Status: ACTIVE | Noted: 2019-12-04

## 2021-01-06 PROBLEM — A41.9 SEPSIS (H): Status: ACTIVE | Noted: 2021-01-06

## 2021-01-06 PROBLEM — N17.9 ACUTE RENAL FAILURE SUPERIMPOSED ON STAGE 3 CHRONIC KIDNEY DISEASE (H): Status: ACTIVE | Noted: 2021-01-06

## 2021-01-06 PROBLEM — J44.9 CHRONIC OBSTRUCTIVE PULMONARY DISEASE, UNSPECIFIED COPD TYPE (H): Status: ACTIVE | Noted: 2018-06-01

## 2021-01-06 PROBLEM — A49.9 GRAM-NEGATIVE INFECTION: Status: ACTIVE | Noted: 2021-01-06

## 2021-01-06 PROBLEM — R65.21 SEPTIC SHOCK (H): Status: ACTIVE | Noted: 2021-01-06

## 2021-01-06 PROBLEM — N18.30 CKD (CHRONIC KIDNEY DISEASE) STAGE 3, GFR 30-59 ML/MIN (H): Status: ACTIVE | Noted: 2019-12-04

## 2021-01-06 LAB
ABO + RH BLD: NORMAL
ABO + RH BLD: NORMAL
ALBUMIN SERPL-MCNC: 2.2 G/DL (ref 3.4–5)
ALBUMIN SERPL-MCNC: 2.3 G/DL (ref 3.4–5)
ALBUMIN UR-MCNC: 10 MG/DL
ALP SERPL-CCNC: 194 U/L (ref 40–150)
ALP SERPL-CCNC: 194 U/L (ref 40–150)
ALT SERPL W P-5'-P-CCNC: 361 U/L (ref 0–50)
ALT SERPL W P-5'-P-CCNC: 418 U/L (ref 0–50)
ANION GAP SERPL CALCULATED.3IONS-SCNC: 8 MMOL/L (ref 3–14)
ANION GAP SERPL CALCULATED.3IONS-SCNC: 9 MMOL/L (ref 3–14)
APPEARANCE UR: ABNORMAL
AST SERPL W P-5'-P-CCNC: 416 U/L (ref 0–45)
AST SERPL W P-5'-P-CCNC: 650 U/L (ref 0–45)
BACTERIA #/AREA URNS HPF: ABNORMAL /HPF
BILIRUB SERPL-MCNC: 1.5 MG/DL (ref 0.2–1.3)
BILIRUB SERPL-MCNC: 1.7 MG/DL (ref 0.2–1.3)
BILIRUB UR QL STRIP: NEGATIVE
BLD GP AB SCN SERPL QL: NORMAL
BLOOD BANK CMNT PATIENT-IMP: NORMAL
BLOOD BANK CMNT PATIENT-IMP: NORMAL
BUN SERPL-MCNC: 35 MG/DL (ref 7–30)
BUN SERPL-MCNC: 43 MG/DL (ref 7–30)
CALCIUM SERPL-MCNC: 7.4 MG/DL (ref 8.5–10.1)
CALCIUM SERPL-MCNC: 7.8 MG/DL (ref 8.5–10.1)
CHLORIDE SERPL-SCNC: 112 MMOL/L (ref 94–109)
CHLORIDE SERPL-SCNC: 113 MMOL/L (ref 94–109)
CK SERPL-CCNC: 58 U/L (ref 30–225)
CO2 SERPL-SCNC: 18 MMOL/L (ref 20–32)
CO2 SERPL-SCNC: 18 MMOL/L (ref 20–32)
COLOR UR AUTO: YELLOW
CREAT SERPL-MCNC: 1.28 MG/DL (ref 0.52–1.04)
CREAT SERPL-MCNC: 1.67 MG/DL (ref 0.52–1.04)
ERYTHROCYTE [DISTWIDTH] IN BLOOD BY AUTOMATED COUNT: 13.6 % (ref 10–15)
FLUABV+SARS-COV-2+RSV PNL RESP NAA+PROBE: NEGATIVE
FLUABV+SARS-COV-2+RSV PNL RESP NAA+PROBE: NEGATIVE
GFR SERPL CREATININE-BSD FRML MDRD: 28 ML/MIN/{1.73_M2}
GFR SERPL CREATININE-BSD FRML MDRD: 38 ML/MIN/{1.73_M2}
GLUCOSE BLDC GLUCOMTR-MCNC: 104 MG/DL (ref 70–99)
GLUCOSE SERPL-MCNC: 129 MG/DL (ref 70–99)
GLUCOSE SERPL-MCNC: 73 MG/DL (ref 70–99)
GLUCOSE UR STRIP-MCNC: NEGATIVE MG/DL
HCT VFR BLD AUTO: 28.2 % (ref 35–47)
HGB BLD-MCNC: 9 G/DL (ref 11.7–15.7)
HGB UR QL STRIP: NEGATIVE
KETONES UR STRIP-MCNC: NEGATIVE MG/DL
LABORATORY COMMENT REPORT: NORMAL
LACTATE BLD-SCNC: 1.7 MMOL/L (ref 0.7–2)
LACTATE BLD-SCNC: 2.3 MMOL/L (ref 0.7–2)
LEUKOCYTE ESTERASE UR QL STRIP: ABNORMAL
MAGNESIUM SERPL-MCNC: 1.4 MG/DL (ref 1.6–2.3)
MAGNESIUM SERPL-MCNC: 1.8 MG/DL (ref 1.6–2.3)
MCH RBC QN AUTO: 29.5 PG (ref 26.5–33)
MCHC RBC AUTO-ENTMCNC: 31.9 G/DL (ref 31.5–36.5)
MCV RBC AUTO: 93 FL (ref 78–100)
MUCOUS THREADS #/AREA URNS LPF: PRESENT /LPF
NITRATE UR QL: NEGATIVE
PH UR STRIP: 5.5 PH (ref 4.7–8)
PLATELET # BLD AUTO: 121 10E9/L (ref 150–450)
POTASSIUM SERPL-SCNC: 4.4 MMOL/L (ref 3.4–5.3)
POTASSIUM SERPL-SCNC: 5.4 MMOL/L (ref 3.4–5.3)
PROT SERPL-MCNC: 5.1 G/DL (ref 6.8–8.8)
PROT SERPL-MCNC: 5.4 G/DL (ref 6.8–8.8)
RBC # BLD AUTO: 3.05 10E12/L (ref 3.8–5.2)
RBC #/AREA URNS AUTO: 1 /HPF (ref 0–2)
RSV RNA SPEC QL NAA+PROBE: NEGATIVE
SARS-COV-2 RNA SPEC QL NAA+PROBE: NEGATIVE
SODIUM SERPL-SCNC: 139 MMOL/L (ref 133–144)
SODIUM SERPL-SCNC: 139 MMOL/L (ref 133–144)
SOURCE: ABNORMAL
SP GR UR STRIP: 1.02 (ref 1–1.03)
SPECIMEN EXP DATE BLD: NORMAL
SPECIMEN SOURCE: NORMAL
SQUAMOUS #/AREA URNS AUTO: 0 /HPF (ref 0–1)
TROPONIN I SERPL-MCNC: <0.015 UG/L (ref 0–0.04)
UROBILINOGEN UR STRIP-MCNC: NORMAL MG/DL (ref 0–2)
WBC # BLD AUTO: 13.4 10E9/L (ref 4–11)
WBC #/AREA URNS AUTO: 9 /HPF (ref 0–5)

## 2021-01-06 PROCEDURE — 82550 ASSAY OF CK (CPK): CPT | Performed by: INTERNAL MEDICINE

## 2021-01-06 PROCEDURE — 36415 COLL VENOUS BLD VENIPUNCTURE: CPT | Performed by: INTERNAL MEDICINE

## 2021-01-06 PROCEDURE — 76705 ECHO EXAM OF ABDOMEN: CPT

## 2021-01-06 PROCEDURE — 93306 TTE W/DOPPLER COMPLETE: CPT

## 2021-01-06 PROCEDURE — 93306 TTE W/DOPPLER COMPLETE: CPT | Mod: 26 | Performed by: INTERNAL MEDICINE

## 2021-01-06 PROCEDURE — 87186 SC STD MICRODIL/AGAR DIL: CPT | Performed by: INTERNAL MEDICINE

## 2021-01-06 PROCEDURE — 250N000011 HC RX IP 250 OP 636: Performed by: INTERNAL MEDICINE

## 2021-01-06 PROCEDURE — 250N000013 HC RX MED GY IP 250 OP 250 PS 637: Performed by: INTERNAL MEDICINE

## 2021-01-06 PROCEDURE — 99223 1ST HOSP IP/OBS HIGH 75: CPT | Mod: AI | Performed by: INTERNAL MEDICINE

## 2021-01-06 PROCEDURE — 81001 URINALYSIS AUTO W/SCOPE: CPT | Performed by: INTERNAL MEDICINE

## 2021-01-06 PROCEDURE — 96365 THER/PROPH/DIAG IV INF INIT: CPT | Mod: XS

## 2021-01-06 PROCEDURE — 87086 URINE CULTURE/COLONY COUNT: CPT | Performed by: INTERNAL MEDICINE

## 2021-01-06 PROCEDURE — 250N000009 HC RX 250

## 2021-01-06 PROCEDURE — 96361 HYDRATE IV INFUSION ADD-ON: CPT

## 2021-01-06 PROCEDURE — 258N000003 HC RX IP 258 OP 636: Performed by: INTERNAL MEDICINE

## 2021-01-06 PROCEDURE — 83735 ASSAY OF MAGNESIUM: CPT | Performed by: INTERNAL MEDICINE

## 2021-01-06 PROCEDURE — 250N000011 HC RX IP 250 OP 636

## 2021-01-06 PROCEDURE — 87088 URINE BACTERIA CULTURE: CPT | Performed by: INTERNAL MEDICINE

## 2021-01-06 PROCEDURE — 94640 AIRWAY INHALATION TREATMENT: CPT

## 2021-01-06 PROCEDURE — 85027 COMPLETE CBC AUTOMATED: CPT | Performed by: INTERNAL MEDICINE

## 2021-01-06 PROCEDURE — 250N000009 HC RX 250: Performed by: INTERNAL MEDICINE

## 2021-01-06 PROCEDURE — 80053 COMPREHEN METABOLIC PANEL: CPT | Performed by: INTERNAL MEDICINE

## 2021-01-06 PROCEDURE — 96375 TX/PRO/DX INJ NEW DRUG ADDON: CPT

## 2021-01-06 PROCEDURE — 71045 X-RAY EXAM CHEST 1 VIEW: CPT

## 2021-01-06 PROCEDURE — 255N000002 HC RX 255 OP 636: Performed by: INTERNAL MEDICINE

## 2021-01-06 PROCEDURE — 74177 CT ABD & PELVIS W/CONTRAST: CPT

## 2021-01-06 PROCEDURE — 200N000001 HC R&B ICU

## 2021-01-06 PROCEDURE — 37000011 ZZH ANESTHESIA WARD SERVICE: Performed by: NURSE ANESTHETIST, CERTIFIED REGISTERED

## 2021-01-06 PROCEDURE — 96366 THER/PROPH/DIAG IV INF ADDON: CPT

## 2021-01-06 PROCEDURE — 999N001017 HC STATISTIC GLUCOSE BY METER IP

## 2021-01-06 PROCEDURE — 83605 ASSAY OF LACTIC ACID: CPT | Performed by: INTERNAL MEDICINE

## 2021-01-06 PROCEDURE — 84484 ASSAY OF TROPONIN QUANT: CPT | Performed by: INTERNAL MEDICINE

## 2021-01-06 RX ORDER — SODIUM CHLORIDE 9 MG/ML
INJECTION, SOLUTION INTRAVENOUS
Status: DISCONTINUED
Start: 2021-01-06 | End: 2021-01-06 | Stop reason: HOSPADM

## 2021-01-06 RX ORDER — IOPAMIDOL 612 MG/ML
100 INJECTION, SOLUTION INTRAVASCULAR ONCE
Status: COMPLETED | OUTPATIENT
Start: 2021-01-06 | End: 2021-01-06

## 2021-01-06 RX ORDER — SODIUM CHLORIDE, SODIUM LACTATE, POTASSIUM CHLORIDE, CALCIUM CHLORIDE 600; 310; 30; 20 MG/100ML; MG/100ML; MG/100ML; MG/100ML
INJECTION, SOLUTION INTRAVENOUS CONTINUOUS
Status: DISCONTINUED | OUTPATIENT
Start: 2021-01-06 | End: 2021-01-06

## 2021-01-06 RX ORDER — DEXTROSE MONOHYDRATE 25 G/50ML
25-50 INJECTION, SOLUTION INTRAVENOUS
Status: DISCONTINUED | OUTPATIENT
Start: 2021-01-06 | End: 2021-01-09 | Stop reason: HOSPADM

## 2021-01-06 RX ORDER — NOREPINEPHRINE BITARTRATE 0.06 MG/ML
0.03-0.4 INJECTION, SOLUTION INTRAVENOUS CONTINUOUS
Status: DISCONTINUED | OUTPATIENT
Start: 2021-01-06 | End: 2021-01-06

## 2021-01-06 RX ORDER — NALOXONE HYDROCHLORIDE 0.4 MG/ML
0.2 INJECTION, SOLUTION INTRAMUSCULAR; INTRAVENOUS; SUBCUTANEOUS
Status: DISCONTINUED | OUTPATIENT
Start: 2021-01-06 | End: 2021-01-09 | Stop reason: HOSPADM

## 2021-01-06 RX ORDER — VANCOMYCIN HYDROCHLORIDE 1 G/200ML
1000 INJECTION, SOLUTION INTRAVENOUS ONCE
Status: DISCONTINUED | OUTPATIENT
Start: 2021-01-06 | End: 2021-01-07

## 2021-01-06 RX ORDER — ALBUTEROL SULFATE 90 UG/1
2 AEROSOL, METERED RESPIRATORY (INHALATION) EVERY 4 HOURS PRN
Status: DISCONTINUED | OUTPATIENT
Start: 2021-01-06 | End: 2021-01-09 | Stop reason: HOSPADM

## 2021-01-06 RX ORDER — ONDANSETRON 2 MG/ML
4 INJECTION INTRAMUSCULAR; INTRAVENOUS EVERY 6 HOURS PRN
Status: DISCONTINUED | OUTPATIENT
Start: 2021-01-06 | End: 2021-01-09 | Stop reason: HOSPADM

## 2021-01-06 RX ORDER — ACETAMINOPHEN 325 MG/1
650 TABLET ORAL EVERY 4 HOURS PRN
Status: DISCONTINUED | OUTPATIENT
Start: 2021-01-06 | End: 2021-01-09 | Stop reason: HOSPADM

## 2021-01-06 RX ORDER — HYDROCODONE BITARTRATE AND ACETAMINOPHEN 5; 325 MG/1; MG/1
1-2 TABLET ORAL EVERY 6 HOURS PRN
Status: DISCONTINUED | OUTPATIENT
Start: 2021-01-06 | End: 2021-01-09 | Stop reason: HOSPADM

## 2021-01-06 RX ORDER — NALOXONE HYDROCHLORIDE 0.4 MG/ML
0.4 INJECTION, SOLUTION INTRAMUSCULAR; INTRAVENOUS; SUBCUTANEOUS
Status: DISCONTINUED | OUTPATIENT
Start: 2021-01-06 | End: 2021-01-09 | Stop reason: HOSPADM

## 2021-01-06 RX ORDER — VANCOMYCIN HYDROCHLORIDE 1 G/200ML
1000 INJECTION, SOLUTION INTRAVENOUS ONCE
Status: COMPLETED | OUTPATIENT
Start: 2021-01-06 | End: 2021-01-06

## 2021-01-06 RX ORDER — VANCOMYCIN HYDROCHLORIDE 1 G/200ML
INJECTION, SOLUTION INTRAVENOUS
Status: COMPLETED
Start: 2021-01-06 | End: 2021-01-06

## 2021-01-06 RX ORDER — MEROPENEM 1 G/1
INJECTION, POWDER, FOR SOLUTION INTRAVENOUS
Status: DISCONTINUED
Start: 2021-01-06 | End: 2021-01-06 | Stop reason: HOSPADM

## 2021-01-06 RX ORDER — NOREPINEPHRINE BITARTRATE 1 MG/ML
INJECTION, SOLUTION INTRAVENOUS
Status: DISCONTINUED
Start: 2021-01-06 | End: 2021-01-06 | Stop reason: HOSPADM

## 2021-01-06 RX ORDER — NOREPINEPHRINE BITARTRATE 0.06 MG/ML
INJECTION, SOLUTION INTRAVENOUS
Status: COMPLETED
Start: 2021-01-06 | End: 2021-01-06

## 2021-01-06 RX ORDER — CEFAZOLIN SODIUM 1 G/50ML
1250 SOLUTION INTRAVENOUS
Status: DISCONTINUED | OUTPATIENT
Start: 2021-01-08 | End: 2021-01-07

## 2021-01-06 RX ORDER — SODIUM CHLORIDE, SODIUM LACTATE, POTASSIUM CHLORIDE, CALCIUM CHLORIDE 600; 310; 30; 20 MG/100ML; MG/100ML; MG/100ML; MG/100ML
INJECTION, SOLUTION INTRAVENOUS CONTINUOUS
Status: ACTIVE | OUTPATIENT
Start: 2021-01-06 | End: 2021-01-07

## 2021-01-06 RX ORDER — MEROPENEM AND SODIUM CHLORIDE 500 MG/50ML
500 INJECTION, SOLUTION INTRAVENOUS EVERY 12 HOURS
Status: DISCONTINUED | OUTPATIENT
Start: 2021-01-06 | End: 2021-01-07

## 2021-01-06 RX ORDER — NOREPINEPHRINE BITARTRATE 0.06 MG/ML
0.03-0.4 INJECTION, SOLUTION INTRAVENOUS CONTINUOUS
Status: DISCONTINUED | OUTPATIENT
Start: 2021-01-06 | End: 2021-01-08

## 2021-01-06 RX ORDER — MEROPENEM AND SODIUM CHLORIDE 1 G/50ML
1 INJECTION, SOLUTION INTRAVENOUS ONCE
Status: COMPLETED | OUTPATIENT
Start: 2021-01-06 | End: 2021-01-06

## 2021-01-06 RX ORDER — MAGNESIUM SULFATE HEPTAHYDRATE 40 MG/ML
2 INJECTION, SOLUTION INTRAVENOUS ONCE
Status: COMPLETED | OUTPATIENT
Start: 2021-01-06 | End: 2021-01-06

## 2021-01-06 RX ORDER — ONDANSETRON 4 MG/1
4 TABLET, ORALLY DISINTEGRATING ORAL EVERY 6 HOURS PRN
Status: DISCONTINUED | OUTPATIENT
Start: 2021-01-06 | End: 2021-01-09 | Stop reason: HOSPADM

## 2021-01-06 RX ORDER — NICOTINE POLACRILEX 4 MG
15-30 LOZENGE BUCCAL
Status: DISCONTINUED | OUTPATIENT
Start: 2021-01-06 | End: 2021-01-09 | Stop reason: HOSPADM

## 2021-01-06 RX ADMIN — HYDROCODONE BITARTRATE AND ACETAMINOPHEN 1 TABLET: 5; 325 TABLET ORAL at 16:41

## 2021-01-06 RX ADMIN — VANCOMYCIN HYDROCHLORIDE 1000 MG: 1 INJECTION, SOLUTION INTRAVENOUS at 05:14

## 2021-01-06 RX ADMIN — SODIUM CHLORIDE, POTASSIUM CHLORIDE, SODIUM LACTATE AND CALCIUM CHLORIDE: 600; 310; 30; 20 INJECTION, SOLUTION INTRAVENOUS at 01:34

## 2021-01-06 RX ADMIN — ONDANSETRON 4 MG: 4 TABLET, ORALLY DISINTEGRATING ORAL at 23:54

## 2021-01-06 RX ADMIN — SODIUM CHLORIDE, POTASSIUM CHLORIDE, SODIUM LACTATE AND CALCIUM CHLORIDE: 600; 310; 30; 20 INJECTION, SOLUTION INTRAVENOUS at 05:03

## 2021-01-06 RX ADMIN — HYDROCODONE BITARTRATE AND ACETAMINOPHEN 1 TABLET: 5; 325 TABLET ORAL at 20:02

## 2021-01-06 RX ADMIN — MAGNESIUM SULFATE IN WATER 2 G: 40 INJECTION, SOLUTION INTRAVENOUS at 08:34

## 2021-01-06 RX ADMIN — NOREPINEPHRINE BITARTRATE 0.03 MCG/KG/MIN: 0.06 INJECTION, SOLUTION INTRAVENOUS at 09:13

## 2021-01-06 RX ADMIN — SODIUM CHLORIDE, POTASSIUM CHLORIDE, SODIUM LACTATE AND CALCIUM CHLORIDE: 600; 310; 30; 20 INJECTION, SOLUTION INTRAVENOUS at 18:24

## 2021-01-06 RX ADMIN — IOPAMIDOL 100 ML: 612 INJECTION, SOLUTION INTRAVENOUS at 02:21

## 2021-01-06 RX ADMIN — MEROPENEM AND SODIUM CHLORIDE 500 MG: 500 INJECTION, SOLUTION INTRAVENOUS at 13:49

## 2021-01-06 RX ADMIN — FLUTICASONE FUROATE AND VILANTEROL TRIFENATATE 1 PUFF: 200; 25 POWDER RESPIRATORY (INHALATION) at 08:09

## 2021-01-06 RX ADMIN — Medication 0.03 MCG/KG/MIN: at 09:13

## 2021-01-06 RX ADMIN — ONDANSETRON 4 MG: 2 INJECTION INTRAMUSCULAR; INTRAVENOUS at 18:39

## 2021-01-06 RX ADMIN — SODIUM CHLORIDE, POTASSIUM CHLORIDE, SODIUM LACTATE AND CALCIUM CHLORIDE: 600; 310; 30; 20 INJECTION, SOLUTION INTRAVENOUS at 11:46

## 2021-01-06 RX ADMIN — UMECLIDINIUM 1 PUFF: 62.5 AEROSOL, POWDER ORAL at 08:09

## 2021-01-06 RX ADMIN — VANCOMYCIN HYDROCHLORIDE 1000 MG: 1 INJECTION, SOLUTION INTRAVENOUS at 02:43

## 2021-01-06 RX ADMIN — MEROPENEM AND SODIUM CHLORIDE 1 G: 1 INJECTION, SOLUTION INTRAVENOUS at 01:34

## 2021-01-06 RX ADMIN — NOREPINEPHRINE BITARTRATE 0.03 MCG/KG/MIN: 1 INJECTION, SOLUTION, CONCENTRATE INTRAVENOUS at 01:41

## 2021-01-06 RX ADMIN — SODIUM CHLORIDE 1000 ML: 9 INJECTION, SOLUTION INTRAVENOUS at 00:31

## 2021-01-06 ASSESSMENT — ENCOUNTER SYMPTOMS
WEAKNESS: 1
VOMITING: 0
CHEST TIGHTNESS: 0
ABDOMINAL DISTENTION: 0
ANAL BLEEDING: 0
ABDOMINAL PAIN: 0
DIZZINESS: 0
WHEEZING: 0
CONFUSION: 0
NECK STIFFNESS: 0
DYSURIA: 0
NAUSEA: 0
BACK PAIN: 0
NECK PAIN: 0
DIAPHORESIS: 0
LIGHT-HEADEDNESS: 0
SHORTNESS OF BREATH: 0
MYALGIAS: 0
NUMBNESS: 0
PALPITATIONS: 0
VOICE CHANGE: 0
HEADACHES: 0
FATIGUE: 1
COUGH: 0
COLOR CHANGE: 0
WOUND: 0
FALLS: 1
CHILLS: 0
FEVER: 0
BLOOD IN STOOL: 0
HEMATURIA: 0
ARTHRALGIAS: 0
FLANK PAIN: 0

## 2021-01-06 ASSESSMENT — ACTIVITIES OF DAILY LIVING (ADL)
ADLS_ACUITY_SCORE: 16

## 2021-01-06 ASSESSMENT — MIFFLIN-ST. JEOR: SCORE: 1244.88

## 2021-01-06 NOTE — PLAN OF CARE
Face to face report given with opportunity to observe patient.    Report given to DELANEY Sheriff RN   1/6/2021  7:11 AM

## 2021-01-06 NOTE — H&P
"[facility]    History and Physical  Hospitalist       Date of Admission:  1/5/2021    Assessment & Plan   Jacklyn Hein is a 85 year old female who presents with weakness. Presented hypotensive with significant blood test abnormalities. Failed fluid challenge. Started on levophed. Very abnormal inflammatory markers, renal failure, shock liver.  No \"clear cut\" etiology for septic shock found. I believe sepsis is urinary etiology (the \"best guess\"). Will be admitted to ICU. Code status addressed, patient wants to be DNR/DNR.    Principal Problem:    Septic shock (H)    Assessment: present on admission.  Patient was given 3 L of crystalloid for resuscitation.  Blood pressure slightly improved but still not optimal for her clinical condition.  Started on Levophed and maxed dose for peripheral vein.    Plan: After getting ultrasound I found that she could not tolerate another liter and then if she still marginally low/normal blood pressure will put central line and start titrating Levophed or adding vasopressin.   Follow cultures.  Urine is slightly abnormal.  Would expect more abnormalities to match her severe disease but I cannot find a better explanation of her sepsis.   Continue meropenem 500 mg every 12 hours (adjusted to renal function)   Pharmacy consult to dose vancomycin    Active Problems:    CHF (congestive heart failure), NYHA class II (H)    Assessment: My bedside ultrasound shows patient fraction about 50-55%.  This would be acceptable for patient who is not on pressors.  For her being on high-dose of Levophed I would expect higher ejection fraction.  This matches nonischemic cardiomyopathy.  Her most recent echo showed ejection fraction 40 to 45%.    Plan: We will get echo in the morning.      Chronic obstructive pulmonary disease, unspecified COPD type (H)    Assessment: No signs of exacerbation    Plan: Continue inhalers      Paroxysmal atrial fibrillation (H)    Assessment: Normal " sinus rhythm on admission    Plan: We will continue Eliquis      CKD (chronic kidney disease) stage 3, GFR 30-59 ml/min    Assessment: She is stage IIIa chronic kidney disease.  Today creatinine is much higher and she has acute on chronic renal failure.    Plan: She has Johnson catheter.  Monitor urinary function      Gram-negative infection    Assessment: This is my clinical suspicion    Plan: We will cover with meropenem.  Consider adding Flagyl to cover anaerobes but she has normal CT abdomen and normal abdominal exam.  We will continue meropenem and vancomycin for now for sepsis.      Osteoarthritis of thoracolumbar spine    Assessment: Advanced and debilitating    Plan: Not an active problem now.  I will not give narcotics for pain because of low blood pressure.      Shock liver    Assessment: Present on admission    Plan: Monitor      Abnormal finding on urinalysis    Assessment: Urinary tract infection could be the source of her sickness.  But she has no symptoms of dysuria, frequent urination, itching or burning.    Plan: Follow urinary cultures      DVT Prophylaxis: Patient takes Eliquis which will be continued  Code Status: DNR / DNI    Disposition:  Because of the sickness on presentation and diagnostic challenges  expect prolonged hospitalization, if she survives   Aníbal Pelayo    Primary Care Physician   ANTHONY Post    Chief Complaint Weakness    Reason for Admission: Severe sepsis, septic shock, etiology most likely urinary.    Admission status: ICU inpatient    History is obtained from the patient, electronic health record and emergency department physician    History of Present Illness   Jacklyn BONNER Drelioanish is a 85 year old female who who has past medical history significant for nonischemic cardiomyopathy, COPD, chronic kidney disease stage III advanced degenerative joint disease of the thoracolumbar spine, paroxysmal A. fib, chronic anticoagulation, presents with weakness, and fall.  2 days  "prior to admission after having lunch she started having \" severe heartburn\".  Did not finish lunch.  Soon she started experiencing chills.  Her  covered her with multiple layers of blankets.  She denies any specific symptoms.  She did not finish her lunch and her heartburn symptoms resolved.  Since then her weakness started progressing.  The night of admission patient slipped to the floor.  Patient's left foot got stuck on the dresser.   could not help and EMS called.  When EMS arrived they reported that blood pressure was 110 systolic.  She was taken to emergency room and blood pressure was found very low-60 mm of Hg systolic.  Patient started on aggressive fluid resuscitation.  She was given 3 L of crystalloid and still was hypotensive.  Levophed was started.  Emergency room work-up was directed to find the source of infection because her CRP was high and procalcitonin was also very high.  She had no fever and white blood cell count was normal.  CT abdomen and ultrasound was done.  CT showed no intra-abdominal pathology, the same findings were on limited ultrasound.  Patient has gallbladder removed and her common bile duct is not dilated.  The catheter was placed and minimal amount of clear urine returned.  Urine was sent for analysis and showed only normal abnormalities which includes all blood cell count 9 moderate leukocyte esterase nitrates negative and many bacteria present in the urine.  Chemistry revealed double creatinine from 1.15-2.06, metabolic acidosis, bilirubin 1.5 alk phos 247, , AST 1015, CRP 18.9, lactic acid 3.1, BNP 1488 and procalcitonin is 26.54.  Troponin negative  She presented with normal mood normal sinus rhythm to emergency room and stayed all emergency room stayed.  I saw the patient in the emergency room.  He did bedside cardiac ultrasound.  Her ejection fraction was 50-55% which would be normal for patient who is not in severe distress and the on maximal dose of " Levophed.  She still has more than 50% collapsible inferior vena cava.  Her blood pressures started improving and mental status returned to normal.  The time when I saw her in the emergency room she was awake alert oriented x3.      14 elements of ROS obtained. She denies fever, night sweats, weight loss, chest pain, palpitation, cough, wheezing, GURROLA, abdominal pain, nausea, vomiting, dysuria, hematuria, headache, diplopia, dysphagia, focal weakness, numbness, tingling.  Admits that she is very weak, her back pain the same, her mouth is very dry, and she had the chills the day prior she presented to emergency room.    ED treatment: Meropenem 500 mg IV, vancomycin 1000 mg IV, 3 L crystalloids.    Past Medical History    I have reviewed this patient's medical history and updated it with pertinent information if needed.   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  Past Surgical History:   Procedure Laterality Date     APPENDECTOMY       ARTHROSCOPY KNEE RT/LT  2009    RT     BACK SURGERY  02/06/2020     CARDIAC SURGERY  05/06/2014    Pacemaker     Cataract extraction  2009    Bilateral     CHOLECYSTECTOMY      open      COLONOSCOPY  05/2001    Normal      COLONOSCOPY N/A 10/20/2016    Procedure: COLONOSCOPY;  Surgeon: Gustabo  Charan BONNER MD;  Location: [facility] OR     colonoscopy with polypectomy      Repeat 3 years      CT CORONARY ANGIOGRAM      normal     HERPES ZOSTER PCR ([facility])       IR CONSULTATION FOR IR EXAM  2020     left eye scar tissue       normal echo      fen-phen use       x6       SURGICAL RADIOLOGY PROCEDURE N/A 2016    Procedure: SURGICAL RADIOLOGY PROCEDURE;  Surgeon: Provider, Generic Perianesthesia Nursing;  Location: [facility] OR       Prior to Admission Medications   Prior to Admission Medications   Prescriptions Last Dose Informant Patient Reported? Taking?   Calcium Carbonate-Vitamin D (CALCIUM 600 + D OR)  Self Yes No   Sig: Take 1 tablet by mouth daily   ELIQUIS ANTICOAGULANT 5 MG tablet   No Yes   Sig: TAKE 1 TABLET BY MOUTH 2 TIMES DAILY   ENTRESTO 24-26 MG per tablet   No Yes   Sig: TAKE 1 TABLET BY MOUTH TWICE DAILY   HYDROcodone-acetaminophen (NORCO) 5-325 MG tablet   No Yes   Sig: TAKE 1 TO 2 TABLETS BY MOUTH EVERY 6 HOURS AS NEEDED   Multiple Vitamin (MULTI-VITAMIN DAILY PO)  Self Yes Yes   Sig: Take 1 tablet by mouth daily   SPIRIVA RESPIMAT 2.5 MCG/ACT inhaler   No Yes   Sig: INHALE 2 DOSES INTO THE LUNGS ONCE DAILY   VENTOLIN  (90 Base) MCG/ACT inhaler  Self No Yes   Sig: INHALE 2 PUFFS INTO THE LUNGS EVERY 6 HOURS AS NEEDED FOR SHORTNESS OF BREATH/DYSPNEA/WHEEZING   alendronate (FOSAMAX) 70 MG tablet   No Yes   Sig: TAKE 1 TABLET BY MOUTH EVERY 7 DAYS. TAKE WITH 8 OUNCES OF WATER AND STAY UPRIGHT FOR AT LEAST 30 MINUTES AFTER TAKING.   budesonide-formoterol (SYMBICORT) 160-4.5 MCG/ACT Inhaler   No Yes   Sig: INHALE 2 PUFFS INTO THE LUNGS 2 TIMES DAILY   carvedilol (COREG) 12.5 MG tablet   No Yes   Sig: TAKE 1 TABLET BY MOUTH 2 TIMES DAILY WITH MEALS   gabapentin (NEURONTIN) 300 MG capsule   No Yes   Sig: TAKE 1 CAPSULE BY MOUTH 3 TIMES DAILY   naproxen sodium (ANAPROX) 220 MG tablet   Yes Yes   Sig: Take 220 mg by mouth 2 times daily (with meals)   order for DME  Self No Yes    Sig: Equipment being ordered: Walker   rosuvastatin (CRESTOR) 10 MG tablet   No Yes   Sig: TAKE 1 TABLET BY MOUTH DAILY   spironolactone (ALDACTONE) 25 MG tablet   No Yes   Sig: TAKE 1/2 TABLET BY MOUTH DAILY      Facility-Administered Medications: None     Allergies   No Known Allergies    Social History   I have reviewed this patient's social history and updated it with pertinent information if needed. Jacklyn BONNER Drrubencayden  reports that she quit smoking about 22 years ago. Her smoking use included cigarettes. She has a 15.00 pack-year smoking history. She has never used smokeless tobacco. She reports previous alcohol use. She reports that she does not use drugs.    Family History   I have reviewed this patient's family history and updated it with pertinent information if needed.   Family History   Problem Relation Age of Onset     Cancer Mother         lung (cause of death)      Peptic Ulcer Disease Father         gastric        Review of Systems   See history of present illness.    Physical Exam   Temp: 97.6  F (36.4  C) Temp src: Tympanic BP: (!) 89/42 Pulse: 90   Resp: 14 SpO2: 94 % O2 Device: None (Room air)    Vital Signs with Ranges  Temp:  [97.6  F (36.4  C)-98.8  F (37.1  C)] 97.6  F (36.4  C)  Pulse:  [88-97] 90  Resp:  [8-31] 14  BP: (58-93)/(24-55) 89/42  SpO2:  [90 %-95 %] 94 %  173 lbs .98 oz    Physical exam:   Constitutional: Elderly woman, pale, ill-looking but not in acute distress.    HEENT: atraumatic, normocephalic.  Very dry mucous membranes  NECK: supple, no masses, no bruits, no LAD.  CVS: regular, S1, S2, no S3, no murmurs, rubs or gallops. No edema.  RESPIRATORY: symmetrical respirations, no accessory muscle use.  Only few crackles on the right.  GI: soft, not tender, not distended, no HSM, no pulsatile masses. BS present.  Scars from previous surgeries.  : no CVA tenderness. Bladder not palpable.  Johnson catheter in place  MS: normal muscle bulk, no fasciculations, no  sarcopenia.  NEUROLOGICAL: Nonfocal   PSYCHIATRIC: awake, oriented x 3, calm, cooperative.  SKIN: Multiple bruises, but she is not diaphoretic. Skin is cool but not clammy.    HEMATOLOGIC/LYMPHATIC: no petechia, no ecchymoses, no lymphadenopathy.     Goals of care were discussed with the patient which included but not limited to anticipated treatment course during the current hospitalization, recovery from current event, discharge planning and transitions of care responsibilities and expected outcomes. Code status was addressed on admission as well. End of life care discussion not done.  I spent 65 minutes on this patient's care, which includes bedside ultrasound.  Data   Data reviewed today:  I personally reviewed: blood work, chest XR, EKG and urinary test results.     V rad report noted.   Recent Labs   Lab 01/05/21  2316   WBC 6.1   HGB 10.3*   MCV 91   *      POTASSIUM 4.6   CHLORIDE 110*   CO2 19*   BUN 51*   CR 2.06*   ANIONGAP 10   LORRAINE 8.8   *   ALBUMIN 2.8*   PROTTOTAL 6.3*   BILITOTAL 1.5*   ALKPHOS 247*   *   AST 1,015*   LIPASE 107   TROPI <0.015       No results found for this or any previous visit (from the past 24 hour(s)).

## 2021-01-06 NOTE — PHARMACY-MEDICATION REGIMEN REVIEW
Pharmacy Antimicrobial Stewardship Assessment     Current Antimicrobial Therapy:  Anti-infectives (From now, onward)    Start     Dose/Rate Route Frequency Ordered Stop    01/08/21 0500  vancomycin (VANCOCIN) 1,500 mg in sodium chloride 0.9 % 500 mL intermittent infusion      1,500 mg  over 2 Hours Intravenous EVERY 48 HOURS 01/06/21 0451      01/06/21 1200  meropenem (MERREM) intermittent 500 mg in NS 50ML PREMIX      500 mg  100 mL/hr over 30 Minutes Intravenous EVERY 12 HOURS 01/06/21 0434      01/06/21 0500  vancomycin (VANCOCIN) 1000 mg in dextrose 5% 200 mL PREMIX      1,000 mg  200 mL/hr over 1 Hours Intravenous ONCE 01/06/21 0451            Indication: Sepsis    Days of Therapy: 1     Pertinent Labs:  Creatinine   Date Value Ref Range Status   01/06/2021 1.67 (H) 0.52 - 1.04 mg/dL Final   01/05/2021 2.06 (H) 0.52 - 1.04 mg/dL Final     WBC   Date Value Ref Range Status   01/06/2021 13.4 (H) 4.0 - 11.0 10e9/L Final   01/05/2021 6.1 4.0 - 11.0 10e9/L Final     Procalcitonin   Date Value Ref Range Status   01/05/2021 26.54 (HH) ng/ml Final     Comment:     >/= 10.00 ng/ml   Very high likelihood of severe sepsis or septic shock.  Recommendation: Strongly recommend initiating or continuing antibiotics.   Evaluate culture results and clinical features to target antibacterial   therapy. Obtain blood cultures and other relevant cultures if not done.Repeat   PCT in 2 days to guide antibiotic de-escalation. Consider de-escalating   antibiotics when PCT concentration is <80% of peak or abs PCT <1.  Critical Value called to and read back by  SAMANTHA GERBER AT 2356 ON 1/5/21 BY KARISSA.       CRP Inflammation   Date Value Ref Range Status   01/05/2021 18.9 (H) 0.0 - 8.0 mg/L Final       Culture Results:   (1/6/21) Urine  (1/5/21) Blood  (1/5/21) COVID = negative     Recommendations/Interventions:  1. Trend procalcitonin  2. Patient does not meet criteria for meropenem restricted use per [place] Meropenem Restricted Use  (no medication allergies or history of resistance). Change meropenem to Cefepime:      Gonzales Medley, [facility]  January 6, 2021

## 2021-01-06 NOTE — ED NOTES
Patient gives verbal consent for staff to speak with , Jim, or daughter, Ninoska (Mihaela), if they call for update.

## 2021-01-06 NOTE — PROVIDER NOTIFICATION
DATE:  1/6/2021   TIME OF RECEIPT FROM LAB:  1312  LAB TEST:  Blood Cultures  LAB VALUE:  2/2 positive for Gram negative rods   RESULTS GIVEN WITH READ-BACK TO (PROVIDER):  James Skinner MD  TIME LAB VALUE REPORTED TO PROVIDER:   4660

## 2021-01-06 NOTE — ANESTHESIA PREPROCEDURE EVALUATION
Anesthesia Pre-Procedure Evaluation    Patient: Jacklyn Hein   MRN: [id number] : 1935          Preoperative Diagnosis: * No pre-op diagnosis entered *    * No procedures listed *    Past Medical History:   Diagnosis Date     Angina pectoris 2011     CHF (congestive heart failure), NYHA class II (H) 12/10/2013     CHF (congestive heart failure), NYHA class III (H) 12/10/2013     Hyperhydrosis disorder 2011     Insomnia, unspecified 2011     LBBB (left bundle branch block) 2011     Neck pain, chronic 2011     Non-ischemic cardiomyopathy (H) 12/10/2013     Obesity, unspecified 2011     Osteopenia 2011     Pacemaker      Pain in joint, lower leg 2006     Pure hypercholesterolemia 2002     Unspecified essential hypertension 2011     Past Surgical History:   Procedure Laterality Date     APPENDECTOMY       ARTHROSCOPY KNEE RT/LT      RT     BACK SURGERY  2020     CARDIAC SURGERY  2014    Pacemaker     Cataract extraction  2009    Bilateral     CHOLECYSTECTOMY      open      COLONOSCOPY  2001    Normal      COLONOSCOPY N/A 10/20/2016    Procedure: COLONOSCOPY;  Surgeon: Charan Montejo MD;  Location: [facility]     colonoscopy with polypectomy      Repeat 3 years      CT CORONARY ANGIOGRAM      normal     HERPES ZOSTER PCR ([facility])       IR CONSULTATION FOR IR EXAM  2020     left eye scar tissue       normal echo      fen-phen use       x6       SURGICAL RADIOLOGY PROCEDURE N/A 2016    Procedure: SURGICAL RADIOLOGY PROCEDURE;  Surgeon: Provider, Generic Perianesthesia Nursing;  Location: [facility] OR                        Lab Results   Component Value Date    WBC 13.4 (H) 2021    HGB 9.0 (L) 2021    HCT 28.2 (L) 2021     (L) 2021    CRP 18.9 (H) 2021    SED 35 (H) 2014     2021    POTASSIUM 4.4 2021    CHLORIDE 113 (H) 2021    CO2 18 (L)  "01/06/2021    BUN 43 (H) 01/06/2021    CR 1.67 (H) 01/06/2021     (H) 01/06/2021    LORRAINE 7.4 (L) 01/06/2021    MAG 1.8 01/06/2021    ALBUMIN 2.2 (L) 01/06/2021    PROTTOTAL 5.1 (L) 01/06/2021     (H) 01/06/2021     (H) 01/06/2021    ALKPHOS 194 (H) 01/06/2021    BILITOTAL 1.5 (H) 01/06/2021    LIPASE 107 01/05/2021    PTT 28 05/26/2014    INR 1.2 11/11/2015    TROPN  05/26/2014     <0.04  **Risk Stratification**   0.10 - 0.39   Elevated-may indicate increased                 risk of short term adverse                 cardiac events.      >=0.4      Possible cardiac injury.         Preop Vitals  BP Readings from Last 3 Encounters:   01/06/21 112/93   09/23/20 131/59   09/03/20 116/72    Pulse Readings from Last 3 Encounters:   01/06/21 92   09/23/20 70   09/03/20 69      Resp Readings from Last 3 Encounters:   01/06/21 17   09/23/20 20   05/21/20 16    SpO2 Readings from Last 3 Encounters:   01/06/21 (!) 85%   09/23/20 95%   09/03/20 97%      Temp Readings from Last 1 Encounters:   01/06/21 97.5  F (36.4  C) (Tympanic)    Ht Readings from Last 1 Encounters:   01/06/21 1.651 m (5' 5\")      Wt Readings from Last 1 Encounters:   01/06/21 79.4 kg (175 lb 0.7 oz)    Estimated body mass index is 29.13 kg/m  as calculated from the following:    Height as of this encounter: 1.651 m (5' 5\").    Weight as of this encounter: 79.4 kg (175 lb 0.7 oz).       Anesthesia Plan  Procedure only, no anesthetic delivered             Postoperative Care      Consents                 Ruperto Tafoya, SUSAN CRNA  "

## 2021-01-06 NOTE — PHARMACY-VANCOMYCIN DOSING SERVICE
Pharmacy Vancomycin Note  Date of Service 2021  Patient's  1935   85 year old, female    Indication: Sepsis  Goal Trough Level: 15-20 mg/L  Day of Therapy: 1    Current estimated CrCl = Estimated Creatinine Clearance: 26.1 mL/min (A) (based on SCr of 1.67 mg/dL (H)).    Creatinine for last 3 days  2021: 11:16 PM Creatinine 2.06 mg/dL  2021:  4:41 AM Creatinine 1.67 mg/dL    Recent Vancomycin Levels (past 3 days)  No results found for requested labs within last 72 hours.    Vancomycin IV Administrations (past 72 hours)                   vancomycin (VANCOCIN) 1000 mg in dextrose 5% 200 mL PREMIX (mg) 1,000 mg New Bag 21 0514     1,000 mg New Bag  0243                Nephrotoxins and other renal medications (From now, onward)    Start     Dose/Rate Route Frequency Ordered Stop    21 0500  vancomycin (VANCOCIN) 1,500 mg in sodium chloride 0.9 % 500 mL intermittent infusion      1,500 mg  over 2 Hours Intravenous EVERY 48 HOURS 21 0451      21 0500  vancomycin (VANCOCIN) 1000 mg in dextrose 5% 200 mL PREMIX      1,000 mg  200 mL/hr over 1 Hours Intravenous ONCE 21 0451      21 0125  norepinephrine (LEVOPHED) 4 mg/250 mL infusion ADULT (PERIPHERAL)      0.03-0.125 mcg/kg/min × 78.5 kg  8.8-36.8 mL/hr  Intravenous CONTINUOUS 21 0124               Contrast Orders - past 72 hours (72h ago, onward)    Start     Dose/Rate Route Frequency Ordered Stop    21 0150  iopamidol (ISOVUE-300) IV solution 61% 100 mL      100 mL Intravenous ONCE 21 0148 21 0221        Culture Results:   (21) Urine  (21) Blood  (21) COVID = negative    Assessment/Plan:  1.  Vancomycin 2000 mg [~25 mg/kg] given (1000 mg at 0300 + 0500). Will dose maintenance dose of 1250 mg (15 mg/kg) IV Q48H.  2.  Pharmacy will check a level with AM labs on ; will assess level at 0700 and give further direction  3. Serum creatinine levels will be ordered daily for the  first week of therapy and at least twice weekly for subsequent weeks.      Gonzales Medley, [facility]        .

## 2021-01-06 NOTE — ED NOTES
Advised Dr. Fan that patient feels that her speech is different and that it started yesterday per patient. Patient unable to lift left leg off bed. Reported to Dr. Fan, per MD no code stroke activation at this time.

## 2021-01-06 NOTE — PLAN OF CARE
[facility] Inpatient Admission Note:    Patient admitted to 3122/3122-1 at approximately 0425 via cart accompanied by nurse from emergency room . Report received from Magaly in SBAR format at 0405 via telephone. Patient transferred to bed via slide board.. Patient is alert and oriented X 3, denies pain; rates at 0 on 0-10 scale.  Patient oriented to room, unit, hourly rounding, and plan of care. Explained admission packet and patient handbook with patient bill of rights brochure. Will continue to monitor and document as needed.     Inpatient Nursing criteria listed below was met:    Health care directives status obtained and documented: Yes    Care Everywhere authorization obtained No    MRSA swab completed for patient 65 years and older: N/A    Patient identifies a surrogate decision maker: Yes If yes, who:Ninoska-daughter Contact Information:See FaceSheet     If initial lactic acid >2.0, repeat lactic acid drawn within one hour of arrival to unit: NA. If no, state reason: N/A    Vaccination assessment and education completed: Yes   Vaccinations received prior to admission: Pneumovax yes  Influenza(seasonal)  YES   Vaccination(s) ordered: not given today because Up to date    Clergy visit ordered if patient requests: N/A    Skin issues/needs documented: N/A    Isolation Patient: No Education given, correct sign in place and documentation row added to PCS:   N/A    Fall Prevention Yes: Care plan updated, education given and documented, sticker and magnet in place: Yes    Care Plan initiated: Yes    Education Documented (including assessment): Yes    Patient has discharge needs : No If yes, please explain:daughter checks in daily, lives with

## 2021-01-06 NOTE — PLAN OF CARE
Patient awake alert and oriented, echocardiogram completed at bedside by ultrasound staff.  Denies pain.   Patient currently on levophed drip (4mg/250ml) and BP remains 70's-80's systolic.   Drip is currently at max per order.    Per Dr. Skinner switch to Levophed (16mg/250ml) and titrate to keep MAP >65.     Levophed titrated throughout shift see MAR.  Patient complains of being tired and attempted to rest intermittently.   1845- sats down to 88-89% on room air and laying on right side.   With cough and deep breathing up to 92-93%, noted crackles in right base.  Oxygen applied at 2LPM NC as sats down to 88-89% when falling asleep.  Sats up to 93-94 % on 2LPM NC.   Dr. Pelayo notified of oxygen and new finding of crackles in right base.       Face to face report given with opportunity to observe patient.    Report given to Margarita MALCOLM And Nataly Bhagat RN   1/6/2021  8:18 PM

## 2021-01-06 NOTE — PLAN OF CARE
Prior to Admission Medication Reconciliation:     Medications added:   [x] None  [] As listed below:    Medications deleted:   [x] None  [] As listed below:    Changes made to existing medications:   [x] None  [] As listed below:    Last times/dates taken verified with patient:  [] Yes- completed myself  [x] Nurse completed no changes made  [] Unable to review with patient:  [] Nurse completed/changes made:     Allergy modifications:    [x]Did not review  []Patient verified NKA  []Patient verified current existing allergies: no changes made  []New allergies: listed below    Medication reconciliation sources:   []Patient  []Patient family member/emergency contact: **  [x][facility] Report Review: hasn't been seen since 2016  [x]Epic Chart Review  []Care Everywhere review  []Pharmacy med list: **  []Pharmacy phone call  [x]Outside meds dispense report: see below  []Nursing home or Assisted Living MAR:  []Other: **    Pharmacy desired at discharge: [facility]    Is patient on coumadin?  [x]No    Requests for consultation by provider or pharmacist:   [] Patient understands why all of their meds were prescribed and how to take them. No questions.   [x] Fill dates coincide with compliancy for all maintenance meds.   [] Fill dates do not coincide with compliancy with maintenance meds. See notes in PTA medlist about how patient is taking.   [] Patient has questions about the following:    Comments: fill dates match compliancy. [place] patient. No concerns with PTA meds. Nurse completed review with patient and got last times taken.     Debbie Lopez on 1/6/2021 at 8:01 AM       Discrepancies: [] No []Not Applicable []Yes: listed below        Time spent on medication reconciliation:   [x]5-20 mins  []20-40 mins  []> 40 mins    Issues completing PTA medication reconciliation:  [] On hold for a long time  [] Waited for a call back  [] Fax didn't come through  [] Fax took a long time  [] Other:    Notifying appropriate party  of changes/additions/discrepancies:  [x]No pertinent changes made, notification not necessary.   [] Notified attending provider via text page  [] Notified attending provider in person  [] Notified pharmacy  [] Notified nurse  [] Attending provider not available, left detailed notes  [] Changes/additions made don't need provider notification because provider has not seen patient or input any orders  [] Changes/additions made don't need provider notification because changes made are to medications not ordered      Medication Dispense History (from 1/7/2020 to 1/6/2021)  Expand All  Collapse All  Albuterol Sulfate   Dispensed Days Supply Quantity Provider Pharmacy   ALBUTEROL    AER HFA 02/27/2020 25 18 each  [facility]         Alendronate Sodium   Dispensed Days Supply Quantity Provider Pharmacy   ALENDRONATE  TAB 70MG 12/24/2020 84 12 each [facility]   ALENDRONATE  TAB 70MG 09/28/2020 84 12 each [facility]   ALENDRONATE  TAB 70MG 06/19/2020 84 12 each  [facility]   ALENDRONATE  TAB 70MG 03/31/2020 84 12 each  [facility]         Apixaban   Dispensed Days Supply Quantity Provider Pharmacy   ELIQUIS      TAB 5MG 12/24/2020 90 180 each [facility]   ELIQUIS      TAB 5MG 09/24/2020 90 180 each [facility]   ELIQUIS      TAB 5MG 06/22/2020 90 180 each  [facility]   ELIQUIS      TAB 5MG 03/16/2020 90 180 each  [facility]         Budesonide-Formoterol Fumarate   Dispensed Days Supply Quantity Provider Pharmacy   SYMBICORT    -4.5 12/01/2020 30 10.2 each [facility]...   SYMBICORT    -4.5 11/03/2020 30 10.2 each [facility]...   SYMBICORT    -4.5 09/24/2020 30 10.2 each [facility]...   SYMBICORT    -4.5 08/27/2020 30 10.2 each [facility]   SYMBICORT    -4.5 07/23/2020 30 10.2 each  [facility]   SYMBICORT    -4.5 06/19/2020 30 10.2 each  [facility]   SYMBICORT    -4.5 05/21/2020 30 10.2 each  [facility]   SYMBICORT    -4.5 04/08/2020 30 10.2 each  [facility]...   SYMBICORT    -4.5 03/13/2020 30 10.2 each  [facility]...   BUDES/FORMOT -4.5 02/12/2020 30 10.2 each  [facility]         Carvedilol   Dispensed Days Supply Quantity Provider Pharmacy   CARVEDILOL   TAB 12.5MG 12/01/2020 90 180 each [facility]   CARVEDILOL   TAB 12.5MG 08/27/2020 90 180 each [facility]   CARVEDILOL   TAB 12.5MG 06/02/2020 90 180 each  [facility]   CARVEDILOL   TAB 25MG 03/11/2020 90 90 each  [facility]   CARVEDILOL   TAB 12.5MG 01/13/2020 90 180 each  [facility]         Gabapentin   Dispensed Days Supply Quantity Provider Pharmacy   GABAPENTIN   CAP 300MG 12/24/2020 30 90 each [facility]   GABAPENTIN   CAP 300MG 11/24/2020 30 90 each [facility]   GABAPENTIN   CAP 300MG 11/10/2020 10 30 each [facility]   GABAPENTIN   CAP 300MG 10/29/2020 10 30 each [facility]...   GABAPENTIN   CAP 300MG 10/08/2020 10 30 each [facility]...   GABAPENTIN   CAP 300MG 09/24/2020 10 30 each [facility]   GABAPENTIN   CAP 300MG 09/09/2020 10 30 each [facility]   GABAPENTIN   CAP 300MG 08/31/2020 10 30 each [facility]   GABAPENTIN   CAP 300MG 08/19/2020 10 30 each [facility]         HYDROcodone-Acetaminophen   Dispensed Days Supply Quantity Provider Pharmacy   HYDROCO/APAP TAB 5-325MG 12/22/2020 8 60 each [facility]    HYDROCO/APAP TAB 5-325MG 11/25/2020 8 60 each [facility]...   HYDROCO/APAP TAB 5-325MG 11/03/2020 8 60 each [facility]   HYDROCO/APAP TAB 5-325MG 10/08/2020 8 60 each [facility]...   HYDROCO/APAP TAB 5-325MG 08/28/2020 8 60 each [facility]   HYDROCO/APAP TAB 5-325MG 08/19/2020 8 60 each [facility]   HYDROCO/APAP TAB 5-325MG 07/31/2020 8 60 each  [facility]   HYDROCO/APAP TAB 5-325MG 07/15/2020 8 60 each  [facility]   HYDROCO/APAP TAB 5-325MG 07/01/2020 8 60 each  [facility]   HYDROCO/APAP TAB 5-325MG 06/18/2020 8 60 each  [facility]   HYDROCO/APAP TAB 5-325MG 06/10/2020 8 60 each  [facility]   HYDROCO/APAP TAB 5-325MG 05/26/2020 8 60 each  [facility]   HYDROCO/APAP TAB 5-325MG 05/21/2020 4 12 each  [facility]         Rosuvastatin Calcium   Dispensed Days Supply Quantity Provider Pharmacy   ROSUVASTATIN TAB 10MG 11/30/2020 90 90 each [facility]   ROSUVASTATIN TAB 10MG 09/08/2020 90 90 tablet [facility]...   ROSUVASTATIN TAB 10MG 06/06/2020 90 90 each  [facility]   ROSUVASTATIN TAB 10MG 02/28/2020 90 90 each  [facility]         Sacubitril-Valsartan   Dispensed Days Supply Quantity Provider Pharmacy   ENTRESTO     TAB 24-26MG 12/10/2020 30 60 each [facility]   ENTRESTO     TAB 24-26MG 11/03/2020 30 60 each [facility]...   ENTRESTO     TAB 24-26MG 10/08/2020 30 60 each [facility]...   ENTRESTO     TAB 24-26MG 09/08/2020 30 60 each [facility]...   ENTRESTO     TAB 24-26MG 08/07/2020 30 60 each  [facility]   ENTRESTO     TAB 24-26MG 07/06/2020 30 60 each  [facility]   ENTRESTO     TAB  24-26MG 06/06/2020 30 60 each  [facility]   ENTRESTO     TAB 24-26MG 05/01/2020 30 60 each  [facility]   ENTRESTO     TAB 24-26MG 04/03/2020 30 60 each  [facility]   ENTRESTO     TAB 24-26MG 03/10/2020 30 60 each  [facility]         Spironolactone   Dispensed Days Supply Quantity Provider Pharmacy   SPIRONOLACT  TAB 25MG 10/29/2020 90 45 each [facility]...   SPIRONOLACT  TAB 25MG 07/23/2020 90 45 each  [facility]   SPIRONOLACT  TAB 25MG 05/01/2020 90 45 each  [facility]   SPIRONOLACT  TAB 25MG 02/21/2020 90 45 each  [facility]...         Tiotropium Bromide Monohydrate   Dispensed Days Supply Quantity Provider Pharmacy   SPIRIVA      SPR 2.5MCG 12/01/2020 30 4 each [facility]...   SPIRIVA      SPR 2.5MCG 11/03/2020 30 4 each [facility]...   SPIRIVA      SPR 2.5MCG 09/24/2020 30 4 each [facility]...   SPIRIVA      SPR 2.5MCG 08/27/2020 30 4 each [facility]...   SPIRIVA      SPR 2.5MCG 07/23/2020 30 4 each  [facility]...   SPIRIVA      SPR 2.5MCG 06/19/2020 30 4 each  [facility]...   SPIRIVA      SPR 2.5MCG 05/21/2020 30 4 each  [facility]...   SPIRIVA      SPR 2.5MCG 04/08/2020 30 4 each  [facility]...   SPIRIVA      SPR 2.5MCG 03/13/2020 30 4 each  [facility]...   SPIRIVA      SPR 2.5MCG 02/12/2020 30 4 each  [facility]         tiZANidine HCl   Dispensed Days Supply Quantity Provider Pharmacy   TIZANIDINE   TAB 2MG 02/07/2020 2 5 each  [facility]...

## 2021-01-06 NOTE — PROGRESS NOTES
"Assessment completed with Jacklyn \"Adele\"     LOC: alert, oriented     Dx: Septic Shock   Chronic Disease Management: CHF, HTN, Obesity    Lives with: Spouse  Living at:  Home  Transportation: YES, daughter Ninoska \"Alix\" provides most of the transportation, spouse will provide transportation in town.     Primary PCP: ANTHONY Post  Insurance:  Medicare, BCBS Platinum Blue  Medicare letter reviewed with Adele    Support System:  Strong family support  Homecare/PCA: Does have a paid  coming every Monday to help with cleaning and laundry. Receives meals on wheels 3 days a week.   /County Services:   Denies  [place]: NO      Health Care Directive: None on file    Pharmacy: [facility]   Meds management: Utilizes a pill box, manages own medications.    Adequate Resources for needs (housing, utilities, food/med): YES  Household chores: Has a  coming on Mondays, able to manage the minimal housework needed throughout the week.   Work/community/social activity: YES     ADLs: IND  Ambulation:IND  Falls: Denies any recent falls besides the fall that brought her into the ED. She also states that she didn't really fall, rather slid out of bed.       Equipment used: FWW, Cane         Mental health: Denies  Substance abuse: Denies  Exposure to violence/abuse: Denies      Able to Return to Prior Living Arrangements: YES    Choice of Vendor: NA    GREGORY: Low    Plan: Able to discharge to home with spouse.       "

## 2021-01-06 NOTE — PLAN OF CARE
A&O, pleasant. VS as charted. Pacemaker with tele 100% V-paced, occ Dual paced. Levo gtt continues maxed with BP improved from ER and MAP's 60-70. Afebrile. Denies pain. LS clear, denies SOB. BS active, denies nausea, NPO + ice chips. Johnson patent with good UOP. IV x3. Scattered bruising noted to skin.

## 2021-01-06 NOTE — ED NOTES
Per Dr. Fan's request BP checked manually and with portable machine in both arms. BPs as follows.  Manual  R 70/32  L  70/32    Portable  R 68/32  L 71/36

## 2021-01-06 NOTE — ED NOTES
DATE:  1/5/2021   TIME OF RECEIPT FROM LAB:  9842  LAB TEST:  Lactic acid  LAB VALUE:  3.1  RESULTS GIVEN WITH READ-BACK TO (PROVIDER):  Jeroniom Fan MD  TIME LAB VALUE REPORTED TO PROVIDER:   6134

## 2021-01-06 NOTE — ED NOTES
Patient's  Jim and daughter Ninoska updated at this time. Patient's  would like daughter, Ninoska, to be the family contact and she will update Jim and the rest of the family.   [place] [phone]

## 2021-01-06 NOTE — PROVIDER NOTIFICATION
DATE:  1/6/2021   TIME OF RECEIPT FROM LAB:  7323  LAB TEST:  Blood cultures  LAB VALUE:  2 sets of blood cultures   1 bottle of each set growing gram negative rods.     RESULTS GIVEN WITH READ-BACK TO (PROVIDER):  Dr. Skinner   TIME LAB VALUE REPORTED TO PROVIDER:   8333

## 2021-01-06 NOTE — ED NOTES
Dr. Fan notified of BP's down to 60/40's again as first bolus completed. VORB for second bolus of normal saline

## 2021-01-06 NOTE — PHARMACY-VANCOMYCIN DOSING SERVICE
Pharmacy Vancomycin Initial Note  Date of Service 2021  Patient's  [id number]  85 year old, female    Indication: Sepsis    Current estimated CrCl = Estimated Creatinine Clearance: 26.1 mL/min (A) (based on SCr of 1.67 mg/dL (H)).    Creatinine for last 3 days  2021: 11:16 PM Creatinine 2.06 mg/dL  2021:  4:41 AM Creatinine 1.67 mg/dL    Recent Vancomycin Level(s) for last 3 days  No results found for requested labs within last 72 hours.      Vancomycin IV Administrations (past 72 hours)                   vancomycin (VANCOCIN) 1000 mg in dextrose 5% 200 mL PREMIX (mg) 1,000 mg New Bag 21 0514     1,000 mg New Bag  0243                Nephrotoxins and other renal medications (From now, onward)    Start     Dose/Rate Route Frequency Ordered Stop    21 0500  vancomycin (VANCOCIN) 1,500 mg in sodium chloride 0.9 % 500 mL intermittent infusion      1,500 mg  over 2 Hours Intravenous EVERY 48 HOURS 21 0451      21 0500  vancomycin (VANCOCIN) 1000 mg in dextrose 5% 200 mL PREMIX      1,000 mg  200 mL/hr over 1 Hours Intravenous ONCE 21 0451      21 0125  norepinephrine (LEVOPHED) 4 mg/250 mL infusion ADULT (PERIPHERAL)      0.03-0.125 mcg/kg/min × 78.5 kg  8.8-36.8 mL/hr  Intravenous CONTINUOUS 21 0124            Contrast Orders - past 72 hours (72h ago, onward)    Start     Dose/Rate Route Frequency Ordered Stop    21 0150  iopamidol (ISOVUE-300) IV solution 61% 100 mL      100 mL Intravenous ONCE 21 0148 21 0221                Plan:  1.  Start vancomycin  2000 mg IV loading dose followed by 1500 mg q48h.   2.  Goal Trough Level: 15-20 mg/L   3.  Pharmacy will check trough levels as appropriate in 3-5 Days.    4. Serum creatinine levels will be ordered daily for the first week of therapy and at least twice weekly for subsequent weeks.    5. Depue method utilized to dose vancomycin therapy: Method 2    Isiah King, [facility]    2021

## 2021-01-06 NOTE — ED NOTES
DATE:  1/5/2021   TIME OF RECEIPT FROM LAB:  9993  LAB TEST:  pcal  LAB VALUE:  26.54  RESULTS GIVEN WITH READ-BACK TO (PROVIDER):  Jeronimo Fan MD  TIME LAB VALUE REPORTED TO PROVIDER:   2946

## 2021-01-06 NOTE — ED NOTES
Dr. Pelayo at bedside and aware of BPs and that patient is currently maxed out on levophed. Awaiting new orders.

## 2021-01-06 NOTE — ED NOTES
Dr. Fan aware of BPs and MAP. Patient currently maxed out on levophed. Awaiting Dr. Pelayo, no new orders for BP per Dr. Fan.

## 2021-01-06 NOTE — ED PROVIDER NOTES
History     Chief Complaint   Patient presents with     Generalized Weakness     Fall     slipped out of bed, got left foot caught under dresser     The history is provided by the patient and the EMS personnel.   Fatigue  Severity:  Severe  Onset quality:  Gradual  Duration:  2 days  Timing:  Constant  Progression:  Worsening  Chronicity:  New  Associated symptoms: falls    Associated symptoms: no abdominal pain, no arthralgias, no chest pain, no cough, no dizziness, no dysuria, no fever, no headaches, no myalgias, no nausea, no shortness of breath and no vomiting    Risk factors: congestive heart failure          Allergies:  No Known Allergies    Problem List:    Patient Active Problem List    Diagnosis Date Noted     Septic shock (H) 01/06/2021     Priority: Medium     Gram-negative infection 01/06/2021     Priority: Medium     Osteoarthritis of thoracolumbar spine 01/06/2021     Priority: Medium     Shock liver 01/06/2021     Priority: Medium     Abnormal finding on urinalysis 01/06/2021     Priority: Medium     Acute renal failure superimposed on stage 3 chronic kidney disease (H) 01/06/2021     Priority: Medium     Neuropathy 08/19/2020     Priority: Medium     Chronic systolic CHF (congestive heart failure) (H) 02/06/2020     Priority: Medium     Spinal stenosis of lumbar region with neurogenic claudication 02/06/2020     Priority: Medium     S/p ERP: Decompression Levels: L3 to L5 Side: Bilat on 2/6/2020 with Dr. Torres       Paroxysmal atrial fibrillation (H) 12/04/2019     Priority: Medium     CKD (chronic kidney disease) stage 3, GFR 30-59 ml/min 12/04/2019     Priority: Medium     Combined systolic at 40% and diastolic cardiac dysfunction on 5/10/2017 06/03/2019     Priority: Medium     LBBB (left bundle branch block) 06/01/2018     Priority: Medium     History of tobacco abuse quitting on 2/1/1998 06/01/2018     Priority: Medium     Mild intermittent asthma, unspecified whether complicated 06/01/2018      Priority: Medium     Chronic obstructive pulmonary disease, unspecified COPD type (H) 06/01/2018     Priority: Medium     SOB (shortness of breath) 06/01/2018     Priority: Medium     Lumbar spine pain 10/27/2015     Priority: Medium     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 Provider to review       Non-ischemic cardiomyopathy (H) 12/10/2013     Priority: Medium     CHF (congestive heart failure), NYHA class II (H) 12/10/2013     Priority: Medium     Insomnia 01/01/2011     Priority: Medium     Problem list name updated by automated process. Provider to review       Disorder of bone and cartilage 01/01/2011     Priority: Medium     Problem list name updated by automated process. Provider to review       Essential hypertension 01/01/2011     Priority: Medium     Problem list name updated by automated process. Provider to review       Neck pain, chronic 01/01/2011     Priority: Medium     Hypercholesteremia 06/07/2002     Priority: Medium        Past Medical History:    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     Insomnia, unspecified 01/01/2011     LBBB (left bundle branch block) 01/01/2011     Neck pain, chronic 01/01/2011     Non-ischemic cardiomyopathy (H) 12/10/2013     Obesity, unspecified 01/01/2011     Osteopenia 01/01/2011     Pacemaker      Pain in joint, lower leg 07/31/2006     Pure hypercholesterolemia 06/07/2002     Unspecified essential hypertension 01/01/2011       Past Surgical History:    Past Surgical History:   Procedure Laterality Date     APPENDECTOMY       ARTHROSCOPY KNEE RT/LT  2009    RT     BACK SURGERY  02/06/2020     CARDIAC SURGERY   2014    Pacemaker     Cataract extraction  2009    Bilateral     CHOLECYSTECTOMY      open      COLONOSCOPY  2001    Normal      COLONOSCOPY N/A 10/20/2016    Procedure: COLONOSCOPY;  Surgeon: Charan Montejo MD;  Location: [facility]     colonoscopy with polypectomy      Repeat 3 years      CT CORONARY ANGIOGRAM      normal     HERPES ZOSTER PCR ([facility])       IR CONSULTATION FOR IR EXAM  2020     left eye scar tissue       normal echo      fen-phen use       x6       SURGICAL RADIOLOGY PROCEDURE N/A 2016    Procedure: SURGICAL RADIOLOGY PROCEDURE;  Surgeon: Provider, Generic Perianesthesia Nursing;  Location: [facility]       Family History:    Family History   Problem Relation Age of Onset     Cancer Mother         lung (cause of death)      Peptic Ulcer Disease Father         gastric        Social History:  Marital Status:   [2]  Social History     Tobacco Use     Smoking status: Former Smoker     Packs/day: 1.00     Years: 15.00     Pack years: 15.00     Types: Cigarettes     Quit date: 1998     Years since quittin.9     Smokeless tobacco: Never Used     Tobacco comment: Passive Exposure: No; Longest Tobacco Free: 15 years    Substance Use Topics     Alcohol use: Not Currently     Comment: Occasionally      Drug use: No        Medications:         alendronate (FOSAMAX) 70 MG tablet       budesonide-formoterol (SYMBICORT) 160-4.5 MCG/ACT Inhaler       Calcium Carbonate-Vitamin D (CALCIUM 600 + D OR)       carvedilol (COREG) 12.5 MG tablet       cefdinir (OMNICEF) 300 MG capsule       ELIQUIS ANTICOAGULANT 5 MG tablet       ENTRESTO 24-26 MG per tablet       gabapentin (NEURONTIN) 300 MG capsule       HYDROcodone-acetaminophen (NORCO) 5-325 MG tablet       Multiple Vitamin (MULTI-VITAMIN DAILY PO)       naproxen sodium (ANAPROX) 220 MG tablet       rosuvastatin (CRESTOR) 10 MG tablet       SPIRIVA RESPIMAT 2.5 MCG/ACT inhaler       spironolactone (ALDACTONE) 25 MG  "tablet       VENTOLIN  (90 Base) MCG/ACT inhaler          Review of Systems   Constitutional: Positive for fatigue. Negative for chills, diaphoresis and fever.   HENT: Negative for voice change.    Eyes: Negative for visual disturbance.   Respiratory: Negative for cough, chest tightness, shortness of breath and wheezing.    Cardiovascular: Negative for chest pain, palpitations and leg swelling.   Gastrointestinal: Negative for abdominal distention, abdominal pain, anal bleeding, blood in stool, nausea and vomiting.   Genitourinary: Negative for decreased urine volume, dysuria, flank pain and hematuria.   Musculoskeletal: Positive for falls. Negative for arthralgias, back pain, gait problem, myalgias, neck pain and neck stiffness.   Skin: Negative for color change, pallor, rash and wound.   Neurological: Positive for weakness. Negative for dizziness, syncope, light-headedness, numbness and headaches.   Psychiatric/Behavioral: Negative for confusion and suicidal ideas.       Physical Exam   BP: (!) 70/34  Pulse: 97  Temp: 98.8  F (37.1  C)  Resp: (!) 31  Height: 165.1 cm (5' 5\")  Weight: 78.5 kg (173 lb 1 oz)  SpO2: 92 %      Physical Exam  Vitals signs and nursing note reviewed.   Constitutional:       Appearance: She is well-developed. She is ill-appearing.   HENT:      Head: Normocephalic and atraumatic.      Mouth/Throat:      Pharynx: No oropharyngeal exudate.   Eyes:      Conjunctiva/sclera: Conjunctivae normal.      Pupils: Pupils are equal, round, and reactive to light.   Neck:      Musculoskeletal: Normal range of motion and neck supple.      Thyroid: No thyromegaly.      Vascular: No JVD.      Trachea: No tracheal deviation.   Cardiovascular:      Rate and Rhythm: Normal rate and regular rhythm.      Pulses:           Radial pulses are 1+ on the right side and 1+ on the left side.        Dorsalis pedis pulses are 1+ on the right side and 1+ on the left side.      Heart sounds: Normal heart sounds. No " murmur. No friction rub. No gallop.    Pulmonary:      Effort: Pulmonary effort is normal. No respiratory distress.      Breath sounds: Normal breath sounds. No stridor. No wheezing or rales.   Chest:      Chest wall: No tenderness.   Abdominal:      General: Bowel sounds are normal. There is no distension.      Palpations: Abdomen is soft. There is no mass.      Tenderness: There is no abdominal tenderness. There is no guarding or rebound.   Musculoskeletal: Normal range of motion.         General: No tenderness.   Lymphadenopathy:      Cervical: No cervical adenopathy.   Skin:     General: Skin is warm and dry.      Coloration: Skin is not pale.      Findings: Bruising present. No erythema or rash.      Comments: Multiple old superficial bruising all over body   Neurological:      Mental Status: She is alert and oriented to person, place, and time.   Psychiatric:         Behavior: Behavior normal.         ED Course        Procedures                   Results for orders placed or performed during the hospital encounter of 01/05/21 (from the past 24 hour(s))   Comprehensive metabolic panel   Result Value Ref Range    Sodium 142 133 - 144 mmol/L    Potassium 4.6 3.4 - 5.3 mmol/L    Chloride 114 (H) 94 - 109 mmol/L    Carbon Dioxide 23 20 - 32 mmol/L    Anion Gap 5 3 - 14 mmol/L    Glucose 78 70 - 99 mg/dL    Urea Nitrogen 16 7 - 30 mg/dL    Creatinine 0.84 0.52 - 1.04 mg/dL    GFR Estimate 63 >60 mL/min/[1.73_m2]    GFR Estimate If Black 74 >60 mL/min/[1.73_m2]    Calcium 8.3 (L) 8.5 - 10.1 mg/dL    Bilirubin Total 0.4 0.2 - 1.3 mg/dL    Albumin 2.1 (L) 3.4 - 5.0 g/dL    Protein Total 5.5 (L) 6.8 - 8.8 g/dL    Alkaline Phosphatase 173 (H) 40 - 150 U/L     (H) 0 - 50 U/L    AST 71 (H) 0 - 45 U/L   CBC with platelets   Result Value Ref Range    WBC 8.7 4.0 - 11.0 10e9/L    RBC Count 3.14 (L) 3.8 - 5.2 10e12/L    Hemoglobin 9.2 (L) 11.7 - 15.7 g/dL    Hematocrit 28.1 (L) 35.0 - 47.0 %    MCV 90 78 - 100 fl    MCH  29.3 26.5 - 33.0 pg    MCHC 32.7 31.5 - 36.5 g/dL    RDW 14.0 10.0 - 15.0 %    Platelet Count 131 (L) 150 - 450 10e9/L   Magnesium   Result Value Ref Range    Magnesium 1.8 1.6 - 2.3 mg/dL       Medications   lactated ringers infusion ( Intravenous Rate/Dose Verify 1/7/21 0818)   lactated ringers infusion ( Intravenous Stopped 1/8/21 0516)   0.9% sodium chloride BOLUS (0 mLs Intravenous Stopped 1/5/21 2355)   0.9% sodium chloride BOLUS (0 mLs Intravenous Stopped 1/6/21 0030)   0.9% sodium chloride BOLUS (0 mLs Intravenous Stopped 1/6/21 0127)   meropenem (MERREM) intermittent 1g in NS 50ML PREMIX (1 g Intravenous Given 1/6/21 0134)   vancomycin (VANCOCIN) 1000 mg in dextrose 5% 200 mL PREMIX (1,000 mg Intravenous New Bag 1/6/21 0514)   sodium chloride (PF) 0.9% PF flush 60 mL (60 mLs Intravenous Given 1/6/21 0222)   iopamidol (ISOVUE-300) IV solution 61% 100 mL (100 mLs Intravenous Given 1/6/21 0221)   magnesium sulfate 2 g in water intermittent infusion (2 g Intravenous New Bag 1/6/21 0834)   apixaban ANTICOAGULANT (ELIQUIS) tablet 2.5 mg (2.5 mg Oral Given 1/8/21 1042)       Assessments & Plan (with Medical Decision Making)   Severe generalized weakness, pt slid under dresser and did not have energy to move herself, as verified by patient and family , she did not fall. Denies head injury , denies any pain.   At arrival to ER , hypotensive , SBP 60, weak peripheral pulses . After receiving 1st 1lit of fluid , she felt much better, AAox4, denies any discomfort. All sign and symptoms of  peripheral hypoperfusion resolved, I could feels strong radial pulses( although irrigular) but BP reading did not improved despite aggressive IVF, patient placed on levophed drip. MAP stabilized over 60.   Labs reviewed; elevated Cr/ BUN,  elevated LFT , Lactate, pro calcitonins  IV meropenem + vancomycin given  CT abd, RUQ sono: no abnormal finding  Spoke to Dr Barbour, accepted for transfer to ICU.     I have reviewed the nursing  notes.    I have reviewed the findings, diagnosis, plan and need for follow up with the patient.      Discharge Medication List as of 1/9/2021 10:03 AM      START taking these medications    Details   cefdinir (OMNICEF) 300 MG capsule Take 1 capsule (300 mg) by mouth 2 times daily for 10 days, Disp-20 capsule, R-0, E-Prescribe             Final diagnoses:   Septic shock (H)       1/5/2021   [facility]     Jeronimo Fan MD  01/06/21 0722       Jeronimo Fan MD  01/09/21 2016

## 2021-01-06 NOTE — ED NOTES
Patient's  states that patient has not had any recent falls. Tonight she sat up at edge of bed and then slipped to the floor. Patient's left foot got stuck under dresser. Patient too weak to get self up and  unable to lift patient or move dresser.  then called EMS.  and daughter confirm no new medications or changes in patient or any recent illness. They both report that patient and  have been staying at home with limited contact with family.  notes that patient has had trouble with bruising due to the Eliquis and  thought that dose was being decreased.

## 2021-01-06 NOTE — PROGRESS NOTES
Spo2 98% on room air.  Breath sounds clear.  Instructed on Breo and Incruse.  Patient demos back a good technique.  Ok for RN to give inhalers.

## 2021-01-07 LAB
ALBUMIN SERPL-MCNC: 2.1 G/DL (ref 3.4–5)
ALP SERPL-CCNC: 185 U/L (ref 40–150)
ALT SERPL W P-5'-P-CCNC: 278 U/L (ref 0–50)
ANION GAP SERPL CALCULATED.3IONS-SCNC: 8 MMOL/L (ref 3–14)
AST SERPL W P-5'-P-CCNC: 270 U/L (ref 0–45)
BILIRUB SERPL-MCNC: 0.9 MG/DL (ref 0.2–1.3)
BUN SERPL-MCNC: 31 MG/DL (ref 7–30)
CALCIUM SERPL-MCNC: 7.6 MG/DL (ref 8.5–10.1)
CHLORIDE SERPL-SCNC: 114 MMOL/L (ref 94–109)
CO2 SERPL-SCNC: 19 MMOL/L (ref 20–32)
CREAT SERPL-MCNC: 1.22 MG/DL (ref 0.52–1.04)
ERYTHROCYTE [DISTWIDTH] IN BLOOD BY AUTOMATED COUNT: 14.4 % (ref 10–15)
GFR SERPL CREATININE-BSD FRML MDRD: 41 ML/MIN/{1.73_M2}
GLUCOSE BLDC GLUCOMTR-MCNC: 77 MG/DL (ref 70–99)
GLUCOSE SERPL-MCNC: 65 MG/DL (ref 70–99)
HCT VFR BLD AUTO: 28.4 % (ref 35–47)
HGB BLD-MCNC: 8.9 G/DL (ref 11.7–15.7)
MAGNESIUM SERPL-MCNC: 2.1 MG/DL (ref 1.6–2.3)
MCH RBC QN AUTO: 29.2 PG (ref 26.5–33)
MCHC RBC AUTO-ENTMCNC: 31.3 G/DL (ref 31.5–36.5)
MCV RBC AUTO: 93 FL (ref 78–100)
PLATELET # BLD AUTO: 115 10E9/L (ref 150–450)
POTASSIUM SERPL-SCNC: 4.7 MMOL/L (ref 3.4–5.3)
PROCALCITONIN SERPL-MCNC: 25.38 NG/ML
PROT SERPL-MCNC: 5.3 G/DL (ref 6.8–8.8)
RBC # BLD AUTO: 3.05 10E12/L (ref 3.8–5.2)
SODIUM SERPL-SCNC: 141 MMOL/L (ref 133–144)
WBC # BLD AUTO: 14.6 10E9/L (ref 4–11)

## 2021-01-07 PROCEDURE — 85027 COMPLETE CBC AUTOMATED: CPT | Performed by: INTERNAL MEDICINE

## 2021-01-07 PROCEDURE — 250N000011 HC RX IP 250 OP 636: Performed by: INTERNAL MEDICINE

## 2021-01-07 PROCEDURE — 83735 ASSAY OF MAGNESIUM: CPT | Performed by: INTERNAL MEDICINE

## 2021-01-07 PROCEDURE — 258N000003 HC RX IP 258 OP 636: Performed by: INTERNAL MEDICINE

## 2021-01-07 PROCEDURE — 36415 COLL VENOUS BLD VENIPUNCTURE: CPT | Performed by: INTERNAL MEDICINE

## 2021-01-07 PROCEDURE — 99233 SBSQ HOSP IP/OBS HIGH 50: CPT | Performed by: INTERNAL MEDICINE

## 2021-01-07 PROCEDURE — 999N001017 HC STATISTIC GLUCOSE BY METER IP

## 2021-01-07 PROCEDURE — 250N000013 HC RX MED GY IP 250 OP 250 PS 637: Performed by: INTERNAL MEDICINE

## 2021-01-07 PROCEDURE — 84145 PROCALCITONIN (PCT): CPT | Performed by: INTERNAL MEDICINE

## 2021-01-07 PROCEDURE — 200N000001 HC R&B ICU

## 2021-01-07 PROCEDURE — 80053 COMPREHEN METABOLIC PANEL: CPT | Performed by: INTERNAL MEDICINE

## 2021-01-07 RX ORDER — MEROPENEM AND SODIUM CHLORIDE 1 G/50ML
1 INJECTION, SOLUTION INTRAVENOUS EVERY 12 HOURS
Status: DISCONTINUED | OUTPATIENT
Start: 2021-01-07 | End: 2021-01-08

## 2021-01-07 RX ORDER — SODIUM CHLORIDE, SODIUM LACTATE, POTASSIUM CHLORIDE, CALCIUM CHLORIDE 600; 310; 30; 20 MG/100ML; MG/100ML; MG/100ML; MG/100ML
INJECTION, SOLUTION INTRAVENOUS CONTINUOUS
Status: ACTIVE | OUTPATIENT
Start: 2021-01-07 | End: 2021-01-08

## 2021-01-07 RX ADMIN — FLUTICASONE FUROATE AND VILANTEROL TRIFENATATE 1 PUFF: 200; 25 POWDER RESPIRATORY (INHALATION) at 09:59

## 2021-01-07 RX ADMIN — HYDROCODONE BITARTRATE AND ACETAMINOPHEN 1 TABLET: 5; 325 TABLET ORAL at 17:30

## 2021-01-07 RX ADMIN — MEROPENEM AND SODIUM CHLORIDE 1 G: 1 INJECTION, SOLUTION INTRAVENOUS at 19:48

## 2021-01-07 RX ADMIN — SODIUM CHLORIDE, POTASSIUM CHLORIDE, SODIUM LACTATE AND CALCIUM CHLORIDE: 600; 310; 30; 20 INJECTION, SOLUTION INTRAVENOUS at 06:44

## 2021-01-07 RX ADMIN — MEROPENEM AND SODIUM CHLORIDE 1 G: 1 INJECTION, SOLUTION INTRAVENOUS at 09:53

## 2021-01-07 RX ADMIN — APIXABAN 2.5 MG: 2.5 TABLET, FILM COATED ORAL at 12:46

## 2021-01-07 RX ADMIN — APIXABAN 2.5 MG: 2.5 TABLET, FILM COATED ORAL at 19:49

## 2021-01-07 RX ADMIN — MEROPENEM AND SODIUM CHLORIDE 500 MG: 500 INJECTION, SOLUTION INTRAVENOUS at 01:56

## 2021-01-07 RX ADMIN — UMECLIDINIUM 1 PUFF: 62.5 AEROSOL, POWDER ORAL at 10:03

## 2021-01-07 ASSESSMENT — ACTIVITIES OF DAILY LIVING (ADL)
ADLS_ACUITY_SCORE: 16
ADLS_ACUITY_SCORE: 18
ADLS_ACUITY_SCORE: 16

## 2021-01-07 ASSESSMENT — MIFFLIN-ST. JEOR: SCORE: 1282.88

## 2021-01-07 NOTE — PLAN OF CARE
Patient complains of being tired.  Explained to patient her body is fighting an infection and she needs additional rest.   Without other complaints this am.   BP stable SBP 90's-100's, MAP 60's-70's.  0945- Oxygen turned off sats 95-97 % on 2lpm, will continue to monitor sats.   Patient's sats off oxygen have maintained 90-95% on room air.  Sats when sleeping 90-92%.   Patient up to chair for approximately 40 minutes.  Tolerated well.  Able to transfer with minimal assist of 2 and using walker.   Tolerated clear liquids this afternoon without nausea/vomiting.   BP maintaining without pressors.  Patient sleeping intermittently throughout shift.   Declines advanced diet states will try eating in am.  Called and updated daughter Ninoska on plan of care and how patient has progressed today.

## 2021-01-07 NOTE — PHARMACY-MEDICATION REGIMEN REVIEW
Pharmacy Antimicrobial Stewardship Assessment     Current Antimicrobial Therapy:  Anti-infectives (From now, onward)    Start     Dose/Rate Route Frequency Ordered Stop    01/08/21 0500  vancomycin (VANCOCIN) 1,250 mg in sodium chloride 0.9 % 250 mL intermittent infusion      1,250 mg  over 90 Minutes Intravenous EVERY 48 HOURS 01/06/21 0901      01/06/21 1200  meropenem (MERREM) intermittent 500 mg in NS 50ML PREMIX      500 mg  100 mL/hr over 30 Minutes Intravenous EVERY 12 HOURS 01/06/21 0434      01/06/21 0500  vancomycin (VANCOCIN) 1000 mg in dextrose 5% 200 mL PREMIX      1,000 mg  200 mL/hr over 1 Hours Intravenous ONCE 01/06/21 0451            Indication: Sepsis    Days of Therapy: 2     Pertinent Labs:  Creatinine   Date Value Ref Range Status   01/07/2021 1.22 (H) 0.52 - 1.04 mg/dL Final   01/06/2021 1.28 (H) 0.52 - 1.04 mg/dL Final   01/06/2021 1.67 (H) 0.52 - 1.04 mg/dL Final     WBC   Date Value Ref Range Status   01/07/2021 14.6 (H) 4.0 - 11.0 10e9/L Final   01/06/2021 13.4 (H) 4.0 - 11.0 10e9/L Final   01/05/2021 6.1 4.0 - 11.0 10e9/L Final     Procalcitonin   Date Value Ref Range Status   01/07/2021 25.38 (HH) ng/ml Final     Comment:     >/= 10.00 ng/ml   Very high likelihood of severe sepsis or septic shock.  Recommendation: Strongly recommend initiating or continuing antibiotics.   Evaluate culture results and clinical features to target antibacterial   therapy. Obtain blood cultures and other relevant cultures if not done.Repeat   PCT in 2 days to guide antibiotic de-escalation. Consider de-escalating   antibiotics when PCT concentration is <80% of peak or abs PCT <1.  Critical Value called to and read back by  HEMALATHA ADRIAN AT 0521 ON 1/7/21 BY KARISSA.     01/05/2021 26.54 (HH) ng/ml Final     Comment:     >/= 10.00 ng/ml   Very high likelihood of severe sepsis or septic shock.  Recommendation: Strongly recommend initiating or continuing antibiotics.   Evaluate culture results and clinical features  to target antibacterial   therapy. Obtain blood cultures and other relevant cultures if not done.Repeat   PCT in 2 days to guide antibiotic de-escalation. Consider de-escalating   antibiotics when PCT concentration is <80% of peak or abs PCT <1.  Critical Value called to and read back by  SAMANTHA GERBER AT 2356 ON 1/5/21 BY KARISSA.       CRP Inflammation   Date Value Ref Range Status   01/05/2021 18.9 (H) 0.0 - 8.0 mg/L Final       Culture Results:   (1/6/21) Urine = >100K gnr  (1/5/21) Blood = GNR  (1/5/21) COVID = negative     Recommendations/Interventions:  1. Trend procalcitonin  2. Patient does not meet criteria for meropenem restricted use per [place] Meropenem Restricted Use (no medication allergies or history of resistance). Change meropenem to Cefepime:       3. Stop vancomycin as blood and urine = GNR  4. Meropenem adjusted for renal function & indication per [place] Renal Dosing Policy    Gonzales Medley, [facility]  January 7, 2021

## 2021-01-07 NOTE — PLAN OF CARE
Pt alert and oriented x3, pleasant and cooperative. VS and assessments as charted. Initially rating pain to lower back 3/10 and received Norco with relief. Pt has since declined any pain or discomfort. Lungs with fine crackles bi-basilary and RML, some trace edema to BLE- MD was updated and IVF decreased to 75/hr. Pt remains on 2 LPM with sats mid 90's, denies any SOB, encouraging TCDB. Levophed gtt titrated per MAR to meet parameters, was able to stop Levophed gtt at 0400 and has remained off with maps >65. Johnson patent and draining clear ankit urine. BG 65 with AM labs- orange juice given and recheck BG 77- MD updated and clear liquid diet ordered. Procalcitonin critical at 25.38 and Dr. Pelayo notified.Sepsis BPA also flagging and not ordering now as pt is hemodynamically stable per Dr. Pelayo.  Turned and repositioned every 2 hours, free from falls/ injury and call light within reach.     Face to face report given with opportunity to observe patient.    Report given to Grace Edge RN   1/7/2021  7:15 AM

## 2021-01-07 NOTE — PROGRESS NOTES
[facility]    Hospitalist Progress Note    Date of Service (when I saw the patient): 01/07/2021    Assessment & Plan   Jacklyn Hein is a 85 year old female who presents with weakness. Presented hypotensive with significant blood test abnormalities. Failed fluid challenge. Started on levophed. Very abnormal inflammatory markers, renal failure, shock liver.  Likely sepsis is urinary etiology.  Will be admitted to ICU. Code status addressed, patient wants to be DNR/DNR.     Principal Problem:    Septic shock (H)    Assessment: Patient is now off of Levophed.  Gram-negative bacteremia, urine source likely with greater than 100,000 colonies of gram-negative rods in urine as well.  -Continue meropenem  -We will discontinue vancomycin     Active Problems:    CHF (congestive heart failure), NYHA class II (H)    Assessment: Echocardiogram shows ejection fraction 30 to 35%, which is decreased from previous echo.  She has a pacemaker in place.    Plan: Take caution with fluid resuscitation, low threshold for diuresis if indicated   Telemetry monitoring       Chronic obstructive pulmonary disease, unspecified COPD type (H)    Assessment: No signs of exacerbation    Plan: Continue inhalers       Paroxysmal atrial fibrillation (H)    Assessment: paced rhythm    Plan: We will continue Eliquis       ALVARADO on CKD (chronic kidney disease) stage 3, GFR 30-59 ml/min    Assessment: She is stage IIIa chronic kidney disease.  Likely pre-renal azotemia from sepsis.  Improving.    Plan: Continue davis, monitor renal function and UOP       Osteoarthritis of thoracolumbar spine    Assessment: Advanced and debilitating    Plan: Continue home lortabs       Shock liver    Assessment: Present on admission    Plan: Improving     DVT Prophylaxis: Patient takes Eliquis, currently on hold    Code Status: DNR / DNI     Disposition:  Continue ICU care, pending improvement    James Skinner MD      Interval History   Patient seen in  room.  Patient reports feeling weak, however does feel subjectively better.  No acute events overnight, no new symptoms.    -Data reviewed today: I reviewed all new labs and imaging results over the last 24 hours. I personally reviewed imaging reports.    Physical Exam   Temp: 99  F (37.2  C) Temp src: Tympanic BP: (!) 83/51 Pulse: 76   Resp: 17 SpO2: 96 % O2 Device: Nasal cannula Oxygen Delivery: 2 LPM  Vitals:    01/05/21 2308 01/06/21 0435 01/07/21 0423   Weight: 78.5 kg (173 lb 1 oz) 79.4 kg (175 lb 0.7 oz) 83.2 kg (183 lb 6.8 oz)     Vital Signs with Ranges  Temp:  [98.1  F (36.7  C)-100.2  F (37.9  C)] 99  F (37.2  C)  Pulse:  [72-96] 76  Resp:  [10-49] 17  BP: ()/(31-93) 83/51  SpO2:  [85 %-99 %] 96 %    Intake/Output Summary (Last 24 hours) at 1/7/2021 0843  Last data filed at 1/7/2021 0800  Gross per 24 hour   Intake 3519.2 ml   Output 1522 ml   Net 1997.2 ml       Peripheral IV 01/05/21 Left Upper forearm (Active)   Site Assessment WDL except;Ecchymotic 01/07/21 0800   Line Status Infusing;Checked every 1-2 hour 01/07/21 0800   Phlebitis Scale 0-->no symptoms 01/07/21 0800   Infiltration Scale 0 01/07/21 0800   Number of days: 2       Peripheral IV 01/06/21 Right Upper arm (Active)   Site Assessment WDL except;Bleeding;Ecchymotic 01/07/21 0800   Line Status Saline locked 01/07/21 0800   Phlebitis Scale 0-->no symptoms 01/07/21 0800   Infiltration Scale 0 01/07/21 0800   Number of days: 1       Urethral Catheter Latex 10 fr (Active)   Tube Description Positional 01/07/21 0800   Catheter Care Done;Soap and water/perineal cleanser 01/07/21 0400   Collection Container Standard 01/07/21 0800   Securement Method Securing device (Describe) 01/07/21 0800   Rationale for Continued Use Strict 1-2 Hour I&O 01/07/21 0800   Urine Output 160 mL 01/07/21 0800   Number of days: 1       Incision/Surgical Site 05/06/14 Left Chest (Active)   Number of days: 2438     Line/device assessment(s) completed for medical  necessity    Constitutional - AA, NAD, weak  HEENT - atraumatic, normocephalic  Neck - supple, no masses, no JVD  CVS - S1 S2 RRR, no murmurs, rubs, gallops  Respiratory - CTA b/l  GI - soft, NT, ND, + bowel sounds, no organomegaly  Musculoskeletal - no LE edema, no lesions  Neuro - oriented x 3, no gross focal deficits    Medications     lactated ringers 75 mL/hr at 01/07/21 0818     norepinephrine Stopped (01/07/21 0400)     norepinephrine Stopped (01/06/21 0415)     - MEDICATION INSTRUCTIONS -         [Held by provider] apixaban ANTICOAGULANT  2.5 mg Oral BID     fluticasone-vilanterol  1 puff Inhalation Daily     meropenem  1 g Intravenous Q12H     umeclidinium  1 puff Inhalation Daily     vancomycin (VANCOCIN) IV  1,000 mg Intravenous Once    Followed by     [START ON 1/8/2021] vancomycin (VANCOCIN) IV  1,250 mg Intravenous Q48H       Data   Recent Labs   Lab 01/07/21  0445 01/06/21  1630 01/06/21  0441 01/05/21  2316   WBC 14.6*  --  13.4* 6.1   HGB 8.9*  --  9.0* 10.3*   MCV 93  --  93 91   *  --  121* 146*    139 139 139   POTASSIUM 4.7 5.4* 4.4 4.6   CHLORIDE 114* 112* 113* 110*   CO2 19* 18* 18* 19*   BUN 31* 35* 43* 51*   CR 1.22* 1.28* 1.67* 2.06*   ANIONGAP 8 9 8 10   LORRAINE 7.6* 7.8* 7.4* 8.8   GLC 65* 73 129* 110*   ALBUMIN 2.1* 2.3* 2.2* 2.8*   PROTTOTAL 5.3* 5.4* 5.1* 6.3*   BILITOTAL 0.9 1.7* 1.5* 1.5*   ALKPHOS 185* 194* 194* 247*   * 361* 418* 536*   * 416* 650* 1,015*   LIPASE  --   --   --  107   TROPI  --   --  <0.015 <0.015     Lactic Acid   Date Value Ref Range Status   01/06/2021 2.3 (H) 0.7 - 2.0 mmol/L Final     Comment:     Significant value called to and read back by  MELISSA VALENTINO ON 1/6/21 AT 0907 BY [facility]     01/06/2021 1.7 0.7 - 2.0 mmol/L Final   01/05/2021 3.1 (H) 0.7 - 2.0 mmol/L Final     Comment:     Significant value called to and read back by  Angelika Naylor at 2336 on 1/5/21 by NMA         No results found for this or any previous visit (from the  past 24 hour(s)).    James Skinner MD

## 2021-01-07 NOTE — PHARMACY-MEDICATION REGIMEN REVIEW
Levophed/Norephinephrine    Subjective/Objective:    -Central line administration of 16 mg/250mL is preferred; extravasation may cause severe ischemic necrosis, especially in elderly patients    -Currently, patient only has peripheral lines.    Assessment/Plan:    1. Per [place] Guidelines: Peripheral Administration of a Vasoactive Agent, Adult :    2. Discontinue the 16 mg/250 mL order per policy, as it has been 24 hours since initiation    3. If Levophed is to continue, only use the 4 mg/250mL concentration peripherally, or add a central line    Thank you,    Gnozales Medley, [facility]

## 2021-01-07 NOTE — PROVIDER NOTIFICATION
DATE:  1/7/2021   TIME OF RECEIPT FROM LAB:  0520  LAB TEST:  Procalcitonin  LAB VALUE:  25.38  RESULTS GIVEN WITH READ-BACK TO (PROVIDER):  Dr. Pelayo.      TIME LAB VALUE REPORTED TO PROVIDER:   0553

## 2021-01-08 ENCOUNTER — APPOINTMENT (OUTPATIENT)
Dept: PHYSICAL THERAPY | Facility: HOSPITAL | Age: 86
DRG: 871 | End: 2021-01-08
Attending: INTERNAL MEDICINE
Payer: MEDICARE

## 2021-01-08 ENCOUNTER — APPOINTMENT (OUTPATIENT)
Dept: OCCUPATIONAL THERAPY | Facility: HOSPITAL | Age: 86
DRG: 871 | End: 2021-01-08
Attending: INTERNAL MEDICINE
Payer: MEDICARE

## 2021-01-08 LAB
ALBUMIN SERPL-MCNC: 2 G/DL (ref 3.4–5)
ALP SERPL-CCNC: 180 U/L (ref 40–150)
ALT SERPL W P-5'-P-CCNC: 191 U/L (ref 0–50)
ANION GAP SERPL CALCULATED.3IONS-SCNC: 7 MMOL/L (ref 3–14)
AST SERPL W P-5'-P-CCNC: 136 U/L (ref 0–45)
BACTERIA SPEC CULT: ABNORMAL
BILIRUB SERPL-MCNC: 0.5 MG/DL (ref 0.2–1.3)
BUN SERPL-MCNC: 22 MG/DL (ref 7–30)
CALCIUM SERPL-MCNC: 7.7 MG/DL (ref 8.5–10.1)
CHLORIDE SERPL-SCNC: 114 MMOL/L (ref 94–109)
CO2 SERPL-SCNC: 20 MMOL/L (ref 20–32)
CREAT SERPL-MCNC: 0.91 MG/DL (ref 0.52–1.04)
ERYTHROCYTE [DISTWIDTH] IN BLOOD BY AUTOMATED COUNT: 14.3 % (ref 10–15)
GFR SERPL CREATININE-BSD FRML MDRD: 58 ML/MIN/{1.73_M2}
GLUCOSE BLDC GLUCOMTR-MCNC: 70 MG/DL (ref 70–99)
GLUCOSE SERPL-MCNC: 44 MG/DL (ref 70–99)
HCT VFR BLD AUTO: 27 % (ref 35–47)
HGB BLD-MCNC: 8.6 G/DL (ref 11.7–15.7)
MAGNESIUM SERPL-MCNC: 2 MG/DL (ref 1.6–2.3)
MCH RBC QN AUTO: 29.4 PG (ref 26.5–33)
MCHC RBC AUTO-ENTMCNC: 31.9 G/DL (ref 31.5–36.5)
MCV RBC AUTO: 92 FL (ref 78–100)
PLATELET # BLD AUTO: 123 10E9/L (ref 150–450)
POTASSIUM SERPL-SCNC: 4.7 MMOL/L (ref 3.4–5.3)
PROT SERPL-MCNC: 5.2 G/DL (ref 6.8–8.8)
RBC # BLD AUTO: 2.93 10E12/L (ref 3.8–5.2)
SODIUM SERPL-SCNC: 141 MMOL/L (ref 133–144)
SPECIMEN SOURCE: ABNORMAL
WBC # BLD AUTO: 12.8 10E9/L (ref 4–11)

## 2021-01-08 PROCEDURE — 250N000011 HC RX IP 250 OP 636: Performed by: INTERNAL MEDICINE

## 2021-01-08 PROCEDURE — 999N001017 HC STATISTIC GLUCOSE BY METER IP

## 2021-01-08 PROCEDURE — 36415 COLL VENOUS BLD VENIPUNCTURE: CPT | Performed by: INTERNAL MEDICINE

## 2021-01-08 PROCEDURE — 85027 COMPLETE CBC AUTOMATED: CPT | Performed by: INTERNAL MEDICINE

## 2021-01-08 PROCEDURE — 83735 ASSAY OF MAGNESIUM: CPT | Performed by: INTERNAL MEDICINE

## 2021-01-08 PROCEDURE — 97161 PT EVAL LOW COMPLEX 20 MIN: CPT | Mod: GP

## 2021-01-08 PROCEDURE — 97165 OT EVAL LOW COMPLEX 30 MIN: CPT | Mod: GO

## 2021-01-08 PROCEDURE — 80053 COMPREHEN METABOLIC PANEL: CPT | Performed by: INTERNAL MEDICINE

## 2021-01-08 PROCEDURE — 250N000013 HC RX MED GY IP 250 OP 250 PS 637: Performed by: INTERNAL MEDICINE

## 2021-01-08 PROCEDURE — 200N000001 HC R&B ICU

## 2021-01-08 PROCEDURE — 99232 SBSQ HOSP IP/OBS MODERATE 35: CPT | Performed by: INTERNAL MEDICINE

## 2021-01-08 PROCEDURE — 97530 THERAPEUTIC ACTIVITIES: CPT | Mod: GO

## 2021-01-08 RX ORDER — CEFTRIAXONE SODIUM 2 G/50ML
2 INJECTION, SOLUTION INTRAVENOUS EVERY 24 HOURS
Status: DISCONTINUED | OUTPATIENT
Start: 2021-01-08 | End: 2021-01-09 | Stop reason: HOSPADM

## 2021-01-08 RX ADMIN — UMECLIDINIUM 1 PUFF: 62.5 AEROSOL, POWDER ORAL at 08:32

## 2021-01-08 RX ADMIN — APIXABAN 2.5 MG: 2.5 TABLET, FILM COATED ORAL at 10:42

## 2021-01-08 RX ADMIN — APIXABAN 2.5 MG: 2.5 TABLET, FILM COATED ORAL at 08:31

## 2021-01-08 RX ADMIN — HYDROCODONE BITARTRATE AND ACETAMINOPHEN 1 TABLET: 5; 325 TABLET ORAL at 01:09

## 2021-01-08 RX ADMIN — Medication 30 G: at 05:36

## 2021-01-08 RX ADMIN — APIXABAN 5 MG: 5 TABLET, FILM COATED ORAL at 21:27

## 2021-01-08 RX ADMIN — HYDROCODONE BITARTRATE AND ACETAMINOPHEN 1 TABLET: 5; 325 TABLET ORAL at 18:00

## 2021-01-08 RX ADMIN — FLUTICASONE FUROATE AND VILANTEROL TRIFENATATE 1 PUFF: 200; 25 POWDER RESPIRATORY (INHALATION) at 08:32

## 2021-01-08 RX ADMIN — CEFTRIAXONE SODIUM 2 G: 2 INJECTION, SOLUTION INTRAVENOUS at 09:46

## 2021-01-08 ASSESSMENT — ACTIVITIES OF DAILY LIVING (ADL)
ADLS_ACUITY_SCORE: 18
ADLS_ACUITY_SCORE: 16
ADLS_ACUITY_SCORE: 16
ADLS_ACUITY_SCORE: 18
ADLS_ACUITY_SCORE: 16
ADLS_ACUITY_SCORE: 18

## 2021-01-08 ASSESSMENT — MIFFLIN-ST. JEOR: SCORE: 1266.88

## 2021-01-08 NOTE — PLAN OF CARE
"Pt changed from ICu to Step down status this am. VSS, see graphics for vitas. Continues to deny pain t/o shift. Davis removed this am at 0830, voided multiple times since davis removed. Ambulating to bathroom with SBA and wheeled walker; steady in feet. Worked with PT. Up in recliner for most of day, \"requesting to rest.\" Pt requested Norc0 for sleep, given per her request. Denies wanting to eat supper. Remains up in recliner per her request.   "

## 2021-01-08 NOTE — PLAN OF CARE
"/71 (BP Location: Right arm)   Pulse 80   Temp 98.3  F (36.8  C) (Tympanic)   Resp 13   Ht 1.651 m (5' 5\")   Wt 83.2 kg (183 lb 6.8 oz)   SpO2 93%   BMI 30.52 kg/m      Pt alert and oriented upon initial assessment. C/O generalized pain. 5mg Norco given 1x, tolerated well. Slept well t/o night, perking up by early AM. SBP remained in 110s t/o night. Off Levophed for over 24 hrs now. Tmax 98.5. BG 44 in AM, 30g Glucose gel given, recheck 70. Juices then given as well.  Lungs dim/clear. Satting 93% RA. 100% A-paced 70s. Johnson output adequate. Oral intake adequate. SBA to bedside, feeling stronger by AM. No falls or injuries this shift. Will continue to monitor.     Face to face report given with opportunity to observe patient.    Report given to DELANEY Collazo RN   1/8/2021  7:01 AM      "

## 2021-01-08 NOTE — PHARMACY-MEDICATION REGIMEN REVIEW
Pharmacy Antimicrobial Stewardship Assessment     Current Antimicrobial Therapy:  Anti-infectives (From now, onward)    Start     Dose/Rate Route Frequency Ordered Stop    01/08/21 0900  cefTRIAXone IN D5W (ROCEPHIN) intermittent infusion 2 g      2 g  100 mL/hr over 30 Minutes Intravenous EVERY 24 HOURS 01/08/21 0757            Indication: Sepsis    Days of Therapy: Day 1 Rocephin, Day 3 IV Abx     Pertinent Labs:  Creatinine   Date Value Ref Range Status   01/08/2021 0.91 0.52 - 1.04 mg/dL Final   01/07/2021 1.22 (H) 0.52 - 1.04 mg/dL Final   01/06/2021 1.28 (H) 0.52 - 1.04 mg/dL Final     WBC   Date Value Ref Range Status   01/08/2021 12.8 (H) 4.0 - 11.0 10e9/L Final   01/07/2021 14.6 (H) 4.0 - 11.0 10e9/L Final   01/06/2021 13.4 (H) 4.0 - 11.0 10e9/L Final     Procalcitonin   Date Value Ref Range Status   01/07/2021 25.38 (HH) ng/ml Final     Comment:     >/= 10.00 ng/ml   Very high likelihood of severe sepsis or septic shock.  Recommendation: Strongly recommend initiating or continuing antibiotics.   Evaluate culture results and clinical features to target antibacterial   therapy. Obtain blood cultures and other relevant cultures if not done.Repeat   PCT in 2 days to guide antibiotic de-escalation. Consider de-escalating   antibiotics when PCT concentration is <80% of peak or abs PCT <1.  Critical Value called to and read back by  HEMALATHA ADRIAN AT 0521 ON 1/7/21 BY KARISSA.     01/05/2021 26.54 (HH) ng/ml Final     Comment:     >/= 10.00 ng/ml   Very high likelihood of severe sepsis or septic shock.  Recommendation: Strongly recommend initiating or continuing antibiotics.   Evaluate culture results and clinical features to target antibacterial   therapy. Obtain blood cultures and other relevant cultures if not done.Repeat   PCT in 2 days to guide antibiotic de-escalation. Consider de-escalating   antibiotics when PCT concentration is <80% of peak or abs PCT <1.  Critical Value called to and read back by  SAMANTHA  ZABRINA AT 2356 ON 1/5/21 BY KARISSA.       CRP Inflammation   Date Value Ref Range Status   01/05/2021 18.9 (H) 0.0 - 8.0 mg/L Final       Culture Results:   (1/6/21) Urine = >100K E. Coli pan sensitive  (1/5/21) Blood = Klebsiella resistant to ampicillin, sensitive to cephalosporins  (1/5/21) COVID = negative     Recommendations/Interventions:  1. FYI regarding Rocephin:     Gonzales Medley, [facility]  January 8, 2021

## 2021-01-08 NOTE — PROGRESS NOTES
[facility]    Hospitalist Progress Note    Date of Service (when I saw the patient): 01/08/2021    Assessment & Plan   Jacklyn Hein is a 85 year old female who presents with weakness. Presented hypotensive with significant blood test abnormalities. Failed fluid challenge. Started on levophed. Very abnormal inflammatory markers, renal failure, shock liver.  Likely sepsis is urinary etiology.  Will be admitted to ICU. Code status addressed, patient wants to be DNR/DNR.     Principal Problem:    Septic shock (H)    Assessment: Patient is now off of Levophed, doing well.  Gram-negative bacteremia with Klebsiella, urine source with greater than 100,000 colonies of E coli. ?? Question source now that bacteria do not match.  -Both are sensitive to ceftriaxone, so will switch to that  -Vancomycin discontinued  -much improved today, will step down from ICU     Active Problems:    CHF (congestive heart failure), NYHA class II (H)    Assessment: Echocardiogram shows ejection fraction 30 to 35%, which is decreased from previous echo.  She has a pacemaker in place.    Plan: Take caution with fluid resuscitation, low threshold for diuresis if indicated   Telemetry monitoring       Chronic obstructive pulmonary disease, unspecified COPD type (H)    Assessment: No signs of exacerbation    Plan: Continue inhalers       Paroxysmal atrial fibrillation (H)    Assessment: paced rhythm    Plan: We will continue Eliquis       ALVARADO on CKD (chronic kidney disease) stage 3, GFR 30-59 ml/min    Assessment: She is stage IIIa chronic kidney disease.  Likely pre-renal azotemia from sepsis.  Improving.    Plan: Continue davis, monitor renal function and UOP       Osteoarthritis of thoracolumbar spine    Assessment: Advanced and debilitating    Plan: Continue home lortabs       Shock liver    Assessment: Present on admission    Plan: Improving     DVT Prophylaxis: Patient takes Eliquis, currently on hold    Code Status: DNR /  DNI     Disposition:  Step down out of ICU today, commence PT/OT    James Skinner MD      Interval History   Patient seen in room.  Patient improving.  No acute events overnight, no new symptoms.    -Data reviewed today: I reviewed all new labs and imaging results over the last 24 hours. I personally reviewed imaging reports.    Physical Exam   Temp: 98.3  F (36.8  C) Temp src: Tympanic BP: 138/71 Pulse: 80   Resp: 13 SpO2: 93 % O2 Device: None (Room air)    Vitals:    01/06/21 0435 01/07/21 0423 01/08/21 0511   Weight: 79.4 kg (175 lb 0.7 oz) 83.2 kg (183 lb 6.8 oz) 81.6 kg (179 lb 14.3 oz)     Vital Signs with Ranges  Temp:  [98.1  F (36.7  C)-99.4  F (37.4  C)] 98.3  F (36.8  C)  Pulse:  [76-85] 80  Resp:  [9-24] 13  BP: ()/(49-71) 138/71  SpO2:  [88 %-94 %] 93 %      Intake/Output Summary (Last 24 hours) at 1/8/2021 1005  Last data filed at 1/8/2021 0957  Gross per 24 hour   Intake 2532 ml   Output 1825 ml   Net 707 ml         Peripheral IV 01/05/21 Left Upper forearm (Active)   Site Assessment WDL except;Ecchymotic 01/08/21 0400   Line Status Saline locked 01/08/21 0400   Phlebitis Scale 0-->no symptoms 01/08/21 0400   Infiltration Scale 0 01/08/21 0400   Number of days: 3       Peripheral IV 01/06/21 Right Upper arm (Active)   Site Assessment WDL except;Ecchymotic 01/08/21 0400   Line Status Infusing 01/08/21 0400   Dressing Intervention New dressing  01/07/21 1000   Phlebitis Scale 0-->no symptoms 01/08/21 0400   Infiltration Scale 0 01/08/21 0400   Number of days: 2       Urethral Catheter Latex 10 fr (Active)   Tube Description Positional 01/07/21 2000   Catheter Care Done;Catheter wipes 01/07/21 2000   Collection Container Standard 01/08/21 0400   Securement Method Securing device (Describe) 01/08/21 0400   Rationale for Continued Use Strict 1-2 Hour I&O 01/07/21 2000   Urine Output 275 mL 01/08/21 0500   Number of days: 2       Incision/Surgical Site 05/06/14 Left Chest (Active)   Number of days:  9465     Line/device assessment(s) completed for medical necessity    Constitutional - AA, NAD, weak  HEENT - atraumatic, normocephalic  Neck - supple, no masses, no JVD  CVS - S1 S2 RRR, no murmurs, rubs, gallops  Respiratory - CTA b/l  GI - soft, NT, ND, + bowel sounds, no organomegaly  Musculoskeletal - no LE edema, no lesions  Neuro - oriented x 3, no gross focal deficits    Medications     - MEDICATION INSTRUCTIONS -         apixaban ANTICOAGULANT  2.5 mg Oral BID     cefTRIAXone  2 g Intravenous Q24H     fluticasone-vilanterol  1 puff Inhalation Daily     umeclidinium  1 puff Inhalation Daily       Data   Recent Labs   Lab 01/08/21  0442 01/07/21  0445 01/06/21  1630 01/06/21  0441 01/05/21  2316   WBC 12.8* 14.6*  --  13.4* 6.1   HGB 8.6* 8.9*  --  9.0* 10.3*   MCV 92 93  --  93 91   * 115*  --  121* 146*    141 139 139 139   POTASSIUM 4.7 4.7 5.4* 4.4 4.6   CHLORIDE 114* 114* 112* 113* 110*   CO2 20 19* 18* 18* 19*   BUN 22 31* 35* 43* 51*   CR 0.91 1.22* 1.28* 1.67* 2.06*   ANIONGAP 7 8 9 8 10   LORRAINE 7.7* 7.6* 7.8* 7.4* 8.8   GLC 44* 65* 73 129* 110*   ALBUMIN 2.0* 2.1* 2.3* 2.2* 2.8*   PROTTOTAL 5.2* 5.3* 5.4* 5.1* 6.3*   BILITOTAL 0.5 0.9 1.7* 1.5* 1.5*   ALKPHOS 180* 185* 194* 194* 247*   * 278* 361* 418* 536*   * 270* 416* 650* 1,015*   LIPASE  --   --   --   --  107   TROPI  --   --   --  <0.015 <0.015     Lactic Acid   Date Value Ref Range Status   01/06/2021 2.3 (H) 0.7 - 2.0 mmol/L Final     Comment:     Significant value called to and read back by  MELISSA VALENTINO ON 1/6/21 AT 0907 BY F     01/06/2021 1.7 0.7 - 2.0 mmol/L Final   01/05/2021 3.1 (H) 0.7 - 2.0 mmol/L Final     Comment:     Significant value called to and read back by  Angelika Naylor at 2336 on 1/5/21 by NMA         No results found for this or any previous visit (from the past 24 hour(s)).    James Skinner MD

## 2021-01-08 NOTE — PLAN OF CARE
Face to face report given with opportunity to observe patient.    Report given to Dana Bhagat RN   1/7/2021  7:38 PM

## 2021-01-08 NOTE — PROGRESS NOTES
Inpatient Physical Therapy Evaluation    Name: Jacklyn Hein MRN# [id number]   Age: 85 year old YOB: 1935     Date of Consultation: 2021  Consultation is requested by:  Dr. Skinner  Specific Consultation Request:  Weakness  Primary care provider: ANTHONY Post    General Information:   Onset Date: 20    History of Current Problem/Admission: Sepsis (H) [A41.9]    Prior Level of Function: Pt was independent with mobility using FWW for ambulation prior to hospitalization   Ambulation: 1 - Assistive Equipment   Transferrin - Assistive Equipment   Toiletin - Not assessed   Bathin - Not assessed  Dressin - Independent   Eatin - Independent   Communication: 0 - Understands/communicates without difficulty  Swallowin - swallows foods/liquids without difficulty  Cognition: 0 - no cognitive issues reported    Fall history within the last 6 months: Yes 1 fall     Current Living Situation:  Pt lives at home with her . Reports she has 1 stairs to enter the home.     Current Equipment Used at Home: FWW, shower chair, grab bars     Patient & Family Goals: Reports she will follow advice of therapy and physician     Past Medical History:   Past Medical History:   Diagnosis Date     Angina pectoris 2011     CHF (congestive heart failure), NYHA class II (H) 12/10/2013     CHF (congestive heart failure), NYHA class III (H) 12/10/2013     Hyperhydrosis disorder 2011     Insomnia, unspecified 2011     LBBB (left bundle branch block) 2011     Neck pain, chronic 2011     Non-ischemic cardiomyopathy (H) 12/10/2013     Obesity, unspecified 2011     Osteopenia 2011     Pacemaker      Pain in joint, lower leg 2006     Pure hypercholesterolemia 2002     Unspecified essential hypertension 2011       Past Surgical History:  Past Surgical History:   Procedure Laterality Date     APPENDECTOMY       ARTHROSCOPY KNEE RT/LT       RT     BACK SURGERY  2020     CARDIAC SURGERY  2014    Pacemaker     Cataract extraction  2009    Bilateral     CHOLECYSTECTOMY      open      COLONOSCOPY  2001    Normal      COLONOSCOPY N/A 10/20/2016    Procedure: COLONOSCOPY;  Surgeon: Charan Montejo MD;  Location: [facility] OR     colonoscopy with polypectomy      Repeat 3 years      CT CORONARY ANGIOGRAM      normal     HERPES ZOSTER PCR ([facility])       IR CONSULTATION FOR IR EXAM  2020     left eye scar tissue       normal echo      fen-phen use       x6       SURGICAL RADIOLOGY PROCEDURE N/A 2016    Procedure: SURGICAL RADIOLOGY PROCEDURE;  Surgeon: Provider, Generic Perianesthesia Nursing;  Location: [facility] OR       Medications:   Current Facility-Administered Medications   Medication     acetaminophen (TYLENOL) tablet 650 mg    Or     acetaminophen (TYLENOL) solution 650 mg     albuterol (PROAIR HFA/PROVENTIL HFA/VENTOLIN HFA) 108 (90 Base) MCG/ACT inhaler 2 puff     apixaban ANTICOAGULANT (ELIQUIS) tablet 5 mg     cefTRIAXone IN D5W (ROCEPHIN) intermittent infusion 2 g     glucose gel 15-30 g    Or     dextrose 50 % injection 25-50 mL    Or     glucagon injection 1 mg     fluticasone-vilanterol (BREO ELLIPTA) 200-25 MCG/INH inhaler 1 puff     HYDROcodone-acetaminophen (NORCO) 5-325 MG per tablet 1-2 tablet     naloxone (NARCAN) injection 0.2 mg    Or     naloxone (NARCAN) injection 0.4 mg    Or     naloxone (NARCAN) injection 0.2 mg    Or     naloxone (NARCAN) injection 0.4 mg     ondansetron (ZOFRAN-ODT) ODT tab 4 mg    Or     ondansetron (ZOFRAN) injection 4 mg     Patient is already receiving anticoagulation with heparin, enoxaparin (LOVENOX), warfarin (COUMADIN)  or other anticoagulant medication     umeclidinium (INCRUSE ELLIPTA) 62.5 MCG/INH inhaler 1 puff       Weight Bearing Status: NA     Cognitive Status Examination:  Orientation: awake and alert  Level of Consciousness: alert  Follows Commands and  Answers Questions: 100% of the time  Personal Safety and Judgement: Intact  Memory: Immediate recall intact  Comments:     Pain:   Currently in pain? No  Pain Location?   Pain Rating:     Physical Therapy Evaluation:   Integumentary/Edema: NA  ROM: WFL  Strength: mild LE weakness bilateral LE  Bed Mobility: NA  Transfers: CGA to min A for sit <>stand using FWW  Gait: Pt ambulated 75' x2 c FWW and min A , WC follow for safety. Distance limited by LE weakness and decreased activity tolerance. Pt required about 5 minute rest break between walks due to fatigue. Pt very fatigued when arrived back to room, needed min A for safety when transferring back to recliner  Stairs: NT today  Balance: Fair   Sensory: NA  Coordination: Normal     Physical Therapy Interventions: Balance, Bed Mobility, Gait Training , Strengthening, Transfer Training and Progressive Activity/Exercise     Clinical Impressions:  Criteria for Skilled Therapeutic Intervention Met: Yes, treatment indicated  PT Diagnosis: Weakness, decreased activity tolerance   Influenced by the following impairments: LE weakness, decreased activity tolerance, h/o fall   Functional limitations due to impairment: Decreased tolerance for functional mobility   Clinical presentation: Stable/Uncomplicated  Clinical presentation rationale: Clinical judgement  Clinical Decision making (complexity): Low Complexity  Frequency: 1-2x/day, 6 days /week  Predicted Duration of Therapy Intervention (days/wks): 5 days    Anticipated Discharge Disposition: Home with Home Therapy and Transitional Care Facility   Anticipated Equipment Needs at Discharge: NA  Risks and Benefits of therapy have been explained: Yes  Patient, Family & other staff in agreement with plan of care: Yes  Clinical Impression Comments: PT evaluation completed for weakness due to sepsis. Pt is functioning below baseline and is at a high risk for falls and continued decline in function. Current recommendation would be  discharge to TCU for short term rehab to improve function for safe discharge home. If mobility improves pt may be able to discharge home with home PT but currently discharge to SNF with daily PT/OT would be the safest option. Pt's  unable to provide much assistance and she reports she doesn't not want to inconvenience her children by asking them to stay with her. Plan to treat pt during her stay and monitor progress.     Total Eval Time: 15

## 2021-01-08 NOTE — PROGRESS NOTES
"   01/08/21 1300   Signing Clinician's Name / Credentials   Signing clinician's name / credentials Heena Crain, OTR/L   Quick Adds   Rehab Discipline OT   Quick Adds Therapeutic Activity   Therapeutic Activity   Minutes of Treatment 8 minutes   Symptoms Noted During/After Treatment Fatigue   Treatment Detail Pt was able to transfer off the commode with Sahil-CGA. She ambulated to the sink and washed her hands with SBA. Pt ambulated out of the bathroom back to the chair using the FWW with Sahil. SpO2 95-96% on RA, -116 before and after tasks. Pt was educated in current level of functioning compared to PLOF and safe d/c recommendations. When SNF was discussed, pt stated \"let's see how I do tomorrow\". Pt was left sitting in the chair with the call light within reach.   OT Discharge Planning    OT Discharge Recommendation (DC Rec) Transitional Care Facility;home with home care occupational therapy   OT Rationale for DC Rec Pt is functioning below her baseline and demonstrating increased difficulties with ADLs due to weakness and decreased activity tolerance. Pt would benefit from short term rehab to improve her strength and endurance for maximal independence in ADLs. Depending on her hospital stay, she may be able to return home with home therapy. Pt wants to see how she does tomorrow and then make a decision but stated she will do what the MD/therapy recommends. Concerns are noted with assist at home as pt states her spouse likely wouldn't be able to help her much with ADLs (he can do errands, shopping, etc.) and home accessibility concerns with the shower/tub and difficulties safely transferring. Plan to work with pt during her hospital stay and will monitor her progress and continue to make discharge recommendations.   OT Brief overview of current status  Pt transferred sit<>stand from the chair with CGA. She ambulated to/from the BR using FWW with Sahil. Pt transferred on/off the toilet with Sahil and verbal " cues for safety including hand support. She was able to perform pily-cares independently. Pt stood at the sink with SBA to brush her teeth and wash her hands. Mild difficulties noted due to decreased activity tolerance and low back starting to bother her (not unusual for her back to bother her while standing). She was able to doff/don her socks in sitting with SBA but increased difficulties. SpO2 on RA remained 95-96%, HR in the 70s/80s before and after tasks.   Additional Documentation   Rehab Comments Decreased activity tolerance   OT Plan Progress strength, endurance, and independence in ADLs   Total Session Time   Total Session Time (minutes) 8 minutes

## 2021-01-08 NOTE — PROGRESS NOTES
Inpatient Occupational Therapy Evaluation    Name: Jacklyn Hein MRN# [id number]   Age: 85 year old YOB: 1935     Date of Consultation: 2021  Consultation is requested by: Dr. Skinner  Specific Consultation Request: Weakness  Primary care provider: ANTHONY Post    General Information:   Onset Date: 2021    History of Current Problem/Admission: Sepsis (H) [A41.9]    Prior Level of Function: Prior to admission, pt was independent in ADLs and used a 4WW for functional mobility.   Ambulation: 1 - Assistive Equipment   Transferrin - Assistive Equipment   Toiletin - Independent    Bathin - Assistive Equipment   Dressin - Independent   Eatin - Independent   Communication: 0 - Understands/communicates without difficulty  Swallowin - swallows foods/liquids without difficulty  Cognition: 0 - no cognitive issues reported    IADLs:   Previous Responsibilities of the Patient: simple Meal Prep, Laundry, Medication Management, and Finances   Comments: Pt gets meals on wheels 3 days/week. They have a  who completes the cleaning; pt states she was previously doing laundry but will not have the  do laundry as it is in the basement. Pt's  does the shopping. They hire out for yard work.    Fall history within the last 6 months: Yes - just the 1 fall that she came to the ED for    Current Living Situation: Pt lives in a house with her . 1 step to enter home, and then 1 step inside home. Bathroom has a shower/tub combo with a grab bar and shower chair; the toilet is higher but no grab bars. Pt states the tub is right across and the counter is on her R to use for transferring if needed. Pt was going downstairs to the basement for laundry but she reports the  can complete laundry.    Current Equipment Used at Home: 4WW, shower chair, grab bar in shower/tub     Patient & Family Goals: Improve strength and endurance. She is open to  short term rehab but wants to see how she progresses during her hospital stay. Ultimately, pt stated she will do what the team feels is best for her.      Past Medical History:   Past Medical History:   Diagnosis Date     Angina pectoris 2011     CHF (congestive heart failure), NYHA class II (H) 12/10/2013     CHF (congestive heart failure), NYHA class III (H) 12/10/2013     Hyperhydrosis disorder 2011     Insomnia, unspecified 2011     LBBB (left bundle branch block) 2011     Neck pain, chronic 2011     Non-ischemic cardiomyopathy (H) 12/10/2013     Obesity, unspecified 2011     Osteopenia 2011     Pacemaker      Pain in joint, lower leg 2006     Pure hypercholesterolemia 2002     Unspecified essential hypertension 2011       Past Surgical History:  Past Surgical History:   Procedure Laterality Date     APPENDECTOMY       ARTHROSCOPY KNEE RT/LT      RT     BACK SURGERY  2020     CARDIAC SURGERY  2014    Pacemaker     Cataract extraction  2009    Bilateral     CHOLECYSTECTOMY      open      COLONOSCOPY  2001    Normal      COLONOSCOPY N/A 10/20/2016    Procedure: COLONOSCOPY;  Surgeon: Charan Montejo MD;  Location: [facility] OR     colonoscopy with polypectomy      Repeat 3 years      CT CORONARY ANGIOGRAM      normal     HERPES ZOSTER PCR ([facility])       IR CONSULTATION FOR IR EXAM  2020     left eye scar tissue       normal echo      fen-phen use       x6       SURGICAL RADIOLOGY PROCEDURE N/A 2016    Procedure: SURGICAL RADIOLOGY PROCEDURE;  Surgeon: Provider, [facility];  Location: [facility] OR       Medications:   Current Facility-Administered Medications   Medication     acetaminophen (TYLENOL) tablet 650 mg    Or     acetaminophen (TYLENOL) solution 650 mg     albuterol (PROAIR HFA/PROVENTIL HFA/VENTOLIN HFA) 108 (90 Base) MCG/ACT inhaler 2 puff     apixaban ANTICOAGULANT (ELIQUIS) tablet 5 mg      cefTRIAXone IN D5W (ROCEPHIN) intermittent infusion 2 g     glucose gel 15-30 g    Or     dextrose 50 % injection 25-50 mL    Or     glucagon injection 1 mg     fluticasone-vilanterol (BREO ELLIPTA) 200-25 MCG/INH inhaler 1 puff     HYDROcodone-acetaminophen (NORCO) 5-325 MG per tablet 1-2 tablet     naloxone (NARCAN) injection 0.2 mg    Or     naloxone (NARCAN) injection 0.4 mg    Or     naloxone (NARCAN) injection 0.2 mg    Or     naloxone (NARCAN) injection 0.4 mg     ondansetron (ZOFRAN-ODT) ODT tab 4 mg    Or     ondansetron (ZOFRAN) injection 4 mg     Patient is already receiving anticoagulation with heparin, enoxaparin (LOVENOX), warfarin (COUMADIN)  or other anticoagulant medication     umeclidinium (INCRUSE ELLIPTA) 62.5 MCG/INH inhaler 1 puff         Cognitive Status Examination:  Orientation: oriented to time, place, and person  Level of Consciousness: alert  Follows Commands and Answers Questions: 100% of the time  Personal Safety and Judgement: Intact  Memory: Immediate recall intact and Long-term memory intact  Comments: No concerns noted    Pain:   Currently in pain? No  Pain Location?   Pain Ratin (No pain)    Occupational Therapy Evaluation:   Integumentary/Edema: Scattered bruising noted on forearms  Range of Motion: BUE's WNL   Strength: Bilateral shoulders grossly 3+/5, bilateral elbows grossly 4/5  Hand Strength: Bilateral gross grasp fair to good  Muscle Tone Assessment: No issues observed  Coordination: Normal    Mobility:   Transfer Skills: Pt transferred sit<>stand from the chair with CGA  Bed to Chair/Chair to Bed: N/A as pt was already up in the chair upon OT arrival  Toilet Transfer: Pt transferred on/off the toilet with Sahil and verbal cues for safety including hand support   Balance: Fair - pt ambulated to/from the BR using FWW with Sahil    ADLs:   Lower Body Dressing: Pt was able to doff/don her socks in sitting with SBA but increased difficulties   Toileting: Pt was able to  perform pily-cares independently.   Grooming: Pt stood at the sink with SBA to brush her teeth and wash her hands. Mild difficulties noted due to decreased activity tolerance and low back starting to bother her (not unusual for her back to bother her while standing)    SpO2 on RA remained 95-96%, HR in the 70s/80s before and after tasks    Activities of Daily Living Analysis:   Impairments Contributing to Impaired Activities of Daily Living: weakness, decreased activity tolerance    Occupational Therapy Interventions: ADL Retraining, strengthening, progressive activity/exercise, and risk factor education     Clinical Impressions:  Criteria for Skilled Therapeutic Intervention Met: Yes, treatment indicated  OT Diagnosis: Impaired ADLs  Influenced by the following impairments: Weakness, decreased activity tolerance  Functional limitations due to impairment: Decreased tolerance for ADLs  Clinical presentation: Stable/Uncomplicated  Clinical presentation rationale: Clinical judgement  Clinical Decision making (complexity): Low Complexity  Frequency: 5 times/week  Predicted Duration of Therapy Intervention (days/wks): 5 days    Anticipated Discharge Disposition: Transitional Care Facility vs Home with Assist and Home Therapy  Anticipated Equipment Needs at Discharge: TBD  Risks and Benefits of therapy have been explained: Yes  Patient, Family & other staff in agreement with plan of care: Yes  Clinical Impression Comments: Pt is functioning below her baseline and demonstrating increased difficulties with ADLs due to weakness and decreased activity tolerance. Pt would benefit from short term rehab to improve her strength and endurance for maximal independence in ADLs. Depending on her hospital stay, she may be able to return home with home therapy. Pt wants to see how she does tomorrow and then make a decision but stated she will do what the MD/therapy recommends. Concerns are noted with assist at home as pt states her  spouse likely wouldn't be able to help her much with ADLs (he can do errands, shopping, etc.) and home accessibility concerns with the shower/tub and difficulties safely transferring. Plan to work with pt during her hospital stay and will monitor her progress and continue to make discharge recommendations.     Total Eval Time: 15

## 2021-01-09 VITALS
SYSTOLIC BLOOD PRESSURE: 127 MMHG | WEIGHT: 179.9 LBS | HEIGHT: 65 IN | RESPIRATION RATE: 16 BRPM | HEART RATE: 66 BPM | TEMPERATURE: 98.3 F | BODY MASS INDEX: 29.97 KG/M2 | DIASTOLIC BLOOD PRESSURE: 57 MMHG | OXYGEN SATURATION: 95 %

## 2021-01-09 LAB
ALBUMIN SERPL-MCNC: 2.1 G/DL (ref 3.4–5)
ALP SERPL-CCNC: 173 U/L (ref 40–150)
ALT SERPL W P-5'-P-CCNC: 141 U/L (ref 0–50)
ANION GAP SERPL CALCULATED.3IONS-SCNC: 5 MMOL/L (ref 3–14)
AST SERPL W P-5'-P-CCNC: 71 U/L (ref 0–45)
BILIRUB SERPL-MCNC: 0.4 MG/DL (ref 0.2–1.3)
BUN SERPL-MCNC: 16 MG/DL (ref 7–30)
CALCIUM SERPL-MCNC: 8.3 MG/DL (ref 8.5–10.1)
CHLORIDE SERPL-SCNC: 114 MMOL/L (ref 94–109)
CO2 SERPL-SCNC: 23 MMOL/L (ref 20–32)
CREAT SERPL-MCNC: 0.84 MG/DL (ref 0.52–1.04)
ERYTHROCYTE [DISTWIDTH] IN BLOOD BY AUTOMATED COUNT: 14 % (ref 10–15)
GFR SERPL CREATININE-BSD FRML MDRD: 63 ML/MIN/{1.73_M2}
GLUCOSE SERPL-MCNC: 78 MG/DL (ref 70–99)
HCT VFR BLD AUTO: 28.1 % (ref 35–47)
HGB BLD-MCNC: 9.2 G/DL (ref 11.7–15.7)
MAGNESIUM SERPL-MCNC: 1.8 MG/DL (ref 1.6–2.3)
MCH RBC QN AUTO: 29.3 PG (ref 26.5–33)
MCHC RBC AUTO-ENTMCNC: 32.7 G/DL (ref 31.5–36.5)
MCV RBC AUTO: 90 FL (ref 78–100)
PLATELET # BLD AUTO: 131 10E9/L (ref 150–450)
POTASSIUM SERPL-SCNC: 4.6 MMOL/L (ref 3.4–5.3)
PROT SERPL-MCNC: 5.5 G/DL (ref 6.8–8.8)
RBC # BLD AUTO: 3.14 10E12/L (ref 3.8–5.2)
SODIUM SERPL-SCNC: 142 MMOL/L (ref 133–144)
WBC # BLD AUTO: 8.7 10E9/L (ref 4–11)

## 2021-01-09 PROCEDURE — 250N000013 HC RX MED GY IP 250 OP 250 PS 637: Performed by: INTERNAL MEDICINE

## 2021-01-09 PROCEDURE — 99239 HOSP IP/OBS DSCHRG MGMT >30: CPT | Performed by: INTERNAL MEDICINE

## 2021-01-09 PROCEDURE — 250N000011 HC RX IP 250 OP 636: Performed by: INTERNAL MEDICINE

## 2021-01-09 PROCEDURE — 36415 COLL VENOUS BLD VENIPUNCTURE: CPT | Performed by: INTERNAL MEDICINE

## 2021-01-09 PROCEDURE — 83735 ASSAY OF MAGNESIUM: CPT | Performed by: INTERNAL MEDICINE

## 2021-01-09 PROCEDURE — 80053 COMPREHEN METABOLIC PANEL: CPT | Performed by: INTERNAL MEDICINE

## 2021-01-09 PROCEDURE — 85027 COMPLETE CBC AUTOMATED: CPT | Performed by: INTERNAL MEDICINE

## 2021-01-09 RX ORDER — CEFDINIR 300 MG/1
300 CAPSULE ORAL 2 TIMES DAILY
Qty: 20 CAPSULE | Refills: 0 | Status: SHIPPED | OUTPATIENT
Start: 2021-01-09 | End: 2021-01-19

## 2021-01-09 RX ADMIN — UMECLIDINIUM 1 PUFF: 62.5 AEROSOL, POWDER ORAL at 08:17

## 2021-01-09 RX ADMIN — APIXABAN 5 MG: 5 TABLET, FILM COATED ORAL at 08:16

## 2021-01-09 RX ADMIN — HYDROCODONE BITARTRATE AND ACETAMINOPHEN 1 TABLET: 5; 325 TABLET ORAL at 03:22

## 2021-01-09 RX ADMIN — CEFTRIAXONE SODIUM 2 G: 2 INJECTION, SOLUTION INTRAVENOUS at 08:37

## 2021-01-09 RX ADMIN — FLUTICASONE FUROATE AND VILANTEROL TRIFENATATE 1 PUFF: 200; 25 POWDER RESPIRATORY (INHALATION) at 08:17

## 2021-01-09 RX ADMIN — ONDANSETRON 4 MG: 4 TABLET, ORALLY DISINTEGRATING ORAL at 03:22

## 2021-01-09 ASSESSMENT — ACTIVITIES OF DAILY LIVING (ADL)
ADLS_ACUITY_SCORE: 16

## 2021-01-09 NOTE — PLAN OF CARE
Pt is alert and oriented, VSS to baseline/afebrile and maintaining sats 96% on room air. Lungs are dim with intermittent crackles to bases. Pt up with SBA to BR using walker. Administered po zofran for c/o nausea/stomach upset as well as po Norco per pt request. Voiding w/o difficulty, had 2 episodes of loose stool. Makes needs known, uses call light appropriately.     Face to face report given with opportunity to observe patient.    Report given to Vale Stevenson RN   1/9/2021  7:07 AM

## 2021-01-09 NOTE — DISCHARGE SUMMARY
[facility]    Discharge Summary  Hospitalist    Date of Admission:  1/5/2021  Date of Discharge:  1/9/2021  Discharging Provider: James Skinner MD  Date of Service (when I saw the patient): 01/09/21    Discharge Diagnoses   Principal Problem:    Septic shock (H) (1/6/2021)  Active Problems:    CHF (congestive heart failure), NYHA class II (H) (12/10/2013)    Chronic obstructive pulmonary disease, unspecified COPD type (H) (6/1/2018)    Paroxysmal atrial fibrillation (H) (12/4/2019)    CKD (chronic kidney disease) stage 3, GFR 30-59 ml/min (12/4/2019)    Gram-negative infection (1/6/2021)    Osteoarthritis of thoracolumbar spine (1/6/2021)    Shock liver (1/6/2021)    Abnormal finding on urinalysis (1/6/2021)    Acute renal failure superimposed on stage 3 chronic kidney disease (H) (1/6/2021)      History of Present Illness   Jacklyn Hein is an 85 year old female who presented with weakness.  Please see admission H+P for additional details.    Hospital Course   Jacklyn Hein was admitted on 1/5/2021.  She is an 85-year-old female with a history of congestive heart failure with a pacemaker in place, paroxysmal atrial fibrillation on Eliquis who presented with weakness.  She showed signs of sepsis and septic shock early on with elevated inflammatory markers and hypotension.  Her respiratory status was stable, however she feels some fluid challenges that required vasopressor therapy.  She was volume resuscitated as well as given broad-spectrum empiric antibiotics.  Source of infection was somewhat uncertain.  She had a chest x-ray which did not show any infiltrates.  Ultrasound of the abdomen was negative for any biliary pathology.  CT of the abdomen pelvis was obtained as well, and there was no obvious source of infection.  Urine and blood cultures were sent.  She ultimately grew E. coli in her urine, which did not coordinate with her blood cultures that grew Klebsiella and 4 out of 4  bottles.  This was confirmed with the laboratory.  It is unknown as to the etiology of her Klebsiella.  She has no skin lesions, no biliary pathology, no bowel pathology.  She does not have any symptoms to suggest any focal source.  Her Klebsiella was sensitive to ceftriaxone, and she was transitioned to this without difficulty.  Her inflammatory markers continue to improve.  She was weaned off of vasopressors in fairly short order.  Due to her hypotension, new echocardiogram was obtained showing ejection fraction of 30 to 35%.  Most of her heart failure medications had to be held during her hospitalization due to hypotension.  Now with hypotension resolved, she can safely resume her oral medications.  Her strength and appetite also have improved, to the point where she wants to go home.  As of discharge, there still is not a definitive source of her Klebsiella, but due to any clinical signs or symptoms, we will endeavor to treat her with oral cefdinir for another 10 days to complete a 14-day treatment for her bacteremia.  This also will cover her urinary tract infection.  We will have her follow-up with her primary care physician in 5 to 7 days to assess her for complete resolution.    James Skinner MD      Significant Results and Procedures   See below.    Pending Results   These results will be followed up by ANTHONY Post    Unresulted Labs Ordered in the Past 30 Days of this Admission     No orders found from 12/6/2020 to 1/6/2021.          Code Status   DNR / DNI       Primary Care Physician   ANTHONY Post    Physical Exam   Temp: 98.5  F (36.9  C) Temp src: Tympanic BP: 127/57 Pulse: 66   Resp: 18 SpO2: 96 % O2 Device: None (Room air)    Vitals:    01/06/21 0435 01/07/21 0423 01/08/21 0511   Weight: 79.4 kg (175 lb 0.7 oz) 83.2 kg (183 lb 6.8 oz) 81.6 kg (179 lb 14.3 oz)     Vital Signs with Ranges  Temp:  [98.5  F (36.9  C)-99.4  F (37.4  C)] 98.5  F (36.9  C)  Pulse:  [66-73] 66  Resp:  [16-18] 18  BP:  (127-140)/(51-57) 127/57  SpO2:  [94 %-96 %] 96 %  I/O last 3 completed shifts:  In: 720 [P.O.:720]  Out: 800 [Urine:800]    Constitutional: AA, NAD  Eyes: PERRLA, no injection, no icterus  HEENT: atraumatic, normocephalic  Respiratory: CTA b/l  Cardiovascular: S1 S2 RRR  GI: soft, NT, ND, + bowel sounds  Lymph/Hematologic: no palpable lymphadenopathy  Skin: no rashes, no lesions  Musculoskeletal: No edema, good tone, no deformities  Neurologic: oriented x 3, no focal deficits  Psychiatric: appropriate affect    Discharge Disposition   Discharged to home  Condition at discharge: Stable    Consultations This Hospital Stay   SOCIAL WORK IP CONSULT  PHARMACY TO DOSE VANCO  PHYSICAL THERAPY ADULT IP CONSULT  OCCUPATIONAL THERAPY ADULT IP CONSULT    Time Spent on this Encounter   James MAHARAJ MD, personally saw the patient today and spent greater than 30 minutes discharging this patient.    Discharge Orders      Reason for your hospital stay    Sepsis     Follow-up and recommended labs and tests     Follow up with primary care provider, ANTHONY Post, within 7 days for hospital follow- up.  No follow up labs or test are needed.     Activity    Your activity upon discharge: activity as tolerated     When to contact your care team    Call your primary doctor if you have any of the following: Fevers, chills, weakness.     No CPR- Do NOT Intubate     Diet    Follow this diet upon discharge: Orders Placed This Encounter      Advance Diet as Tolerated: Regular Diet Adult     Discharge Medications   Current Discharge Medication List      START taking these medications    Details   cefdinir (OMNICEF) 300 MG capsule Take 1 capsule (300 mg) by mouth 2 times daily for 10 days  Qty: 20 capsule, Refills: 0    Associated Diagnoses: Septic shock (H)         CONTINUE these medications which have NOT CHANGED    Details   alendronate (FOSAMAX) 70 MG tablet TAKE 1 TABLET BY MOUTH EVERY 7 DAYS. TAKE WITH 8 OUNCES OF WATER AND STAY  UPRIGHT FOR AT LEAST 30 MINUTES AFTER TAKING.  Qty: 12 tablet, Refills: 0    Associated Diagnoses: Age-related osteoporosis without current pathological fracture      budesonide-formoterol (SYMBICORT) 160-4.5 MCG/ACT Inhaler INHALE 2 PUFFS INTO THE LUNGS 2 TIMES DAILY  Qty: 10.2 g, Refills: 0    Associated Diagnoses: Other emphysema (H)      Calcium Carbonate-Vitamin D (CALCIUM 600 + D OR) Take 1 tablet by mouth daily      carvedilol (COREG) 12.5 MG tablet TAKE 1 TABLET BY MOUTH 2 TIMES DAILY WITH MEALS  Qty: 180 tablet, Refills: 0    Associated Diagnoses: Non-ischemic cardiomyopathy (H)      ELIQUIS ANTICOAGULANT 5 MG tablet TAKE 1 TABLET BY MOUTH 2 TIMES DAILY  Qty: 180 tablet, Refills: 0    Associated Diagnoses: Paroxysmal atrial fibrillation (H)      ENTRESTO 24-26 MG per tablet TAKE 1 TABLET BY MOUTH TWICE DAILY  Qty: 60 tablet, Refills: 0    Associated Diagnoses: Congestive heart failure, NYHA class 2, unspecified congestive heart failure type (H)      gabapentin (NEURONTIN) 300 MG capsule TAKE 1 CAPSULE BY MOUTH 3 TIMES DAILY  Qty: 90 capsule, Refills: 0    Associated Diagnoses: Neuropathy      HYDROcodone-acetaminophen (NORCO) 5-325 MG tablet TAKE 1 TO 2 TABLETS BY MOUTH EVERY 6 HOURS AS NEEDED  Qty: 60 tablet, Refills: 0    Associated Diagnoses: Lumbar spine pain      Multiple Vitamin (MULTI-VITAMIN DAILY PO) Take 1 tablet by mouth daily      naproxen sodium (ANAPROX) 220 MG tablet Take 220 mg by mouth 2 times daily (with meals)      rosuvastatin (CRESTOR) 10 MG tablet TAKE 1 TABLET BY MOUTH DAILY  Qty: 90 tablet, Refills: 3    Associated Diagnoses: Mixed hyperlipidemia      SPIRIVA RESPIMAT 2.5 MCG/ACT inhaler INHALE 2 DOSES INTO THE LUNGS ONCE DAILY  Qty: 4 g, Refills: 0    Associated Diagnoses: Chronic obstructive pulmonary disease, unspecified COPD type (H)      spironolactone (ALDACTONE) 25 MG tablet TAKE 1/2 TABLET BY MOUTH DAILY  Qty: 45 tablet, Refills: 0    Associated Diagnoses: Primary pulmonary  hypertension (H)      VENTOLIN  (90 Base) MCG/ACT inhaler INHALE 2 PUFFS INTO THE LUNGS EVERY 6 HOURS AS NEEDED FOR SHORTNESS OF BREATH/DYSPNEA/WHEEZING  Qty: 18 g, Refills: 0    Associated Diagnoses: Other emphysema (H)           Allergies   No Known Allergies  Data   Most Recent 3 CBC's:  Recent Labs   Lab Test 01/09/21 0440 01/08/21 0442 01/07/21  0445   WBC 8.7 12.8* 14.6*   HGB 9.2* 8.6* 8.9*   MCV 90 92 93   * 123* 115*      Most Recent 3 BMP's:  Recent Labs   Lab Test 01/09/21 0440 01/08/21 0442 01/07/21  0445    141 141   POTASSIUM 4.6 4.7 4.7   CHLORIDE 114* 114* 114*   CO2 23 20 19*   BUN 16 22 31*   CR 0.84 0.91 1.22*   ANIONGAP 5 7 8   LORRAINE 8.3* 7.7* 7.6*   GLC 78 44* 65*     Most Recent 2 LFT's:  Recent Labs   Lab Test 01/09/21 0440 01/08/21 0442   AST 71* 136*   * 191*   ALKPHOS 173* 180*   BILITOTAL 0.4 0.5     Most Recent INR's and Anticoagulation Dosing History:  Anticoagulation Dose History     Recent Dosing and Labs Latest Ref Rng & Units 6/2/2014 6/4/2014 6/9/2014 6/16/2014 6/30/2014 7/21/2014 8/11/2014    INR 0.80 - 1.20 - - - - - - -    INR 0.86 - 1.14 4.8(A) 2.9(A) 2.2(A) 2.3(A) 1.9(A) 1.7(A) 2.5(A)        Most Recent 3 Troponin's:  Recent Labs   Lab Test 01/06/21  0441 01/05/21  2316 03/03/20  0950   TROPI <0.015 <0.015 <0.015     Most Recent Cholesterol Panel:  Recent Labs   Lab Test 12/04/19  1405   CHOL 218*   *   HDL 56   TRIG 118     Most Recent 6 Bacteria Isolates From Any Culture (See EPIC Reports for Culture Details):  Recent Labs   Lab Test 01/06/21  0042 01/05/21  2328 01/05/21  2316 11/29/16  1013 10/28/15  0800 10/28/14  0740   CULT >100,000 colonies/mL  Escherichia coli  *  (Note)  Patient has blood cultures with K pneumoniae.  Identification of all isolates were confirmed.   Klebsiella pneumoniae  2 of 2 bottles  *  Critical Value called to and read back by  Rosemarie Bhagat 1231 1/6/21 MRN  Gram stain bottle 1 called.   Klebsiella  pneumoniae  2 of 2 bottles  See coinciding set for susceptibility results.  *  Critical Value called to and read back by  Rosemarie Bhagat 1231 1/6/21 MRN  Bottle 1 gram stain called.    Critical Value called to and read back by  Veronica Merchant @ 1312 on 01/06/21 by Juliana Mark  Gram stain of bottle 2 called   No beta hemolytic Streptococcus Group A isolated 50,000 to 100,000 colonies/mL Klebsiella pneumoniae* 50,000 to 100,000 colonies/mL Mixed bacterial rashid No further identification or   sensitivity done  *     Most Recent TSH, T4 and A1c Labs:  Recent Labs   Lab Test 12/04/19  1405   A1C 5.5     Results for orders placed or performed during the hospital encounter of 01/05/21   XR Chest Port 1 View    Narrative    PROCEDURE:  XR CHEST PORT 1 VW    HISTORY:  sob.     COMPARISON:  March 2020    FINDINGS:   The cardiac silhouette is normal in size. The pulmonary vasculature is  normal.  The lungs are clear. No pleural effusion or pneumothorax.  There is a transvenous pacemaker in place.        Impression    IMPRESSION:  No evidence of active pulmonary infiltrates      MARII GARCIA MD   Abdomen US, limited (RUQ only)    Narrative    PROCEDURES: US ABDOMEN LIMITED    HISTORY: elevated liver, elevatated liver    TECHNIQUE: Grayscale ultrasound of the upper abdomen was performed.    COMPARISON: none     FINDINGS:    MEASUREMENTS:   Liver length:  15 cm.   Common duct diameter:  1.5 cm.    LIVER: The liver is free of masses or intrahepatic biliary ductal  enlargement.    GALLBLADDER: The gallbladder is been removed. The bile ducts are  normal for a postcholecystectomy patient    ABDOMINAL AORTA AND IVC: The abdominal aorta is normally tapering. The  inferior vena cava is patent.    PANCREAS: The pancreas is obscured by bowel gas.    Right KIDNEY: The right kidney is free of masses or hydronephrosis        Impression    IMPRESSION:     No liver masses or biliary ductal enlargement.    MARII GARCIA MD   CT  Abdomen Pelvis w Contrast    Narrative    EXAMINATION: CT ABDOMEN PELVIS W CONTRAST, 2021 2:40 AM    TECHNIQUE:  Helical CT images from the lung bases through the  symphysis pubis were obtained  with IV contrast. Contrast dose: Isovue  300, 100ml    COMPARISON: none    HISTORY: epigastric pain    FINDINGS:    There is dependent atelectasis at the lung bases.    The liver is free of masses or biliary ductal enlargement. The  gallbladder has been removed.    The the spleen and pancreas appear normal.    The adrenal glands are normal.    The right and left kidneys are free of masses or hydronephrosis.    The periaortic lymph nodes are normal in caliber.    No intraperitoneal masses or inflammatory changes are noted.    There is a catheter within the bladder. The rectum appears normal.    There are T12 and L1 compression fractures are stable from 2020      Impression    IMPRESSION: No intra-abdominal masses or inflammatory changes are  noted     MARII GARCIA MD   Echocardiogram Complete    Narrative    [id number]  [id number]  [id number]  [id number]^BRIGHT^JOSEFINA           [facility]  Echocardiography Laboratory  [address] [place] [address]     Name: DEBBIE BOYKIN  MRN: [id number]  : 1935  Study Date: 2021 07:26 AM  Age: 85 yrs  Gender: Female  Patient Location: [facility]  Reason For Study: Shock  History: shock  Ordering Physician: JOSEFINA NOVOA  Referring Physician: JOSEFINA NOVOA  Performed By: Rosemarie Hurd     BSA: 1.9 m2  Height: 65 in  Weight: 175 lb  _____________________________________________________________________________  __        Procedure  Technically difficult study. The patient's rhythm is paced.  _____________________________________________________________________________  __        Interpretation Summary  No pericardial effusion is present.  Left ventricular size is normal.  Moderately (EF 30-35%) reduced left ventricular  function is present.  The right ventricle is normal size.  A pacemaker lead is noted in the right ventricle.  Both atria appear normal.  Mild to moderate mitral insufficiency is present.  Mild aortic insufficiency is present.  The mean gradient across the aortic valve is4.1 mmHg.  Mild to moderate tricuspid insufficiency is present.  Right ventricular systolic pressure is 21mmHg above the right atrial pressure.  The pulmonic valve is normal.  The aorta root is normal.  _____________________________________________________________________________  __        Left Ventricle  Left ventricular size is normal. Moderately (EF 30-35%) reduced left  ventricular function is present.     Right Ventricle  The right ventricle is normal size. A pacemaker lead is noted in the right  ventricle.     Atria  Both atria appear normal.     Mitral Valve  Mild to moderate mitral insufficiency is present.     Aortic Valve  Mild aortic insufficiency is present. The mean AoV pressure gradient is 4.1  mmHg. The peak AoV pressure gradient is 7.3 mmHg. The mean gradient across the  aortic valve is4.1 mmHg.        Tricuspid Valve  Mild to moderate tricuspid insufficiency is present. Right ventricular  systolic pressure is 21mmHg above the right atrial pressure.     Pulmonic Valve  The pulmonic valve is normal.     Vessels  The aorta root is normal.     Pericardium  No pericardial effusion is present.     _____________________________________________________________________________  __  MMode/2D Measurements & Calculations  Ao root diam: 3.0 cm  LA dimension: 3.3 cm  LA/Ao: 1.1  LVOT diam: 1.4 cm  LVOT area: 1.6 cm2     Time Measurements  Aortic HR: 236.5 BPM        Doppler Measurements & Calculations  Ao V2 max: 135.4 cm/sec  Ao max P.3 mmHg  Ao V2 mean: 95.1 cm/sec  Ao mean P.1 mmHg  Ao V2 VTI: 24.1 cm  KAYLEN(I,D): 1.1 cm2  KAYLEN(V,D): 1.2 cm2  AI P1/2t: 415.9 msec  LV V1 max P.1 mmHg  LV V1 max: 100.7 cm/sec  LV V1 VTI: 16.5  cm  CO(LVOT): 6.1 l/min  CI(LVOT): 3.3 l/min/m2  SV(LVOT): 25.9 ml  SI(LVOT): 13.9 ml/m2  TR max gnio: 230.1 cm/sec  TR max P.2 mmHg  AV Gino Ratio (DI): 0.74  KAYLEN Index (cm2/m2): 0.58  Peak E' Gino: 8.8 cm/sec     _____________________________________________________________________________  __           Report approved by: Esteban Bell 2021 08:52 AM

## 2021-01-09 NOTE — PLAN OF CARE
Physical Therapy Discharge Summary    Reason for therapy discharge:    Discharged to unknown. Not currently in chart.     Progress towards therapy goal(s). See goals on Care Plan in [facility] electronic health record for goal details.  Goals partially met.  Barriers to achieving goals:   discharge from facility.    Therapy recommendation(s):    Continued therapy is recommended.  Rationale/Recommendations:  Homecare PT or  PT services at [facility] recommended based upon patient status and charting in PT eval.

## 2021-01-09 NOTE — PLAN OF CARE
Face to face report given with opportunity to observe patient.    Report given to Margarita KNUTSON RN   1/8/2021  7:07 PM

## 2021-01-09 NOTE — PLAN OF CARE
Patient discharged at 10:44 AM via wheel chair accompanied by daughter and staff. Prescriptions sent to patients preferred pharmacy. All belongings sent with patient.     Discharge instructions reviewed with patient . Listed belongings gathered and returned to patient; clothing, IPAD, I PHONE and cell phone .     Patient discharged to home.         [facility]  Follow up appointment made:   Unable to d/t weekend   Home and hospital aquired medications returned to patient: Yes  Patient reports pain was well managed at discharge: Yes

## 2021-01-11 DIAGNOSIS — I50.9 CONGESTIVE HEART FAILURE, NYHA CLASS 2, UNSPECIFIED CONGESTIVE HEART FAILURE TYPE (H): ICD-10-CM

## 2021-01-11 RX ORDER — SACUBITRIL AND VALSARTAN 24; 26 MG/1; MG/1
TABLET, FILM COATED ORAL
Qty: 60 TABLET | Refills: 0 | Status: SHIPPED | OUTPATIENT
Start: 2021-01-11 | End: 2021-02-11

## 2021-01-11 NOTE — TELEPHONE ENCOUNTER
ENTRESTO 24-26 MG per tablet  Last Written Prescription Date:  12/10/20  Last Fill Quantity: 60,   # refills: 0  Last Office Visit: 9/3/20  Future Office visit:    Next 5 appointments (look out 90 days)    Feb 22, 2021 10:00 AM  (Arrive by 9:45 AM)  Return Visit with Kodi Garcia DO  [facility] ([facility] ) [address]  [place] [address]  [phone]           Routing refill request to provider for review/approval

## 2021-01-12 NOTE — PLAN OF CARE
Occupational Therapy Discharge Summary    Reason for therapy discharge:    Discharged to home.    Progress towards therapy goal(s). See goals on Care Plan in [facility] electronic health record for goal details.  Goals partially met.  Barriers to achieving goals:   discharge from facility.    Therapy recommendation(s):    Transitional care facility vs home with home OT was recommended when pt was first evaluated in OT on 01/08/2021.

## 2021-01-13 ENCOUNTER — TELEPHONE (OUTPATIENT)
Dept: FAMILY MEDICINE | Facility: OTHER | Age: 86
End: 2021-01-13

## 2021-01-13 NOTE — TELEPHONE ENCOUNTER
Received a PA from [facility] pharmacy for Spiriva Respimat 2.5mcg/ACT. Submitted on CMM. Waiting for a response.

## 2021-01-14 NOTE — PROGRESS NOTES
"  Assessment & Plan     History of sepsis    - CBC with platelets and differential  - Comprehensive metabolic panel (BMP + Alb, Alk Phos, ALT, AST, Total. Bili, TP)  - UA reflex to Microscopic and Culture - GABRIEL; Future  Patient was hospitalized earlier this month with what ended up being urosepsis.  She has 2 more doses of antibiotic.  She is eating now voiding and stooling without difficulty walking around with a wheeled walker here with her daughter no headache chest pain shortness of breath bowel or bladder issue now.  She will check a CBC and a CMP today.  We will have her come in next week for her follow-up urine to give it a few days to make sure that it is cleared up.  If we do the urine today with her still being on antibiotic it could falsely say everything is normal when it is not.  Clinically she is looking great compared to how she did look.  Early on in her hospitalization her liver function tests were in the thousands and kidney function was bad with a creatinine in the 2 range and GFR in the 20s we will recheck that today.                     BMI:   Estimated body mass index is 27.98 kg/m  as calculated from the following:    Height as of this encounter: 1.626 m (5' 4\").    Weight as of this encounter: 73.9 kg (163 lb).           ANTHONY Post MD  [facility] - SYLVIAAUGUSTA Angulo is a 85 year old who presents to clinic today for the following health issues     HPI     Patient was hospitalized     Date of Admission:  1/5/2021  Date of Discharge:  1/9/2021  Discharging Provider: James Skinner MD  Date of Service (when I saw the patient): 01/09/21        Discharge Diagnoses     Principal Problem:    Septic shock (H) (1/6/2021)  Active Problems:    CHF (congestive heart failure), NYHA class II (H) (12/10/2013)    Chronic obstructive pulmonary disease, unspecified COPD type (H) (6/1/2018)    Paroxysmal atrial fibrillation (H) (12/4/2019)    CKD (chronic kidney disease) stage " "3, GFR 30-59 ml/min (12/4/2019)    Gram-negative infection (1/6/2021)    Osteoarthritis of thoracolumbar spine (1/6/2021)    Shock liver (1/6/2021)    Abnormal finding on urinalysis (1/6/2021)    Acute renal failure superimposed on stage 3 chronic kidney disease (H) (1/6/2021)         How are you feeling today? Much better  In the past 24 hours have you had shortness of breath when speaking, walking, or climbing stairs? My breathing issues have stayed the same  Do you have a cough? I don't have a cough  When is the last time you had a fever greater than 100? 1/5/2021 - 100.3F  Are you having any other symptoms? Loss of appetite, Fatigue and Body aches   Do you have any other stressors you would like to discuss with your provider? No  Was hospitalized with urosepsis.  Has been drinking fluids feeling better                          Review of Systems   Constitutional, HEENT, cardiovascular, pulmonary, gi and gu systems are negative, except as otherwise noted.      Objective    /66   Pulse 71   Temp 97.8  F (36.6  C)   Ht 1.626 m (5' 4\")   Wt 73.9 kg (163 lb)   SpO2 96%   BMI 27.98 kg/m    Body mass index is 27.98 kg/m .  Physical Exam   GENERAL: healthy, alert and no distress  EYES: Eyes grossly normal to inspection, PERRL and conjunctivae and sclerae normal  NECK: no adenopathy, no asymmetry, masses, or scars and thyroid normal to palpation  RESP: lungs clear to auscultation - no rales, rhonchi or wheezes  CV: regular rate and rhythm, normal S1 S2, no S3 or S4, no murmur, click or rub, no peripheral edema and peripheral pulses strong  ABDOMEN: soft, nontender, no hepatosplenomegaly, no masses and bowel sounds normal  MS: no gross musculoskeletal defects noted, no edema                "

## 2021-01-14 NOTE — TELEPHONE ENCOUNTER
Received an APPROVAL from [employer] for Spiriva Respimat 2.5mcg/ACT.  Effective: 10/15/2020 to 1/13/2022.  Forms scanned to [facility].

## 2021-01-18 ENCOUNTER — OFFICE VISIT (OUTPATIENT)
Dept: FAMILY MEDICINE | Facility: OTHER | Age: 86
End: 2021-01-18
Attending: FAMILY MEDICINE
Payer: COMMERCIAL

## 2021-01-18 ENCOUNTER — TELEPHONE (OUTPATIENT)
Dept: FAMILY MEDICINE | Facility: OTHER | Age: 86
End: 2021-01-18

## 2021-01-18 VITALS
WEIGHT: 163 LBS | OXYGEN SATURATION: 96 % | DIASTOLIC BLOOD PRESSURE: 66 MMHG | HEIGHT: 64 IN | BODY MASS INDEX: 27.83 KG/M2 | HEART RATE: 71 BPM | TEMPERATURE: 97.8 F | SYSTOLIC BLOOD PRESSURE: 128 MMHG

## 2021-01-18 DIAGNOSIS — Z86.19 HISTORY OF SEPSIS: Primary | ICD-10-CM

## 2021-01-18 DIAGNOSIS — E87.5 HYPERKALEMIA: Primary | ICD-10-CM

## 2021-01-18 LAB
ALBUMIN SERPL-MCNC: 3.2 G/DL (ref 3.4–5)
ALP SERPL-CCNC: 132 U/L (ref 40–150)
ALT SERPL W P-5'-P-CCNC: 37 U/L (ref 0–50)
ANION GAP SERPL CALCULATED.3IONS-SCNC: 6 MMOL/L (ref 3–14)
AST SERPL W P-5'-P-CCNC: 22 U/L (ref 0–45)
BASOPHILS # BLD AUTO: 0.1 10E9/L (ref 0–0.2)
BASOPHILS NFR BLD AUTO: 1.3 %
BILIRUB SERPL-MCNC: 0.5 MG/DL (ref 0.2–1.3)
BUN SERPL-MCNC: 28 MG/DL (ref 7–30)
CALCIUM SERPL-MCNC: 9.3 MG/DL (ref 8.5–10.1)
CHLORIDE SERPL-SCNC: 106 MMOL/L (ref 94–109)
CO2 SERPL-SCNC: 24 MMOL/L (ref 20–32)
CREAT SERPL-MCNC: 1.29 MG/DL (ref 0.52–1.04)
DIFFERENTIAL METHOD BLD: ABNORMAL
EOSINOPHIL # BLD AUTO: 0.2 10E9/L (ref 0–0.7)
EOSINOPHIL NFR BLD AUTO: 2.4 %
ERYTHROCYTE [DISTWIDTH] IN BLOOD BY AUTOMATED COUNT: 13.4 % (ref 10–15)
GFR SERPL CREATININE-BSD FRML MDRD: 38 ML/MIN/{1.73_M2}
GLUCOSE SERPL-MCNC: 93 MG/DL (ref 70–99)
HCT VFR BLD AUTO: 33.8 % (ref 35–47)
HGB BLD-MCNC: 10.7 G/DL (ref 11.7–15.7)
IMM GRANULOCYTES # BLD: 0 10E9/L (ref 0–0.4)
IMM GRANULOCYTES NFR BLD: 0.6 %
LYMPHOCYTES # BLD AUTO: 1.2 10E9/L (ref 0.8–5.3)
LYMPHOCYTES NFR BLD AUTO: 16.3 %
MCH RBC QN AUTO: 28.7 PG (ref 26.5–33)
MCHC RBC AUTO-ENTMCNC: 31.7 G/DL (ref 31.5–36.5)
MCV RBC AUTO: 91 FL (ref 78–100)
MONOCYTES # BLD AUTO: 0.6 10E9/L (ref 0–1.3)
MONOCYTES NFR BLD AUTO: 7.7 %
NEUTROPHILS # BLD AUTO: 5.2 10E9/L (ref 1.6–8.3)
NEUTROPHILS NFR BLD AUTO: 71.7 %
NRBC # BLD AUTO: 0 10*3/UL
NRBC BLD AUTO-RTO: 0 /100
PLATELET # BLD AUTO: 275 10E9/L (ref 150–450)
POTASSIUM SERPL-SCNC: 5.7 MMOL/L (ref 3.4–5.3)
PROT SERPL-MCNC: 7.4 G/DL (ref 6.8–8.8)
RBC # BLD AUTO: 3.73 10E12/L (ref 3.8–5.2)
SODIUM SERPL-SCNC: 136 MMOL/L (ref 133–144)
WBC # BLD AUTO: 7.2 10E9/L (ref 4–11)

## 2021-01-18 PROCEDURE — G0463 HOSPITAL OUTPT CLINIC VISIT: HCPCS

## 2021-01-18 PROCEDURE — 85025 COMPLETE CBC W/AUTO DIFF WBC: CPT | Mod: ZL | Performed by: FAMILY MEDICINE

## 2021-01-18 PROCEDURE — 99214 OFFICE O/P EST MOD 30 MIN: CPT | Performed by: FAMILY MEDICINE

## 2021-01-18 PROCEDURE — 80053 COMPREHEN METABOLIC PANEL: CPT | Mod: ZL | Performed by: FAMILY MEDICINE

## 2021-01-18 PROCEDURE — 36415 COLL VENOUS BLD VENIPUNCTURE: CPT | Mod: ZL | Performed by: FAMILY MEDICINE

## 2021-01-18 ASSESSMENT — PAIN SCALES - GENERAL: PAINLEVEL: SEVERE PAIN (7)

## 2021-01-18 ASSESSMENT — MIFFLIN-ST. JEOR: SCORE: 1174.36

## 2021-01-18 NOTE — NURSING NOTE
"Chief Complaint   Patient presents with     Hospital F/U       Initial /66   Pulse 71   Temp 97.8  F (36.6  C)   Ht 1.626 m (5' 4\")   Wt 73.9 kg (163 lb)   SpO2 96%   BMI 27.98 kg/m   Estimated body mass index is 27.98 kg/m  as calculated from the following:    Height as of this encounter: 1.626 m (5' 4\").    Weight as of this encounter: 73.9 kg (163 lb).  Medication Reconciliation: jessica Hughes  "

## 2021-01-20 DIAGNOSIS — M54.50 LUMBAR SPINE PAIN: ICD-10-CM

## 2021-01-20 DIAGNOSIS — I27.0 PRIMARY PULMONARY HYPERTENSION (H): ICD-10-CM

## 2021-01-20 RX ORDER — HYDROCODONE BITARTRATE AND ACETAMINOPHEN 5; 325 MG/1; MG/1
TABLET ORAL
Qty: 60 TABLET | Refills: 0 | Status: SHIPPED | OUTPATIENT
Start: 2021-01-20 | End: 2021-02-11

## 2021-01-20 RX ORDER — SPIRONOLACTONE 25 MG/1
TABLET ORAL
Qty: 45 TABLET | Refills: 0 | Status: SHIPPED | OUTPATIENT
Start: 2021-01-20 | End: 2021-03-09

## 2021-01-20 NOTE — TELEPHONE ENCOUNTER
Pt of     Diuretics (Including Combos) Protocol [id number]/20/2021 08:52 AM   Normal serum creatinine on file in past 12 months Protocol Details    Normal serum potassium on file in past 12 months      Creatinine   Date Value Ref Range Status   01/18/2021 1.29 (H) 0.52 - 1.04 mg/dL Final     Potassium   Date Value Ref Range Status   01/18/2021 5.7 (H) 3.4 - 5.3 mmol/L Final     Aldactone  Last Written Prescription Date:  10.29.2020  Last Fill Quantity: 45,   # refills: 0  Last Office Visit: 1.18.2021  Future Office visit:    Next 5 appointments (look out 90 days)    Feb 22, 2021 10:00 AM  (Arrive by 9:45 AM)  Return Visit with Kodi Garcia [facility] ([facility] ) [address] [facility] [address]  [phone]           Routing refill request to provider for review/approval because:  See above        Norco    Last Written Prescription Date: 12.22.2020  Last Fill Quantity: 60,   # refills: 0  Last Office Visit:   Future Office visit:    Next 5 appointments (look out 90 days)    Feb 22, 2021 10:00 AM  (Arrive by 9:45 AM)  Return Visit with Kodi Garcia [facility] ([facility] ) [address] [facility] [address]  [phone]           Routing refill request to provider for review/approval because:  Drug not on the [facility], [facility] or [facility] refill protocol or controlled substance      Liz Gracia RN

## 2021-01-22 ENCOUNTER — DOCUMENTATION ONLY (OUTPATIENT)
Dept: OTHER | Facility: CLINIC | Age: 86
End: 2021-01-22

## 2021-01-25 DIAGNOSIS — E87.5 HYPERKALEMIA: ICD-10-CM

## 2021-01-25 DIAGNOSIS — Z86.19 HISTORY OF SEPSIS: ICD-10-CM

## 2021-01-25 LAB
ALBUMIN UR-MCNC: NEGATIVE MG/DL
ANION GAP SERPL CALCULATED.3IONS-SCNC: 7 MMOL/L (ref 3–14)
APPEARANCE UR: CLEAR
BACTERIA #/AREA URNS HPF: ABNORMAL /HPF
BILIRUB UR QL STRIP: NEGATIVE
BUN SERPL-MCNC: 25 MG/DL (ref 7–30)
CALCIUM SERPL-MCNC: 9.3 MG/DL (ref 8.5–10.1)
CHLORIDE SERPL-SCNC: 109 MMOL/L (ref 94–109)
CO2 SERPL-SCNC: 23 MMOL/L (ref 20–32)
COLOR UR AUTO: YELLOW
CREAT SERPL-MCNC: 1.15 MG/DL (ref 0.52–1.04)
GFR SERPL CREATININE-BSD FRML MDRD: 44 ML/MIN/{1.73_M2}
GLUCOSE SERPL-MCNC: 72 MG/DL (ref 70–99)
GLUCOSE UR STRIP-MCNC: NEGATIVE MG/DL
HGB UR QL STRIP: NEGATIVE
HYALINE CASTS #/AREA URNS LPF: 26 /LPF
KETONES UR STRIP-MCNC: NEGATIVE MG/DL
LEUKOCYTE ESTERASE UR QL STRIP: ABNORMAL
MUCOUS THREADS #/AREA URNS LPF: PRESENT /LPF
NITRATE UR QL: NEGATIVE
PH UR STRIP: 5.5 PH (ref 4.7–8)
POTASSIUM SERPL-SCNC: 4.5 MMOL/L (ref 3.4–5.3)
RBC #/AREA URNS AUTO: 1 /HPF (ref 0–2)
SODIUM SERPL-SCNC: 139 MMOL/L (ref 133–144)
SOURCE: ABNORMAL
SP GR UR STRIP: 1.02 (ref 1–1.03)
SQUAMOUS #/AREA URNS AUTO: 1 /HPF (ref 0–1)
UROBILINOGEN UR STRIP-MCNC: NORMAL MG/DL (ref 0–2)
WBC #/AREA URNS AUTO: 4 /HPF (ref 0–5)

## 2021-01-25 PROCEDURE — 36415 COLL VENOUS BLD VENIPUNCTURE: CPT | Mod: ZL | Performed by: FAMILY MEDICINE

## 2021-01-25 PROCEDURE — 80048 BASIC METABOLIC PNL TOTAL CA: CPT | Mod: ZL | Performed by: FAMILY MEDICINE

## 2021-01-25 PROCEDURE — 81001 URINALYSIS AUTO W/SCOPE: CPT | Mod: ZL | Performed by: FAMILY MEDICINE

## 2021-01-27 DIAGNOSIS — G62.9 NEUROPATHY: ICD-10-CM

## 2021-01-27 DIAGNOSIS — M81.0 AGE-RELATED OSTEOPOROSIS WITHOUT CURRENT PATHOLOGICAL FRACTURE: ICD-10-CM

## 2021-01-27 RX ORDER — GABAPENTIN 300 MG/1
CAPSULE ORAL
Qty: 90 CAPSULE | Refills: 8 | Status: SHIPPED | OUTPATIENT
Start: 2021-01-27 | End: 2021-12-08

## 2021-01-27 RX ORDER — ALENDRONATE SODIUM 70 MG/1
TABLET ORAL
Qty: 12 TABLET | Refills: 0 | Status: SHIPPED | OUTPATIENT
Start: 2021-01-27 | End: 2021-06-30

## 2021-01-27 NOTE — TELEPHONE ENCOUNTER
Gabapentin       Last Written Prescription Date:  12/24/20  Last Fill Quantity: 90,   # refills: 0  Last Office Visit: 1/18/21  Future Office visit:    Next 5 appointments (look out 90 days)    Feb 22, 2021 10:00 AM  (Arrive by 9:45 AM)  Return Visit with Kodi Garcia DO  [facility] ([facility] ) [address] [facility] [address]  [phone]           Routing refill request to provider for review/approval because:  Drug not on the [facility], [facility] or [facility] refill protocol or controlled substance    alendronate       Last Written Prescription Date:  12/24/20  Last Fill Quantity: 12,   # refills: 0  Last Office Visit: 1/18/21  Future Office visit:    Next 5 appointments (look out 90 days)    Feb 22, 2021 10:00 AM  (Arrive by 9:45 AM)  Return Visit with Kodi Garcia DO  [facility] ([facility] ) [address]  [facility] [address]  [phone]           Routing refill request to provider for review/approval because:    Bisphosphonates Failed    Rerun Protocol (1/27/2021 9:07 AM)     Dexa on file within past 2 years    Please review last Dexa result.         Normal serum creatinine on file within past 12 months        Recent Labs   Lab Test 01/25/21  1301   CR 1.15*

## 2021-02-04 ENCOUNTER — IMMUNIZATION (OUTPATIENT)
Dept: FAMILY MEDICINE | Facility: OTHER | Age: 86
End: 2021-02-04
Attending: FAMILY MEDICINE
Payer: MEDICARE

## 2021-02-04 PROCEDURE — 91300 PR COVID VAC PFIZER DIL RECON 30 MCG/0.3 ML IM: CPT

## 2021-02-05 ENCOUNTER — OFFICE VISIT (OUTPATIENT)
Dept: OTOLARYNGOLOGY | Facility: OTHER | Age: 86
End: 2021-02-05
Attending: PHYSICIAN ASSISTANT
Payer: MEDICARE

## 2021-02-05 VITALS
TEMPERATURE: 97.7 F | HEIGHT: 64 IN | HEART RATE: 86 BPM | OXYGEN SATURATION: 97 % | WEIGHT: 163 LBS | DIASTOLIC BLOOD PRESSURE: 72 MMHG | SYSTOLIC BLOOD PRESSURE: 124 MMHG | BODY MASS INDEX: 27.83 KG/M2

## 2021-02-05 DIAGNOSIS — H61.23 BILATERAL IMPACTED CERUMEN: Primary | ICD-10-CM

## 2021-02-05 DIAGNOSIS — H90.5 SENSORINEURAL HEARING LOSS (SNHL), UNSPECIFIED LATERALITY: ICD-10-CM

## 2021-02-05 DIAGNOSIS — H61.23 KERATIN DEBRIS OF BOTH EAR CANALS: ICD-10-CM

## 2021-02-05 PROCEDURE — 69210 REMOVE IMPACTED EAR WAX UNI: CPT | Mod: 50 | Performed by: PHYSICIAN ASSISTANT

## 2021-02-05 PROCEDURE — 92504 EAR MICROSCOPY EXAMINATION: CPT | Performed by: PHYSICIAN ASSISTANT

## 2021-02-05 ASSESSMENT — MIFFLIN-ST. JEOR: SCORE: 1174.36

## 2021-02-05 ASSESSMENT — PAIN SCALES - GENERAL: PAINLEVEL: MODERATE PAIN (5)

## 2021-02-05 NOTE — PROGRESS NOTES
Chief Complaint   Patient presents with     Cerumen Impaction     Pt is here for an ear cleaning.     Adele Hein is a 85 year old female seen today for ear cleaning. She reports her ears are full of cerumen. She was in the ED and noted to have cerumen impaction.   She was last seen on 7/16/20 for ear cleaning.         Denies COM or otologic surgeries.   Denies otalgia, otorrhea  Denies worrisome tinnitus  Denies fluctuating hearing loss or tinnitus.   She does have hearing loss, but declines audiogram/ aids.   Denies vertigo or facial paraesthesia. Denies dysphagia.        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     Insomnia, unspecified 01/01/2011     LBBB (left bundle branch block) 01/01/2011     Neck pain, chronic 01/01/2011     Non-ischemic cardiomyopathy (H) 12/10/2013     Obesity, unspecified 01/01/2011     Osteopenia 01/01/2011     Pacemaker      Pain in joint, lower leg 07/31/2006     Pure hypercholesterolemia 06/07/2002     Unspecified essential hypertension 01/01/2011      No Known Allergies  Current Outpatient Medications   Medication     alendronate (FOSAMAX) 70 MG tablet     budesonide-formoterol (SYMBICORT) 160-4.5 MCG/ACT Inhaler     Calcium Carbonate-Vitamin D (CALCIUM 600 + D OR)     carvedilol (COREG) 12.5 MG tablet     ELIQUIS ANTICOAGULANT 5 MG tablet     ENTRESTO 24-26 MG per tablet     gabapentin (NEURONTIN) 300 MG capsule     HYDROcodone-acetaminophen (NORCO) 5-325 MG tablet     Multiple Vitamin (MULTI-VITAMIN DAILY PO)     naproxen sodium (ANAPROX) 220 MG tablet     rosuvastatin (CRESTOR) 10 MG tablet     SPIRIVA RESPIMAT 2.5 MCG/ACT inhaler     spironolactone (ALDACTONE) 25 MG tablet     VENTOLIN  (90 Base) MCG/ACT inhaler     No current facility-administered medications for this  "visit.       ROS: 10 point ROS neg other than the symptoms noted above in the HPI.  /72 (Cuff Size: Adult Regular)   Pulse 86   Temp 97.7  F (36.5  C) (Tympanic)   Ht 1.626 m (5' 4\")   Wt 73.9 kg (163 lb)   SpO2 97%   BMI 27.98 kg/m    General - The patient is well nourished and well developed, and appears to have good nutritional status.  Alert and oriented to person and place, answers questions and cooperates with examination appropriately.   Head and Face - Normocephalic and atraumatic, with no gross asymmetry noted.  The facial nerve is intact, with strong symmetric movements.  Voice and Breathing - The patient was breathing comfortably without the use of accessory muscles. There was no wheezing, stridor, or stertor.  The patients voice was clear and strong, and had appropriate pitch and quality.  On examination of the ears, I found that the ear(s) were completely impacted with dense cerumen.  Therefore, I positioned them in the examination chair in a semi-supine position, beginning with the right side.  I used the binocular surgical microscope to perform cerumen removal.  I began by using a cerumen loop to gently lift the edges of the cerumen mass away from the walls of the external canal.   Once the mass was loose enough, the entire plug was pulled from the canal.  The tympanic membrane was intact, no sign of perforation or middle ear effusion.     I turned my attention to the contralateral side once again using the binocular surgical microscope to perform cerumen removal.  I began by using a cerumen loop to gently lift the edges of the cerumen mass away from the walls of the external canal.   Once the mass was loose enough, the entire plug was pulled from the canal.  The tympanic membrane was intact, no sign of perforation or middle ear effusion.     Eyes - Extraocular movements intact, and the pupils were reactive to light.  Sclera were not icteric or injected, conjunctiva were pink and " moist.  Mouth - Examination of the oral cavity showed pink, healthy oral mucosa. No lesions or ulcerations noted.  The tongue was mobile and midline, and the dentition were in good condition.    Throat - The walls of the oropharynx were smooth, pink, moist, symmetric, and had no lesions or ulcerations.  The tonsillar pillars and soft palate were symmetric.  The uvula was midline on elevation.    Neck - Normal midline excursion of the laryngotracheal complex during swallowing.  Full range of motion on passive movement.  Palpation of the occipital, submental, submandibular, internal jugular chain, and supraclavicular nodes did not demonstrate any abnormal lymph nodes or masses.  Palpation of the thyroid was soft and smooth, with no nodules or goiter appreciated.  The trachea was mobile and midline.      ASSESSMENT:    ICD-10-CM    1. Bilateral impacted cerumen  H61.23    2. Keratin debris of both ear canals  H61.23    3. Sensorineural hearing loss (SNHL), unspecified laterality  H90.5      Ears were cleaned.   She tolerated well. No concerns.   Return in 6 months for ear cleaning.               Brandi Zhang PA-C  ENT  [facility], [place]  [phone]

## 2021-02-05 NOTE — LETTER
2/5/2021         RE: Jacklyn Hein  [address]  [place] [address]        Dear Colleague,    Thank you for referring your patient, Jacklyn Hein, to the [facility] [place]. Please see a copy of my visit note below.    Chief Complaint   Patient presents with     Cerumen Impaction     Pt is here for an ear cleaning.     Adele Hein is a 85 year old female seen today for ear cleaning. She reports her ears are full of cerumen. She was in the ED and noted to have cerumen impaction.   She was last seen on 7/16/20 for ear cleaning.         Denies COM or otologic surgeries.   Denies otalgia, otorrhea  Denies worrisome tinnitus  Denies fluctuating hearing loss or tinnitus.   She does have hearing loss, but declines audiogram/ aids.   Denies vertigo or facial paraesthesia. Denies dysphagia.        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     Insomnia, unspecified 01/01/2011     LBBB (left bundle branch block) 01/01/2011     Neck pain, chronic 01/01/2011     Non-ischemic cardiomyopathy (H) 12/10/2013     Obesity, unspecified 01/01/2011     Osteopenia 01/01/2011     Pacemaker      Pain in joint, lower leg 07/31/2006     Pure hypercholesterolemia 06/07/2002     Unspecified essential hypertension 01/01/2011      No Known Allergies  Current Outpatient Medications   Medication     alendronate (FOSAMAX) 70 MG tablet     budesonide-formoterol (SYMBICORT) 160-4.5 MCG/ACT Inhaler     Calcium Carbonate-Vitamin D (CALCIUM 600 + D OR)     carvedilol (COREG) 12.5 MG tablet     ELIQUIS ANTICOAGULANT 5 MG tablet     ENTRESTO 24-26 MG per tablet     gabapentin (NEURONTIN) 300 MG capsule     HYDROcodone-acetaminophen (NORCO) 5-325 MG tablet     Multiple Vitamin (MULTI-VITAMIN DAILY PO)     naproxen  "sodium (ANAPROX) 220 MG tablet     rosuvastatin (CRESTOR) 10 MG tablet     SPIRIVA RESPIMAT 2.5 MCG/ACT inhaler     spironolactone (ALDACTONE) 25 MG tablet     VENTOLIN  (90 Base) MCG/ACT inhaler     No current facility-administered medications for this visit.       ROS: 10 point ROS neg other than the symptoms noted above in the HPI.  /72 (Cuff Size: Adult Regular)   Pulse 86   Temp 97.7  F (36.5  C) (Tympanic)   Ht 1.626 m (5' 4\")   Wt 73.9 kg (163 lb)   SpO2 97%   BMI 27.98 kg/m    General - The patient is well nourished and well developed, and appears to have good nutritional status.  Alert and oriented to person and place, answers questions and cooperates with examination appropriately.   Head and Face - Normocephalic and atraumatic, with no gross asymmetry noted.  The facial nerve is intact, with strong symmetric movements.  Voice and Breathing - The patient was breathing comfortably without the use of accessory muscles. There was no wheezing, stridor, or stertor.  The patients voice was clear and strong, and had appropriate pitch and quality.  On examination of the ears, I found that the ear(s) were completely impacted with dense cerumen.  Therefore, I positioned them in the examination chair in a semi-supine position, beginning with the right side.  I used the binocular surgical microscope to perform cerumen removal.  I began by using a cerumen loop to gently lift the edges of the cerumen mass away from the walls of the external canal.   Once the mass was loose enough, the entire plug was pulled from the canal.  The tympanic membrane was intact, no sign of perforation or middle ear effusion.     I turned my attention to the contralateral side once again using the binocular surgical microscope to perform cerumen removal.  I began by using a cerumen loop to gently lift the edges of the cerumen mass away from the walls of the external canal.   Once the mass was loose enough, the entire plug " was pulled from the canal.  The tympanic membrane was intact, no sign of perforation or middle ear effusion.     Eyes - Extraocular movements intact, and the pupils were reactive to light.  Sclera were not icteric or injected, conjunctiva were pink and moist.  Mouth - Examination of the oral cavity showed pink, healthy oral mucosa. No lesions or ulcerations noted.  The tongue was mobile and midline, and the dentition were in good condition.    Throat - The walls of the oropharynx were smooth, pink, moist, symmetric, and had no lesions or ulcerations.  The tonsillar pillars and soft palate were symmetric.  The uvula was midline on elevation.    Neck - Normal midline excursion of the laryngotracheal complex during swallowing.  Full range of motion on passive movement.  Palpation of the occipital, submental, submandibular, internal jugular chain, and supraclavicular nodes did not demonstrate any abnormal lymph nodes or masses.  Palpation of the thyroid was soft and smooth, with no nodules or goiter appreciated.  The trachea was mobile and midline.      ASSESSMENT:    ICD-10-CM    1. Bilateral impacted cerumen  H61.23    2. Keratin debris of both ear canals  H61.23    3. Sensorineural hearing loss (SNHL), unspecified laterality  H90.5      Ears were cleaned.   She tolerated well. No concerns.   Return in 6 months for ear cleaning.               Brandi Zhang PA-C  ENT  [facility]  [phone]        Again, thank you for allowing me to participate in the care of your patient.        Sincerely,        Brandi Zhang PA-C

## 2021-02-05 NOTE — NURSING NOTE
"Chief Complaint   Patient presents with     Cerumen Impaction     Pt is here for an ear cleaning.       Initial /72 (Cuff Size: Adult Regular)   Pulse 86   Temp 97.7  F (36.5  C) (Tympanic)   Ht 1.626 m (5' 4\")   Wt 73.9 kg (163 lb)   SpO2 97%   BMI 27.98 kg/m   Estimated body mass index is 27.98 kg/m  as calculated from the following:    Height as of this encounter: 1.626 m (5' 4\").    Weight as of this encounter: 73.9 kg (163 lb).  Medication Reconciliation: complete  Angie Belle LPN    "

## 2021-02-05 NOTE — PATIENT INSTRUCTIONS
Ears were cleaned.   No fluid or infection.   Return in 6 months for ear cleaning.     Thank you for allowing Brandi Zhang PA-C and our ENT team to participate in your care.  If your medications are too expensive, please give the nurse a call.  We can possibly change this medication.  If you have a scheduling or an appointment question please contact our Health Unit Coordinator at their direct line [phone].   ALL nursing questions or concerns can be directed to your ENT nurse at: [phone] Rashe[place]

## 2021-02-06 ENCOUNTER — HEALTH MAINTENANCE LETTER (OUTPATIENT)
Age: 86
End: 2021-02-06

## 2021-02-10 DIAGNOSIS — M54.50 LUMBAR SPINE PAIN: ICD-10-CM

## 2021-02-10 DIAGNOSIS — I50.9 CONGESTIVE HEART FAILURE, NYHA CLASS 2, UNSPECIFIED CONGESTIVE HEART FAILURE TYPE (H): ICD-10-CM

## 2021-02-11 DIAGNOSIS — J44.9 CHRONIC OBSTRUCTIVE PULMONARY DISEASE, UNSPECIFIED COPD TYPE (H): ICD-10-CM

## 2021-02-11 DIAGNOSIS — J43.8 OTHER EMPHYSEMA (H): ICD-10-CM

## 2021-02-11 RX ORDER — HYDROCODONE BITARTRATE AND ACETAMINOPHEN 5; 325 MG/1; MG/1
TABLET ORAL
Qty: 60 TABLET | Refills: 0 | Status: SHIPPED | OUTPATIENT
Start: 2021-02-11 | End: 2021-03-09

## 2021-02-11 RX ORDER — BUDESONIDE AND FORMOTEROL FUMARATE DIHYDRATE 160; 4.5 UG/1; UG/1
AEROSOL RESPIRATORY (INHALATION)
Qty: 10.2 G | Refills: 0 | Status: SHIPPED | OUTPATIENT
Start: 2021-02-11 | End: 2021-03-17

## 2021-02-11 RX ORDER — TIOTROPIUM BROMIDE INHALATION SPRAY 3.12 UG/1
SPRAY, METERED RESPIRATORY (INHALATION)
Qty: 4 G | Refills: 0 | Status: SHIPPED | OUTPATIENT
Start: 2021-02-11 | End: 2021-03-17

## 2021-02-11 RX ORDER — SACUBITRIL AND VALSARTAN 24; 26 MG/1; MG/1
TABLET, FILM COATED ORAL
Qty: 60 TABLET | Refills: 0 | Status: SHIPPED | OUTPATIENT
Start: 2021-02-11 | End: 2022-10-19

## 2021-02-11 NOTE — TELEPHONE ENCOUNTER
ENTRESTO 24-26 MG per tablet      Last Written Prescription Date:  01/11/21  Last Fill Quantity: 60,   # refills: 0    HYDROcodone-acetaminophen (NORCO) 5-325 MG tablet      Last Written Prescription Date:  01/20/21  Last Fill Quantity: 60,   # refills: 0  Last Office Visit: 01/18/21  Future Office visit:    Next 5 appointments (look out 90 days)    Feb 22, 2021 10:00 AM  (Arrive by 9:45 AM)  Return Visit with Kodi Garcia [facility] [place] ([facility] ) [address] [place] [address]  [phone]           Routing refill request to provider for review/approval because:  Drug not on the G, P or [facility] refill protocol or controlled substance    Normal serum creatinine on file in past 12 months        Recent Labs   Lab Test 01/25/21  1301   CR 1.15*

## 2021-02-11 NOTE — TELEPHONE ENCOUNTER
Budesonide-formoterol (SYMBICORT) 160-4.5mcg/ACT inhaler      Last Written Prescription Date:  1/11/2021  Last Fill Quantity: 10.2g,   # refills: 0  Last Office Visit: 1/18/2021  Future Office visit:    Next 5 appointments (look out 90 days)    Feb 22, 2021 10:00 AM  (Arrive by 9:45 AM)  Return Visit with Kodi Garcia DO  [facility] [place] [facility] [address] [place] [address]  [phone]           Routing refill request to provider for review/approval because:

## 2021-02-11 NOTE — TELEPHONE ENCOUNTER
spiriva respimat 2.5mcg/act inhaler      Last Written Prescription Date:  1/11/2021  Last Fill Quantity: 4g,   # refills: 0  Last Office Visit: 1/18/2021  Future Office visit:    Next 5 appointments (look out 90 days)    Feb 22, 2021 10:00 AM  (Arrive by 9:45 AM)  Return Visit with Kodi Garcia [facility] [place] [facility] [address] [place] [address]  [phone]           Routing refill request to provider for review/approval because:

## 2021-02-21 PROBLEM — N18.30 ACUTE RENAL FAILURE SUPERIMPOSED ON STAGE 3 CHRONIC KIDNEY DISEASE (H): Status: RESOLVED | Noted: 2021-01-06 | Resolved: 2021-02-21

## 2021-02-21 PROBLEM — N18.32 STAGE 3B CHRONIC KIDNEY DISEASE (H): Status: ACTIVE | Noted: 2019-12-04

## 2021-02-21 PROBLEM — I51.89 COMBINED SYSTOLIC AND DIASTOLIC CARDIAC DYSFUNCTION: Status: ACTIVE | Noted: 2019-06-03

## 2021-02-21 PROBLEM — R65.21 SEPTIC SHOCK (H): Status: RESOLVED | Noted: 2021-01-06 | Resolved: 2021-02-21

## 2021-02-21 PROBLEM — A41.9 SEPTIC SHOCK (H): Status: RESOLVED | Noted: 2021-01-06 | Resolved: 2021-02-21

## 2021-02-21 PROBLEM — N17.9 ACUTE RENAL FAILURE SUPERIMPOSED ON STAGE 3 CHRONIC KIDNEY DISEASE (H): Status: RESOLVED | Noted: 2021-01-06 | Resolved: 2021-02-21

## 2021-02-21 PROBLEM — E78.2 MIXED HYPERLIPIDEMIA: Status: ACTIVE | Noted: 2021-02-21

## 2021-02-21 NOTE — PROGRESS NOTES
[facility] HEART CARE   CARDIOLOGY PROGRESS NOTE     Chief Complaint   Patient presents with     Follow Up          Diagnosis:  1.  Chronic systolic at 30-35% and diastolic heart failure as noted on echo from 1/5/21. Previously, at 40-45%% on 12/01/2019.   2.  Reduction in severity of COPD from severe to mild to moderate per pulmonary and concern for asthma based on PFT's from 5/17/17.  3.  Nonischemic cardiomyopathy with NYHA classification 2.  4.  ICD placement on 11/11/2014.  5.  Hypertension-controlled.  6.  Hyperlipidemia-uncontrolled.  7.  Remote h/o tobacco abuse quitting on 2/1/1998  8.  LBBB.  9.  SOB, now significantly improved.  10.  CKD-3.  11.  Hypocalcemia.  12.  A. fib up to 23 seconds.  13.  CHADSVASC score of 4.   14.  Mitral and tricuspid regurgitation-mild to moderate on 1/5/2021.  15.  Bacteremia on 1/5/2021 with Klebsiella pneumonia with septic shock.    Assessment/Plan:    1.  Related to paroxysmal atrial fibrillation, continue on Coreg 12.5 mg twice a day and Eliquis 5 mg twice a day.  2.  Related to decline in her EF as noted above, will continue on spironolactone 12.5 mg daily, Coreg 12.5 mg twice a day, and Entresto with increases as noted above.  3.  We will plan for repeat echocardiogram in 6 months to reassess her EF to see if this is improved.  4.  Patient remains short of breath which I feel secondary to COPD, asthma, or CHF.  5.  Reviewed hospitalization from 1/5/2021 with findings of Klebsiella in her blood and E. coli in her urine.  This was discussed.  6.  Salt restriction, fluid restriction, daily weights.  7.  Follow-up in 6 months after completion of echocardiogram or sooner with issues.      Interval history:  Jacklyn was hospitalized on 1/5/2021 for bacteremia with sepsis as well as UTI.  She was appropriately treated with antibiotics and has done well.  Her heart function had declined from 40-45% on 12/1/2019 to 30-35% on 1/5/2021.  This was discussed today.  Thankfully, she has  not had significant fluid overload or concerns for heart failure.  She essentially has no peripheral edema.  She states she has been liberal with her fluids related to her UTI as she was told to increase her fluid consumption.  She was cautioned today with salt intake, fluid intake, and she should wear herself on a daily basis.  She continues to be dyspneic which I suspect is related to the CHF, COPD, and asthma.  As outlined above.  She continues to have her device checked here remotely.  She has not had any significant bleeding or concerns for blood loss while on anticoagulation.    HPI:    Jacklyn Hein is being seen by cardiology for dyspnea with chronic systolic EF of 40-45% and evidence for diastolic heart failure on 12/31/2019.     She has been short of breath with a diagnosis of asthma and COPD.  She is followed for a history of severe nonischemic cardiomyopathy with a previously ejection fraction at less than 35%. More recently, her EF on 12/31/2019 was 40-45%.  EF of 30-35% on 1/5/21. She also has diastolic heart failure, ICD placed on 11/11/16, her recent visit with pulmonary secondary to what was thought to be severe COPD but was downgraded to mild to moderate with a greater concern for asthma, per the patient.     She was started on some breathing treatments for asthma/COPD by pulmonary.  With these treatments, she says she feels much better but remains short of breath.       With having diastolic and systolic heart failure, she has minimal to no lower extremity edema. Her activity has been limited secondary to chronic back pain and generalized weakness.  It was not for back pain, she would be doing really well.     She has a history of severe nonischemic cardiomyopathy S/P ICD placement on 11/11/2014. She had her ICD checked on 10/22/2019.  It showed she was bi-V paced 96.8% of the time with 3.0 years left on her battery.  = 98.1%. She had up to 20 seconds of A. fib with a total of 3 episodes.   Her device was described as functioning appropriately.     Echo from 12/31/2019.  She has an EF of 40% to 45%, grade 1 diastolic dysfunction, mild left atrial enlargement, mild to moderate AI, and mild MI      Relevant testing:  Echo on 12/31/2019:  No pericardial effusion is present.  Left ventricular size is normal.  The Ejection Fraction is estimated at 40-45%.  Grade I or early diastolic dysfunction.  Mild diffuse hypokinesis is present.  The right ventricle is normal size.  Global right ventricular function is normal.  Mild left atrial enlargement is present.  Mild mitral insufficiency is present.  Mild to moderate aortic insufficiency is present.  The mean gradient across the aortic valve is 3.9 mmHg.  Trace tricuspid insufficiency is present.  Right ventricular systolic pressure is 20 mmHg above the right atrial pressure.  The pulmonic valve is normal.  The aorta root is normal.      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     Insomnia, unspecified 01/01/2011     LBBB (left bundle branch block) 01/01/2011     Neck pain, chronic 01/01/2011     Non-ischemic cardiomyopathy (H) 12/10/2013     Obesity, unspecified 01/01/2011     Osteopenia 01/01/2011     Pacemaker      Pain in joint, lower leg 07/31/2006     Pure hypercholesterolemia 06/07/2002     Unspecified essential hypertension 01/01/2011       Past Surgical History:   Procedure Laterality Date     APPENDECTOMY       ARTHROSCOPY KNEE RT/LT  2009    RT     BACK SURGERY  02/06/2020     CARDIAC SURGERY  05/06/2014    Pacemaker     Cataract extraction  2009    Bilateral     CHOLECYSTECTOMY      open      COLONOSCOPY  05/2001    Normal      COLONOSCOPY N/A 10/20/2016    Procedure: COLONOSCOPY;  Surgeon: Charan Montejo MD;  Location: [facility] OR     colonoscopy with  polypectomy  2011    Repeat 3 years      CT CORONARY ANGIOGRAM      normal     HERPES ZOSTER PCR ([facility])       IR CONSULTATION FOR IR EXAM  2020     left eye scar tissue       normal echo      fen-phen use       x6       SURGICAL RADIOLOGY PROCEDURE N/A 2016    Procedure: SURGICAL RADIOLOGY PROCEDURE;  Surgeon: Provider, [facility];  Location: [facility] OR       No Known Allergies    Current Outpatient Medications   Medication Sig Dispense Refill     alendronate (FOSAMAX) 70 MG tablet TAKE 1 TABLET BY MOUTH EVERY 7 DAYS. TAKE WITH 8 OUNCES OF WATER AND STAY UPRIGHT FOR AT LEAST 30 MINUTES AFTER TAKING. 12 tablet 0     budesonide-formoterol (SYMBICORT) 160-4.5 MCG/ACT Inhaler INHALE 2 PUFFS INTO THE LUNGS 2 TIMES DAILY 10.2 g 0     Calcium Carbonate-Vitamin D (CALCIUM 600 + D OR) Take 1 tablet by mouth daily       carvedilol (COREG) 12.5 MG tablet TAKE 1 TABLET BY MOUTH 2 TIMES DAILY WITH MEALS 180 tablet 0     ELIQUIS ANTICOAGULANT 5 MG tablet TAKE 1 TABLET BY MOUTH 2 TIMES DAILY 180 tablet 0     ENTRESTO 24-26 MG per tablet TAKE 1 TABLET BY MOUTH TWICE DAILY 60 tablet 0     gabapentin (NEURONTIN) 300 MG capsule TAKE 1 CAPSULE BY MOUTH 3 TIMES DAILY 90 capsule 8     HYDROcodone-acetaminophen (NORCO) 5-325 MG tablet TAKE 1 TO 2 TABLETS BY MOUTH EVERY 6 HOURS AS NEEDED 60 tablet 0     Multiple Vitamin (MULTI-VITAMIN DAILY PO) Take 1 tablet by mouth daily       naproxen sodium (ANAPROX) 220 MG tablet Take 220 mg by mouth 2 times daily (with meals)       rosuvastatin (CRESTOR) 10 MG tablet TAKE 1 TABLET BY MOUTH DAILY 90 tablet 3     sacubitril-valsartan (ENTRESTO) 49-51 MG per tablet Take 1 tablet by mouth 2 times daily 180 tablet 3     SPIRIVA RESPIMAT 2.5 MCG/ACT inhaler INHALE 2 DOSES INTO THE LUNGS ONCE DAILY 4 g 0     spironolactone (ALDACTONE) 25 MG tablet TAKE 1/2 TABLET BY MOUTH DAILY 45 tablet 0     VENTOLIN  (90 Base) MCG/ACT inhaler INHALE 2 PUFFS INTO THE LUNGS  EVERY 6 HOURS AS NEEDED FOR SHORTNESS OF BREATH/DYSPNEA/WHEEZING 18 g 0       Social History     Socioeconomic History     Marital status:      Spouse name: Not on file     Number of children: Not on file     Years of education: Not on file     Highest education level: Not on file   Occupational History     Occupation:       Employer: RETIRED   Social Needs     Financial resource strain: Not on file     Food insecurity     Worry: Not on file     Inability: Not on file     Transportation needs     Medical: Not on file     Non-medical: Not on file   Tobacco Use     Smoking status: Former Smoker     Packs/day: 1.00     Years: 15.00     Pack years: 15.00     Types: Cigarettes     Quit date: 1998     Years since quittin.0     Smokeless tobacco: Never Used     Tobacco comment: Passive Exposure: No; Longest Tobacco Free: 15 years    Substance and Sexual Activity     Alcohol use: Not Currently     Comment: Occasionally      Drug use: No     Sexual activity: Not on file   Lifestyle     Physical activity     Days per week: Not on file     Minutes per session: Not on file     Stress: Not on file   Relationships     Social connections     Talks on phone: Not on file     Gets together: Not on file     Attends Jew service: Not on file     Active member of club or organization: Not on file     Attends meetings of clubs or organizations: Not on file     Relationship status: Not on file     Intimate partner violence     Fear of current or ex partner: Not on file     Emotionally abused: Not on file     Physically abused: Not on file     Forced sexual activity: Not on file   Other Topics Concern      Service Not Asked     Blood Transfusions Not Asked     Caffeine Concern Yes     Comment: Coffee 5 cups daily      Occupational Exposure Not Asked     Hobby Hazards Not Asked     Sleep Concern Not Asked     Stress Concern Not Asked     Weight Concern Not Asked     Special Diet Not Asked     Back  Care Not Asked     Exercise Not Asked     Bike Helmet Not Asked     Seat Belt Not Asked     Self-Exams Not Asked     Parent/sibling w/ CABG, MI or angioplasty before 65F 55M? No   Social History Narrative     Not on file       LAB RESULTS:   Office Visit on 08/08/2020   Component Date Value Ref Range Status     COVID-19 Virus PCR to [facility] - So* 08/08/2020 Nasopharyngeal   Final     COVID-19 Virus PCR to [facility] - Re* 08/08/2020 Test received-See reflex to IDDL test SARS CoV2 (COVID-19) Virus RT-PCR   Final     SARS-CoV-2 Virus Specimen Source 08/08/2020 Nasopharyngeal   Final     SARS-CoV-2 PCR Result 08/08/2020 NEGATIVE   Final     SARS-CoV-2 PCR Comment 08/08/2020    Final                    Value:Testing was performed using the [employer] SARS-CoV-2 Assay on the [employer] Instrument System.   Additional information about this Emergency Use Authorization (EUA) assay can be found via   the Lab Guide.     Ancillary Procedure on 08/04/2020   Component Date Value Ref Range Status     Date Time Interrogation Session 08/05/2020 [id number]   Final     Implantable Pulse Generator Manufa* 08/05/2020 Medtronic   Final     Implantable Pulse Generator Model 08/05/2020 [id number] Viva XT CRT-D   Final     Implantable Pulse Generator Serial* 08/05/2020 [id number]   Final     Type Interrogation Session 08/05/2020 Remote    Final     Clinic Name 08/05/2020 [facility]   Final     Implantable Pulse Generator Type 08/05/2020 Cardiac Resynchronization Therapy - Defibrillator   Final     Implantable Pulse Generator Implan* 08/05/2020 [id number]   Final     Implantable Lead  08/05/2020 Medtronic   Final     Implantable Lead Model 08/05/2020 4296 Attain Ability Plus   Final     Implantable Lead Serial Number 08/05/2020 [id number]   Final     Implantable Lead Implant Date 08/05/2020 20140506   Final     Implantable Lead Polarity Type 08/05/2020 Bipolar Lead   Final     Implantable Lead Location Detail 1  08/05/2020 UNKNOWN   Final     Implantable Lead Location 08/05/2020 Left Ventricle   Final     Implantable Lead  08/05/2020 Medtronic   Final     Implantable Lead Model 08/05/2020 5076 CapSureFix Novus   Final     Implantable Lead Serial Number 08/05/2020 [id number]   Final     Implantable Lead Implant Date 08/05/2020 20140506   Final     Implantable Lead Polarity Type 08/05/2020 Bipolar Lead   Final     Implantable Lead Location Detail 1 08/05/2020 APPENDAGE   Final     Implantable Lead Location 08/05/2020 Right Atrium   Final     Implantable Lead  08/05/2020 Medtronic   Final     Implantable Lead Model 08/05/2020 6947 Sprint Quattro Secure   Final     Implantable Lead Serial Number 08/05/2020 [id number]   Final     Implantable Lead Implant Date 08/05/2020 20140506   Final     Implantable Lead Polarity Type 08/05/2020 Quadripolar Lead   Final     Implantable Lead Location Detail 1 08/05/2020 APEX   Final     Implantable Lead Location 08/05/2020 Right Ventricle   Final     Vamshi Setting Mode (NBG Code) 08/05/2020 DDDR   Final     Vamshi Setting Lower Rate Limit 08/05/2020 60  [beats]/min Final     Vamshi Setting Maximum Tracking Rate 08/05/2020 130  [beats]/min Final     Vamshi Setting Maximum Sensor Rate 08/05/2020 130  [beats]/min Final     Vamshi Setting NAJMA Delay Low 08/05/2020 140  ms Final     Vamshi Setting PAV Delay Low 08/05/2020 160  ms Final     Vamshi Setting AT Mode Switch Rate 08/05/2020 171  [beats]/min Final     Lead Channel Setting Sensing Polar* 08/05/2020 Bipolar   Final     Lead Channel Setting Sensing Anode* 08/05/2020 Right Atrium   Final     Lead Channel Setting Sensing Anode* 08/05/2020 Ring   Final     Lead Channel Setting Sensing Catho* 08/05/2020 Right Atrium   Final     Lead Channel Setting Sensing Catho* 08/05/2020 Tip   Final     Lead Channel Setting Sensing Sensi* 08/05/2020 0.3  mV Final     Lead Channel Setting Sensing Polar* 08/05/2020 Bipolar   Final     Lead  Channel Setting Sensing Anode* 08/05/2020 Right Ventricle   Final     Lead Channel Setting Sensing Anode* 08/05/2020 Coil   Final     Lead Channel Setting Sensing Catho* 08/05/2020 Right Ventricle   Final     Lead Channel Setting Sensing Catho* 08/05/2020 Tip   Final     Lead Channel Setting Sensing Sensi* 08/05/2020 0.3  mV Final     Ventricular chambers paced during * 08/05/2020 LVOnly   Final     Lead Channel Setting Pacing Polari* 08/05/2020 Bipolar   Final     Lead Channel Setting Pacing Anode * 08/05/2020 Right Atrium   Final     Lead Channel Setting Pacing Anode * 08/05/2020 Ring   Final     Lead Channel Setting Sensing Catho* 08/05/2020 Right Atrium   Final     Lead Channel Setting Sensing Catho* 08/05/2020 Tip   Final     Lead Channel Setting Pacing Pulse * 08/05/2020 0.4  ms Final     Lead Channel Setting Pacing Amplit* 08/05/2020 1.5  V Final     Lead Channel Setting Pacing Captur* 08/05/2020 Adaptive   Final     Lead Channel Setting Pacing Polari* 08/05/2020 Bipolar   Final     Lead Channel Setting Pacing Anode * 08/05/2020 Right Ventricle   Final     Lead Channel Setting Pacing Anode * 08/05/2020 Ring   Final     Lead Channel Setting Sensing Catho* 08/05/2020 Right Ventricle   Final     Lead Channel Setting Sensing Catho* 08/05/2020 Tip   Final     Lead Channel Setting Pacing Pulse * 08/05/2020 0.4  ms Final     Lead Channel Setting Pacing Amplit* 08/05/2020 2  V Final     Lead Channel Setting Pacing Captur* 08/05/2020 Adaptive   Final     Lead Channel Setting Pacing Polari* 08/05/2020 Bipolar   Final     Lead Channel Setting Pacing Anode * 08/05/2020 Right Ventricle   Final     Lead Channel Setting Pacing Anode * 08/05/2020 Coil   Final     Lead Channel Setting Sensing Catho* 08/05/2020 Left Ventricle   Final     Lead Channel Setting Sensing Catho* 08/05/2020 Tip   Final     Lead Channel Setting Pacing Pulse * 08/05/2020 0.6  ms Final     Lead Channel Setting Pacing Amplit* 08/05/2020 1.5  V Final      Lead Channel Setting Pacing Captur* 08/05/2020 Adaptive   Final     Zone Setting Type Category 08/05/2020 VF   Final     Zone Setting Detection Interval 08/05/2020 320  ms Final     Zone Setting Detection Beats Numer* 08/05/2020 30  [beats] Final     Zone Setting Detection Beats Denom* 08/05/2020 40  [beats] Final     Zone Setting Type Category 08/05/2020 VT   Final     Zone Setting Detection Interval 08/05/2020 Blank   Final     Zone Setting Type Category 08/05/2020 VT   Final     Zone Setting Detection Interval 08/05/2020 360  ms Final     Zone Setting Type Category 08/05/2020 VT   Final     Zone Setting Detection Interval 08/05/2020 400  ms Final     Zone Setting Type Category 08/05/2020 ATRIAL_FIBRILLATION   Final     Zone Setting Type Category 08/05/2020 AT/AF   Final     Zone Setting Detection Interval 08/05/2020 350  ms Final     Lead Channel Impedance Value 08/05/2020 437  ohm Final     Lead Channel Sensing Intrinsic Amp* 08/05/2020 2.375  mV Final     Lead Channel Sensing Intrinsic Amp* 08/05/2020 2.375  mV Final     Lead Channel Pacing Threshold Ampl* 08/05/2020 0.375  V Final     Lead Channel Pacing Threshold Puls* 08/05/2020 0.4  ms Final     Lead Channel Impedance Value 08/05/2020 399  ohm Final     Lead Channel Impedance Value 08/05/2020 323  ohm Final     Lead Channel Sensing Intrinsic Amp* 08/05/2020 5  mV Final     Lead Channel Sensing Intrinsic Amp* 08/05/2020 5  mV Final     Lead Channel Pacing Threshold Ampl* 08/05/2020 0.625  V Final     Lead Channel Pacing Threshold Puls* 08/05/2020 0.4  ms Final     Lead Channel Impedance Value 08/05/2020 893  ohm Final     Lead Channel Impedance Value 08/05/2020 456  ohm Final     Lead Channel Impedance Value 08/05/2020 570  ohm Final     Lead Channel Pacing Threshold Ampl* 08/05/2020 0.5  V Final     Lead Channel Pacing Threshold Puls* 08/05/2020 0.6  ms Final     Battery Date Time of Measurements 08/05/2020 20200804012400   Final     Battery Status  08/05/2020 OK   Final     Battery RRT Trigger 08/05/2020 2.727   Final     Battery Remaining Longevity 08/05/2020 26  mo Final     Battery Voltage 08/05/2020 2.94  V Final     Capacitor Charge Type 08/05/2020 Reformation   Final     Capacitor Last Charge Date Time 08/05/2020 20200517090334   Final     Capacitor Charge Time 08/05/2020 4.314   Final     Capacitor Charge Energy 08/05/2020 18  J Final     Vamshi Statistic Date Time Start 08/05/2020 20200504124338   Final     Vamshi Statistic Date Time End 08/05/2020 20200804012403   Final     Vamshi Statistic RA Percent Paced 08/05/2020 9.93  % Final     Vamshi Statistic RV Percent Paced 08/05/2020 7.92  % Final     CRT Statistic LV Percent Paced 08/05/2020 98.09  % Final     Vamshi Statistic AP  Percent 08/05/2020 9.75  % Final     Vamshi Statistic AS  Percent 08/05/2020 88.58  % Final     Vamshi Statistic AP VS Percent 08/05/2020 0.19  % Final     Vamshi Statistic AS VS Percent 08/05/2020 1.47  % Final     CRT Statistic Date Time Start 08/05/2020 20200504124338   Final     CRT Statistic Date Time End 08/05/2020 20200804012403   Final     CRT Statistic CRT Percent Paced 08/05/2020 98.09  % Final     Atrial Tachy Statistic Date Time S* 08/05/2020 20200504124338   Final     Atrial Tachy Statistic Date Time E* 08/05/2020 20200804012403   Final     Atrial Tachy Statistic AT/AF Burde* 08/05/2020 0.1  % Final     Therapy Statistic Recent Shocks De* 08/05/2020 0   Final     Therapy Statistic Recent Shocks Ab* 08/05/2020 0   Final     Therapy Statistic Recent ATP Deliv* 08/05/2020 0   Final     Therapy Statistic Recent Date Time* 08/05/2020 20200504124338   Final     Therapy Statistic Recent Date Time* 08/05/2020 20200804012403   Final     Therapy Statistic Total Shocks Del* 08/05/2020 1   Final     Therapy Statistic Total Shocks Abo* 08/05/2020 0   Final     Therapy Statistic Total ATP Delive* 08/05/2020 0   Final     Therapy Statistic Total  Date Time* 08/05/2020 20140506113519    Final     Therapy Statistic Total  Date Time* 08/05/2020 20200804012403   Final     Episode Statistic Recent Count 08/05/2020 1   Final     Episode Statistic Type Category 08/05/2020 AT/AF   Final     Episode Statistic Recent Count 08/05/2020 0   Final     Episode Statistic Type Category 08/05/2020 SVT   Final     Episode Statistic Recent Count 08/05/2020 0   Final     Episode Statistic Type Category 08/05/2020 VT   Final     Episode Statistic Recent Count 08/05/2020 0   Final     Episode Statistic Type Category 08/05/2020 VF   Final     Episode Statistic Recent Count 08/05/2020 0   Final     Episode Statistic Type Category 08/05/2020 VT   Final     Episode Statistic Recent Count 08/05/2020 0   Final     Episode Statistic Type Category 08/05/2020 VT   Final     Episode Statistic Recent Count 08/05/2020 0   Final     Episode Statistic Type Category 08/05/2020 VT   Final     Episode Statistic Recent Date Time* 08/05/2020 20200504124338   Final     Episode Statistic Recent Date Time* 08/05/2020 20200804012403   Final     Episode Statistic Recent Date Time* 08/05/2020 20200504124338   Final     Episode Statistic Recent Date Time* 08/05/2020 20200804012403   Final     Episode Statistic Recent Date Time* 08/05/2020 20200504124338   Final     Episode Statistic Recent Date Time* 08/05/2020 20200804012403   Final     Episode Statistic Recent Date Time* 08/05/2020 20200504124338   Final     Episode Statistic Recent Date Time* 08/05/2020 20200804012403   Final     Episode Statistic Recent Date Time* 08/05/2020 20200504124338   Final     Episode Statistic Recent Date Time* 08/05/2020 20200804012403   Final     Episode Statistic Recent Date Time* 08/05/2020 20200504124338   Final     Episode Statistic Recent Date Time* 08/05/2020 20200804012403   Final     Episode Statistic Recent Date Time* 08/05/2020 20200504124338   Final     Episode Statistic Recent Date Time* 08/05/2020 20200804012403   Final     Episode Statistic Total  Count 08/05/2020 0   Final     Episode Statistic Type Category 08/05/2020 AT/AF   Final     Episode Statistic Total Count 08/05/2020 0   Final     Episode Statistic Type Category 08/05/2020 SVT   Final     Episode Statistic Total Count 08/05/2020 66   Final     Episode Statistic Type Category 08/05/2020 VT   Final     Episode Statistic Total Count 08/05/2020 1   Final     Episode Statistic Type Category 08/05/2020 VF   Final     Episode Statistic Total Count 08/05/2020 0   Final     Episode Statistic Type Category 08/05/2020 VT   Final     Episode Statistic Total Count 08/05/2020 0   Final     Episode Statistic Type Category 08/05/2020 VT   Final     Episode Statistic Total Count 08/05/2020 0   Final     Episode Statistic Type Category 08/05/2020 VT   Final     Episode Statistic Total Date Time * 08/05/2020 20140506113519   Final     Episode Statistic Total Date Time * 08/05/2020 20200804012403   Final     Episode Statistic Total Date Time * 08/05/2020 20140506113519   Final     Episode Statistic Total Date Time * 08/05/2020 20200804012403   Final     Episode Statistic Total Date Time * 08/05/2020 20140506113519   Final     Episode Statistic Total Date Time * 08/05/2020 20200804012403   Final     Episode Statistic Total Date Time * 08/05/2020 20140506113519   Final     Episode Statistic Total Date Time * 08/05/2020 20200804012403   Final     Episode Statistic Total Date Time * 08/05/2020 20140506113519   Final     Episode Statistic Total Date Time * 08/05/2020 20200804012403   Final     Episode Statistic Total Date Time * 08/05/2020 20140506113519   Final     Episode Statistic Total Date Time * 08/05/2020 20200804012403   Final     Episode Statistic Total Date Time * 08/05/2020 20140506113519   Final     Episode Statistic Total Date Time * 08/05/2020 20200804012403   Final     Episode Identifier 08/05/2020 716   Final     Episode Type Category 08/05/2020 VSE   Final     Episode Date Time 08/05/2020 20200716092155    Final     Episode Duration 08/05/2020 9  s Final     Episode Identifier 08/05/2020 715   Final     Episode Type Category 08/05/2020 VSE   Final     Episode Date Time 08/05/2020 20200626195644   Final     Episode Duration 08/05/2020 3  s Final     Episode Identifier 08/05/2020 714   Final     Episode Type Category 08/05/2020 VSE   Final     Episode Date Time 08/05/2020 20200625235723   Final     Episode Duration 08/05/2020 4  s Final     Episode Identifier 08/05/2020 713   Final     Episode Type Category 08/05/2020 VSE   Final     Episode Date Time 08/05/2020 20200607131142   Final     Episode Duration 08/05/2020 6  s Final     Episode Identifier 08/05/2020 712   Final     Episode Type Category 08/05/2020 VSE   Final     Episode Date Time 08/05/2020 [id number]   Final     Episode Duration 08/05/2020 18  s Final     Episode Identifier 08/05/2020 711   Final     Episode Type Category 08/05/2020 VSE   Final     Episode Date Time 08/05/2020 [id number]   Final     Episode Duration 08/05/2020 4  s Final        Review of systems: Negative except that which was noted in the HPI.    Physical examination:  BP (!) 144/80 (BP Location: Left arm, Patient Position: Sitting, Cuff Size: Adult Regular)   Pulse 64   Temp 96.8  F (36  C)   SpO2 97%     GENERAL APPEARANCE: healthy, alert and no distress  HEENT: no icterus, no xanthelasmas, normal pupil size and reaction, no cyanosis.  NECK: no adenopathy, no asymmetry, masses, or scars.  CHEST: lungs clear to auscultation - no rales, rhonchi or wheezes, no use of accessory muscles, no retractions, respirations are unlabored, normal respiratory rate  CARDIOVASCULAR: Irregular rate irregular rhythm, normal S1 with physiologic split S2, no S3 or S4 and no murmur, click or rub  ABDOMEN: soft, non tender, bowel sounds normal.  Distant sounds.  EXTREMITIES: no clubbing, cyanosis but with mild to moderate lower extremity edema  NEURO: alert and oriented normal speech, and  affect  VASC: No vascular bruits heard.  SKIN: no ecchymoses, no rashes      Thank you for allowing me to participate in the care of your patient. Please do not hesitate to contact me if you have any questions.     Kodi Garcia, DO

## 2021-02-22 ENCOUNTER — OFFICE VISIT (OUTPATIENT)
Dept: CARDIOLOGY | Facility: OTHER | Age: 86
End: 2021-02-22
Attending: INTERNAL MEDICINE
Payer: COMMERCIAL

## 2021-02-22 VITALS
TEMPERATURE: 96.8 F | HEART RATE: 64 BPM | OXYGEN SATURATION: 97 % | SYSTOLIC BLOOD PRESSURE: 144 MMHG | DIASTOLIC BLOOD PRESSURE: 80 MMHG

## 2021-02-22 DIAGNOSIS — Z95.810 AUTOMATIC IMPLANTABLE CARDIOVERTER-DEFIBRILLATOR IN SITU: ICD-10-CM

## 2021-02-22 DIAGNOSIS — R78.81 BACTEREMIA DUE TO KLEBSIELLA PNEUMONIAE: ICD-10-CM

## 2021-02-22 DIAGNOSIS — I48.0 PAROXYSMAL ATRIAL FIBRILLATION (H): ICD-10-CM

## 2021-02-22 DIAGNOSIS — J45.20 MILD INTERMITTENT ASTHMA, UNSPECIFIED WHETHER COMPLICATED: ICD-10-CM

## 2021-02-22 DIAGNOSIS — I42.8 NON-ISCHEMIC CARDIOMYOPATHY (H): ICD-10-CM

## 2021-02-22 DIAGNOSIS — I10 ESSENTIAL HYPERTENSION: ICD-10-CM

## 2021-02-22 DIAGNOSIS — E78.2 MIXED HYPERLIPIDEMIA: ICD-10-CM

## 2021-02-22 DIAGNOSIS — I44.7 LBBB (LEFT BUNDLE BRANCH BLOCK): ICD-10-CM

## 2021-02-22 DIAGNOSIS — R06.02 SOB (SHORTNESS OF BREATH): ICD-10-CM

## 2021-02-22 DIAGNOSIS — J44.9 CHRONIC OBSTRUCTIVE PULMONARY DISEASE, UNSPECIFIED COPD TYPE (H): ICD-10-CM

## 2021-02-22 DIAGNOSIS — G47.00 INSOMNIA, UNSPECIFIED TYPE: ICD-10-CM

## 2021-02-22 DIAGNOSIS — Z87.891 HISTORY OF TOBACCO ABUSE: ICD-10-CM

## 2021-02-22 DIAGNOSIS — I51.89 COMBINED SYSTOLIC AND DIASTOLIC CARDIAC DYSFUNCTION: Primary | ICD-10-CM

## 2021-02-22 DIAGNOSIS — I50.9 CONGESTIVE HEART FAILURE, NYHA CLASS 2, UNSPECIFIED CONGESTIVE HEART FAILURE TYPE (H): ICD-10-CM

## 2021-02-22 DIAGNOSIS — E78.00 HYPERCHOLESTEREMIA: ICD-10-CM

## 2021-02-22 DIAGNOSIS — N18.32 STAGE 3B CHRONIC KIDNEY DISEASE (H): ICD-10-CM

## 2021-02-22 DIAGNOSIS — I50.22 CHRONIC SYSTOLIC CHF (CONGESTIVE HEART FAILURE) (H): ICD-10-CM

## 2021-02-22 DIAGNOSIS — B96.1 BACTEREMIA DUE TO KLEBSIELLA PNEUMONIAE: ICD-10-CM

## 2021-02-22 PROCEDURE — 99215 OFFICE O/P EST HI 40 MIN: CPT | Performed by: INTERNAL MEDICINE

## 2021-02-22 PROCEDURE — G0463 HOSPITAL OUTPT CLINIC VISIT: HCPCS | Mod: 25

## 2021-02-22 PROCEDURE — G0463 HOSPITAL OUTPT CLINIC VISIT: HCPCS

## 2021-02-22 RX ORDER — SACUBITRIL AND VALSARTAN 49; 51 MG/1; MG/1
1 TABLET, FILM COATED ORAL 2 TIMES DAILY
Qty: 180 TABLET | Refills: 3 | Status: SHIPPED | OUTPATIENT
Start: 2021-02-22 | End: 2021-05-11

## 2021-02-22 NOTE — NURSING NOTE
"Chief Complaint   Patient presents with     Follow Up       Initial BP (!) 144/80 (BP Location: Left arm, Patient Position: Sitting, Cuff Size: Adult Regular)   Pulse 64   Temp 96.8  F (36  C)   SpO2 97%  Estimated body mass index is 27.98 kg/m  as calculated from the following:    Height as of 2/5/21: 1.626 m (5' 4\").    Weight as of 2/5/21: 73.9 kg (163 lb).  Medication Reconciliation: complete  Jessa Behrman, LPN  "

## 2021-02-22 NOTE — PATIENT INSTRUCTIONS
Thank you for allowing Dr. Garcia and our  team to participate in your care. Please call our office at [phone] with scheduling questions or if you need to cancel or change your appointment. With any other questions or concerns you may call [facility] cardiology nurse at [phone].       If you experience chest pain, chest pressure, chest tightness, shortness of breath, fainting, lightheadedness, nausea, vomiting, or other concerning symptoms, please report to the Emergency Department or call 911. These symptoms may be emergent, and best treated in the Emergency Department.    Follow up in 6 months after echocardiogram.    You will have an echocardiogram performed in 6 months.  This is an ultrasound of the heart, that evaluates heart function.  The hospital scheduling department will call to schedule you for this test.     Increase your Entresto to 49/51 twice per day.

## 2021-02-25 ENCOUNTER — IMMUNIZATION (OUTPATIENT)
Dept: FAMILY MEDICINE | Facility: OTHER | Age: 86
End: 2021-02-25
Attending: FAMILY MEDICINE
Payer: MEDICARE

## 2021-02-25 PROCEDURE — 91300 PR COVID VAC PFIZER DIL RECON 30 MCG/0.3 ML IM: CPT

## 2021-03-08 ENCOUNTER — MYC MEDICAL ADVICE (OUTPATIENT)
Dept: FAMILY MEDICINE | Facility: OTHER | Age: 86
End: 2021-03-08

## 2021-03-08 DIAGNOSIS — M48.062 SPINAL STENOSIS OF LUMBAR REGION WITH NEUROGENIC CLAUDICATION: ICD-10-CM

## 2021-03-08 DIAGNOSIS — M54.50 LUMBAR SPINE PAIN: Primary | ICD-10-CM

## 2021-03-09 DIAGNOSIS — M54.50 LUMBAR SPINE PAIN: ICD-10-CM

## 2021-03-09 DIAGNOSIS — I50.9 CONGESTIVE HEART FAILURE, NYHA CLASS 2, UNSPECIFIED CONGESTIVE HEART FAILURE TYPE (H): ICD-10-CM

## 2021-03-09 DIAGNOSIS — I42.8 NON-ISCHEMIC CARDIOMYOPATHY (H): ICD-10-CM

## 2021-03-09 DIAGNOSIS — R06.02 SOB (SHORTNESS OF BREATH): ICD-10-CM

## 2021-03-09 DIAGNOSIS — I27.0 PRIMARY PULMONARY HYPERTENSION (H): ICD-10-CM

## 2021-03-09 DIAGNOSIS — I51.89 COMBINED SYSTOLIC AND DIASTOLIC CARDIAC DYSFUNCTION: ICD-10-CM

## 2021-03-09 RX ORDER — CARVEDILOL 25 MG/1
TABLET ORAL
Qty: 180 TABLET | Refills: 0 | Status: SHIPPED | OUTPATIENT
Start: 2021-03-09 | End: 2021-03-09 | Stop reason: DRUGHIGH

## 2021-03-09 RX ORDER — HYDROCODONE BITARTRATE AND ACETAMINOPHEN 5; 325 MG/1; MG/1
TABLET ORAL
Qty: 60 TABLET | Refills: 0 | Status: SHIPPED | OUTPATIENT
Start: 2021-03-09 | End: 2021-04-07

## 2021-03-09 RX ORDER — CARVEDILOL 12.5 MG/1
TABLET ORAL
Qty: 180 TABLET | Refills: 0 | Status: SHIPPED | OUTPATIENT
Start: 2021-03-09 | End: 2021-06-02

## 2021-03-09 RX ORDER — SPIRONOLACTONE 25 MG/1
TABLET ORAL
Qty: 45 TABLET | Refills: 0 | Status: SHIPPED | OUTPATIENT
Start: 2021-03-09 | End: 2021-07-13

## 2021-03-09 NOTE — TELEPHONE ENCOUNTER
HYDROcodone-acetaminophen (NORCO) 5-325 MG tablet     Last Written Prescription Date:  2/11/2021  Last Fill Quantity: 60,   # refills: 0  Last Office Visit: 1/18/2021  Future Office visit:       spironolactone (ALDACTONE) 25 MG tablet   Last Written Prescription Date:  1/20/21  Last Fill Quantity: 45,   # refills: 0  Last Office Visit: 1/18/2021  Future Office visit:         carvedilol (COREG) 12.5 MG tablet    Last Written Prescription Date:  11/30/2020  Last Fill Quantity: 180,   # refills: 0  Last Office Visit: 1/18/2021  Future Office visit:   NOT ON CURRENT MED LIST (HISTORICAL)       Routed Hydrocodone to Kodi Arevalo

## 2021-03-09 NOTE — TELEPHONE ENCOUNTER
Coreg  Last Written Prescription Date: 3/9/21  Last Fill Quantity: 180 # of Refills: 0  Last Office Visit: 1/18/21

## 2021-03-17 DIAGNOSIS — J43.8 OTHER EMPHYSEMA (H): ICD-10-CM

## 2021-03-17 DIAGNOSIS — J44.9 CHRONIC OBSTRUCTIVE PULMONARY DISEASE, UNSPECIFIED COPD TYPE (H): ICD-10-CM

## 2021-03-17 RX ORDER — BUDESONIDE AND FORMOTEROL FUMARATE DIHYDRATE 160; 4.5 UG/1; UG/1
AEROSOL RESPIRATORY (INHALATION)
Qty: 10.2 G | Refills: 0 | Status: SHIPPED | OUTPATIENT
Start: 2021-03-17 | End: 2021-03-18

## 2021-03-17 RX ORDER — TIOTROPIUM BROMIDE INHALATION SPRAY 3.12 UG/1
SPRAY, METERED RESPIRATORY (INHALATION)
Qty: 4 G | Refills: 0 | Status: SHIPPED | OUTPATIENT
Start: 2021-03-17 | End: 2021-03-18

## 2021-03-17 NOTE — TELEPHONE ENCOUNTER
Spiriva  Last Written Prescription Date: 2/11/21  Last Fill Quantity: 4g # of Refills: 0  Last Office Visit: 1/18/21    Symbicort  Last Written Prescription Date: 2/11/21  Last Fill Quantity: 10.2g # of Refills: 0  Last Office Visit: 1/18/21

## 2021-03-18 DIAGNOSIS — J43.8 OTHER EMPHYSEMA (H): ICD-10-CM

## 2021-03-18 DIAGNOSIS — J44.9 CHRONIC OBSTRUCTIVE PULMONARY DISEASE, UNSPECIFIED COPD TYPE (H): ICD-10-CM

## 2021-03-18 RX ORDER — TIOTROPIUM BROMIDE INHALATION SPRAY 3.12 UG/1
SPRAY, METERED RESPIRATORY (INHALATION)
Qty: 4 G | Refills: 0 | Status: SHIPPED | OUTPATIENT
Start: 2021-03-18 | End: 2021-05-27

## 2021-03-18 RX ORDER — BUDESONIDE AND FORMOTEROL FUMARATE DIHYDRATE 160; 4.5 UG/1; UG/1
AEROSOL RESPIRATORY (INHALATION)
Qty: 10.2 G | Refills: 0 | Status: SHIPPED | OUTPATIENT
Start: 2021-03-18 | End: 2021-05-27

## 2021-03-18 NOTE — TELEPHONE ENCOUNTER
spiriva inhaler      Last Written Prescription Date:  3-  Last Fill Quantity: 4G,   # refills: 0      symbicort 160-4.5      Last Written Prescription Date:  3-  Last Fill Quantity: 10.2G ,   # refills: 0  Last Office Visit: 1-

## 2021-03-27 DIAGNOSIS — I48.0 PAROXYSMAL ATRIAL FIBRILLATION (H): ICD-10-CM

## 2021-03-28 RX ORDER — APIXABAN 5 MG/1
TABLET, FILM COATED ORAL
Qty: 180 TABLET | Refills: 0 | Status: SHIPPED | OUTPATIENT
Start: 2021-03-28 | End: 2021-07-07

## 2021-03-28 NOTE — TELEPHONE ENCOUNTER
Alise       Last Written Prescription Date:  12/24/2020  Last Fill Quantity: 180,   # refills: 0  Last Office Visit: 1/18/2021  Future Office visit:

## 2021-04-01 NOTE — TELEPHONE ENCOUNTER
Norco  Last Written Prescription Date:  8.19.2020  Last Fill Quantity: 60,   # refills: 0  Last Office Visit: 8.8.2020  Future Office visit:    Next 5 appointments (look out 90 days)    Sep 03, 2020 11:30 AM CDT  (Arrive by 11:15 AM)  [facility] [address] [place] [place] [facility] [address]  [phone]           Routing refill request to provider for review/approval because:  Drug not on the [facility], [facility] or [facility] refill protocol or controlled substance    She is asking for a larger supply otherwise she has to refill about every 8 days.      Liz Gracia RN      
18

## 2021-04-07 DIAGNOSIS — M54.50 LUMBAR SPINE PAIN: ICD-10-CM

## 2021-04-07 RX ORDER — HYDROCODONE BITARTRATE AND ACETAMINOPHEN 5; 325 MG/1; MG/1
TABLET ORAL
Qty: 60 TABLET | Refills: 0 | Status: SHIPPED | OUTPATIENT
Start: 2021-04-07 | End: 2021-05-07

## 2021-04-07 NOTE — TELEPHONE ENCOUNTER
HYDROCODONE-APAP 5-325 MG      Last Written Prescription Date:  3/9/21  Last Fill Quantity: 60,   # refills: 0  Last Office Visit: 1/18/21  Future Office visit:       Routing refill request to provider for review/approval because:    Drug not on the [facility], [facility] or [facility] refill protocol or controlled substance

## 2021-04-29 ENCOUNTER — TRANSFERRED RECORDS (OUTPATIENT)
Dept: HEALTH INFORMATION MANAGEMENT | Facility: CLINIC | Age: 86
End: 2021-04-29

## 2021-04-30 ENCOUNTER — MEDICAL CORRESPONDENCE (OUTPATIENT)
Dept: HEALTH INFORMATION MANAGEMENT | Facility: HOSPITAL | Age: 86
End: 2021-04-30

## 2021-05-04 ENCOUNTER — HOSPITAL ENCOUNTER (OUTPATIENT)
Dept: CT IMAGING | Facility: HOSPITAL | Age: 86
Discharge: HOME | End: 2021-05-04
Attending: STUDENT IN AN ORGANIZED HEALTH CARE EDUCATION/TRAINING PROGRAM | Admitting: STUDENT IN AN ORGANIZED HEALTH CARE EDUCATION/TRAINING PROGRAM
Payer: MEDICARE

## 2021-05-04 DIAGNOSIS — M54.9 BACK PAIN: ICD-10-CM

## 2021-05-04 DIAGNOSIS — M79.606 LEG PAIN: ICD-10-CM

## 2021-05-04 PROCEDURE — 72131 CT LUMBAR SPINE W/O DYE: CPT

## 2021-05-05 ENCOUNTER — TELEPHONE (OUTPATIENT)
Dept: FAMILY MEDICINE | Facility: OTHER | Age: 86
End: 2021-05-05

## 2021-05-05 NOTE — TELEPHONE ENCOUNTER
3:13 PM    Reason for Call: Phone Call    Description: Pt is Preop DOS 5-18-21 [facility] Kaley Virk the pt is looking for PreOp week of 5-. Her PCP is out of office and is she able to see another provider. Please call pt    Was an appointment offered for this call? No  If yes : Appointment type              Date    Preferred method for responding to this message: Telephone Call  What is your phone number ? [phone]    If we cannot reach you directly, may we leave a detailed response at the number you provided? Yes    Can this message wait until your PCP/provider returns, if available today? Sarahy Rutherford

## 2021-05-06 DIAGNOSIS — M54.50 LUMBAR SPINE PAIN: ICD-10-CM

## 2021-05-06 NOTE — PROGRESS NOTES
[facility] [place]  [address]  [place] [address]  Phone: [phone]  Primary Provider: ANTHONY Post  Pre-op Performing Provider: HUGH DRISCOLL      PREOPERATIVE EVALUATION:  Today's date: 5/11/2021    Jacklyn Hein is a 85 year old female who presents for a preoperative evaluation.    Surgical Information:  Surgery/Procedure: kyphoplasty L4/L5  Surgery Location: [facility]  Surgeon: Dr. Jackman[facility]  Surgery Date: 5/18/2021  Time of Surgery: ~ 11AM  Where patient plans to recover: At home with family  Fax number for surgical facility: NA    Type of Anesthesia Anticipated: to be determined    Assessment & Plan     The proposed surgical procedure is considered LOW risk.    Preop general physical exam  Stable, no concerning exam, lab, or CXR findings  - Basic metabolic panel  - CBC with platelets  - EKG 12-lead complete w/read - (Clinic Performed)    Lumbar spine pain / Spinal stenosis of lumbar region with neurogenic claudication  Reason for procedure  - c/w norco  - c/w gabapentin  - does not take naproxen  - stop advil PM    Chronic obstructive pulmonary disease, unspecified COPD type (H)  stable  - XR CHEST 2 VW (Clinic Performed); Future  - c/w symbicort 160/4.5  - c/w spiriva   - c/w albuterol     Mixed hyperlipidemia  Has been on Crestor for 1 to 1.5 years w/o issues     Congestive heart failure, NYHA class 2, unspecified congestive heart failure type (H) / Combined systolic at 30-35% and diastolic cardiac dysfunction / Essential hypertension / LBBB (left bundle branch block) / Automatic implantable cardioverter-defibrillator - Medtronic multi lead ICD on 11/11/2014 / Paroxysmal atrial fibrillation (H)  BP with MAP of 78. Rated controlled.   - c/w spironolactone 12.5mg  - c/w coreg 12.5mg bid  - c//w entresto (sacubitril-valsartan)  - not on ASA    Stage 3b chronic kidney disease  Cr and gfr worsen today, but Adele has only had coffee  today    Insomnia, unspecified type  Discontinue Advil PM  - melatonin 1 MG TABS tablet; Take 1 tablet (1 mg) by mouth nightly as needed for sleep     Implanted Device:   - Type of device: ICD Patient advised to bring device information on day of surgery.      Risks and Recommendations:  The patient has the following additional risks and recommendations for perioperative complications:  Pulmonary:    - Incentive spirometry post-op   - Consider Respiratory Therapy (Respiratory Care IP Consult) post-op  Social and Substance:    - Patient is taking medications for chronic pain    Medication Instructions:   - apixaban (Eliquis), edoxaban (Savaysa), rivaroxaban (Xarelto): CrCl <50 mL/min. HOLD 24 hours before procedure.  }      Estimated Creatinine Clearance: 30.8 mL/min (A) (based on SCr of 1.33 mg/dL (H)).    RECOMMENDATION:  Adele is optimized to proceed with proposed procedure, without further diagnostic evaluation.    Subjective     HPI related to upcoming procedure: Adele has a many year history of back pain and compression fx. Adele will be undergoing kyphoplasty to assist with her current back pain       Preop Questions 5/11/2021   1. Have you ever had a heart attack or stroke? No - but h/o LBBB   2. Have you ever had surgery on your heart or blood vessels, such as a stent placement, a coronary artery bypass, or surgery on an artery in your head, neck, heart, or legs? No   3. Do you have chest pain with activity? No   4. Do you have a history of  heart failure? YES - Patient   5. Do you currently have a cold, bronchitis or symptoms of other infection? No   6. Do you have a cough, shortness of breath, or wheezing? No   7. Do you or anyone in your family have previous history of blood clots? YES - Patient, after pacemaker was placed. Blood clot in left arm. No issues since   8. Do you or does anyone in your family have a serious bleeding problem such as prolonged bleeding following surgeries or cuts? No   9. Have  you ever had problems with anemia or been told to take iron pills? No   10. Have you had any abnormal blood loss such as black, tarry or bloody stools, or abnormal vaginal bleeding? No   11. Have you ever had a blood transfusion? No   12. Are you willing to have a blood transfusion if it is medically needed before, during, or after your surgery? Yes   13. Have you or any of your relatives ever had problems with anesthesia? No   14. Do you have sleep apnea, excessive snoring or daytime drowsiness? No   15. Do you have any artifical heart valves or other implanted medical devices like a pacemaker, defibrillator, or continuous glucose monitor? YES - Patient   15a. What type of device do you have? Defibrillator   15b. Name of the clinic that manages your device:  [facility]   16. Do you have artificial joints? No   17. Are you allergic to latex? No     Health Care Directive:  Patient has a Health Care Directive on file      Preoperative Review of :   reviewed - controlled substances reflected in medication list.      Status of Chronic Conditions:  A-FIB - Patient has a longstanding history of chronic A-fib currently on rate control. Current treatment regimen includes Apixaban for stroke prevention and denies significant symptoms of lightheadedness, palpitations or dyspnea.   - CHADSVASC score of 4.  - coreg 12.5mg bid  - Eliquis morning and night     CHF w/ EF 30-35% (1/5/2021) / CAD / ICD (11/11/2014) / LBBB - Patient has a longstanding history of moderate-severe CHF. Exacerbating conditions include atrial fibrilation. Currently the patient's condition is same. Current treatment regimen includes listed below. The patient denies chest pain, edema, orthopnea, SOB or recent weight gain.   - spironolactone 12.5mg  - coreg 12.5mg bid  - entresto (sacubitril-valsartan)  - Follows with Dr. Garcia, cardiology, with last visit 2/22/2021. Follow up in 6 months       COPD w/ asthma - Patient has a longstanding history of  moderate-severe COPD . Patient has been doing well overall noting SOB and continues on medication regimen consisting of see below without adverse reactions or side effects.  - symbicort 160/4.5  - spiriva   - albuterol     HYPERLIPIDEMIA - Patient has a long history of significant Hyperlipidemia requiring medication for treatment with recent fair control. Patient reports no problems or side effects with the medication.   LDL (12/4/2019): 138  - crestor 10mg, duration of one year to 1.5 years      HYPERTENSION - Patient has longstanding history of HTN , currently denies any symptoms referable to elevated blood pressure. Specifically denies chest pain, palpitations, dyspnea, orthopnea, PND or peripheral edema. Blood pressure readings have been in normal range. Current medication regimen is as listed below. Patient denies any side effects of medication.   - spironolactone 12.5mg  - coreg 12.5mg bid  - entresto (sacubitril-valsartan)    RENAL INSUFFICIENCY - Patient has a longstanding history of moderate-severe chronic renal insufficiency. Last Cr 1.33. has only had coffee today so is dehydrated     # h/o bacteremia on 1/5/2021 w/ klebsiella pneumonia w/ septic shock and elevated LFTs which have resolved    # Osteoporosis: h/o compression fx  - fosamax   - calcium / vitamin d    # back pain / neuropathy in legs and feet  - gabapentin  - norco   - naproxen, does not   - takes advil PM for sleep    # Cardiovascular: able to walk up a flight of stairs w/o cardiac, but has some pulmonary issues which are chronic    # Pulmonary: former smoker, quit in 1998    # Previous surgical history: no issues with anesthesia      Review of Systems   Constitutional: Negative for chills and fever.   HENT: Negative for congestion.    Respiratory: Positive for shortness of breath.    Cardiovascular: Negative for chest pain and palpitations.   Gastrointestinal: Negative for abdominal distention.   Musculoskeletal: Positive for back pain.          Patient Active Problem List    Diagnosis Date Noted     Bacteremia due to Klebsiella pneumoniae on 1/5/2021 02/22/2021     Priority: Medium     Mixed hyperlipidemia 02/21/2021     Priority: Medium     Osteoarthritis of thoracolumbar spine 01/06/2021     Priority: Medium     Shock liver 01/06/2021     Priority: Medium     Abnormal finding on urinalysis 01/06/2021     Priority: Medium     Neuropathy 08/19/2020     Priority: Medium     Chronic systolic CHF (congestive heart failure) (H) 02/06/2020     Priority: Medium     Spinal stenosis of lumbar region with neurogenic claudication 02/06/2020     Priority: Medium     S/p ERP: Decompression Levels: L3 to L5 Side: Bilat on 2/6/2020 with Dr. 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 06/03/2019     Priority: Medium     LBBB (left bundle branch block) 06/01/2018     Priority: Medium     History of tobacco abuse quitting on 2/1/1998 06/01/2018     Priority: Medium     Mild intermittent asthma, unspecified whether complicated 06/01/2018     Priority: Medium     Chronic obstructive pulmonary disease, unspecified COPD type (H) 06/01/2018     Priority: Medium     SOB (shortness of breath) 06/01/2018     Priority: Medium     Lumbar spine pain 10/27/2015     Priority: Medium     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 Provider to review       Non-ischemic cardiomyopathy (H) 12/10/2013     Priority: Medium     CHF (congestive heart failure), NYHA class II (H) 12/10/2013     Priority: Medium     Insomnia 01/01/2011     Priority: Medium     Problem list name updated by automated process. Provider to review       Disorder of bone and cartilage  2011     Priority: Medium     Problem list name updated by automated process. Provider to review       Essential hypertension 2011     Priority: Medium     Problem list name updated by automated process. Provider to review       Neck pain, chronic 2011     Priority: Medium     Hypercholesteremia 2002     Priority: Medium      Past Medical History:   Diagnosis Date     Angina pectoris 2011     CHF (congestive heart failure), NYHA class II (H) 12/10/2013     CHF (congestive heart failure), NYHA class III (H) 12/10/2013     Hyperhydrosis disorder 2011     Insomnia, unspecified 2011     LBBB (left bundle branch block) 2011     Neck pain, chronic 2011     Non-ischemic cardiomyopathy (H) 12/10/2013     Obesity, unspecified 2011     Osteopenia 2011     Pacemaker      Pain in joint, lower leg 2006     Pure hypercholesterolemia 2002     Unspecified essential hypertension 2011     Past Surgical History:   Procedure Laterality Date     APPENDECTOMY       ARTHROSCOPY KNEE RT/LT      RT     BACK SURGERY  2020     CARDIAC SURGERY  2014    Pacemaker     Cataract extraction  2009    Bilateral     CHOLECYSTECTOMY      open      COLONOSCOPY  2001    Normal      COLONOSCOPY N/A 10/20/2016    Procedure: COLONOSCOPY;  Surgeon: Charan Montejo MD;  Location: [facility] OR     colonoscopy with polypectomy      Repeat 3 years      CT CORONARY ANGIOGRAM      normal     HERPES ZOSTER PCR ([facility])       IR CONSULTATION FOR IR EXAM  2020     left eye scar tissue       normal echo      fen-phen use       x6       SURGICAL RADIOLOGY PROCEDURE N/A 2016    Procedure: SURGICAL RADIOLOGY PROCEDURE;  Surgeon: Provider, [facility];  Location: [facility]     Current Outpatient Medications   Medication Sig Dispense Refill     alendronate (FOSAMAX) 70 MG tablet TAKE 1 TABLET BY MOUTH EVERY 7 DAYS. TAKE WITH 8  OUNCES OF WATER AND STAY UPRIGHT FOR AT LEAST 30 MINUTES AFTER TAKING. 12 tablet 0     budesonide-formoterol (SYMBICORT) 160-4.5 MCG/ACT Inhaler INHALE 2 PUFFS INTO THE LUNGS 2 TIMES DAILY 10.2 g 0     Calcium Carbonate-Vitamin D (CALCIUM 600 + D OR) Take 1 tablet by mouth daily       carvedilol (COREG) 12.5 MG tablet TAKE 1 TABLET BY MOUTH 2 TIMES DAILY WITH MEALS 180 tablet 0     ELIQUIS ANTICOAGULANT 5 MG tablet TAKE 1 TABLET BY MOUTH 2 TIMES DAILY 180 tablet 0     ENTRESTO 24-26 MG per tablet TAKE 1 TABLET BY MOUTH TWICE DAILY 60 tablet 0     gabapentin (NEURONTIN) 300 MG capsule TAKE 1 CAPSULE BY MOUTH 3 TIMES DAILY 90 capsule 8     HYDROcodone-acetaminophen (NORCO) 5-325 MG tablet TAKE 1 TO 2 TABLETS BY MOUTH EVERY 6 HOURS AS NEEDED 60 tablet 0     Multiple Vitamin (MULTI-VITAMIN DAILY PO) Take 1 tablet by mouth daily       naproxen sodium (ANAPROX) 220 MG tablet Take 220 mg by mouth 2 times daily (with meals)       rosuvastatin (CRESTOR) 10 MG tablet TAKE 1 TABLET BY MOUTH DAILY 90 tablet 3     SPIRIVA RESPIMAT 2.5 MCG/ACT inhaler INHALE 2 DOSES INTO THE LUNGS ONCE DAILY 4 g 0     spironolactone (ALDACTONE) 25 MG tablet TAKE 1/2 TABLET BY MOUTH DAILY 45 tablet 0     VENTOLIN  (90 Base) MCG/ACT inhaler INHALE 2 PUFFS INTO THE LUNGS EVERY 6 HOURS AS NEEDED FOR SHORTNESS OF BREATH/DYSPNEA/WHEEZING 18 g 0       No Known Allergies     Social History     Tobacco Use     Smoking status: Former Smoker     Packs/day: 1.00     Years: 15.00     Pack years: 15.00     Types: Cigarettes     Quit date: 1998     Years since quittin.2     Smokeless tobacco: Never Used     Tobacco comment: Passive Exposure: No; Longest Tobacco Free: 15 years    Substance Use Topics     Alcohol use: Not Currently     Comment: Occasionally      Family History   Problem Relation Age of Onset     Cancer Mother         lung (cause of death)      Peptic Ulcer Disease Father         gastric      History   Drug Use No         Objective      /48 (BP Location: Left arm, Patient Position: Chair, Cuff Size: Adult Regular)   Pulse 68   Temp 97.2  F (36.2  C) (Tympanic)   Resp 18   Wt 72.6 kg (160 lb 2 oz)   SpO2 98%   BMI 27.49 kg/m      Physical Exam  Constitutional:       General: She is not in acute distress.     Appearance: She is not ill-appearing.   HENT:      Right Ear: Tympanic membrane normal.      Left Ear: Tympanic membrane normal.      Mouth/Throat:      Pharynx: No oropharyngeal exudate or posterior oropharyngeal erythema.   Eyes:      Extraocular Movements: Extraocular movements intact.      Conjunctiva/sclera: Conjunctivae normal.   Cardiovascular:      Rate and Rhythm: Normal rate. Rhythm irregularly irregular.      Heart sounds: No murmur.   Pulmonary:      Effort: Pulmonary effort is normal. No respiratory distress.      Breath sounds: No wheezing or rales.   Abdominal:      General: Bowel sounds are normal.      Tenderness: There is no abdominal tenderness.   Musculoskeletal:      Right lower le+ Edema present.      Left lower le+ Edema present.   Lymphadenopathy:      Cervical: No cervical adenopathy.   Neurological:      Mental Status: She is alert.         Recent Labs   Lab Test 21  1301 21  1437 21  0440 19  1405 19  1405   HGB  --  10.7* 9.2*   < > 13.0   PLT  --  275 131*   < > 228    136 142   < > 140   POTASSIUM 4.5 5.7* 4.6   < > 4.7   CR 1.15* 1.29* 0.84   < > 1.00   A1C  --   --   --   --  5.5    < > = values in this interval not displayed.        Diagnostics:  Recent Results (from the past 24 hour(s))   Basic metabolic panel    Collection Time: 21 11:05 AM   Result Value Ref Range    Sodium 139 133 - 144 mmol/L    Potassium 5.2 3.4 - 5.3 mmol/L    Chloride 110 (H) 94 - 109 mmol/L    Carbon Dioxide 26 20 - 32 mmol/L    Anion Gap 3 3 - 14 mmol/L    Glucose 93 70 - 99 mg/dL    Urea Nitrogen 37 (H) 7 - 30 mg/dL    Creatinine 1.33 (H) 0.52 - 1.04 mg/dL    GFR  Estimate 36 (L) >60 mL/min/[1.73_m2]    GFR Estimate If Black 42 (L) >60 mL/min/[1.73_m2]    Calcium 9.3 8.5 - 10.1 mg/dL   CBC with platelets    Collection Time: 05/11/21 11:05 AM   Result Value Ref Range    WBC 5.4 4.0 - 11.0 10e9/L    RBC Count 4.01 3.8 - 5.2 10e12/L    Hemoglobin 11.8 11.7 - 15.7 g/dL    Hematocrit 36.7 35.0 - 47.0 %    MCV 92 78 - 100 fl    MCH 29.4 26.5 - 33.0 pg    MCHC 32.2 31.5 - 36.5 g/dL    RDW 14.3 10.0 - 15.0 %    Platelet Count 189 150 - 450 10e9/L      EKG (5/11/2021): Ventricular paced, unchanged from previous tracings (1/5/2021)    CXR (5/11/2021): FINDINGS:   The cardiac silhouette is normal in size. The pulmonary vasculature is normal.  The lungs are clear. No pleural effusion or pneumothorax. There is a transvenous pacemaker in place.                                                                    IMPRESSION:  No acute cardiopulmonary disease.      Revised Cardiac Risk Index (RCRI):  The patient has the following serious cardiovascular risks for perioperative complications:   - Coronary Artery Disease (MI, positive stress test, angina, Qs on EKG) = 1 point   - Congestive Heart Failure (pulmonary edema, PND, s3 augusto, CXR with pulmonary congestion, basilar rales) = 1 point     RCRI Interpretation: 2 points: Class III (moderate risk - 6.6% complication rate)     Estimated Functional Capacity: Performs 4 METS exercise without symptoms (e.g., light housework, stairs, 4 mph walk, 7 mph bike, slow step dance)     40 minutes spent on the date of the encounter doing chart review (PCP, cardiology, hospital note), review of test results, interpretation of tests, patient visit, documentation       Signed Electronically by: Lamar Harris MD  Copy of this evaluation report is provided to requesting physician.

## 2021-05-06 NOTE — PATIENT INSTRUCTIONS
Stop your Advil PM today.   Try melatonin 1mg to 3mg, one hour before bedtime      - apixaban (Eliquis): HOLD 24 hours before procedure.     Preparing for Your Surgery  Getting started  A nurse will call you to review your health history and instructions. They will give you an arrival time based on your scheduled surgery time.  Please be ready to share the following:    Your doctor's clinic name and phone number    Your medical, surgical and anesthesia history    A list of allergies and sensitivities    A list of medicines, including herbal treatments and over-the-counter drugs    Whether the patient has a legal guardian (ask how to send us the papers in advance)  If you have a child who's having surgery, please ask for a copy of Preparing for Your Child's Surgery.    Preparing for surgery    Within 30 days of surgery: Have a pre-op exam (sometimes called an H&P, or History and Physical). This can be done at a clinic or pre-operative center.  ? If you're having a , you may not need this exam. Talk to your care team    At your pre-op exam, talk to your care team about all medicines you take. If you need to stop any medicines before surgery, ask when to start taking them again.  ? We do this for your safety. Many medicines can make you bleed too much during surgery. Some change how well surgery (anesthesia) drugs work.    Call your insurance company to let them know you're having surgery. (If you don't have insurance, call [phone].)    Call your clinic if there's any change in your health. This includes signs of a cold or flu (sore throat, runny nose, cough, rash, fever). It also includes a scrape or scratch near the surgery site.    If you have questions on the day of surgery, call your hospital or surgery center.  Eating and drinking guidelines  For your safety: Unless your surgeon tells you otherwise, follow the guidelines below.    Eat and drink as usual until 8 hours before surgery. After that, no  food or milk.    Drink clear liquids until 2 hours before surgery. These are liquids you can see through, like water, Gatorade and Propel Water. You may also have black coffee and tea (no cream or milk).    Nothing by mouth within 2 hours of surgery. This includes gum, candy and breath mints.    If you drink, stop drinking alcohol the night before surgery.    If your care team tells you to take medicine on the morning of surgery, it's okay to take it with a sip of water.  Preventing infection    Shower or bathe the night before and morning of your surgery. Follow the instructions your clinic gave you. (If no instructions, use regular soap.)    Don't shave or clip hair near your surgery site. We'll remove the hair if needed.    Don't smoke or vape the morning of surgery. You may chew nicotine gum up to 2 hours before surgery. A nicotine patch is okay.  ? Note: Some surgeries require you to completely quit smoking and nicotine. Check with your surgeon.    Your care team will make every effort to keep you safe from infection. We will:  ? Clean our hands often with soap and water (or an alcohol-based hand rub).  ? Clean the skin at your surgery site with a special soap that kills germs.  ? Give you a special gown to keep you warm. (Cold raises the risk of infection.)  ? Wear special hair covers, masks, gowns and gloves during surgery.  ? Give antibiotic medicine, if prescribed. Not all surgeries need antibiotics.  What to bring on the day of surgery    Photo ID and insurance card    Copy of your health care directive, if you have one    Glasses and hearing aides (bring cases)  ? You can't wear contacts during surgery    Inhaler and eye drops, if you use them (tell us about these when you arrive)    CPAP machine or breathing device, if you use them    A few personal items, if spending the night    If you have . . .  ? A pacemaker or ICD (cardiac defibrillator): Bring the ID card.  ? An implanted stimulator: Bring the  remote control.  ? A legal guardian: Bring a copy of the certified (court-stamped) guardianship papers.  Please remove any jewelry, including body piercings. Leave jewelry and other valuables at home.  If you're going home the day of surgery  Important: If you don't follow the rules below, we must cancel your surgery.     Arrange for someone to drive you home after surgery. You may not drive, take a taxi or take public transportation by yourself (unless you'll have local anesthesia only).    Arrange for a responsible adult to stay with you overnight. If you don't, we may keep you in the hospital overnight, and you may need to pay the costs yourself.  Questions?   If you have any questions for your care team, list them here: _________________________________________________________________________________________________________________________________________________________________________________________________________________________________________________________________________________________________________________________  For informational purposes only. Not to replace the advice of your health care provider. Copyright   2003, 2019 [facility]. All rights reserved. Clinically reviewed by Ratna Valverde MD. [employer] [id number] - REV 4/20.

## 2021-05-07 RX ORDER — HYDROCODONE BITARTRATE AND ACETAMINOPHEN 5; 325 MG/1; MG/1
TABLET ORAL
Qty: 60 TABLET | Refills: 0 | Status: SHIPPED | OUTPATIENT
Start: 2021-05-07 | End: 2021-06-02

## 2021-05-07 NOTE — TELEPHONE ENCOUNTER
HYDROCODONE-APAP 5-325 MG        Last Written Prescription Date:  4/7/21  Last Fill Quantity: 60,   # refills: 0  Last Office Visit: 1/18/21  Future Office visit:    Next 5 appointments (look out 90 days)    May 11, 2021 11:30 AM  (Arrive by 11:15 AM)  Pre-Op physical with Lamar Harris MD  [facility] [address] [place] [address]  [phone]   May 14, 2021  1:30 PM  (Arrive by 1:15 PM)  SHORT with HC COLLECTION [facility] ([facility] [place] ) [address]  [place] [address]  [phone]           Routing refill request to provider for review/approval because:    Drug not on the [facility], [facility] or [facility] refill protocol or controlled substance

## 2021-05-11 ENCOUNTER — APPOINTMENT (OUTPATIENT)
Dept: LAB | Facility: OTHER | Age: 86
End: 2021-05-11
Attending: FAMILY MEDICINE
Payer: MEDICARE

## 2021-05-11 ENCOUNTER — ANCILLARY PROCEDURE (OUTPATIENT)
Dept: GENERAL RADIOLOGY | Facility: OTHER | Age: 86
End: 2021-05-11
Attending: FAMILY MEDICINE
Payer: MEDICARE

## 2021-05-11 ENCOUNTER — OFFICE VISIT (OUTPATIENT)
Dept: FAMILY MEDICINE | Facility: OTHER | Age: 86
End: 2021-05-11
Attending: FAMILY MEDICINE
Payer: COMMERCIAL

## 2021-05-11 VITALS
TEMPERATURE: 97.2 F | BODY MASS INDEX: 27.49 KG/M2 | OXYGEN SATURATION: 98 % | HEART RATE: 68 BPM | SYSTOLIC BLOOD PRESSURE: 118 MMHG | WEIGHT: 160.13 LBS | DIASTOLIC BLOOD PRESSURE: 58 MMHG | RESPIRATION RATE: 18 BRPM

## 2021-05-11 DIAGNOSIS — Z95.810 AUTOMATIC IMPLANTABLE CARDIOVERTER-DEFIBRILLATOR IN SITU: ICD-10-CM

## 2021-05-11 DIAGNOSIS — G47.00 INSOMNIA, UNSPECIFIED TYPE: ICD-10-CM

## 2021-05-11 DIAGNOSIS — M54.50 LUMBAR SPINE PAIN: ICD-10-CM

## 2021-05-11 DIAGNOSIS — M48.062 SPINAL STENOSIS OF LUMBAR REGION WITH NEUROGENIC CLAUDICATION: ICD-10-CM

## 2021-05-11 DIAGNOSIS — I48.0 PAROXYSMAL ATRIAL FIBRILLATION (H): ICD-10-CM

## 2021-05-11 DIAGNOSIS — J44.9 CHRONIC OBSTRUCTIVE PULMONARY DISEASE, UNSPECIFIED COPD TYPE (H): ICD-10-CM

## 2021-05-11 DIAGNOSIS — I44.7 LBBB (LEFT BUNDLE BRANCH BLOCK): ICD-10-CM

## 2021-05-11 DIAGNOSIS — N18.32 STAGE 3B CHRONIC KIDNEY DISEASE (H): ICD-10-CM

## 2021-05-11 DIAGNOSIS — Z01.818 PREOP GENERAL PHYSICAL EXAM: Primary | ICD-10-CM

## 2021-05-11 DIAGNOSIS — I10 ESSENTIAL HYPERTENSION: ICD-10-CM

## 2021-05-11 DIAGNOSIS — I50.9 CONGESTIVE HEART FAILURE, NYHA CLASS 2, UNSPECIFIED CONGESTIVE HEART FAILURE TYPE (H): ICD-10-CM

## 2021-05-11 DIAGNOSIS — I51.89 COMBINED SYSTOLIC AND DIASTOLIC CARDIAC DYSFUNCTION: ICD-10-CM

## 2021-05-11 DIAGNOSIS — E78.2 MIXED HYPERLIPIDEMIA: ICD-10-CM

## 2021-05-11 LAB
ANION GAP SERPL CALCULATED.3IONS-SCNC: 3 MMOL/L (ref 3–14)
BUN SERPL-MCNC: 37 MG/DL (ref 7–30)
CALCIUM SERPL-MCNC: 9.3 MG/DL (ref 8.5–10.1)
CHLORIDE SERPL-SCNC: 110 MMOL/L (ref 94–109)
CO2 SERPL-SCNC: 26 MMOL/L (ref 20–32)
CREAT SERPL-MCNC: 1.33 MG/DL (ref 0.52–1.04)
ERYTHROCYTE [DISTWIDTH] IN BLOOD BY AUTOMATED COUNT: 14.3 % (ref 10–15)
GFR SERPL CREATININE-BSD FRML MDRD: 36 ML/MIN/{1.73_M2}
GLUCOSE SERPL-MCNC: 93 MG/DL (ref 70–99)
HCT VFR BLD AUTO: 36.7 % (ref 35–47)
HGB BLD-MCNC: 11.8 G/DL (ref 11.7–15.7)
MCH RBC QN AUTO: 29.4 PG (ref 26.5–33)
MCHC RBC AUTO-ENTMCNC: 32.2 G/DL (ref 31.5–36.5)
MCV RBC AUTO: 92 FL (ref 78–100)
PLATELET # BLD AUTO: 189 10E9/L (ref 150–450)
POTASSIUM SERPL-SCNC: 5.2 MMOL/L (ref 3.4–5.3)
RBC # BLD AUTO: 4.01 10E12/L (ref 3.8–5.2)
SODIUM SERPL-SCNC: 139 MMOL/L (ref 133–144)
WBC # BLD AUTO: 5.4 10E9/L (ref 4–11)

## 2021-05-11 PROCEDURE — 93005 ELECTROCARDIOGRAM TRACING: CPT

## 2021-05-11 PROCEDURE — 85027 COMPLETE CBC AUTOMATED: CPT | Mod: ZL | Performed by: FAMILY MEDICINE

## 2021-05-11 PROCEDURE — 36415 COLL VENOUS BLD VENIPUNCTURE: CPT | Mod: ZL | Performed by: FAMILY MEDICINE

## 2021-05-11 PROCEDURE — 99215 OFFICE O/P EST HI 40 MIN: CPT | Mod: 25 | Performed by: FAMILY MEDICINE

## 2021-05-11 PROCEDURE — 80048 BASIC METABOLIC PNL TOTAL CA: CPT | Mod: ZL | Performed by: FAMILY MEDICINE

## 2021-05-11 PROCEDURE — G0463 HOSPITAL OUTPT CLINIC VISIT: HCPCS | Mod: 25

## 2021-05-11 PROCEDURE — 71046 X-RAY EXAM CHEST 2 VIEWS: CPT | Mod: TC

## 2021-05-11 PROCEDURE — 93010 ELECTROCARDIOGRAM REPORT: CPT | Performed by: INTERNAL MEDICINE

## 2021-05-11 ASSESSMENT — ENCOUNTER SYMPTOMS
ABDOMINAL DISTENTION: 0
BACK PAIN: 1
SHORTNESS OF BREATH: 1
FEVER: 0
CHILLS: 0
PALPITATIONS: 0

## 2021-05-11 ASSESSMENT — PAIN SCALES - GENERAL: PAINLEVEL: NO PAIN (0)

## 2021-05-11 NOTE — NURSING NOTE
"Chief Complaint   Patient presents with     Pre-Op Exam       Initial /48 (BP Location: Left arm, Patient Position: Chair, Cuff Size: Adult Regular)   Pulse 68   Temp 97.2  F (36.2  C) (Tympanic)   Resp 18   Wt 72.6 kg (160 lb 2 oz)   SpO2 98%   BMI 27.49 kg/m   Estimated body mass index is 27.49 kg/m  as calculated from the following:    Height as of 2/5/21: 1.626 m (5' 4\").    Weight as of this encounter: 72.6 kg (160 lb 2 oz).  Medication Reconciliation: complete  Inés Burgos LPN  "

## 2021-05-12 ENCOUNTER — TELEPHONE (OUTPATIENT)
Dept: FAMILY MEDICINE | Facility: OTHER | Age: 86
End: 2021-05-12

## 2021-05-14 ENCOUNTER — OFFICE VISIT (OUTPATIENT)
Dept: FAMILY MEDICINE | Facility: OTHER | Age: 86
End: 2021-05-14
Attending: FAMILY MEDICINE
Payer: MEDICARE

## 2021-05-14 DIAGNOSIS — Z20.822 COVID-19 RULED OUT: Primary | ICD-10-CM

## 2021-05-14 PROCEDURE — U0003 INFECTIOUS AGENT DETECTION BY NUCLEIC ACID (DNA OR RNA); SEVERE ACUTE RESPIRATORY SYNDROME CORONAVIRUS 2 (SARS-COV-2) (CORONAVIRUS DISEASE [COVID-19]), AMPLIFIED PROBE TECHNIQUE, MAKING USE OF HIGH THROUGHPUT TECHNOLOGIES AS DESCRIBED BY CMS-2020-01-R: HCPCS | Mod: ZL | Performed by: FAMILY MEDICINE

## 2021-05-14 PROCEDURE — U0005 INFEC AGEN DETEC AMPLI PROBE: HCPCS | Mod: ZL | Performed by: FAMILY MEDICINE

## 2021-05-15 LAB
LABORATORY COMMENT REPORT: NORMAL
SARS-COV-2 RNA RESP QL NAA+PROBE: NEGATIVE
SARS-COV-2 RNA RESP QL NAA+PROBE: NORMAL
SPECIMEN SOURCE: NORMAL
SPECIMEN SOURCE: NORMAL

## 2021-05-17 PROBLEM — F11.90 CHRONIC, CONTINUOUS USE OF OPIOIDS: Chronic | Status: ACTIVE | Noted: 2021-05-17

## 2021-05-18 ENCOUNTER — TELEPHONE (OUTPATIENT)
Dept: FAMILY MEDICINE | Facility: OTHER | Age: 86
End: 2021-05-18

## 2021-05-18 NOTE — TELEPHONE ENCOUNTER
2:06 PM    Reason for Call: Phone Call    Description: pt has some skin tears and needs to have them evaluated with in 2 days/ Needs to be worked into a covering providers schedule/ please call daughter [phone]    Was an appointment offered for this call? No  If yes : Appointment type              Date    Preferred method for responding to this message: Telephone Call  What is your phone number ? [phone]    If we cannot reach you directly, may we leave a detailed response at the number you provided? Yes    Can this message wait until your PCP/provider returns, if available today? YES, No Any covering provider sarita Beltran

## 2021-05-19 ENCOUNTER — OFFICE VISIT (OUTPATIENT)
Dept: FAMILY MEDICINE | Facility: OTHER | Age: 86
End: 2021-05-19
Attending: FAMILY MEDICINE
Payer: COMMERCIAL

## 2021-05-19 VITALS
TEMPERATURE: 98 F | HEIGHT: 64 IN | BODY MASS INDEX: 27.31 KG/M2 | SYSTOLIC BLOOD PRESSURE: 100 MMHG | WEIGHT: 160 LBS | DIASTOLIC BLOOD PRESSURE: 42 MMHG | HEART RATE: 73 BPM | OXYGEN SATURATION: 94 %

## 2021-05-19 DIAGNOSIS — T14.8XXA MULTIPLE SKIN TEARS: Primary | ICD-10-CM

## 2021-05-19 PROCEDURE — 99213 OFFICE O/P EST LOW 20 MIN: CPT | Performed by: FAMILY MEDICINE

## 2021-05-19 ASSESSMENT — ANXIETY QUESTIONNAIRES
2. NOT BEING ABLE TO STOP OR CONTROL WORRYING: NOT AT ALL
1. FEELING NERVOUS, ANXIOUS, OR ON EDGE: NOT AT ALL
6. BECOMING EASILY ANNOYED OR IRRITABLE: NOT AT ALL
3. WORRYING TOO MUCH ABOUT DIFFERENT THINGS: NOT AT ALL
7. FEELING AFRAID AS IF SOMETHING AWFUL MIGHT HAPPEN: NOT AT ALL
5. BEING SO RESTLESS THAT IT IS HARD TO SIT STILL: NOT AT ALL
GAD7 TOTAL SCORE: 0
4. TROUBLE RELAXING: NOT AT ALL

## 2021-05-19 ASSESSMENT — PAIN SCALES - GENERAL: PAINLEVEL: NO PAIN (0)

## 2021-05-19 ASSESSMENT — MIFFLIN-ST. JEOR: SCORE: 1160.76

## 2021-05-19 ASSESSMENT — PATIENT HEALTH QUESTIONNAIRE - PHQ9: SUM OF ALL RESPONSES TO PHQ QUESTIONS 1-9: 1

## 2021-05-19 NOTE — PROGRESS NOTES
"    Assessment & Plan     Multiple skin tears  Discussed with pt and dtr will need close follow-up for a while and may need debridement.  Will refer to wound care and appt Monday made.  Will see back in 2 days in nurse only clinic for wound check and possibly bandage change.   Pt and dtr agree.   - WOUND CARE REFERRAL ([place])             BMI:   Estimated body mass index is 27.46 kg/m  as calculated from the following:    Height as of this encounter: 1.626 m (5' 4\").    Weight as of this encounter: 72.6 kg (160 lb).           No follow-ups on file.    Ilia Moran MD  [facility] [place]    Subjective   Adele is a 85 year old who presents for the following health issues  accompanied by her daughter:    HPI     Concern - skin tear  Onset: yesterday  Description: skin tears to lower legs after transfering patient  Intensity: moderate  Progression of Symptoms:  same  Accompanying Signs & Symptoms: thin skin  Previous history of similar problem: yes  Precipitating factors:        Worsened by: nothing  Alleviating factors:        Improved by: dressing change  Therapies tried and outcome:  none     Had Kyro[facility] yesterday at [facility]  In transferring got skin tears - needs evaluation         Review of Systems   CONSTITUTIONAL: NEGATIVE for fever, chills, change in weight  RESP: NEGATIVE for significant cough or SOB  CV: NEGATIVE for chest pain, palpitations or peripheral edema      Objective    /42   Pulse 73   Temp 98  F (36.7  C)   Ht 1.626 m (5' 4\")   Wt 72.6 kg (160 lb)   SpO2 94%   BMI 27.46 kg/m    Body mass index is 27.46 kg/m .  Physical Exam   GENERAL: healthy, alert and no distress  MS: no gross musculoskeletal defects noted, no edema  SKIN: large skin tears on both shins - symmetrical.  Tegaderm intact and wounds look clean and no signs of infection                "

## 2021-05-19 NOTE — LETTER
My COPD Action Plan     Name: Jacklyn Hein    YOB: 1935   Date: 5/19/2021    My doctor: Ilia Moran MD   My clinic: [facility] [place]    [facility] [address] [place] [address]  [phone]  My Controller Medicine: Tiotropium (Spiriva)  Formoterol/Budesonide (Symbicort)   Dose: 2.5mcg 2 puffs daily, 160/4.5 2 puffs BID     My Rescue Medicine: Albuterol (Proair/Ventolin/Proventil) inhaler   Dose: 2 puffs every 6 hours as needed     My Flare Up Medicine: n/a   Dose: n/a     My COPD Severity: { :292857}      Use of Oxygen: Oxygen Not Prescribed      Make sure you've had your pneumonia   vaccines.          GREEN ZONE       Doing well today      Usual level of activity and exercise    Usual amount of cough and mucus    No shortness of breath    Usual level of health (thinking clearly, sleeping well, feel like eating) Actions:      Take daily medicines    Use oxygen as prescribed    Follow regular exercise and diet plan    Avoid cigarette smoke and other irritants that harm the lungs           YELLOW ZONE          Having a bad day or flare up      Short of breath more than usual    A lot more sputum (mucus) than usual    Sputum looks yellow, green, tan, brown or bloody    More coughing or wheezing    Fever or chills    Less energy; trouble completing activities    Trouble thinking or focusing    Using quick relief inhaler or nebulizer more often    Poor sleep; symptoms wake me up    Do not feel like eating Actions:      Get plenty of rest    Take daily medicines    Use quick relief inhaler every 6 hours    If you use oxygen, call you doctor to see if you should adjust your oxygen    Do breathing exercises or other things to help you relax    Let a loved one, friend or neighbor know you are feeling worse    Call your care team if you have 2 or more symptoms.  Start taking steroids or antibiotics if directed by your care team           RED ZONE       Need medical care  now      Severe shortness of breath (feel you can't breathe)    Fever, chills    Not enough breath to do any activity    Trouble coughing up mucus, walking or talking    Blood in mucus    Frequent coughing   Rescue medicines are not working    Not able to sleep because of breathing    Feel confused or drowsy    Chest pain    Actions:      Call your health care team.  If you cannot reach your care team, call 911 or go to the emergency room.        Annual Reminders:  Meet with Care Team, Flu Shot every Fall  Pharmacy:    [facility] - [place], [place] - [address]  [facility] - [place], [place] - [address] [address]

## 2021-05-19 NOTE — NURSING NOTE
"Chief Complaint   Patient presents with     Derm Problem       Initial /42   Pulse 73   Temp 98  F (36.7  C)   Ht 1.626 m (5' 4\")   Wt 72.6 kg (160 lb)   SpO2 94%   BMI 27.46 kg/m   Estimated body mass index is 27.46 kg/m  as calculated from the following:    Height as of this encounter: 1.626 m (5' 4\").    Weight as of this encounter: 72.6 kg (160 lb).  Medication Reconciliation: complete  Alec Navas LPN  "

## 2021-05-19 NOTE — LETTER
Opioid / Opioid Plus Controlled Substance Agreement    This is an agreement between you and your provider about the safe and appropriate use of controlled substance/opioids prescribed by your care team. Controlled substances are medicines that can cause physical and mental dependence (abuse).    There are strict laws about having and using these medicines. We here at [facility] are committing to working with you in your efforts to get better. To support you in this work, we ll help you schedule regular office appointments for medicine refills. If we must cancel or change your appointment for any reason, we ll make sure you have enough medicine to last until your next appointment.     As a Provider, I will:    Listen carefully to your concerns and treat you with respect.     Recommend a treatment plan that I believe is in your best interest. This plan may involve therapies other than opioid pain medication.     Talk with you often about the possible benefits, and the risk of harm of any medicine that we prescribe for you.     Provide a plan on how to taper (discontinue or go off) using this medicine if the decision is made to stop its use.    As a Patient, I understand that opioid(s):     Are a controlled substance prescribed by my care team to help me function or work and manage my condition(s).     Are strong medicines and can cause serious side effects such as:    Drowsiness, which can seriously affect my driving ability    A lower breathing rate, enough to cause death    Harm to my thinking ability     Depression     Abuse of and addiction to this medicine    Need to be taken exactly as prescribed. Combining opioids with certain medicines or chemicals (such as illegal drugs, sedatives, sleeping pills, and benzodiazepines) can be dangerous or even fatal. If I stop opioids suddenly, I may have severe withdrawal symptoms.    Do not work for all types of pain nor for all patients. If they re not helpful, I may  be asked to stop them.        The risks, benefits and side effects of these medicine(s) were explained to me. I agree that:  1. I will take part in other treatments as advised by my care team. This may be psychiatry or counseling, physical therapy, behavioral therapy, group treatment or a referral to a specialist.     2. I will keep all my appointments. I understand that this is part of the monitoring of opioids. My care team may require an office visit for EVERY opioid/controlled substance refill. If I miss appointments or don t follow instructions, my care team may stop my medicine.    3. I will take my medicines as prescribed. I will not change the dose or schedule unless my care team tells me to. There will be no refills if I run out early.     4. I may be asked to come to the clinic and complete a urine drug test or complete a pill count at any time. If I don t give a urine sample or participate in a pill count, the care team may stop my medicine.    5. I will only receive prescriptions from this clinic for chronic pain. If I am treated by another provider for acute pain issues, I will tell them that I am taking opioid pain medication for chronic pain and that I have a treatment agreement with this provider. I will inform my [facility] care team within one business day if I am given a prescription for any pain medication by another healthcare provider. My [facility] care team can contact other providers and pharmacists about my use of any medicines.    6. It is up to me to make sure that I don t run out of my medicines on weekends or holidays. If my care team is willing to refill my opioid prescription without a visit, I must request refills only during office hours. Refills may take up to 3 business days to process. I will use one pharmacy to fill all my opioid and other controlled substance prescriptions. I will notify the clinic about any changes to my insurance or medication  availability.    7. I am responsible for my prescriptions. If the medicine/prescription is lost, stolen or destroyed, it will not be replaced. I also agree not to share controlled substance medicines with anyone.    8. I am aware I should not use any illegal or recreational drugs. I agree not to drink alcohol unless my care team says I can.       9. If I enroll in the [place] Medical Cannabis program, I will tell my care team prior to my next refill.     10. I will tell my care team right away if I become pregnant, have a new medical problem treated outside of my regular clinic, or have a change in my medications.    11. I understand that this medicine can affect my thinking, judgment and reaction time. Alcohol and drugs affect the brain and body, which can affect the safety of my driving. Being under the influence of alcohol or drugs can affect my decision-making, behaviors, personal safety, and the safety of others. Driving while impaired (DWI) can occur if a person is driving, operating, or in physical control of a car, motorcycle, boat, snowmobile, ATV, motorbike, off-road vehicle, or any other motor vehicle ([place] Statute 169A.20). I understand the risk if I choose to drive or operate any vehicle or machinery.    I understand that if I do not follow any of the conditions above, my prescriptions or treatment may be stopped or changed.          Opioids  What You Need to Know    What are opioids?   Opioids are pain medicines that must be prescribed by a doctor. They are also known as narcotics.     Examples are:   1. morphine (MS Contin, Selene)  2. oxycodone (Oxycontin)  3. oxycodone and acetaminophen (Percocet)  4. hydrocodone and acetaminophen (Vicodin, Norco)   5. fentanyl patch (Duragesic)   6. hydromorphone (Dilaudid)   7. methadone  8. codeine (Tylenol #3)     What do opioids do well?   Opioids are best for severe short-term pain such as after a surgery or injury. They may work well for cancer pain. They may  help some people with long-lasting (chronic) pain.     What do opioids NOT do well?   Opioids never get rid of pain entirely, and they don t work well for most patients with chronic pain. Opioids don t reduce swelling, one of the causes of pain.                                    Other ways to manage chronic pain and improve function include:       Treat the health problem that may be causing pain    Anti-inflammation medicines, which reduce swelling and tenderness, such as ibuprofen (Advil, Motrin) or naproxen (Aleve)    Acetaminophen (Tylenol)    Antidepressants and anti-seizure medicines, especially for nerve pain    Topical treatments such as patches or creams    Injections or nerve blocks    Chiropractic or osteopathic treatment    Acupuncture, massage, deep breathing, meditation, visual imagery, aromatherapy    Use heat or ice at the pain site    Physical therapy     Exercise    Stop smoking    Take part in therapy       Risks and side effects     Talk to your doctor before you start or decide to keep taking opioids. Possible side effects include:      Lowering your breathing rate enough to cause death    Overdose, including death, especially if taking higher than prescribed doses    Worse depression symptoms; less pleasure in things you usually enjoy    Feeling tired or sluggish    Slower thoughts or cloudy thinking    Being more sensitive to pain over time; pain is harder to control    Trouble sleeping or restless sleep    Changes in hormone levels (for example, less testosterone)    Changes in sex drive or ability to have sex    Constipation    Unsafe driving    Itching and sweating    Dizziness    Nausea, throwing up and dry mouth    What else should I know about opioids?    Opioids may lead to dependence, tolerance, or addiction.      Dependence means that if you stop or reduce the medicine too quickly, you will have withdrawal symptoms. These include loose poop (diarrhea), jitters, flu-like symptoms,  nervousness and tremors. Dependence is not the same as addiction.                       Tolerance means needing higher doses over time to get the same effect. This may increase the chance of serious side effects.      Addiction is when people improperly use a substance that harms their body, their mind or their relations with others. Use of opiates can cause a relapse of addiction if you have a history of drug or alcohol abuse.      People who have used opioids for a long time may have a lower quality of life, worse depression, higher levels of pain and more visits to doctors.    You can overdose on opioids. Take these steps to lower your risk of overdose:    1. Recognize the signs:  Signs of overdose include decrease or loss of consciousness (blackout), slowed breathing, trouble waking up and blue lips. If someone is worried about overdose, they should call 911.    2. Talk to your doctor about Narcan (naloxone).   If you are at risk for overdose, you may be given a prescription for Narcan. This medicine very quickly reverses the effects of opioids.   If you overdose, a friend or family member can give you Narcan while waiting for the ambulance. They need to know the signs of overdose and how to give Narcan.     3. Don't use alcohol or street drugs.   Taking them with opioids can cause death.    4. Do not take any of these medicines unless your doctor says it s OK. Taking these with opioids can cause death:    Benzodiazepines, such as lorazepam (Ativan), alprazolam (Xanax) or diazepam (Valium)    Muscle relaxers, such as cyclobenzaprine (Flexeril)    Sleeping pills like zolpidem (Ambien)     Other opioids      How to keep you and other people safe while taking opioids:    1. Never share your opioids with others.  Opioid medicines are regulated by the Drug Enforcement Agency ([place]). Selling or sharing medications is a criminal act.    2. Be sure to store opioids in a secure place, locked up if possible. Young children  can easily swallow them and overdose.    3. When you are traveling with your medicines, keep them in the original bottles. If you use a pill box, be sure you also carry a copy of your medicine list from your clinic or pharmacy.    4. Safe disposal of opioids    Most pharmacies have places to get rid of medicine, called disposal kiosks. Medicine disposal options are also available in every [facility]. Search your county and  medication disposal  to find more options. You can find more details at:  [email or web address]-[email or web address]-[email or web address]     I agree that my provider, clinic care team, and pharmacy may work with any city, state or federal law enforcement agency that investigates the misuse, sale, or other diversion of my controlled medicine. I will allow my provider to discuss my care with, or share a copy of, this agreement with any other treating provider, pharmacy or emergency room where I receive care.    I have read this agreement and have asked questions about anything I did not understand.    _______________________________________________________  Patient Signature - Jacklyn Hein _____________________                   Date     _______________________________________________________  Provider Signature - Ilia Moran MD   _____________________                   Date     _______________________________________________________  Witness Signature (required if provider not present while patient signing)   _____________________                   Date

## 2021-05-20 ASSESSMENT — ANXIETY QUESTIONNAIRES: GAD7 TOTAL SCORE: 0

## 2021-05-21 ENCOUNTER — ALLIED HEALTH/NURSE VISIT (OUTPATIENT)
Dept: FAMILY MEDICINE | Facility: OTHER | Age: 86
End: 2021-05-21
Attending: FAMILY MEDICINE
Payer: COMMERCIAL

## 2021-05-21 VITALS
OXYGEN SATURATION: 95 % | HEIGHT: 64 IN | HEART RATE: 68 BPM | SYSTOLIC BLOOD PRESSURE: 110 MMHG | TEMPERATURE: 97.3 F | WEIGHT: 160 LBS | DIASTOLIC BLOOD PRESSURE: 50 MMHG | BODY MASS INDEX: 27.31 KG/M2

## 2021-05-21 DIAGNOSIS — T14.8XXA MULTIPLE SKIN TEARS: Primary | ICD-10-CM

## 2021-05-21 ASSESSMENT — PAIN SCALES - GENERAL: PAINLEVEL: NO PAIN (0)

## 2021-05-21 ASSESSMENT — MIFFLIN-ST. JEOR: SCORE: 1160.76

## 2021-05-21 NOTE — NURSING NOTE
"Chief Complaint   Patient presents with     Wound Check       Initial /50   Pulse 68   Temp 97.3  F (36.3  C)   Ht 1.626 m (5' 4\")   Wt 72.6 kg (160 lb)   SpO2 95%   BMI 27.46 kg/m   Estimated body mass index is 27.46 kg/m  as calculated from the following:    Height as of this encounter: 1.626 m (5' 4\").    Weight as of this encounter: 72.6 kg (160 lb).  Medication Reconciliation: complete  Alec Navas LPN  "

## 2021-05-21 NOTE — PROGRESS NOTES
Patient here to have bilateral leg wounds rechecked, tagaderms in place and legs rewrapped for protection, will see wound care on Monday.

## 2021-05-24 ENCOUNTER — OFFICE VISIT (OUTPATIENT)
Dept: WOUND CARE | Facility: OTHER | Age: 86
End: 2021-05-24
Attending: FAMILY MEDICINE
Payer: COMMERCIAL

## 2021-05-24 VITALS
SYSTOLIC BLOOD PRESSURE: 108 MMHG | DIASTOLIC BLOOD PRESSURE: 63 MMHG | HEART RATE: 88 BPM | OXYGEN SATURATION: 98 % | TEMPERATURE: 98.2 F

## 2021-05-24 DIAGNOSIS — T14.8XXA MULTIPLE SKIN TEARS: ICD-10-CM

## 2021-05-24 DIAGNOSIS — S81.802D OPEN WOUND OF LEFT LOWER LEG, SUBSEQUENT ENCOUNTER: ICD-10-CM

## 2021-05-24 DIAGNOSIS — S81.801D OPEN WOUND OF RIGHT LOWER LEG, SUBSEQUENT ENCOUNTER: Primary | ICD-10-CM

## 2021-05-24 PROCEDURE — G0463 HOSPITAL OUTPT CLINIC VISIT: HCPCS | Mod: 25

## 2021-05-24 PROCEDURE — G0463 HOSPITAL OUTPT CLINIC VISIT: HCPCS

## 2021-05-24 PROCEDURE — 99213 OFFICE O/P EST LOW 20 MIN: CPT | Performed by: NURSE PRACTITIONER

## 2021-05-24 ASSESSMENT — PAIN SCALES - GENERAL: PAINLEVEL: NO PAIN (0)

## 2021-05-24 NOTE — NURSING NOTE
"Chief Complaint   Patient presents with     WOUND CARE       Initial /63   Pulse 88   Temp 98.2  F (36.8  C)   SpO2 98%  Estimated body mass index is 27.46 kg/m  as calculated from the following:    Height as of 5/21/21: 1.626 m (5' 4\").    Weight as of 5/21/21: 72.6 kg (160 lb).  Medication Reconciliation: complete  Zandra Rodriguez MA  "

## 2021-05-24 NOTE — PROGRESS NOTES
SUBJECTIVE:  Jacklyn Hein, 85 year old, female presents with the following Chief Complaint(s) with HPI to follow:  Chief Complaint   Patient presents with     WOUND CARE        Wound Care    HPI:  Adele is here today for the reassessment and treatment of bilateral leg wounds.  Back story:  Adele had a back procedure (kyphoplasty of L4/L5) last Tuesday (5/18/2021) at [facility] by Dr. Jackman.   Unfortunately, as she was being transferred off the table, she got two skin tears.  Past tx: application of tegaderm   5/19/21: saw Dr. Moran. Tx: wound care referral.  Wounds: look clean and no signs of infection   5/21/21: wound check: tagaderms in place and legs rewrapped for protection  5/24/21: seeing myself today.     Denies any fevers, chills, and/or malodorous drainage.   PMH: HF with hx of pacemaker--on eliquis, past smoker, denies hx of diabetes  Last A1c  Lab Results   Component Value Date    A1C 5.5 12/04/2019         Patient Active Problem List   Diagnosis     Insomnia     Disorder of bone and cartilage     Essential hypertension     Neck pain, chronic     Hypercholesteremia     Non-ischemic cardiomyopathy (H)     CHF (congestive heart failure), NYHA class II (H)     Automatic implantable cardioverter-defibrillator - Medtronic multi lead ICD on 11/11/2014     Lumbar spine pain     LBBB (left bundle branch block)     History of tobacco abuse quitting on 2/1/1998     Mild intermittent asthma, unspecified whether complicated     Chronic obstructive pulmonary disease, unspecified COPD type (H)     SOB (shortness of breath)     Combined systolic at 30-35% and diastolic cardiac dysfunction     Paroxysmal atrial fibrillation (H)     Stage 3b chronic kidney disease     Chronic systolic CHF (congestive heart failure) (H)     Spinal stenosis of lumbar region with neurogenic claudication     Neuropathy     Osteoarthritis of thoracolumbar spine     Shock liver     Abnormal finding on urinalysis      Mixed hyperlipidemia     Bacteremia due to Klebsiella pneumoniae on 2021     Chronic, continuous use of opioids       Past Medical History:   Diagnosis Date     Angina pectoris 2011     CHF (congestive heart failure), NYHA class II (H) 12/10/2013     CHF (congestive heart failure), NYHA class III (H) 12/10/2013     Hyperhydrosis disorder 2011     Insomnia, unspecified 2011     LBBB (left bundle branch block) 2011     Neck pain, chronic 2011     Non-ischemic cardiomyopathy (H) 12/10/2013     Obesity, unspecified 2011     Osteopenia 2011     Pacemaker      Pain in joint, lower leg 2006     Pure hypercholesterolemia 2002     Unspecified essential hypertension 2011       Past Surgical History:   Procedure Laterality Date     APPENDECTOMY       ARTHROSCOPY KNEE RT/LT      RT     BACK SURGERY  2020     CARDIAC SURGERY  2014    Pacemaker     Cataract extraction  2009    Bilateral     CHOLECYSTECTOMY      open      COLONOSCOPY  2001    Normal      COLONOSCOPY N/A 10/20/2016    Procedure: COLONOSCOPY;  Surgeon: Charan Montejo MD;  Location: [facility] OR     colonoscopy with polypectomy      Repeat 3 years      CT CORONARY ANGIOGRAM      normal     HERPES ZOSTER PCR ([facility])       IR CONSULTATION FOR IR EXAM  2020     left eye scar tissue       normal echo      fen-phen use       x6       SURGICAL RADIOLOGY PROCEDURE N/A 2016    Procedure: SURGICAL RADIOLOGY PROCEDURE;  Surgeon: Provider, [facility] Nursing;  Location: [facility] OR       Family History   Problem Relation Age of Onset     Cancer Mother         lung (cause of death)      Peptic Ulcer Disease Father         gastric        Social History     Tobacco Use     Smoking status: Former Smoker     Packs/day: 1.00     Years: 15.00     Pack years: 15.00     Types: Cigarettes     Start date: 1983     Quit date: 1998     Years since quittin.3      Smokeless tobacco: Never Used     Tobacco comment: Passive Exposure: No; Longest Tobacco Free: 15 years    Substance Use Topics     Alcohol use: Not Currently     Comment: Occasionally        Current Outpatient Medications   Medication Sig Dispense Refill     alendronate (FOSAMAX) 70 MG tablet TAKE 1 TABLET BY MOUTH EVERY 7 DAYS. TAKE WITH 8 OUNCES OF WATER AND STAY UPRIGHT FOR AT LEAST 30 MINUTES AFTER TAKING. 12 tablet 0     budesonide-formoterol (SYMBICORT) 160-4.5 MCG/ACT Inhaler INHALE 2 PUFFS INTO THE LUNGS 2 TIMES DAILY 10.2 g 0     Calcium Carbonate-Vitamin D (CALCIUM 600 + D OR) Take 1 tablet by mouth daily       carvedilol (COREG) 12.5 MG tablet TAKE 1 TABLET BY MOUTH 2 TIMES DAILY WITH MEALS 180 tablet 0     ELIQUIS ANTICOAGULANT 5 MG tablet TAKE 1 TABLET BY MOUTH 2 TIMES DAILY 180 tablet 0     ENTRESTO 24-26 MG per tablet TAKE 1 TABLET BY MOUTH TWICE DAILY 60 tablet 0     gabapentin (NEURONTIN) 300 MG capsule TAKE 1 CAPSULE BY MOUTH 3 TIMES DAILY 90 capsule 8     HYDROcodone-acetaminophen (NORCO) 5-325 MG tablet TAKE 1 TO 2 TABLETS BY MOUTH EVERY 6 HOURS AS NEEDED 60 tablet 0     melatonin 1 MG TABS tablet Take 1 tablet (1 mg) by mouth nightly as needed for sleep 30 tablet 1     Multiple Vitamin (MULTI-VITAMIN DAILY PO) Take 1 tablet by mouth daily       naproxen sodium (ANAPROX) 220 MG tablet Take 220 mg by mouth 2 times daily (with meals)       rosuvastatin (CRESTOR) 10 MG tablet TAKE 1 TABLET BY MOUTH DAILY 90 tablet 3     SPIRIVA RESPIMAT 2.5 MCG/ACT inhaler INHALE 2 DOSES INTO THE LUNGS ONCE DAILY 4 g 0     spironolactone (ALDACTONE) 25 MG tablet TAKE 1/2 TABLET BY MOUTH DAILY 45 tablet 0     VENTOLIN  (90 Base) MCG/ACT inhaler INHALE 2 PUFFS INTO THE LUNGS EVERY 6 HOURS AS NEEDED FOR SHORTNESS OF BREATH/DYSPNEA/WHEEZING 18 g 0       No Known Allergies    REVIEW OF SYSTEMS  Skin: as stated above  Eyes: glasses  Ears/Nose/Throat: negative  Respiratory: Shortness of breath- no change, Dyspnea  on exertion- no change, No cough and No hemoptysis  Cardiovascular: negative, hx of HF and pacemaker  Gastrointestinal: negative  Genitourinary: negative  Musculoskeletal: denies wound pain, hx of back pain  Neurologic: positive for numbness or tingling of feet  Psychiatric: negative  Hematologic/Lymphatic/Immunologic: negative  Endocrine: negative    OBJECTIVE:  /63   Pulse 88   Temp 98.2  F (36.8  C)   SpO2 98%   Constitutional: alert and no distress  Respiratory:  Good diaphragmatic excursion.   Musculoskeletal: extremities normal- no gross deformities noted  Skin:   Wound description: Type of Wound- skin tear, trauma; Location- RLE, anterior, Drainage amount-scant, Drainage color-old blood, Odor- none; Wound bed-see picture, not boggy; Surrounding skin- +1 edema, +ecchymosis.  Measurements: general area of deeper bruising: 6 x 8.5 cm    Dressing change: Wound cleansed with soap and water, dressed with mepilex transfer and foam with silicone border.   Tubigrip size F--base of toes to bend of knee     Wound description: Type of Wound- skin tear, trauma; Location- LLE, anterior, Drainage amount-scant, Drainage color-old blood, Odor- none; Wound bed-see picture, not boggy, Surrounding skin- +1 edema, +ecchymosis.  Measurements: general area of deeper bruising: 10 x 8.3 cm    Dressing change: Wound cleansed with soap and water, dressed with mepilex transfer and foam with silicone border. Tubigrip size F--base of toes to bend of knee       Psychiatric: mentation appears normal and affect normal/bright      LABS  No results found for any visits on 05/24/21.    ASSESSMENT / PLAN:  (Q95.937E) Open wound of right lower leg, subsequent encounter  (primary encounter diagnosis)  Comment: noted and plan developed  Plan:   mepilex transfer--to protect fragile skin  Covered with foam with silicone border  Tubigrip size F--base of toes to bend of knee   Leave intact until next visit    (T14.8XXA) Multiple skin  tears  Comment: noted  Plan:   As stated above    (R15.511O) Open wound of left lower leg, subsequent encounter  Comment: noted and plan develop  Plan:   mepilex transfer--to protect fragile skin  Covered with foam with silicone border  Tubigrip size F--base of toes to bend of knee   Leave intact until next visit    Treatment goal:  Protect damaged skin  Promote healing  Edema control    Follow up  1 week      Patient Instructions   Keep dressing intact until you see us next week.      Protect them when showering with saran wrap.   Okay to get them a little damp, not saturated.     Please call if any questions/concerns and/or problems ([phone])    Follow up   One week    Increase protein intake        Time: 35 min  Barrier: none  Willingness to learn: accepting    Silva DAVIS FNP-BC  Diabetes and Wound Care    Cc: Dr. Post

## 2021-05-24 NOTE — PATIENT INSTRUCTIONS
Keep dressing intact until you see us next week.      Protect them when showering with saran wrap.   Okay to get them a little damp, not saturated.     Please call if any questions/concerns and/or problems ([phone])    Follow up   One week    Increase protein intake

## 2021-05-26 DIAGNOSIS — J44.9 CHRONIC OBSTRUCTIVE PULMONARY DISEASE, UNSPECIFIED COPD TYPE (H): ICD-10-CM

## 2021-05-26 DIAGNOSIS — J43.8 OTHER EMPHYSEMA (H): ICD-10-CM

## 2021-05-27 RX ORDER — TIOTROPIUM BROMIDE INHALATION SPRAY 3.12 UG/1
SPRAY, METERED RESPIRATORY (INHALATION)
Qty: 4 G | Refills: 0 | Status: SHIPPED | OUTPATIENT
Start: 2021-05-27 | End: 2021-06-30

## 2021-05-27 RX ORDER — BUDESONIDE AND FORMOTEROL FUMARATE DIHYDRATE 160; 4.5 UG/1; UG/1
AEROSOL RESPIRATORY (INHALATION)
Qty: 10.2 G | Refills: 0 | Status: SHIPPED | OUTPATIENT
Start: 2021-05-27 | End: 2021-06-30

## 2021-06-01 ENCOUNTER — HOSPITAL ENCOUNTER (OUTPATIENT)
Dept: WOUND CARE | Facility: HOSPITAL | Age: 86
Discharge: HOME | End: 2021-06-01
Attending: NURSE PRACTITIONER | Admitting: NURSE PRACTITIONER
Payer: MEDICARE

## 2021-06-01 VITALS
TEMPERATURE: 98.8 F | RESPIRATION RATE: 16 BRPM | OXYGEN SATURATION: 95 % | HEART RATE: 84 BPM | SYSTOLIC BLOOD PRESSURE: 116 MMHG | DIASTOLIC BLOOD PRESSURE: 68 MMHG

## 2021-06-01 PROCEDURE — G0463 HOSPITAL OUTPT CLINIC VISIT: HCPCS

## 2021-06-01 NOTE — IP AVS SNAPSHOT
After Visit Summary Template Not Found    This Print Group is only intended to be used in the After Visit Summary and can only be used in a report that uses a released After Visit Summary Template.                       MRN:[id number]                      After Visit Summary   6/1/2021    Jacklyn Hein    MRN: [id number]           Visit Information        Provider Department      6/1/2021  1:30 PM [facility]           Review of your medicines      UNREVIEWED medicines. Ask your doctor about these medicines       Dose / Directions   alendronate 70 MG tablet  Commonly known as: FOSAMAX  Used for: Age-related osteoporosis without current pathological fracture      TAKE 1 TABLET BY MOUTH EVERY 7 DAYS. TAKE WITH 8 OUNCES OF WATER AND STAY UPRIGHT FOR AT LEAST 30 MINUTES AFTER TAKING.  Quantity: 12 tablet  Refills: 0     budesonide-formoterol 160-4.5 MCG/ACT Inhaler  Commonly known as: Symbicort  Used for: Other emphysema (H)      INHALE 2 PUFFS INTO THE LUNGS 2 TIMES DAILY  Quantity: 10.2 g  Refills: 0     CALCIUM 600 + D PO      Dose: 1 tablet  Take 1 tablet by mouth daily  Refills: 0     carvedilol 12.5 MG tablet  Commonly known as: COREG  Used for: Non-ischemic cardiomyopathy (H)      TAKE 1 TABLET BY MOUTH 2 TIMES DAILY WITH MEALS  Quantity: 180 tablet  Refills: 0     ELIQUIS ANTICOAGULANT 5 MG tablet  Used for: Paroxysmal atrial fibrillation (H)  Generic drug: apixaban ANTICOAGULANT      TAKE 1 TABLET BY MOUTH 2 TIMES DAILY  Quantity: 180 tablet  Refills: 0     Entresto 24-26 MG per tablet  Used for: Congestive heart failure, NYHA class 2, unspecified congestive heart failure type (H)  Generic drug: sacubitril-valsartan      TAKE 1 TABLET BY MOUTH TWICE DAILY  Quantity: 60 tablet  Refills: 0     gabapentin 300 MG capsule  Commonly known as: NEURONTIN  Used for: Neuropathy      TAKE 1 CAPSULE BY MOUTH 3 TIMES DAILY  Quantity: 90 capsule  Refills: 8     HYDROcodone-acetaminophen 5-325 MG  tablet  Commonly known as: NORCO  Used for: Lumbar spine pain      TAKE 1 TO 2 TABLETS BY MOUTH EVERY 6 HOURS AS NEEDED  Quantity: 60 tablet  Refills: 0     MULTI-VITAMIN DAILY PO      Dose: 1 tablet  Take 1 tablet by mouth daily  Refills: 0     naproxen sodium 220 MG tablet  Commonly known as: ANAPROX      Dose: 220 mg  Take 220 mg by mouth 2 times daily (with meals)  Refills: 0     rosuvastatin 10 MG tablet  Commonly known as: CRESTOR  Used for: Mixed hyperlipidemia      TAKE 1 TABLET BY MOUTH DAILY  Quantity: 90 tablet  Refills: 3     Spiriva Respimat 2.5 MCG/ACT inhaler  Used for: Chronic obstructive pulmonary disease, unspecified COPD type (H)  Generic drug: tiotropium      INHALE 2 DOSES INTO THE LUNGS ONCE DAILY  Quantity: 4 g  Refills: 0     spironolactone 25 MG tablet  Commonly known as: ALDACTONE  Used for: Primary pulmonary hypertension (H)      TAKE 1/2 TABLET BY MOUTH DAILY  Quantity: 45 tablet  Refills: 0     Ventolin  (90 Base) MCG/ACT inhaler  Used for: Other emphysema (H)  Generic drug: albuterol      INHALE 2 PUFFS INTO THE LUNGS EVERY 6 HOURS AS NEEDED FOR SHORTNESS OF BREATH/DYSPNEA/WHEEZING  Quantity: 18 g  Refills: 0        CONTINUE these medicines which have NOT CHANGED       Dose / Directions   melatonin 1 MG Tabs tablet  Used for: Insomnia, unspecified type      Dose: 1 mg  Take 1 tablet (1 mg) by mouth nightly as needed for sleep  Quantity: 30 tablet  Refills: 1              Protect others around you: Learn how to safely use, store and throw away your medicines at www.disposemymeds.org.       Follow-ups after your visit       Your next 10 appointments already scheduled    Jun 08, 2021  1:30 PM  Return Visit with [facility]  [facility] ([facility] ) [address] [place] [id number]  [phone]      Aug 10, 2021  1:15 PM  (Arrive by 1:00 PM)  Procedure with Brandi Zhang PA-C  [facility] ([facility] )  [address]  [place] [address]  [phone]      Aug 24, 2021  1:00 PM  (Arrive by 12:45 PM)  Echo Complete with HIECH1  [facility] ([facility] [place] ) [address]  [place] [id number] [phone]   1. Please bring or wear a comfortable two-piece outfit.  2. You may eat, drink and take your normal medicines.  3. Please do not apply perfumes or lotions on the day of your exam.   4. For any questions that cannot be answered, please contact the ordering physician     Sep 08, 2021  1:00 PM  (Arrive by 12:45 PM)  Return Visit with Kodi Garcia,   [facility] [place] ([facility] ) [address]  [facility] [address]  [phone]         Care Instructions       Further instructions from your care team       Cleanse wounds with baby wash and water, pat or air dry.  Apply small amount of honey gel to the base of the dark scabbed areas.  Cover with foam dressings.    Additional Information About Your Visit       MyChart Information    [facility] gives you secure access to your electronic health record. If you see a primary care provider, you can also send messages to your care team and make appointments. If you have questions, please call your primary care clinic.  If you do not have a primary care provider, please call [phone] and they will assist you.       Care EveryWhere ID    This is your Care EveryWhere ID. This could be used by other organizations to access your [place] medical records  [id number]       Your Vitals Were  Most recent update: 6/1/2021  1:38 PM    Blood Pressure   116/68 (BP Location: Left arm)    Pulse   84    Temperature   98.8  F (37.1  C)    Respirations   16    Pulse Oximetry   95%          Primary Care Provider Office Phone # Fax #    R Josh Post [phone]      Equal Access to Services    ANA MITCHELL AH: Hadii faizan long hadasho Soomaali, waaxda luqadaha, qaybta kaalmada pastor zurita  brayan morris. So [facility] [phone].    ATENCIÓN: Si shefalila lexii, tiene a jacome disposición servicios gratuitos de asistencia lingüística. June al [phone].    We comply with applicable federal and state civil rights laws, including the [place] Human Rights Act. We do not discriminate on the basis of race, color, creed, Voodoo, national origin, marital status, age, disability, sex, sexual orientation, or gender identity.    If you would like an itemization of your charges they will now be available in [employer] 30 days after discharge. To access the itemized statements in [employer] go to billing/billing summary. From there select view account. There will be multiple tabs showing an overview of your account, detail, payments, and communications. From the communications tab you can see your monthly statements, your itemized statements, and any billing letters generated for your account. If you do not have a [employer] account and need help getting access please contact [employer] support at [phone].  If you would prefer to have your itemized statements mailed please contact our automated itemized bill request line at [phone] option  2.       Thank you!    Thank you for choosing [facility] for your care. Our goal is always to provide you with excellent care. Hearing back from our patients is one way we can continue to improve our services. Please take a few minutes to complete the written survey that you may receive in the mail after you visit with us. Thank you!            Medication List      Medications          Morning Afternoon Evening Bedtime As Needed    melatonin 1 MG Tabs tablet  INSTRUCTIONS: Take 1 tablet (1 mg) by mouth nightly as needed for sleep                       ASK your doctor about these medications          Morning Afternoon Evening Bedtime As Needed    alendronate 70 MG tablet  Also known as: FOSAMAX  INSTRUCTIONS: TAKE 1 TABLET BY MOUTH EVERY 7 DAYS. TAKE WITH 8 OUNCES OF  WATER AND STAY UPRIGHT FOR AT LEAST 30 MINUTES AFTER TAKING.  Doctor's comments: COULD WE HAVE ADDITIONAL REFILLS PLEASE??                     budesonide-formoterol 160-4.5 MCG/ACT Inhaler  Also known as: Symbicort  INSTRUCTIONS: INHALE 2 PUFFS INTO THE LUNGS 2 TIMES DAILY                     CALCIUM 600 + D PO  INSTRUCTIONS: Take 1 tablet by mouth daily                     carvedilol 12.5 MG tablet  Also known as: COREG  INSTRUCTIONS: TAKE 1 TABLET BY MOUTH 2 TIMES DAILY WITH MEALS  Doctor's comments: PT THINKS SHE IS ON 12.5MG, NOT 25MG                     ELIQUIS ANTICOAGULANT 5 MG tablet  INSTRUCTIONS: TAKE 1 TABLET BY MOUTH 2 TIMES DAILY  Generic drug: apixaban ANTICOAGULANT                     Entresto 24-26 MG per tablet  INSTRUCTIONS: TAKE 1 TABLET BY MOUTH TWICE DAILY  Generic drug: sacubitril-valsartan                     gabapentin 300 MG capsule  Also known as: NEURONTIN  INSTRUCTIONS: TAKE 1 CAPSULE BY MOUTH 3 TIMES DAILY  Doctor's comments: CAN WE HAVE ADDITIONAL REFILLS??                     HYDROcodone-acetaminophen 5-325 MG tablet  Also known as: NORCO  INSTRUCTIONS: TAKE 1 TO 2 TABLETS BY MOUTH EVERY 6 HOURS AS NEEDED                     MULTI-VITAMIN DAILY PO  INSTRUCTIONS: Take 1 tablet by mouth daily                     naproxen sodium 220 MG tablet  Also known as: ANAPROX  INSTRUCTIONS: Take 220 mg by mouth 2 times daily (with meals)                     rosuvastatin 10 MG tablet  Also known as: CRESTOR  INSTRUCTIONS: TAKE 1 TABLET BY MOUTH DAILY                     Spiriva Respimat 2.5 MCG/ACT inhaler  INSTRUCTIONS: INHALE 2 DOSES INTO THE LUNGS ONCE DAILY  Generic drug: tiotropium                     spironolactone 25 MG tablet  Also known as: ALDACTONE  INSTRUCTIONS: TAKE 1/2 TABLET BY MOUTH DAILY                     Ventolin  (90 Base) MCG/ACT inhaler  INSTRUCTIONS: INHALE 2 PUFFS INTO THE LUNGS EVERY 6 HOURS AS NEEDED FOR SHORTNESS OF BREATH/DYSPNEA/WHEEZING  Generic drug:  albuterol

## 2021-06-01 NOTE — PROGRESS NOTES
Jacklyn BONNER Asteranish, 1935  Chief Complaint   Patient presents with     WOUND CARE       Patient Active Problem List   Diagnosis     Insomnia     Disorder of bone and cartilage     Essential hypertension     Neck pain, chronic     Hypercholesteremia     Non-ischemic cardiomyopathy (H)     CHF (congestive heart failure), NYHA class II (H)     Automatic implantable cardioverter-defibrillator - Medtronic multi lead ICD on 11/11/2014     Lumbar spine pain     LBBB (left bundle branch block)     History of tobacco abuse quitting on 2/1/1998     Mild intermittent asthma, unspecified whether complicated     Chronic obstructive pulmonary disease, unspecified COPD type (H)     SOB (shortness of breath)     Combined systolic at 30-35% and diastolic cardiac dysfunction     Paroxysmal atrial fibrillation (H)     Stage 3b chronic kidney disease     Chronic systolic CHF (congestive heart failure) (H)     Spinal stenosis of lumbar region with neurogenic claudication     Neuropathy     Osteoarthritis of thoracolumbar spine     Shock liver     Abnormal finding on urinalysis     Mixed hyperlipidemia     Bacteremia due to Klebsiella pneumoniae on 1/5/2021     Chronic, continuous use of opioids       Concerns/Comments:   Jacklyn BONNER Sohancayden is here for re-evaluation and tx of skin tears to bilateral legs. First visit to the Wound Center was with Silva Figueroa NP on 5/24/21. Per Silva's charting Adele had a back procedure done on 5/18/21 at the [facility] and as she was being transferred off of the table she sustained 2 skin tears to her legs.  Tx prior was tegaderm dressings  Pt saw PCP Dr. Moran on 5/19/21and referred for wound care.   5/24/21: tx Mepilex transfer, foam, Medigrip size F for compression    Tolerated the dressings well.   She is here with her daughter Jaqueline today.    Objective:   Location:   RLE  Drainage:   none    Odor:   none  Measurement:   See picture  Edges:   attached  Base:   Dry adherent  scab to edge of flap  Surrounding Skin:   Intact/eccymotic    Location:   LLE  Drainage:   none    Odor:   none  Measurement:   See picture  Edges:   attached  Base:   Dry adherent scab to edge of flap  Surrounding Skin:   Intact/eccymotic, there is a small are distal to the wound that had opened and bled quite a bit per Adele, this is now a stable scab    Procedures:   Wound beds cleansed and evaluated.  Dressed scabbed areas with honey gel and bordered foam dressings.    Assessment/Response to tx:  Skin flaps appear to have attached and appear viable.  No periwound erythema or excessive warmth; no odor.  New small scab to RLE.    Plan of care:   Change dressing in 3 days.  Cleanse wounds with baby wash and water, pat or air dry.  Apply small amount of honey gel to the base of the dark scabbed areas.  Cover with foam dressings.  Return in one week.    Treatment Goal:  Epithelial migration without onset of infections    Supplies provided:  Dressing supplies for Fridays dressing change.    Education:  Wound care instructions/education provided. Patient verbalized understanding of instruction/education.    CC: ANTHONY Post

## 2021-06-01 NOTE — DISCHARGE INSTRUCTIONS
Cleanse wounds with baby wash and water, pat or air dry.  Apply small amount of honey gel to the base of the dark scabbed areas.  Cover with foam dressings.

## 2021-06-01 NOTE — PROGRESS NOTES
"Chief Complaint   Patient presents with     WOUND CARE       Initial /68 (BP Location: Left arm)   Pulse 84   Temp 98.8  F (37.1  C)   Resp 16   SpO2 95%  Estimated body mass index is 27.46 kg/m  as calculated from the following:    Height as of 5/21/21: 1.626 m (5' 4\").    Weight as of 5/21/21: 72.6 kg (160 lb).  Medication Reconciliation: complete  Zandra Rodriguez MA    "

## 2021-06-02 DIAGNOSIS — M54.50 LUMBAR SPINE PAIN: ICD-10-CM

## 2021-06-02 DIAGNOSIS — I42.8 NON-ISCHEMIC CARDIOMYOPATHY (H): ICD-10-CM

## 2021-06-02 RX ORDER — HYDROCODONE BITARTRATE AND ACETAMINOPHEN 5; 325 MG/1; MG/1
TABLET ORAL
Qty: 60 TABLET | Refills: 0 | Status: SHIPPED | OUTPATIENT
Start: 2021-06-02 | End: 2021-07-08

## 2021-06-02 RX ORDER — CARVEDILOL 12.5 MG/1
TABLET ORAL
Qty: 180 TABLET | Refills: 1 | Status: SHIPPED | OUTPATIENT
Start: 2021-06-02 | End: 2021-12-01

## 2021-06-02 NOTE — TELEPHONE ENCOUNTER
Hydrocodone - acetaminophen 5-325 mg      Last Written Prescription Date:  5/07/21  Last Fill Quantity: 60,   # refills: 0  Last Office Visit: 5/19/21  Future Office visit:    Next 5 appointments (look out 90 days)    Jun 08, 2021  1:30 PM  Return Visit with [facility]  [facility] ([facility] ) [address] [id number] [phone]           Routing refill request to provider for review/approval because:  Drug not on the [facility], [facility] or  Health refill protocol or controlled substance

## 2021-06-08 ENCOUNTER — HOSPITAL ENCOUNTER (OUTPATIENT)
Dept: WOUND CARE | Facility: HOSPITAL | Age: 86
Discharge: HOME | End: 2021-06-08
Attending: FAMILY MEDICINE | Admitting: FAMILY MEDICINE
Payer: MEDICARE

## 2021-06-08 VITALS
SYSTOLIC BLOOD PRESSURE: 136 MMHG | TEMPERATURE: 97.9 F | OXYGEN SATURATION: 96 % | RESPIRATION RATE: 16 BRPM | HEART RATE: 86 BPM | DIASTOLIC BLOOD PRESSURE: 77 MMHG

## 2021-06-08 PROCEDURE — G0463 HOSPITAL OUTPT CLINIC VISIT: HCPCS

## 2021-06-08 NOTE — PROGRESS NOTES
"Chief Complaint   Patient presents with     WOUND CARE       Initial /77 (BP Location: Left arm)   Pulse 86   Temp 97.9  F (36.6  C) (Tympanic)   Resp 16   SpO2 96%  Estimated body mass index is 27.46 kg/m  as calculated from the following:    Height as of 5/21/21: 1.626 m (5' 4\").    Weight as of 5/21/21: 72.6 kg (160 lb).  Medication Reconciliation: complete  Zandra Rodriguez MA    "

## 2021-06-08 NOTE — PROGRESS NOTES
Jacklyn Hein, 1935  Chief Complaint   Patient presents with     WOUND CARE       Patient Active Problem List   Diagnosis     Insomnia     Disorder of bone and cartilage     Essential hypertension     Neck pain, chronic     Hypercholesteremia     Non-ischemic cardiomyopathy (H)     CHF (congestive heart failure), NYHA class II (H)     Automatic implantable cardioverter-defibrillator - [employer] multi lead ICD on 11/11/2014     Lumbar spine pain     LBBB (left bundle branch block)     History of tobacco abuse quitting on 2/1/1998     Mild intermittent asthma, unspecified whether complicated     Chronic obstructive pulmonary disease, unspecified COPD type (H)     SOB (shortness of breath)     Combined systolic at 30-35% and diastolic cardiac dysfunction     Paroxysmal atrial fibrillation (H)     Stage 3b chronic kidney disease     Chronic systolic CHF (congestive heart failure) (H)     Spinal stenosis of lumbar region with neurogenic claudication     Neuropathy     Osteoarthritis of thoracolumbar spine     Shock liver     Abnormal finding on urinalysis     Mixed hyperlipidemia     Bacteremia due to Klebsiella pneumoniae on 1/5/2021     Chronic, continuous use of opioids       Concerns/Comments:   Jacklyn Hein is here for re-evaluation and tx of skin tears to bilateral legs. First visit to the [facility] was with Sivla Figueroa NP on 5/24/21. Per Silva's charting Adele had a back procedure done on 5/18/21 at the [facility] and as she was being transferred off of the table she sustained 2 skin tears to her legs.  Tx prior was tegaderm dressings  Pt saw PCP Dr. Moran on 5/19/21and referred for wound care.   5/24/21: tx Mepilex transfer, foam, Medigrip size F for compression  6/1/21: tx honey gel, Allevyn sacral foam dressings (large enough to cover the area)    Tolerated the dressings well.     Objective:   Location:   RLE  Drainage:   none    Odor:   none  Measurement:   Small open  superficial area approx 0.8x0.4cm  Edges:   attached  Base:   Red  Surrounding Skin:   Intact    Location:   LLE  Drainage:   none   Odor:   none  Measurement:   Small linear superficial area approx 1.4x0.2cm  Edges:   attached  Base:   Pink/red  Surrounding Skin:   Intact there is a slightly dusky area within the adhered skin flap that has mild fluctuance beneath, pt informed that this may open up or resolve     Procedures:   Wound beds cleansed and evaluated.  Vaseline applied to adherent dried scab.  Was able to lift the scab using forceps.  Dressed with bordered foam dressings.    Assessment/Response to tx:  Skin flaps attached and viable.  No periwound erythema or excessive warmth; no odor.  Only 2 small superficial areas remain along the flap edge.    Plan of care:   Remove dressings to legs in 5 days June 13th. Do not get dressing wet.  May shower without the dressings on even if there are any open areas remaining. Then pat or air dry.  If there are any open areas apply the Mepilex foam dressings that were provided.  Call if you feel you need an appt when you remove the dressings on the 13th. [phone].    Treatment Goal:  Epithelial migration without onset of infections    Supplies provided:  Mepilex bordered foam dressings    Education:  Wound care instructions/education provided. Patient verbalized understanding of instruction/education.    CC: ANTHONY Post

## 2021-06-08 NOTE — IP AVS SNAPSHOT
After Visit Summary Template Not Found    This Print Group is only intended to be used in the After Visit Summary and can only be used in a report that uses a released After Visit Summary Template.                       MRN:[id number]                      After Visit Summary   6/8/2021    Jacklyn Hein    MRN: [id number]           Visit Information        Provider Department      6/8/2021  1:30 PM [facility] WOUND CARE [facility] Wound Ostomy           Review of your medicines      UNREVIEWED medicines. Ask your doctor about these medicines       Dose / Directions   alendronate 70 MG tablet  Commonly known as: FOSAMAX  Used for: Age-related osteoporosis without current pathological fracture      TAKE 1 TABLET BY MOUTH EVERY 7 DAYS. TAKE WITH 8 OUNCES OF WATER AND STAY UPRIGHT FOR AT LEAST 30 MINUTES AFTER TAKING.  Quantity: 12 tablet  Refills: 0     budesonide-formoterol 160-4.5 MCG/ACT Inhaler  Commonly known as: Symbicort  Used for: Other emphysema (H)      INHALE 2 PUFFS INTO THE LUNGS 2 TIMES DAILY  Quantity: 10.2 g  Refills: 0     CALCIUM 600 + D PO      Dose: 1 tablet  Take 1 tablet by mouth daily  Refills: 0     carvedilol 12.5 MG tablet  Commonly known as: COREG  Used for: Non-ischemic cardiomyopathy (H)      TAKE 1 TABLET BY MOUTH 2 TIMES DAILY WITH MEALS  Quantity: 180 tablet  Refills: 1     ELIQUIS ANTICOAGULANT 5 MG tablet  Used for: Paroxysmal atrial fibrillation (H)  Generic drug: apixaban ANTICOAGULANT      TAKE 1 TABLET BY MOUTH 2 TIMES DAILY  Quantity: 180 tablet  Refills: 0     Entresto 24-26 MG per tablet  Used for: Congestive heart failure, NYHA class 2, unspecified congestive heart failure type (H)  Generic drug: sacubitril-valsartan      TAKE 1 TABLET BY MOUTH TWICE DAILY  Quantity: 60 tablet  Refills: 0     gabapentin 300 MG capsule  Commonly known as: NEURONTIN  Used for: Neuropathy      TAKE 1 CAPSULE BY MOUTH 3 TIMES DAILY  Quantity: 90 capsule  Refills: 8     HYDROcodone-acetaminophen 5-325 MG  tablet  Commonly known as: NORCO  Used for: Lumbar spine pain      TAKE 1 TO 2 TABLETS BY MOUTH EVERY 6 HOURS AS NEEDED  Quantity: 60 tablet  Refills: 0     MULTI-VITAMIN DAILY PO      Dose: 1 tablet  Take 1 tablet by mouth daily  Refills: 0     naproxen sodium 220 MG tablet  Commonly known as: ANAPROX      Dose: 220 mg  Take 220 mg by mouth 2 times daily (with meals)  Refills: 0     rosuvastatin 10 MG tablet  Commonly known as: CRESTOR  Used for: Mixed hyperlipidemia      TAKE 1 TABLET BY MOUTH DAILY  Quantity: 90 tablet  Refills: 3     Spiriva Respimat 2.5 MCG/ACT inhaler  Used for: Chronic obstructive pulmonary disease, unspecified COPD type (H)  Generic drug: tiotropium      INHALE 2 DOSES INTO THE LUNGS ONCE DAILY  Quantity: 4 g  Refills: 0     spironolactone 25 MG tablet  Commonly known as: ALDACTONE  Used for: Primary pulmonary hypertension (H)      TAKE 1/2 TABLET BY MOUTH DAILY  Quantity: 45 tablet  Refills: 0     Ventolin  (90 Base) MCG/ACT inhaler  Used for: Other emphysema (H)  Generic drug: albuterol      INHALE 2 PUFFS INTO THE LUNGS EVERY 6 HOURS AS NEEDED FOR SHORTNESS OF BREATH/DYSPNEA/WHEEZING  Quantity: 18 g  Refills: 0        CONTINUE these medicines which have NOT CHANGED       Dose / Directions   melatonin 1 MG Tabs tablet  Used for: Insomnia, unspecified type      Dose: 1 mg  Take 1 tablet (1 mg) by mouth nightly as needed for sleep  Quantity: 30 tablet  Refills: 1              Protect others around you: Learn how to safely use, store and throw away your medicines at www.disposemymeds.org.       Follow-ups after your visit       Your next 10 appointments already scheduled    Aug 10, 2021  1:15 PM  (Arrive by 1:00 PM)  Procedure with Brandi Zhang PA-C  [facility] ([facility] [place] ) [address]  [place] [address]  [phone]      Aug 24, 2021  1:00 PM  (Arrive by 12:45 PM)  Echo Complete with [address] CARDIAC SERVICES ([facility]  - [place] ) [address]  [place] [id number] [phone]   1. Please bring or wear a comfortable two-piece outfit.  2. You may eat, drink and take your normal medicines.  3. Please do not apply perfumes or lotions on the day of your exam.   4. For any questions that cannot be answered, please contact the ordering physician     Sep 08, 2021  1:00 PM  (Arrive by 12:45 PM)  Return Visit with Kodi Garcia,   [facility] ([facility] [place] ) [address]  [place] [address]  [phone]         Care Instructions       Further instructions from your care team       Remove dressings to legs in 5 days June 13th. Do not get dressing wet.  May shower without the dressings on even if there are any open areas remaining. Then pat or air dry.  If there are any open areas apply the Mepilex foam dressings that were provided.  Call if you feel you need an appt when you remove the dressings on the 13th. [phone].    Additional Information About Your Visit       MyChart Information    [employer] gives you secure access to your electronic health record. If you see a primary care provider, you can also send messages to your care team and make appointments. If you have questions, please call your primary care clinic.  If you do not have a primary care provider, please call [phone] and they will assist you.       Care EveryWhere ID    This is your Care EveryWhere ID. This could be used by other organizations to access your [facility] medical records  [id number]       Your Vitals Were  Most recent update: 6/8/2021  1:26 PM    Blood Pressure   136/77 (BP Location: Left arm)    Pulse   86    Temperature   97.9  F (36.6  C) (Tympanic)    Respirations   16    Pulse Oximetry   96%          Primary Care Provider Office Phone # Fax #    R Josh Post [phone]      Equal Access to Services    ANA MITCHELL AH: Vannesa Lane, car baldwin, claudioybta  pastor longoriabela morris. So [facility] [phone].    ATENCIÓN: Si vinnie shultz, tiene a jacome disposición servicios gratuitos de asistencia lingüística. June al [phone].    We comply with applicable federal and state civil rights laws, including the [place] Human Rights Act. We do not discriminate on the basis of race, color, creed, Orthodoxy, national origin, marital status, age, disability, sex, sexual orientation, or gender identity.    If you would like an itemization of your charges they will now be available in [employer] 30 days after discharge. To access the itemized statements in [employer] go to billing/billing summary. From there select view account. There will be multiple tabs showing an overview of your account, detail, payments, and communications. From the communications tab you can see your monthly statements, your itemized statements, and any billing letters generated for your account. If you do not have a [employer] account and need help getting access please contact [employer] support at [phone].  If you would prefer to have your itemized statements mailed please contact our automated itemized bill request line at [phone] option  2.       Thank you!    Thank you for choosing [facility] for your care. Our goal is always to provide you with excellent care. Hearing back from our patients is one way we can continue to improve our services. Please take a few minutes to complete the written survey that you may receive in the mail after you visit with us. Thank you!            Medication List      Medications          Morning Afternoon Evening Bedtime As Needed    melatonin 1 MG Tabs tablet  INSTRUCTIONS: Take 1 tablet (1 mg) by mouth nightly as needed for sleep                       ASK your doctor about these medications          Morning Afternoon Evening Bedtime As Needed    alendronate 70 MG tablet  Also known as: FOSAMAX  INSTRUCTIONS: TAKE 1 TABLET BY MOUTH EVERY  7 DAYS. TAKE WITH 8 OUNCES OF WATER AND STAY UPRIGHT FOR AT LEAST 30 MINUTES AFTER TAKING.  Doctor's comments: COULD WE HAVE ADDITIONAL REFILLS PLEASE??                     budesonide-formoterol 160-4.5 MCG/ACT Inhaler  Also known as: Symbicort  INSTRUCTIONS: INHALE 2 PUFFS INTO THE LUNGS 2 TIMES DAILY                     CALCIUM 600 + D PO  INSTRUCTIONS: Take 1 tablet by mouth daily                     carvedilol 12.5 MG tablet  Also known as: COREG  INSTRUCTIONS: TAKE 1 TABLET BY MOUTH 2 TIMES DAILY WITH MEALS                     ELIQUIS ANTICOAGULANT 5 MG tablet  INSTRUCTIONS: TAKE 1 TABLET BY MOUTH 2 TIMES DAILY  Generic drug: apixaban ANTICOAGULANT                     Entresto 24-26 MG per tablet  INSTRUCTIONS: TAKE 1 TABLET BY MOUTH TWICE DAILY  Generic drug: sacubitril-valsartan                     gabapentin 300 MG capsule  Also known as: NEURONTIN  INSTRUCTIONS: TAKE 1 CAPSULE BY MOUTH 3 TIMES DAILY  Doctor's comments: CAN WE HAVE ADDITIONAL REFILLS??                     HYDROcodone-acetaminophen 5-325 MG tablet  Also known as: NORCO  INSTRUCTIONS: TAKE 1 TO 2 TABLETS BY MOUTH EVERY 6 HOURS AS NEEDED                     MULTI-VITAMIN DAILY PO  INSTRUCTIONS: Take 1 tablet by mouth daily                     naproxen sodium 220 MG tablet  Also known as: ANAPROX  INSTRUCTIONS: Take 220 mg by mouth 2 times daily (with meals)                     rosuvastatin 10 MG tablet  Also known as: CRESTOR  INSTRUCTIONS: TAKE 1 TABLET BY MOUTH DAILY                     Spiriva Respimat 2.5 MCG/ACT inhaler  INSTRUCTIONS: INHALE 2 DOSES INTO THE LUNGS ONCE DAILY  Generic drug: tiotropium                     spironolactone 25 MG tablet  Also known as: ALDACTONE  INSTRUCTIONS: TAKE 1/2 TABLET BY MOUTH DAILY                     Ventolin  (90 Base) MCG/ACT inhaler  INSTRUCTIONS: INHALE 2 PUFFS INTO THE LUNGS EVERY 6 HOURS AS NEEDED FOR SHORTNESS OF BREATH/DYSPNEA/WHEEZING  Generic drug: albuterol

## 2021-06-08 NOTE — DISCHARGE INSTRUCTIONS
Remove dressings to legs in 5 days June 13th. Do not get dressing wet.  May shower without the dressings on even if there are any open areas remaining. Then pat or air dry.  If there are any open areas apply the Mepilex foam dressings that were provided.  Call if you feel you need an appt when you remove the dressings on the 13th. [phone].

## 2021-06-28 DIAGNOSIS — J44.9 CHRONIC OBSTRUCTIVE PULMONARY DISEASE, UNSPECIFIED COPD TYPE (H): ICD-10-CM

## 2021-06-28 DIAGNOSIS — J43.8 OTHER EMPHYSEMA (H): ICD-10-CM

## 2021-06-28 DIAGNOSIS — M81.0 AGE-RELATED OSTEOPOROSIS WITHOUT CURRENT PATHOLOGICAL FRACTURE: ICD-10-CM

## 2021-06-30 RX ORDER — BUDESONIDE AND FORMOTEROL FUMARATE DIHYDRATE 160; 4.5 UG/1; UG/1
AEROSOL RESPIRATORY (INHALATION)
Qty: 10.2 G | Refills: 0 | Status: SHIPPED | OUTPATIENT
Start: 2021-06-30 | End: 2021-08-03

## 2021-06-30 RX ORDER — TIOTROPIUM BROMIDE INHALATION SPRAY 3.12 UG/1
SPRAY, METERED RESPIRATORY (INHALATION)
Qty: 4 G | Refills: 0 | Status: SHIPPED | OUTPATIENT
Start: 2021-06-30 | End: 2021-08-03

## 2021-06-30 RX ORDER — ALENDRONATE SODIUM 70 MG/1
TABLET ORAL
Qty: 12 TABLET | Refills: 0 | Status: SHIPPED | OUTPATIENT
Start: 2021-06-30 | End: 2021-09-15

## 2021-06-30 NOTE — TELEPHONE ENCOUNTER
Symbicort       Last Written Prescription Date:  5/27/2021  Last Fill Quantity: 10.2g,   # refills: 0    Spiriva       Last Written Prescription Date:  5/27/2021  Last Fill Quantity: 4g,   # refills: 0    Fosamax       Last Written Prescription Date:  1/27/2021  Last Fill Quantity: 12,   # refills: 0  Last Office Visit: 5/19/2021  Future Office visit:    Next 5 appointments (look out 90 days)    Sep 08, 2021  1:00 PM  (Arrive by 12:45 PM)  Return Visit with Kodi Garcia DO  [facility] [place] ([facility] [place] ) [address]  [place] [address]  [phone]

## 2021-07-05 DIAGNOSIS — I48.0 PAROXYSMAL ATRIAL FIBRILLATION (H): ICD-10-CM

## 2021-07-07 DIAGNOSIS — M54.50 LUMBAR SPINE PAIN: ICD-10-CM

## 2021-07-07 RX ORDER — APIXABAN 5 MG/1
TABLET, FILM COATED ORAL
Qty: 180 TABLET | Refills: 0 | Status: SHIPPED | OUTPATIENT
Start: 2021-07-07 | End: 2021-09-30

## 2021-07-07 NOTE — TELEPHONE ENCOUNTER
eliquis      Last Written Prescription Date:  3/28/21  Last Fill Quantity: 180,   # refills: 0  Last Office Visit: 5/19/21  Future Office visit:    Next 5 appointments (look out 90 days)    Sep 08, 2021  1:00 PM  (Arrive by 12:45 PM)  Return Visit with Kodi Garcia DO  [facility] [place] ([facility] [place] ) [address]  [place] [address]  [phone]

## 2021-07-07 NOTE — TELEPHONE ENCOUNTER
HYDROCODONE-APAP 5-325 MG      Last Written Prescription Date:  6/2/21  Last Fill Quantity: 60,   # refills: 0  Last Office Visit: 5/19/21  Future Office visit:    Next 5 appointments (look out 90 days)    Sep 08, 2021  1:00 PM  (Arrive by 12:45 PM)  Return Visit with Kodi Garcia DO  [facility] [place] ([facility] [place] ) [address] [place] [address]  [phone]           Routing refill request to provider for review/approval because:    Drug not on the [facility], [facility] or [facility] refill protocol or controlled substance

## 2021-07-08 RX ORDER — HYDROCODONE BITARTRATE AND ACETAMINOPHEN 5; 325 MG/1; MG/1
TABLET ORAL
Qty: 60 TABLET | Refills: 0 | Status: SHIPPED | OUTPATIENT
Start: 2021-07-08 | End: 2021-08-25

## 2021-07-12 DIAGNOSIS — I27.0 PRIMARY PULMONARY HYPERTENSION (H): ICD-10-CM

## 2021-07-13 RX ORDER — SPIRONOLACTONE 25 MG/1
TABLET ORAL
Qty: 45 TABLET | Refills: 0 | Status: SHIPPED | OUTPATIENT
Start: 2021-07-13 | End: 2021-09-30

## 2021-07-13 NOTE — TELEPHONE ENCOUNTER
spironolactone      Last Written Prescription Date:  3/9/21  Last Fill Quantity: 45,   # refills: 0  Last Office Visit: 5/19/21  Future Office visit:    Next 5 appointments (look out 90 days)    Sep 08, 2021  1:00 PM  (Arrive by 12:45 PM)  Return Visit with Kodi Garcia DO  [facility] [place] ([facility] [place] ) [address]  [place] [address]  [phone]

## 2021-08-03 DIAGNOSIS — J44.9 CHRONIC OBSTRUCTIVE PULMONARY DISEASE, UNSPECIFIED COPD TYPE (H): ICD-10-CM

## 2021-08-03 DIAGNOSIS — J43.8 OTHER EMPHYSEMA (H): ICD-10-CM

## 2021-08-03 RX ORDER — TIOTROPIUM BROMIDE INHALATION SPRAY 3.12 UG/1
SPRAY, METERED RESPIRATORY (INHALATION)
Qty: 4 G | Refills: 0 | Status: SHIPPED | OUTPATIENT
Start: 2021-08-03 | End: 2021-09-15

## 2021-08-03 RX ORDER — BUDESONIDE AND FORMOTEROL FUMARATE DIHYDRATE 160; 4.5 UG/1; UG/1
AEROSOL RESPIRATORY (INHALATION)
Qty: 10.2 G | Refills: 0 | Status: SHIPPED | OUTPATIENT
Start: 2021-08-03 | End: 2021-09-15

## 2021-08-03 NOTE — TELEPHONE ENCOUNTER
SPIRIVA RESPIMAT INHAL SPRAY  Last Written Prescription Date:  6/30/21  Last Fill Quantity: 4 g,   # refills: 0  Last Office Visit: 5/19/2021  Future Office visit:    Next 5 appointments (look out 90 days)    Sep 08, 2021  1:00 PM  (Arrive by 12:45 PM)  Return Visit with Kodi Garcia DO  [facility] [address] [facility] [address] [facility] [address]  [phone]           Routing refill request to provider for review/approval because:        Symbicort 160/4.5 mcg inhal     Last Written Prescription Date:  6/30/2021  Last Fill Quantity: 10.2 g,   # refills: 0  Last Office Visit: 5/19/2021  Future Office visit:    Next 5 appointments (look out 90 days)    Sep 08, 2021  1:00 PM  (Arrive by 12:45 PM)  [facility] DO  [facility] ([facility] [place] ) [address]  [place] [address]  [phone]           Routing refill request to provider for review/approval because:

## 2021-08-10 ENCOUNTER — OFFICE VISIT (OUTPATIENT)
Dept: OTOLARYNGOLOGY | Facility: OTHER | Age: 86
End: 2021-08-10
Attending: PHYSICIAN ASSISTANT
Payer: MEDICARE

## 2021-08-10 VITALS
OXYGEN SATURATION: 95 % | TEMPERATURE: 97.3 F | WEIGHT: 154 LBS | HEIGHT: 65 IN | DIASTOLIC BLOOD PRESSURE: 74 MMHG | HEART RATE: 89 BPM | SYSTOLIC BLOOD PRESSURE: 132 MMHG | BODY MASS INDEX: 25.66 KG/M2

## 2021-08-10 DIAGNOSIS — H61.23 EXCESSIVE CERUMEN IN BOTH EAR CANALS: Primary | ICD-10-CM

## 2021-08-10 DIAGNOSIS — H61.23 KERATIN DEBRIS OF BOTH EAR CANALS: ICD-10-CM

## 2021-08-10 PROCEDURE — 92504 EAR MICROSCOPY EXAMINATION: CPT | Performed by: PHYSICIAN ASSISTANT

## 2021-08-10 PROCEDURE — 69210 REMOVE IMPACTED EAR WAX UNI: CPT | Mod: 50 | Performed by: PHYSICIAN ASSISTANT

## 2021-08-10 PROCEDURE — G0463 HOSPITAL OUTPT CLINIC VISIT: HCPCS

## 2021-08-10 ASSESSMENT — PAIN SCALES - GENERAL: PAINLEVEL: NO PAIN (0)

## 2021-08-10 ASSESSMENT — MIFFLIN-ST. JEOR: SCORE: 1144.42

## 2021-08-10 NOTE — PROGRESS NOTES
Chief Complaint   Patient presents with     Cerumen Impaction     Pt is here for an ear cleaning.         Adele Hein is a 85 year old female seen today for ear cleaning. She reports her ears are full of cerumen. She was in the ED and noted to have cerumen impaction.   She was last seen on 7/16/20 for ear cleaning. Ninoska, her daughter is with today.           Denies COM or otologic surgeries.   Denies otalgia, otorrhea  Denies worrisome tinnitus  Denies fluctuating hearing loss or tinnitus.   She does have hearing loss, but declines audiogram/ aids.   Denies vertigo or facial paraesthesia. Denies dysphagia.     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     Insomnia, unspecified 01/01/2011     LBBB (left bundle branch block) 01/01/2011     Neck pain, chronic 01/01/2011     Non-ischemic cardiomyopathy (H) 12/10/2013     Obesity, unspecified 01/01/2011     Osteopenia 01/01/2011     Pacemaker      Pain in joint, lower leg 07/31/2006     Pure hypercholesterolemia 06/07/2002     Unspecified essential hypertension 01/01/2011      No Known Allergies  Current Outpatient Medications   Medication     alendronate (FOSAMAX) 70 MG tablet     budesonide-formoterol (SYMBICORT) 160-4.5 MCG/ACT Inhaler     Calcium Carbonate-Vitamin D (CALCIUM 600 + D OR)     carvedilol (COREG) 12.5 MG tablet     ELIQUIS ANTICOAGULANT 5 MG tablet     ENTRESTO 24-26 MG per tablet     gabapentin (NEURONTIN) 300 MG capsule     HYDROcodone-acetaminophen (NORCO) 5-325 MG tablet     melatonin 1 MG TABS tablet     Multiple Vitamin (MULTI-VITAMIN DAILY PO)     naproxen sodium (ANAPROX) 220 MG tablet     rosuvastatin (CRESTOR) 10 MG tablet     SPIRIVA RESPIMAT 2.5 MCG/ACT inhaler     spironolactone (ALDACTONE) 25 MG tablet     VENTOLIN  (90 Base)  "MCG/ACT inhaler     No current facility-administered medications for this visit.     ROS- SEE HPI  /74 (Cuff Size: Adult Regular)   Pulse 89   Temp 97.3  F (36.3  C) (Tympanic)   Ht 1.651 m (5' 5\")   Wt 69.9 kg (154 lb)   SpO2 95%   BMI 25.63 kg/m      General - The patient is well nourished and well developed, and appears to have good nutritional status.  Alert and oriented to person and place, answers questions and cooperates with examination appropriately.   Head and Face - Normocephalic and atraumatic, with no gross asymmetry noted.  The facial nerve is intact, with strong symmetric movements.  Voice and Breathing - The patient was breathing comfortably without the use of accessory muscles. There was no wheezing, stridor, or stertor.  The patients voice was clear and strong, and had appropriate pitch and quality.  On examination of the ears, I found that the ear(s) were completely impacted with dense cerumen.  Therefore, I positioned them in the examination chair in a semi-supine position, beginning with the right side.  I used the binocular surgical microscope to perform cerumen removal.  I began by using a cerumen loop to gently lift the edges of the cerumen mass away from the walls of the external canal.   Once the mass was loose enough, the entire plug was pulled from the canal.  The tympanic membrane was intact, no sign of perforation or middle ear effusion.     I turned my attention to the contralateral side once again using the binocular surgical microscope to perform cerumen removal.  I began by using a cerumen loop to gently lift the edges of the cerumen mass away from the walls of the external canal.   Once the mass was loose enough, the entire plug was pulled from the canal.  The tympanic membrane was intact, no sign of perforation or middle ear effusion.     Eyes - Extraocular movements intact, and the pupils were reactive to light.  Sclera were not icteric or injected, conjunctiva were " pink and moist.  Mouth - Examination of the oral cavity showed pink, healthy oral mucosa. No lesions or ulcerations noted.  The tongue was mobile and midline, and the dentition were in good condition.    Throat - The walls of the oropharynx were smooth, pink, moist, symmetric, and had no lesions or ulcerations.  The tonsillar pillars and soft palate were symmetric.  The uvula was midline on elevation.    Neck - Normal midline excursion of the laryngotracheal complex during swallowing.  Full range of motion on passive movement.  Palpation of the occipital, submental, submandibular, internal jugular chain, and supraclavicular nodes did not demonstrate any abnormal lymph nodes or masses.  Palpation of the thyroid was soft and smooth, with no nodules or goiter appreciated.  The trachea was mobile and midline.         ASSESSMENT:    ICD-10-CM    1. Excessive cerumen in both ear canals  H61.23    2. Keratin debris of both ear canals  H61.23      Ears were cleaned.   She tolerated well. No concerns.   Return in 12 months for ear cleaning. If concerns may return sooner.                  Brandi Zhang PA-C  ENT  [facility]

## 2021-08-10 NOTE — NURSING NOTE
"Chief Complaint   Patient presents with     Cerumen Impaction     Pt is here for an ear cleaning.       Initial /74 (Cuff Size: Adult Regular)   Pulse 89   Temp 97.3  F (36.3  C) (Tympanic)   Ht 1.651 m (5' 5\")   Wt 69.9 kg (154 lb)   SpO2 95%   BMI 25.63 kg/m   Estimated body mass index is 25.63 kg/m  as calculated from the following:    Height as of this encounter: 1.651 m (5' 5\").    Weight as of this encounter: 69.9 kg (154 lb).  Medication Reconciliation: complete  Angie Belle LPN    "

## 2021-08-10 NOTE — LETTER
8/10/2021         RE: Jacklyn Hein  [address] [place] [address]        Dear Colleague,    Thank you for referring your patient, Jacklyn Hein, to the [facility]. Please see a copy of my visit note below.    Chief Complaint   Patient presents with     Cerumen Impaction     Pt is here for an ear cleaning.         Adele Hein is a 85 year old female seen today for ear cleaning. She reports her ears are full of cerumen. She was in the ED and noted to have cerumen impaction.   She was last seen on 7/16/20 for ear cleaning. Ninoska, her daughter is with today.           Denies COM or otologic surgeries.   Denies otalgia, otorrhea  Denies worrisome tinnitus  Denies fluctuating hearing loss or tinnitus.   She does have hearing loss, but declines audiogram/ aids.   Denies vertigo or facial paraesthesia. Denies dysphagia.     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     Insomnia, unspecified 01/01/2011     LBBB (left bundle branch block) 01/01/2011     Neck pain, chronic 01/01/2011     Non-ischemic cardiomyopathy (H) 12/10/2013     Obesity, unspecified 01/01/2011     Osteopenia 01/01/2011     Pacemaker      Pain in joint, lower leg 07/31/2006     Pure hypercholesterolemia 06/07/2002     Unspecified essential hypertension 01/01/2011      No Known Allergies  Current Outpatient Medications   Medication     alendronate (FOSAMAX) 70 MG tablet     budesonide-formoterol (SYMBICORT) 160-4.5 MCG/ACT Inhaler     Calcium Carbonate-Vitamin D (CALCIUM 600 + D OR)     carvedilol (COREG) 12.5 MG tablet     ELIQUIS ANTICOAGULANT 5 MG tablet     ENTRESTO 24-26 MG per tablet     gabapentin (NEURONTIN) 300 MG capsule     HYDROcodone-acetaminophen (NORCO) 5-325 MG tablet     melatonin 1 MG  "TABS tablet     Multiple Vitamin (MULTI-VITAMIN DAILY PO)     naproxen sodium (ANAPROX) 220 MG tablet     rosuvastatin (CRESTOR) 10 MG tablet     SPIRIVA RESPIMAT 2.5 MCG/ACT inhaler     spironolactone (ALDACTONE) 25 MG tablet     VENTOLIN  (90 Base) MCG/ACT inhaler     No current facility-administered medications for this visit.     ROS- SEE HPI  /74 (Cuff Size: Adult Regular)   Pulse 89   Temp 97.3  F (36.3  C) (Tympanic)   Ht 1.651 m (5' 5\")   Wt 69.9 kg (154 lb)   SpO2 95%   BMI 25.63 kg/m      General - The patient is well nourished and well developed, and appears to have good nutritional status.  Alert and oriented to person and place, answers questions and cooperates with examination appropriately.   Head and Face - Normocephalic and atraumatic, with no gross asymmetry noted.  The facial nerve is intact, with strong symmetric movements.  Voice and Breathing - The patient was breathing comfortably without the use of accessory muscles. There was no wheezing, stridor, or stertor.  The patients voice was clear and strong, and had appropriate pitch and quality.  On examination of the ears, I found that the ear(s) were completely impacted with dense cerumen.  Therefore, I positioned them in the examination chair in a semi-supine position, beginning with the right side.  I used the binocular surgical microscope to perform cerumen removal.  I began by using a cerumen loop to gently lift the edges of the cerumen mass away from the walls of the external canal.   Once the mass was loose enough, the entire plug was pulled from the canal.  The tympanic membrane was intact, no sign of perforation or middle ear effusion.     I turned my attention to the contralateral side once again using the binocular surgical microscope to perform cerumen removal.  I began by using a cerumen loop to gently lift the edges of the cerumen mass away from the walls of the external canal.   Once the mass was loose enough, the " entire plug was pulled from the canal.  The tympanic membrane was intact, no sign of perforation or middle ear effusion.     Eyes - Extraocular movements intact, and the pupils were reactive to light.  Sclera were not icteric or injected, conjunctiva were pink and moist.  Mouth - Examination of the oral cavity showed pink, healthy oral mucosa. No lesions or ulcerations noted.  The tongue was mobile and midline, and the dentition were in good condition.    Throat - The walls of the oropharynx were smooth, pink, moist, symmetric, and had no lesions or ulcerations.  The tonsillar pillars and soft palate were symmetric.  The uvula was midline on elevation.    Neck - Normal midline excursion of the laryngotracheal complex during swallowing.  Full range of motion on passive movement.  Palpation of the occipital, submental, submandibular, internal jugular chain, and supraclavicular nodes did not demonstrate any abnormal lymph nodes or masses.  Palpation of the thyroid was soft and smooth, with no nodules or goiter appreciated.  The trachea was mobile and midline.         ASSESSMENT:    ICD-10-CM    1. Excessive cerumen in both ear canals  H61.23    2. Keratin debris of both ear canals  H61.23      Ears were cleaned.   She tolerated well. No concerns.   Return in 12 months for ear cleaning. If concerns may return sooner.                  Brandi Zhang PA-C  ENT  [facility], [place]      Again, thank you for allowing me to participate in the care of your patient.        Sincerely,        Brandi Zhang PA-C

## 2021-08-10 NOTE — PATIENT INSTRUCTIONS
Ears were cleaned  Follow up in 1 year or sooner if needed for cleaning.       Thank you for allowing Brandi Zhang PA-C and our ENT team to participate in your care.  If your medications are too expensive, please give the nurse a call.  We can possibly change this medication.  If you have a scheduling or an appointment question please contact our Health Unit Coordinator at [phone], Ext. [phone].    ALL nursing questions or concerns can be directed to your ENT nurse at: [phone] [place]

## 2021-08-24 ENCOUNTER — HOSPITAL ENCOUNTER (OUTPATIENT)
Dept: CARDIOLOGY | Facility: HOSPITAL | Age: 86
Discharge: HOME | End: 2021-08-24
Attending: INTERNAL MEDICINE | Admitting: STUDENT IN AN ORGANIZED HEALTH CARE EDUCATION/TRAINING PROGRAM
Payer: MEDICARE

## 2021-08-24 DIAGNOSIS — I42.8 NON-ISCHEMIC CARDIOMYOPATHY (H): ICD-10-CM

## 2021-08-24 DIAGNOSIS — I51.89 COMBINED SYSTOLIC AND DIASTOLIC CARDIAC DYSFUNCTION: ICD-10-CM

## 2021-08-24 DIAGNOSIS — I10 ESSENTIAL HYPERTENSION: ICD-10-CM

## 2021-08-24 DIAGNOSIS — R06.02 SOB (SHORTNESS OF BREATH): ICD-10-CM

## 2021-08-24 DIAGNOSIS — I50.9 CONGESTIVE HEART FAILURE, NYHA CLASS 2, UNSPECIFIED CONGESTIVE HEART FAILURE TYPE (H): ICD-10-CM

## 2021-08-24 DIAGNOSIS — M54.50 LUMBAR SPINE PAIN: ICD-10-CM

## 2021-08-24 DIAGNOSIS — I50.22 CHRONIC SYSTOLIC CHF (CONGESTIVE HEART FAILURE) (H): ICD-10-CM

## 2021-08-24 LAB — LVEF ECHO: NORMAL

## 2021-08-24 PROCEDURE — 93306 TTE W/DOPPLER COMPLETE: CPT

## 2021-08-25 RX ORDER — HYDROCODONE BITARTRATE AND ACETAMINOPHEN 5; 325 MG/1; MG/1
TABLET ORAL
Qty: 60 TABLET | Refills: 0 | Status: SHIPPED | OUTPATIENT
Start: 2021-08-25 | End: 2021-09-15

## 2021-08-25 NOTE — TELEPHONE ENCOUNTER
HYDROcodone-acetaminophen (NORCO) 5-325 MG tablet        Last Written Prescription Date:  7/8/21  Last Fill Quantity: 60,   # refills: 0  Last Office Visit: 5/19/21  Future Office visit:    Next 5 appointments (look out 90 days)    Sep 08, 2021  1:00 PM  (Arrive by 12:45 PM)  Return Visit with Kodi Garcia DO  [facility] ([facility] ) [address] [place] [address] [place] [address]  [phone]           Routing refill request to provider for review/approval because:    Drug not on the [facility], [facility] or  Health refill protocol or controlled substance

## 2021-09-07 NOTE — PROGRESS NOTES
[facility] HEART CARE   CARDIOLOGY PROGRESS NOTE     Chief Complaint   Patient presents with     Follow Up     Heart Problem          Diagnosis:  1. H/O systolic 30-35% and diastolic CHF on 1/5/21. 40-45%% on 12/01/2019. 50-55% on 8/24/21.   2.  Reduction in severity of COPD-severe to mild/moderate per pulmonary and concern for asthma based on PFT's from 5/17/17.  3.  Nonischemic cardiomyopathy with NYHA classification 2.  4.  ICD placement on 11/11/2014.  5.  Hypertension-controlled.  6.  Hyperlipidemia-uncontrolled.  7.  Remote h/o tobacco abuse quitting on 2/1/1998  8.  LBBB.  9.  SOB-significantly improved.  10.  CKD-3.  11.  Hypocalcemia.  12.  A. fib up to 23 seconds on 4/9/19.  13.  CHADSVASC score of 4.   14.  Mitral and tricuspid regurgitation-mild to moderate on 1/5/2021.  15.  Bacteremia on 1/5/2021 with Klebsiella pneumonia with septic shock.    Assessment/Plan:    1.  Related to paroxysmal atrial fibrillation, continue on Coreg 12.5 mg twice a day and Eliquis 5 mg twice a day.  2.  With a history of heart failure, will continue on spironolactone 12.5 mg daily, Coreg 12.5 mg twice a day, and Entresto with increases as noted above.  3.  Reviewed her echocardiogram which now shows a low normal EF at 50-55%.  She does have diastolic dysfunction.    4.  Patient remains short of breath which I feel is secondary to COPD and asthma.  5.  Salt restriction, fluid restriction, and daily weights.  6.  She will be referred back to the pacemaker clinic.  It has been since November, 2020 since last checked.  I suspect she has fallen through the cracks related to the COVID-19 pandemic.  7.  Follow-up in 1 year or sooner with issues.      Interval history:  Jacklyn was hospitalized on 1/5/2021 for bacteremia with sepsis as well as UTI.  She was appropriately treated with antibiotics and has done well.  Her heart function had declined from 40-45% on 12/1/2019 to 30-35% on 1/5/2021.  She is grateful today as she had repeat  echocardiogram with findings of a low normal EF of 50-55%.  She continues to have evidence for diastolic dysfunction.  She has some mild peripheral edema but nothing significant.  She is grateful as she is currently not on diuretics.  We discussed continuing her heart failure medications for at least 1 to 2 years.  Otherwise, her heart function may fall.  She continues be short of breath.  She is responding well to breathing treatments.  She does follow-up with pulmonary.  I suspect her shortness of breath is not heart related but more lung related and this was explained today.  She does have a history of atrial fibrillation and is currently on Eliquis.  The cost is affordable.  She has not had significant bruising.  It has been a year since her cholesterols been checked.  It was uncontrolled at that time.  She is currently on a cholesterol medication and tolerating this well.  She is worried about the COVID-19 pandemic and the delta variant.  She is vaccinated.      HPI:    Jacklyn BONNER Asteranish is being seen by cardiology for dyspnea with chronic systolic EF of 40-45% and evidence for diastolic heart failure on 12/31/2019.     She has been short of breath with a diagnosis of asthma and COPD.  She is followed for a history of severe nonischemic cardiomyopathy with a previously ejection fraction at less than 35%. More recently, her EF on 12/31/2019 was 40-45%.  EF of 30-35% on 1/5/21. She also has diastolic heart failure, ICD placed on 11/11/16, her recent visit with pulmonary secondary to what was thought to be severe COPD but was downgraded to mild to moderate with a greater concern for asthma, per the patient.     She was started on some breathing treatments for asthma/COPD by pulmonary.  With these treatments, she says she feels much better but remains short of breath.       With having diastolic and systolic heart failure, she has minimal to no lower extremity edema. Her activity has been limited secondary to  chronic back pain and generalized weakness.  It was not for back pain, she would be doing really well.     She has a history of severe nonischemic cardiomyopathy S/P ICD placement on 11/11/2014. She had her ICD checked on 10/22/2019.  It showed she was bi-V paced 96.8% of the time with 3.0 years left on her battery.  = 98.1%. She had up to 20 seconds of A. fib with a total of 3 episodes.  Her device was described as functioning appropriately.     Echo from 12/31/2019.  She has an EF of 40% to 45%, grade 1 diastolic dysfunction, mild left atrial enlargement, mild to moderate AI, and mild MI      Relevant testing:  ECHO on 8/24/21:  Technically difficult study. Poor acoustic windows.  Left ventricular function is decreased. The ejection fraction is 50-55%  (borderline). Mild diffuse hypokinesis is present. Diastolic Doppler findings (E/E' ratio and/or other parameters) suggest left ventricular filling pressures are increased.  Global right ventricular function is normal. The right ventricle is normal size.  No significant valvular abnormalities.  The estimated PA systolic pressure is 17 mmHg above the RA pressure. The RA pressure could not be estimated.  This study was compared with the study from 01/06/2021. There is no  significant change in the biventricular function upon direct visual  comparison.    Echo on 12/31/2019:  No pericardial effusion is present.  Left ventricular size is normal.  The Ejection Fraction is estimated at 40-45%.  Grade I or early diastolic dysfunction.  Mild diffuse hypokinesis is present.  The right ventricle is normal size.  Global right ventricular function is normal.  Mild left atrial enlargement is present.  Mild mitral insufficiency is present.  Mild to moderate aortic insufficiency is present.  The mean gradient across the aortic valve is 3.9 mmHg.  Trace tricuspid insufficiency is present.  Right ventricular systolic pressure is 20 mmHg above the right atrial pressure.  The  pulmonic valve is normal.  The aorta root is normal.    Echocardiogram on 8/24/2021:  Technically difficult study. Poor acoustic windows.  Left ventricular function is decreased. The ejection fraction is 50-55%  (borderline). Mild diffuse hypokinesis is present. Diastolic Doppler findings  (E/E' ratio and/or other parameters) suggest left ventricular filling  pressures are increased.  Global right ventricular function is normal. The right ventricle is normal  size.  No significant valvular abnormalities.  The estimated PA systolic pressure is 17 mmHg above the RA pressure. The RA  pressure could not be estimated.  This study was compared with the study from 01/06/2021. There is no  significant change in the biventricular function upon direct visual  comparison.      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     Insomnia, unspecified 01/01/2011     LBBB (left bundle branch block) 01/01/2011     Neck pain, chronic 01/01/2011     Non-ischemic cardiomyopathy (H) 12/10/2013     Obesity, unspecified 01/01/2011     Osteopenia 01/01/2011     Pacemaker      Pain in joint, lower leg 07/31/2006     Pure hypercholesterolemia 06/07/2002     Unspecified essential hypertension 01/01/2011       Past Surgical History:   Procedure Laterality Date     APPENDECTOMY       ARTHROSCOPY KNEE RT/LT  2009    RT     BACK SURGERY  02/06/2020     BACK SURGERY  06/2021    fractured vert repair     CARDIAC SURGERY  05/06/2014    Pacemaker     Cataract extraction  2009    Bilateral     CHOLECYSTECTOMY      open      COLONOSCOPY  05/2001    Normal      COLONOSCOPY N/A 10/20/2016    Procedure: COLONOSCOPY;  Surgeon: Chaarn Montejo MD;  Location: [facility] OR     colonoscopy with polypectomy  2011    Repeat 3 years      CT CORONARY ANGIOGRAM  1999    normal      HERPES ZOSTER PCR ([facility])       IR CONSULTATION FOR IR EXAM  2020     left eye scar tissue       normal echo      fen-phen use       x6       SURGICAL RADIOLOGY PROCEDURE N/A 2016    Procedure: SURGICAL RADIOLOGY PROCEDURE;  Surgeon: Provider, [facility]ianest[facility] Nursing;  Location: [facility] OR       No Known Allergies    Current Outpatient Medications   Medication Sig Dispense Refill     alendronate (FOSAMAX) 70 MG tablet TAKE 1 TABLET BY MOUTH EVERY 7 DAYS. TAKE WITH 8 OUNCES OF WATER AND STAY UPRIGHT FOR AT LEAST 30 MINUTES AFTER TAKING. 12 tablet 0     budesonide-formoterol (SYMBICORT) 160-4.5 MCG/ACT Inhaler INHALE 2 PUFFS INTO THE LUNGS 2 TIMES DAILY 10.2 g 0     Calcium Carbonate-Vitamin D (CALCIUM 600 + D OR) Take 1 tablet by mouth daily       carvedilol (COREG) 12.5 MG tablet TAKE 1 TABLET BY MOUTH 2 TIMES DAILY WITH MEALS 180 tablet 1     ELIQUIS ANTICOAGULANT 5 MG tablet TAKE 1 TABLET BY MOUTH 2 TIMES DAILY 180 tablet 0     ENTRESTO 24-26 MG per tablet TAKE 1 TABLET BY MOUTH TWICE DAILY 60 tablet 0     gabapentin (NEURONTIN) 300 MG capsule TAKE 1 CAPSULE BY MOUTH 3 TIMES DAILY 90 capsule 8     HYDROcodone-acetaminophen (NORCO) 5-325 MG tablet TAKE 1 TO 2 TABLETS BY MOUTH EVERY 6 HOURS AS NEEDED 60 tablet 0     melatonin 1 MG TABS tablet Take 1 tablet (1 mg) by mouth nightly as needed for sleep 30 tablet 1     Multiple Vitamin (MULTI-VITAMIN DAILY PO) Take 1 tablet by mouth daily       naproxen sodium (ANAPROX) 220 MG tablet Take 220 mg by mouth 2 times daily (with meals)       rosuvastatin (CRESTOR) 10 MG tablet TAKE 1 TABLET BY MOUTH DAILY 90 tablet 3     SPIRIVA RESPIMAT 2.5 MCG/ACT inhaler INHALE 2 DOSES INTO THE LUNGS ONCE DAILY 4 g 0     spironolactone (ALDACTONE) 25 MG tablet TAKE 1/2 TABLET BY MOUTH DAILY 45 tablet 0     VENTOLIN  (90 Base) MCG/ACT inhaler INHALE 2 PUFFS INTO THE LUNGS EVERY 6 HOURS AS NEEDED FOR SHORTNESS OF BREATH/DYSPNEA/WHEEZING 18 g 0       Social  History     Socioeconomic History     Marital status:      Spouse name: Not on file     Number of children: Not on file     Years of education: Not on file     Highest education level: Not on file   Occupational History     Occupation:       Employer: RETIRED   Tobacco Use     Smoking status: Former Smoker     Packs/day: 1.00     Years: 15.00     Pack years: 15.00     Types: Cigarettes     Start date: 1983     Quit date: 1998     Years since quittin.6     Smokeless tobacco: Never Used     Tobacco comment: Passive Exposure: No; Longest Tobacco Free: 15 years    Substance and Sexual Activity     Alcohol use: Not Currently     Comment: Occasionally      Drug use: No     Sexual activity: Not on file   Other Topics Concern      Service Not Asked     Blood Transfusions Not Asked     Caffeine Concern Yes     Comment: Coffee 5 cups daily      Occupational Exposure Not Asked     Hobby Hazards Not Asked     Sleep Concern Not Asked     Stress Concern Not Asked     Weight Concern Not Asked     Special Diet Not Asked     Back Care Not Asked     Exercise Not Asked     Bike Helmet Not Asked     Seat Belt Not Asked     Self-Exams Not Asked     Parent/sibling w/ CABG, MI or angioplasty before 65F 55M? No   Social History Narrative     Not on file     Social Determinants of Health     Financial Resource Strain:      Difficulty of Paying Living Expenses:    Food Insecurity:      Worried About Running Out of Food in the Last Year:      Ran Out of Food in the Last Year:    Transportation Needs:      Lack of Transportation (Medical):      Lack of Transportation (Non-Medical):    Physical Activity:      Days of Exercise per Week:      Minutes of Exercise per Session:    Stress:      Feeling of Stress :    Social Connections:      Frequency of Communication with Friends and Family:      Frequency of Social Gatherings with Friends and Family:      Attends Spiritism Services:      Active Member of  Clubs or Organizations:      Attends Club or Organization Meetings:      Marital Status:    Intimate Partner Violence:      Fear of Current or Ex-Partner:      Emotionally Abused:      Physically Abused:      Sexually Abused:        LAB RESULTS:   Office Visit on 08/08/2020   Component Date Value Ref Range Status     COVID-19 Virus PCR to [facility] - So* 08/08/2020 Nasopharyngeal   Final     COVID-19 Virus PCR to [facility] - Re* 08/08/2020 Test received-See reflex to IDDL test SARS CoV2 (COVID-19) Virus RT-PCR   Final     SARS-CoV-2 Virus Specimen Source 08/08/2020 Nasopharyngeal   Final     SARS-CoV-2 PCR Result 08/08/2020 NEGATIVE   Final     SARS-CoV-2 PCR Comment 08/08/2020    Final                    Value:Testing was performed using the Compiere SARS-CoV-2 Assay on the [employer] Instrument System.   Additional information about this Emergency Use Authorization (EUA) assay can be found via   the Lab Guide.     Ancillary Procedure on 08/04/2020   Component Date Value Ref Range Status     Date Time Interrogation Session 08/05/2020 [id number]   Final     Implantable Pulse Generator Manufa* 08/05/2020 Medtronic   Final     Implantable Pulse Generator Model 08/05/2020 [id number] Viva XT CRT-D   Final     Implantable Pulse Generator Serial* 08/05/2020 [id number]   Final     Type Interrogation Session 08/05/2020 Remote    Final     Clinic Name 08/05/2020 [facility]   Final     Implantable Pulse Generator Type 08/05/2020 Cardiac Resynchronization Therapy - Defibrillator   Final     Implantable Pulse Generator Implan* 08/05/2020 [id number]   Final     Implantable Lead  08/05/2020 Medtronic   Final     Implantable Lead Model 08/05/2020 4296 Attain Ability Plus   Final     Implantable Lead Serial Number 08/05/2020 [id number]   Final     Implantable Lead Implant Date 08/05/2020 [id number]   Final     Implantable Lead Polarity Type 08/05/2020 Bipolar Lead   Final     Implantable Lead Location Detail  1 08/05/2020 UNKNOWN   Final     Implantable Lead Location 08/05/2020 Left Ventricle   Final     Implantable Lead  08/05/2020 Medtronic   Final     Implantable Lead Model 08/05/2020 5076 CapSureFix Novus   Final     Implantable Lead Serial Number 08/05/2020 [id number]   Final     Implantable Lead Implant Date 08/05/2020 [id number]   Final     Implantable Lead Polarity Type 08/05/2020 Bipolar Lead   Final     Implantable Lead Location Detail 1 08/05/2020 APPENDAGE   Final     Implantable Lead Location 08/05/2020 Right Atrium   Final     Implantable Lead  08/05/2020 Medtronic   Final     Implantable Lead Model 08/05/2020 6947 Sprint Quattro Secure   Final     Implantable Lead Serial Number 08/05/2020 [id number]   Final     Implantable Lead Implant Date 08/05/2020 [id number]   Final     Implantable Lead Polarity Type 08/05/2020 Quadripolar Lead   Final     Implantable Lead Location Detail 1 08/05/2020 APEX   Final     Implantable Lead Location 08/05/2020 Right Ventricle   Final     Vamshi Setting Mode (NBG Code) 08/05/2020 DDDR   Final     Vamshi Setting Lower Rate Limit 08/05/2020 60  [beats]/min Final     Vamshi Setting Maximum Tracking Rate 08/05/2020 130  [beats]/min Final     Vamshi Setting Maximum Sensor Rate 08/05/2020 130  [beats]/min Final     Vamshi Setting NAJMA Delay Low 08/05/2020 140  ms Final     Vamshi Setting PAV Delay Low 08/05/2020 160  ms Final     Vamshi Setting AT Mode Switch Rate 08/05/2020 171  [beats]/min Final     Lead Channel Setting Sensing Polar* 08/05/2020 Bipolar   Final     Lead Channel Setting Sensing Anode* 08/05/2020 Right Atrium   Final     Lead Channel Setting Sensing Anode* 08/05/2020 Ring   Final     Lead Channel Setting Sensing Catho* 08/05/2020 Right Atrium   Final     Lead Channel Setting Sensing Catho* 08/05/2020 Tip   Final     Lead Channel Setting Sensing Sensi* 08/05/2020 0.3  mV Final     Lead Channel Setting Sensing Polar* 08/05/2020 Bipolar   Final     Lead  Channel Setting Sensing Anode* 08/05/2020 Right Ventricle   Final     Lead Channel Setting Sensing Anode* 08/05/2020 Coil   Final     Lead Channel Setting Sensing Catho* 08/05/2020 Right Ventricle   Final     Lead Channel Setting Sensing Catho* 08/05/2020 Tip   Final     Lead Channel Setting Sensing Sensi* 08/05/2020 0.3  mV Final     Ventricular chambers paced during * 08/05/2020 LVOnly   Final     Lead Channel Setting Pacing Polari* 08/05/2020 Bipolar   Final     Lead Channel Setting Pacing Anode * 08/05/2020 Right Atrium   Final     Lead Channel Setting Pacing Anode * 08/05/2020 Ring   Final     Lead Channel Setting Sensing Catho* 08/05/2020 Right Atrium   Final     Lead Channel Setting Sensing Catho* 08/05/2020 Tip   Final     Lead Channel Setting Pacing Pulse * 08/05/2020 0.4  ms Final     Lead Channel Setting Pacing Amplit* 08/05/2020 1.5  V Final     Lead Channel Setting Pacing Captur* 08/05/2020 Adaptive   Final     Lead Channel Setting Pacing Polari* 08/05/2020 Bipolar   Final     Lead Channel Setting Pacing Anode * 08/05/2020 Right Ventricle   Final     Lead Channel Setting Pacing Anode * 08/05/2020 Ring   Final     Lead Channel Setting Sensing Catho* 08/05/2020 Right Ventricle   Final     Lead Channel Setting Sensing Catho* 08/05/2020 Tip   Final     Lead Channel Setting Pacing Pulse * 08/05/2020 0.4  ms Final     Lead Channel Setting Pacing Amplit* 08/05/2020 2  V Final     Lead Channel Setting Pacing Captur* 08/05/2020 Adaptive   Final     Lead Channel Setting Pacing Polari* 08/05/2020 Bipolar   Final     Lead Channel Setting Pacing Anode * 08/05/2020 Right Ventricle   Final     Lead Channel Setting Pacing Anode * 08/05/2020 Coil   Final     Lead Channel Setting Sensing Catho* 08/05/2020 Left Ventricle   Final     Lead Channel Setting Sensing Catho* 08/05/2020 Tip   Final     Lead Channel Setting Pacing Pulse * 08/05/2020 0.6  ms Final     Lead Channel Setting Pacing Amplit* 08/05/2020 1.5  V Final      Lead Channel Setting Pacing Captur* 08/05/2020 Adaptive   Final     Zone Setting Type Category 08/05/2020 VF   Final     Zone Setting Detection Interval 08/05/2020 320  ms Final     Zone Setting Detection Beats Numer* 08/05/2020 30  [beats] Final     Zone Setting Detection Beats Denom* 08/05/2020 40  [beats] Final     Zone Setting Type Category 08/05/2020 VT   Final     Zone Setting Detection Interval 08/05/2020 Blank   Final     Zone Setting Type Category 08/05/2020 VT   Final     Zone Setting Detection Interval 08/05/2020 360  ms Final     Zone Setting Type Category 08/05/2020 VT   Final     Zone Setting Detection Interval 08/05/2020 400  ms Final     Zone Setting Type Category 08/05/2020 ATRIAL_FIBRILLATION   Final     Zone Setting Type Category 08/05/2020 AT/AF   Final     Zone Setting Detection Interval 08/05/2020 350  ms Final     Lead Channel Impedance Value 08/05/2020 437  ohm Final     Lead Channel Sensing Intrinsic Amp* 08/05/2020 2.375  mV Final     Lead Channel Sensing Intrinsic Amp* 08/05/2020 2.375  mV Final     Lead Channel Pacing Threshold Ampl* 08/05/2020 0.375  V Final     Lead Channel Pacing Threshold Puls* 08/05/2020 0.4  ms Final     Lead Channel Impedance Value 08/05/2020 399  ohm Final     Lead Channel Impedance Value 08/05/2020 323  ohm Final     Lead Channel Sensing Intrinsic Amp* 08/05/2020 5  mV Final     Lead Channel Sensing Intrinsic Amp* 08/05/2020 5  mV Final     Lead Channel Pacing Threshold Ampl* 08/05/2020 0.625  V Final     Lead Channel Pacing Threshold Puls* 08/05/2020 0.4  ms Final     Lead Channel Impedance Value 08/05/2020 893  ohm Final     Lead Channel Impedance Value 08/05/2020 456  ohm Final     Lead Channel Impedance Value 08/05/2020 570  ohm Final     Lead Channel Pacing Threshold Ampl* 08/05/2020 0.5  V Final     Lead Channel Pacing Threshold Puls* 08/05/2020 0.6  ms Final     Battery Date Time of Measurements 08/05/2020 20200804012400   Final     Battery Status  08/05/2020 OK   Final     Battery RRT Trigger 08/05/2020 2.727   Final     Battery Remaining Longevity 08/05/2020 26  mo Final     Battery Voltage 08/05/2020 2.94  V Final     Capacitor Charge Type 08/05/2020 Reformation   Final     Capacitor Last Charge Date Time 08/05/2020 [id number]   Final     Capacitor Charge Time 08/05/2020 4.314   Final     Capacitor Charge Energy 08/05/2020 18  J Final     Vamshi Statistic Date Time Start 08/05/2020 20200504124338   Final     Vamshi Statistic Date Time End 08/05/2020 20200804012403   Final     Vamshi Statistic RA Percent Paced 08/05/2020 9.93  % Final     Vamshi Statistic RV Percent Paced 08/05/2020 7.92  % Final     CRT Statistic LV Percent Paced 08/05/2020 98.09  % Final     Vamshi Statistic AP  Percent 08/05/2020 9.75  % Final     Vamshi Statistic AS  Percent 08/05/2020 88.58  % Final     Vamshi Statistic AP VS Percent 08/05/2020 0.19  % Final     Vamshi Statistic AS VS Percent 08/05/2020 1.47  % Final     CRT Statistic Date Time Start 08/05/2020 [id number]   Final     CRT Statistic Date Time End 08/05/2020 20200804012403   Final     CRT Statistic CRT Percent Paced 08/05/2020 98.09  % Final     Atrial Tachy Statistic Date Time S* 08/05/2020 [id number]   Final     Atrial Tachy Statistic Date Time E* 08/05/2020 20200804012403   Final     Atrial Tachy Statistic AT/AF Burde* 08/05/2020 0.1  % Final     Therapy Statistic Recent Shocks De* 08/05/2020 0   Final     Therapy Statistic Recent Shocks Ab* 08/05/2020 0   Final     Therapy Statistic Recent ATP Deliv* 08/05/2020 0   Final     Therapy Statistic Recent Date Time* 08/05/2020 [id number]   Final     Therapy Statistic Recent Date Time* 08/05/2020 20200804012403   Final     Therapy Statistic Total Shocks Del* 08/05/2020 1   Final     Therapy Statistic Total Shocks Abo* 08/05/2020 0   Final     Therapy Statistic Total ATP Delive* 08/05/2020 0   Final     Therapy Statistic Total  Date Time* 08/05/2020 [id number]    Final     Therapy Statistic Total  Date Time* 08/05/2020 20200804012403   Final     Episode Statistic Recent Count 08/05/2020 1   Final     Episode Statistic Type Category 08/05/2020 AT/AF   Final     Episode Statistic Recent Count 08/05/2020 0   Final     Episode Statistic Type Category 08/05/2020 SVT   Final     Episode Statistic Recent Count 08/05/2020 0   Final     Episode Statistic Type Category 08/05/2020 VT   Final     Episode Statistic Recent Count 08/05/2020 0   Final     Episode Statistic Type Category 08/05/2020 VF   Final     Episode Statistic Recent Count 08/05/2020 0   Final     Episode Statistic Type Category 08/05/2020 VT   Final     Episode Statistic Recent Count 08/05/2020 0   Final     Episode Statistic Type Category 08/05/2020 VT   Final     Episode Statistic Recent Count 08/05/2020 0   Final     Episode Statistic Type Category 08/05/2020 VT   Final     Episode Statistic Recent Date Time* 08/05/2020 [id number]   Final     Episode Statistic Recent Date Time* 08/05/2020 20200804012403   Final     Episode Statistic Recent Date Time* 08/05/2020 [id number]   Final     Episode Statistic Recent Date Time* 08/05/2020 20200804012403   Final     Episode Statistic Recent Date Time* 08/05/2020 [id number]   Final     Episode Statistic Recent Date Time* 08/05/2020 20200804012403   Final     Episode Statistic Recent Date Time* 08/05/2020 [id number]   Final     Episode Statistic Recent Date Time* 08/05/2020 20200804012403   Final     Episode Statistic Recent Date Time* 08/05/2020 [id number]   Final     Episode Statistic Recent Date Time* 08/05/2020 20200804012403   Final     Episode Statistic Recent Date Time* 08/05/2020 [id number]   Final     Episode Statistic Recent Date Time* 08/05/2020 20200804012403   Final     Episode Statistic Recent Date Time* 08/05/2020 [id number]   Final     Episode Statistic Recent Date Time* 08/05/2020 20200804012403   Final     Episode Statistic Total  Count 08/05/2020 0   Final     Episode Statistic Type Category 08/05/2020 AT/AF   Final     Episode Statistic Total Count 08/05/2020 0   Final     Episode Statistic Type Category 08/05/2020 SVT   Final     Episode Statistic Total Count 08/05/2020 66   Final     Episode Statistic Type Category 08/05/2020 VT   Final     Episode Statistic Total Count 08/05/2020 1   Final     Episode Statistic Type Category 08/05/2020 VF   Final     Episode Statistic Total Count 08/05/2020 0   Final     Episode Statistic Type Category 08/05/2020 VT   Final     Episode Statistic Total Count 08/05/2020 0   Final     Episode Statistic Type Category 08/05/2020 VT   Final     Episode Statistic Total Count 08/05/2020 0   Final     Episode Statistic Type Category 08/05/2020 VT   Final     Episode Statistic Total Date Time * 08/05/2020 [id number]   Final     Episode Statistic Total Date Time * 08/05/2020 20200804012403   Final     Episode Statistic Total Date Time * 08/05/2020 [id number]   Final     Episode Statistic Total Date Time * 08/05/2020 20200804012403   Final     Episode Statistic Total Date Time * 08/05/2020 [id number]   Final     Episode Statistic Total Date Time * 08/05/2020 20200804012403   Final     Episode Statistic Total Date Time * 08/05/2020 [id number]   Final     Episode Statistic Total Date Time * 08/05/2020 20200804012403   Final     Episode Statistic Total Date Time * 08/05/2020 [id number]   Final     Episode Statistic Total Date Time * 08/05/2020 20200804012403   Final     Episode Statistic Total Date Time * 08/05/2020 [id number]   Final     Episode Statistic Total Date Time * 08/05/2020 20200804012403   Final     Episode Statistic Total Date Time * 08/05/2020 [id number]   Final     Episode Statistic Total Date Time * 08/05/2020 20200804012403   Final     Episode Identifier 08/05/2020 716   Final     Episode Type Category 08/05/2020 VSE   Final     Episode Date Time 08/05/2020 [id number]    "Final     Episode Duration 08/05/2020 9  s Final     Episode Identifier 08/05/2020 715   Final     Episode Type Category 08/05/2020 VSE   Final     Episode Date Time 08/05/2020 [id number]   Final     Episode Duration 08/05/2020 3  s Final     Episode Identifier 08/05/2020 714   Final     Episode Type Category 08/05/2020 VSE   Final     Episode Date Time 08/05/2020 [id number]   Final     Episode Duration 08/05/2020 4  s Final     Episode Identifier 08/05/2020 713   Final     Episode Type Category 08/05/2020 VSE   Final     Episode Date Time 08/05/2020 [id number]   Final     Episode Duration 08/05/2020 6  s Final     Episode Identifier 08/05/2020 712   Final     Episode Type Category 08/05/2020 VSE   Final     Episode Date Time 08/05/2020 [id number]   Final     Episode Duration 08/05/2020 18  s Final     Episode Identifier 08/05/2020 711   Final     Episode Type Category 08/05/2020 VSE   Final     Episode Date Time 08/05/2020 [id number]   Final     Episode Duration 08/05/2020 4  s Final        Review of systems: Negative except that which was noted in the HPI.    Physical examination:  /69   Pulse 78   Resp 16   Ht 1.651 m (5' 5\")   Wt 72.1 kg (159 lb)   SpO2 98%   BMI 26.46 kg/m      GENERAL APPEARANCE: healthy, alert and no distress  HEENT: no icterus, no xanthelasmas, normal pupil size and reaction, no cyanosis.  NECK: no adenopathy, no asymmetry, masses, or scars.  CHEST: lungs clear to auscultation - no rales, rhonchi or wheezes, no use of accessory muscles, no retractions, respirations are unlabored, normal respiratory rate  CARDIOVASCULAR: Irregular rate irregular rhythm, normal S1 with physiologic split S2, no S3 or S4 and no murmur, click or rub  ABDOMEN: soft, non tender, bowel sounds normal.  Distant sounds.  EXTREMITIES: no clubbing, cyanosis but with mild to moderate lower extremity edema  NEURO: alert and oriented normal speech, and affect  VASC: No vascular bruits " heard.  SKIN: no ecchymoses, no rashes      Thank you for allowing me to participate in the care of your patient. Please do not hesitate to contact me if you have any questions.     Kodi Garcia, DO

## 2021-09-08 ENCOUNTER — PATIENT OUTREACH (OUTPATIENT)
Dept: CARE COORDINATION | Facility: OTHER | Age: 86
End: 2021-09-08

## 2021-09-08 ENCOUNTER — OFFICE VISIT (OUTPATIENT)
Dept: CARDIOLOGY | Facility: OTHER | Age: 86
End: 2021-09-08
Attending: INTERNAL MEDICINE
Payer: COMMERCIAL

## 2021-09-08 VITALS
BODY MASS INDEX: 26.49 KG/M2 | WEIGHT: 159 LBS | HEIGHT: 65 IN | HEART RATE: 78 BPM | DIASTOLIC BLOOD PRESSURE: 69 MMHG | SYSTOLIC BLOOD PRESSURE: 113 MMHG | OXYGEN SATURATION: 98 % | RESPIRATION RATE: 16 BRPM

## 2021-09-08 DIAGNOSIS — I10 ESSENTIAL HYPERTENSION: ICD-10-CM

## 2021-09-08 DIAGNOSIS — J44.9 CHRONIC OBSTRUCTIVE PULMONARY DISEASE, UNSPECIFIED COPD TYPE (H): ICD-10-CM

## 2021-09-08 DIAGNOSIS — N18.32 STAGE 3B CHRONIC KIDNEY DISEASE (H): ICD-10-CM

## 2021-09-08 DIAGNOSIS — E78.00 HYPERCHOLESTEREMIA: ICD-10-CM

## 2021-09-08 DIAGNOSIS — I48.0 PAROXYSMAL ATRIAL FIBRILLATION (H): ICD-10-CM

## 2021-09-08 DIAGNOSIS — I50.22 CHRONIC SYSTOLIC CHF (CONGESTIVE HEART FAILURE) (H): ICD-10-CM

## 2021-09-08 DIAGNOSIS — Z87.891 HISTORY OF TOBACCO ABUSE: ICD-10-CM

## 2021-09-08 DIAGNOSIS — I42.8 NON-ISCHEMIC CARDIOMYOPATHY (H): ICD-10-CM

## 2021-09-08 DIAGNOSIS — J45.20 MILD INTERMITTENT ASTHMA, UNSPECIFIED WHETHER COMPLICATED: ICD-10-CM

## 2021-09-08 DIAGNOSIS — R06.02 SOB (SHORTNESS OF BREATH): Primary | ICD-10-CM

## 2021-09-08 DIAGNOSIS — I50.9 CONGESTIVE HEART FAILURE, NYHA CLASS 2, UNSPECIFIED CONGESTIVE HEART FAILURE TYPE (H): ICD-10-CM

## 2021-09-08 DIAGNOSIS — E78.2 MIXED HYPERLIPIDEMIA: ICD-10-CM

## 2021-09-08 DIAGNOSIS — Z95.810 AUTOMATIC IMPLANTABLE CARDIOVERTER-DEFIBRILLATOR IN SITU: ICD-10-CM

## 2021-09-08 DIAGNOSIS — I51.89 COMBINED SYSTOLIC AND DIASTOLIC CARDIAC DYSFUNCTION: ICD-10-CM

## 2021-09-08 DIAGNOSIS — I44.7 LBBB (LEFT BUNDLE BRANCH BLOCK): ICD-10-CM

## 2021-09-08 PROCEDURE — 99215 OFFICE O/P EST HI 40 MIN: CPT | Performed by: INTERNAL MEDICINE

## 2021-09-08 PROCEDURE — G0463 HOSPITAL OUTPT CLINIC VISIT: HCPCS

## 2021-09-08 ASSESSMENT — MIFFLIN-ST. JEOR: SCORE: 1167.1

## 2021-09-08 ASSESSMENT — PAIN SCALES - GENERAL: PAINLEVEL: NO PAIN (0)

## 2021-09-08 NOTE — PROGRESS NOTES
Clinic Care Coordination Contact  Care Team Conversations    Spoke with Dr. Garcia after patient appointment. Not current candidate for CHF CC. Will add to watch list for possible future enrollment.       Deng ARCE RN  CHF Care Coordinator   [phone] Option #8  Ext. *[phone]

## 2021-09-08 NOTE — PATIENT INSTRUCTIONS
Thank you for allowing Dr. Garcia and our  team to participate in your care. Please call our office at [phone] with scheduling questions or if you need to cancel or change your appointment. With any other questions or concerns you may call [facility] cardiology nurse at [phone].       If you experience chest pain, chest pressure, chest tightness, shortness of breath, fainting, lightheadedness, nausea, vomiting, or other concerning symptoms, please report to the Emergency Department or call 911. These symptoms may be emergent, and best treated in the Emergency Department.    Follow up in 1 year     Someone will call you to schedule you for a Device Check ( Pacemaker ) if you do not hear from someone please call our office.

## 2021-09-08 NOTE — NURSING NOTE
"Chief Complaint   Patient presents with     Follow Up     Heart Problem       Initial /69   Pulse 78   Resp 16   Ht 1.651 m (5' 5\")   Wt 72.1 kg (159 lb)   SpO2 98%   BMI 26.46 kg/m   Estimated body mass index is 26.46 kg/m  as calculated from the following:    Height as of this encounter: 1.651 m (5' 5\").    Weight as of this encounter: 72.1 kg (159 lb).  Medication Reconciliation: complete  Mana Acosta LPN    "

## 2021-09-12 ENCOUNTER — HEALTH MAINTENANCE LETTER (OUTPATIENT)
Age: 86
End: 2021-09-12

## 2021-09-14 DIAGNOSIS — M54.50 LUMBAR SPINE PAIN: ICD-10-CM

## 2021-09-14 DIAGNOSIS — J43.8 OTHER EMPHYSEMA (H): ICD-10-CM

## 2021-09-14 DIAGNOSIS — M81.0 AGE-RELATED OSTEOPOROSIS WITHOUT CURRENT PATHOLOGICAL FRACTURE: ICD-10-CM

## 2021-09-14 DIAGNOSIS — J44.9 CHRONIC OBSTRUCTIVE PULMONARY DISEASE, UNSPECIFIED COPD TYPE (H): ICD-10-CM

## 2021-09-15 RX ORDER — BUDESONIDE AND FORMOTEROL FUMARATE DIHYDRATE 160; 4.5 UG/1; UG/1
AEROSOL RESPIRATORY (INHALATION)
Qty: 10.2 G | Refills: 0 | Status: SHIPPED | OUTPATIENT
Start: 2021-09-15 | End: 2021-10-15

## 2021-09-15 RX ORDER — HYDROCODONE BITARTRATE AND ACETAMINOPHEN 5; 325 MG/1; MG/1
TABLET ORAL
Qty: 60 TABLET | Refills: 0 | Status: SHIPPED | OUTPATIENT
Start: 2021-09-15 | End: 2021-10-15

## 2021-09-15 RX ORDER — ALENDRONATE SODIUM 70 MG/1
TABLET ORAL
Qty: 12 TABLET | Refills: 0 | Status: SHIPPED | OUTPATIENT
Start: 2021-09-15 | End: 2021-12-21

## 2021-09-15 RX ORDER — TIOTROPIUM BROMIDE INHALATION SPRAY 3.12 UG/1
SPRAY, METERED RESPIRATORY (INHALATION)
Qty: 4 G | Refills: 0 | Status: SHIPPED | OUTPATIENT
Start: 2021-09-15 | End: 2021-10-15

## 2021-09-15 NOTE — TELEPHONE ENCOUNTER
Norco      Last Written Prescription Date:  8.25.21  Last Fill Quantity: #60,   # refills: 0  Last Office Visit: 1.18.21  Future Office visit:    Next 5 appointments (look out 90 days)    Sep 24, 2021  2:00 PM  (Arrive by 1:45 PM)  SHORT with ANTHONY Post MD  [facility] [address]  [place] [address]  [phone]           Routing refill request to provider for review/approval because:  Drug not on the [facility], [facility] or [place] refill protocol or controlled substance

## 2021-09-24 ENCOUNTER — OFFICE VISIT (OUTPATIENT)
Dept: FAMILY MEDICINE | Facility: OTHER | Age: 86
End: 2021-09-24
Attending: FAMILY MEDICINE
Payer: MEDICARE

## 2021-09-24 VITALS
BODY MASS INDEX: 25.83 KG/M2 | OXYGEN SATURATION: 98 % | HEART RATE: 73 BPM | SYSTOLIC BLOOD PRESSURE: 102 MMHG | HEIGHT: 65 IN | RESPIRATION RATE: 20 BRPM | TEMPERATURE: 97.8 F | WEIGHT: 155 LBS | DIASTOLIC BLOOD PRESSURE: 58 MMHG

## 2021-09-24 DIAGNOSIS — M25.552 HIP PAIN, LEFT: ICD-10-CM

## 2021-09-24 DIAGNOSIS — Z02.83 ENCOUNTER FOR DRUG SCREENING: Primary | ICD-10-CM

## 2021-09-24 LAB — CREAT UR-MCNC: 50 MG/DL

## 2021-09-24 PROCEDURE — G0463 HOSPITAL OUTPT CLINIC VISIT: HCPCS | Mod: 25

## 2021-09-24 PROCEDURE — 82570 ASSAY OF URINE CREATININE: CPT | Mod: ZL | Performed by: FAMILY MEDICINE

## 2021-09-24 PROCEDURE — 80307 DRUG TEST PRSMV CHEM ANLYZR: CPT | Mod: ZL | Performed by: FAMILY MEDICINE

## 2021-09-24 PROCEDURE — 99213 OFFICE O/P EST LOW 20 MIN: CPT | Performed by: FAMILY MEDICINE

## 2021-09-24 ASSESSMENT — MIFFLIN-ST. JEOR: SCORE: 1148.96

## 2021-09-24 ASSESSMENT — ANXIETY QUESTIONNAIRES
7. FEELING AFRAID AS IF SOMETHING AWFUL MIGHT HAPPEN: NOT AT ALL
GAD7 TOTAL SCORE: 0
1. FEELING NERVOUS, ANXIOUS, OR ON EDGE: NOT AT ALL
5. BEING SO RESTLESS THAT IT IS HARD TO SIT STILL: NOT AT ALL
3. WORRYING TOO MUCH ABOUT DIFFERENT THINGS: NOT AT ALL
6. BECOMING EASILY ANNOYED OR IRRITABLE: NOT AT ALL
2. NOT BEING ABLE TO STOP OR CONTROL WORRYING: NOT AT ALL
4. TROUBLE RELAXING: NOT AT ALL

## 2021-09-24 ASSESSMENT — PATIENT HEALTH QUESTIONNAIRE - PHQ9: SUM OF ALL RESPONSES TO PHQ QUESTIONS 1-9: 0

## 2021-09-24 ASSESSMENT — PAIN SCALES - GENERAL: PAINLEVEL: SEVERE PAIN (7)

## 2021-09-24 NOTE — LETTER
My COPD Action Plan     Name: Jacklyn Hein    YOB: 1935   Date: 9/24/2021    My doctor: ANTHONY Post MD   My clinic: [facility] [place]    [address]  [place] [address]  [phone]  My Controller Medicine: Formoterol/Budesonide (Symbicort)   Dose: 160     My Rescue Medicine: None   Dose: NA     My Flare Up Medicine: NA   Dose: NA     My COPD Severity: NA      Use of Oxygen: Oxygen Not Prescribed      Make sure you've had your pneumonia   vaccines.          GREEN ZONE       Doing well today      Usual level of activity and exercise    Usual amount of cough and mucus    No shortness of breath    Usual level of health (thinking clearly, sleeping well, feel like eating) Actions:      Take daily medicines    Use oxygen as prescribed    Follow regular exercise and diet plan    Avoid cigarette smoke and other irritants that harm the lungs           YELLOW ZONE          Having a bad day or flare up      Short of breath more than usual    A lot more sputum (mucus) than usual    Sputum looks yellow, green, tan, brown or bloody    More coughing or wheezing    Fever or chills    Less energy; trouble completing activities    Trouble thinking or focusing    Using quick relief inhaler or nebulizer more often    Poor sleep; symptoms wake me up    Do not feel like eating Actions:      Get plenty of rest    Take daily medicines    Use quick relief inhaler every 2 hours    If you use oxygen, call you doctor to see if you should adjust your oxygen    Do breathing exercises or other things to help you relax    Let a loved one, friend or neighbor know you are feeling worse    Call your care team if you have 2 or more symptoms.  Start taking steroids or antibiotics if directed by your care team           RED ZONE       Need medical care now      Severe shortness of breath (feel you can't breathe)    Fever, chills    Not enough breath to do any activity    Trouble coughing up mucus, walking  or talking    Blood in mucus    Frequent coughing   Rescue medicines are not working    Not able to sleep because of breathing    Feel confused or drowsy    Chest pain    Actions:      Call your health care team.  If you cannot reach your care team, call 911 or go to the emergency room.        Annual Reminders:  Meet with Care Team, Flu Shot every Fall  Pharmacy:    [facility] [place] [place] - [address] [facility] - [place], [place] - [address]

## 2021-09-24 NOTE — NURSING NOTE
"Chief Complaint   Patient presents with     Musculoskeletal Problem       Initial /58 (BP Location: Left arm, Patient Position: Sitting, Cuff Size: Adult Regular)   Pulse 73   Temp 97.8  F (36.6  C) (Tympanic)   Resp 20   Ht 1.651 m (5' 5\")   Wt 70.3 kg (155 lb)   SpO2 98%   BMI 25.79 kg/m   Estimated body mass index is 25.79 kg/m  as calculated from the following:    Height as of this encounter: 1.651 m (5' 5\").    Weight as of this encounter: 70.3 kg (155 lb).  Medication Reconciliation: complete  Ninoska Sanders LPN  "

## 2021-09-24 NOTE — PROGRESS NOTES
"    Assessment & Plan     Encounter for drug screening    - Drug Confirmation Panel Urine with Creat; Future    Hip pain, left    Patient has chronic pain is on opioids pain contract signed today she will do the urine drug screen.  She did have a compression fracture had cementing done with Dr. Jackman interventional radiology in [place].  When she was there had right buttock pain and he did an injection stating she had hip bursitis and it cleared it up.  Now she has developed pain on the left side.  She was told by him if she has any problems with her low back or hips to give him a call she will get an appointment to see if he can do an injection on her left side.  She has seen Dr. Moran in the past and requests to see him  as I am retiring.           BMI:   Estimated body mass index is 25.79 kg/m  as calculated from the following:    Height as of this encounter: 1.651 m (5' 5\").    Weight as of this encounter: 70.3 kg (155 lb).               ANTHONY Post MD  [facility] [place] is a 86 year old who presents for the following health issues.  She is here with her daughter    HPI     Musculoskeletal problem/pain  Onset/Duration: 2 months no falls on this.  She did have surgery done on the lumbar spine and had a compression fracture and had cement work procedure done.  At that time had a right hip bursitis that Dr. Jackman interventional radiology in [place] injected and treated now she has the exact same pain on the left side.  She was told by him that if she has any problems with her back or hips that he could see her in follow-up.  Description  Location: Hip - left  Joint Swelling: no  Redness: no  Pain: YES- 8/10  Warmth: no  Intensity:  moderate  Progression of Symptoms:  worsening and intermittent  Accompanying signs and symptoms:   Fevers: no  Numbness/tingling/weakness: Feels like bursitis   History  Trauma to the area: no  Recent illness:  no  Previous similar problem: YES- " "right side  Previous evaluation:  no  Precipitating or alleviating factors:  Aggravating factors include: standing, walking, climbing stairs, exercise and overuse  Therapies tried and outcome: nothing        Review of Systems   Constitutional, HEENT, cardiovascular, pulmonary, gi and gu systems are negative, except as otherwise noted.      Objective    /58 (BP Location: Left arm, Patient Position: Sitting, Cuff Size: Adult Regular)   Pulse 73   Temp 97.8  F (36.6  C) (Tympanic)   Resp 20   Ht 1.651 m (5' 5\")   Wt 70.3 kg (155 lb)   SpO2 98%   BMI 25.79 kg/m    Body mass index is 25.79 kg/m .  Physical Exam   GENERAL: healthy, alert and no distress  EYES: Eyes grossly normal to inspection, PERRL and conjunctivae and sclerae normal  NECK: no adenopathy, no asymmetry, masses, or scars and thyroid normal to palpation  RESP: Breathing comfortably  ABDOMEN: soft, nontender, no hepatosplenomegaly, no masses and bowel sounds normal  MS: Has tenderness left posterior hip.  Walks with a walker.  Earlier had flap tears in her legs that have healed up.                "

## 2021-09-24 NOTE — LETTER
Opioid / Opioid Plus Controlled Substance Agreement    This is an agreement between you and your provider about the safe and appropriate use of controlled substance/opioids prescribed by your care team. Controlled substances are medicines that can cause physical and mental dependence (abuse).    There are strict laws about having and using these medicines. We here at [facility] are committing to working with you in your efforts to get better. To support you in this work, we ll help you schedule regular office appointments for medicine refills. If we must cancel or change your appointment for any reason, we ll make sure you have enough medicine to last until your next appointment.     As a Provider, I will:    Listen carefully to your concerns and treat you with respect.     Recommend a treatment plan that I believe is in your best interest. This plan may involve therapies other than opioid pain medication.     Talk with you often about the possible benefits, and the risk of harm of any medicine that we prescribe for you.     Provide a plan on how to taper (discontinue or go off) using this medicine if the decision is made to stop its use.    As a Patient, I understand that opioid(s):     Are a controlled substance prescribed by my care team to help me function or work and manage my condition(s).     Are strong medicines and can cause serious side effects such as:    Drowsiness, which can seriously affect my driving ability    A lower breathing rate, enough to cause death    Harm to my thinking ability     Depression     Abuse of and addiction to this medicine    Need to be taken exactly as prescribed. Combining opioids with certain medicines or chemicals (such as illegal drugs, sedatives, sleeping pills, and benzodiazepines) can be dangerous or even fatal. If I stop opioids suddenly, I may have severe withdrawal symptoms.    Do not work for all types of pain nor for all patients. If they re not helpful, I may  be asked to stop them.        The risks, benefits and side effects of these medicine(s) were explained to me. I agree that:  1. I will take part in other treatments as advised by my care team. This may be psychiatry or counseling, physical therapy, behavioral therapy, group treatment or a referral to a specialist.     2. I will keep all my appointments. I understand that this is part of the monitoring of opioids. My care team may require an office visit for EVERY opioid/controlled substance refill. If I miss appointments or don t follow instructions, my care team may stop my medicine.    3. I will take my medicines as prescribed. I will not change the dose or schedule unless my care team tells me to. There will be no refills if I run out early.     4. I may be asked to come to the clinic and complete a urine drug test or complete a pill count at any time. If I don t give a urine sample or participate in a pill count, the care team may stop my medicine.    5. I will only receive prescriptions from this clinic for chronic pain. If I am treated by another provider for acute pain issues, I will tell them that I am taking opioid pain medication for chronic pain and that I have a treatment agreement with this provider. I will inform my [facility] care team within one business day if I am given a prescription for any pain medication by another healthcare provider. My [facility] care team can contact other providers and pharmacists about my use of any medicines.    6. It is up to me to make sure that I don t run out of my medicines on weekends or holidays. If my care team is willing to refill my opioid prescription without a visit, I must request refills only during office hours. Refills may take up to 3 business days to process. I will use one pharmacy to fill all my opioid and other controlled substance prescriptions. I will notify the clinic about any changes to my insurance or medication  availability.    7. I am responsible for my prescriptions. If the medicine/prescription is lost, stolen or destroyed, it will not be replaced. I also agree not to share controlled substance medicines with anyone.    8. I am aware I should not use any illegal or recreational drugs. I agree not to drink alcohol unless my care team says I can.       9. If I enroll in the [place] [facility] Cannabis program, I will tell my care team prior to my next refill.     10. I will tell my care team right away if I become pregnant, have a new medical problem treated outside of my regular clinic, or have a change in my medications.    11. I understand that this medicine can affect my thinking, judgment and reaction time. Alcohol and drugs affect the brain and body, which can affect the safety of my driving. Being under the influence of alcohol or drugs can affect my decision-making, behaviors, personal safety, and the safety of others. Driving while impaired (DWI) can occur if a person is driving, operating, or in physical control of a car, motorcycle, boat, snowmobile, ATV, motorbike, off-road vehicle, or any other motor vehicle ([place] Statute 169A.20). I understand the risk if I choose to drive or operate any vehicle or machinery.    I understand that if I do not follow any of the conditions above, my prescriptions or treatment may be stopped or changed.          Opioids  What You Need to Know    What are opioids?   Opioids are pain medicines that must be prescribed by a doctor. They are also known as narcotics.     Examples are:   1. morphine (MS Contin, Selene)  2. oxycodone (Oxycontin)  3. oxycodone and acetaminophen (Percocet)  4. hydrocodone and acetaminophen (Vicodin, Norco)   5. fentanyl patch (Duragesic)   6. hydromorphone (Dilaudid)   7. methadone  8. codeine (Tylenol #3)     What do opioids do well?   Opioids are best for severe short-term pain such as after a surgery or injury. They may work well for cancer pain. They may  help some people with long-lasting (chronic) pain.     What do opioids NOT do well?   Opioids never get rid of pain entirely, and they don t work well for most patients with chronic pain. Opioids don t reduce swelling, one of the causes of pain.                                    Other ways to manage chronic pain and improve function include:       Treat the health problem that may be causing pain    Anti-inflammation medicines, which reduce swelling and tenderness, such as ibuprofen (Advil, Motrin) or naproxen (Aleve)    Acetaminophen (Tylenol)    Antidepressants and anti-seizure medicines, especially for nerve pain    Topical treatments such as patches or creams    Injections or nerve blocks    Chiropractic or osteopathic treatment    Acupuncture, massage, deep breathing, meditation, visual imagery, aromatherapy    Use heat or ice at the pain site    Physical therapy     Exercise    Stop smoking    Take part in therapy       Risks and side effects     Talk to your doctor before you start or decide to keep taking opioids. Possible side effects include:      Lowering your breathing rate enough to cause death    Overdose, including death, especially if taking higher than prescribed doses    Worse depression symptoms; less pleasure in things you usually enjoy    Feeling tired or sluggish    Slower thoughts or cloudy thinking    Being more sensitive to pain over time; pain is harder to control    Trouble sleeping or restless sleep    Changes in hormone levels (for example, less testosterone)    Changes in sex drive or ability to have sex    Constipation    Unsafe driving    Itching and sweating    Dizziness    Nausea, throwing up and dry mouth    What else should I know about opioids?    Opioids may lead to dependence, tolerance, or addiction.      Dependence means that if you stop or reduce the medicine too quickly, you will have withdrawal symptoms. These include loose poop (diarrhea), jitters, flu-like symptoms,  nervousness and tremors. Dependence is not the same as addiction.                       Tolerance means needing higher doses over time to get the same effect. This may increase the chance of serious side effects.      Addiction is when people improperly use a substance that harms their body, their mind or their relations with others. Use of opiates can cause a relapse of addiction if you have a history of drug or alcohol abuse.      People who have used opioids for a long time may have a lower quality of life, worse depression, higher levels of pain and more visits to doctors.    You can overdose on opioids. Take these steps to lower your risk of overdose:    1. Recognize the signs:  Signs of overdose include decrease or loss of consciousness (blackout), slowed breathing, trouble waking up and blue lips. If someone is worried about overdose, they should call 911.    2. Talk to your doctor about Narcan (naloxone).   If you are at risk for overdose, you may be given a prescription for Narcan. This medicine very quickly reverses the effects of opioids.   If you overdose, a friend or family member can give you Narcan while waiting for the ambulance. They need to know the signs of overdose and how to give Narcan.     3. Don't use alcohol or street drugs.   Taking them with opioids can cause death.    4. Do not take any of these medicines unless your doctor says it s OK. Taking these with opioids can cause death:    Benzodiazepines, such as lorazepam (Ativan), alprazolam (Xanax) or diazepam (Valium)    Muscle relaxers, such as cyclobenzaprine (Flexeril)    Sleeping pills like zolpidem (Ambien)     Other opioids      How to keep you and other people safe while taking opioids:    1. Never share your opioids with others.  Opioid medicines are regulated by the Drug Enforcement Agency ([place]). Selling or sharing medications is a criminal act.    2. Be sure to store opioids in a secure place, locked up if possible. Young children  can easily swallow them and overdose.    3. When you are traveling with your medicines, keep them in the original bottles. If you use a pill box, be sure you also carry a copy of your medicine list from your clinic or pharmacy.    4. Safe disposal of opioids    Most pharmacies have places to get rid of medicine, called disposal kiosks. Medicine disposal options are also available in every [facility]. Search your county and  medication disposal  to find more options. You can find more details at:  [email or web address]-[email or web address]-[email or web address]-medications     I agree that my provider, clinic care team, and pharmacy may work with any city, state or federal law enforcement agency that investigates the misuse, sale, or other diversion of my controlled medicine. I will allow my provider to discuss my care with, or share a copy of, this agreement with any other treating provider, pharmacy or emergency room where I receive care.    I have read this agreement and have asked questions about anything I did not understand.    _______________________________________________________  Patient Signature - Jacklyn Hein _____________________                   Date     _______________________________________________________  Provider Signature - ANTHONY Post MD   _____________________                   Date     _______________________________________________________  Witness Signature (required if provider not present while patient signing)   _____________________                   Date

## 2021-09-25 ASSESSMENT — ANXIETY QUESTIONNAIRES: GAD7 TOTAL SCORE: 0

## 2021-09-29 ENCOUNTER — IMMUNIZATION (OUTPATIENT)
Dept: FAMILY MEDICINE | Facility: OTHER | Age: 86
End: 2021-09-29
Attending: FAMILY MEDICINE
Payer: MEDICARE

## 2021-09-29 DIAGNOSIS — I27.0 PRIMARY PULMONARY HYPERTENSION (H): ICD-10-CM

## 2021-09-29 DIAGNOSIS — I48.0 PAROXYSMAL ATRIAL FIBRILLATION (H): ICD-10-CM

## 2021-09-29 LAB
CREATININE URINE MG/DL  (SYNCED VALUE): 50 MG/DL
DHC UR CFM-MCNC: 368 NG/ML
DHC/CREAT UR: 736 NG/MG {CREAT}
GABAPENTIN UR QL CFM: PRESENT
HYDROCODONE UR CFM-MCNC: 750 NG/ML
HYDROCODONE/CREAT UR: 1500 NG/MG {CREAT}
HYDROMORPHONE UR CFM-MCNC: 272 NG/ML
HYDROMORPHONE/CREAT UR: 544 NG/MG {CREAT}

## 2021-09-29 PROCEDURE — 0003A PR COVID VAC PFIZER DIL RECON 30 MCG/0.3 ML IM: CPT

## 2021-09-29 NOTE — TELEPHONE ENCOUNTER
Eliquis Anticoagulant  Last Written Prescription Date:  7/7/2021  Last Fill Quantity: 180,   # refills: 0  Last Office Visit: 9/24/2021  Future Office visit:

## 2021-09-29 NOTE — TELEPHONE ENCOUNTER
Spironolactone (Aldactone)  Last Written Prescription Date:  7/13/2021  Last Fill Quantity: 45,   # refills: 0  Last Office Visit: 9/24/2021  Future Office visit:

## 2021-09-30 RX ORDER — SPIRONOLACTONE 25 MG/1
TABLET ORAL
Qty: 45 TABLET | Refills: 0 | Status: SHIPPED | OUTPATIENT
Start: 2021-09-30 | End: 2021-12-21

## 2021-09-30 RX ORDER — APIXABAN 5 MG/1
TABLET, FILM COATED ORAL
Qty: 180 TABLET | Refills: 0 | Status: SHIPPED | OUTPATIENT
Start: 2021-09-30 | End: 2021-12-23

## 2021-10-12 ENCOUNTER — ANCILLARY PROCEDURE (OUTPATIENT)
Dept: CARDIOLOGY | Facility: OTHER | Age: 86
End: 2021-10-12
Attending: INTERNAL MEDICINE
Payer: COMMERCIAL

## 2021-10-12 DIAGNOSIS — Z95.810 AUTOMATIC IMPLANTABLE CARDIOVERTER-DEFIBRILLATOR IN SITU: ICD-10-CM

## 2021-10-12 PROCEDURE — 90662 IIV NO PRSV INCREASED AG IM: CPT

## 2021-10-12 PROCEDURE — 93284 PRGRMG EVAL IMPLANTABLE DFB: CPT | Performed by: INTERNAL MEDICINE

## 2021-10-12 PROCEDURE — G0008 ADMIN INFLUENZA VIRUS VAC: HCPCS

## 2021-10-12 NOTE — PATIENT INSTRUCTIONS
It was a pleasure to see you in clinic today.  Please do not hesitate to call with any questions or concerns.  You are scheduled for a remote transmission on 1/26/22.  We look forward to seeing you in clinic at your next device check in 6 months.    Desiree Luevano, RN, MS, CCRN  Electrophysiology Nurse Clinician  [facility]    During Business Hours Please Call:  [phone]  After Hours Please Call:  [phone] - select option #[phone] and ask for job code [phone]

## 2021-10-15 DIAGNOSIS — J43.8 OTHER EMPHYSEMA (H): ICD-10-CM

## 2021-10-15 DIAGNOSIS — J44.9 CHRONIC OBSTRUCTIVE PULMONARY DISEASE, UNSPECIFIED COPD TYPE (H): ICD-10-CM

## 2021-10-15 DIAGNOSIS — M54.50 LUMBAR SPINE PAIN: ICD-10-CM

## 2021-10-15 LAB
MDC_IDC_EPISODE_DTM: NORMAL
MDC_IDC_EPISODE_DURATION: 5 S
MDC_IDC_EPISODE_DURATION: 6 S
MDC_IDC_EPISODE_DURATION: 6 S
MDC_IDC_EPISODE_ID: 734
MDC_IDC_EPISODE_ID: 735
MDC_IDC_EPISODE_ID: 736
MDC_IDC_EPISODE_ID: 737
MDC_IDC_EPISODE_ID: 738
MDC_IDC_EPISODE_ID: 739
MDC_IDC_EPISODE_ID: 740
MDC_IDC_EPISODE_TYPE: NORMAL
MDC_IDC_LEAD_IMPLANT_DT: NORMAL
MDC_IDC_LEAD_LOCATION: NORMAL
MDC_IDC_LEAD_LOCATION_DETAIL_1: NORMAL
MDC_IDC_LEAD_MFG: NORMAL
MDC_IDC_LEAD_MODEL: NORMAL
MDC_IDC_LEAD_POLARITY_TYPE: NORMAL
MDC_IDC_LEAD_SERIAL: NORMAL
MDC_IDC_MSMT_BATTERY_DTM: NORMAL
MDC_IDC_MSMT_BATTERY_REMAINING_LONGEVITY: 14 MO
MDC_IDC_MSMT_BATTERY_RRT_TRIGGER: 2.73
MDC_IDC_MSMT_BATTERY_STATUS: NORMAL
MDC_IDC_MSMT_BATTERY_VOLTAGE: 2.88 V
MDC_IDC_MSMT_CAP_CHARGE_DTM: NORMAL
MDC_IDC_MSMT_CAP_CHARGE_ENERGY: 18 J
MDC_IDC_MSMT_CAP_CHARGE_TIME: 4.57
MDC_IDC_MSMT_CAP_CHARGE_TYPE: NORMAL
MDC_IDC_MSMT_LEADCHNL_LV_IMPEDANCE_VALUE: 456 OHM
MDC_IDC_MSMT_LEADCHNL_LV_IMPEDANCE_VALUE: 627 OHM
MDC_IDC_MSMT_LEADCHNL_LV_IMPEDANCE_VALUE: 893 OHM
MDC_IDC_MSMT_LEADCHNL_LV_PACING_THRESHOLD_AMPLITUDE: 0.75 V
MDC_IDC_MSMT_LEADCHNL_LV_PACING_THRESHOLD_PULSEWIDTH: 0.6 MS
MDC_IDC_MSMT_LEADCHNL_RA_IMPEDANCE_VALUE: 437 OHM
MDC_IDC_MSMT_LEADCHNL_RA_PACING_THRESHOLD_AMPLITUDE: 0.5 V
MDC_IDC_MSMT_LEADCHNL_RA_PACING_THRESHOLD_PULSEWIDTH: 0.4 MS
MDC_IDC_MSMT_LEADCHNL_RA_SENSING_INTR_AMPL: 2.12 MV
MDC_IDC_MSMT_LEADCHNL_RA_SENSING_INTR_AMPL: 2.62 MV
MDC_IDC_MSMT_LEADCHNL_RV_IMPEDANCE_VALUE: 380 OHM
MDC_IDC_MSMT_LEADCHNL_RV_IMPEDANCE_VALUE: 456 OHM
MDC_IDC_MSMT_LEADCHNL_RV_PACING_THRESHOLD_AMPLITUDE: 1 V
MDC_IDC_MSMT_LEADCHNL_RV_PACING_THRESHOLD_PULSEWIDTH: 0.4 MS
MDC_IDC_MSMT_LEADCHNL_RV_SENSING_INTR_AMPL: 4.88 MV
MDC_IDC_MSMT_LEADCHNL_RV_SENSING_INTR_AMPL: 5 MV
MDC_IDC_PG_IMPLANT_DTM: NORMAL
MDC_IDC_PG_MFG: NORMAL
MDC_IDC_PG_MODEL: NORMAL
MDC_IDC_PG_SERIAL: NORMAL
MDC_IDC_PG_TYPE: NORMAL
MDC_IDC_SESS_CLINIC_NAME: NORMAL
MDC_IDC_SESS_DTM: NORMAL
MDC_IDC_SESS_TYPE: NORMAL
MDC_IDC_SET_BRADY_AT_MODE_SWITCH_RATE: 171 {BEATS}/MIN
MDC_IDC_SET_BRADY_LOWRATE: 60 {BEATS}/MIN
MDC_IDC_SET_BRADY_MAX_SENSOR_RATE: 130 {BEATS}/MIN
MDC_IDC_SET_BRADY_MAX_TRACKING_RATE: 130 {BEATS}/MIN
MDC_IDC_SET_BRADY_MODE: NORMAL
MDC_IDC_SET_BRADY_PAV_DELAY_LOW: 160 MS
MDC_IDC_SET_BRADY_SAV_DELAY_LOW: 130 MS
MDC_IDC_SET_CRT_PACED_CHAMBERS: NORMAL
MDC_IDC_SET_LEADCHNL_LV_PACING_AMPLITUDE: 1.25 V
MDC_IDC_SET_LEADCHNL_LV_PACING_ANODE_ELECTRODE_1: NORMAL
MDC_IDC_SET_LEADCHNL_LV_PACING_ANODE_LOCATION_1: NORMAL
MDC_IDC_SET_LEADCHNL_LV_PACING_CAPTURE_MODE: NORMAL
MDC_IDC_SET_LEADCHNL_LV_PACING_CATHODE_ELECTRODE_1: NORMAL
MDC_IDC_SET_LEADCHNL_LV_PACING_CATHODE_LOCATION_1: NORMAL
MDC_IDC_SET_LEADCHNL_LV_PACING_POLARITY: NORMAL
MDC_IDC_SET_LEADCHNL_LV_PACING_PULSEWIDTH: 0.6 MS
MDC_IDC_SET_LEADCHNL_RA_PACING_AMPLITUDE: 1.5 V
MDC_IDC_SET_LEADCHNL_RA_PACING_ANODE_ELECTRODE_1: NORMAL
MDC_IDC_SET_LEADCHNL_RA_PACING_ANODE_LOCATION_1: NORMAL
MDC_IDC_SET_LEADCHNL_RA_PACING_CAPTURE_MODE: NORMAL
MDC_IDC_SET_LEADCHNL_RA_PACING_CATHODE_ELECTRODE_1: NORMAL
MDC_IDC_SET_LEADCHNL_RA_PACING_CATHODE_LOCATION_1: NORMAL
MDC_IDC_SET_LEADCHNL_RA_PACING_POLARITY: NORMAL
MDC_IDC_SET_LEADCHNL_RA_PACING_PULSEWIDTH: 0.4 MS
MDC_IDC_SET_LEADCHNL_RA_SENSING_ANODE_ELECTRODE_1: NORMAL
MDC_IDC_SET_LEADCHNL_RA_SENSING_ANODE_LOCATION_1: NORMAL
MDC_IDC_SET_LEADCHNL_RA_SENSING_CATHODE_ELECTRODE_1: NORMAL
MDC_IDC_SET_LEADCHNL_RA_SENSING_CATHODE_LOCATION_1: NORMAL
MDC_IDC_SET_LEADCHNL_RA_SENSING_POLARITY: NORMAL
MDC_IDC_SET_LEADCHNL_RA_SENSING_SENSITIVITY: 0.3 MV
MDC_IDC_SET_LEADCHNL_RV_PACING_AMPLITUDE: 2 V
MDC_IDC_SET_LEADCHNL_RV_PACING_ANODE_ELECTRODE_1: NORMAL
MDC_IDC_SET_LEADCHNL_RV_PACING_ANODE_LOCATION_1: NORMAL
MDC_IDC_SET_LEADCHNL_RV_PACING_CAPTURE_MODE: NORMAL
MDC_IDC_SET_LEADCHNL_RV_PACING_CATHODE_ELECTRODE_1: NORMAL
MDC_IDC_SET_LEADCHNL_RV_PACING_CATHODE_LOCATION_1: NORMAL
MDC_IDC_SET_LEADCHNL_RV_PACING_POLARITY: NORMAL
MDC_IDC_SET_LEADCHNL_RV_PACING_PULSEWIDTH: 0.4 MS
MDC_IDC_SET_LEADCHNL_RV_SENSING_ANODE_ELECTRODE_1: NORMAL
MDC_IDC_SET_LEADCHNL_RV_SENSING_ANODE_LOCATION_1: NORMAL
MDC_IDC_SET_LEADCHNL_RV_SENSING_CATHODE_ELECTRODE_1: NORMAL
MDC_IDC_SET_LEADCHNL_RV_SENSING_CATHODE_LOCATION_1: NORMAL
MDC_IDC_SET_LEADCHNL_RV_SENSING_POLARITY: NORMAL
MDC_IDC_SET_LEADCHNL_RV_SENSING_SENSITIVITY: 0.3 MV
MDC_IDC_SET_ZONE_DETECTION_BEATS_DENOMINATOR: 40 {BEATS}
MDC_IDC_SET_ZONE_DETECTION_BEATS_NUMERATOR: 30 {BEATS}
MDC_IDC_SET_ZONE_DETECTION_INTERVAL: 320 MS
MDC_IDC_SET_ZONE_DETECTION_INTERVAL: 350 MS
MDC_IDC_SET_ZONE_DETECTION_INTERVAL: 360 MS
MDC_IDC_SET_ZONE_DETECTION_INTERVAL: 400 MS
MDC_IDC_SET_ZONE_DETECTION_INTERVAL: NORMAL
MDC_IDC_SET_ZONE_TYPE: NORMAL
MDC_IDC_STAT_AT_BURDEN_PERCENT: 0.1 %
MDC_IDC_STAT_AT_DTM_END: NORMAL
MDC_IDC_STAT_AT_DTM_START: NORMAL
MDC_IDC_STAT_BRADY_AP_VP_PERCENT: 10.78 %
MDC_IDC_STAT_BRADY_AP_VS_PERCENT: 0.2 %
MDC_IDC_STAT_BRADY_AS_VP_PERCENT: 86.77 %
MDC_IDC_STAT_BRADY_AS_VS_PERCENT: 2.25 %
MDC_IDC_STAT_BRADY_DTM_END: NORMAL
MDC_IDC_STAT_BRADY_DTM_START: NORMAL
MDC_IDC_STAT_BRADY_RA_PERCENT_PACED: 10.92 %
MDC_IDC_STAT_BRADY_RV_PERCENT_PACED: 16.74 %
MDC_IDC_STAT_CRT_DTM_END: NORMAL
MDC_IDC_STAT_CRT_DTM_START: NORMAL
MDC_IDC_STAT_CRT_LV_PERCENT_PACED: 96.96 %
MDC_IDC_STAT_CRT_PERCENT_PACED: 96.96 %
MDC_IDC_STAT_EPISODE_RECENT_COUNT: 0
MDC_IDC_STAT_EPISODE_RECENT_COUNT: 1
MDC_IDC_STAT_EPISODE_RECENT_COUNT: 7
MDC_IDC_STAT_EPISODE_RECENT_COUNT_DTM_END: NORMAL
MDC_IDC_STAT_EPISODE_RECENT_COUNT_DTM_START: NORMAL
MDC_IDC_STAT_EPISODE_TOTAL_COUNT: 0
MDC_IDC_STAT_EPISODE_TOTAL_COUNT: 1
MDC_IDC_STAT_EPISODE_TOTAL_COUNT: 66
MDC_IDC_STAT_EPISODE_TOTAL_COUNT_DTM_END: NORMAL
MDC_IDC_STAT_EPISODE_TOTAL_COUNT_DTM_START: NORMAL
MDC_IDC_STAT_EPISODE_TYPE: NORMAL
MDC_IDC_STAT_TACHYTHERAPY_ATP_DELIVERED_RECENT: 0
MDC_IDC_STAT_TACHYTHERAPY_ATP_DELIVERED_TOTAL: 0
MDC_IDC_STAT_TACHYTHERAPY_RECENT_DTM_END: NORMAL
MDC_IDC_STAT_TACHYTHERAPY_RECENT_DTM_START: NORMAL
MDC_IDC_STAT_TACHYTHERAPY_SHOCKS_ABORTED_RECENT: 0
MDC_IDC_STAT_TACHYTHERAPY_SHOCKS_ABORTED_TOTAL: 0
MDC_IDC_STAT_TACHYTHERAPY_SHOCKS_DELIVERED_RECENT: 0
MDC_IDC_STAT_TACHYTHERAPY_SHOCKS_DELIVERED_TOTAL: 1
MDC_IDC_STAT_TACHYTHERAPY_TOTAL_DTM_END: NORMAL
MDC_IDC_STAT_TACHYTHERAPY_TOTAL_DTM_START: NORMAL

## 2021-10-15 RX ORDER — BUDESONIDE AND FORMOTEROL FUMARATE DIHYDRATE 160; 4.5 UG/1; UG/1
AEROSOL RESPIRATORY (INHALATION)
Qty: 10.2 G | Refills: 11 | Status: SHIPPED | OUTPATIENT
Start: 2021-10-15 | End: 2022-10-27

## 2021-10-15 RX ORDER — HYDROCODONE BITARTRATE AND ACETAMINOPHEN 5; 325 MG/1; MG/1
TABLET ORAL
Qty: 60 TABLET | Refills: 0 | Status: SHIPPED | OUTPATIENT
Start: 2021-10-15 | End: 2021-11-19

## 2021-10-15 RX ORDER — TIOTROPIUM BROMIDE INHALATION SPRAY 3.12 UG/1
SPRAY, METERED RESPIRATORY (INHALATION)
Qty: 4 G | Refills: 11 | Status: SHIPPED | OUTPATIENT
Start: 2021-10-15 | End: 2022-11-15

## 2021-11-05 NOTE — TELEPHONE ENCOUNTER
BP Readings from Last 3 Encounters:   05/21/20 142/65   03/10/20 116/62   03/03/20 122/58     Please see BP's.     Epic order is for 25mg Coreg-take 0.5mg po BID. This order from pharmacy is for 12.5mg tab.    Liz Gracia RN     Prednisone Counseling:  I discussed with the patient the risks of prolonged use of prednisone including but not limited to weight gain, insomnia, osteoporosis, mood changes, diabetes, susceptibility to infection, glaucoma and high blood pressure.  In cases where prednisone use is prolonged, patients should be monitored with blood pressure checks, serum glucose levels and an eye exam.  Additionally, the patient may need to be placed on GI prophylaxis, PCP prophylaxis, and calcium and vitamin D supplementation and/or a bisphosphonate.  The patient verbalized understanding of the proper use and the possible adverse effects of prednisone.  All of the patient's questions and concerns were addressed.

## 2021-11-16 NOTE — PROGRESS NOTES
Assessment & Plan     Congestive heart failure, NYHA class 2, unspecified congestive heart failure type (H)  Stable / sees Cards. Pt has been instructed on behavioral changes to improve CHF including fluid and salt restrictions along with watching weights and elevating legs. Pt is aware of when to come back or call  if signs and symptoms of CHF worsen.  Continue current medications and behavioral changes.   - Comprehensive metabolic panel; Future  - TSH; Future  - CBC with Platelets & Differential; Future  - CBC with Platelets & Differential  - TSH  - Comprehensive metabolic panel    Essential hypertension  Stable on meds. Continue current medications and behavioral changes.   - Comprehensive metabolic panel; Future  - TSH; Future  - CBC with Platelets & Differential; Future  - CBC with Platelets & Differential  - TSH  - Comprehensive metabolic panel    Paroxysmal atrial fibrillation (H)  Stable /sees cards on anticoag  - Comprehensive metabolic panel; Future  - TSH; Future  - CBC with Platelets & Differential; Future  - CBC with Platelets & Differential  - TSH  - Comprehensive metabolic panel    Primary pulmonary hypertension (H)  Stable- Continue current medications and behavioral changes.   - Comprehensive metabolic panel; Future  - TSH; Future  - CBC with Platelets & Differential; Future  - CBC with Platelets & Differential  - TSH  - Comprehensive metabolic panel    Chronic obstructive pulmonary disease, unspecified COPD type (H)  Stable on inhalers. Continue current medications and behavioral changes.   - Comprehensive metabolic panel; Future  - TSH; Future  - CBC with Platelets & Differential; Future  - CBC with Platelets & Differential  - TSH  - Comprehensive metabolic panel    Non-ischemic cardiomyopathy (H)  No issues. Stable health and sx  - Comprehensive metabolic panel; Future  - TSH; Future  - CBC with Platelets & Differential; Future  - CBC with Platelets & Differential  - TSH  - Comprehensive  "metabolic panel    Encounter for medical examination to establish care  Will assume care.   - Comprehensive metabolic panel; Future  - TSH; Future  - CBC with Platelets & Differential; Future  - CBC with Platelets & Differential  - TSH  - Comprehensive metabolic panel    Stage 3b chronic kidney disease  Stable labs ok today     Anticoagulated by anticoagulation treatment  Stable for awhile on anticoag. Continue current medications and behavioral changes.     Age-related osteoporosis with current pathological fracture with routine healing, subsequent encounter  On calcium/vitamin D and fosamax          Follow-up in 6 months sooner if needed      BMI:   Estimated body mass index is 25.63 kg/m  as calculated from the following:    Height as of this encounter: 1.651 m (5' 5\").    Weight as of this encounter: 69.9 kg (154 lb).           No follow-ups on file.    Ilia Moran MD  [facility] - GABRIEL Angulo is a 86 year old who presents for the following health issues  accompanied by her daughter.  ESTABLISH CARE visit   HPI     Vascular Disease Follow-up      How often do you take nitroglycerin? Never    Do you take an aspirin every day? No     No issues    Sees Dr Garcia     Asthma Follow-Up    Was ACT completed today?    Yes    ACT Total Scores 11/22/2021   ACT TOTAL SCORE (Goal Greater than or Equal to 20) 22   In the past 12 months, how many times did you visit the emergency room for your asthma without being admitted to the hospital? 0   In the past 12 months, how many times were you hospitalized overnight because of your asthma? 0     Breathing is stable and baseline        How many days per week do you miss taking your asthma controller medication?  0    Please describe any recent triggers for your asthma: exercise or sports    Have you had any Emergency Room Visits, Urgent Care Visits, or Hospital Admissions since your last office visit?  No        Pain History:  When did you first " notice your pain? - More than 6 weeks   Have you seen this provider for your pain in the past?   Yes   Where in your body do you have pain? Low back left side  Are you seeing anyone else for your pain? Yes - radiology    PHQ-9 SCORE 12/11/2018 5/19/2021 9/24/2021   PHQ-9 Total Score - - -   PHQ-9 Total Score 4 1 0       SEBASTIÁN-7 SCORE 12/11/2018 5/19/2021 9/24/2021   Total Score 0 0 0         PEG Score 11/22/2021   PEG Total Score 5.33       Chronic Pain Follow Up:    Location of pain: back and legs  Analgesia/pain control:    - Recent changes:  Had injection helped pain a little    - Overall control: Tolerable with discomfort    - Current treatments: pain medications and DANG   Adherence:     - Do you ever take more pain medicine than prescribed? No    - When did you take your last dose of pain medicine?  This am 530   Adverse effects: No   PDMP Review       Value Time User    State PDMP site checked  Yes 11/22/2021  7:43 AM Ilia Moran MD        Last CSA Agreement:   CSA -- Patient Level:    Controlled Substance Agreement - Opioid - Scan on 9/28/2021  9:10 AM: OPIOD/OPIOD PLUS CONTROLLED SUBSTANCE AGREEMENT       Last UDS: 9/29/2021        Heart Failure Follow-up  Are you experiencing any shortness of breath? Yes, with activity only   How would you describe your shortness of breath?  Same as usual        Are you experiencing any swelling in your legs or feet?  No    Are you using more pillows than usual? No    Do you cough at night?  No    Do you check your weight daily?  No    Have you had a weight change recently?  No    Are you having any of the following side effects from your medications? (Select all that apply)  The patient does not report symptoms of dizziness, fatigue, cough, swelling, or slow heart beat.    Since your last visit, how many times have you gone to the cardiologist, urgent care, emergency room, or hospital because of your heart failure?   None    COPD Follow-Up    Overall, how are your COPD  "symptoms since your last clinic visit?  No change    How much fatigue or shortness of breath do you have when you are walking?  None    How much shortness of breath do you have when you are resting?  None    How often do you cough? Rarely    Have you noticed any change in your sputum/phlegm?  No    Have you experienced a recent fever? No    Please describe how far you can walk without stopping to rest:      How many flights of stairs are you able to walk up without stopping?      Have you had any Emergency Room Visits, Urgent Care Visits, or Hospital Admissions because of your COPD since your last office visit?  No    History   Smoking Status     Former Smoker     Packs/day: 1.00     Years: 15.00     Types: Cigarettes     Start date: 1/1/1983     Quit date: 2/1/1998   Smokeless Tobacco     Never Used     Comment: Passive Exposure: No; Longest Tobacco Free: 15 years      No results found for: FEV1, XYN0BKY      Review of Systems   Constitutional, HEENT, cardiovascular, pulmonary, gi and gu systems are negative, except as otherwise noted.      Objective    /60   Pulse 85   Temp 97.7  F (36.5  C)   Ht 1.651 m (5' 5\")   Wt 69.9 kg (154 lb)   SpO2 98%   BMI 25.63 kg/m    Body mass index is 25.63 kg/m .  Physical Exam   GENERAL: healthy, alert and no distress  NECK: no adenopathy, no asymmetry, masses, or scars and thyroid normal to palpation  RESP: lungs clear to auscultation - no rales, rhonchi or wheezes  CV: regular rate and rhythm, normal S1 S2, no S3 or S4, no murmur, click or rub, no peripheral edema and peripheral pulses strong  ABDOMEN: soft, nontender, no hepatosplenomegaly, no masses and bowel sounds normal  MS: no gross musculoskeletal defects noted, trace pitting  edema  PSYCH: mentation appears normal, affect normal/bright        Results for orders placed or performed in visit on 11/22/21   TSH     Status: Normal   Result Value Ref Range    TSH 2.02 0.40 - 4.00 mU/L   Comprehensive metabolic " panel     Status: Abnormal   Result Value Ref Range    Sodium 141 133 - 144 mmol/L    Potassium 4.9 3.4 - 5.3 mmol/L    Chloride 112 (H) 94 - 109 mmol/L    Carbon Dioxide (CO2) 26 20 - 32 mmol/L    Anion Gap 3 3 - 14 mmol/L    Urea Nitrogen 33 (H) 7 - 30 mg/dL    Creatinine 1.12 (H) 0.52 - 1.04 mg/dL    Calcium 9.2 8.5 - 10.1 mg/dL    Glucose 89 70 - 99 mg/dL    Alkaline Phosphatase 66 40 - 150 U/L    AST 19 0 - 45 U/L    ALT 21 0 - 50 U/L    Protein Total 7.1 6.8 - 8.8 g/dL    Albumin 3.0 (L) 3.4 - 5.0 g/dL    Bilirubin Total 0.2 0.2 - 1.3 mg/dL    GFR Estimate 45 (L) >60 mL/min/1.73m2   CBC with platelets and differential     Status: None   Result Value Ref Range    WBC Count 5.3 4.0 - 11.0 10e3/uL    RBC Count 3.86 3.80 - 5.20 10e6/uL    Hemoglobin 11.7 11.7 - 15.7 g/dL    Hematocrit 36.4 35.0 - 47.0 %    MCV 94 78 - 100 fL    MCH 30.3 26.5 - 33.0 pg    MCHC 32.1 31.5 - 36.5 g/dL    RDW 12.5 10.0 - 15.0 %    Platelet Count 154 150 - 450 10e3/uL    % Neutrophils 63 %    % Lymphocytes 22 %    % Monocytes 11 %    % Eosinophils 4 %    % Basophils 0 %    % Immature Granulocytes 0 %    NRBCs per 100 WBC 0 <1 /100    Absolute Neutrophils 3.3 1.6 - 8.3 10e3/uL    Absolute Lymphocytes 1.2 0.8 - 5.3 10e3/uL    Absolute Monocytes 0.6 0.0 - 1.3 10e3/uL    Absolute Eosinophils 0.2 0.0 - 0.7 10e3/uL    Absolute Basophils 0.0 0.0 - 0.2 10e3/uL    Absolute Immature Granulocytes 0.0 <=0.0 10e3/uL    Absolute NRBCs 0.0 10e3/uL   CBC with Platelets & Differential     Status: None    Narrative    The following orders were created for panel order CBC with Platelets & Differential.  Procedure                               Abnormality         Status                     ---------                               -----------         ------                     CBC with platelets and d...[[id number]]                      Final result                 Please view results for these tests on the individual orders.

## 2021-11-18 DIAGNOSIS — M54.50 LUMBAR SPINE PAIN: ICD-10-CM

## 2021-11-19 RX ORDER — HYDROCODONE BITARTRATE AND ACETAMINOPHEN 5; 325 MG/1; MG/1
TABLET ORAL
Qty: 60 TABLET | Refills: 0 | Status: SHIPPED | OUTPATIENT
Start: 2021-11-19 | End: 2021-12-21

## 2021-11-19 NOTE — TELEPHONE ENCOUNTER
HYDROcodone-acetaminophen (NORCO) 5-325 MG tablet  Last Written Prescription Date:  10/15/21  Last Fill Quantity: 60,  # refills: 0   Last office visit: 9/24/2021   Future Office Visit:      Routing refill request to provider for review/approval because:  Drug not on the [facility] refill protocol

## 2021-11-22 ENCOUNTER — OFFICE VISIT (OUTPATIENT)
Dept: FAMILY MEDICINE | Facility: OTHER | Age: 86
End: 2021-11-22
Attending: FAMILY MEDICINE
Payer: COMMERCIAL

## 2021-11-22 VITALS
DIASTOLIC BLOOD PRESSURE: 60 MMHG | BODY MASS INDEX: 25.66 KG/M2 | TEMPERATURE: 97.7 F | SYSTOLIC BLOOD PRESSURE: 128 MMHG | OXYGEN SATURATION: 98 % | WEIGHT: 154 LBS | HEIGHT: 65 IN | HEART RATE: 85 BPM

## 2021-11-22 DIAGNOSIS — J44.9 CHRONIC OBSTRUCTIVE PULMONARY DISEASE, UNSPECIFIED COPD TYPE (H): ICD-10-CM

## 2021-11-22 DIAGNOSIS — I27.0 PRIMARY PULMONARY HYPERTENSION (H): ICD-10-CM

## 2021-11-22 DIAGNOSIS — I42.8 NON-ISCHEMIC CARDIOMYOPATHY (H): ICD-10-CM

## 2021-11-22 DIAGNOSIS — Z00.00 ENCOUNTER FOR MEDICAL EXAMINATION TO ESTABLISH CARE: ICD-10-CM

## 2021-11-22 DIAGNOSIS — N18.32 STAGE 3B CHRONIC KIDNEY DISEASE (H): ICD-10-CM

## 2021-11-22 DIAGNOSIS — I50.9 CONGESTIVE HEART FAILURE, NYHA CLASS 2, UNSPECIFIED CONGESTIVE HEART FAILURE TYPE (H): Primary | ICD-10-CM

## 2021-11-22 DIAGNOSIS — M80.00XD AGE-RELATED OSTEOPOROSIS WITH CURRENT PATHOLOGICAL FRACTURE WITH ROUTINE HEALING, SUBSEQUENT ENCOUNTER: ICD-10-CM

## 2021-11-22 DIAGNOSIS — Z79.01 ANTICOAGULATED BY ANTICOAGULATION TREATMENT: ICD-10-CM

## 2021-11-22 DIAGNOSIS — I10 ESSENTIAL HYPERTENSION: ICD-10-CM

## 2021-11-22 DIAGNOSIS — I48.0 PAROXYSMAL ATRIAL FIBRILLATION (H): ICD-10-CM

## 2021-11-22 LAB
ALBUMIN SERPL-MCNC: 3 G/DL (ref 3.4–5)
ALP SERPL-CCNC: 66 U/L (ref 40–150)
ALT SERPL W P-5'-P-CCNC: 21 U/L (ref 0–50)
ANION GAP SERPL CALCULATED.3IONS-SCNC: 3 MMOL/L (ref 3–14)
AST SERPL W P-5'-P-CCNC: 19 U/L (ref 0–45)
BASOPHILS # BLD AUTO: 0 10E3/UL (ref 0–0.2)
BASOPHILS NFR BLD AUTO: 0 %
BILIRUB SERPL-MCNC: 0.2 MG/DL (ref 0.2–1.3)
BUN SERPL-MCNC: 33 MG/DL (ref 7–30)
CALCIUM SERPL-MCNC: 9.2 MG/DL (ref 8.5–10.1)
CHLORIDE BLD-SCNC: 112 MMOL/L (ref 94–109)
CO2 SERPL-SCNC: 26 MMOL/L (ref 20–32)
CREAT SERPL-MCNC: 1.12 MG/DL (ref 0.52–1.04)
EOSINOPHIL # BLD AUTO: 0.2 10E3/UL (ref 0–0.7)
EOSINOPHIL NFR BLD AUTO: 4 %
ERYTHROCYTE [DISTWIDTH] IN BLOOD BY AUTOMATED COUNT: 12.5 % (ref 10–15)
GFR SERPL CREATININE-BSD FRML MDRD: 45 ML/MIN/1.73M2
GLUCOSE BLD-MCNC: 89 MG/DL (ref 70–99)
HCT VFR BLD AUTO: 36.4 % (ref 35–47)
HGB BLD-MCNC: 11.7 G/DL (ref 11.7–15.7)
IMM GRANULOCYTES # BLD: 0 10E3/UL
IMM GRANULOCYTES NFR BLD: 0 %
LYMPHOCYTES # BLD AUTO: 1.2 10E3/UL (ref 0.8–5.3)
LYMPHOCYTES NFR BLD AUTO: 22 %
MCH RBC QN AUTO: 30.3 PG (ref 26.5–33)
MCHC RBC AUTO-ENTMCNC: 32.1 G/DL (ref 31.5–36.5)
MCV RBC AUTO: 94 FL (ref 78–100)
MONOCYTES # BLD AUTO: 0.6 10E3/UL (ref 0–1.3)
MONOCYTES NFR BLD AUTO: 11 %
NEUTROPHILS # BLD AUTO: 3.3 10E3/UL (ref 1.6–8.3)
NEUTROPHILS NFR BLD AUTO: 63 %
NRBC # BLD AUTO: 0 10E3/UL
NRBC BLD AUTO-RTO: 0 /100
PLATELET # BLD AUTO: 154 10E3/UL (ref 150–450)
POTASSIUM BLD-SCNC: 4.9 MMOL/L (ref 3.4–5.3)
PROT SERPL-MCNC: 7.1 G/DL (ref 6.8–8.8)
RBC # BLD AUTO: 3.86 10E6/UL (ref 3.8–5.2)
SODIUM SERPL-SCNC: 141 MMOL/L (ref 133–144)
TSH SERPL DL<=0.005 MIU/L-ACNC: 2.02 MU/L (ref 0.4–4)
WBC # BLD AUTO: 5.3 10E3/UL (ref 4–11)

## 2021-11-22 PROCEDURE — 85025 COMPLETE CBC W/AUTO DIFF WBC: CPT | Mod: ZL | Performed by: FAMILY MEDICINE

## 2021-11-22 PROCEDURE — 36415 COLL VENOUS BLD VENIPUNCTURE: CPT | Mod: ZL | Performed by: FAMILY MEDICINE

## 2021-11-22 PROCEDURE — 82374 ASSAY BLOOD CARBON DIOXIDE: CPT | Mod: ZL | Performed by: FAMILY MEDICINE

## 2021-11-22 PROCEDURE — 84443 ASSAY THYROID STIM HORMONE: CPT | Mod: ZL | Performed by: FAMILY MEDICINE

## 2021-11-22 PROCEDURE — 82040 ASSAY OF SERUM ALBUMIN: CPT | Mod: ZL | Performed by: FAMILY MEDICINE

## 2021-11-22 PROCEDURE — 99214 OFFICE O/P EST MOD 30 MIN: CPT | Performed by: FAMILY MEDICINE

## 2021-11-22 PROCEDURE — G0463 HOSPITAL OUTPT CLINIC VISIT: HCPCS

## 2021-11-22 ASSESSMENT — ASTHMA QUESTIONNAIRES
QUESTION_5 LAST FOUR WEEKS HOW WOULD YOU RATE YOUR ASTHMA CONTROL: WELL CONTROLLED
QUESTION_3 LAST FOUR WEEKS HOW OFTEN DID YOUR ASTHMA SYMPTOMS (WHEEZING, COUGHING, SHORTNESS OF BREATH, CHEST TIGHTNESS OR PAIN) WAKE YOU UP AT NIGHT OR EARLIER THAN USUAL IN THE MORNING: NOT AT ALL
QUESTION_4 LAST FOUR WEEKS HOW OFTEN HAVE YOU USED YOUR RESCUE INHALER OR NEBULIZER MEDICATION (SUCH AS ALBUTEROL): NOT AT ALL
QUESTION_1 LAST FOUR WEEKS HOW MUCH OF THE TIME DID YOUR ASTHMA KEEP YOU FROM GETTING AS MUCH DONE AT WORK, SCHOOL OR AT HOME: A LITTLE OF THE TIME
ACT_TOTALSCORE: 22
QUESTION_2 LAST FOUR WEEKS HOW OFTEN HAVE YOU HAD SHORTNESS OF BREATH: ONCE OR TWICE A WEEK

## 2021-11-22 ASSESSMENT — MIFFLIN-ST. JEOR: SCORE: 1144.42

## 2021-11-22 ASSESSMENT — PAIN SCALES - GENERAL: PAINLEVEL: MILD PAIN (3)

## 2021-11-22 NOTE — NURSING NOTE
"Chief Complaint   Patient presents with     Establish Care       Initial /60   Pulse 85   Temp 97.7  F (36.5  C)   Ht 1.651 m (5' 5\")   Wt 69.9 kg (154 lb)   SpO2 98%   BMI 25.63 kg/m   Estimated body mass index is 25.63 kg/m  as calculated from the following:    Height as of this encounter: 1.651 m (5' 5\").    Weight as of this encounter: 69.9 kg (154 lb).  Medication Reconciliation: complete  Alec Navas LPN  "

## 2021-11-23 ASSESSMENT — ASTHMA QUESTIONNAIRES: ACT_TOTALSCORE: 22

## 2021-12-08 DIAGNOSIS — E78.2 MIXED HYPERLIPIDEMIA: ICD-10-CM

## 2021-12-08 DIAGNOSIS — E78.00 HYPERCHOLESTEREMIA: Primary | ICD-10-CM

## 2021-12-09 RX ORDER — ROSUVASTATIN CALCIUM 10 MG/1
TABLET, COATED ORAL
Qty: 90 TABLET | Refills: 3 | Status: SHIPPED | OUTPATIENT
Start: 2021-12-09 | End: 2023-02-28

## 2021-12-09 NOTE — TELEPHONE ENCOUNTER
rosuvastatin (CRESTOR) 10 MG tablet      Last Written Prescription Date:  11-30-20  Last Fill Quantity: 90,   # refills: 3  Last Office Visit: 9-8-21  Future Office visit:       Routing refill request to provider for review/approval because:   LDL on file in past 12 months    LDL Cholesterol Calculated   Date Value Ref Range Status   12/04/2019 138 (H) <100 mg/dL Final     Comment:     Above desirable:  100-129 mg/dl  Borderline High:  130-159 mg/dL  High:             160-189 mg/dL  Very high:       >189 mg/dl

## 2021-12-22 DIAGNOSIS — I48.0 PAROXYSMAL ATRIAL FIBRILLATION (H): ICD-10-CM

## 2021-12-23 RX ORDER — APIXABAN 5 MG/1
TABLET, FILM COATED ORAL
Qty: 180 TABLET | Refills: 0 | Status: SHIPPED | OUTPATIENT
Start: 2021-12-23 | End: 2022-04-08

## 2021-12-23 NOTE — TELEPHONE ENCOUNTER
ELIQUIS 5 MG TABLET  Last Written Prescription Date:  9/30/2021  Last Fill Quantity: 180,   # refills: 0  Last Office Visit: 11/22/2021  Future Office visit:       Routing refill request to provider for review/approval because:

## 2022-01-12 DIAGNOSIS — M54.50 LUMBAR SPINE PAIN: ICD-10-CM

## 2022-01-14 RX ORDER — HYDROCODONE BITARTRATE AND ACETAMINOPHEN 5; 325 MG/1; MG/1
TABLET ORAL
Qty: 60 TABLET | Refills: 0 | Status: SHIPPED | OUTPATIENT
Start: 2022-01-14 | End: 2022-02-18

## 2022-01-14 NOTE — TELEPHONE ENCOUNTER
Norco      Last Written Prescription Date:  12/21/21  Last Fill Quantity: 60,   # refills: 0  Last Office Visit: 11/22/21  Future Office visit:

## 2022-01-20 ENCOUNTER — TELEPHONE (OUTPATIENT)
Dept: OTOLARYNGOLOGY | Facility: OTHER | Age: 87
End: 2022-01-20
Payer: COMMERCIAL

## 2022-01-26 ENCOUNTER — ANCILLARY PROCEDURE (OUTPATIENT)
Dept: CARDIOLOGY | Facility: CLINIC | Age: 87
End: 2022-01-26
Attending: INTERNAL MEDICINE
Payer: MEDICARE

## 2022-01-26 DIAGNOSIS — Z95.810 AUTOMATIC IMPLANTABLE CARDIOVERTER-DEFIBRILLATOR IN SITU: ICD-10-CM

## 2022-01-26 PROCEDURE — 93296 REM INTERROG EVL PM/IDS: CPT

## 2022-01-26 PROCEDURE — 93295 DEV INTERROG REMOTE 1/2/MLT: CPT | Performed by: INTERNAL MEDICINE

## 2022-01-28 LAB
MDC_IDC_EPISODE_DTM: NORMAL
MDC_IDC_EPISODE_DURATION: 4 S
MDC_IDC_EPISODE_DURATION: 5 S
MDC_IDC_EPISODE_DURATION: 5 S
MDC_IDC_EPISODE_DURATION: 6 S
MDC_IDC_EPISODE_DURATION: 7 S
MDC_IDC_EPISODE_DURATION: 7 S
MDC_IDC_EPISODE_ID: 741
MDC_IDC_EPISODE_ID: 742
MDC_IDC_EPISODE_ID: 743
MDC_IDC_EPISODE_ID: 744
MDC_IDC_EPISODE_ID: 745
MDC_IDC_EPISODE_ID: 746
MDC_IDC_EPISODE_TYPE: NORMAL
MDC_IDC_LEAD_IMPLANT_DT: NORMAL
MDC_IDC_LEAD_LOCATION: NORMAL
MDC_IDC_LEAD_LOCATION_DETAIL_1: NORMAL
MDC_IDC_LEAD_MFG: NORMAL
MDC_IDC_LEAD_MODEL: NORMAL
MDC_IDC_LEAD_POLARITY_TYPE: NORMAL
MDC_IDC_LEAD_SERIAL: NORMAL
MDC_IDC_MSMT_BATTERY_DTM: NORMAL
MDC_IDC_MSMT_BATTERY_REMAINING_LONGEVITY: 10 MO
MDC_IDC_MSMT_BATTERY_RRT_TRIGGER: 2.73
MDC_IDC_MSMT_BATTERY_STATUS: NORMAL
MDC_IDC_MSMT_BATTERY_VOLTAGE: 2.86 V
MDC_IDC_MSMT_CAP_CHARGE_DTM: NORMAL
MDC_IDC_MSMT_CAP_CHARGE_ENERGY: 18 J
MDC_IDC_MSMT_CAP_CHARGE_TIME: 4.63
MDC_IDC_MSMT_CAP_CHARGE_TYPE: NORMAL
MDC_IDC_MSMT_LEADCHNL_LV_IMPEDANCE_VALUE: 456 OHM
MDC_IDC_MSMT_LEADCHNL_LV_IMPEDANCE_VALUE: 513 OHM
MDC_IDC_MSMT_LEADCHNL_LV_IMPEDANCE_VALUE: 836 OHM
MDC_IDC_MSMT_LEADCHNL_LV_PACING_THRESHOLD_AMPLITUDE: 0.75 V
MDC_IDC_MSMT_LEADCHNL_LV_PACING_THRESHOLD_PULSEWIDTH: 0.6 MS
MDC_IDC_MSMT_LEADCHNL_RA_IMPEDANCE_VALUE: 437 OHM
MDC_IDC_MSMT_LEADCHNL_RA_PACING_THRESHOLD_AMPLITUDE: 0.38 V
MDC_IDC_MSMT_LEADCHNL_RA_PACING_THRESHOLD_PULSEWIDTH: 0.4 MS
MDC_IDC_MSMT_LEADCHNL_RA_SENSING_INTR_AMPL: 2.12 MV
MDC_IDC_MSMT_LEADCHNL_RA_SENSING_INTR_AMPL: 2.12 MV
MDC_IDC_MSMT_LEADCHNL_RV_IMPEDANCE_VALUE: 342 OHM
MDC_IDC_MSMT_LEADCHNL_RV_IMPEDANCE_VALUE: 437 OHM
MDC_IDC_MSMT_LEADCHNL_RV_PACING_THRESHOLD_AMPLITUDE: 1 V
MDC_IDC_MSMT_LEADCHNL_RV_PACING_THRESHOLD_PULSEWIDTH: 0.4 MS
MDC_IDC_MSMT_LEADCHNL_RV_SENSING_INTR_AMPL: 4.12 MV
MDC_IDC_MSMT_LEADCHNL_RV_SENSING_INTR_AMPL: 4.12 MV
MDC_IDC_PG_IMPLANT_DTM: NORMAL
MDC_IDC_PG_MFG: NORMAL
MDC_IDC_PG_MODEL: NORMAL
MDC_IDC_PG_SERIAL: NORMAL
MDC_IDC_PG_TYPE: NORMAL
MDC_IDC_SESS_CLINIC_NAME: NORMAL
MDC_IDC_SESS_DTM: NORMAL
MDC_IDC_SESS_TYPE: NORMAL
MDC_IDC_SET_BRADY_AT_MODE_SWITCH_RATE: 171 {BEATS}/MIN
MDC_IDC_SET_BRADY_LOWRATE: 60 {BEATS}/MIN
MDC_IDC_SET_BRADY_MAX_SENSOR_RATE: 130 {BEATS}/MIN
MDC_IDC_SET_BRADY_MAX_TRACKING_RATE: 130 {BEATS}/MIN
MDC_IDC_SET_BRADY_MODE: NORMAL
MDC_IDC_SET_BRADY_PAV_DELAY_LOW: 160 MS
MDC_IDC_SET_BRADY_SAV_DELAY_LOW: 140 MS
MDC_IDC_SET_CRT_PACED_CHAMBERS: NORMAL
MDC_IDC_SET_LEADCHNL_LV_PACING_AMPLITUDE: 1.25 V
MDC_IDC_SET_LEADCHNL_LV_PACING_ANODE_ELECTRODE_1: NORMAL
MDC_IDC_SET_LEADCHNL_LV_PACING_ANODE_LOCATION_1: NORMAL
MDC_IDC_SET_LEADCHNL_LV_PACING_CAPTURE_MODE: NORMAL
MDC_IDC_SET_LEADCHNL_LV_PACING_CATHODE_ELECTRODE_1: NORMAL
MDC_IDC_SET_LEADCHNL_LV_PACING_CATHODE_LOCATION_1: NORMAL
MDC_IDC_SET_LEADCHNL_LV_PACING_POLARITY: NORMAL
MDC_IDC_SET_LEADCHNL_LV_PACING_PULSEWIDTH: 0.6 MS
MDC_IDC_SET_LEADCHNL_RA_PACING_AMPLITUDE: 1.5 V
MDC_IDC_SET_LEADCHNL_RA_PACING_ANODE_ELECTRODE_1: NORMAL
MDC_IDC_SET_LEADCHNL_RA_PACING_ANODE_LOCATION_1: NORMAL
MDC_IDC_SET_LEADCHNL_RA_PACING_CAPTURE_MODE: NORMAL
MDC_IDC_SET_LEADCHNL_RA_PACING_CATHODE_ELECTRODE_1: NORMAL
MDC_IDC_SET_LEADCHNL_RA_PACING_CATHODE_LOCATION_1: NORMAL
MDC_IDC_SET_LEADCHNL_RA_PACING_POLARITY: NORMAL
MDC_IDC_SET_LEADCHNL_RA_PACING_PULSEWIDTH: 0.4 MS
MDC_IDC_SET_LEADCHNL_RA_SENSING_ANODE_ELECTRODE_1: NORMAL
MDC_IDC_SET_LEADCHNL_RA_SENSING_ANODE_LOCATION_1: NORMAL
MDC_IDC_SET_LEADCHNL_RA_SENSING_CATHODE_ELECTRODE_1: NORMAL
MDC_IDC_SET_LEADCHNL_RA_SENSING_CATHODE_LOCATION_1: NORMAL
MDC_IDC_SET_LEADCHNL_RA_SENSING_POLARITY: NORMAL
MDC_IDC_SET_LEADCHNL_RA_SENSING_SENSITIVITY: 0.3 MV
MDC_IDC_SET_LEADCHNL_RV_PACING_AMPLITUDE: 2 V
MDC_IDC_SET_LEADCHNL_RV_PACING_ANODE_ELECTRODE_1: NORMAL
MDC_IDC_SET_LEADCHNL_RV_PACING_ANODE_LOCATION_1: NORMAL
MDC_IDC_SET_LEADCHNL_RV_PACING_CAPTURE_MODE: NORMAL
MDC_IDC_SET_LEADCHNL_RV_PACING_CATHODE_ELECTRODE_1: NORMAL
MDC_IDC_SET_LEADCHNL_RV_PACING_CATHODE_LOCATION_1: NORMAL
MDC_IDC_SET_LEADCHNL_RV_PACING_POLARITY: NORMAL
MDC_IDC_SET_LEADCHNL_RV_PACING_PULSEWIDTH: 0.4 MS
MDC_IDC_SET_LEADCHNL_RV_SENSING_ANODE_ELECTRODE_1: NORMAL
MDC_IDC_SET_LEADCHNL_RV_SENSING_ANODE_LOCATION_1: NORMAL
MDC_IDC_SET_LEADCHNL_RV_SENSING_CATHODE_ELECTRODE_1: NORMAL
MDC_IDC_SET_LEADCHNL_RV_SENSING_CATHODE_LOCATION_1: NORMAL
MDC_IDC_SET_LEADCHNL_RV_SENSING_POLARITY: NORMAL
MDC_IDC_SET_LEADCHNL_RV_SENSING_SENSITIVITY: 0.3 MV
MDC_IDC_SET_ZONE_DETECTION_BEATS_DENOMINATOR: 40 {BEATS}
MDC_IDC_SET_ZONE_DETECTION_BEATS_NUMERATOR: 30 {BEATS}
MDC_IDC_SET_ZONE_DETECTION_INTERVAL: 320 MS
MDC_IDC_SET_ZONE_DETECTION_INTERVAL: 350 MS
MDC_IDC_SET_ZONE_DETECTION_INTERVAL: 360 MS
MDC_IDC_SET_ZONE_DETECTION_INTERVAL: 400 MS
MDC_IDC_SET_ZONE_DETECTION_INTERVAL: NORMAL
MDC_IDC_SET_ZONE_TYPE: NORMAL
MDC_IDC_STAT_AT_BURDEN_PERCENT: 0 %
MDC_IDC_STAT_AT_DTM_END: NORMAL
MDC_IDC_STAT_AT_DTM_START: NORMAL
MDC_IDC_STAT_BRADY_AP_VP_PERCENT: 11.46 %
MDC_IDC_STAT_BRADY_AP_VS_PERCENT: 0.2 %
MDC_IDC_STAT_BRADY_AS_VP_PERCENT: 87.06 %
MDC_IDC_STAT_BRADY_AS_VS_PERCENT: 1.27 %
MDC_IDC_STAT_BRADY_DTM_END: NORMAL
MDC_IDC_STAT_BRADY_DTM_START: NORMAL
MDC_IDC_STAT_BRADY_RA_PERCENT_PACED: 11.66 %
MDC_IDC_STAT_BRADY_RV_PERCENT_PACED: 34.25 %
MDC_IDC_STAT_CRT_DTM_END: NORMAL
MDC_IDC_STAT_CRT_DTM_START: NORMAL
MDC_IDC_STAT_CRT_LV_PERCENT_PACED: 98.31 %
MDC_IDC_STAT_CRT_PERCENT_PACED: 98.31 %
MDC_IDC_STAT_EPISODE_RECENT_COUNT: 0
MDC_IDC_STAT_EPISODE_RECENT_COUNT_DTM_END: NORMAL
MDC_IDC_STAT_EPISODE_RECENT_COUNT_DTM_START: NORMAL
MDC_IDC_STAT_EPISODE_TOTAL_COUNT: 0
MDC_IDC_STAT_EPISODE_TOTAL_COUNT: 1
MDC_IDC_STAT_EPISODE_TOTAL_COUNT: 66
MDC_IDC_STAT_EPISODE_TOTAL_COUNT_DTM_END: NORMAL
MDC_IDC_STAT_EPISODE_TOTAL_COUNT_DTM_START: NORMAL
MDC_IDC_STAT_EPISODE_TYPE: NORMAL
MDC_IDC_STAT_TACHYTHERAPY_ATP_DELIVERED_RECENT: 0
MDC_IDC_STAT_TACHYTHERAPY_ATP_DELIVERED_TOTAL: 0
MDC_IDC_STAT_TACHYTHERAPY_RECENT_DTM_END: NORMAL
MDC_IDC_STAT_TACHYTHERAPY_RECENT_DTM_START: NORMAL
MDC_IDC_STAT_TACHYTHERAPY_SHOCKS_ABORTED_RECENT: 0
MDC_IDC_STAT_TACHYTHERAPY_SHOCKS_ABORTED_TOTAL: 0
MDC_IDC_STAT_TACHYTHERAPY_SHOCKS_DELIVERED_RECENT: 0
MDC_IDC_STAT_TACHYTHERAPY_SHOCKS_DELIVERED_TOTAL: 1
MDC_IDC_STAT_TACHYTHERAPY_TOTAL_DTM_END: NORMAL
MDC_IDC_STAT_TACHYTHERAPY_TOTAL_DTM_START: NORMAL

## 2022-02-10 ENCOUNTER — TELEPHONE (OUTPATIENT)
Dept: FAMILY MEDICINE | Facility: OTHER | Age: 87
End: 2022-02-10
Payer: COMMERCIAL

## 2022-02-10 NOTE — TELEPHONE ENCOUNTER
Received a PA from [employer] for Spiriva Respimat 2.5MCG/ACT aerosol. Submitted on CMM. Waiting for a response.

## 2022-02-11 NOTE — TELEPHONE ENCOUNTER
Received an APPROVAL from [employer] for Spiriva Respimat 2.5mcg/act aerosol. Effective 11/12/2021 to 02/10/2023. Forms scanned to [facility].

## 2022-02-16 DIAGNOSIS — M54.50 LUMBAR SPINE PAIN: ICD-10-CM

## 2022-02-17 NOTE — TELEPHONE ENCOUNTER
COREYCO      Last Written Prescription Date:  1-  Last Fill Quantity: 60,   # refills: 0  Last Office Visit: 11-  Future Office visit:       Routing refill request to provider for review/approval because:  Drug not on the [facility], [facility] or [facility] refill protocol or controlled substance

## 2022-02-18 RX ORDER — HYDROCODONE BITARTRATE AND ACETAMINOPHEN 5; 325 MG/1; MG/1
TABLET ORAL
Qty: 60 TABLET | Refills: 0 | Status: SHIPPED | OUTPATIENT
Start: 2022-02-18 | End: 2022-03-16

## 2022-02-21 NOTE — PROGRESS NOTES
"80 y/o referred by Dr Garcia with COPD. Pt has been SOB for probably 5 years, progressive. Initially found to have non ischemic cardiomyopathy, ended up with a pacemaker to cardiac resynchronization. At this point EF improved to 40 %, some diastolic dysfunction but SOB getting worse particulary since Jan, 6 mo ago. No wheezing, mild non prod cough, no chest pain. Smoked around a ppd from 31 to 61, quit 20 y ago. No hx of asthma, no particular airways hypersensitivity or allergies, not much day to day variability in her dyspnea. Has trouble even getting up a flight of stairs. Also limited by some chronic leg weakness and back pain.     PMH hypertension, cardiomyopathy    SH her with daughter today, nice lady has other grown children    PE  BP 98/58  Pulse 75  Resp 16  Ht 5' 5\" (1.651 m)  Wt 168 lb (76.2 kg)  SpO2 95%  BMI 27.96 kg/m2  Resp fairly clear bilat, no wheeze  Cor rrr o mgr  Ext no edema      Current Outpatient Prescriptions:      albuterol (PROAIR HFA/PROVENTIL HFA/VENTOLIN HFA) 108 (90 BASE) MCG/ACT Inhaler, Inhale 2 puffs into the lungs every 6 hours as needed for shortness of breath / dyspnea or wheezing, Disp: 1 Inhaler, Rfl: 1     budesonide-formoterol (SYMBICORT) 160-4.5 MCG/ACT Inhaler, Inhale 2 puffs into the lungs 2 times daily, Disp: 1 Inhaler, Rfl: 1     spironolactone (ALDACTONE) 25 MG tablet, TAKE 1/2 TABLET BY MOUTH DAILY, Disp: 45 tablet, Rfl: 3     alendronate (FOSAMAX) 70 MG tablet, TAKE 1 TABLET BY MOUTH EVERY 7 DAYS. TAKE WITH 8 OUNCES OF WATER AND STAY UPRIGHT FOR AT LEAST 30 MINUTES AFTER TAKING., Disp: 12 tablet, Rfl: 1     ibuprofen (ADVIL/MOTRIN) 600 MG tablet, TAKE 1 TABLET BY MOUTH EVERY 6 HOURS AS NEEDED FOR MODERATE TO SEVEREP AIN, Disp: 30 tablet, Rfl: 3     carvedilol (COREG) 12.5 MG tablet, TAKE 1 TABLET BY MOUTH 2 TIMES DAILY WITH MEALS, Disp: 180 tablet, Rfl: 2     order for DME, Equipment being ordered: Walker, Disp: 1 each, Rfl: 0     lisinopril (PRINIVIL,ZESTRIL) " 2.5 MG tablet, Take 1 tablet (2.5 mg) by mouth daily, Disp: 91 tablet, Rfl: 3     ORDER FOR DME, Equipment being ordered: Lumbar brace, Disp: 1 Device, Rfl: 0     aspirin 81 MG tablet, Take 1 tablet by mouth daily, Disp: , Rfl:      Cholecalciferol (VITAMIN D3) 2000 UNITS TABS, Take 1 tablet by mouth daily, Disp: , Rfl:      Multiple Vitamin (MULTI-VITAMIN DAILY PO), Take 1 tablet by mouth daily, Disp: , Rfl:      Calcium Carbonate-Vitamin D (CALCIUM 600 + D OR), Take 1 tablet by mouth daily, Disp: , Rfl:      diphenhydrAMINE-acetaminophen (ACETAMINOPHEN PM)  MG tablet, Take 2 tablets by mouth nightly as needed., Disp: , Rfl:     PFTs  Moderate obstruct, FEV% 48, high TLC, DLCO 49 %, some reversibility seen    A/ COPD/poss asthma   Albuterol and symbicort trial, consider decreasing or stopping B Blocker in the future, poss pred trial in future tho has osteoporosis.    MODESTO GUPTA RN

## 2022-02-27 ENCOUNTER — HEALTH MAINTENANCE LETTER (OUTPATIENT)
Age: 87
End: 2022-02-27

## 2022-03-01 DIAGNOSIS — I50.9 CONGESTIVE HEART FAILURE, NYHA CLASS 2, UNSPECIFIED CONGESTIVE HEART FAILURE TYPE (H): ICD-10-CM

## 2022-03-01 DIAGNOSIS — I51.89 COMBINED SYSTOLIC AND DIASTOLIC CARDIAC DYSFUNCTION: ICD-10-CM

## 2022-03-01 DIAGNOSIS — R06.02 SOB (SHORTNESS OF BREATH): ICD-10-CM

## 2022-03-01 DIAGNOSIS — I50.22 CHRONIC SYSTOLIC CHF (CONGESTIVE HEART FAILURE) (H): ICD-10-CM

## 2022-03-01 DIAGNOSIS — I42.8 NON-ISCHEMIC CARDIOMYOPATHY (H): ICD-10-CM

## 2022-03-01 DIAGNOSIS — I10 ESSENTIAL HYPERTENSION: ICD-10-CM

## 2022-03-02 RX ORDER — SACUBITRIL AND VALSARTAN 49; 51 MG/1; MG/1
TABLET, FILM COATED ORAL
Qty: 180 TABLET | Refills: 3 | Status: SHIPPED | OUTPATIENT
Start: 2022-03-02 | End: 2023-02-20

## 2022-03-02 NOTE — TELEPHONE ENCOUNTER
entresto      Last Written Prescription Date:  2/22/21  Last Fill Quantity: 60,   # refills: 0  Last Office Visit: 11/22/21  Future Office visit:    Next 5 appointments (look out 90 days)    May 24, 2022  3:15 PM  (Arrive by 3:00 PM)  SHORT with Ilia Moran MD  [facility] [place] ([facility] [place] ) [address]  [place] [address]  [phone]

## 2022-03-15 DIAGNOSIS — M54.50 LUMBAR SPINE PAIN: ICD-10-CM

## 2022-03-16 RX ORDER — HYDROCODONE BITARTRATE AND ACETAMINOPHEN 5; 325 MG/1; MG/1
TABLET ORAL
Qty: 60 TABLET | Refills: 0 | Status: SHIPPED | OUTPATIENT
Start: 2022-03-16 | End: 2022-04-08

## 2022-03-16 NOTE — TELEPHONE ENCOUNTER
Norco      Last Written Prescription Date:  02/18/22  Last Fill Quantity: 60,   # refills: 0  Last Office Visit: 11/22/21  Future Office visit:    Next 5 appointments (look out 90 days)    May 24, 2022  3:15 PM  (Arrive by 3:00 PM)  SHORT with Ilia Moran MD  [facility] [place] ([facility] [place] ) [address]  [place] [address]  [phone]

## 2022-04-14 ENCOUNTER — MEDICAL CORRESPONDENCE (OUTPATIENT)
Dept: CT IMAGING | Facility: HOSPITAL | Age: 87
End: 2022-04-14
Payer: COMMERCIAL

## 2022-04-18 ENCOUNTER — HOSPITAL ENCOUNTER (OUTPATIENT)
Dept: CT IMAGING | Facility: HOSPITAL | Age: 87
Discharge: HOME | End: 2022-04-18
Attending: ANESTHESIOLOGY | Admitting: ANESTHESIOLOGY
Payer: MEDICARE

## 2022-04-18 DIAGNOSIS — M54.12 CERVICAL RADICULOPATHY: ICD-10-CM

## 2022-04-18 PROCEDURE — 72125 CT NECK SPINE W/O DYE: CPT

## 2022-04-21 ENCOUNTER — IMMUNIZATION (OUTPATIENT)
Dept: FAMILY MEDICINE | Facility: OTHER | Age: 87
End: 2022-04-21
Attending: FAMILY MEDICINE
Payer: MEDICARE

## 2022-04-21 PROCEDURE — 91305 COVID-19,PF,PFIZER (12+ YRS): CPT

## 2022-05-03 ENCOUNTER — ANCILLARY PROCEDURE (OUTPATIENT)
Dept: CARDIOLOGY | Facility: CLINIC | Age: 87
End: 2022-05-03
Attending: INTERNAL MEDICINE
Payer: MEDICARE

## 2022-05-03 DIAGNOSIS — Z95.810 AUTOMATIC IMPLANTABLE CARDIOVERTER-DEFIBRILLATOR IN SITU: ICD-10-CM

## 2022-05-03 PROCEDURE — 93296 REM INTERROG EVL PM/IDS: CPT

## 2022-05-03 PROCEDURE — 93295 DEV INTERROG REMOTE 1/2/MLT: CPT | Performed by: INTERNAL MEDICINE

## 2022-06-01 DIAGNOSIS — I50.9 CONGESTIVE HEART FAILURE, NYHA CLASS 2, UNSPECIFIED CONGESTIVE HEART FAILURE TYPE (H): ICD-10-CM

## 2022-06-01 DIAGNOSIS — I42.8 NON-ISCHEMIC CARDIOMYOPATHY (H): ICD-10-CM

## 2022-06-01 DIAGNOSIS — I10 ESSENTIAL HYPERTENSION: ICD-10-CM

## 2022-06-01 DIAGNOSIS — R06.02 SOB (SHORTNESS OF BREATH): ICD-10-CM

## 2022-06-01 DIAGNOSIS — I51.89 COMBINED SYSTOLIC AND DIASTOLIC CARDIAC DYSFUNCTION: ICD-10-CM

## 2022-06-01 DIAGNOSIS — I50.22 CHRONIC SYSTOLIC CHF (CONGESTIVE HEART FAILURE) (H): ICD-10-CM

## 2022-06-01 LAB
MDC_IDC_EPISODE_DTM: NORMAL
MDC_IDC_EPISODE_DURATION: 4 S
MDC_IDC_EPISODE_DURATION: 4 S
MDC_IDC_EPISODE_DURATION: 5 S
MDC_IDC_EPISODE_DURATION: 6 S
MDC_IDC_EPISODE_ID: 747
MDC_IDC_EPISODE_ID: 748
MDC_IDC_EPISODE_ID: 749
MDC_IDC_EPISODE_ID: 750
MDC_IDC_EPISODE_TYPE: NORMAL
MDC_IDC_LEAD_IMPLANT_DT: NORMAL
MDC_IDC_LEAD_LOCATION: NORMAL
MDC_IDC_LEAD_LOCATION_DETAIL_1: NORMAL
MDC_IDC_LEAD_MFG: NORMAL
MDC_IDC_LEAD_MODEL: NORMAL
MDC_IDC_LEAD_POLARITY_TYPE: NORMAL
MDC_IDC_LEAD_SERIAL: NORMAL
MDC_IDC_MSMT_BATTERY_DTM: NORMAL
MDC_IDC_MSMT_BATTERY_REMAINING_LONGEVITY: 8 MO
MDC_IDC_MSMT_BATTERY_RRT_TRIGGER: 2.73
MDC_IDC_MSMT_BATTERY_STATUS: NORMAL
MDC_IDC_MSMT_BATTERY_VOLTAGE: 2.84 V
MDC_IDC_MSMT_CAP_CHARGE_DTM: NORMAL
MDC_IDC_MSMT_CAP_CHARGE_ENERGY: 18 J
MDC_IDC_MSMT_CAP_CHARGE_TIME: 4.76
MDC_IDC_MSMT_CAP_CHARGE_TYPE: NORMAL
MDC_IDC_MSMT_LEADCHNL_LV_IMPEDANCE_VALUE: 437 OHM
MDC_IDC_MSMT_LEADCHNL_LV_IMPEDANCE_VALUE: 532 OHM
MDC_IDC_MSMT_LEADCHNL_LV_IMPEDANCE_VALUE: 836 OHM
MDC_IDC_MSMT_LEADCHNL_LV_PACING_THRESHOLD_AMPLITUDE: 0.75 V
MDC_IDC_MSMT_LEADCHNL_LV_PACING_THRESHOLD_PULSEWIDTH: 0.6 MS
MDC_IDC_MSMT_LEADCHNL_RA_IMPEDANCE_VALUE: 437 OHM
MDC_IDC_MSMT_LEADCHNL_RA_PACING_THRESHOLD_AMPLITUDE: 0.5 V
MDC_IDC_MSMT_LEADCHNL_RA_PACING_THRESHOLD_PULSEWIDTH: 0.4 MS
MDC_IDC_MSMT_LEADCHNL_RA_SENSING_INTR_AMPL: 2.12 MV
MDC_IDC_MSMT_LEADCHNL_RA_SENSING_INTR_AMPL: 2.12 MV
MDC_IDC_MSMT_LEADCHNL_RV_IMPEDANCE_VALUE: 380 OHM
MDC_IDC_MSMT_LEADCHNL_RV_IMPEDANCE_VALUE: 456 OHM
MDC_IDC_MSMT_LEADCHNL_RV_PACING_THRESHOLD_AMPLITUDE: 0.75 V
MDC_IDC_MSMT_LEADCHNL_RV_PACING_THRESHOLD_PULSEWIDTH: 0.4 MS
MDC_IDC_MSMT_LEADCHNL_RV_SENSING_INTR_AMPL: 4.12 MV
MDC_IDC_MSMT_LEADCHNL_RV_SENSING_INTR_AMPL: 4.12 MV
MDC_IDC_PG_IMPLANT_DTM: NORMAL
MDC_IDC_PG_MFG: NORMAL
MDC_IDC_PG_MODEL: NORMAL
MDC_IDC_PG_SERIAL: NORMAL
MDC_IDC_PG_TYPE: NORMAL
MDC_IDC_SESS_CLINIC_NAME: NORMAL
MDC_IDC_SESS_DTM: NORMAL
MDC_IDC_SESS_TYPE: NORMAL
MDC_IDC_SET_BRADY_AT_MODE_SWITCH_RATE: 171 {BEATS}/MIN
MDC_IDC_SET_BRADY_LOWRATE: 60 {BEATS}/MIN
MDC_IDC_SET_BRADY_MAX_SENSOR_RATE: 130 {BEATS}/MIN
MDC_IDC_SET_BRADY_MAX_TRACKING_RATE: 130 {BEATS}/MIN
MDC_IDC_SET_BRADY_MODE: NORMAL
MDC_IDC_SET_BRADY_PAV_DELAY_LOW: 150 MS
MDC_IDC_SET_BRADY_SAV_DELAY_LOW: 100 MS
MDC_IDC_SET_CRT_LVRV_DELAY: 10 MS
MDC_IDC_SET_CRT_PACED_CHAMBERS: NORMAL
MDC_IDC_SET_LEADCHNL_LV_PACING_AMPLITUDE: 1.25 V
MDC_IDC_SET_LEADCHNL_LV_PACING_ANODE_ELECTRODE_1: NORMAL
MDC_IDC_SET_LEADCHNL_LV_PACING_ANODE_LOCATION_1: NORMAL
MDC_IDC_SET_LEADCHNL_LV_PACING_CAPTURE_MODE: NORMAL
MDC_IDC_SET_LEADCHNL_LV_PACING_CATHODE_ELECTRODE_1: NORMAL
MDC_IDC_SET_LEADCHNL_LV_PACING_CATHODE_LOCATION_1: NORMAL
MDC_IDC_SET_LEADCHNL_LV_PACING_POLARITY: NORMAL
MDC_IDC_SET_LEADCHNL_LV_PACING_PULSEWIDTH: 0.6 MS
MDC_IDC_SET_LEADCHNL_RA_PACING_AMPLITUDE: 1.5 V
MDC_IDC_SET_LEADCHNL_RA_PACING_ANODE_ELECTRODE_1: NORMAL
MDC_IDC_SET_LEADCHNL_RA_PACING_ANODE_LOCATION_1: NORMAL
MDC_IDC_SET_LEADCHNL_RA_PACING_CAPTURE_MODE: NORMAL
MDC_IDC_SET_LEADCHNL_RA_PACING_CATHODE_ELECTRODE_1: NORMAL
MDC_IDC_SET_LEADCHNL_RA_PACING_CATHODE_LOCATION_1: NORMAL
MDC_IDC_SET_LEADCHNL_RA_PACING_POLARITY: NORMAL
MDC_IDC_SET_LEADCHNL_RA_PACING_PULSEWIDTH: 0.4 MS
MDC_IDC_SET_LEADCHNL_RA_SENSING_ANODE_ELECTRODE_1: NORMAL
MDC_IDC_SET_LEADCHNL_RA_SENSING_ANODE_LOCATION_1: NORMAL
MDC_IDC_SET_LEADCHNL_RA_SENSING_CATHODE_ELECTRODE_1: NORMAL
MDC_IDC_SET_LEADCHNL_RA_SENSING_CATHODE_LOCATION_1: NORMAL
MDC_IDC_SET_LEADCHNL_RA_SENSING_POLARITY: NORMAL
MDC_IDC_SET_LEADCHNL_RA_SENSING_SENSITIVITY: 0.3 MV
MDC_IDC_SET_LEADCHNL_RV_PACING_AMPLITUDE: 2 V
MDC_IDC_SET_LEADCHNL_RV_PACING_ANODE_ELECTRODE_1: NORMAL
MDC_IDC_SET_LEADCHNL_RV_PACING_ANODE_LOCATION_1: NORMAL
MDC_IDC_SET_LEADCHNL_RV_PACING_CAPTURE_MODE: NORMAL
MDC_IDC_SET_LEADCHNL_RV_PACING_CATHODE_ELECTRODE_1: NORMAL
MDC_IDC_SET_LEADCHNL_RV_PACING_CATHODE_LOCATION_1: NORMAL
MDC_IDC_SET_LEADCHNL_RV_PACING_POLARITY: NORMAL
MDC_IDC_SET_LEADCHNL_RV_PACING_PULSEWIDTH: 0.4 MS
MDC_IDC_SET_LEADCHNL_RV_SENSING_ANODE_ELECTRODE_1: NORMAL
MDC_IDC_SET_LEADCHNL_RV_SENSING_ANODE_LOCATION_1: NORMAL
MDC_IDC_SET_LEADCHNL_RV_SENSING_CATHODE_ELECTRODE_1: NORMAL
MDC_IDC_SET_LEADCHNL_RV_SENSING_CATHODE_LOCATION_1: NORMAL
MDC_IDC_SET_LEADCHNL_RV_SENSING_POLARITY: NORMAL
MDC_IDC_SET_LEADCHNL_RV_SENSING_SENSITIVITY: 0.3 MV
MDC_IDC_SET_ZONE_DETECTION_BEATS_DENOMINATOR: 40 {BEATS}
MDC_IDC_SET_ZONE_DETECTION_BEATS_NUMERATOR: 30 {BEATS}
MDC_IDC_SET_ZONE_DETECTION_INTERVAL: 320 MS
MDC_IDC_SET_ZONE_DETECTION_INTERVAL: 350 MS
MDC_IDC_SET_ZONE_DETECTION_INTERVAL: 360 MS
MDC_IDC_SET_ZONE_DETECTION_INTERVAL: 400 MS
MDC_IDC_SET_ZONE_DETECTION_INTERVAL: NORMAL
MDC_IDC_SET_ZONE_TYPE: NORMAL
MDC_IDC_STAT_AT_BURDEN_PERCENT: 0 %
MDC_IDC_STAT_AT_DTM_END: NORMAL
MDC_IDC_STAT_AT_DTM_START: NORMAL
MDC_IDC_STAT_BRADY_AP_VP_PERCENT: 10.76 %
MDC_IDC_STAT_BRADY_AP_VS_PERCENT: 0.19 %
MDC_IDC_STAT_BRADY_AS_VP_PERCENT: 87.8 %
MDC_IDC_STAT_BRADY_AS_VS_PERCENT: 1.26 %
MDC_IDC_STAT_BRADY_DTM_END: NORMAL
MDC_IDC_STAT_BRADY_DTM_START: NORMAL
MDC_IDC_STAT_BRADY_RA_PERCENT_PACED: 10.94 %
MDC_IDC_STAT_BRADY_RV_PERCENT_PACED: 53.99 %
MDC_IDC_STAT_CRT_DTM_END: NORMAL
MDC_IDC_STAT_CRT_DTM_START: NORMAL
MDC_IDC_STAT_CRT_LV_PERCENT_PACED: 98.33 %
MDC_IDC_STAT_CRT_PERCENT_PACED: 98.33 %
MDC_IDC_STAT_EPISODE_RECENT_COUNT: 0
MDC_IDC_STAT_EPISODE_RECENT_COUNT_DTM_END: NORMAL
MDC_IDC_STAT_EPISODE_RECENT_COUNT_DTM_START: NORMAL
MDC_IDC_STAT_EPISODE_TOTAL_COUNT: 0
MDC_IDC_STAT_EPISODE_TOTAL_COUNT: 1
MDC_IDC_STAT_EPISODE_TOTAL_COUNT: 66
MDC_IDC_STAT_EPISODE_TOTAL_COUNT_DTM_END: NORMAL
MDC_IDC_STAT_EPISODE_TOTAL_COUNT_DTM_START: NORMAL
MDC_IDC_STAT_EPISODE_TYPE: NORMAL
MDC_IDC_STAT_TACHYTHERAPY_ATP_DELIVERED_RECENT: 0
MDC_IDC_STAT_TACHYTHERAPY_ATP_DELIVERED_TOTAL: 0
MDC_IDC_STAT_TACHYTHERAPY_RECENT_DTM_END: NORMAL
MDC_IDC_STAT_TACHYTHERAPY_RECENT_DTM_START: NORMAL
MDC_IDC_STAT_TACHYTHERAPY_SHOCKS_ABORTED_RECENT: 0
MDC_IDC_STAT_TACHYTHERAPY_SHOCKS_ABORTED_TOTAL: 0
MDC_IDC_STAT_TACHYTHERAPY_SHOCKS_DELIVERED_RECENT: 0
MDC_IDC_STAT_TACHYTHERAPY_SHOCKS_DELIVERED_TOTAL: 1
MDC_IDC_STAT_TACHYTHERAPY_TOTAL_DTM_END: NORMAL
MDC_IDC_STAT_TACHYTHERAPY_TOTAL_DTM_START: NORMAL

## 2022-06-02 RX ORDER — SACUBITRIL AND VALSARTAN 49; 51 MG/1; MG/1
TABLET, FILM COATED ORAL
Qty: 180 TABLET | Refills: 0 | OUTPATIENT
Start: 2022-06-02

## 2022-06-16 DIAGNOSIS — M54.50 LUMBAR SPINE PAIN: ICD-10-CM

## 2022-06-20 RX ORDER — HYDROCODONE BITARTRATE AND ACETAMINOPHEN 5; 325 MG/1; MG/1
TABLET ORAL
Qty: 60 TABLET | Refills: 0 | Status: SHIPPED | OUTPATIENT
Start: 2022-06-20 | End: 2022-08-09

## 2022-06-20 NOTE — TELEPHONE ENCOUNTER
hydrocodone      Last Written Prescription Date:  5/11/22  Last Fill Quantity: 60,   # refills: 0  Last Office Visit: 11/22/21  Future Office visit:    Next 5 appointments (look out 90 days)    Sep 12, 2022  1:00 PM  (Arrive by 12:45 PM)  Return Visit with Kodi Garcia DO  [facility] [place] ([facility] [place] ) [address] [place] [address]  [phone]           Routing refill request to provider for review/approval because:  Drug not on the [facility], [facility] or [facility] refill protocol or controlled substance

## 2022-06-29 ENCOUNTER — APPOINTMENT (OUTPATIENT)
Dept: GENERAL RADIOLOGY | Facility: HOSPITAL | Age: 87
End: 2022-06-29
Attending: NURSE PRACTITIONER
Payer: MEDICARE

## 2022-06-29 ENCOUNTER — HOSPITAL ENCOUNTER (EMERGENCY)
Facility: HOSPITAL | Age: 87
Discharge: HOME | End: 2022-06-29
Attending: NURSE PRACTITIONER | Admitting: NURSE PRACTITIONER
Payer: MEDICARE

## 2022-06-29 VITALS
SYSTOLIC BLOOD PRESSURE: 145 MMHG | DIASTOLIC BLOOD PRESSURE: 63 MMHG | RESPIRATION RATE: 16 BRPM | TEMPERATURE: 97.1 F | HEART RATE: 73 BPM | OXYGEN SATURATION: 97 %

## 2022-06-29 DIAGNOSIS — M25.461 EFFUSION OF RIGHT KNEE: ICD-10-CM

## 2022-06-29 PROCEDURE — G0463 HOSPITAL OUTPT CLINIC VISIT: HCPCS

## 2022-06-29 PROCEDURE — 99213 OFFICE O/P EST LOW 20 MIN: CPT | Performed by: NURSE PRACTITIONER

## 2022-06-29 PROCEDURE — 73562 X-RAY EXAM OF KNEE 3: CPT | Mod: RT

## 2022-06-29 ASSESSMENT — ENCOUNTER SYMPTOMS
ARTHRALGIAS: 1
ACTIVITY CHANGE: 1
COLOR CHANGE: 0
FEVER: 0
CHILLS: 0
VOMITING: 0
NAUSEA: 0
NUMBNESS: 0

## 2022-06-29 NOTE — ED PROVIDER NOTES
History     Chief Complaint   Patient presents with     Knee Pain     HPI  Jacklyn Hein is a 86 year old female who is accompanied per her daughter.  She presents with right knee pain that started yesterday.  Denies any trauma other than she stood in line for extended period of time at a , and her knee started hurting when left event.  Felt a click in her right knee when she is walking out of the [employer].  Takes hydrocodone in the morning and the night; no change in discomfort noted. History of osteoporosis.  Had arthroplasty on the same knee several years ago: No problems since that time. Denies fevers, chills, nausea, and vomiting.  Has chronic numbness and tingling in her legs: Not exacerbated at this time.     Musculoskeletal problem/pain      Duration: yesterday    Description  Location: right     Intensity:  0/10 at rest 10/10 with bending    Accompanying signs and symptoms: none    History  Previous similar problem: YES- arthroscopy several years ago has been well since that time.   Previous evaluation:  x-ray    Precipitating or alleviating factors:  Trauma or overuse: YES- stood in line for extended period of time. Not use to standing that long  Aggravating factors include: bending knee. Is favoring right leg with ambulation    Therapies tried and outcome: ice and hydrocodone.  Has not helped.     Allergies:  No Known Allergies    Problem List:    Patient Active Problem List    Diagnosis Date Noted     Anticoagulated by anticoagulation treatment 2021     Priority: Medium     Primary pulmonary hypertension (H) 2021     Priority: Medium     Age-related osteoporosis with current pathological fracture with routine healing, subsequent encounter 2021     Priority: Medium     Chronic, continuous use of opioids 2021     Priority: Medium     Bacteremia due to Klebsiella pneumoniae on 2021     Priority: Medium     Mixed hyperlipidemia 2021     Priority:  Medium     Osteoarthritis of thoracolumbar spine 01/06/2021     Priority: Medium     Shock liver 01/06/2021     Priority: Medium     Abnormal finding on urinalysis 01/06/2021     Priority: Medium     Neuropathy 08/19/2020     Priority: Medium     Chronic systolic CHF (congestive heart failure) (H) 02/06/2020     Priority: Medium     Spinal stenosis of lumbar region with neurogenic claudication 02/06/2020     Priority: Medium     S/p ERP: Decompression Levels: L3 to L5 Side: Bilat on 2/6/2020 with Dr. 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 06/03/2019     Priority: Medium     LBBB (left bundle branch block) 06/01/2018     Priority: Medium     History of tobacco abuse quitting on 2/1/1998 06/01/2018     Priority: Medium     Mild intermittent asthma, unspecified whether complicated 06/01/2018     Priority: Medium     Chronic obstructive pulmonary disease, unspecified COPD type (H) 06/01/2018     Priority: Medium     SOB (shortness of breath) 06/01/2018     Priority: Medium     Lumbar spine pain 10/27/2015     Priority: Medium     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 Provider to review       Non-ischemic cardiomyopathy (H) 12/10/2013     Priority: Medium     CHF (congestive heart failure), NYHA class II (H) 12/10/2013     Priority: Medium     Insomnia 01/01/2011     Priority: Medium     Problem list name updated by automated process. Provider to review       Disorder of bone and cartilage 01/01/2011     Priority: Medium     Problem list name updated by automated process. Provider to review       Essential hypertension 01/01/2011     Priority: Medium     Problem list name updated by automated  process. Provider to review       Neck pain, chronic 2011     Priority: Medium     Hypercholesteremia 2002     Priority: Medium        Past Medical History:    Past Medical History:   Diagnosis Date     Angina pectoris 2011     CHF (congestive heart failure), NYHA class II (H) 12/10/2013     CHF (congestive heart failure), NYHA class III (H) 12/10/2013     Hyperhydrosis disorder 2011     Insomnia, unspecified 2011     LBBB (left bundle branch block) 2011     Neck pain, chronic 2011     Non-ischemic cardiomyopathy (H) 12/10/2013     Obesity, unspecified 2011     Osteopenia 2011     Pacemaker      Pain in joint, lower leg 2006     Pure hypercholesterolemia 2002     Unspecified essential hypertension 2011       Past Surgical History:    Past Surgical History:   Procedure Laterality Date     APPENDECTOMY       ARTHROSCOPY KNEE RT/LT      RT     BACK SURGERY  2020     BACK SURGERY  2021    fractured vert repair     CARDIAC SURGERY  2014    Pacemaker     Cataract extraction  2009    Bilateral     CHOLECYSTECTOMY      open      COLONOSCOPY  2001    Normal      COLONOSCOPY N/A 10/20/2016    Procedure: COLONOSCOPY;  Surgeon: Charan Montejo MD;  Location: [facility]     colonoscopy with polypectomy      Repeat 3 years      CT CORONARY ANGIOGRAM      normal     HERPES ZOSTER PCR ([facility])       IR CONSULTATION FOR IR EXAM  2020     left eye scar tissue       normal echo      fen-phen use       x6       SURGICAL RADIOLOGY PROCEDURE N/A 2016    Procedure: SURGICAL RADIOLOGY PROCEDURE;  Surgeon: Provider, [facility] Perianesthesia Nursing;  Location: [facility]       Family History:    Family History   Problem Relation Age of Onset     Cancer Mother         lung (cause of death)      Peptic Ulcer Disease Father         gastric        Social History:  Marital Status:   [2]  Social History     Tobacco Use      Smoking status: Former Smoker     Packs/day: 1.00     Years: 15.00     Pack years: 15.00     Types: Cigarettes     Start date: 1983     Quit date: 1998     Years since quittin.4     Smokeless tobacco: Never Used     Tobacco comment: Passive Exposure: No; Longest Tobacco Free: 15 years    Substance Use Topics     Alcohol use: Not Currently     Comment: Occasionally      Drug use: No        Medications:    alendronate (FOSAMAX) 70 MG tablet  budesonide-formoterol (SYMBICORT) 160-4.5 MCG/ACT Inhaler  Calcium Carbonate-Vitamin D (CALCIUM 600 + D OR)  carvedilol (COREG) 12.5 MG tablet  ELIQUIS ANTICOAGULANT 5 MG tablet  ENTRESTO 24-26 MG per tablet  ENTRESTO 49-51 MG per tablet  gabapentin (NEURONTIN) 300 MG capsule  HYDROcodone-acetaminophen (NORCO) 5-325 MG tablet  Multiple Vitamin (MULTI-VITAMIN DAILY PO)  rosuvastatin (CRESTOR) 10 MG tablet  SPIRIVA RESPIMAT 2.5 MCG/ACT inhaler  spironolactone (ALDACTONE) 25 MG tablet  VENTOLIN  (90 Base) MCG/ACT inhaler          Review of Systems   Constitutional: Positive for activity change. Negative for chills and fever.   Gastrointestinal: Negative for nausea and vomiting.   Musculoskeletal: Positive for arthralgias (right).   Skin: Negative for color change.   Neurological: Negative for numbness.       Physical Exam   BP: 145/63  Pulse: 73  Temp: 97.1  F (36.2  C)  Resp: 16  SpO2: 97 %      Physical Exam  Vitals and nursing note reviewed.   Constitutional:       General: She is in acute distress (mild).   Cardiovascular:      Rate and Rhythm: Normal rate.   Pulmonary:      Effort: Pulmonary effort is normal.   Musculoskeletal:         General: Swelling (mild) and tenderness present.      Right hip: No tenderness. Normal strength.      Right upper leg: Tenderness (With palpation lateral region) present.      Right knee: Swelling (Mild) and effusion present. No erythema, ecchymosis or crepitus. Normal range of motion. Tenderness present over the lateral joint  line and LCL. Normal patellar mobility. Normal pulse.      Instability Tests: Anterior drawer test negative. Posterior drawer test negative.      Right lower leg: Normal.   Skin:     General: Skin is warm and dry.      Findings: No bruising or erythema.   Neurological:      Mental Status: She is alert and oriented to person, place, and time.   Psychiatric:         Behavior: Behavior normal.         ED Course                 Procedures             Results for orders placed or performed during the hospital encounter of 22 (from the past 24 hour(s))   XR Knee Right 3 Views    Narrative    Exam: XR KNEE RIGHT 3 VIEWS    Technique: Right knee, 3 Views    Comparison: 2009, 2009    Exam reason: heard a click in her right knee when she was walking-  difficult to bear weight without her walker.    Findings:  No acute fracture or dislocation. Moderate to severe medial  compartment joint space narrowing, worsened since . There are  tricompartmental osteophytes.     There is a knee joint effusion.      Impression    Impression:  No acute fracture or dislocation.    Moderate to severe medial compartment degenerative change which has  worsened since .    DWAYNE KURTZ MD         SYSTEM ID:  [id number]       Medications - No data to display    Assessments & Plan (with Medical Decision Making)     I have reviewed the nursing notes.    I have reviewed the findings, diagnosis, plan and need for follow up with the patient.  (M25.721) Effusion of right knee  Comment: 86 year old female who is accompanied per her daughter.  She presents with right knee pain that started yesterday.  Denies any trauma other than she stood in line for extended period of time at a , and her knee started hurting when left event.  Felt a click in her right knee when she is walking out of the [employer].  Takes hydrocodone in the morning and the night; no change in discomfort noted.  History of osteoporosis.  Had arthroplasty on the  same knee several years ago: No problems since that time. Denies fevers, chills, nausea, and vomiting.  Has chronic numbness and tingling in her legs: Not exacerbated at this time.     MDM: Pain and mild swelling noted with palpation over lateral side of her knee.  Minimal swelling noted.  No ecchymosis or erythema noted.  Popliteal pulse obtained with Doppler.  No lower leg edema.  Pedal pulses 3+.    Right knee x-ray reviewed and per radiology;  No acute fracture or dislocation.   There is a knee joint effusion.  Moderate to severe medial compartment degenerative change which has  worsened since 2009.    Ace wrap applied per LPN    Plan: Education provided for knee effusion.  Keep affected extremity elevated as much as possible for next 24 - 48 hours. Ice to affected area 20 minutes every hour as needed for comfort. After 48 hours you can apply heat.   For severe to moderate pain take one hydrocodone with 325 mg of acetaminophen (Tylenol). For mild pain take Tylenol 650 to 1000 mg every four to six hours as needed (not to exceed more than 4000 mg in a 24 hour period).  BE aware using narcotics can increase your risk of falling so be very careful with ambulation and getting in and out of bed and standing up from chairs.    Suggest medicating around the clock for the next 24-48 hours. Use ace wrap  until you have completed your follow-up appointment. Slowly start to wiggle your toes and move lower leg as often as possible but not beyond the point of pain. Follow up with primary provider  as needed  Orthopedic  Referral placed        These discharge instructions and medications were reviewed with her and her daughter and understanding verbalized.    This document was prepared using a combination of typing and voice generated software.  While every attempt was made for accuracy, spelling and grammatical errors may exist.    Discharge Medication List as of 6/29/2022  2:47 PM          Final diagnoses:    Effusion of right knee       6/29/2022   [facility]       Chela Koenig, CNP  06/29/22 [id number]

## 2022-06-29 NOTE — DISCHARGE INSTRUCTIONS
Keep affected extremity elevated as much as possible for next 24 - 48 hours. Ice to affected area 20 minutes every hour as needed for comfort. After 48 hours you can apply heat.   For severe to moderate pain take one hydrocodone with 325 mg of acetaminophen (Tylenol). For mild pain take Tylenol 650 to 1000 mg every four to six hours as needed (not to exceed more than 4000 mg in a 24 hour period).  BE aware using narcotics can increase your risk of falling so be very careful with ambulation and getting in and out of bed and standing up from chairs.    Orthopedic referral placed    Suggest medicating around the clock for the next 24-48 hours. Use ace wrap  until you have completed your follow-up appointment. Slowly start to wiggle your toes and move lower leg as often as possible but not beyond the point of pain. Follow up with primary provider  as needed

## 2022-06-29 NOTE — ED TRIAGE NOTES
Pain in right knee since yesterday, was walking and then started to hear a click in it   minimal pain at rest increased with bearing weight or turning.  Didn't fall on it

## 2022-06-29 NOTE — ED TRIAGE NOTES
Pt presents with right knee pain since walking and felt a click in her right knee. She has a walker that she walks with and it would be difficult for her to walk without it. Had a Norco at 0600.

## 2022-08-09 ENCOUNTER — OFFICE VISIT (OUTPATIENT)
Dept: OTOLARYNGOLOGY | Facility: OTHER | Age: 87
End: 2022-08-09
Attending: PHYSICIAN ASSISTANT
Payer: MEDICARE

## 2022-08-09 ENCOUNTER — ANCILLARY PROCEDURE (OUTPATIENT)
Dept: CARDIOLOGY | Facility: OTHER | Age: 87
End: 2022-08-09
Attending: INTERNAL MEDICINE
Payer: MEDICARE

## 2022-08-09 VITALS
TEMPERATURE: 98 F | WEIGHT: 154 LBS | OXYGEN SATURATION: 97 % | HEIGHT: 65 IN | HEART RATE: 86 BPM | SYSTOLIC BLOOD PRESSURE: 100 MMHG | DIASTOLIC BLOOD PRESSURE: 50 MMHG | BODY MASS INDEX: 25.66 KG/M2

## 2022-08-09 DIAGNOSIS — H61.23 KERATIN DEBRIS OF BOTH EAR CANALS: Primary | ICD-10-CM

## 2022-08-09 DIAGNOSIS — Z95.810 AUTOMATIC IMPLANTABLE CARDIOVERTER-DEFIBRILLATOR IN SITU: ICD-10-CM

## 2022-08-09 DIAGNOSIS — J38.3 GLOTTIC INSUFFICIENCY: ICD-10-CM

## 2022-08-09 DIAGNOSIS — H61.23 EXCESSIVE CERUMEN IN BOTH EAR CANALS: ICD-10-CM

## 2022-08-09 DIAGNOSIS — M54.50 LUMBAR SPINE PAIN: ICD-10-CM

## 2022-08-09 DIAGNOSIS — R13.19 OTHER DYSPHAGIA: ICD-10-CM

## 2022-08-09 PROCEDURE — 69210 REMOVE IMPACTED EAR WAX UNI: CPT | Mod: 50 | Performed by: PHYSICIAN ASSISTANT

## 2022-08-09 PROCEDURE — G0463 HOSPITAL OUTPT CLINIC VISIT: HCPCS

## 2022-08-09 PROCEDURE — 31575 DIAGNOSTIC LARYNGOSCOPY: CPT | Performed by: PHYSICIAN ASSISTANT

## 2022-08-09 PROCEDURE — 92504 EAR MICROSCOPY EXAMINATION: CPT | Performed by: PHYSICIAN ASSISTANT

## 2022-08-09 PROCEDURE — 99213 OFFICE O/P EST LOW 20 MIN: CPT | Mod: 25 | Performed by: PHYSICIAN ASSISTANT

## 2022-08-09 PROCEDURE — 93284 PRGRMG EVAL IMPLANTABLE DFB: CPT | Performed by: INTERNAL MEDICINE

## 2022-08-09 RX ORDER — HYDROCODONE BITARTRATE AND ACETAMINOPHEN 5; 325 MG/1; MG/1
TABLET ORAL
Qty: 60 TABLET | Refills: 0 | Status: SHIPPED | OUTPATIENT
Start: 2022-08-09 | End: 2022-09-09

## 2022-08-09 ASSESSMENT — PAIN SCALES - GENERAL: PAINLEVEL: NO PAIN (0)

## 2022-08-09 NOTE — TELEPHONE ENCOUNTER
HYDROCODONE      Last Written Prescription Date:  6-  Last Fill Quantity: 60,   # refills: 0  Last Office Visit: 11-  Future Office visit:    Next 5 appointments (look out 90 days)    Sep 12, 2022  1:00 PM  (Arrive by 12:45 PM)  Return Visit with Kodi Garcia DO  [facility] ([facility] ) [address] [place] [address]  [phone]           Routing refill request to provider for review/approval because:  Drug not on the [facility], [facility] or [facility] refill protocol or controlled substance

## 2022-08-09 NOTE — PATIENT INSTRUCTIONS
It was a pleasure to see you in clinic today.  Please do not hesitate to call with any questions or concerns.  You are scheduled for remote transmissions on 11/9/22, 2/13/23 and 5/15/23.  We look forward to seeing you in clinic at your next device check in 1 year    Desiree Luevano RN, MS, CCRN  Electrophysiology Nurse Clinician  [facility]    During Business Hours Please Call:  [phone]  After Hours Please Call:  [phone] - select option #4 and ask for job code [phone]

## 2022-08-09 NOTE — PATIENT INSTRUCTIONS
Ears were cleaned.   Consider audiogram.     Maintain good water intake  Continue to rinse after inhaler use.   Complete barium swallow  May consider voice therapy.     Thank you for allowing Brandi Zhang PA-C and our ENT team to participate in your care.  If your medications are too expensive, please give the nurse a call.  We can possibly change this medication.  If you have a scheduling or an appointment question please contact our Health Unit Coordinator at [phone], Ext. [phone].    ALL nursing questions or concerns can be directed to your ENT nurse at: [phone] [place]

## 2022-08-09 NOTE — PROGRESS NOTES
Chief Complaint   Patient presents with     Cerumen Impaction     Ear cleaning       Adele Hein is a 87 year old female seen today for ear cleaning. She reports her ears are full of cerumen.    Ninoska, her daughter is with today.     Denies COM or otologic surgeries.   Denies otalgia, otorrhea.  Denies worrisome tinnitus.  Denies fluctuating hearing loss or tinnitus.   She does have hearing loss, but declines audiogram/ aids.   Denies vertigo or facial paraesthesia.     +Dysphagia for the last year. She does report harder time with certain foods and gets debris lodged in her throat.   Patient denies sore throat or throat pain  Denies chronic otalgia.   Denies fevers, night sweats or weight loss.   Dry mouth.   Denies dysphonia.   Denies globus sensation.   Inhaler use- rinses.   Water- fairly adequate, trying to increase.   Caffeine- 1 cup.   ETOH- None 1/ week  Tobacco- Quit 30 years ago. No recent tobacco use.   Reflux- No.     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     Insomnia, unspecified 01/01/2011     LBBB (left bundle branch block) 01/01/2011     Neck pain, chronic 01/01/2011     Non-ischemic cardiomyopathy (H) 12/10/2013     Obesity, unspecified 01/01/2011     Osteopenia 01/01/2011     Pacemaker      Pain in joint, lower leg 07/31/2006     Pure hypercholesterolemia 06/07/2002     Unspecified essential hypertension 01/01/2011      No Known Allergies  Current Outpatient Medications   Medication     alendronate (FOSAMAX) 70 MG tablet     budesonide-formoterol (SYMBICORT) 160-4.5 MCG/ACT Inhaler     Calcium Carbonate-Vitamin D (CALCIUM 600 + D OR)     carvedilol (COREG) 12.5 MG tablet     ELIQUIS ANTICOAGULANT 5 MG tablet     ENTRESTO 24-26 MG per tablet     ENTRESTO 49-51 MG per tablet     gabapentin  "(NEURONTIN) 300 MG capsule     HYDROcodone-acetaminophen (NORCO) 5-325 MG tablet     Multiple Vitamin (MULTI-VITAMIN DAILY PO)     rosuvastatin (CRESTOR) 10 MG tablet     SPIRIVA RESPIMAT 2.5 MCG/ACT inhaler     spironolactone (ALDACTONE) 25 MG tablet     VENTOLIN  (90 Base) MCG/ACT inhaler     No current facility-administered medications for this visit.     ROS- SEE HPI  /50 (BP Location: Right arm, Patient Position: Sitting, Cuff Size: Adult Regular)   Pulse 86   Temp 98  F (36.7  C) (Tympanic)   Ht 1.651 m (5' 5\")   Wt 69.9 kg (154 lb)   SpO2 97%   BMI 25.63 kg/m      General - The patient is well nourished and well developed, and appears to have good nutritional status.  Alert and oriented to person and place, answers questions and cooperates with examination appropriately.   Head and Face - Normocephalic and atraumatic, with no gross asymmetry noted.  The facial nerve is intact, with strong symmetric movements.  Voice and Breathing - The patient was breathing comfortably without the use of accessory muscles. There was no wheezing, stridor, or stertor.  The patients voice was clear and strong, and had appropriate pitch and quality.  On examination of the ears, I found that the ear(s) were completely impacted with dense cerumen.  Therefore, I positioned them in the examination chair in a semi-supine position, beginning with the right side.  I used the binocular surgical microscope to perform cerumen removal.  I began by using a cerumen loop to gently lift the edges of the cerumen mass away from the walls of the external canal.   Once the mass was loose enough, the entire plug was pulled from the canal.  The tympanic membrane was intact, no sign of perforation or middle ear effusion.     I turned my attention to the contralateral side once again using the binocular surgical microscope to perform cerumen removal.  I began by using a cerumen loop to gently lift the edges of the cerumen mass away " from the walls of the external canal.   Once the mass was loose enough, the entire plug was pulled from the canal.  The tympanic membrane was intact, no sign of perforation or middle ear effusion.     Eyes - Extraocular movements intact, and the pupils were reactive to light.  Sclera were not icteric or injected, conjunctiva were pink and moist.  Mouth - Examination of the oral cavity showed pink, healthy oral mucosa. No lesions or ulcerations noted.  The tongue was mobile and midline, and the dentition were in good condition.    Throat - The walls of the oropharynx were smooth, pink, moist, symmetric, and had no lesions or ulcerations.  The tonsillar pillars and soft palate were symmetric.  The uvula was midline on elevation.    Neck - Normal midline excursion of the laryngotracheal complex during swallowing.  Full range of motion on passive movement.  Palpation of the occipital, submental, submandibular, internal jugular chain, and supraclavicular nodes did not demonstrate any abnormal lymph nodes or masses.  Palpation of the thyroid was soft and smooth, with no nodules or goiter appreciated.  The trachea was mobile and midline.    Flexible Endoscopy -     Attempts at mirror laryngoscopy were not possible due to gag reflex.  Therefore I proceeded with a fiberoptic examination.  First I sprayed both sides of the nose with a mixture of lidocaine and neosynephrine.  I then passed the scope through the nasal cavity. The nasal cavity was unremarkable.  The nasopharynx was mucosally covered and symmetric.  The Eustachian tube openings were unobstructed.  Going further down I had a clear view of the base of tongue which had normal appearing lingual tonsillar tissue.  The base of tongue was free of lesions, and the vallecula was open.  The epiglottis was smooth and mucosally covered.  The supraglottic larynx was then clearly visualized.  The vocal cords moved smoothly and symmetrically, they were pearly white and no  lesions were seen.  Vocal cords do have central bowing, no mass or ulcerative changes.The pyriform sinuses were open, and the limited view of the postcricoid region did not show any lesions.      ASSESSMENT/ PLAN:    ICD-10-CM    1. Keratin debris of both ear canals  H61.23    2. Excessive cerumen in both ear canals  H61.23    3. 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  Follow-up in 1 year for repeat ear cleaning.  She may consider audiogram, patient declined    Recommended to continue with good water intake.  Continue to rinse after inhaler use.  Discussed options for glottic insufficiency including voice therapy.  Patient declined.  Insufficiency may be related to multifactorial as well as age.    Complete barium swallow  Pending results- consider EGD/ General surgery.       Brandi Zhang PA-C  ENT  [facility], [place]

## 2022-08-09 NOTE — LETTER
8/9/2022         RE: Jacklyn Hein  [address] [place] [address]        Dear Colleague,    Thank you for referring your patient, Jacklyn Hein, to the [facility]. Please see a copy of my visit note below.    Chief Complaint   Patient presents with     Cerumen Impaction     Ear cleaning       Adele Hein is a 87 year old female seen today for ear cleaning. She reports her ears are full of cerumen.    Ninoska, her daughter is with today.     Denies COM or otologic surgeries.   Denies otalgia, otorrhea.  Denies worrisome tinnitus.  Denies fluctuating hearing loss or tinnitus.   She does have hearing loss, but declines audiogram/ aids.   Denies vertigo or facial paraesthesia.     +Dysphagia for the last year. She does report harder time with certain foods and gets debris lodged in her throat.   Patient denies sore throat or throat pain  Denies chronic otalgia.   Denies fevers, night sweats or weight loss.   Dry mouth.   Denies dysphonia.   Denies globus sensation.   Inhaler use- rinses.   Water- fairly adequate, trying to increase.   Caffeine- 1 cup.   ETOH- None 1/ week  Tobacco- Quit 30 years ago. No recent tobacco use.   Reflux- No.     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     Insomnia, unspecified 01/01/2011     LBBB (left bundle branch block) 01/01/2011     Neck pain, chronic 01/01/2011     Non-ischemic cardiomyopathy (H) 12/10/2013     Obesity, unspecified 01/01/2011     Osteopenia 01/01/2011     Pacemaker      Pain in joint, lower leg 07/31/2006     Pure hypercholesterolemia 06/07/2002     Unspecified essential hypertension 01/01/2011      No Known Allergies  Current Outpatient Medications   Medication     alendronate (FOSAMAX) 70 MG tablet      "budesonide-formoterol (SYMBICORT) 160-4.5 MCG/ACT Inhaler     Calcium Carbonate-Vitamin D (CALCIUM 600 + D OR)     carvedilol (COREG) 12.5 MG tablet     ELIQUIS ANTICOAGULANT 5 MG tablet     ENTRESTO 24-26 MG per tablet     ENTRESTO 49-51 MG per tablet     gabapentin (NEURONTIN) 300 MG capsule     HYDROcodone-acetaminophen (NORCO) 5-325 MG tablet     Multiple Vitamin (MULTI-VITAMIN DAILY PO)     rosuvastatin (CRESTOR) 10 MG tablet     SPIRIVA RESPIMAT 2.5 MCG/ACT inhaler     spironolactone (ALDACTONE) 25 MG tablet     VENTOLIN  (90 Base) MCG/ACT inhaler     No current facility-administered medications for this visit.     ROS- SEE HPI  /50 (BP Location: Right arm, Patient Position: Sitting, Cuff Size: Adult Regular)   Pulse 86   Temp 98  F (36.7  C) (Tympanic)   Ht 1.651 m (5' 5\")   Wt 69.9 kg (154 lb)   SpO2 97%   BMI 25.63 kg/m      General - The patient is well nourished and well developed, and appears to have good nutritional status.  Alert and oriented to person and place, answers questions and cooperates with examination appropriately.   Head and Face - Normocephalic and atraumatic, with no gross asymmetry noted.  The facial nerve is intact, with strong symmetric movements.  Voice and Breathing - The patient was breathing comfortably without the use of accessory muscles. There was no wheezing, stridor, or stertor.  The patients voice was clear and strong, and had appropriate pitch and quality.  On examination of the ears, I found that the ear(s) were completely impacted with dense cerumen.  Therefore, I positioned them in the examination chair in a semi-supine position, beginning with the right side.  I used the binocular surgical microscope to perform cerumen removal.  I began by using a cerumen loop to gently lift the edges of the cerumen mass away from the walls of the external canal.   Once the mass was loose enough, the entire plug was pulled from the canal.  The tympanic membrane was " intact, no sign of perforation or middle ear effusion.     I turned my attention to the contralateral side once again using the binocular surgical microscope to perform cerumen removal.  I began by using a cerumen loop to gently lift the edges of the cerumen mass away from the walls of the external canal.   Once the mass was loose enough, the entire plug was pulled from the canal.  The tympanic membrane was intact, no sign of perforation or middle ear effusion.     Eyes - Extraocular movements intact, and the pupils were reactive to light.  Sclera were not icteric or injected, conjunctiva were pink and moist.  Mouth - Examination of the oral cavity showed pink, healthy oral mucosa. No lesions or ulcerations noted.  The tongue was mobile and midline, and the dentition were in good condition.    Throat - The walls of the oropharynx were smooth, pink, moist, symmetric, and had no lesions or ulcerations.  The tonsillar pillars and soft palate were symmetric.  The uvula was midline on elevation.    Neck - Normal midline excursion of the laryngotracheal complex during swallowing.  Full range of motion on passive movement.  Palpation of the occipital, submental, submandibular, internal jugular chain, and supraclavicular nodes did not demonstrate any abnormal lymph nodes or masses.  Palpation of the thyroid was soft and smooth, with no nodules or goiter appreciated.  The trachea was mobile and midline.    Flexible Endoscopy -     Attempts at mirror laryngoscopy were not possible due to gag reflex.  Therefore I proceeded with a fiberoptic examination.  First I sprayed both sides of the nose with a mixture of lidocaine and neosynephrine.  I then passed the scope through the nasal cavity. The nasal cavity was unremarkable.  The nasopharynx was mucosally covered and symmetric.  The Eustachian tube openings were unobstructed.  Going further down I had a clear view of the base of tongue which had normal appearing lingual  tonsillar tissue.  The base of tongue was free of lesions, and the vallecula was open.  The epiglottis was smooth and mucosally covered.  The supraglottic larynx was then clearly visualized.  The vocal cords moved smoothly and symmetrically, they were pearly white and no lesions were seen.  Vocal cords do have central bowing, no mass or ulcerative changes.The pyriform sinuses were open, and the limited view of the postcricoid region did not show any lesions.      ASSESSMENT/ PLAN:    ICD-10-CM    1. Keratin debris of both ear canals  H61.23    2. Excessive cerumen in both ear canals  H61.23    3. 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  Follow-up in 1 year for repeat ear cleaning.  She may consider audiogram, patient declined    Recommended to continue with good water intake.  Continue to rinse after inhaler use.  Discussed options for glottic insufficiency including voice therapy.  Patient declined.  Insufficiency may be related to multifactorial as well as age.    Complete barium swallow  Pending results- consider EGD/ General surgery.       Brandi Zhang PA-C  ENT  [facility], [place]               Again, thank you for allowing me to participate in the care of your patient.        Sincerely,        Brandi Zhang PA-C

## 2022-08-09 NOTE — NURSING NOTE
"Chief Complaint   Patient presents with     Cerumen Impaction     Ear cleaning       Initial /50 (BP Location: Right arm, Patient Position: Sitting, Cuff Size: Adult Regular)   Pulse 86   Temp 98  F (36.7  C) (Tympanic)   Ht 1.651 m (5' 5\")   Wt 69.9 kg (154 lb)   SpO2 97%   BMI 25.63 kg/m   Estimated body mass index is 25.63 kg/m  as calculated from the following:    Height as of this encounter: 1.651 m (5' 5\").    Weight as of this encounter: 69.9 kg (154 lb).  Medication Reconciliation: complete  Leticia Hammonds LPN    "

## 2022-08-11 LAB
MDC_IDC_EPISODE_DTM: NORMAL
MDC_IDC_EPISODE_DURATION: 4 S
MDC_IDC_EPISODE_DURATION: 5 S
MDC_IDC_EPISODE_DURATION: 5 S
MDC_IDC_EPISODE_DURATION: 6 S
MDC_IDC_EPISODE_ID: 751
MDC_IDC_EPISODE_ID: 752
MDC_IDC_EPISODE_ID: 753
MDC_IDC_EPISODE_ID: 754
MDC_IDC_EPISODE_TYPE: NORMAL
MDC_IDC_LEAD_IMPLANT_DT: NORMAL
MDC_IDC_LEAD_LOCATION: NORMAL
MDC_IDC_LEAD_LOCATION_DETAIL_1: NORMAL
MDC_IDC_LEAD_MFG: NORMAL
MDC_IDC_LEAD_MODEL: NORMAL
MDC_IDC_LEAD_POLARITY_TYPE: NORMAL
MDC_IDC_LEAD_SERIAL: NORMAL
MDC_IDC_MSMT_BATTERY_DTM: NORMAL
MDC_IDC_MSMT_BATTERY_REMAINING_LONGEVITY: 6 MO
MDC_IDC_MSMT_BATTERY_RRT_TRIGGER: 2.73
MDC_IDC_MSMT_BATTERY_STATUS: NORMAL
MDC_IDC_MSMT_BATTERY_VOLTAGE: 2.82 V
MDC_IDC_MSMT_CAP_CHARGE_DTM: NORMAL
MDC_IDC_MSMT_CAP_CHARGE_ENERGY: 18 J
MDC_IDC_MSMT_CAP_CHARGE_TIME: 4.92
MDC_IDC_MSMT_CAP_CHARGE_TYPE: NORMAL
MDC_IDC_MSMT_LEADCHNL_LV_IMPEDANCE_VALUE: 456 OHM
MDC_IDC_MSMT_LEADCHNL_LV_IMPEDANCE_VALUE: 570 OHM
MDC_IDC_MSMT_LEADCHNL_LV_IMPEDANCE_VALUE: 893 OHM
MDC_IDC_MSMT_LEADCHNL_LV_PACING_THRESHOLD_AMPLITUDE: 0.5 V
MDC_IDC_MSMT_LEADCHNL_LV_PACING_THRESHOLD_PULSEWIDTH: 0.6 MS
MDC_IDC_MSMT_LEADCHNL_RA_IMPEDANCE_VALUE: 399 OHM
MDC_IDC_MSMT_LEADCHNL_RA_PACING_THRESHOLD_AMPLITUDE: 0.5 V
MDC_IDC_MSMT_LEADCHNL_RA_PACING_THRESHOLD_PULSEWIDTH: 0.4 MS
MDC_IDC_MSMT_LEADCHNL_RA_SENSING_INTR_AMPL: 1.88 MV
MDC_IDC_MSMT_LEADCHNL_RA_SENSING_INTR_AMPL: 2.12 MV
MDC_IDC_MSMT_LEADCHNL_RV_IMPEDANCE_VALUE: 380 OHM
MDC_IDC_MSMT_LEADCHNL_RV_IMPEDANCE_VALUE: 456 OHM
MDC_IDC_MSMT_LEADCHNL_RV_PACING_THRESHOLD_AMPLITUDE: 1 V
MDC_IDC_MSMT_LEADCHNL_RV_PACING_THRESHOLD_PULSEWIDTH: 0.4 MS
MDC_IDC_MSMT_LEADCHNL_RV_SENSING_INTR_AMPL: 4.12 MV
MDC_IDC_MSMT_LEADCHNL_RV_SENSING_INTR_AMPL: 4.38 MV
MDC_IDC_PG_IMPLANT_DTM: NORMAL
MDC_IDC_PG_MFG: NORMAL
MDC_IDC_PG_MODEL: NORMAL
MDC_IDC_PG_SERIAL: NORMAL
MDC_IDC_PG_TYPE: NORMAL
MDC_IDC_SESS_CLINIC_NAME: NORMAL
MDC_IDC_SESS_DTM: NORMAL
MDC_IDC_SESS_TYPE: NORMAL
MDC_IDC_SET_BRADY_AT_MODE_SWITCH_RATE: 171 {BEATS}/MIN
MDC_IDC_SET_BRADY_LOWRATE: 60 {BEATS}/MIN
MDC_IDC_SET_BRADY_MAX_SENSOR_RATE: 130 {BEATS}/MIN
MDC_IDC_SET_BRADY_MAX_TRACKING_RATE: 130 {BEATS}/MIN
MDC_IDC_SET_BRADY_MODE: NORMAL
MDC_IDC_SET_BRADY_PAV_DELAY_LOW: 160 MS
MDC_IDC_SET_BRADY_SAV_DELAY_LOW: 110 MS
MDC_IDC_SET_CRT_LVRV_DELAY: 0 MS
MDC_IDC_SET_CRT_PACED_CHAMBERS: NORMAL
MDC_IDC_SET_LEADCHNL_LV_PACING_AMPLITUDE: 1.25 V
MDC_IDC_SET_LEADCHNL_LV_PACING_ANODE_ELECTRODE_1: NORMAL
MDC_IDC_SET_LEADCHNL_LV_PACING_ANODE_LOCATION_1: NORMAL
MDC_IDC_SET_LEADCHNL_LV_PACING_CAPTURE_MODE: NORMAL
MDC_IDC_SET_LEADCHNL_LV_PACING_CATHODE_ELECTRODE_1: NORMAL
MDC_IDC_SET_LEADCHNL_LV_PACING_CATHODE_LOCATION_1: NORMAL
MDC_IDC_SET_LEADCHNL_LV_PACING_POLARITY: NORMAL
MDC_IDC_SET_LEADCHNL_LV_PACING_PULSEWIDTH: 0.6 MS
MDC_IDC_SET_LEADCHNL_RA_PACING_AMPLITUDE: 1.5 V
MDC_IDC_SET_LEADCHNL_RA_PACING_ANODE_ELECTRODE_1: NORMAL
MDC_IDC_SET_LEADCHNL_RA_PACING_ANODE_LOCATION_1: NORMAL
MDC_IDC_SET_LEADCHNL_RA_PACING_CAPTURE_MODE: NORMAL
MDC_IDC_SET_LEADCHNL_RA_PACING_CATHODE_ELECTRODE_1: NORMAL
MDC_IDC_SET_LEADCHNL_RA_PACING_CATHODE_LOCATION_1: NORMAL
MDC_IDC_SET_LEADCHNL_RA_PACING_POLARITY: NORMAL
MDC_IDC_SET_LEADCHNL_RA_PACING_PULSEWIDTH: 0.4 MS
MDC_IDC_SET_LEADCHNL_RA_SENSING_ANODE_ELECTRODE_1: NORMAL
MDC_IDC_SET_LEADCHNL_RA_SENSING_ANODE_LOCATION_1: NORMAL
MDC_IDC_SET_LEADCHNL_RA_SENSING_CATHODE_ELECTRODE_1: NORMAL
MDC_IDC_SET_LEADCHNL_RA_SENSING_CATHODE_LOCATION_1: NORMAL
MDC_IDC_SET_LEADCHNL_RA_SENSING_POLARITY: NORMAL
MDC_IDC_SET_LEADCHNL_RA_SENSING_SENSITIVITY: 0.3 MV
MDC_IDC_SET_LEADCHNL_RV_PACING_AMPLITUDE: 2 V
MDC_IDC_SET_LEADCHNL_RV_PACING_ANODE_ELECTRODE_1: NORMAL
MDC_IDC_SET_LEADCHNL_RV_PACING_ANODE_LOCATION_1: NORMAL
MDC_IDC_SET_LEADCHNL_RV_PACING_CAPTURE_MODE: NORMAL
MDC_IDC_SET_LEADCHNL_RV_PACING_CATHODE_ELECTRODE_1: NORMAL
MDC_IDC_SET_LEADCHNL_RV_PACING_CATHODE_LOCATION_1: NORMAL
MDC_IDC_SET_LEADCHNL_RV_PACING_POLARITY: NORMAL
MDC_IDC_SET_LEADCHNL_RV_PACING_PULSEWIDTH: 0.4 MS
MDC_IDC_SET_LEADCHNL_RV_SENSING_ANODE_ELECTRODE_1: NORMAL
MDC_IDC_SET_LEADCHNL_RV_SENSING_ANODE_LOCATION_1: NORMAL
MDC_IDC_SET_LEADCHNL_RV_SENSING_CATHODE_ELECTRODE_1: NORMAL
MDC_IDC_SET_LEADCHNL_RV_SENSING_CATHODE_LOCATION_1: NORMAL
MDC_IDC_SET_LEADCHNL_RV_SENSING_POLARITY: NORMAL
MDC_IDC_SET_LEADCHNL_RV_SENSING_SENSITIVITY: 0.3 MV
MDC_IDC_SET_ZONE_DETECTION_BEATS_DENOMINATOR: 40 {BEATS}
MDC_IDC_SET_ZONE_DETECTION_BEATS_NUMERATOR: 30 {BEATS}
MDC_IDC_SET_ZONE_DETECTION_INTERVAL: 320 MS
MDC_IDC_SET_ZONE_DETECTION_INTERVAL: 350 MS
MDC_IDC_SET_ZONE_DETECTION_INTERVAL: 360 MS
MDC_IDC_SET_ZONE_DETECTION_INTERVAL: 400 MS
MDC_IDC_SET_ZONE_DETECTION_INTERVAL: NORMAL
MDC_IDC_SET_ZONE_TYPE: NORMAL
MDC_IDC_STAT_AT_BURDEN_PERCENT: 0.1 %
MDC_IDC_STAT_AT_DTM_END: NORMAL
MDC_IDC_STAT_AT_DTM_START: NORMAL
MDC_IDC_STAT_BRADY_AP_VP_PERCENT: 10.72 %
MDC_IDC_STAT_BRADY_AP_VS_PERCENT: 0.19 %
MDC_IDC_STAT_BRADY_AS_VP_PERCENT: 87.84 %
MDC_IDC_STAT_BRADY_AS_VS_PERCENT: 1.26 %
MDC_IDC_STAT_BRADY_DTM_END: NORMAL
MDC_IDC_STAT_BRADY_DTM_START: NORMAL
MDC_IDC_STAT_BRADY_RA_PERCENT_PACED: 10.9 %
MDC_IDC_STAT_BRADY_RV_PERCENT_PACED: 53.21 %
MDC_IDC_STAT_CRT_DTM_END: NORMAL
MDC_IDC_STAT_CRT_DTM_START: NORMAL
MDC_IDC_STAT_CRT_LV_PERCENT_PACED: 98.32 %
MDC_IDC_STAT_CRT_PERCENT_PACED: 98.32 %
MDC_IDC_STAT_EPISODE_RECENT_COUNT: 0
MDC_IDC_STAT_EPISODE_RECENT_COUNT: 2
MDC_IDC_STAT_EPISODE_RECENT_COUNT_DTM_END: NORMAL
MDC_IDC_STAT_EPISODE_RECENT_COUNT_DTM_START: NORMAL
MDC_IDC_STAT_EPISODE_TOTAL_COUNT: 0
MDC_IDC_STAT_EPISODE_TOTAL_COUNT: 1
MDC_IDC_STAT_EPISODE_TOTAL_COUNT: 66
MDC_IDC_STAT_EPISODE_TOTAL_COUNT_DTM_END: NORMAL
MDC_IDC_STAT_EPISODE_TOTAL_COUNT_DTM_START: NORMAL
MDC_IDC_STAT_EPISODE_TYPE: NORMAL
MDC_IDC_STAT_TACHYTHERAPY_ATP_DELIVERED_RECENT: 0
MDC_IDC_STAT_TACHYTHERAPY_ATP_DELIVERED_TOTAL: 0
MDC_IDC_STAT_TACHYTHERAPY_RECENT_DTM_END: NORMAL
MDC_IDC_STAT_TACHYTHERAPY_RECENT_DTM_START: NORMAL
MDC_IDC_STAT_TACHYTHERAPY_SHOCKS_ABORTED_RECENT: 0
MDC_IDC_STAT_TACHYTHERAPY_SHOCKS_ABORTED_TOTAL: 0
MDC_IDC_STAT_TACHYTHERAPY_SHOCKS_DELIVERED_RECENT: 0
MDC_IDC_STAT_TACHYTHERAPY_SHOCKS_DELIVERED_TOTAL: 1
MDC_IDC_STAT_TACHYTHERAPY_TOTAL_DTM_END: NORMAL
MDC_IDC_STAT_TACHYTHERAPY_TOTAL_DTM_START: NORMAL

## 2022-08-11 NOTE — TELEPHONE ENCOUNTER
Next 5 appointments (look out 90 days)        Oct 19, 2022  2:45 PM  (Arrive by 2:30 PM)  SHORT with Ilia Moran MD  [facility] [place] ([facility] [place] ) [address]  [place] [address]  [phone]

## 2022-09-07 DIAGNOSIS — I42.8 NON-ISCHEMIC CARDIOMYOPATHY (H): ICD-10-CM

## 2022-09-07 DIAGNOSIS — M54.50 LUMBAR SPINE PAIN: ICD-10-CM

## 2022-09-09 ENCOUNTER — PATIENT OUTREACH (OUTPATIENT)
Dept: CARE COORDINATION | Facility: OTHER | Age: 87
End: 2022-09-09

## 2022-09-09 DIAGNOSIS — I48.0 PAROXYSMAL ATRIAL FIBRILLATION (H): ICD-10-CM

## 2022-09-09 DIAGNOSIS — I50.9 CHF (CONGESTIVE HEART FAILURE), NYHA CLASS II (H): Primary | ICD-10-CM

## 2022-09-09 RX ORDER — CARVEDILOL 12.5 MG/1
TABLET ORAL
Qty: 180 TABLET | Refills: 0 | Status: SHIPPED | OUTPATIENT
Start: 2022-09-09 | End: 2022-11-28

## 2022-09-09 RX ORDER — HYDROCODONE BITARTRATE AND ACETAMINOPHEN 5; 325 MG/1; MG/1
TABLET ORAL
Qty: 60 TABLET | Refills: 0 | Status: SHIPPED | OUTPATIENT
Start: 2022-09-09 | End: 2022-10-13

## 2022-09-09 NOTE — TELEPHONE ENCOUNTER
Norco      Last Written Prescription Date:  8/9/22  Last Fill Quantity: 60,   # refills: 0  Last Office Visit: 11/22/21  Future Office visit:    Next 5 appointments (look out 90 days)    Sep 12, 2022  1:00 PM  (Arrive by 12:45 PM)  Return Visit with Kodi Garcia DO  [facility] [place] ([facility] [place] ) [address]  [place] [address]  [phone]   Oct 19, 2022  2:45 PM  (Arrive by 2:30 PM)  SHORT with Ilia Moran MD  [facility] [place] ([facility] [place] ) [address]  [place] [address]  [phone]           Routing refill request to provider for review/approval because:      Coreg      Last Written Prescription Date:  12/1/21  Last Fill Quantity: 180,   # refills: 1  Last Office Visit: 11/22/21  Future Office visit:    Next 5 appointments (look out 90 days)    Sep 12, 2022  1:00 PM  (Arrive by 12:45 PM)  Return Visit with Kodi Garcia DO  [facility] [place] ([facility] [place] ) [address]  [place] [address]  [phone]   Oct 19, 2022  2:45 PM  (Arrive by 2:30 PM)  SHORT with Ilia Moran MD  [facility] [place] ([facility] [place] ) [address]  [place] [address]  [phone]           Routing refill request to provider for review/approval because:

## 2022-09-12 ENCOUNTER — OFFICE VISIT (OUTPATIENT)
Dept: CARDIOLOGY | Facility: OTHER | Age: 87
End: 2022-09-12
Attending: INTERNAL MEDICINE
Payer: COMMERCIAL

## 2022-09-12 VITALS
SYSTOLIC BLOOD PRESSURE: 134 MMHG | OXYGEN SATURATION: 96 % | DIASTOLIC BLOOD PRESSURE: 72 MMHG | HEART RATE: 86 BPM | WEIGHT: 154 LBS | TEMPERATURE: 97.3 F | RESPIRATION RATE: 20 BRPM | BODY MASS INDEX: 26.29 KG/M2 | HEIGHT: 64 IN

## 2022-09-12 DIAGNOSIS — I44.7 LBBB (LEFT BUNDLE BRANCH BLOCK): ICD-10-CM

## 2022-09-12 DIAGNOSIS — I51.89 COMBINED SYSTOLIC AND DIASTOLIC CARDIAC DYSFUNCTION: ICD-10-CM

## 2022-09-12 DIAGNOSIS — I42.8 NON-ISCHEMIC CARDIOMYOPATHY (H): ICD-10-CM

## 2022-09-12 DIAGNOSIS — Z87.891 HISTORY OF TOBACCO ABUSE: ICD-10-CM

## 2022-09-12 DIAGNOSIS — I27.0 PRIMARY PULMONARY HYPERTENSION (H): ICD-10-CM

## 2022-09-12 DIAGNOSIS — I48.0 PAROXYSMAL ATRIAL FIBRILLATION (H): Primary | ICD-10-CM

## 2022-09-12 DIAGNOSIS — Z79.01 ANTICOAGULATED BY ANTICOAGULATION TREATMENT: ICD-10-CM

## 2022-09-12 DIAGNOSIS — R82.90 ABNORMAL FINDING ON URINALYSIS: ICD-10-CM

## 2022-09-12 DIAGNOSIS — E78.00 HYPERCHOLESTEREMIA: ICD-10-CM

## 2022-09-12 DIAGNOSIS — E78.2 MIXED HYPERLIPIDEMIA: ICD-10-CM

## 2022-09-12 DIAGNOSIS — Z95.810 AUTOMATIC IMPLANTABLE CARDIOVERTER-DEFIBRILLATOR IN SITU: ICD-10-CM

## 2022-09-12 DIAGNOSIS — I50.22 CHRONIC SYSTOLIC CHF (CONGESTIVE HEART FAILURE) (H): ICD-10-CM

## 2022-09-12 DIAGNOSIS — I10 ESSENTIAL HYPERTENSION: ICD-10-CM

## 2022-09-12 DIAGNOSIS — I50.9 CONGESTIVE HEART FAILURE, NYHA CLASS 2, UNSPECIFIED CONGESTIVE HEART FAILURE TYPE (H): ICD-10-CM

## 2022-09-12 DIAGNOSIS — N18.32 STAGE 3B CHRONIC KIDNEY DISEASE (H): ICD-10-CM

## 2022-09-12 DIAGNOSIS — R06.02 SOB (SHORTNESS OF BREATH): ICD-10-CM

## 2022-09-12 PROCEDURE — G0463 HOSPITAL OUTPT CLINIC VISIT: HCPCS

## 2022-09-12 PROCEDURE — 99214 OFFICE O/P EST MOD 30 MIN: CPT | Performed by: INTERNAL MEDICINE

## 2022-09-12 ASSESSMENT — PAIN SCALES - GENERAL: PAINLEVEL: NO PAIN (0)

## 2022-09-12 NOTE — PROGRESS NOTES
[facility] HEART CARE   CARDIOLOGY PROGRESS NOTE     Chief Complaint   Patient presents with     RECHECK     1 year follow up.          Diagnosis:  1. CHF-systolic. 50-55%/grade 1 on 8/24/21. 30-35%/diastolic on 1/5/21. 40-45%% on 12/01/2019.  2.  Reduction in severity of COPD-severe to mild/moderate.  Pulmonary concern for asthma based on PFT's from 5/17/17.  3.  Nonischemic cardiomyopathy with NYHA classification 2.  4.  ICD placement on 11/11/2014.  5.  Hypertension-controlled.  6.  Hyperlipidemia-uncontrolled.  7.  H/O tobacco abuse quitting on 2/1/1998  8.  LBBB.  9.  SOB-significantly improved.  10.  CKD-3.  11.  Hypocalcemia.  12.  A. fib up to 23 seconds on 4/9/19.  Less than 1% in 8/9/2022.  13.  CHADSVASC score of 4.   14.  Mitral and tricuspid regurgitation-mild to moderate on 1/5/2021.  15.  Bacteremia on 1/5/2021 with Klebsiella pneumonia with septic shock.    Assessment/Plan:    1.  Reviewed device interrogation from 8/9/2022.  Patient found to have 5 months left on battery.  She is paced 98.5% of the time.  A. fib burden was less than 1.1%.  Currently on Eliquis.  Would like to have her pacemaker changed in [place] when that time comes.  2.  A. fib: Stable, continue on Coreg 12.5 mg twice a day and Eliquis 5 mg twice a day.  2.  CHF: Continue on spironolactone 12.5 mg daily, Coreg 12.5 mg twice a day, and Entresto.  Reviewed her echocardiogram from 8/24/2021 shows normalization of the EF at 50-55%.  However, continued evidence for diastolic dysfunction.  Salt restriction, fluid restriction, daily weights  3.  Hypertension: Controlled.  Continue Entresto, Coreg, and spironolactone,  4.  CKD 3: Discussed.  Patient aware.  5.  Follow-up in 1 year or sooner if pacemaker is changed prior to this timeframe.      Interval history:  Jacklyn was hospitalized on 1/5/2021 for bacteremia with sepsis as well as UTI.  She was appropriately treated with antibiotics and has done well.  Her heart function had  declined from 40-45% on 12/1/2019 to 30-35% on 1/5/2021.  She is grateful today as she had repeat echocardiogram with findings of a low normal EF of 50-55%.  She continues to have evidence for diastolic dysfunction.  She has some mild peripheral edema but nothing significant.  She is grateful as she is currently not on diuretics.  We discussed continuing her heart failure medications for at least 1 to 2 years.  Otherwise, her heart function may fall.  She continues be short of breath.  She is responding well to breathing treatments.  She does follow-up with pulmonary.  I suspect her shortness of breath is not heart related but more lung related and this was explained today.  She does have a history of atrial fibrillation and is currently on Eliquis.  The cost is affordable.  She has a bruise to her leg and unaware of the cause.  It has been a year since her cholesterols been checked.  She plans to follow-up with her primary to have these checked.  It was uncontrolled at that time.  We discussed her pacemaker interrogation from 8/9/2022.  She has 5 months left on the device.  Patient states that when it is time to have her battery  replaced, would like to have this done in [place].      HPI:    Jacklyn BONNER Asteranish is being seen by cardiology for dyspnea with chronic systolic EF of 40-45% and evidence for diastolic heart failure on 12/31/2019.     She has been short of breath with a diagnosis of asthma and COPD.  She is followed for a history of severe nonischemic cardiomyopathy with a previously ejection fraction at less than 35%. More recently, her EF on 12/31/2019 was 40-45%.  EF of 30-35% on 1/5/21. She also has diastolic heart failure, ICD placed on 11/11/16, her recent visit with pulmonary secondary to what was thought to be severe COPD but was downgraded to mild to moderate with a greater concern for asthma, per the patient.     She was started on some breathing treatments for asthma/COPD by pulmonary.   With these treatments, she says she feels much better but remains short of breath.       With having diastolic and systolic heart failure, she has minimal to no lower extremity edema. Her activity has been limited secondary to chronic back pain and generalized weakness.  It was not for back pain, she would be doing really well.     She has a history of severe nonischemic cardiomyopathy S/P ICD placement on 11/11/2014. She had her ICD checked on 10/22/2019.  It showed she was bi-V paced 96.8% of the time with 3.0 years left on her battery.  = 98.1%. She had up to 20 seconds of A. fib with a total of 3 episodes.  Her device was described as functioning appropriately.     Echo from 12/31/2019.  She has an EF of 40% to 45%, grade 1 diastolic dysfunction, mild left atrial enlargement, mild to moderate AI, and mild MI      Relevant testing:  ECHO on 8/24/21:  Technically difficult study. Poor acoustic windows.  Left ventricular function is decreased. The ejection fraction is 50-55%  (borderline). Mild diffuse hypokinesis is present. Diastolic Doppler findings (E/E' ratio and/or other parameters) suggest left ventricular filling pressures are increased.  Global right ventricular function is normal. The right ventricle is normal size.  No significant valvular abnormalities.  The estimated PA systolic pressure is 17 mmHg above the RA pressure. The RA pressure could not be estimated.  This study was compared with the study from 01/06/2021. There is no  significant change in the biventricular function upon direct visual  comparison.    Echo on 12/31/2019:  No pericardial effusion is present.  Left ventricular size is normal.  The Ejection Fraction is estimated at 40-45%.  Grade I or early diastolic dysfunction.  Mild diffuse hypokinesis is present.  The right ventricle is normal size.  Global right ventricular function is normal.  Mild left atrial enlargement is present.  Mild mitral insufficiency is present.  Mild to  moderate aortic insufficiency is present.  The mean gradient across the aortic valve is 3.9 mmHg.  Trace tricuspid insufficiency is present.  Right ventricular systolic pressure is 20 mmHg above the right atrial pressure.  The pulmonic valve is normal.  The aorta root is normal.    Echocardiogram on 8/24/2021:  Technically difficult study. Poor acoustic windows.  Left ventricular function is decreased. The ejection fraction is 50-55%  (borderline). Mild diffuse hypokinesis is present. Diastolic Doppler findings  (E/E' ratio and/or other parameters) suggest left ventricular filling  pressures are increased.  Global right ventricular function is normal. The right ventricle is normal  size.  No significant valvular abnormalities.  The estimated PA systolic pressure is 17 mmHg above the RA pressure. The RA  pressure could not be estimated.  This study was compared with the study from 01/06/2021. There is no  significant change in the biventricular function upon direct visual  comparison.      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     Insomnia, unspecified 01/01/2011     LBBB (left bundle branch block) 01/01/2011     Neck pain, chronic 01/01/2011     Non-ischemic cardiomyopathy (H) 12/10/2013     Obesity, unspecified 01/01/2011     Osteopenia 01/01/2011     Pacemaker      Pain in joint, lower leg 07/31/2006     Pure hypercholesterolemia 06/07/2002     Unspecified essential hypertension 01/01/2011       Past Surgical History:   Procedure Laterality Date     APPENDECTOMY       ARTHROSCOPY KNEE RT/LT  2009    RT     BACK SURGERY  02/06/2020     BACK SURGERY  06/2021    fractured vert repair     CARDIAC SURGERY  05/06/2014    Pacemaker     Cataract extraction  2009    Bilateral     CHOLECYSTECTOMY      open      COLONOSCOPY   2001    Normal      COLONOSCOPY N/A 10/20/2016    Procedure: COLONOSCOPY;  Surgeon: Charan Montejo MD;  Location: [facility] OR     colonoscopy with polypectomy      Repeat 3 years      CT CORONARY ANGIOGRAM      normal     HERPES ZOSTER PCR ([facility])       IR CONSULTATION FOR IR EXAM  2020     left eye scar tissue       normal echo      fen-phen use       x6       SURGICAL RADIOLOGY PROCEDURE N/A 2016    Procedure: SURGICAL RADIOLOGY PROCEDURE;  Surgeon: Provider, [facility] Nursing;  Location: [facility] OR       No Known Allergies    Current Outpatient Medications   Medication Sig Dispense Refill     alendronate (FOSAMAX) 70 MG tablet TAKE 1 TABLET BY MOUTH EVERY 7 DAYS. TAKE WITH 8 OUNCES OF WATER AND STAY UPRIGHT FOR AT LEAST 30 MINUTES AFTER TAKING. 12 tablet 4     budesonide-formoterol (SYMBICORT) 160-4.5 MCG/ACT Inhaler INHALE 2 PUFFS INTO THE LUNGS 2 TIMES DAILY 10.2 g 11     Calcium Carbonate-Vitamin D (CALCIUM 600 + D OR) Take 1 tablet by mouth daily       carvedilol (COREG) 12.5 MG tablet TAKE 1 TABLET BY MOUTH 2 TIMES DAILY WITH MEALS 180 tablet 0     ELIQUIS ANTICOAGULANT 5 MG tablet TAKE 1 TABLET BY MOUTH 2 TIMES DAILY 180 tablet 1     ENTRESTO 24-26 MG per tablet TAKE 1 TABLET BY MOUTH TWICE DAILY 60 tablet 0     ENTRESTO 49-51 MG per tablet TAKE 1 TABLET BY MOUTH TWICE DAILY. 180 tablet 3     gabapentin (NEURONTIN) 300 MG capsule TAKE 1 CAPSULE BY MOUTH 3 TIMES DAILY 90 capsule 4     HYDROcodone-acetaminophen (NORCO) 5-325 MG tablet TAKE 1 TO 2 TABLETS BY MOUTH EVERY 6 HOURS AS NEEDED 60 tablet 0     Multiple Vitamin (MULTI-VITAMIN DAILY PO) Take 1 tablet by mouth daily       rosuvastatin (CRESTOR) 10 MG tablet TAKE 1 TABLET BY MOUTH DAILY 90 tablet 3     SPIRIVA RESPIMAT 2.5 MCG/ACT inhaler INHALE 2 DOSES INTO THE LUNGS ONCE DAILY 4 g 11     spironolactone (ALDACTONE) 25 MG tablet TAKE 1/2 TABLET BY MOUTH DAILY 45 tablet 3     VENTOLIN  (90 Base) MCG/ACT  inhaler INHALE 2 PUFFS INTO THE LUNGS EVERY 6 HOURS AS NEEDED FOR SHORTNESS OF BREATH/DYSPNEA/WHEEZING 18 g 0       Social History     Socioeconomic History     Marital status:      Spouse name: Not on file     Number of children: Not on file     Years of education: Not on file     Highest education level: Not on file   Occupational History     Occupation:       Employer: RETIRED   Tobacco Use     Smoking status: Former Smoker     Packs/day: 1.00     Years: 15.00     Pack years: 15.00     Types: Cigarettes     Start date: 1983     Quit date: 1998     Years since quittin.6     Smokeless tobacco: Never Used     Tobacco comment: Passive Exposure: No; Longest Tobacco Free: 15 years    Substance and Sexual Activity     Alcohol use: Not Currently     Comment: Occasionally      Drug use: No     Sexual activity: Not on file   Other Topics Concern      Service Not Asked     Blood Transfusions Not Asked     Caffeine Concern Yes     Comment: Coffee 5 cups daily      Occupational Exposure Not Asked     Hobby Hazards Not Asked     Sleep Concern Not Asked     Stress Concern Not Asked     Weight Concern Not Asked     Special Diet Not Asked     Back Care Not Asked     Exercise Not Asked     Bike Helmet Not Asked     Seat Belt Not Asked     Self-Exams Not Asked     Parent/sibling w/ CABG, MI or angioplasty before 65F 55M? No   Social History Narrative     Not on file     Social Determinants of Health     Financial Resource Strain: Not on file   Food Insecurity: Not on file   Transportation Needs: Not on file   Physical Activity: Not on file   Stress: Not on file   Social Connections: Not on file   Intimate Partner Violence: Not on file   Housing Stability: Not on file       LAB RESULTS:   Office Visit on 2020   Component Date Value Ref Range Status     COVID-19 Virus PCR to [facility] - So* 2020 Nasopharyngeal   Final     COVID-19 Virus PCR to [facility] - Re* 2020 Test  received-See reflex to IDDL test SARS CoV2 (COVID-19) Virus RT-PCR   Final     SARS-CoV-2 Virus Specimen Source 08/08/2020 Nasopharyngeal   Final     SARS-CoV-2 PCR Result 08/08/2020 NEGATIVE   Final     SARS-CoV-2 PCR Comment 08/08/2020    Final                    Value:Testing was performed using the Aptima SARS-CoV-2 Assay on the [employer] Instrument System.   Additional information about this Emergency Use Authorization (EUA) assay can be found via   the Lab Guide.     Ancillary Procedure on 08/04/2020   Component Date Value Ref Range Status     Date Time Interrogation Session 08/05/2020 [id number]   Final     Implantable Pulse Generator Manufa* 08/05/2020 Medtronic   Final     Implantable Pulse Generator Model 08/05/2020 [id number] Viva XT CRT-D   Final     Implantable Pulse Generator Serial* 08/05/2020 [id number]   Final     Type Interrogation Session 08/05/2020 Remote    Final     Clinic Name 08/05/2020 [facility]   Final     Implantable Pulse Generator Type 08/05/2020 Cardiac Resynchronization Therapy - Defibrillator   Final     Implantable Pulse Generator Implan* 08/05/2020 [id number]   Final     Implantable Lead  08/05/2020 Medtronic   Final     Implantable Lead Model 08/05/2020 4296 Attain Ability Plus   Final     Implantable Lead Serial Number 08/05/2020 [id number]   Final     Implantable Lead Implant Date 08/05/2020 [id number]   Final     Implantable Lead Polarity Type 08/05/2020 Bipolar Lead   Final     Implantable Lead Location Detail 1 08/05/2020 UNKNOWN   Final     Implantable Lead Location 08/05/2020 Left Ventricle   Final     Implantable Lead  08/05/2020 Medtronic   Final     Implantable Lead Model 08/05/2020 5076 CapSureFix Novus   Final     Implantable Lead Serial Number 08/05/2020 [id number]   Final     Implantable Lead Implant Date 08/05/2020 [id number]   Final     Implantable Lead Polarity Type 08/05/2020 Bipolar Lead   Final     Implantable Lead  Location Detail 1 08/05/2020 APPENDAGE   Final     Implantable Lead Location 08/05/2020 Right Atrium   Final     Implantable Lead  08/05/2020 Medtronic   Final     Implantable Lead Model 08/05/2020 6947 Benny Morao Secure   Final     Implantable Lead Serial Number 08/05/2020 [id number]   Final     Implantable Lead Implant Date 08/05/2020 20140506   Final     Implantable Lead Polarity Type 08/05/2020 Quadripolar Lead   Final     Implantable Lead Location Detail 1 08/05/2020 APEX   Final     Implantable Lead Location 08/05/2020 Right Ventricle   Final     Vamshi Setting Mode (NBG Code) 08/05/2020 DDDR   Final     Vamshi Setting Lower Rate Limit 08/05/2020 60  [beats]/min Final     Vamshi Setting Maximum Tracking Rate 08/05/2020 130  [beats]/min Final     Vamshi Setting Maximum Sensor Rate 08/05/2020 130  [beats]/min Final     Vamshi Setting NAJMA Delay Low 08/05/2020 140  ms Final     Vamshi Setting PAV Delay Low 08/05/2020 160  ms Final     Vamshi Setting AT Mode Switch Rate 08/05/2020 171  [beats]/min Final     Lead Channel Setting Sensing Polar* 08/05/2020 Bipolar   Final     Lead Channel Setting Sensing Anode* 08/05/2020 Right Atrium   Final     Lead Channel Setting Sensing Anode* 08/05/2020 Ring   Final     Lead Channel Setting Sensing Catho* 08/05/2020 Right Atrium   Final     Lead Channel Setting Sensing Catho* 08/05/2020 Tip   Final     Lead Channel Setting Sensing Sensi* 08/05/2020 0.3  mV Final     Lead Channel Setting Sensing Polar* 08/05/2020 Bipolar   Final     Lead Channel Setting Sensing Anode* 08/05/2020 Right Ventricle   Final     Lead Channel Setting Sensing Anode* 08/05/2020 Coil   Final     Lead Channel Setting Sensing Catho* 08/05/2020 Right Ventricle   Final     Lead Channel Setting Sensing Catho* 08/05/2020 Tip   Final     Lead Channel Setting Sensing Sensi* 08/05/2020 0.3  mV Final     Ventricular chambers paced during * 08/05/2020 LVOnly   Final     Lead Channel Setting Pacing Polari*  08/05/2020 Bipolar   Final     Lead Channel Setting Pacing Anode * 08/05/2020 Right Atrium   Final     Lead Channel Setting Pacing Anode * 08/05/2020 Ring   Final     Lead Channel Setting Sensing Catho* 08/05/2020 Right Atrium   Final     Lead Channel Setting Sensing Catho* 08/05/2020 Tip   Final     Lead Channel Setting Pacing Pulse * 08/05/2020 0.4  ms Final     Lead Channel Setting Pacing Amplit* 08/05/2020 1.5  V Final     Lead Channel Setting Pacing Captur* 08/05/2020 Adaptive   Final     Lead Channel Setting Pacing Polari* 08/05/2020 Bipolar   Final     Lead Channel Setting Pacing Anode * 08/05/2020 Right Ventricle   Final     Lead Channel Setting Pacing Anode * 08/05/2020 Ring   Final     Lead Channel Setting Sensing Catho* 08/05/2020 Right Ventricle   Final     Lead Channel Setting Sensing Catho* 08/05/2020 Tip   Final     Lead Channel Setting Pacing Pulse * 08/05/2020 0.4  ms Final     Lead Channel Setting Pacing Amplit* 08/05/2020 2  V Final     Lead Channel Setting Pacing Captur* 08/05/2020 Adaptive   Final     Lead Channel Setting Pacing Polari* 08/05/2020 Bipolar   Final     Lead Channel Setting Pacing Anode * 08/05/2020 Right Ventricle   Final     Lead Channel Setting Pacing Anode * 08/05/2020 Coil   Final     Lead Channel Setting Sensing Catho* 08/05/2020 Left Ventricle   Final     Lead Channel Setting Sensing Catho* 08/05/2020 Tip   Final     Lead Channel Setting Pacing Pulse * 08/05/2020 0.6  ms Final     Lead Channel Setting Pacing Amplit* 08/05/2020 1.5  V Final     Lead Channel Setting Pacing Captur* 08/05/2020 Adaptive   Final     Zone Setting Type Category 08/05/2020 VF   Final     Zone Setting Detection Interval 08/05/2020 320  ms Final     Zone Setting Detection Beats Numer* 08/05/2020 30  [beats] Final     Zone Setting Detection Beats Denom* 08/05/2020 40  [beats] Final     Zone Setting Type Category 08/05/2020 VT   Final     Zone Setting Detection Interval 08/05/2020 Blank   Final     Zone  Setting Type Category 08/05/2020 VT   Final     Zone Setting Detection Interval 08/05/2020 360  ms Final     Zone Setting Type Category 08/05/2020 VT   Final     Zone Setting Detection Interval 08/05/2020 400  ms Final     Zone Setting Type Category 08/05/2020 ATRIAL_FIBRILLATION   Final     Zone Setting Type Category 08/05/2020 AT/AF   Final     Zone Setting Detection Interval 08/05/2020 350  ms Final     Lead Channel Impedance Value 08/05/2020 437  ohm Final     Lead Channel Sensing Intrinsic Amp* 08/05/2020 2.375  mV Final     Lead Channel Sensing Intrinsic Amp* 08/05/2020 2.375  mV Final     Lead Channel Pacing Threshold Ampl* 08/05/2020 0.375  V Final     Lead Channel Pacing Threshold Puls* 08/05/2020 0.4  ms Final     Lead Channel Impedance Value 08/05/2020 399  ohm Final     Lead Channel Impedance Value 08/05/2020 323  ohm Final     Lead Channel Sensing Intrinsic Amp* 08/05/2020 5  mV Final     Lead Channel Sensing Intrinsic Amp* 08/05/2020 5  mV Final     Lead Channel Pacing Threshold Ampl* 08/05/2020 0.625  V Final     Lead Channel Pacing Threshold Puls* 08/05/2020 0.4  ms Final     Lead Channel Impedance Value 08/05/2020 893  ohm Final     Lead Channel Impedance Value 08/05/2020 456  ohm Final     Lead Channel Impedance Value 08/05/2020 570  ohm Final     Lead Channel Pacing Threshold Ampl* 08/05/2020 0.5  V Final     Lead Channel Pacing Threshold Puls* 08/05/2020 0.6  ms Final     Battery Date Time of Measurements 08/05/2020 20200804012400   Final     Battery Status 08/05/2020 OK   Final     Battery RRT Trigger 08/05/2020 2.727   Final     Battery Remaining Longevity 08/05/2020 26  mo Final     Battery Voltage 08/05/2020 2.94  V Final     Capacitor Charge Type 08/05/2020 Reformation   Final     Capacitor Last Charge Date Time 08/05/2020 20200517090334   Final     Capacitor Charge Time 08/05/2020 4.314   Final     Capacitor Charge Energy 08/05/2020 18  J Final     Vamshi Statistic Date Time Start 08/05/2020  [id number]   Final     Vamshi Statistic Date Time End 08/05/2020 20200804012403   Final     Vamshi Statistic RA Percent Paced 08/05/2020 9.93  % Final     Vamshi Statistic RV Percent Paced 08/05/2020 7.92  % Final     CRT Statistic LV Percent Paced 08/05/2020 98.09  % Final     Vamshi Statistic AP  Percent 08/05/2020 9.75  % Final     Vamshi Statistic AS  Percent 08/05/2020 88.58  % Final     Vamshi Statistic AP VS Percent 08/05/2020 0.19  % Final     Vamshi Statistic AS VS Percent 08/05/2020 1.47  % Final     CRT Statistic Date Time Start 08/05/2020 [id number]   Final     CRT Statistic Date Time End 08/05/2020 20200804012403   Final     CRT Statistic CRT Percent Paced 08/05/2020 98.09  % Final     Atrial Tachy Statistic Date Time S* 08/05/2020 [id number]   Final     Atrial Tachy Statistic Date Time E* 08/05/2020 20200804012403   Final     Atrial Tachy Statistic AT/AF [place]de* 08/05/2020 0.1  % Final     Therapy Statistic Recent Shocks De* 08/05/2020 0   Final     Therapy Statistic Recent Shocks Ab* 08/05/2020 0   Final     Therapy Statistic Recent ATP Deliv* 08/05/2020 0   Final     Therapy Statistic Recent Date Time* 08/05/2020 20200504124338   Final     Therapy Statistic Recent Date Time* 08/05/2020 20200804012403   Final     Therapy Statistic Total Shocks Del* 08/05/2020 1   Final     Therapy Statistic Total Shocks Abo* 08/05/2020 0   Final     Therapy Statistic Total ATP Delive* 08/05/2020 0   Final     Therapy Statistic Total  Date Time* 08/05/2020 [id number]   Final     Therapy Statistic Total  Date Time* 08/05/2020 20200804012403   Final     Episode Statistic Recent Count 08/05/2020 1   Final     Episode Statistic Type Category 08/05/2020 AT/AF   Final     Episode Statistic Recent Count 08/05/2020 0   Final     Episode Statistic Type Category 08/05/2020 SVT   Final     Episode Statistic Recent Count 08/05/2020 0   Final     Episode Statistic Type Category 08/05/2020 VT   Final     Episode  Statistic Recent Count 08/05/2020 0   Final     Episode Statistic Type Category 08/05/2020 VF   Final     Episode Statistic Recent Count 08/05/2020 0   Final     Episode Statistic Type Category 08/05/2020 VT   Final     Episode Statistic Recent Count 08/05/2020 0   Final     Episode Statistic Type Category 08/05/2020 VT   Final     Episode Statistic Recent Count 08/05/2020 0   Final     Episode Statistic Type Category 08/05/2020 VT   Final     Episode Statistic Recent Date Time* 08/05/2020 [id number]   Final     Episode Statistic Recent Date Time* 08/05/2020 20200804012403   Final     Episode Statistic Recent Date Time* 08/05/2020 20200504124338   Final     Episode Statistic Recent Date Time* 08/05/2020 20200804012403   Final     Episode Statistic Recent Date Time* 08/05/2020 [id number]   Final     Episode Statistic Recent Date Time* 08/05/2020 20200804012403   Final     Episode Statistic Recent Date Time* 08/05/2020 20200504124338   Final     Episode Statistic Recent Date Time* 08/05/2020 20200804012403   Final     Episode Statistic Recent Date Time* 08/05/2020 20200504124338   Final     Episode Statistic Recent Date Time* 08/05/2020 20200804012403   Final     Episode Statistic Recent Date Time* 08/05/2020 20200504124338   Final     Episode Statistic Recent Date Time* 08/05/2020 20200804012403   Final     Episode Statistic Recent Date Time* 08/05/2020 20200504124338   Final     Episode Statistic Recent Date Time* 08/05/2020 20200804012403   Final     Episode Statistic Total Count 08/05/2020 0   Final     Episode Statistic Type Category 08/05/2020 AT/AF   Final     Episode Statistic Total Count 08/05/2020 0   Final     Episode Statistic Type Category 08/05/2020 SVT   Final     Episode Statistic Total Count 08/05/2020 66   Final     Episode Statistic Type Category 08/05/2020 VT   Final     Episode Statistic Total Count 08/05/2020 1   Final     Episode Statistic Type Category 08/05/2020 VF   Final     Episode  Statistic Total Count 08/05/2020 0   Final     Episode Statistic Type Category 08/05/2020 VT   Final     Episode Statistic Total Count 08/05/2020 0   Final     Episode Statistic Type Category 08/05/2020 VT   Final     Episode Statistic Total Count 08/05/2020 0   Final     Episode Statistic Type Category 08/05/2020 VT   Final     Episode Statistic Total Date Time * 08/05/2020 [id number]   Final     Episode Statistic Total Date Time * 08/05/2020 20200804012403   Final     Episode Statistic Total Date Time * 08/05/2020 20140506113519   Final     Episode Statistic Total Date Time * 08/05/2020 20200804012403   Final     Episode Statistic Total Date Time * 08/05/2020 [id number]   Final     Episode Statistic Total Date Time * 08/05/2020 [id number]   Final     Episode Statistic Total Date Time * 08/05/2020 [id number]   Final     Episode Statistic Total Date Time * 08/05/2020 [id number]   Final     Episode Statistic Total Date Time * 08/05/2020 [id number]   Final     Episode Statistic Total Date Time * 08/05/2020 20200804012403   Final     Episode Statistic Total Date Time * 08/05/2020 [id number]   Final     Episode Statistic Total Date Time * 08/05/2020 20200804012403   Final     Episode Statistic Total Date Time * 08/05/2020 [id number]   Final     Episode Statistic Total Date Time * 08/05/2020 [id number]   Final     Episode Identifier 08/05/2020 716   Final     Episode Type Category 08/05/2020 VSE   Final     Episode Date Time 08/05/2020 [id number]   Final     Episode Duration 08/05/2020 9  s Final     Episode Identifier 08/05/2020 715   Final     Episode Type Category 08/05/2020 VSE   Final     Episode Date Time 08/05/2020 [id number]   Final     Episode Duration 08/05/2020 3  s Final     Episode Identifier 08/05/2020 714   Final     Episode Type Category 08/05/2020 VSE   Final     Episode Date Time 08/05/2020 [id number]   Final     Episode Duration 08/05/2020 4  s Final      "Episode Identifier 08/05/2020 713   Final     Episode Type Category 08/05/2020 VSE   Final     Episode Date Time 08/05/2020 [id number]   Final     Episode Duration 08/05/2020 6  s Final     Episode Identifier 08/05/2020 712   Final     Episode Type Category 08/05/2020 VSE   Final     Episode Date Time 08/05/2020 [id number]   Final     Episode Duration 08/05/2020 18  s Final     Episode Identifier 08/05/2020 711   Final     Episode Type Category 08/05/2020 VSE   Final     Episode Date Time 08/05/2020 [id number]   Final     Episode Duration 08/05/2020 4  s Final        Review of systems: Negative except that which was noted in the HPI.    Physical examination:  /72 (BP Location: Right arm, Cuff Size: Adult Regular)   Pulse 86   Temp 97.3  F (36.3  C) (Tympanic)   Resp 20   Ht 1.626 m (5' 4\")   Wt 69.9 kg (154 lb)   SpO2 96%   BMI 26.43 kg/m      GENERAL APPEARANCE: healthy, alert and no distress  HEENT: no icterus, no xanthelasmas, normal pupil size and reaction, no cyanosis.  NECK: no adenopathy, no asymmetry, masses, or scars.  CHEST: lungs clear to auscultation - no rales, rhonchi or wheezes, no use of accessory muscles, no retractions, respirations are unlabored, normal respiratory rate  CARDIOVASCULAR: Irregular rate irregular rhythm, normal S1 with physiologic split S2, no S3 or S4 and no murmur, click or rub  ABDOMEN: soft, non tender, bowel sounds normal.  Distant sounds.  EXTREMITIES: no clubbing, cyanosis but with mild to moderate lower extremity edema  NEURO: alert and oriented normal speech, and affect  VASC: No vascular bruits heard.  SKIN: no ecchymoses, no rashes      Thank you for allowing me to participate in the care of your patient. Please do not hesitate to contact me if you have any questions.     Kodi Garcia, DO          "

## 2022-09-12 NOTE — NURSING NOTE
"Chief Complaint   Patient presents with     RECHECK     1 year follow up.       Initial /72 (BP Location: Right arm, Cuff Size: Adult Regular)   Pulse 86   Temp 97.3  F (36.3  C) (Tympanic)   Resp 20   Ht 1.626 m (5' 4\")   Wt 69.9 kg (154 lb)   SpO2 96%   BMI 26.43 kg/m   Estimated body mass index is 26.43 kg/m  as calculated from the following:    Height as of this encounter: 1.626 m (5' 4\").    Weight as of this encounter: 69.9 kg (154 lb).  Medication Reconciliation: complete  Rasheeda Hollingsworth LPN    "

## 2022-10-11 DIAGNOSIS — M54.50 LUMBAR SPINE PAIN: ICD-10-CM

## 2022-10-13 RX ORDER — HYDROCODONE BITARTRATE AND ACETAMINOPHEN 5; 325 MG/1; MG/1
TABLET ORAL
Qty: 60 TABLET | Refills: 0 | Status: SHIPPED | OUTPATIENT
Start: 2022-10-13 | End: 2022-10-19

## 2022-10-13 NOTE — TELEPHONE ENCOUNTER
Please sign asap so it can be delivered today            HYDROcodone-acetaminophen (NORCO) 5-325 MG tablet    Last Written Prescription Date:  9/9/22  Last Fill Quantity: 60,   # refills: 0  Last Office Visit: 11/22/21  Future Office visit:    Next 5 appointments (look out 90 days)    Oct 19, 2022  2:45 PM  (Arrive by 2:30 PM)  SHORT with Ilia Moran MD  [facility] ([facility] [place] ) [address]  [place] [address]  [phone]           Routing refill request to provider for review/approval because:  Drug not on the [facility], [facility] or  Health refill protocol or controlled substance

## 2022-10-19 ENCOUNTER — OFFICE VISIT (OUTPATIENT)
Dept: FAMILY MEDICINE | Facility: OTHER | Age: 87
End: 2022-10-19
Attending: FAMILY MEDICINE
Payer: MEDICARE

## 2022-10-19 ENCOUNTER — LAB (OUTPATIENT)
Dept: LAB | Facility: OTHER | Age: 87
End: 2022-10-19
Attending: FAMILY MEDICINE
Payer: MEDICARE

## 2022-10-19 VITALS
SYSTOLIC BLOOD PRESSURE: 104 MMHG | DIASTOLIC BLOOD PRESSURE: 61 MMHG | OXYGEN SATURATION: 96 % | RESPIRATION RATE: 20 BRPM | HEART RATE: 81 BPM | TEMPERATURE: 97.1 F | WEIGHT: 153 LBS | BODY MASS INDEX: 26.26 KG/M2

## 2022-10-19 DIAGNOSIS — Z79.01 ANTICOAGULATED BY ANTICOAGULATION TREATMENT: ICD-10-CM

## 2022-10-19 DIAGNOSIS — I42.8 NON-ISCHEMIC CARDIOMYOPATHY (H): ICD-10-CM

## 2022-10-19 DIAGNOSIS — E87.5 HYPERKALEMIA: Primary | ICD-10-CM

## 2022-10-19 DIAGNOSIS — J44.9 CHRONIC OBSTRUCTIVE PULMONARY DISEASE, UNSPECIFIED COPD TYPE (H): ICD-10-CM

## 2022-10-19 DIAGNOSIS — M47.815 SPONDYLOSIS OF THORACOLUMBAR REGION WITHOUT MYELOPATHY OR RADICULOPATHY: ICD-10-CM

## 2022-10-19 DIAGNOSIS — G62.9 NEUROPATHY: ICD-10-CM

## 2022-10-19 DIAGNOSIS — Z23 NEED FOR PROPHYLACTIC VACCINATION AND INOCULATION AGAINST INFLUENZA: ICD-10-CM

## 2022-10-19 DIAGNOSIS — Z79.899 ENCOUNTER FOR LONG-TERM (CURRENT) USE OF MEDICATIONS: ICD-10-CM

## 2022-10-19 DIAGNOSIS — F11.90 CHRONIC, CONTINUOUS USE OF OPIOIDS: Chronic | ICD-10-CM

## 2022-10-19 DIAGNOSIS — M47.896 OTHER SPONDYLOSIS, LUMBAR REGION: ICD-10-CM

## 2022-10-19 DIAGNOSIS — I48.0 PAROXYSMAL ATRIAL FIBRILLATION (H): ICD-10-CM

## 2022-10-19 DIAGNOSIS — I50.9 CONGESTIVE HEART FAILURE, NYHA CLASS 2, UNSPECIFIED CONGESTIVE HEART FAILURE TYPE (H): ICD-10-CM

## 2022-10-19 DIAGNOSIS — I48.0 PAROXYSMAL ATRIAL FIBRILLATION (H): Primary | ICD-10-CM

## 2022-10-19 DIAGNOSIS — F11.90 CHRONIC, CONTINUOUS USE OF OPIOIDS: ICD-10-CM

## 2022-10-19 DIAGNOSIS — M48.062 SPINAL STENOSIS OF LUMBAR REGION WITH NEUROGENIC CLAUDICATION: ICD-10-CM

## 2022-10-19 LAB
ALBUMIN SERPL BCG-MCNC: 3.7 G/DL (ref 3.5–5.2)
ALP SERPL-CCNC: 70 U/L (ref 35–104)
ALT SERPL W P-5'-P-CCNC: 14 U/L (ref 10–35)
ANION GAP SERPL CALCULATED.3IONS-SCNC: 8 MMOL/L (ref 7–15)
AST SERPL W P-5'-P-CCNC: 23 U/L (ref 10–35)
BASOPHILS # BLD AUTO: 0 10E3/UL (ref 0–0.2)
BASOPHILS NFR BLD AUTO: 1 %
BILIRUB SERPL-MCNC: 0.2 MG/DL
BUN SERPL-MCNC: 39.7 MG/DL (ref 8–23)
CALCIUM SERPL-MCNC: 8.5 MG/DL (ref 8.8–10.2)
CHLORIDE SERPL-SCNC: 104 MMOL/L (ref 98–107)
CREAT SERPL-MCNC: 1.57 MG/DL (ref 0.51–0.95)
DEPRECATED HCO3 PLAS-SCNC: 25 MMOL/L (ref 22–29)
EOSINOPHIL # BLD AUTO: 0.1 10E3/UL (ref 0–0.7)
EOSINOPHIL NFR BLD AUTO: 3 %
ERYTHROCYTE [DISTWIDTH] IN BLOOD BY AUTOMATED COUNT: 13 % (ref 10–15)
GFR SERPL CREATININE-BSD FRML MDRD: 32 ML/MIN/1.73M2
GLUCOSE SERPL-MCNC: 113 MG/DL (ref 70–99)
HCT VFR BLD AUTO: 33.4 % (ref 35–47)
HGB BLD-MCNC: 10.8 G/DL (ref 11.7–15.7)
IMM GRANULOCYTES # BLD: 0 10E3/UL
IMM GRANULOCYTES NFR BLD: 0 %
LYMPHOCYTES # BLD AUTO: 1.2 10E3/UL (ref 0.8–5.3)
LYMPHOCYTES NFR BLD AUTO: 27 %
MCH RBC QN AUTO: 29.5 PG (ref 26.5–33)
MCHC RBC AUTO-ENTMCNC: 32.3 G/DL (ref 31.5–36.5)
MCV RBC AUTO: 91 FL (ref 78–100)
MONOCYTES # BLD AUTO: 0.6 10E3/UL (ref 0–1.3)
MONOCYTES NFR BLD AUTO: 14 %
NEUTROPHILS # BLD AUTO: 2.3 10E3/UL (ref 1.6–8.3)
NEUTROPHILS NFR BLD AUTO: 55 %
NRBC # BLD AUTO: 0 10E3/UL
NRBC BLD AUTO-RTO: 0 /100
PLATELET # BLD AUTO: 148 10E3/UL (ref 150–450)
POTASSIUM SERPL-SCNC: 5.2 MMOL/L (ref 3.4–5.3)
PROT SERPL-MCNC: 6.5 G/DL (ref 6.4–8.3)
RBC # BLD AUTO: 3.66 10E6/UL (ref 3.8–5.2)
SODIUM SERPL-SCNC: 137 MMOL/L (ref 136–145)
WBC # BLD AUTO: 4.3 10E3/UL (ref 4–11)

## 2022-10-19 PROCEDURE — 91312 COVID-19,PF,PFIZER BOOSTER BIVALENT: CPT

## 2022-10-19 PROCEDURE — 80307 DRUG TEST PRSMV CHEM ANLYZR: CPT | Mod: ZL

## 2022-10-19 PROCEDURE — 0124A COVID-19,PF,PFIZER BOOSTER BIVALENT: CPT

## 2022-10-19 PROCEDURE — G0008 ADMIN INFLUENZA VIRUS VAC: HCPCS

## 2022-10-19 PROCEDURE — 85025 COMPLETE CBC W/AUTO DIFF WBC: CPT | Mod: ZL

## 2022-10-19 PROCEDURE — 80053 COMPREHEN METABOLIC PANEL: CPT | Mod: ZL

## 2022-10-19 PROCEDURE — G0463 HOSPITAL OUTPT CLINIC VISIT: HCPCS

## 2022-10-19 PROCEDURE — 36415 COLL VENOUS BLD VENIPUNCTURE: CPT | Mod: ZL

## 2022-10-19 PROCEDURE — 99214 OFFICE O/P EST MOD 30 MIN: CPT | Performed by: FAMILY MEDICINE

## 2022-10-19 RX ORDER — GABAPENTIN 400 MG/1
CAPSULE ORAL
COMMUNITY
Start: 2022-10-18 | End: 2022-12-02

## 2022-10-19 RX ORDER — HYDROCODONE BITARTRATE AND ACETAMINOPHEN 5; 325 MG/1; MG/1
TABLET ORAL
Qty: 60 TABLET | Refills: 0 | Status: SHIPPED | OUTPATIENT
Start: 2022-11-11 | End: 2022-12-09

## 2022-10-19 ASSESSMENT — ANXIETY QUESTIONNAIRES
GAD7 TOTAL SCORE: 1
3. WORRYING TOO MUCH ABOUT DIFFERENT THINGS: NOT AT ALL
6. BECOMING EASILY ANNOYED OR IRRITABLE: NOT AT ALL
1. FEELING NERVOUS, ANXIOUS, OR ON EDGE: SEVERAL DAYS
7. FEELING AFRAID AS IF SOMETHING AWFUL MIGHT HAPPEN: NOT AT ALL
2. NOT BEING ABLE TO STOP OR CONTROL WORRYING: NOT AT ALL
4. TROUBLE RELAXING: NOT AT ALL
5. BEING SO RESTLESS THAT IT IS HARD TO SIT STILL: NOT AT ALL
IF YOU CHECKED OFF ANY PROBLEMS ON THIS QUESTIONNAIRE, HOW DIFFICULT HAVE THESE PROBLEMS MADE IT FOR YOU TO DO YOUR WORK, TAKE CARE OF THINGS AT HOME, OR GET ALONG WITH OTHER PEOPLE: SOMEWHAT DIFFICULT
GAD7 TOTAL SCORE: 1

## 2022-10-19 ASSESSMENT — PAIN SCALES - GENERAL: PAINLEVEL: NO PAIN (0)

## 2022-10-19 ASSESSMENT — PATIENT HEALTH QUESTIONNAIRE - PHQ9: SUM OF ALL RESPONSES TO PHQ QUESTIONS 1-9: 1

## 2022-10-19 NOTE — LETTER
Opioid / Opioid Plus Controlled Substance Agreement    This is an agreement between you and your provider about the safe and appropriate use of controlled substance/opioids prescribed by your care team. Controlled substances are medicines that can cause physical and mental dependence (abuse).    There are strict laws about having and using these medicines. We here at [facility] are committing to working with you in your efforts to get better. To support you in this work, we ll help you schedule regular office appointments for medicine refills. If we must cancel or change your appointment for any reason, we ll make sure you have enough medicine to last until your next appointment.     As a Provider, I will:    Listen carefully to your concerns and treat you with respect.     Recommend a treatment plan that I believe is in your best interest. This plan may involve therapies other than opioid pain medication.     Talk with you often about the possible benefits, and the risk of harm of any medicine that we prescribe for you.     Provide a plan on how to taper (discontinue or go off) using this medicine if the decision is made to stop its use.    As a Patient, I understand that opioid(s):     Are a controlled substance prescribed by my care team to help me function or work and manage my condition(s).     Are strong medicines and can cause serious side effects such as:    Drowsiness, which can seriously affect my driving ability    A lower breathing rate, enough to cause death    Harm to my thinking ability     Depression     Abuse of and addiction to this medicine    Need to be taken exactly as prescribed. Combining opioids with certain medicines or chemicals (such as illegal drugs, sedatives, sleeping pills, and benzodiazepines) can be dangerous or even fatal. If I stop opioids suddenly, I may have severe withdrawal symptoms.    Do not work for all types of pain nor for all patients. If they re not helpful, I may  be asked to stop them.        The risks, benefits and side effects of these medicine(s) were explained to me. I agree that:  1. I will take part in other treatments as advised by my care team. This may be psychiatry or counseling, physical therapy, behavioral therapy, group treatment or a referral to a specialist.     2. I will keep all my appointments. I understand that this is part of the monitoring of opioids. My care team may require an office visit for EVERY opioid/controlled substance refill. If I miss appointments or don t follow instructions, my care team may stop my medicine.    3. I will take my medicines as prescribed. I will not change the dose or schedule unless my care team tells me to. There will be no refills if I run out early.     4. I may be asked to come to the clinic and complete a urine drug test or complete a pill count at any time. If I don t give a urine sample or participate in a pill count, the care team may stop my medicine.    5. I will only receive prescriptions from this clinic for chronic pain. If I am treated by another provider for acute pain issues, I will tell them that I am taking opioid pain medication for chronic pain and that I have a treatment agreement with this provider. I will inform my [facility] care team within one business day if I am given a prescription for any pain medication by another healthcare provider. My [facility] care team can contact other providers and pharmacists about my use of any medicines.    6. It is up to me to make sure that I don t run out of my medicines on weekends or holidays. If my care team is willing to refill my opioid prescription without a visit, I must request refills only during office hours. Refills may take up to 3 business days to process. I will use one pharmacy to fill all my opioid and other controlled substance prescriptions. I will notify the clinic about any changes to my insurance or medication  availability.    7. I am responsible for my prescriptions. If the medicine/prescription is lost, stolen or destroyed, it will not be replaced. I also agree not to share controlled substance medicines with anyone.    8. I am aware I should not use any illegal or recreational drugs. I agree not to drink alcohol unless my care team says I can.       9. If I enroll in the [place] [facility] program, I will tell my care team prior to my next refill.     10. I will tell my care team right away if I become pregnant, have a new medical problem treated outside of my regular clinic, or have a change in my medications.    11. I understand that this medicine can affect my thinking, judgment and reaction time. Alcohol and drugs affect the brain and body, which can affect the safety of my driving. Being under the influence of alcohol or drugs can affect my decision-making, behaviors, personal safety, and the safety of others. Driving while impaired (DWI) can occur if a person is driving, operating, or in physical control of a car, motorcycle, boat, snowmobile, ATV, motorbike, off-road vehicle, or any other motor vehicle ([place] Statute 169A.20). I understand the risk if I choose to drive or operate any vehicle or machinery.    I understand that if I do not follow any of the conditions above, my prescriptions or treatment may be stopped or changed.          Opioids  What You Need to Know    What are opioids?   Opioids are pain medicines that must be prescribed by a doctor. They are also known as narcotics.     Examples are:   1. morphine (MS Contin, Selene)  2. oxycodone (Oxycontin)  3. oxycodone and acetaminophen (Percocet)  4. hydrocodone and acetaminophen (Vicodin, Norco)   5. fentanyl patch (Duragesic)   6. hydromorphone (Dilaudid)   7. methadone  8. codeine (Tylenol #3)     What do opioids do well?   Opioids are best for severe short-term pain such as after a surgery or injury. They may work well for cancer pain. They may  help some people with long-lasting (chronic) pain.     What do opioids NOT do well?   Opioids never get rid of pain entirely, and they don t work well for most patients with chronic pain. Opioids don t reduce swelling, one of the causes of pain.                                    Other ways to manage chronic pain and improve function include:       Treat the health problem that may be causing pain    Anti-inflammation medicines, which reduce swelling and tenderness, such as ibuprofen (Advil, Motrin) or naproxen (Aleve)    Acetaminophen (Tylenol)    Antidepressants and anti-seizure medicines, especially for nerve pain    Topical treatments such as patches or creams    Injections or nerve blocks    Chiropractic or osteopathic treatment    Acupuncture, massage, deep breathing, meditation, visual imagery, aromatherapy    Use heat or ice at the pain site    Physical therapy     Exercise    Stop smoking    Take part in therapy       Risks and side effects     Talk to your doctor before you start or decide to keep taking opioids. Possible side effects include:      Lowering your breathing rate enough to cause death    Overdose, including death, especially if taking higher than prescribed doses    Worse depression symptoms; less pleasure in things you usually enjoy    Feeling tired or sluggish    Slower thoughts or cloudy thinking    Being more sensitive to pain over time; pain is harder to control    Trouble sleeping or restless sleep    Changes in hormone levels (for example, less testosterone)    Changes in sex drive or ability to have sex    Constipation    Unsafe driving    Itching and sweating    Dizziness    Nausea, throwing up and dry mouth    What else should I know about opioids?    Opioids may lead to dependence, tolerance, or addiction.      Dependence means that if you stop or reduce the medicine too quickly, you will have withdrawal symptoms. These include loose poop (diarrhea), jitters, flu-like symptoms,  nervousness and tremors. Dependence is not the same as addiction.                       Tolerance means needing higher doses over time to get the same effect. This may increase the chance of serious side effects.      Addiction is when people improperly use a substance that harms their body, their mind or their relations with others. Use of opiates can cause a relapse of addiction if you have a history of drug or alcohol abuse.      People who have used opioids for a long time may have a lower quality of life, worse depression, higher levels of pain and more visits to doctors.    You can overdose on opioids. Take these steps to lower your risk of overdose:    1. Recognize the signs:  Signs of overdose include decrease or loss of consciousness (blackout), slowed breathing, trouble waking up and blue lips. If someone is worried about overdose, they should call 911.    2. Talk to your doctor about Narcan (naloxone).   If you are at risk for overdose, you may be given a prescription for Narcan. This medicine very quickly reverses the effects of opioids.   If you overdose, a friend or family member can give you Narcan while waiting for the ambulance. They need to know the signs of overdose and how to give Narcan.     3. Don't use alcohol or street drugs.   Taking them with opioids can cause death.    4. Do not take any of these medicines unless your doctor says it s OK. Taking these with opioids can cause death:    Benzodiazepines, such as lorazepam (Ativan), alprazolam (Xanax) or diazepam (Valium)    Muscle relaxers, such as cyclobenzaprine (Flexeril)    Sleeping pills like zolpidem (Ambien)     Other opioids      How to keep you and other people safe while taking opioids:    1. Never share your opioids with others.  Opioid medicines are regulated by the Drug Enforcement Agency ([employer]). Selling or sharing medications is a criminal act.    2. Be sure to store opioids in a secure place, locked up if possible. Young children  can easily swallow them and overdose.    3. When you are traveling with your medicines, keep them in the original bottles. If you use a pill box, be sure you also carry a copy of your medicine list from your clinic or pharmacy.    4. Safe disposal of opioids    Most pharmacies have places to get rid of medicine, called disposal kiosks. Medicine disposal options are also available in every [facility]. Search your county and  medication disposal  to find more options. You can find more details at:  [email or web address]     I agree that my provider, clinic care team, and pharmacy may work with any city, state or federal law enforcement agency that investigates the misuse, sale, or other diversion of my controlled medicine. I will allow my provider to discuss my care with, or share a copy of, this agreement with any other treating provider, pharmacy or emergency room where I receive care.    I have read this agreement and have asked questions about anything I did not understand.    _______________________________________________________  Patient Signature - Jacklyn Hein _____________________                   Date     _______________________________________________________  Provider Signature - Ilia Moran MD   _____________________                   Date     _______________________________________________________  Witness Signature (required if provider not present while patient signing)   _____________________                   Date

## 2022-10-19 NOTE — NURSING NOTE
"Chief Complaint   Patient presents with     Recheck Medication       Initial /61 (BP Location: Left arm, Patient Position: Sitting, Cuff Size: Adult Regular)   Pulse 81   Temp 97.1  F (36.2  C) (Tympanic)   Resp 20   Wt 69.4 kg (153 lb)   SpO2 96%   BMI 26.26 kg/m   Estimated body mass index is 26.26 kg/m  as calculated from the following:    Height as of 9/12/22: 1.626 m (5' 4\").    Weight as of this encounter: 69.4 kg (153 lb).  Medication Reconciliation: complete  Franchesca Lazo LPN  "

## 2022-10-19 NOTE — PROGRESS NOTES
Assessment & Plan     Paroxysmal atrial fibrillation (H)  Stable - seens cards. Continue current medications and behavioral changes.   - CBC with Platelets & Differential; Future  - Comprehensive metabolic panel; Future    Non-ischemic cardiomyopathy (H)  Stable overall. No change in sx.   - CBC with Platelets & Differential; Future  - Comprehensive metabolic panel; Future    Congestive heart failure, NYHA class 2, unspecified congestive heart failure type (H)  Continue current medications and behavioral changes.    as above   - CBC with Platelets & Differential; Future  - Comprehensive metabolic panel; Future    Chronic obstructive pulmonary disease, unspecified COPD type (H)  On inhalers.  Got flu shot and covid boosters. Continue current medications and behavioral changes.   - CBC with Platelets & Differential; Future  - Comprehensive metabolic panel; Future    Anticoagulated by anticoagulation treatment  Stable / hgb ok  - CBC with Platelets & Differential; Future  - Comprehensive metabolic panel; Future    Chronic, continuous use of opioids  Discussed. Will see every 6 months - UDS and contract done     Spinal stenosis of lumbar region with neurogenic claudication  As above. This is her biggest issue daily   - HYDROcodone-acetaminophen (NORCO) 5-325 MG tablet; TAKE 1 TO 2 TABLETS BY MOUTH EVERY 6 HOURS AS NEEDED Strength: 5-325 mg    Neuropathy  As above. 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   - HYDROcodone-acetaminophen (NORCO) 5-325 MG tablet; TAKE 1 TO 2 TABLETS BY MOUTH EVERY 6 HOURS AS NEEDED Strength: 5-325 mg    Other spondylosis, lumbar region  As above.     Need for prophylactic vaccination and inoculation against influenza  Shot given   - ADMIN INFLUENZA (For MEDICARE Patients ONLY) []    Follow-up  "in 6 months        BMI:   Estimated body mass index is 26.26 kg/m  as calculated from the following:    Height as of 9/12/22: 1.626 m (5' 4\").    Weight as of this encounter: 69.4 kg (153 lb).           No follow-ups on file.    Ilia Moran MD  [facility] - GABRIEL Angulo is a 87 year old accompanied by her daughter, presenting for the following health issues:  Recheck Medication      HPI     Heart Failure Follow-up  Are you experiencing any shortness of breath? Yes, with activity only   How would you describe your shortness of breath?  Same as usual        Are you experiencing any swelling in your legs or feet?  No    Are you using more pillows than usual? No    Do you cough at night?  No    Do you check your weight daily?  Yes    Have you had a weight change recently?  No    Are you having any of the following side effects from your medications? (Select all that apply)  The patient does not report symptoms of dizziness, fatigue, cough, swelling, or slow heart beat.    Since your last visit, how many times have you gone to the cardiologist, urgent care, emergency room, or hospital because of your heart failure?   1 time    COPD Follow-Up    Overall, how are your COPD symptoms since your last clinic visit?  No change    How much fatigue or shortness of breath do you have when you are walking?  Same as usual    How much shortness of breath do you have when you are resting?  None    How often do you cough? Rarely    Have you noticed any change in your sputum/phlegm?  No    Have you experienced a recent fever? No    Please describe how far you can walk without stopping to rest:  Less than 1 block    How many flights of stairs are you able to walk up without stopping?  1    Have you had any Emergency Room Visits, Urgent Care Visits, or Hospital Admissions because of your COPD since your last office visit?  No    History   Smoking Status     Former     Packs/day: 1.00     Years: 15.00 "     Types: Cigarettes     Start date: 1/1/1983     Quit date: 2/1/1998   Smokeless Tobacco     Never     No results found for: FEV1, OQV2GOO    Pain History:  When did you first notice your pain? - Chronic Pain   Have you seen this provider for your pain in the past?   Yes   Where in your body do you have pain? Low back  Are you seeing anyone else for your pain? Yes - Dr. Jackman                Chronic Pain Follow Up:    Location of pain: low back  Analgesia/pain control:    - Recent changes:  About the same    - Overall control: Comfortably manageable    - Current treatments: NORCO   Adherence:     - Do you ever take more pain medicine than prescribed? No    - When did you take your last dose of pain medicine?  This morning   Adverse effects: No   PDMP Review       Value Time User    State PDMP site checked  Yes 10/19/2022  7:25 AM Ilia Moran MD        Last CSA Agreement:   CSA -- Patient Level:     [Media Unavailable] Controlled Substance Agreement - Opioid - Scan on 9/28/2021  9:10 AM: OPIOD/OPIOD PLUS CONTROLLED SUBSTANCE AGREEMENT       Last UDS: 9/29/2021            Review of Systems   Constitutional, HEENT, cardiovascular, pulmonary, gi and gu systems are negative, except as otherwise noted.      Objective    /61 (BP Location: Left arm, Patient Position: Sitting, Cuff Size: Adult Regular)   Pulse 81   Temp 97.1  F (36.2  C) (Tympanic)   Resp 20   Wt 69.4 kg (153 lb)   SpO2 96%   BMI 26.26 kg/m    Body mass index is 26.26 kg/m .  Physical Exam   GENERAL: healthy, alert and no distress  HENT: ear canals and TM's normal, nose and mouth without ulcers or lesions  NECK: no adenopathy, no asymmetry, masses, or scars and thyroid normal to palpation  RESP: lungs clear to auscultation - no rales, rhonchi or wheezes  BREAST: normal without masses, tenderness or nipple discharge and no palpable axillary masses or adenopathy  CV: regular rate and rhythm, normal S1 S2, no S3 or S4, no murmur, click or  rub, no peripheral edema and peripheral pulses strong  ABDOMEN: soft, nontender, no hepatosplenomegaly, no masses and bowel sounds normal  MS: no gross musculoskeletal defects noted, no edema  SKIN: no suspicious lesions or rashes  NEURO: Normal strength and tone, mentation intact and speech normal  PSYCH: mentation appears normal, affect normal/bright    Results for orders placed or performed in visit on 10/19/22   Comprehensive metabolic panel     Status: Abnormal   Result Value Ref Range    Sodium 137 136 - 145 mmol/L    Potassium 5.2 3.4 - 5.3 mmol/L    Chloride 104 98 - 107 mmol/L    Carbon Dioxide (CO2) 25 22 - 29 mmol/L    Anion Gap 8 7 - 15 mmol/L    Urea Nitrogen 39.7 (H) 8.0 - 23.0 mg/dL    Creatinine 1.57 (H) 0.51 - 0.95 mg/dL    Calcium 8.5 (L) 8.8 - 10.2 mg/dL    Glucose 113 (H) 70 - 99 mg/dL    Alkaline Phosphatase 70 35 - 104 U/L    AST 23 10 - 35 U/L    ALT 14 10 - 35 U/L    Protein Total 6.5 6.4 - 8.3 g/dL    Albumin 3.7 3.5 - 5.2 g/dL    Bilirubin Total 0.2 <=1.2 mg/dL    GFR Estimate 32 (L) >60 mL/min/1.73m2   CBC with platelets and differential     Status: Abnormal   Result Value Ref Range    WBC Count 4.3 4.0 - 11.0 10e3/uL    RBC Count 3.66 (L) 3.80 - 5.20 10e6/uL    Hemoglobin 10.8 (L) 11.7 - 15.7 g/dL    Hematocrit 33.4 (L) 35.0 - 47.0 %    MCV 91 78 - 100 fL    MCH 29.5 26.5 - 33.0 pg    MCHC 32.3 31.5 - 36.5 g/dL    RDW 13.0 10.0 - 15.0 %    Platelet Count 148 (L) 150 - 450 10e3/uL    % Neutrophils 55 %    % Lymphocytes 27 %    % Monocytes 14 %    % Eosinophils 3 %    % Basophils 1 %    % Immature Granulocytes 0 %    NRBCs per 100 WBC 0 <1 /100    Absolute Neutrophils 2.3 1.6 - 8.3 10e3/uL    Absolute Lymphocytes 1.2 0.8 - 5.3 10e3/uL    Absolute Monocytes 0.6 0.0 - 1.3 10e3/uL    Absolute Eosinophils 0.1 0.0 - 0.7 10e3/uL    Absolute Basophils 0.0 0.0 - 0.2 10e3/uL    Absolute Immature Granulocytes 0.0 <=0.4 10e3/uL    Absolute NRBCs 0.0 10e3/uL   CBC with Platelets & Differential      Status: Abnormal    Narrative    The following orders were created for panel order CBC with Platelets & Differential.  Procedure                               Abnormality         Status                     ---------                               -----------         ------                     CBC with platelets and d...[[id number]]  Abnormal            Final result                 Please view results for these tests on the individual orders.

## 2022-11-09 ENCOUNTER — ANCILLARY PROCEDURE (OUTPATIENT)
Dept: CARDIOLOGY | Facility: CLINIC | Age: 87
End: 2022-11-09
Attending: INTERNAL MEDICINE
Payer: MEDICARE

## 2022-11-09 DIAGNOSIS — Z95.810 AUTOMATIC IMPLANTABLE CARDIOVERTER-DEFIBRILLATOR IN SITU: ICD-10-CM

## 2022-11-09 LAB
MDC_IDC_EPISODE_DTM: NORMAL
MDC_IDC_EPISODE_DURATION: 4 S
MDC_IDC_EPISODE_DURATION: 5 S
MDC_IDC_EPISODE_DURATION: 8 S
MDC_IDC_EPISODE_ID: 755
MDC_IDC_EPISODE_ID: 756
MDC_IDC_EPISODE_ID: 757
MDC_IDC_EPISODE_TYPE: NORMAL
MDC_IDC_LEAD_IMPLANT_DT: NORMAL
MDC_IDC_LEAD_LOCATION: NORMAL
MDC_IDC_LEAD_LOCATION_DETAIL_1: NORMAL
MDC_IDC_LEAD_MFG: NORMAL
MDC_IDC_LEAD_MODEL: NORMAL
MDC_IDC_LEAD_POLARITY_TYPE: NORMAL
MDC_IDC_LEAD_SERIAL: NORMAL
MDC_IDC_MSMT_BATTERY_DTM: NORMAL
MDC_IDC_MSMT_BATTERY_REMAINING_LONGEVITY: 3 MO
MDC_IDC_MSMT_BATTERY_RRT_TRIGGER: 2.73
MDC_IDC_MSMT_BATTERY_STATUS: NORMAL
MDC_IDC_MSMT_BATTERY_VOLTAGE: 2.77 V
MDC_IDC_MSMT_CAP_CHARGE_DTM: NORMAL
MDC_IDC_MSMT_CAP_CHARGE_ENERGY: 18 J
MDC_IDC_MSMT_CAP_CHARGE_TIME: 5.29
MDC_IDC_MSMT_CAP_CHARGE_TYPE: NORMAL
MDC_IDC_MSMT_LEADCHNL_LV_IMPEDANCE_VALUE: 456 OHM
MDC_IDC_MSMT_LEADCHNL_LV_IMPEDANCE_VALUE: 513 OHM
MDC_IDC_MSMT_LEADCHNL_LV_IMPEDANCE_VALUE: 836 OHM
MDC_IDC_MSMT_LEADCHNL_LV_PACING_THRESHOLD_AMPLITUDE: 0.75 V
MDC_IDC_MSMT_LEADCHNL_LV_PACING_THRESHOLD_PULSEWIDTH: 0.6 MS
MDC_IDC_MSMT_LEADCHNL_RA_IMPEDANCE_VALUE: 437 OHM
MDC_IDC_MSMT_LEADCHNL_RA_PACING_THRESHOLD_AMPLITUDE: 0.6 V
MDC_IDC_MSMT_LEADCHNL_RA_PACING_THRESHOLD_PULSEWIDTH: 0.4 MS
MDC_IDC_MSMT_LEADCHNL_RA_SENSING_INTR_AMPL: 1.5 MV
MDC_IDC_MSMT_LEADCHNL_RV_IMPEDANCE_VALUE: 342 OHM
MDC_IDC_MSMT_LEADCHNL_RV_IMPEDANCE_VALUE: 399 OHM
MDC_IDC_MSMT_LEADCHNL_RV_PACING_THRESHOLD_AMPLITUDE: 0.8 V
MDC_IDC_MSMT_LEADCHNL_RV_PACING_THRESHOLD_PULSEWIDTH: 0.4 MS
MDC_IDC_MSMT_LEADCHNL_RV_SENSING_INTR_AMPL: 4.3 MV
MDC_IDC_PG_IMPLANT_DTM: NORMAL
MDC_IDC_PG_MFG: NORMAL
MDC_IDC_PG_MODEL: NORMAL
MDC_IDC_PG_SERIAL: NORMAL
MDC_IDC_PG_TYPE: NORMAL
MDC_IDC_SESS_CLINIC_NAME: NORMAL
MDC_IDC_SESS_DTM: NORMAL
MDC_IDC_SESS_TYPE: NORMAL
MDC_IDC_SET_BRADY_AT_MODE_SWITCH_RATE: 171 {BEATS}/MIN
MDC_IDC_SET_BRADY_LOWRATE: 60 {BEATS}/MIN
MDC_IDC_SET_BRADY_MAX_SENSOR_RATE: 130 {BEATS}/MIN
MDC_IDC_SET_BRADY_MAX_TRACKING_RATE: 130 {BEATS}/MIN
MDC_IDC_SET_BRADY_MODE: NORMAL
MDC_IDC_SET_BRADY_PAV_DELAY_LOW: 160 MS
MDC_IDC_SET_BRADY_SAV_DELAY_LOW: 110 MS
MDC_IDC_SET_CRT_LVRV_DELAY: 0 MS
MDC_IDC_SET_CRT_PACED_CHAMBERS: NORMAL
MDC_IDC_SET_LEADCHNL_LV_PACING_AMPLITUDE: 1.25 V
MDC_IDC_SET_LEADCHNL_LV_PACING_ANODE_ELECTRODE_1: NORMAL
MDC_IDC_SET_LEADCHNL_LV_PACING_ANODE_LOCATION_1: NORMAL
MDC_IDC_SET_LEADCHNL_LV_PACING_CAPTURE_MODE: NORMAL
MDC_IDC_SET_LEADCHNL_LV_PACING_CATHODE_ELECTRODE_1: NORMAL
MDC_IDC_SET_LEADCHNL_LV_PACING_CATHODE_LOCATION_1: NORMAL
MDC_IDC_SET_LEADCHNL_LV_PACING_POLARITY: NORMAL
MDC_IDC_SET_LEADCHNL_LV_PACING_PULSEWIDTH: 0.6 MS
MDC_IDC_SET_LEADCHNL_RA_PACING_AMPLITUDE: 1.5 V
MDC_IDC_SET_LEADCHNL_RA_PACING_ANODE_ELECTRODE_1: NORMAL
MDC_IDC_SET_LEADCHNL_RA_PACING_ANODE_LOCATION_1: NORMAL
MDC_IDC_SET_LEADCHNL_RA_PACING_CAPTURE_MODE: NORMAL
MDC_IDC_SET_LEADCHNL_RA_PACING_CATHODE_ELECTRODE_1: NORMAL
MDC_IDC_SET_LEADCHNL_RA_PACING_CATHODE_LOCATION_1: NORMAL
MDC_IDC_SET_LEADCHNL_RA_PACING_POLARITY: NORMAL
MDC_IDC_SET_LEADCHNL_RA_PACING_PULSEWIDTH: 0.4 MS
MDC_IDC_SET_LEADCHNL_RA_SENSING_ANODE_ELECTRODE_1: NORMAL
MDC_IDC_SET_LEADCHNL_RA_SENSING_ANODE_LOCATION_1: NORMAL
MDC_IDC_SET_LEADCHNL_RA_SENSING_CATHODE_ELECTRODE_1: NORMAL
MDC_IDC_SET_LEADCHNL_RA_SENSING_CATHODE_LOCATION_1: NORMAL
MDC_IDC_SET_LEADCHNL_RA_SENSING_POLARITY: NORMAL
MDC_IDC_SET_LEADCHNL_RA_SENSING_SENSITIVITY: 0.3 MV
MDC_IDC_SET_LEADCHNL_RV_PACING_AMPLITUDE: 2 V
MDC_IDC_SET_LEADCHNL_RV_PACING_ANODE_ELECTRODE_1: NORMAL
MDC_IDC_SET_LEADCHNL_RV_PACING_ANODE_LOCATION_1: NORMAL
MDC_IDC_SET_LEADCHNL_RV_PACING_CAPTURE_MODE: NORMAL
MDC_IDC_SET_LEADCHNL_RV_PACING_CATHODE_ELECTRODE_1: NORMAL
MDC_IDC_SET_LEADCHNL_RV_PACING_CATHODE_LOCATION_1: NORMAL
MDC_IDC_SET_LEADCHNL_RV_PACING_POLARITY: NORMAL
MDC_IDC_SET_LEADCHNL_RV_PACING_PULSEWIDTH: 0.4 MS
MDC_IDC_SET_LEADCHNL_RV_SENSING_ANODE_ELECTRODE_1: NORMAL
MDC_IDC_SET_LEADCHNL_RV_SENSING_ANODE_LOCATION_1: NORMAL
MDC_IDC_SET_LEADCHNL_RV_SENSING_CATHODE_ELECTRODE_1: NORMAL
MDC_IDC_SET_LEADCHNL_RV_SENSING_CATHODE_LOCATION_1: NORMAL
MDC_IDC_SET_LEADCHNL_RV_SENSING_POLARITY: NORMAL
MDC_IDC_SET_LEADCHNL_RV_SENSING_SENSITIVITY: 0.3 MV
MDC_IDC_SET_ZONE_DETECTION_BEATS_DENOMINATOR: 40 {BEATS}
MDC_IDC_SET_ZONE_DETECTION_BEATS_NUMERATOR: 30 {BEATS}
MDC_IDC_SET_ZONE_DETECTION_INTERVAL: 320 MS
MDC_IDC_SET_ZONE_DETECTION_INTERVAL: 350 MS
MDC_IDC_SET_ZONE_DETECTION_INTERVAL: 360 MS
MDC_IDC_SET_ZONE_DETECTION_INTERVAL: 400 MS
MDC_IDC_SET_ZONE_DETECTION_INTERVAL: NORMAL
MDC_IDC_SET_ZONE_TYPE: NORMAL
MDC_IDC_STAT_AT_BURDEN_PERCENT: 0 %
MDC_IDC_STAT_AT_DTM_END: NORMAL
MDC_IDC_STAT_AT_DTM_START: NORMAL
MDC_IDC_STAT_BRADY_AP_VP_PERCENT: 8.99 %
MDC_IDC_STAT_BRADY_AP_VS_PERCENT: 0.16 %
MDC_IDC_STAT_BRADY_AS_VP_PERCENT: 89.59 %
MDC_IDC_STAT_BRADY_AS_VS_PERCENT: 1.26 %
MDC_IDC_STAT_BRADY_DTM_END: NORMAL
MDC_IDC_STAT_BRADY_DTM_START: NORMAL
MDC_IDC_STAT_BRADY_RA_PERCENT_PACED: 9.14 %
MDC_IDC_STAT_BRADY_RV_PERCENT_PACED: 64.38 %
MDC_IDC_STAT_CRT_DTM_END: NORMAL
MDC_IDC_STAT_CRT_DTM_START: NORMAL
MDC_IDC_STAT_CRT_LV_PERCENT_PACED: 98.36 %
MDC_IDC_STAT_CRT_PERCENT_PACED: 98.36 %
MDC_IDC_STAT_EPISODE_RECENT_COUNT: 0
MDC_IDC_STAT_EPISODE_RECENT_COUNT_DTM_END: NORMAL
MDC_IDC_STAT_EPISODE_RECENT_COUNT_DTM_START: NORMAL
MDC_IDC_STAT_EPISODE_TOTAL_COUNT: 0
MDC_IDC_STAT_EPISODE_TOTAL_COUNT: 1
MDC_IDC_STAT_EPISODE_TOTAL_COUNT: 66
MDC_IDC_STAT_EPISODE_TOTAL_COUNT_DTM_END: NORMAL
MDC_IDC_STAT_EPISODE_TOTAL_COUNT_DTM_START: NORMAL
MDC_IDC_STAT_EPISODE_TYPE: NORMAL
MDC_IDC_STAT_TACHYTHERAPY_ATP_DELIVERED_RECENT: 0
MDC_IDC_STAT_TACHYTHERAPY_ATP_DELIVERED_TOTAL: 0
MDC_IDC_STAT_TACHYTHERAPY_RECENT_DTM_END: NORMAL
MDC_IDC_STAT_TACHYTHERAPY_RECENT_DTM_START: NORMAL
MDC_IDC_STAT_TACHYTHERAPY_SHOCKS_ABORTED_RECENT: 0
MDC_IDC_STAT_TACHYTHERAPY_SHOCKS_ABORTED_TOTAL: 0
MDC_IDC_STAT_TACHYTHERAPY_SHOCKS_DELIVERED_RECENT: 0
MDC_IDC_STAT_TACHYTHERAPY_SHOCKS_DELIVERED_TOTAL: 1
MDC_IDC_STAT_TACHYTHERAPY_TOTAL_DTM_END: NORMAL
MDC_IDC_STAT_TACHYTHERAPY_TOTAL_DTM_START: NORMAL

## 2022-11-09 PROCEDURE — 93296 REM INTERROG EVL PM/IDS: CPT

## 2022-11-09 PROCEDURE — 93295 DEV INTERROG REMOTE 1/2/MLT: CPT | Performed by: INTERNAL MEDICINE

## 2022-11-17 LAB — DRUGS BLD SCN NOM: NORMAL

## 2022-11-29 ENCOUNTER — HOSPITAL ENCOUNTER (EMERGENCY)
Facility: HOSPITAL | Age: 87
Discharge: HOME | End: 2022-11-29
Attending: NURSE PRACTITIONER | Admitting: NURSE PRACTITIONER
Payer: MEDICARE

## 2022-11-29 ENCOUNTER — APPOINTMENT (OUTPATIENT)
Dept: GENERAL RADIOLOGY | Facility: HOSPITAL | Age: 87
End: 2022-11-29
Attending: NURSE PRACTITIONER
Payer: MEDICARE

## 2022-11-29 VITALS
TEMPERATURE: 98 F | DIASTOLIC BLOOD PRESSURE: 72 MMHG | OXYGEN SATURATION: 97 % | HEART RATE: 88 BPM | RESPIRATION RATE: 16 BRPM | SYSTOLIC BLOOD PRESSURE: 125 MMHG

## 2022-11-29 DIAGNOSIS — R22.41 LOCALIZED SWELLING OF RIGHT FOOT: ICD-10-CM

## 2022-11-29 PROCEDURE — 73630 X-RAY EXAM OF FOOT: CPT | Mod: RT

## 2022-11-29 PROCEDURE — G0463 HOSPITAL OUTPT CLINIC VISIT: HCPCS

## 2022-11-29 PROCEDURE — 99213 OFFICE O/P EST LOW 20 MIN: CPT | Performed by: NURSE PRACTITIONER

## 2022-11-29 PROCEDURE — 73610 X-RAY EXAM OF ANKLE: CPT | Mod: RT

## 2022-11-29 ASSESSMENT — ENCOUNTER SYMPTOMS
COLOR CHANGE: 1
VOMITING: 0
NAUSEA: 0
CHILLS: 0
NUMBNESS: 1
ACTIVITY CHANGE: 1
FEVER: 0
SHORTNESS OF BREATH: 1

## 2022-11-29 NOTE — DISCHARGE INSTRUCTIONS
Keep affected extremity elevated as much as possible for next 24 - 48 hours. Ice to affected area 20 minutes every hour as needed for comfort. After 48 hours you can apply heat.  Tylenol 650 to 1000 mg every four to six hours as needed (not to exceed more than 4000 mg in a 24 hour period).  Suggest medicating around the clock for the next 24-48 hours. Use ace wrap until you can walk on your right foot without having discomfort.  Have completed your follow-up appointment. Slowly start to wiggle your toes and move foot as often as possible but not beyond the point of pain. Follow up with primary provider or orthopedics as needed

## 2022-11-29 NOTE — ED TRIAGE NOTES
Pt presents with c/o right little toe pain and pain is going into foot. Daughter states she noticed swelling and bruising today. Pt states that she did not hit it or anything. Pt woke up with it Saturday morning when she woke up. Pt has been taking her hydrocodone

## 2022-11-29 NOTE — ED PROVIDER NOTES
History     Chief Complaint   Patient presents with     Foot Pain     HPI  Jacklyn Hein is a 87 year old female who is accompanied per her daughter.  She presents with pain, bruising, and swelling of her little toe, accompanied with swelling in her right foot and ankle for the past 3 days.  Discomfort is starting to subside.  Has taken her normal pain medications which include hydrocodone in the morning and afternoon, and gabapentin at night.  Denies any injuries.  History of osteoporosis.  History of peripheral neuropathy and chronic numbness and tingling in her legs and feet.  Denies fevers, chills, nausea, and vomiting.    Musculoskeletal problem/pain      Duration: three days    Description  Location: right foot swollen and pain in little toe    Intensity:  1/10 at rest. Is improving. Hurts to walk on. 4/10 with ambulation    Accompanying signs and symptoms: swelling and discoloration of  riught lateral /distal foot    History  Previous similar problem: no   Previous evaluation:  none    Precipitating or alleviating factors:  Trauma or overuse: no  denies  Aggravating factors include: walking    Therapies tried and outcome: sits in recliner. taken normal pain medications no extra     Allergies:  No Known Allergies    Problem List:    Patient Active Problem List    Diagnosis Date Noted     Anticoagulated by anticoagulation treatment 11/22/2021     Priority: Medium     Primary pulmonary hypertension (H) 11/22/2021     Priority: Medium     Age-related osteoporosis with current pathological fracture with routine healing, subsequent encounter 11/22/2021     Priority: Medium     Chronic, continuous use of opioids 05/17/2021     Priority: Medium     Bacteremia due to Klebsiella pneumoniae on 1/5/2021 02/22/2021     Priority: Medium     Mixed hyperlipidemia 02/21/2021     Priority: Medium     Osteoarthritis of thoracolumbar spine 01/06/2021     Priority: Medium     Shock liver 01/06/2021     Priority: Medium      Abnormal finding on urinalysis 01/06/2021     Priority: Medium     Neuropathy 08/19/2020     Priority: Medium     Chronic systolic CHF (congestive heart failure) (H) 02/06/2020     Priority: Medium     Spinal stenosis of lumbar region with neurogenic claudication 02/06/2020     Priority: Medium     S/p ERP: Decompression Levels: L3 to L5 Side: Bilat on 2/6/2020 with Dr. 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 06/03/2019     Priority: Medium     LBBB (left bundle branch block) 06/01/2018     Priority: Medium     History of tobacco abuse quitting on 2/1/1998 06/01/2018     Priority: Medium     Mild intermittent asthma, unspecified whether complicated 06/01/2018     Priority: Medium     Chronic obstructive pulmonary disease, unspecified COPD type (H) 06/01/2018     Priority: Medium     SOB (shortness of breath) 06/01/2018     Priority: Medium     Lumbar spine pain 10/27/2015     Priority: Medium     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 Provider to review       Non-ischemic cardiomyopathy (H) 12/10/2013     Priority: Medium     Congestive heart failure, NYHA class 2, unspecified congestive heart failure type (H) 12/10/2013     Priority: Medium     Insomnia 01/01/2011     Priority: Medium     Problem list name updated by automated process. Provider to review       Disorder of bone and cartilage 01/01/2011     Priority: Medium     Problem list name updated by automated process. Provider to review       Essential hypertension 01/01/2011     Priority: Medium     Problem list name updated by automated process. Provider to review       Neck pain, chronic 01/01/2011     Priority: Medium      Hypercholesteremia 2002     Priority: Medium        Past Medical History:    Past Medical History:   Diagnosis Date     Angina pectoris 2011     CHF (congestive heart failure), NYHA class II (H) 12/10/2013     CHF (congestive heart failure), NYHA class III (H) 12/10/2013     Hyperhydrosis disorder 2011     Insomnia, unspecified 2011     LBBB (left bundle branch block) 2011     Neck pain, chronic 2011     Non-ischemic cardiomyopathy (H) 12/10/2013     Obesity, unspecified 2011     Osteopenia 2011     Pacemaker      Pain in joint, lower leg 2006     Pure hypercholesterolemia 2002     Unspecified essential hypertension 2011       Past Surgical History:    Past Surgical History:   Procedure Laterality Date     APPENDECTOMY       ARTHROSCOPY KNEE RT/LT      RT     BACK SURGERY  2020     BACK SURGERY  2021    fractured vert repair     CARDIAC SURGERY  2014    Pacemaker     Cataract extraction  2009    Bilateral     CHOLECYSTECTOMY      open      COLONOSCOPY  2001    Normal      COLONOSCOPY N/A 10/20/2016    Procedure: COLONOSCOPY;  Surgeon: Charan Montejo MD;  Location: [facility]     colonoscopy with polypectomy      Repeat 3 years      CT CORONARY ANGIOGRAM      normal     HERPES ZOSTER PCR ([facility])       IR CONSULTATION FOR IR EXAM  2020     left eye scar tissue       normal echo      fen-phen use       x6       SURGICAL RADIOLOGY PROCEDURE N/A 2016    Procedure: SURGICAL RADIOLOGY PROCEDURE;  Surgeon: Provider, [facility] Perianesthesia Nursing;  Location: [facility]       Family History:    Family History   Problem Relation Age of Onset     Cancer Mother         lung (cause of death)      Peptic Ulcer Disease Father         gastric        Social History:  Marital Status:   [2]  Social History     Tobacco Use     Smoking status: Former     Packs/day: 1.00     Years: 15.00     Pack years: 15.00     Types:  Cigarettes     Start date: 1983     Quit date: 1998     Years since quittin.8     Smokeless tobacco: Never     Tobacco comments:     Passive Exposure: No; Longest Tobacco Free: 15 years    Vaping Use     Vaping Use: Never used   Substance Use Topics     Alcohol use: Not Currently     Comment: Occasionally      Drug use: No        Medications:    budesonide-formoterol (SYMBICORT) 160-4.5 MCG/ACT Inhaler  Calcium Carbonate-Vitamin D (CALCIUM 600 + D OR)  carvedilol (COREG) 12.5 MG tablet  ELIQUIS ANTICOAGULANT 5 MG tablet  ENTRESTO 49-51 MG per tablet  gabapentin (NEURONTIN) 400 MG capsule  HYDROcodone-acetaminophen (NORCO) 5-325 MG tablet  Multiple Vitamin (MULTI-VITAMIN DAILY PO)  rosuvastatin (CRESTOR) 10 MG tablet  SPIRIVA RESPIMAT 2.5 MCG/ACT inhaler  spironolactone (ALDACTONE) 25 MG tablet  VENTOLIN  (90 Base) MCG/ACT inhaler          Review of Systems   Constitutional: Positive for activity change. Negative for chills and fever.   Respiratory: Positive for shortness of breath (chronic not worse than normal).    Gastrointestinal: Negative for nausea and vomiting.   Musculoskeletal:        Right foot and ankle swelling.  Questionable ecchymosis versus venous stasis   Skin: Positive for color change.   Neurological: Positive for numbness (tingling . chronic. not worse than normal).       Physical Exam   BP: 125/72  Pulse: 88  Temp: 98  F (36.7  C)  Resp: 16  SpO2: 97 %      Physical Exam  Vitals reviewed.   Constitutional:       General: She is not in acute distress.     Appearance: She is normal weight.   Cardiovascular:      Rate and Rhythm: Normal rate.      Pulses:           Dorsalis pedis pulses are 3+ on the right side.        Posterior tibial pulses are 3+ on the right side.   Pulmonary:      Effort: Pulmonary effort is normal.   Musculoskeletal:         General: Swelling and tenderness present.        Feet:       Comments: Right lateral malleolus/lateral foot and little toe   Skin:      General: Skin is warm and dry.      Findings: Bruising (Right little toe) present. No erythema.   Neurological:      Mental Status: She is alert and oriented to person, place, and time.   Psychiatric:         Behavior: Behavior normal.         ED Course                 Procedures           Results for orders placed or performed during the hospital encounter of 11/29/22 (from the past 24 hour(s))   Ankle XR, G/E 3 views, right    Narrative    PROCEDURE:  XR ANKLE RIGHT G/E 3 VIEWS    HISTORY: pain and swelling over lateral malleolus and  anterior/distal  lower leg    COMPARISON:  None.    TECHNIQUE:  Three-view right ankle    FINDINGS:  No fracture or dislocation is identified. The joint spaces  are preserved. No foreign body is seen. . Mild cortical and  subcortical degenerative changes along the medial malleolus. Mild soft  tissue swelling over the lateral malleolus       Impression    IMPRESSION:   No acute osseous abnormality.    DAILY BARAJAS MD         SYSTEM ID:  [id number]   Foot  XR, G/E 3 views, right    Narrative    PROCEDURE:  XR FOOT RIGHT G/E 3 VIEWS    HISTORY: pain and bruising in little toe extends to mid/lateral foot.  moderate foot swelling. hx osteoporosis.    COMPARISON:  None.    TECHNIQUE:  3 view right foot    FINDINGS:  No fracture or dislocation is identified. The joint spaces  are preserved. No foreign body is seen. Mild cortical and subcortical  degenerative changes along the medial malleolus. Mild plantar  calcaneal enthesopathy Mild soft tissue swelling over the lateral  malleolus. Mild general osteopenia.      Impression    IMPRESSION:   No acute osseous abnormality.    DAILY BARAJAS MD         SYSTEM ID:  [id number]       Medications - No data to display    Assessments & Plan (with Medical Decision Making)     I have reviewed the nursing notes.    I have reviewed the findings, diagnosis, plan and need for follow up with the patient.  (R22.41) Localized swelling of right foot  Comment:   87 year old female who is accompanied per her daughter.  She presents with pain, bruising, and swelling of her little toe, accompanied with swelling in her right foot and ankle for the past 3 days.  Discomfort is starting to subside.  Has taken her normal pain medications which include hydrocodone in the morning and afternoon, and gabapentin at night.  Denies any injuries.  History of osteoporosis.  History of peripheral neuropathy and chronic numbness and tingling in her legs and feet.  Denies fevers, chills, nausea, and vomiting.    MDM: Mild swelling in lateral foot and moderate swelling over lateral malleolus.  Right little toe is swollen and ecchymotic.  Questionable if this ecchymosis extends into the lateral foot versus venous stasis.  Pedal pulses 3+    Right foot and ankle x-ray reviewed and per radiology; no acute osseous abnormality    Ace wrap applied to right foot and ankle per LPN.    Plan: Keep affected extremity elevated as much as possible for next 24 - 48 hours. Ice to affected area 20 minutes every hour as needed for comfort. After 48 hours you can apply heat.  Tylenol 650 to 1000 mg every four to six hours as needed (not to exceed more than 4000 mg in a 24 hour period).  Suggest medicating around the clock for the next 24-48 hours. Use ace wrap until you can walk on your right foot without having discomfort.  Have completed your follow-up appointment. Slowly start to wiggle your toes and move foot as often as possible but not beyond the point of pain. Follow up with primary provider or orthopedics as needed  These discharge instructions and medications were reviewed with her and her daughter and understanding verbalized.    This document was prepared using a combination of typing and voice generated software.  While every attempt was made for accuracy, spelling and grammatical errors may exist.    Discharge Medication List as of 11/29/2022  4:57 PM          Final diagnoses:   Localized swelling  of right foot       11/29/2022   [facility]       Chela Koenig, CNP  11/29/22 2052

## 2022-12-01 DIAGNOSIS — G62.9 NEUROPATHY: ICD-10-CM

## 2022-12-01 DIAGNOSIS — M48.062 SPINAL STENOSIS OF LUMBAR REGION WITH NEUROGENIC CLAUDICATION: Primary | ICD-10-CM

## 2022-12-02 RX ORDER — GABAPENTIN 400 MG/1
CAPSULE ORAL
Qty: 90 CAPSULE | Refills: 0 | Status: SHIPPED | OUTPATIENT
Start: 2022-12-02 | End: 2023-01-13

## 2022-12-02 NOTE — TELEPHONE ENCOUNTER
gabapentin      Last Written Prescription Date:  10/18/2022  Last Fill Quantity: NA,   # refills: NA  Last Office Visit: 10/19/2022  Future Office visit:       Routing refill request to provider for review/approval because:

## 2022-12-09 DIAGNOSIS — G62.9 NEUROPATHY: ICD-10-CM

## 2022-12-09 DIAGNOSIS — M47.815 SPONDYLOSIS OF THORACOLUMBAR REGION WITHOUT MYELOPATHY OR RADICULOPATHY: ICD-10-CM

## 2022-12-09 DIAGNOSIS — M48.062 SPINAL STENOSIS OF LUMBAR REGION WITH NEUROGENIC CLAUDICATION: ICD-10-CM

## 2022-12-09 RX ORDER — HYDROCODONE BITARTRATE AND ACETAMINOPHEN 5; 325 MG/1; MG/1
TABLET ORAL
Qty: 60 TABLET | Refills: 0 | Status: SHIPPED | OUTPATIENT
Start: 2022-12-09 | End: 2023-01-13

## 2022-12-09 NOTE — TELEPHONE ENCOUNTER
NORCO      Last Written Prescription Date:  11/11/2022  Last Fill Quantity: 60,   # refills: 0  Last Office Visit: 10/19/2022  Future Office visit:       Routing refill request to provider for review/approval because:

## 2023-01-01 ENCOUNTER — HOSPITAL ENCOUNTER (EMERGENCY)
Facility: HOSPITAL | Age: 88
Discharge: HOME | End: 2023-01-01
Attending: EMERGENCY MEDICINE | Admitting: EMERGENCY MEDICINE
Payer: MEDICARE

## 2023-01-01 ENCOUNTER — APPOINTMENT (OUTPATIENT)
Dept: GENERAL RADIOLOGY | Facility: HOSPITAL | Age: 88
End: 2023-01-01
Attending: INTERNAL MEDICINE
Payer: MEDICARE

## 2023-01-01 VITALS
HEART RATE: 77 BPM | TEMPERATURE: 99.5 F | DIASTOLIC BLOOD PRESSURE: 60 MMHG | OXYGEN SATURATION: 99 % | RESPIRATION RATE: 16 BRPM | SYSTOLIC BLOOD PRESSURE: 96 MMHG

## 2023-01-01 DIAGNOSIS — N30.00 ACUTE CYSTITIS WITHOUT HEMATURIA: ICD-10-CM

## 2023-01-01 LAB
ALBUMIN SERPL BCG-MCNC: 3.5 G/DL (ref 3.5–5.2)
ALBUMIN UR-MCNC: 20 MG/DL
ALP SERPL-CCNC: 50 U/L (ref 35–104)
ALT SERPL W P-5'-P-CCNC: 10 U/L (ref 10–35)
AMORPH CRY #/AREA URNS HPF: ABNORMAL /HPF
ANION GAP SERPL CALCULATED.3IONS-SCNC: 12 MMOL/L (ref 7–15)
APPEARANCE UR: ABNORMAL
AST SERPL W P-5'-P-CCNC: 21 U/L (ref 10–35)
BACTERIA #/AREA URNS HPF: ABNORMAL /HPF
BASOPHILS # BLD AUTO: 0 10E3/UL (ref 0–0.2)
BASOPHILS NFR BLD AUTO: 0 %
BILIRUB SERPL-MCNC: 0.4 MG/DL
BILIRUB UR QL STRIP: NEGATIVE
BUN SERPL-MCNC: 31.2 MG/DL (ref 8–23)
CALCIUM SERPL-MCNC: 8.9 MG/DL (ref 8.8–10.2)
CHLORIDE SERPL-SCNC: 101 MMOL/L (ref 98–107)
COLOR UR AUTO: YELLOW
CREAT SERPL-MCNC: 1.46 MG/DL (ref 0.51–0.95)
CRP SERPL-MCNC: 81.87 MG/L
DEPRECATED HCO3 PLAS-SCNC: 20 MMOL/L (ref 22–29)
EOSINOPHIL # BLD AUTO: 0 10E3/UL (ref 0–0.7)
EOSINOPHIL NFR BLD AUTO: 0 %
ERYTHROCYTE [DISTWIDTH] IN BLOOD BY AUTOMATED COUNT: 13.2 % (ref 10–15)
FLUAV RNA SPEC QL NAA+PROBE: NEGATIVE
FLUBV RNA RESP QL NAA+PROBE: NEGATIVE
GFR SERPL CREATININE-BSD FRML MDRD: 35 ML/MIN/1.73M2
GLUCOSE SERPL-MCNC: 99 MG/DL (ref 70–99)
GLUCOSE UR STRIP-MCNC: NEGATIVE MG/DL
HCT VFR BLD AUTO: 31 % (ref 35–47)
HGB BLD-MCNC: 10.1 G/DL (ref 11.7–15.7)
HGB UR QL STRIP: ABNORMAL
HOLD SPECIMEN: NORMAL
HOLD SPECIMEN: NORMAL
IMM GRANULOCYTES # BLD: 0 10E3/UL
IMM GRANULOCYTES NFR BLD: 0 %
KETONES UR STRIP-MCNC: NEGATIVE MG/DL
LACTATE SERPL-SCNC: 1 MMOL/L (ref 0.7–2)
LEUKOCYTE ESTERASE UR QL STRIP: ABNORMAL
LYMPHOCYTES # BLD AUTO: 1.2 10E3/UL (ref 0.8–5.3)
LYMPHOCYTES NFR BLD AUTO: 17 %
MCH RBC QN AUTO: 29.7 PG (ref 26.5–33)
MCHC RBC AUTO-ENTMCNC: 32.6 G/DL (ref 31.5–36.5)
MCV RBC AUTO: 91 FL (ref 78–100)
MONOCYTES # BLD AUTO: 0.8 10E3/UL (ref 0–1.3)
MONOCYTES NFR BLD AUTO: 12 %
MUCOUS THREADS #/AREA URNS LPF: PRESENT /LPF
NEUTROPHILS # BLD AUTO: 4.9 10E3/UL (ref 1.6–8.3)
NEUTROPHILS NFR BLD AUTO: 71 %
NITRATE UR QL: NEGATIVE
NRBC # BLD AUTO: 0 10E3/UL
NRBC BLD AUTO-RTO: 0 /100
PH UR STRIP: 5.5 [PH] (ref 4.7–8)
PLATELET # BLD AUTO: 131 10E3/UL (ref 150–450)
POTASSIUM SERPL-SCNC: 4.8 MMOL/L (ref 3.4–5.3)
PROCALCITONIN SERPL IA-MCNC: 0.85 NG/ML
PROT SERPL-MCNC: 6.3 G/DL (ref 6.4–8.3)
RBC # BLD AUTO: 3.4 10E6/UL (ref 3.8–5.2)
RBC URINE: 12 /HPF
RENAL TUB EPI: 4 /HPF
RSV RNA SPEC NAA+PROBE: NEGATIVE
SARS-COV-2 RNA RESP QL NAA+PROBE: NEGATIVE
SODIUM SERPL-SCNC: 133 MMOL/L (ref 136–145)
SP GR UR STRIP: 1.01 (ref 1–1.03)
SQUAMOUS EPITHELIAL: 1 /HPF
UROBILINOGEN UR STRIP-MCNC: NORMAL MG/DL
WBC # BLD AUTO: 7 10E3/UL (ref 4–11)
WBC CLUMPS #/AREA URNS HPF: PRESENT /HPF
WBC URINE: >182 /HPF

## 2023-01-01 PROCEDURE — 36415 COLL VENOUS BLD VENIPUNCTURE: CPT | Performed by: INTERNAL MEDICINE

## 2023-01-01 PROCEDURE — 87040 BLOOD CULTURE FOR BACTERIA: CPT | Performed by: INTERNAL MEDICINE

## 2023-01-01 PROCEDURE — 93010 ELECTROCARDIOGRAM REPORT: CPT | Performed by: INTERNAL MEDICINE

## 2023-01-01 PROCEDURE — 86140 C-REACTIVE PROTEIN: CPT | Performed by: INTERNAL MEDICINE

## 2023-01-01 PROCEDURE — 85025 COMPLETE CBC W/AUTO DIFF WBC: CPT | Performed by: INTERNAL MEDICINE

## 2023-01-01 PROCEDURE — 87088 URINE BACTERIA CULTURE: CPT | Performed by: INTERNAL MEDICINE

## 2023-01-01 PROCEDURE — 99284 EMERGENCY DEPT VISIT MOD MDM: CPT | Performed by: EMERGENCY MEDICINE

## 2023-01-01 PROCEDURE — 83605 ASSAY OF LACTIC ACID: CPT | Performed by: INTERNAL MEDICINE

## 2023-01-01 PROCEDURE — 81001 URINALYSIS AUTO W/SCOPE: CPT | Performed by: INTERNAL MEDICINE

## 2023-01-01 PROCEDURE — 71045 X-RAY EXAM CHEST 1 VIEW: CPT

## 2023-01-01 PROCEDURE — 80053 COMPREHEN METABOLIC PANEL: CPT | Performed by: INTERNAL MEDICINE

## 2023-01-01 PROCEDURE — 258N000003 HC RX IP 258 OP 636: Performed by: EMERGENCY MEDICINE

## 2023-01-01 PROCEDURE — 96365 THER/PROPH/DIAG IV INF INIT: CPT

## 2023-01-01 PROCEDURE — 93005 ELECTROCARDIOGRAM TRACING: CPT

## 2023-01-01 PROCEDURE — 87040 BLOOD CULTURE FOR BACTERIA: CPT | Performed by: EMERGENCY MEDICINE

## 2023-01-01 PROCEDURE — 99285 EMERGENCY DEPT VISIT HI MDM: CPT | Mod: 25

## 2023-01-01 PROCEDURE — 96361 HYDRATE IV INFUSION ADD-ON: CPT

## 2023-01-01 PROCEDURE — 87637 SARSCOV2&INF A&B&RSV AMP PRB: CPT | Performed by: EMERGENCY MEDICINE

## 2023-01-01 PROCEDURE — C9803 HOPD COVID-19 SPEC COLLECT: HCPCS

## 2023-01-01 PROCEDURE — 84145 PROCALCITONIN (PCT): CPT | Performed by: INTERNAL MEDICINE

## 2023-01-01 PROCEDURE — 250N000011 HC RX IP 250 OP 636: Performed by: EMERGENCY MEDICINE

## 2023-01-01 RX ORDER — CEFTRIAXONE SODIUM 1 G/50ML
1 INJECTION, SOLUTION INTRAVENOUS ONCE
Status: COMPLETED | OUTPATIENT
Start: 2023-01-01 | End: 2023-01-01

## 2023-01-01 RX ORDER — NITROFURANTOIN 25; 75 MG/1; MG/1
100 CAPSULE ORAL 2 TIMES DAILY
Qty: 14 CAPSULE | Refills: 0 | Status: SHIPPED | OUTPATIENT
Start: 2023-01-01 | End: 2023-01-10

## 2023-01-01 RX ORDER — SODIUM CHLORIDE 9 MG/ML
INJECTION, SOLUTION INTRAVENOUS CONTINUOUS
Status: DISCONTINUED | OUTPATIENT
Start: 2023-01-01 | End: 2023-01-01 | Stop reason: HOSPADM

## 2023-01-01 RX ADMIN — SODIUM CHLORIDE 1000 ML: 9 INJECTION, SOLUTION INTRAVENOUS at 07:34

## 2023-01-01 RX ADMIN — CEFTRIAXONE SODIUM 1 G: 1 INJECTION, SOLUTION INTRAVENOUS at 10:00

## 2023-01-01 ASSESSMENT — ENCOUNTER SYMPTOMS
CARDIOVASCULAR NEGATIVE: 1
RESPIRATORY NEGATIVE: 1
ENDOCRINE NEGATIVE: 1
WEAKNESS: 1
EYES NEGATIVE: 1
GASTROINTESTINAL NEGATIVE: 1
FATIGUE: 1
MUSCULOSKELETAL NEGATIVE: 1

## 2023-01-01 ASSESSMENT — ACTIVITIES OF DAILY LIVING (ADL)
ADLS_ACUITY_SCORE: 35
ADLS_ACUITY_SCORE: 35

## 2023-01-01 NOTE — ED PROVIDER NOTES
"  History     Chief Complaint   Patient presents with     Generalized Weakness     Not feeling well the last few days     HPI  Jacklyn Hein is a 87 year old female who comes to the emergency department complaining of generalized weakness.  Patient is a nursing home resident.  She states for the past 2 days she has felt very \"weak\".  She denies pain.  She states she does not have a headache.  She denies pain in her chest does not feel short of breath.  She has not had fevers or chills.  She denies a sore throat.  She has not had a cough.  She denies nausea or vomiting.  She states she \"did have a little diarrhea\" this morning\".  She denies abdominal pain.  She has not had hematuria or dysuria.  She denies fevers or shaking chills.    Allergies:  No Known Allergies    Problem List:    Patient Active Problem List    Diagnosis Date Noted     Anticoagulated by anticoagulation treatment 11/22/2021     Priority: Medium     Primary pulmonary hypertension (H) 11/22/2021     Priority: Medium     Age-related osteoporosis with current pathological fracture with routine healing, subsequent encounter 11/22/2021     Priority: Medium     Chronic, continuous use of opioids 05/17/2021     Priority: Medium     Bacteremia due to Klebsiella pneumoniae on 1/5/2021 02/22/2021     Priority: Medium     Mixed hyperlipidemia 02/21/2021     Priority: Medium     Osteoarthritis of thoracolumbar spine 01/06/2021     Priority: Medium     Shock liver 01/06/2021     Priority: Medium     Abnormal finding on urinalysis 01/06/2021     Priority: Medium     Neuropathy 08/19/2020     Priority: Medium     Chronic systolic CHF (congestive heart failure) (H) 02/06/2020     Priority: Medium     Spinal stenosis of lumbar region with neurogenic claudication 02/06/2020     Priority: Medium     S/p ERP: Decompression Levels: L3 to L5 Side: Bilat on 2/6/2020 with Dr. 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 06/03/2019     Priority: Medium     LBBB (left bundle branch block) 06/01/2018     Priority: Medium     History of tobacco abuse quitting on 2/1/1998 06/01/2018     Priority: Medium     Mild intermittent asthma, unspecified whether complicated 06/01/2018     Priority: Medium     Chronic obstructive pulmonary disease, unspecified COPD type (H) 06/01/2018     Priority: Medium     SOB (shortness of breath) 06/01/2018     Priority: Medium     Lumbar spine pain 10/27/2015     Priority: Medium     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 Provider to review       Non-ischemic cardiomyopathy (H) 12/10/2013     Priority: Medium     Congestive heart failure, NYHA class 2, unspecified congestive heart failure type (H) 12/10/2013     Priority: Medium     Insomnia 01/01/2011     Priority: Medium     Problem list name updated by automated process. Provider to review       Disorder of bone and cartilage 01/01/2011     Priority: Medium     Problem list name updated by automated process. Provider to review       Essential hypertension 01/01/2011     Priority: Medium     Problem list name updated by automated process. Provider to review       Neck pain, chronic 01/01/2011     Priority: Medium     Hypercholesteremia 06/07/2002     Priority: Medium        Past Medical History:    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     Insomnia, unspecified 01/01/2011     LBBB (left bundle branch block) 01/01/2011     Neck pain, chronic 01/01/2011     Non-ischemic cardiomyopathy  (H) 12/10/2013     Obesity, unspecified 2011     Osteopenia 2011     Pacemaker      Pain in joint, lower leg 2006     Pure hypercholesterolemia 2002     Unspecified essential hypertension 2011       Past Surgical History:    Past Surgical History:   Procedure Laterality Date     APPENDECTOMY       ARTHROSCOPY KNEE RT/LT      RT     BACK SURGERY  2020     BACK SURGERY  2021    fractured vert repair     CARDIAC SURGERY  2014    Pacemaker     Cataract extraction  2009    Bilateral     CHOLECYSTECTOMY      open      COLONOSCOPY  2001    Normal      COLONOSCOPY N/A 10/20/2016    Procedure: COLONOSCOPY;  Surgeon: Charan Montejo MD;  Location: [facility]     colonoscopy with polypectomy      Repeat 3 years      CT CORONARY ANGIOGRAM      normal     HERPES ZOSTER PCR ([employer])       IR CONSULTATION FOR IR EXAM  2020     left eye scar tissue       normal echo      fen-phen use       x6       SURGICAL RADIOLOGY PROCEDURE N/A 2016    Procedure: SURGICAL RADIOLOGY PROCEDURE;  Surgeon: Provider, [facility];  Location: [facility]       Family History:    Family History   Problem Relation Age of Onset     Cancer Mother         lung (cause of death)      Peptic Ulcer Disease Father         gastric        Social History:  Marital Status:   [2]  Social History     Tobacco Use     Smoking status: Former     Packs/day: 1.00     Years: 15.00     Pack years: 15.00     Types: Cigarettes     Start date: 1983     Quit date: 1998     Years since quittin.9     Smokeless tobacco: Never     Tobacco comments:     Passive Exposure: No; Longest Tobacco Free: 15 years    Vaping Use     Vaping Use: Never used   Substance Use Topics     Alcohol use: Not Currently     Comment: Occasionally      Drug use: No        Medications:    budesonide-formoterol (SYMBICORT) 160-4.5 MCG/ACT Inhaler  Calcium Carbonate-Vitamin D (CALCIUM 600 + D  OR)  carvedilol (COREG) 12.5 MG tablet  ELIQUIS ANTICOAGULANT 5 MG tablet  ENTRESTO 49-51 MG per tablet  gabapentin (NEURONTIN) 400 MG capsule  HYDROcodone-acetaminophen (NORCO) 5-325 MG tablet  Multiple Vitamin (MULTI-VITAMIN DAILY PO)  nitroFURantoin macrocrystal-monohydrate (MACROBID) 100 MG capsule  rosuvastatin (CRESTOR) 10 MG tablet  SPIRIVA RESPIMAT 2.5 MCG/ACT inhaler  spironolactone (ALDACTONE) 25 MG tablet          Review of Systems   Constitutional: Positive for fatigue.   HENT: Negative.    Eyes: Negative.    Respiratory: Negative.    Cardiovascular: Negative.    Gastrointestinal: Negative.    Endocrine: Negative.    Genitourinary: Negative.    Musculoskeletal: Negative.    Neurological: Positive for weakness.   all other systems reviewed and found unremarkable    Physical Exam   BP: (!) 118/46  Pulse: 78  Temp: 99.5  F (37.5  C)  Resp: (!) 28  SpO2: 94 %      Physical Exam 87-year-old female who is awake alert oriented person place and time.  She is very pleasant and cooperative with my exam.  HEENT normocephalic extraocular muscles intact pupils equally round and reactive to light and oropharynx is clear.  Neck is supple his range of motion without pain no evidence of nuchal irritation.  Lungs are clear bilaterally.  Heart maintains a regular rate and rhythm.  S1 and S2 sounds are appreciated.  Abdomen is soft and nontender.  No mass no organomegaly no rebound.  Extremities a full range of motion 5/5 strength.  Brisk peripheral pulses brisk capillary refill no sensory deficit is noted.  Neurologic exam there is no focal cranial nerve deficit noted.  Dermatologic exam there are no diffuse skin rashes or lesions noted.    ED Course              ED Course as of 01/01/23 1016   Sun Jan 01, 2023   0932 Procalcitonin(!): 0.85   1011 Patient remained very stable throughout her stay in the department.  She was hydrated with IV fluid and given a gram of Rocephin intravenously.  We will start her on a course of  Macrodantin.  I will advise patient be rechecked by her primary care provider as soon as possible this coming week and I will also urged that she be return to the emergency department if her symptoms are not improving or if they seem to be getting worse.   1015 Procalcitonin(!): 0.85              {EKG done and interpreted by myself.  It shows a paced rhythm.  Ventricular rate is 76 bpm.  OH intervals 190 ms.  Corrected QT is 434 ms.  There is no ST segment elevation or depression.  There is no inappropriate T wave inversion.  There does not appear to be evidence of acute ischemic change.             Results for orders placed or performed during the hospital encounter of 01/01/23 (from the past 24 hour(s))   Asymptomatic Influenza A/B & SARS-CoV2 (COVID-19) Virus PCR Multiplex Nasopharyngeal    Specimen: Nasopharyngeal; Swab   Result Value Ref Range    Influenza A PCR Negative Negative    Influenza B PCR Negative Negative    RSV PCR Negative Negative    SARS CoV2 PCR Negative Negative    Narrative    Testing was performed using the Xpert Xpress CoV2/Flu/RSV Assay on the [employer] GeneXpert Instrument. This test should be ordered for the detection of SARS-CoV-2 and influenza viruses in individuals who meet clinical and/or epidemiological criteria. Test performance is unknown in asymptomatic patients. This test is for in vitro diagnostic use under the FDA EUA for laboratories certified under CLIA to perform high or moderate complexity testing. This test has not been FDA cleared or approved. A negative result does not rule out the presence of PCR inhibitors in the specimen or target RNA in concentration below the limit of detection for the assay. If only one viral target is positive but coinfection with multiple targets is suspected, the sample should be re-tested with another FDA cleared, approved, or authorized test, if coinfection would change clinical management. This test was validated by the [facility]  Laboratories. These laboratories are certified under the Clinical Laboratory Improvement Amendments of 1988 (CLIA-88) as qualified to perform high complexity laboratory testing.   Comprehensive metabolic panel   Result Value Ref Range    Sodium 133 (L) 136 - 145 mmol/L    Potassium 4.8 3.4 - 5.3 mmol/L    Chloride 101 98 - 107 mmol/L    Carbon Dioxide (CO2) 20 (L) 22 - 29 mmol/L    Anion Gap 12 7 - 15 mmol/L    Urea Nitrogen 31.2 (H) 8.0 - 23.0 mg/dL    Creatinine 1.46 (H) 0.51 - 0.95 mg/dL    Calcium 8.9 8.8 - 10.2 mg/dL    Glucose 99 70 - 99 mg/dL    Alkaline Phosphatase 50 35 - 104 U/L    AST 21 10 - 35 U/L    ALT 10 10 - 35 U/L    Protein Total 6.3 (L) 6.4 - 8.3 g/dL    Albumin 3.5 3.5 - 5.2 g/dL    Bilirubin Total 0.4 <=1.2 mg/dL    GFR Estimate 35 (L) >60 mL/min/1.73m2   CBC with Platelets & Differential    Narrative    The following orders were created for panel order CBC with Platelets & Differential.  Procedure                               Abnormality         Status                     ---------                               -----------         ------                     CBC with platelets and d...[[id number]]  Abnormal            Final result                 Please view results for these tests on the individual orders.   CRP inflammation   Result Value Ref Range    CRP Inflammation 81.87 (H) <5.00 mg/L   Lactic acid whole blood   Result Value Ref Range    Lactic Acid 1.0 0.7 - 2.0 mmol/L   Procalcitonin   Result Value Ref Range    Procalcitonin 0.85 (H) <0.05 ng/mL   [place] Draw    Narrative    The following orders were created for panel order [place] Draw.  Procedure                               Abnormality         Status                     ---------                               -----------         ------                     Extra Blue Top Tube[[id number]]                              Final result               Extra Red Top Tube[[id number]]                               Final result                  Please view results for these tests on the individual orders.   CBC with platelets and differential   Result Value Ref Range    WBC Count 7.0 4.0 - 11.0 10e3/uL    RBC Count 3.40 (L) 3.80 - 5.20 10e6/uL    Hemoglobin 10.1 (L) 11.7 - 15.7 g/dL    Hematocrit 31.0 (L) 35.0 - 47.0 %    MCV 91 78 - 100 fL    MCH 29.7 26.5 - 33.0 pg    MCHC 32.6 31.5 - 36.5 g/dL    RDW 13.2 10.0 - 15.0 %    Platelet Count 131 (L) 150 - 450 10e3/uL    % Neutrophils 71 %    % Lymphocytes 17 %    % Monocytes 12 %    % Eosinophils 0 %    % Basophils 0 %    % Immature Granulocytes 0 %    NRBCs per 100 WBC 0 <1 /100    Absolute Neutrophils 4.9 1.6 - 8.3 10e3/uL    Absolute Lymphocytes 1.2 0.8 - 5.3 10e3/uL    Absolute Monocytes 0.8 0.0 - 1.3 10e3/uL    Absolute Eosinophils 0.0 0.0 - 0.7 10e3/uL    Absolute Basophils 0.0 0.0 - 0.2 10e3/uL    Absolute Immature Granulocytes 0.0 <=0.4 10e3/uL    Absolute NRBCs 0.0 10e3/uL   Extra Blue Top Tube   Result Value Ref Range    Hold Specimen JIC    Extra Red Top Tube   Result Value Ref Range    Hold Specimen JIC    XR Chest Port 1 View    Narrative    PROCEDURE:  XR CHEST PORT 1 VIEW    HISTORY:  fever.     COMPARISON:  5/11/2021    FINDINGS:   The cardiac silhouette is normal in size. There is a transvenous  pacemaker in place. The pulmonary vasculature is normal.  The lungs  are clear. No pleural effusion or pneumothorax.      Impression    IMPRESSION:  No acute cardiopulmonary disease.      MARII GARCIA MD         SYSTEM ID:  [id number]   UA reflex to Microscopic and Culture    Specimen: Urine, Midstream   Result Value Ref Range    Color Urine Yellow Colorless, Straw, Light Yellow, Yellow    Appearance Urine Cloudy (A) Clear    Glucose Urine Negative Negative mg/dL    Bilirubin Urine Negative Negative    Ketones Urine Negative Negative mg/dL    Specific Gravity Urine 1.013 1.003 - 1.035    Blood Urine Moderate (A) Negative    pH Urine 5.5 4.7 - 8.0    Protein Albumin Urine 20 (A) Negative mg/dL     Urobilinogen Urine Normal Normal, 2.0 mg/dL    Nitrite Urine Negative Negative    Leukocyte Esterase Urine Large (A) Negative    Bacteria Urine Moderate (A) None Seen /HPF    WBC Clumps Urine Present (A) None Seen /HPF    Mucus Urine Present (A) None Seen /LPF    Amorphous Crystals Urine Moderate (A) None Seen /HPF    RBC Urine 12 (H) <=2 /HPF    WBC Urine >182 (H) <=5 /HPF    Squamous Epithelials Urine 1 <=1 /HPF    Renal Tubular Epithelials Urine 4 (H) None Seen /HPF    Narrative    Urine Culture ordered based on laboratory criteria       Medications   sodium chloride 0.9% infusion (1,000 mLs Intravenous New Bag 1/1/23 0734)   cefTRIAXone in d5w (ROCEPHIN) intermittent infusion 1 g (1 g Intravenous New Bag 1/1/23 1000)       Assessments & Plan (with Medical Decision Making)     I have reviewed the nursing notes.    I have reviewed the findings, diagnosis, plan and need for follow up with the patient.                New Prescriptions    NITROFURANTOIN MACROCRYSTAL-MONOHYDRATE (MACROBID) 100 MG CAPSULE    Take 1 capsule (100 mg) by mouth 2 times daily       Final diagnoses:   Acute cystitis without hematuria       1/1/2023   [facility]     Edmundo Hunt,   01/01/23 1016

## 2023-01-01 NOTE — ED NOTES
Face to face report given with opportunity to observe patient.    Report given to DELANEY Paz RN   1/1/2023  7:03 AM

## 2023-01-01 NOTE — ED NOTES
"Patient arrives via [facility] from home. Patient states that she woke up yesterday not feeling well. Patient is vague with symptoms and feeling generally unwell. Patient does report \"some\" diarrhea. Patient denies pain.   "

## 2023-01-01 NOTE — ED NOTES
Discharge instructions reviewed and sent with pt. Verbalized understanding.  Provided wheelchair to waiting car.

## 2023-01-03 ENCOUNTER — TELEPHONE (OUTPATIENT)
Dept: FAMILY MEDICINE | Facility: OTHER | Age: 88
End: 2023-01-03

## 2023-01-03 NOTE — TELEPHONE ENCOUNTER
Emergency Department and Urgent Care Follow-up      Reason for ER/UC visit: UTI, weakness  o Date seen: 1/1/23      New or Worsening symptoms:  No fever, but does not feel good       Prescription Received/Picked up from Pharmacy?: Macrobid   o Medications started? yes  o Any questions or issues regarding your prescription?: causing diarrhea      Follow-up Results or Labs that are pending: no       Questions or concerns?: no       ER Recommends Follow-up by: Follow up with PCP in two days      RN Recommendations: follow up with PCP or covering provider  Appointment scheduled:   Next 5 appointments (look out 90 days)    Jan 04, 2023  8:45 AM  (Arrive by 8:30 AM)  SHORT with Ilia Moran MD  [facility] [place] ([facility] [place] ) [address]  [place] [address] [phone]      o   o     If you start feeling worse, or have any further questions, please feel free to contact Nurse Triage at [phone].  If needing immediate medical attention at any time please call 911/Go to the ER.

## 2023-01-04 ENCOUNTER — OFFICE VISIT (OUTPATIENT)
Dept: FAMILY MEDICINE | Facility: OTHER | Age: 88
End: 2023-01-04
Attending: FAMILY MEDICINE
Payer: COMMERCIAL

## 2023-01-04 VITALS
RESPIRATION RATE: 18 BRPM | WEIGHT: 153 LBS | HEART RATE: 69 BPM | SYSTOLIC BLOOD PRESSURE: 88 MMHG | DIASTOLIC BLOOD PRESSURE: 46 MMHG | OXYGEN SATURATION: 94 % | BODY MASS INDEX: 26.26 KG/M2 | TEMPERATURE: 97.9 F

## 2023-01-04 DIAGNOSIS — J44.9 CHRONIC OBSTRUCTIVE PULMONARY DISEASE, UNSPECIFIED COPD TYPE (H): ICD-10-CM

## 2023-01-04 DIAGNOSIS — I95.2 HYPOTENSION DUE TO DRUGS: ICD-10-CM

## 2023-01-04 DIAGNOSIS — N18.32 STAGE 3B CHRONIC KIDNEY DISEASE (H): ICD-10-CM

## 2023-01-04 DIAGNOSIS — I42.8 NON-ISCHEMIC CARDIOMYOPATHY (H): ICD-10-CM

## 2023-01-04 DIAGNOSIS — N30.90 RECURRENT CYSTITIS: Primary | ICD-10-CM

## 2023-01-04 DIAGNOSIS — I48.0 PAROXYSMAL ATRIAL FIBRILLATION (H): ICD-10-CM

## 2023-01-04 LAB
BACTERIA UR CULT: ABNORMAL
BACTERIA UR CULT: ABNORMAL

## 2023-01-04 PROCEDURE — G0463 HOSPITAL OUTPT CLINIC VISIT: HCPCS

## 2023-01-04 PROCEDURE — 99214 OFFICE O/P EST MOD 30 MIN: CPT | Performed by: FAMILY MEDICINE

## 2023-01-04 ASSESSMENT — PAIN SCALES - GENERAL: PAINLEVEL: MILD PAIN (3)

## 2023-01-04 NOTE — PROGRESS NOTES
"  Assessment & Plan       ICD-10-CM    1. Recurrent cystitis  N30.90       2. Hypotension due to drugs  I95.2       3. Chronic obstructive pulmonary disease, unspecified COPD type (H)  J44.9       4. Non-ischemic cardiomyopathy (H)  I42.8       5. Paroxysmal atrial fibrillation (H)  I48.0       6. Stage 3b chronic kidney disease (H)  N18.32       improving slowly   Probably early sepsis when seen . BC negative though   BP low and that is part of problem  Will adjust meds and push fluids  See below-  HOLD SPIRONOLACTONE Thursday AND Friday ( POSSIBLE Saturday )    TAKE 1/2 DOSE /1/2 TAB OF COREG / CORVEDIOL  TWICE DAILY START  THIS AFTERNOON AND THURSDAY AND Friday ( POSSIBLE Saturday)    PUSH FLUIDS /GATORADE     BE CAREFULL WHEN GETTING UP / WALKING      Follow-up with Dr Vicente with labs next week when I am gone.   Discussed above instructions carefully with pt and dtr              BMI:   Estimated body mass index is 26.26 kg/m  as calculated from the following:    Height as of 9/12/22: 1.626 m (5' 4\").    Weight as of this encounter: 69.4 kg (153 lb).           No follow-ups on file.    Ilia Moran MD  [facility] - GABRIEL Angulo is a 87 year old, presenting for the following health issues:  Follow Up      HPI     ED/UC Followup:    Facility:  [facility]  Date of visit: 1/1/2022  Reason for visit: weakness  Current Status: weak, low blood pressure, no urinary symptoms  Seen in ER  2 bugs in urine  Slowly improving  Weak and LH on standing or moving  No F/C now  UC reviewed  No recent change in heart meds  dtr looks after her closely         Review of Systems   Constitutional, HEENT, cardiovascular, pulmonary, gi and gu systems are negative, except as otherwise noted.      Objective    BP (!) 88/46 (BP Location: Left arm, Patient Position: Sitting, Cuff Size: Adult Regular)   Pulse 69   Temp 97.9  F (36.6  C) (Tympanic)   Resp 18   Wt 69.4 kg (153 lb)   SpO2 94%   BMI 26.26 kg/m  "   Body mass index is 26.26 kg/m .  Physical Exam   GENERAL: healthy, alert and no distress  NECK: no adenopathy, no asymmetry, masses, or scars and thyroid normal to palpation  RESP: lungs clear to auscultation - no rales, rhonchi or wheezes  CV: regular rate and rhythm, normal S1 S2, no S3 or S4, no murmur, click or rub, no peripheral edema and peripheral pulses strong  PSYCH: mentation appears normal, affect normal/bright

## 2023-01-04 NOTE — PATIENT INSTRUCTIONS
HOLD SPIRONOLACTONE Thursday AND Friday ( POSSIBLE Saturday )    TAKE 1/2 DOSE /1/2 TAB OF COREG / CORVEDIOL  TWICE DAILY START  THIS AFTERNOON AND THURSDAY AND Friday ( POSSIBLE Saturday)    PUSH FLUIDS /GATORADE     BE CAREFULL WHEN GETTING UP / WALKING

## 2023-01-07 LAB
BACTERIA BLD CULT: NO GROWTH
BACTERIA BLD CULT: NO GROWTH

## 2023-01-10 ENCOUNTER — LAB (OUTPATIENT)
Dept: LAB | Facility: OTHER | Age: 88
End: 2023-01-10
Attending: STUDENT IN AN ORGANIZED HEALTH CARE EDUCATION/TRAINING PROGRAM
Payer: COMMERCIAL

## 2023-01-10 ENCOUNTER — OFFICE VISIT (OUTPATIENT)
Dept: FAMILY MEDICINE | Facility: OTHER | Age: 88
End: 2023-01-10
Attending: STUDENT IN AN ORGANIZED HEALTH CARE EDUCATION/TRAINING PROGRAM
Payer: MEDICARE

## 2023-01-10 VITALS
TEMPERATURE: 96.8 F | DIASTOLIC BLOOD PRESSURE: 54 MMHG | RESPIRATION RATE: 18 BRPM | OXYGEN SATURATION: 98 % | BODY MASS INDEX: 26.62 KG/M2 | WEIGHT: 155.1 LBS | HEART RATE: 68 BPM | SYSTOLIC BLOOD PRESSURE: 116 MMHG

## 2023-01-10 DIAGNOSIS — N18.32 STAGE 3B CHRONIC KIDNEY DISEASE (H): ICD-10-CM

## 2023-01-10 DIAGNOSIS — N30.90 RECURRENT CYSTITIS: ICD-10-CM

## 2023-01-10 DIAGNOSIS — I95.2 HYPOTENSION DUE TO DRUGS: ICD-10-CM

## 2023-01-10 DIAGNOSIS — I42.8 NON-ISCHEMIC CARDIOMYOPATHY (H): ICD-10-CM

## 2023-01-10 DIAGNOSIS — I48.0 PAROXYSMAL ATRIAL FIBRILLATION (H): ICD-10-CM

## 2023-01-10 DIAGNOSIS — I50.9 CONGESTIVE HEART FAILURE, NYHA CLASS 2, UNSPECIFIED CONGESTIVE HEART FAILURE TYPE (H): ICD-10-CM

## 2023-01-10 DIAGNOSIS — N30.90 RECURRENT CYSTITIS: Primary | ICD-10-CM

## 2023-01-10 LAB
ALBUMIN UR-MCNC: NEGATIVE MG/DL
ANION GAP SERPL CALCULATED.3IONS-SCNC: 9 MMOL/L (ref 7–15)
APPEARANCE UR: CLEAR
BASOPHILS # BLD AUTO: 0 10E3/UL (ref 0–0.2)
BASOPHILS NFR BLD AUTO: 0 %
BILIRUB UR QL STRIP: NEGATIVE
BUN SERPL-MCNC: 28.5 MG/DL (ref 8–23)
CALCIUM SERPL-MCNC: 9 MG/DL (ref 8.8–10.2)
CHLORIDE SERPL-SCNC: 97 MMOL/L (ref 98–107)
COLOR UR AUTO: YELLOW
CREAT SERPL-MCNC: 1.48 MG/DL (ref 0.51–0.95)
DEPRECATED HCO3 PLAS-SCNC: 24 MMOL/L (ref 22–29)
EOSINOPHIL # BLD AUTO: 0.1 10E3/UL (ref 0–0.7)
EOSINOPHIL NFR BLD AUTO: 2 %
ERYTHROCYTE [DISTWIDTH] IN BLOOD BY AUTOMATED COUNT: 13.1 % (ref 10–15)
GFR SERPL CREATININE-BSD FRML MDRD: 34 ML/MIN/1.73M2
GLUCOSE SERPL-MCNC: 101 MG/DL (ref 70–99)
GLUCOSE UR STRIP-MCNC: NEGATIVE MG/DL
HCT VFR BLD AUTO: 30.5 % (ref 35–47)
HGB BLD-MCNC: 9.8 G/DL (ref 11.7–15.7)
HGB UR QL STRIP: NEGATIVE
HOLD SPECIMEN: NORMAL
HOLD SPECIMEN: NORMAL
HYALINE CASTS: 15 /LPF
IMM GRANULOCYTES # BLD: 0 10E3/UL
IMM GRANULOCYTES NFR BLD: 0 %
KETONES UR STRIP-MCNC: NEGATIVE MG/DL
LEUKOCYTE ESTERASE UR QL STRIP: ABNORMAL
LYMPHOCYTES # BLD AUTO: 1 10E3/UL (ref 0.8–5.3)
LYMPHOCYTES NFR BLD AUTO: 17 %
MCH RBC QN AUTO: 29.1 PG (ref 26.5–33)
MCHC RBC AUTO-ENTMCNC: 32.1 G/DL (ref 31.5–36.5)
MCV RBC AUTO: 91 FL (ref 78–100)
MONOCYTES # BLD AUTO: 0.6 10E3/UL (ref 0–1.3)
MONOCYTES NFR BLD AUTO: 11 %
MUCOUS THREADS #/AREA URNS LPF: PRESENT /LPF
NEUTROPHILS # BLD AUTO: 4.1 10E3/UL (ref 1.6–8.3)
NEUTROPHILS NFR BLD AUTO: 70 %
NITRATE UR QL: NEGATIVE
NRBC # BLD AUTO: 0 10E3/UL
NRBC BLD AUTO-RTO: 0 /100
PH UR STRIP: 5 [PH] (ref 4.7–8)
PLATELET # BLD AUTO: 218 10E3/UL (ref 150–450)
POTASSIUM SERPL-SCNC: 5.1 MMOL/L (ref 3.4–5.3)
RBC # BLD AUTO: 3.37 10E6/UL (ref 3.8–5.2)
RBC URINE: 2 /HPF
SODIUM SERPL-SCNC: 130 MMOL/L (ref 136–145)
SP GR UR STRIP: 1.02 (ref 1–1.03)
SQUAMOUS EPITHELIAL: 0 /HPF
UROBILINOGEN UR STRIP-MCNC: NORMAL MG/DL
WBC # BLD AUTO: 5.8 10E3/UL (ref 4–11)
WBC URINE: 6 /HPF

## 2023-01-10 PROCEDURE — 80048 BASIC METABOLIC PNL TOTAL CA: CPT | Mod: ZL

## 2023-01-10 PROCEDURE — 36415 COLL VENOUS BLD VENIPUNCTURE: CPT | Mod: ZL

## 2023-01-10 PROCEDURE — G0463 HOSPITAL OUTPT CLINIC VISIT: HCPCS

## 2023-01-10 PROCEDURE — 85025 COMPLETE CBC W/AUTO DIFF WBC: CPT | Mod: ZL

## 2023-01-10 PROCEDURE — 81001 URINALYSIS AUTO W/SCOPE: CPT | Mod: ZL

## 2023-01-10 PROCEDURE — 99215 OFFICE O/P EST HI 40 MIN: CPT | Performed by: STUDENT IN AN ORGANIZED HEALTH CARE EDUCATION/TRAINING PROGRAM

## 2023-01-10 ASSESSMENT — ANXIETY QUESTIONNAIRES
1. FEELING NERVOUS, ANXIOUS, OR ON EDGE: NOT AT ALL
3. WORRYING TOO MUCH ABOUT DIFFERENT THINGS: NOT AT ALL
7. FEELING AFRAID AS IF SOMETHING AWFUL MIGHT HAPPEN: NOT AT ALL
2. NOT BEING ABLE TO STOP OR CONTROL WORRYING: NOT AT ALL
5. BEING SO RESTLESS THAT IT IS HARD TO SIT STILL: NOT AT ALL
6. BECOMING EASILY ANNOYED OR IRRITABLE: NOT AT ALL

## 2023-01-10 ASSESSMENT — PAIN SCALES - GENERAL: PAINLEVEL: NO PAIN (0)

## 2023-01-10 NOTE — PROGRESS NOTES
Assessment & Plan     Recurrent cystitis  1/1/2023 ED encounter for cystitis with hypotension, near sepsis picture.  Repeat UA, CBC, BMP reassuring and no ongoing dysuria.  Some lingering fatigue/weakness could be secondary to deconditioning versus medication side effect.  No indication for repeat antibiotics at this time.  - UA reflex to Microscopic and Culture - [place]; Future  - CBC with platelets and differential; Future  - Basic metabolic panel; Future  - Med adjustments as below  - Encouraged continued healthy hydration habits    Hypotension due to drugs  Normotensive on serial checks in clinic but does endorse ongoing episodes most consistent with orthostatic hypotension which may be exacerbated during acute recovery phase following fairly significant cystitis.  Temporarily holding spironolactone again but may need further medication titration at baseline.  - Basic metabolic panel; Future    Non-ischemic cardiomyopathy (H)  Congestive heart failure, NYHA class 2, unspecified congestive heart failure type (H)  Last echo 8/24/2021 with normal EF 50 to 55% with stable diastolic dysfunction.  No indications of heart failure exacerbation and still may be slightly on the dry side.  Potassium up slightly, likely has upcoming pacemaker exchange; opted to adjust spironolactone over Coreg at this time although beta-blocker could certainly be contributing to complaints of fatigue/weakness.  - CBC with platelets and differential; Future  - Basic metabolic panel; Future  - Hold spironolactone 12.5 mg daily now through 1/13/2023 and then resume  - Continue Coreg 12.5 mg twice daily  - Continue Entresto  - Cardiology follow-up 2/9/2023  - Reviewed S/S that warrant urgent reevaluation    Paroxysmal atrial fibrillation (H)  S/p pacemaker; likely only has about 2 months battery left.  Last interrogation 11/9/2022; BVP 98%, A. fib burden 0%.  Asymptomatic.  - Coreg 12 5 mg twice daily  - Entresto  - Eliquis  - Cardiology  "follow-up in 2/9/2023    Stage 3b chronic kidney disease (H)  Baseline likely around 1.4?  Creatinine overall stable at 1.48 today.  Encouraged hydration and continued monitoring.  - CBC with platelets and differential; Future  - Basic metabolic panel; Future    40 minutes spent on the date of the encounter doing chart review, history and exam, documentation and further activities per the note       BMI:   Estimated body mass index is 26.62 kg/m  as calculated from the following:    Height as of 9/12/22: 1.626 m (5' 4\").    Weight as of this encounter: 70.4 kg (155 lb 1.6 oz).   Weight management plan: Discussed healthy diet and exercise guidelines    Follow-up in 1 week or sooner as needed.    Vipul Vicente MD  [facility] [place] - GABRIEL Angulo is a 87 year old accompanied by her daughter, presenting for the following health issues:  Follow Up      HPI     Concern - Hypotension follow up, cystitis follow-up  Onset: ED on 1/1/2023  -Seen for clinic follow-up 1/4/2023 with ongoing hypotension following acute cystitis  -Medication adjustments at that time included 3 days of holding spironolactone, and half dose Coreg  -Felt well well having these medication adjustments but has had some return of fatigue, weakness, and feeling more tired since resuming full doses over the last couple of days  -Occasional dizziness/lightheadedness in the mornings which is manageable with taking her time  -No fever, chills, night sweats, flulike symptoms  -Appetite not great but has been pushing the fluids  -No pain, burning, itching with urination, hematuria  -Normal bowel habits  -No abdominal pains, back pain, flank pain    Heart Failure Follow-up    Are you experiencing any shortness of breath? No    Are you experiencing any swelling in your legs or feet?  No    Are you using more pillows than usual? Yes    Do you cough at night?  No    Do you check your weight daily?  Yes    Have you had a weight change " recently?  No    Are you having any of the following side effects from your medications? (Select all that apply)  The patient does not report symptoms of dizziness, fatigue, cough, swelling, or slow heart beat.    Since your last visit, how many times have you gone to the cardiologist, urgent care, emergency room, or hospital because of your heart failure?   None    Chronic Kidney Disease Follow-up    Do you take any over the counter pain medicine?: Yes  What over the counter medicine are you taking for your pain?:  tylenol      How often do you take this medicine?:  One time daily    Review of Systems   Constitutional, HEENT, cardiovascular, pulmonary, gi and gu systems are negative, except as otherwise noted.      Objective    /54 (BP Location: Left arm, Patient Position: Sitting, Cuff Size: Adult Regular)   Pulse 68   Temp 96.8  F (36  C) (Tympanic)   Resp 18   Wt 70.4 kg (155 lb 1.6 oz)   SpO2 98%   BMI 26.62 kg/m    Body mass index is 26.62 kg/m .  Physical Exam   GENERAL: healthy, alert and no distress  RESP: lungs clear to auscultation - no rales, rhonchi or wheezes  CV: regular rate and rhythm, normal S1 S2, no S3 or S4, no murmur, click or rub, no peripheral edema and peripheral pulses strong  ABDOMEN: soft, nontender, no hepatosplenomegaly, no masses and bowel sounds normal  MS: no gross musculoskeletal defects noted, no edema  SKIN: no suspicious lesions or rashes  NEURO: Normal strength and tone, mentation intact and speech normal  PSYCH: mentation appears normal, affect normal/bright    Results for orders placed or performed in visit on 01/10/23 (from the past 24 hour(s))   Basic metabolic panel   Result Value Ref Range    Sodium 130 (L) 136 - 145 mmol/L    Potassium 5.1 3.4 - 5.3 mmol/L    Chloride 97 (L) 98 - 107 mmol/L    Carbon Dioxide (CO2) 24 22 - 29 mmol/L    Anion Gap 9 7 - 15 mmol/L    Urea Nitrogen 28.5 (H) 8.0 - 23.0 mg/dL    Creatinine 1.48 (H) 0.51 - 0.95 mg/dL    Calcium 9.0  8.8 - 10.2 mg/dL    Glucose 101 (H) 70 - 99 mg/dL    GFR Estimate 34 (L) >60 mL/min/1.73m2   CBC with platelets and differential    Narrative    The following orders were created for panel order CBC with platelets and differential.  Procedure                               Abnormality         Status                     ---------                               -----------         ------                     CBC with platelets and d...[[id number]]  Abnormal            Final result                 Please view results for these tests on the individual orders.   CBC with platelets and differential   Result Value Ref Range    WBC Count 5.8 4.0 - 11.0 10e3/uL    RBC Count 3.37 (L) 3.80 - 5.20 10e6/uL    Hemoglobin 9.8 (L) 11.7 - 15.7 g/dL    Hematocrit 30.5 (L) 35.0 - 47.0 %    MCV 91 78 - 100 fL    MCH 29.1 26.5 - 33.0 pg    MCHC 32.1 31.5 - 36.5 g/dL    RDW 13.1 10.0 - 15.0 %    Platelet Count 218 150 - 450 10e3/uL    % Neutrophils 70 %    % Lymphocytes 17 %    % Monocytes 11 %    % Eosinophils 2 %    % Basophils 0 %    % Immature Granulocytes 0 %    NRBCs per 100 WBC 0 <1 /100    Absolute Neutrophils 4.1 1.6 - 8.3 10e3/uL    Absolute Lymphocytes 1.0 0.8 - 5.3 10e3/uL    Absolute Monocytes 0.6 0.0 - 1.3 10e3/uL    Absolute Eosinophils 0.1 0.0 - 0.7 10e3/uL    Absolute Basophils 0.0 0.0 - 0.2 10e3/uL    Absolute Immature Granulocytes 0.0 <=0.4 10e3/uL    Absolute NRBCs 0.0 10e3/uL   Extra Tube    Narrative    The following orders were created for panel order Extra Tube.  Procedure                               Abnormality         Status                     ---------                               -----------         ------                     Extra Red Top Tube[[id number]]                               Final result               Extra Green Top (Lithium...[[id number]]                                                 Extra Purple Top Tube[[id number]]                                                       Extra Urine  Collection[[id number]]                           Final result                 Please view results for these tests on the individual orders.   Extra Red Top Tube   Result Value Ref Range    Hold Specimen JIC    Extra Urine Collection   Result Value Ref Range    Hold Specimen JIC    UA reflex to Microscopic and Culture - [place]    Specimen: Urine, Midstream   Result Value Ref Range    Color Urine Yellow Colorless, Straw, Light Yellow, Yellow    Appearance Urine Clear Clear    Glucose Urine Negative Negative mg/dL    Bilirubin Urine Negative Negative    Ketones Urine Negative Negative mg/dL    Specific Gravity Urine 1.016 1.003 - 1.035    Blood Urine Negative Negative    pH Urine 5.0 4.7 - 8.0    Protein Albumin Urine Negative Negative mg/dL    Urobilinogen Urine Normal Normal, 2.0 mg/dL    Nitrite Urine Negative Negative    Leukocyte Esterase Urine Small (A) Negative    Mucus Urine Present (A) None Seen /LPF    RBC Urine 2 <=2 /HPF    WBC Urine 6 (H) <=5 /HPF    Squamous Epithelials Urine 0 <=1 /HPF    Hyaline Casts Urine 15 (H) <=2 /LPF    Narrative    Urine Culture not indicated

## 2023-01-10 NOTE — H&P (VIEW-ONLY)
Assessment & Plan     Recurrent cystitis  1/1/2023 ED encounter for cystitis with hypotension, near sepsis picture.  Repeat UA, CBC, BMP reassuring and no ongoing dysuria.  Some lingering fatigue/weakness could be secondary to deconditioning versus medication side effect.  No indication for repeat antibiotics at this time.  - UA reflex to Microscopic and Culture - [place]; Future  - CBC with platelets and differential; Future  - Basic metabolic panel; Future  - Med adjustments as below  - Encouraged continued healthy hydration habits    Hypotension due to drugs  Normotensive on serial checks in clinic but does endorse ongoing episodes most consistent with orthostatic hypotension which may be exacerbated during acute recovery phase following fairly significant cystitis.  Temporarily holding spironolactone again but may need further medication titration at baseline.  - Basic metabolic panel; Future    Non-ischemic cardiomyopathy (H)  Congestive heart failure, NYHA class 2, unspecified congestive heart failure type (H)  Last echo 8/24/2021 with normal EF 50 to 55% with stable diastolic dysfunction.  No indications of heart failure exacerbation and still may be slightly on the dry side.  Potassium up slightly, likely has upcoming pacemaker exchange; opted to adjust spironolactone over Coreg at this time although beta-blocker could certainly be contributing to complaints of fatigue/weakness.  - CBC with platelets and differential; Future  - Basic metabolic panel; Future  - Hold spironolactone 12.5 mg daily now through 1/13/2023 and then resume  - Continue Coreg 12.5 mg twice daily  - Continue Entresto  - Cardiology follow-up 2/9/2023  - Reviewed S/S that warrant urgent reevaluation    Paroxysmal atrial fibrillation (H)  S/p pacemaker; likely only has about 2 months battery left.  Last interrogation 11/9/2022; BVP 98%, A. fib burden 0%.  Asymptomatic.  - Coreg 12 5 mg twice daily  - Entresto  - Eliquis  - Cardiology  "follow-up in 2/9/2023    Stage 3b chronic kidney disease (H)  Baseline likely around 1.4?  Creatinine overall stable at 1.48 today.  Encouraged hydration and continued monitoring.  - CBC with platelets and differential; Future  - Basic metabolic panel; Future    40 minutes spent on the date of the encounter doing chart review, history and exam, documentation and further activities per the note       BMI:   Estimated body mass index is 26.62 kg/m  as calculated from the following:    Height as of 9/12/22: 1.626 m (5' 4\").    Weight as of this encounter: 70.4 kg (155 lb 1.6 oz).   Weight management plan: Discussed healthy diet and exercise guidelines    Follow-up in 1 week or sooner as needed.    Vipul Vicente MD  [facility] - GABRIEL Angulo is a 87 year old accompanied by her daughter, presenting for the following health issues:  Follow Up      HPI     Concern - Hypotension follow up, cystitis follow-up  Onset: ED on 1/1/2023  -Seen for clinic follow-up 1/4/2023 with ongoing hypotension following acute cystitis  -Medication adjustments at that time included 3 days of holding spironolactone, and half dose Coreg  -Felt well well having these medication adjustments but has had some return of fatigue, weakness, and feeling more tired since resuming full doses over the last couple of days  -Occasional dizziness/lightheadedness in the mornings which is manageable with taking her time  -No fever, chills, night sweats, flulike symptoms  -Appetite not great but has been pushing the fluids  -No pain, burning, itching with urination, hematuria  -Normal bowel habits  -No abdominal pains, back pain, flank pain    Heart Failure Follow-up    Are you experiencing any shortness of breath? No    Are you experiencing any swelling in your legs or feet?  No    Are you using more pillows than usual? Yes    Do you cough at night?  No    Do you check your weight daily?  Yes    Have you had a weight change " recently?  No    Are you having any of the following side effects from your medications? (Select all that apply)  The patient does not report symptoms of dizziness, fatigue, cough, swelling, or slow heart beat.    Since your last visit, how many times have you gone to the cardiologist, urgent care, emergency room, or hospital because of your heart failure?   None    Chronic Kidney Disease Follow-up    Do you take any over the counter pain medicine?: Yes  What over the counter medicine are you taking for your pain?:  tylenol      How often do you take this medicine?:  One time daily    Review of Systems   Constitutional, HEENT, cardiovascular, pulmonary, gi and gu systems are negative, except as otherwise noted.      Objective    /54 (BP Location: Left arm, Patient Position: Sitting, Cuff Size: Adult Regular)   Pulse 68   Temp 96.8  F (36  C) (Tympanic)   Resp 18   Wt 70.4 kg (155 lb 1.6 oz)   SpO2 98%   BMI 26.62 kg/m    Body mass index is 26.62 kg/m .  Physical Exam   GENERAL: healthy, alert and no distress  RESP: lungs clear to auscultation - no rales, rhonchi or wheezes  CV: regular rate and rhythm, normal S1 S2, no S3 or S4, no murmur, click or rub, no peripheral edema and peripheral pulses strong  ABDOMEN: soft, nontender, no hepatosplenomegaly, no masses and bowel sounds normal  MS: no gross musculoskeletal defects noted, no edema  SKIN: no suspicious lesions or rashes  NEURO: Normal strength and tone, mentation intact and speech normal  PSYCH: mentation appears normal, affect normal/bright    Results for orders placed or performed in visit on 01/10/23 (from the past 24 hour(s))   Basic metabolic panel   Result Value Ref Range    Sodium 130 (L) 136 - 145 mmol/L    Potassium 5.1 3.4 - 5.3 mmol/L    Chloride 97 (L) 98 - 107 mmol/L    Carbon Dioxide (CO2) 24 22 - 29 mmol/L    Anion Gap 9 7 - 15 mmol/L    Urea Nitrogen 28.5 (H) 8.0 - 23.0 mg/dL    Creatinine 1.48 (H) 0.51 - 0.95 mg/dL    Calcium 9.0  8.8 - 10.2 mg/dL    Glucose 101 (H) 70 - 99 mg/dL    GFR Estimate 34 (L) >60 mL/min/1.73m2   CBC with platelets and differential    Narrative    The following orders were created for panel order CBC with platelets and differential.  Procedure                               Abnormality         Status                     ---------                               -----------         ------                     CBC with platelets and d...[[id number]]  Abnormal            Final result                 Please view results for these tests on the individual orders.   CBC with platelets and differential   Result Value Ref Range    WBC Count 5.8 4.0 - 11.0 10e3/uL    RBC Count 3.37 (L) 3.80 - 5.20 10e6/uL    Hemoglobin 9.8 (L) 11.7 - 15.7 g/dL    Hematocrit 30.5 (L) 35.0 - 47.0 %    MCV 91 78 - 100 fL    MCH 29.1 26.5 - 33.0 pg    MCHC 32.1 31.5 - 36.5 g/dL    RDW 13.1 10.0 - 15.0 %    Platelet Count 218 150 - 450 10e3/uL    % Neutrophils 70 %    % Lymphocytes 17 %    % Monocytes 11 %    % Eosinophils 2 %    % Basophils 0 %    % Immature Granulocytes 0 %    NRBCs per 100 WBC 0 <1 /100    Absolute Neutrophils 4.1 1.6 - 8.3 10e3/uL    Absolute Lymphocytes 1.0 0.8 - 5.3 10e3/uL    Absolute Monocytes 0.6 0.0 - 1.3 10e3/uL    Absolute Eosinophils 0.1 0.0 - 0.7 10e3/uL    Absolute Basophils 0.0 0.0 - 0.2 10e3/uL    Absolute Immature Granulocytes 0.0 <=0.4 10e3/uL    Absolute NRBCs 0.0 10e3/uL   Extra Tube    Narrative    The following orders were created for panel order Extra Tube.  Procedure                               Abnormality         Status                     ---------                               -----------         ------                     Extra Red Top Tube[[id number]]                               Final result               Extra Green Top (Lithium...[[id number]]                                                 Extra Purple Top Tube[[id number]]                                                       Extra Urine  Collection[[id number]]                           Final result                 Please view results for these tests on the individual orders.   Extra Red Top Tube   Result Value Ref Range    Hold Specimen JIC    Extra Urine Collection   Result Value Ref Range    Hold Specimen JIC    UA reflex to Microscopic and Culture - [place]    Specimen: Urine, Midstream   Result Value Ref Range    Color Urine Yellow Colorless, Straw, Light Yellow, Yellow    Appearance Urine Clear Clear    Glucose Urine Negative Negative mg/dL    Bilirubin Urine Negative Negative    Ketones Urine Negative Negative mg/dL    Specific Gravity Urine 1.016 1.003 - 1.035    Blood Urine Negative Negative    pH Urine 5.0 4.7 - 8.0    Protein Albumin Urine Negative Negative mg/dL    Urobilinogen Urine Normal Normal, 2.0 mg/dL    Nitrite Urine Negative Negative    Leukocyte Esterase Urine Small (A) Negative    Mucus Urine Present (A) None Seen /LPF    RBC Urine 2 <=2 /HPF    WBC Urine 6 (H) <=5 /HPF    Squamous Epithelials Urine 0 <=1 /HPF    Hyaline Casts Urine 15 (H) <=2 /LPF    Narrative    Urine Culture not indicated

## 2023-01-11 DIAGNOSIS — M48.062 SPINAL STENOSIS OF LUMBAR REGION WITH NEUROGENIC CLAUDICATION: ICD-10-CM

## 2023-01-11 DIAGNOSIS — M47.815 SPONDYLOSIS OF THORACOLUMBAR REGION WITHOUT MYELOPATHY OR RADICULOPATHY: ICD-10-CM

## 2023-01-11 DIAGNOSIS — G62.9 NEUROPATHY: ICD-10-CM

## 2023-01-13 RX ORDER — HYDROCODONE BITARTRATE AND ACETAMINOPHEN 5; 325 MG/1; MG/1
TABLET ORAL
Qty: 60 TABLET | Refills: 0 | Status: SHIPPED | OUTPATIENT
Start: 2023-01-13 | End: 2023-02-15

## 2023-01-13 RX ORDER — GABAPENTIN 400 MG/1
CAPSULE ORAL
Qty: 90 CAPSULE | Refills: 0 | Status: SHIPPED | OUTPATIENT
Start: 2023-01-13 | End: 2023-02-15

## 2023-01-13 NOTE — TELEPHONE ENCOUNTER
Pt counting on getting these and needs these filled.She requested these days ago.  Covering provider?    Norco     Last Written Prescription Date:  12.9.2022  Last Fill Quantity: 60,   # refills: 0  Last Office Visit: 1.10.2023  Future Office visit:       Routing refill request to provider for review/approval because:  Drug not on the [facility], [facility] or [facility] refill protocol or controlled substance      Neurontin      Last Written Prescription Date:  12.2.2022  Last Fill Quantity: 90,   # refills: 0  Last Office Visit:   Future Office visit:       Routing refill request to provider for review/approval because:  Drug not on the [facility], [facility] or [facility] refill protocol or controlled substance    Liz Gracia RN

## 2023-01-18 ENCOUNTER — ANCILLARY PROCEDURE (OUTPATIENT)
Dept: CARDIOLOGY | Facility: CLINIC | Age: 88
End: 2023-01-18
Attending: INTERNAL MEDICINE
Payer: MEDICARE

## 2023-01-18 DIAGNOSIS — Z95.810 AUTOMATIC IMPLANTABLE CARDIOVERTER-DEFIBRILLATOR IN SITU: ICD-10-CM

## 2023-01-18 DIAGNOSIS — Z95.810 AUTOMATIC IMPLANTABLE CARDIOVERTER-DEFIBRILLATOR IN SITU: Primary | ICD-10-CM

## 2023-01-18 DIAGNOSIS — Z45.02 IMPLANTABLE CARDIOVERTER-DEFIBRILLATOR (ICD) GENERATOR END OF LIFE: ICD-10-CM

## 2023-01-18 PROCEDURE — 99207 CARDIAC DEVICE CHECK - REMOTE: CPT | Performed by: INTERNAL MEDICINE

## 2023-01-19 ENCOUNTER — TELEPHONE (OUTPATIENT)
Dept: SURGERY | Facility: OTHER | Age: 88
End: 2023-01-19
Payer: COMMERCIAL

## 2023-01-19 NOTE — TELEPHONE ENCOUNTER
Left a message to call back to schedule a ICD generator change.  Laura Melendez LPN..........1/19/2023  8:18 AM     31-Jan-2021

## 2023-01-20 NOTE — TELEPHONE ENCOUNTER
Patient Scheduled for ICD Generated change for January 30th with Dr. Hodge. Sandra Fischer, DELANEY  ....................  1/20/2023   8:57 AM

## 2023-01-20 NOTE — TELEPHONE ENCOUNTER
Sent message to Desiree Luevano via Epic and spoke with Robert at [employer].     Implantable Pulse Generator Model [id number] Viva XT CRT-D      Sandra Fischer RN  ....................  1/20/2023   9:21 AM

## 2023-01-20 NOTE — TELEPHONE ENCOUNTER
Updated patient and provided education over the phone. Provided surgery and PAC phone call information to patient. Patient will  Hibiclens at a local pharmacy as she is unable to get to [facility] to  cleansers and folder. Provided Surgery education over the phone. Sandra Fischer RN  ....................  1/20/2023   9:13 AM

## 2023-01-21 LAB
MDC_IDC_EPISODE_DTM: NORMAL
MDC_IDC_EPISODE_DURATION: 5 S
MDC_IDC_EPISODE_ID: 758
MDC_IDC_EPISODE_TYPE: NORMAL
MDC_IDC_LEAD_IMPLANT_DT: NORMAL
MDC_IDC_LEAD_LOCATION: NORMAL
MDC_IDC_LEAD_LOCATION_DETAIL_1: NORMAL
MDC_IDC_LEAD_MFG: NORMAL
MDC_IDC_LEAD_MODEL: NORMAL
MDC_IDC_LEAD_POLARITY_TYPE: NORMAL
MDC_IDC_LEAD_SERIAL: NORMAL
MDC_IDC_MSMT_BATTERY_DTM: NORMAL
MDC_IDC_MSMT_BATTERY_REMAINING_LONGEVITY: 1 MO
MDC_IDC_MSMT_BATTERY_RRT_TRIGGER: 2.73
MDC_IDC_MSMT_BATTERY_STATUS: NORMAL
MDC_IDC_MSMT_BATTERY_VOLTAGE: 2.72 V
MDC_IDC_MSMT_CAP_CHARGE_DTM: NORMAL
MDC_IDC_MSMT_CAP_CHARGE_ENERGY: 18 J
MDC_IDC_MSMT_CAP_CHARGE_TIME: 5.62
MDC_IDC_MSMT_CAP_CHARGE_TYPE: NORMAL
MDC_IDC_MSMT_LEADCHNL_LV_IMPEDANCE_VALUE: 437 OHM
MDC_IDC_MSMT_LEADCHNL_LV_IMPEDANCE_VALUE: 532 OHM
MDC_IDC_MSMT_LEADCHNL_LV_IMPEDANCE_VALUE: 836 OHM
MDC_IDC_MSMT_LEADCHNL_LV_PACING_THRESHOLD_AMPLITUDE: 0.75 V
MDC_IDC_MSMT_LEADCHNL_LV_PACING_THRESHOLD_PULSEWIDTH: 0.6 MS
MDC_IDC_MSMT_LEADCHNL_RA_IMPEDANCE_VALUE: 437 OHM
MDC_IDC_MSMT_LEADCHNL_RA_PACING_THRESHOLD_AMPLITUDE: 0.62 V
MDC_IDC_MSMT_LEADCHNL_RA_PACING_THRESHOLD_PULSEWIDTH: 0.4 MS
MDC_IDC_MSMT_LEADCHNL_RA_SENSING_INTR_AMPL: 1.62 MV
MDC_IDC_MSMT_LEADCHNL_RA_SENSING_INTR_AMPL: 1.75 MV
MDC_IDC_MSMT_LEADCHNL_RV_IMPEDANCE_VALUE: 304 OHM
MDC_IDC_MSMT_LEADCHNL_RV_IMPEDANCE_VALUE: 399 OHM
MDC_IDC_MSMT_LEADCHNL_RV_PACING_THRESHOLD_AMPLITUDE: 1.12 V
MDC_IDC_MSMT_LEADCHNL_RV_PACING_THRESHOLD_PULSEWIDTH: 0.4 MS
MDC_IDC_MSMT_LEADCHNL_RV_SENSING_INTR_AMPL: 6.38 MV
MDC_IDC_MSMT_LEADCHNL_RV_SENSING_INTR_AMPL: 6.38 MV
MDC_IDC_PG_IMPLANT_DTM: NORMAL
MDC_IDC_PG_MFG: NORMAL
MDC_IDC_PG_MODEL: NORMAL
MDC_IDC_PG_SERIAL: NORMAL
MDC_IDC_PG_TYPE: NORMAL
MDC_IDC_SESS_CLINIC_NAME: NORMAL
MDC_IDC_SESS_DTM: NORMAL
MDC_IDC_SESS_TYPE: NORMAL
MDC_IDC_SET_BRADY_AT_MODE_SWITCH_RATE: 171 {BEATS}/MIN
MDC_IDC_SET_BRADY_LOWRATE: 60 {BEATS}/MIN
MDC_IDC_SET_BRADY_MAX_SENSOR_RATE: 130 {BEATS}/MIN
MDC_IDC_SET_BRADY_MAX_TRACKING_RATE: 130 {BEATS}/MIN
MDC_IDC_SET_BRADY_MODE: NORMAL
MDC_IDC_SET_BRADY_PAV_DELAY_LOW: 160 MS
MDC_IDC_SET_BRADY_SAV_DELAY_LOW: 110 MS
MDC_IDC_SET_CRT_LVRV_DELAY: 10 MS
MDC_IDC_SET_CRT_PACED_CHAMBERS: NORMAL
MDC_IDC_SET_LEADCHNL_LV_PACING_AMPLITUDE: 1.25 V
MDC_IDC_SET_LEADCHNL_LV_PACING_ANODE_ELECTRODE_1: NORMAL
MDC_IDC_SET_LEADCHNL_LV_PACING_ANODE_LOCATION_1: NORMAL
MDC_IDC_SET_LEADCHNL_LV_PACING_CAPTURE_MODE: NORMAL
MDC_IDC_SET_LEADCHNL_LV_PACING_CATHODE_ELECTRODE_1: NORMAL
MDC_IDC_SET_LEADCHNL_LV_PACING_CATHODE_LOCATION_1: NORMAL
MDC_IDC_SET_LEADCHNL_LV_PACING_POLARITY: NORMAL
MDC_IDC_SET_LEADCHNL_LV_PACING_PULSEWIDTH: 0.6 MS
MDC_IDC_SET_LEADCHNL_RA_PACING_AMPLITUDE: 1.5 V
MDC_IDC_SET_LEADCHNL_RA_PACING_ANODE_ELECTRODE_1: NORMAL
MDC_IDC_SET_LEADCHNL_RA_PACING_ANODE_LOCATION_1: NORMAL
MDC_IDC_SET_LEADCHNL_RA_PACING_CAPTURE_MODE: NORMAL
MDC_IDC_SET_LEADCHNL_RA_PACING_CATHODE_ELECTRODE_1: NORMAL
MDC_IDC_SET_LEADCHNL_RA_PACING_CATHODE_LOCATION_1: NORMAL
MDC_IDC_SET_LEADCHNL_RA_PACING_POLARITY: NORMAL
MDC_IDC_SET_LEADCHNL_RA_PACING_PULSEWIDTH: 0.4 MS
MDC_IDC_SET_LEADCHNL_RA_SENSING_ANODE_ELECTRODE_1: NORMAL
MDC_IDC_SET_LEADCHNL_RA_SENSING_ANODE_LOCATION_1: NORMAL
MDC_IDC_SET_LEADCHNL_RA_SENSING_CATHODE_ELECTRODE_1: NORMAL
MDC_IDC_SET_LEADCHNL_RA_SENSING_CATHODE_LOCATION_1: NORMAL
MDC_IDC_SET_LEADCHNL_RA_SENSING_POLARITY: NORMAL
MDC_IDC_SET_LEADCHNL_RA_SENSING_SENSITIVITY: 0.3 MV
MDC_IDC_SET_LEADCHNL_RV_PACING_AMPLITUDE: 2.25 V
MDC_IDC_SET_LEADCHNL_RV_PACING_ANODE_ELECTRODE_1: NORMAL
MDC_IDC_SET_LEADCHNL_RV_PACING_ANODE_LOCATION_1: NORMAL
MDC_IDC_SET_LEADCHNL_RV_PACING_CAPTURE_MODE: NORMAL
MDC_IDC_SET_LEADCHNL_RV_PACING_CATHODE_ELECTRODE_1: NORMAL
MDC_IDC_SET_LEADCHNL_RV_PACING_CATHODE_LOCATION_1: NORMAL
MDC_IDC_SET_LEADCHNL_RV_PACING_POLARITY: NORMAL
MDC_IDC_SET_LEADCHNL_RV_PACING_PULSEWIDTH: 0.4 MS
MDC_IDC_SET_LEADCHNL_RV_SENSING_ANODE_ELECTRODE_1: NORMAL
MDC_IDC_SET_LEADCHNL_RV_SENSING_ANODE_LOCATION_1: NORMAL
MDC_IDC_SET_LEADCHNL_RV_SENSING_CATHODE_ELECTRODE_1: NORMAL
MDC_IDC_SET_LEADCHNL_RV_SENSING_CATHODE_LOCATION_1: NORMAL
MDC_IDC_SET_LEADCHNL_RV_SENSING_POLARITY: NORMAL
MDC_IDC_SET_LEADCHNL_RV_SENSING_SENSITIVITY: 0.3 MV
MDC_IDC_SET_ZONE_DETECTION_BEATS_DENOMINATOR: 40 {BEATS}
MDC_IDC_SET_ZONE_DETECTION_BEATS_NUMERATOR: 30 {BEATS}
MDC_IDC_SET_ZONE_DETECTION_INTERVAL: 320 MS
MDC_IDC_SET_ZONE_DETECTION_INTERVAL: 350 MS
MDC_IDC_SET_ZONE_DETECTION_INTERVAL: 360 MS
MDC_IDC_SET_ZONE_DETECTION_INTERVAL: 400 MS
MDC_IDC_SET_ZONE_DETECTION_INTERVAL: NORMAL
MDC_IDC_SET_ZONE_TYPE: NORMAL
MDC_IDC_STAT_AT_BURDEN_PERCENT: 0 %
MDC_IDC_STAT_AT_DTM_END: NORMAL
MDC_IDC_STAT_AT_DTM_START: NORMAL
MDC_IDC_STAT_BRADY_AP_VP_PERCENT: 8.01 %
MDC_IDC_STAT_BRADY_AP_VS_PERCENT: 0.15 %
MDC_IDC_STAT_BRADY_AS_VP_PERCENT: 90.55 %
MDC_IDC_STAT_BRADY_AS_VS_PERCENT: 1.29 %
MDC_IDC_STAT_BRADY_DTM_END: NORMAL
MDC_IDC_STAT_BRADY_DTM_START: NORMAL
MDC_IDC_STAT_BRADY_RA_PERCENT_PACED: 8.15 %
MDC_IDC_STAT_BRADY_RV_PERCENT_PACED: 57.9 %
MDC_IDC_STAT_CRT_DTM_END: NORMAL
MDC_IDC_STAT_CRT_DTM_START: NORMAL
MDC_IDC_STAT_CRT_LV_PERCENT_PACED: 98.34 %
MDC_IDC_STAT_CRT_PERCENT_PACED: 57.87 %
MDC_IDC_STAT_EPISODE_RECENT_COUNT: 0
MDC_IDC_STAT_EPISODE_RECENT_COUNT_DTM_END: NORMAL
MDC_IDC_STAT_EPISODE_RECENT_COUNT_DTM_START: NORMAL
MDC_IDC_STAT_EPISODE_TOTAL_COUNT: 0
MDC_IDC_STAT_EPISODE_TOTAL_COUNT: 1
MDC_IDC_STAT_EPISODE_TOTAL_COUNT: 66
MDC_IDC_STAT_EPISODE_TOTAL_COUNT_DTM_END: NORMAL
MDC_IDC_STAT_EPISODE_TOTAL_COUNT_DTM_START: NORMAL
MDC_IDC_STAT_EPISODE_TYPE: NORMAL
MDC_IDC_STAT_TACHYTHERAPY_ATP_DELIVERED_RECENT: 0
MDC_IDC_STAT_TACHYTHERAPY_ATP_DELIVERED_TOTAL: 0
MDC_IDC_STAT_TACHYTHERAPY_RECENT_DTM_END: NORMAL
MDC_IDC_STAT_TACHYTHERAPY_RECENT_DTM_START: NORMAL
MDC_IDC_STAT_TACHYTHERAPY_SHOCKS_ABORTED_RECENT: 0
MDC_IDC_STAT_TACHYTHERAPY_SHOCKS_ABORTED_TOTAL: 0
MDC_IDC_STAT_TACHYTHERAPY_SHOCKS_DELIVERED_RECENT: 0
MDC_IDC_STAT_TACHYTHERAPY_SHOCKS_DELIVERED_TOTAL: 1
MDC_IDC_STAT_TACHYTHERAPY_TOTAL_DTM_END: NORMAL
MDC_IDC_STAT_TACHYTHERAPY_TOTAL_DTM_START: NORMAL

## 2023-01-23 DIAGNOSIS — Z45.018 PACEMAKER END OF LIFE: Primary | ICD-10-CM

## 2023-01-23 RX ORDER — CEFAZOLIN SODIUM 2 G/100ML
2 INJECTION, SOLUTION INTRAVENOUS
Status: CANCELLED | OUTPATIENT
Start: 2023-01-23

## 2023-01-23 RX ORDER — CEFAZOLIN SODIUM 2 G/100ML
2 INJECTION, SOLUTION INTRAVENOUS SEE ADMIN INSTRUCTIONS
Status: CANCELLED | OUTPATIENT
Start: 2023-01-23

## 2023-01-27 ENCOUNTER — ANESTHESIA EVENT (OUTPATIENT)
Dept: SURGERY | Facility: OTHER | Age: 88
End: 2023-01-27
Payer: MEDICARE

## 2023-01-27 RX ORDER — SODIUM CHLORIDE 9 MG/ML
INJECTION, SOLUTION INTRAVENOUS CONTINUOUS
Status: CANCELLED | OUTPATIENT
Start: 2023-01-27

## 2023-01-27 RX ORDER — ONDANSETRON 2 MG/ML
4 INJECTION INTRAMUSCULAR; INTRAVENOUS EVERY 30 MIN PRN
Status: CANCELLED | OUTPATIENT
Start: 2023-01-27

## 2023-01-27 RX ORDER — FENTANYL CITRATE 50 UG/ML
50 INJECTION, SOLUTION INTRAMUSCULAR; INTRAVENOUS EVERY 5 MIN PRN
Status: CANCELLED | OUTPATIENT
Start: 2023-01-27

## 2023-01-27 RX ORDER — ONDANSETRON 4 MG/1
4 TABLET, ORALLY DISINTEGRATING ORAL EVERY 30 MIN PRN
Status: CANCELLED | OUTPATIENT
Start: 2023-01-27

## 2023-01-27 RX ORDER — FENTANYL CITRATE 50 UG/ML
25 INJECTION, SOLUTION INTRAMUSCULAR; INTRAVENOUS EVERY 5 MIN PRN
Status: CANCELLED | OUTPATIENT
Start: 2023-01-27

## 2023-01-30 ENCOUNTER — HOSPITAL ENCOUNTER (OUTPATIENT)
Facility: OTHER | Age: 88
Discharge: HOME | End: 2023-01-30
Attending: SURGERY | Admitting: SURGERY
Payer: MEDICARE

## 2023-01-30 ENCOUNTER — ANESTHESIA (OUTPATIENT)
Dept: SURGERY | Facility: OTHER | Age: 88
End: 2023-01-30
Payer: MEDICARE

## 2023-01-30 VITALS
OXYGEN SATURATION: 97 % | WEIGHT: 152 LBS | HEART RATE: 72 BPM | DIASTOLIC BLOOD PRESSURE: 57 MMHG | TEMPERATURE: 96.4 F | BODY MASS INDEX: 25.95 KG/M2 | SYSTOLIC BLOOD PRESSURE: 139 MMHG | HEIGHT: 64 IN

## 2023-01-30 DIAGNOSIS — Z45.018 PACEMAKER END OF LIFE: ICD-10-CM

## 2023-01-30 PROCEDURE — 370N000017 HC ANESTHESIA TECHNICAL FEE, PER MIN: Performed by: SURGERY

## 2023-01-30 PROCEDURE — 33228 REMV&REPLC PM GEN DUAL LEAD: CPT | Performed by: NURSE ANESTHETIST, CERTIFIED REGISTERED

## 2023-01-30 PROCEDURE — 33228 REMV&REPLC PM GEN DUAL LEAD: CPT | Performed by: SURGERY

## 2023-01-30 PROCEDURE — 250N000011 HC RX IP 250 OP 636: Performed by: SURGERY

## 2023-01-30 PROCEDURE — 250N000009 HC RX 250: Performed by: SURGERY

## 2023-01-30 PROCEDURE — C1785 PMKR, DUAL, RATE-RESP: HCPCS | Performed by: SURGERY

## 2023-01-30 PROCEDURE — 258N000003 HC RX IP 258 OP 636: Performed by: NURSE ANESTHETIST, CERTIFIED REGISTERED

## 2023-01-30 PROCEDURE — 710N000012 HC RECOVERY PHASE 2, PER MINUTE: Performed by: SURGERY

## 2023-01-30 PROCEDURE — 999N000141 HC STATISTIC PRE-PROCEDURE NURSING ASSESSMENT: Performed by: SURGERY

## 2023-01-30 PROCEDURE — 360N000076 HC SURGERY LEVEL 3, PER MIN: Performed by: SURGERY

## 2023-01-30 PROCEDURE — 272N000001 HC OR GENERAL SUPPLY STERILE: Performed by: SURGERY

## 2023-01-30 PROCEDURE — 99100 ANES PT EXTEME AGE<1 YR&>70: CPT | Performed by: NURSE ANESTHETIST, CERTIFIED REGISTERED

## 2023-01-30 PROCEDURE — 250N000011 HC RX IP 250 OP 636: Performed by: NURSE ANESTHETIST, CERTIFIED REGISTERED

## 2023-01-30 DEVICE — IMPLANTABLE DEVICE: Type: IMPLANTABLE DEVICE | Site: CHEST | Status: FUNCTIONAL

## 2023-01-30 RX ORDER — ACETAMINOPHEN 325 MG/1
650 TABLET ORAL EVERY 4 HOURS PRN
Qty: 50 TABLET | Refills: 0 | Status: SHIPPED | OUTPATIENT
Start: 2023-01-30 | End: 2023-06-05

## 2023-01-30 RX ORDER — ACETAMINOPHEN 325 MG/1
650 TABLET ORAL
Status: CANCELLED | OUTPATIENT
Start: 2023-01-30

## 2023-01-30 RX ORDER — CEFAZOLIN SODIUM/WATER 2 G/20 ML
2 SYRINGE (ML) INTRAVENOUS
Status: COMPLETED | OUTPATIENT
Start: 2023-01-30 | End: 2023-01-30

## 2023-01-30 RX ORDER — SODIUM CHLORIDE 9 MG/ML
INJECTION, SOLUTION INTRAVENOUS CONTINUOUS
Status: DISCONTINUED | OUTPATIENT
Start: 2023-01-30 | End: 2023-01-30 | Stop reason: HOSPADM

## 2023-01-30 RX ORDER — OXYCODONE HYDROCHLORIDE 5 MG/1
5-10 TABLET ORAL EVERY 4 HOURS PRN
Qty: 6 TABLET | Refills: 0 | Status: SHIPPED | OUTPATIENT
Start: 2023-01-30 | End: 2023-05-15

## 2023-01-30 RX ORDER — PROPOFOL 10 MG/ML
INJECTION, EMULSION INTRAVENOUS CONTINUOUS PRN
Status: DISCONTINUED | OUTPATIENT
Start: 2023-01-30 | End: 2023-01-30

## 2023-01-30 RX ORDER — PROPOFOL 10 MG/ML
INJECTION, EMULSION INTRAVENOUS PRN
Status: DISCONTINUED | OUTPATIENT
Start: 2023-01-30 | End: 2023-01-30

## 2023-01-30 RX ORDER — SODIUM CHLORIDE, SODIUM LACTATE, POTASSIUM CHLORIDE, CALCIUM CHLORIDE 600; 310; 30; 20 MG/100ML; MG/100ML; MG/100ML; MG/100ML
INJECTION, SOLUTION INTRAVENOUS CONTINUOUS PRN
Status: DISCONTINUED | OUTPATIENT
Start: 2023-01-30 | End: 2023-01-30

## 2023-01-30 RX ORDER — CEFAZOLIN SODIUM/WATER 2 G/20 ML
2 SYRINGE (ML) INTRAVENOUS SEE ADMIN INSTRUCTIONS
Status: DISCONTINUED | OUTPATIENT
Start: 2023-01-30 | End: 2023-01-30 | Stop reason: HOSPADM

## 2023-01-30 RX ORDER — LIDOCAINE 40 MG/G
CREAM TOPICAL
Status: DISCONTINUED | OUTPATIENT
Start: 2023-01-30 | End: 2023-01-30 | Stop reason: HOSPADM

## 2023-01-30 RX ADMIN — SODIUM CHLORIDE, SODIUM LACTATE, POTASSIUM CHLORIDE, AND CALCIUM CHLORIDE: 600; 310; 30; 20 INJECTION, SOLUTION INTRAVENOUS at 09:58

## 2023-01-30 RX ADMIN — PROPOFOL 50 MG: 10 INJECTION, EMULSION INTRAVENOUS at 10:05

## 2023-01-30 RX ADMIN — Medication 2 G: at 09:37

## 2023-01-30 RX ADMIN — PROPOFOL 110 MCG/KG/MIN: 10 INJECTION, EMULSION INTRAVENOUS at 10:05

## 2023-01-30 RX ADMIN — SODIUM CHLORIDE 10 ML/HR: 9 INJECTION, SOLUTION INTRAVENOUS at 09:09

## 2023-01-30 ASSESSMENT — COPD QUESTIONNAIRES: COPD: 1

## 2023-01-30 ASSESSMENT — ACTIVITIES OF DAILY LIVING (ADL)
ADLS_ACUITY_SCORE: 35

## 2023-01-30 ASSESSMENT — ENCOUNTER SYMPTOMS: DYSRHYTHMIAS: 1

## 2023-01-30 NOTE — INTERVAL H&P NOTE
"I have reviewed the surgical (or preoperative) H&P that is linked to this encounter, and examined the patient. There are no significant changes    Clinical Conditions Present on Arrival:  Clinically Significant Risk Factors Present on Admission           # Hyponatremia: Lowest Na = 130 mmol/L in last 30 days, will monitor as appropriate        # Drug Induced Coagulation Defect: home medication list includes an anticoagulant medication   # Overweight: Estimated body mass index is 26.09 kg/m  as calculated from the following:    Height as of this encounter: 1.626 m (5' 4\").    Weight as of this encounter: 68.9 kg (152 lb).       "

## 2023-01-30 NOTE — OR NURSING
Pt tolerated fluids without nausea, declines wanting food. Rep here and gave ok for pt to go home. AVS reviewed in detail, no questions. Dr Hodge advised pt not take Oxycodone and just use the Norco she has for her chronic pain instead. Pt and daughter verbalized understanding. Pt taken out by WC to her daughters car.

## 2023-01-30 NOTE — OP NOTE
Surgeon: Isiah Hodge MD   Circulator: Dirk Escamilla RN; Noni Parra RN  Scrub Person: Jaelyn Maria    Family Phys.: Ilia Moran     PREOP DIAGNOSIS: Pacemaker battery depletion.     POSTOPDIAGNOSIS: Same.     PROCEDURE PERFORMED: Replacement of pacemaker generator. Number of leads:3    ANESTHESIA: MAC with local.     SPECIMENS: None.     BLOOD LOSS: Minimal.     COMPLICATIONS:None immediately apparent.     INDICATIONS: The patient is a 87 year old female with a previous pacemaker / defib. The pacemaker battery is showing depletion. The risks, benefits and alternatives to replacement of pacemakergenerator were discussed with the patient. We specifically discussed the risks of infection, bleeding, hematoma, fluid collections and the possible need for further pacemaker generator replacements as well as the possibilityof lead damage to the leads requiring lead replacement. The patient expressed understanding. Informed consent paperwork was completed.     PROCEDURE: The patient was taken to the operating room. Appropriate monitors  were attached. The patient received IV antibiotics preoperatively. The patient was placed in the supine position on the operating room table. The patient had preoperative pacemaker interrogation showing end of life. The patientreceived sedation for comfort. The patient's chest and neck were prepped and draped in standard fashion. Time out was performed confirming the patient s identity and procedure to be performed.  The left chest wall pacemaker was easily palpable and the scar from placement was easily visible. The skin and subcutaneous tissue and the area of the scar were infiltrated with local anesthetic. The incision was then opened sharply and carried down to the subcutaneous tissue. No electrocautery was used around leads. The pacemaker generator was easily palpated and theleads were palpated and avoided. The capsule around the generator was entered. The  pacemaker was freed from any adhesions. The pacemaker was ex-planted without difficulty. The atrial lead was disconnected from the generator and secured in the new generator. Numbers were appropriate. The right ventricular lead was then detached from the generator and secured in the new generator. The left ventricular lead was then detached from the generator and secured in the new generator. Numbers were appropriate. The generator was then positioned in the pocket. This was inspected and found to have excellent hemostasis. Deep tissues were reapproximated usinginterrupted Vicryl sutures. Skin edges were reapproximated using running Monocryl in a subcuticular layer. Exofin glue was applied.   Interrogation of the pacemaker was performed.Please see the pacemaker implant   paperwork for further information.  The patient tolerated the procedure with no immediately apparent   complications. All needle, sponge and instrument counts were reported as   correct at the completion of the case.     Isiah Hodge MD on 1/30/2023 at 10:41 AM     Ilia Moran

## 2023-01-30 NOTE — ANESTHESIA CARE TRANSFER NOTE
Patient: Jacklyn Hein    Procedure: Procedure(s):  REPLACEMENT, PULSE GENERATOR, CARDIAC PACEMAKER       Diagnosis: Pacemaker end of life [Z45.018]  Diagnosis Additional Information: No value filed.    Anesthesia Type:   MAC     Note:      Level of Consciousness: awake  Oxygen Supplementation: face mask  Level of Supplemental Oxygen (L/min / FiO2): 8  Independent Airway: airway patency satisfactory and stable  Dentition: dentition unchanged  Vital Signs Stable: post-procedure vital signs reviewed and stable  Report to RN Given: handoff report given  Patient transferred to: Phase II    Handoff Report: Identifed the Patient, Identified the Reponsible Provider, Reviewed the pertinent medical history, Discussed the surgical course, Reviewed Intra-OP anesthesia mangement and issues during anesthesia, Set expectations for post-procedure period and Allowed opportunity for questions and acknowledgement of understanding      Vitals:  Vitals Value Taken Time   BP     Temp     Pulse     Resp     SpO2 100 % 01/30/23 1040   Vitals shown include unvalidated device data.    Electronically Signed By: David Kellerman, APRN CRNA  January 30, 2023  10:41 AM

## 2023-01-30 NOTE — DISCHARGE INSTRUCTIONS
[facility]  Adult Discharge Orders & Instructions      For 24 hours after surgery:  Get plenty of rest.  A responsible adult must stay with you for at least 24 hours after you leave the hospital.   You may feel lightheaded.  IF so, sit for a few minutes before standing.  Have someone help you get up.   You may have a slight fever. Call the doctor if your fever is over 101 F (38.3 C) (taken under the tongue) or lasts longer than 24 hours.  You may have a dry mouth, a sore throat, muscle aches or trouble sleeping.  These should go away after 24 hours.  Do not make important or legal decisions.  6.   Do not drive or use heavy equipment.  If you have weakness or tingling, don't drive or use heavy equipment until this feeling goes away.                                                                                                                                                                         To contact a doctor, call    [phone]______________

## 2023-01-30 NOTE — ANESTHESIA PREPROCEDURE EVALUATION
Anesthesia Pre-Procedure Evaluation    Patient: Jacklyn Hein   MRN: [id number] : 1935        Procedure : Procedure(s):  REPLACEMENT, PULSE GENERATOR, CARDIAC PACEMAKER          Past Medical History:   Diagnosis Date     Angina pectoris 2011     CHF (congestive heart failure), NYHA class II (H) 12/10/2013     CHF (congestive heart failure), NYHA class III (H) 12/10/2013     Hyperhydrosis disorder 2011     Insomnia, unspecified 2011     LBBB (left bundle branch block) 2011     Neck pain, chronic 2011     Non-ischemic cardiomyopathy (H) 12/10/2013     Obesity, unspecified 2011     Osteopenia 2011     Pacemaker      Pain in joint, lower leg 2006     Pure hypercholesterolemia 2002     Unspecified essential hypertension 2011      Past Surgical History:   Procedure Laterality Date     APPENDECTOMY       ARTHROSCOPY KNEE RT/LT      RT     BACK SURGERY  2020     BACK SURGERY  2021    fractured vert repair     CARDIAC SURGERY  2014    Pacemaker     Cataract extraction  2009    Bilateral     CHOLECYSTECTOMY      open      COLONOSCOPY  2001    Normal      COLONOSCOPY N/A 10/20/2016    Procedure: COLONOSCOPY;  Surgeon: Charan Montejo MD;  Location: [facility]     colonoscopy with polypectomy      Repeat 3 years      CT CORONARY ANGIOGRAM      normal     HERPES ZOSTER PCR ([facility])       IR CONSULTATION FOR IR EXAM  2020     left eye scar tissue       normal echo      fen-phen use       x6       SURGICAL RADIOLOGY PROCEDURE N/A 2016    Procedure: SURGICAL RADIOLOGY PROCEDURE;  Surgeon: Provider, Generic Perianesthesia Nursing;  Location: [facility]      No Known Allergies   Social History     Tobacco Use     Smoking status: Former     Packs/day: 1.00     Years: 15.00     Pack years: 15.00     Types: Cigarettes     Start date: 1983     Quit date: 1998     Years since quittin.0     Smokeless tobacco:  Never     Tobacco comments:     Passive Exposure: No; Longest Tobacco Free: 15 years    Substance Use Topics     Alcohol use: Not Currently     Comment: Occasionally       Wt Readings from Last 1 Encounters:   01/10/23 70.4 kg (155 lb 1.6 oz)        Anesthesia Evaluation   Pt has had prior anesthetic.     No history of anesthetic complications       ROS/MED HX  ENT/Pulmonary:     (+) Mild Persistent, asthma Treatment: Inhaler daily and Inhaled steroids,  COPD,     Neurologic:  - neg neurologic ROS     Cardiovascular:     (+) Dyslipidemia hypertension-----Taking blood thinners CHF Last EF: 50-55% date: 2021 pacemaker, dysrhythmias, a-fib, pulmonary hypertension,     METS/Exercise Tolerance: 3 - Able to walk 1-2 blocks without stopping    Hematologic:  - neg hematologic  ROS     Musculoskeletal:   (+) arthritis,     GI/Hepatic:  - neg GI/hepatic ROS     Renal/Genitourinary:     (+) renal disease, type: CRI,     Endo:  - neg endo ROS     Psychiatric/Substance Use:  - neg psychiatric ROS     Infectious Disease:  - neg infectious disease ROS     Malignancy:  - neg malignancy ROS     Other:  - neg other ROS          Physical Exam    Airway  airway exam normal      Mallampati: II   TM distance: > 3 FB   Neck ROM: full   Mouth opening: > 3 cm    Respiratory Devices and Support         Dental       (+) Completely normal teeth      Cardiovascular          Rhythm and rate: regular and normal     Pulmonary   pulmonary exam normal        breath sounds clear to auscultation   (+) decreased breath sounds           OUTSIDE LABS:  CBC:   Lab Results   Component Value Date    WBC 5.8 01/10/2023    WBC 7.0 01/01/2023    HGB 9.8 (L) 01/10/2023    HGB 10.1 (L) 01/01/2023    HCT 30.5 (L) 01/10/2023    HCT 31.0 (L) 01/01/2023     01/10/2023     (L) 01/01/2023     BMP:   Lab Results   Component Value Date     (L) 01/10/2023     (L) 01/01/2023    POTASSIUM 5.1 01/10/2023    POTASSIUM 4.8 01/01/2023    CHLORIDE 97  (L) 01/10/2023    CHLORIDE 101 01/01/2023    CO2 24 01/10/2023    CO2 20 (L) 01/01/2023    BUN 28.5 (H) 01/10/2023    BUN 31.2 (H) 01/01/2023    CR 1.48 (H) 01/10/2023    CR 1.46 (H) 01/01/2023     (H) 01/10/2023    GLC 99 01/01/2023     COAGS:   Lab Results   Component Value Date    PTT 28 05/26/2014    INR 1.2 11/11/2015     POC:   Lab Results   Component Value Date    BGM 70 01/08/2021     HEPATIC:   Lab Results   Component Value Date    ALBUMIN 3.5 01/01/2023    PROTTOTAL 6.3 (L) 01/01/2023    ALT 10 01/01/2023    AST 21 01/01/2023    ALKPHOS 50 01/01/2023    BILITOTAL 0.4 01/01/2023     OTHER:   Lab Results   Component Value Date    LACT 1.0 01/01/2023    A1C 5.5 12/04/2019    LORRAINE 9.0 01/10/2023    MAG 1.8 01/09/2021    LIPASE 107 01/05/2021    TSH 2.02 11/22/2021    CRP 18.9 (H) 01/05/2021    SED 35 (H) 05/26/2014       Anesthesia Plan    ASA Status:  3   NPO Status:  NPO Appropriate    Anesthesia Type: MAC.   Induction: Propofol.   Maintenance: Balanced.        Consents    Anesthesia Plan(s) and associated risks, benefits, and realistic alternatives discussed. Questions answered and patient/representative(s) expressed understanding.     - Discussed: Risks, Benefits and Alternatives for BOTH SEDATION and the PROCEDURE were discussed     - Discussed with:  Patient      - Extended Intubation/Ventilatory Support Discussed: No.      - Patient is DNR/DNI Status: No    Use of blood products discussed: Yes.     - Discussed with: Patient.     - Consented: consented to blood products            Reason for refusal: other.     Postoperative Care    Pain management: IV analgesics, Multi-modal analgesia.   PONV prophylaxis: Ondansetron (or other 5HT-3)     Comments:                SUSAN LATHAM CRNA

## 2023-01-30 NOTE — ANESTHESIA POSTPROCEDURE EVALUATION
Patient: Jacklyn Hein    Procedure: Procedure(s):  REPLACEMENT, PULSE GENERATOR, CARDIAC PACEMAKER       Anesthesia Type:  MAC    Note:  Disposition: Outpatient   Postop Pain Control: Uneventful            Sign Out: Well controlled pain   PONV: No   Neuro/Psych: Uneventful            Sign Out: Acceptable/Baseline neuro status   Airway/Respiratory: Uneventful            Sign Out: Acceptable/Baseline resp. status   CV/Hemodynamics: Uneventful            Sign Out: Acceptable CV status; No obvious hypovolemia; No obvious fluid overload   Other NRE: NONE   DID A NON-ROUTINE EVENT OCCUR? No           Last vitals:  Vitals Value Taken Time   /57 01/30/23 1115   Temp 96.4  F (35.8  C) 01/30/23 1115   Pulse 65 01/30/23 1109   Resp     SpO2 97 % 01/30/23 1115   Vitals shown include unvalidated device data.    Electronically Signed By: SUSAN LATHAM CRNA  January 30, 2023  11:50 AM

## 2023-02-10 ENCOUNTER — TRANSFERRED RECORDS (OUTPATIENT)
Dept: HEALTH INFORMATION MANAGEMENT | Facility: CLINIC | Age: 88
End: 2023-02-10

## 2023-02-27 DIAGNOSIS — E78.00 HYPERCHOLESTEREMIA: ICD-10-CM

## 2023-02-27 DIAGNOSIS — I42.8 NON-ISCHEMIC CARDIOMYOPATHY (H): Primary | ICD-10-CM

## 2023-02-27 DIAGNOSIS — E78.2 MIXED HYPERLIPIDEMIA: ICD-10-CM

## 2023-02-28 RX ORDER — ROSUVASTATIN CALCIUM 10 MG/1
TABLET, COATED ORAL
Qty: 90 TABLET | Refills: 3 | Status: SHIPPED | OUTPATIENT
Start: 2023-02-28 | End: 2024-03-14

## 2023-02-28 NOTE — TELEPHONE ENCOUNTER
rosuvastatin (CRESTOR) 10 MG tablet      Last Written Prescription Date:  12-9-21  Last Fill Quantity: 90,   # refills: 3  Last Office Visit: 9-12-22  Future Office visit:    Next 5 appointments (look out 90 days)    Apr 19, 2023  3:15 PM  (Arrive by 3:00 PM)  SHORT with Ilia Moran MD  [facility] ([facility] ) [address]  [place] [address]  [phone]           Routing refill request to provider for review/approval because:   LDL on file in past 12 months  LDL Cholesterol Calculated   Date Value Ref Range Status   12/04/2019 138 (H) <100 mg/dL Final     Comment:     Above desirable:  100-129 mg/dl  Borderline High:  130-159 mg/dL  High:             160-189 mg/dL  Very high:       >189 mg/dl

## 2023-03-16 DIAGNOSIS — G62.9 NEUROPATHY: ICD-10-CM

## 2023-03-16 DIAGNOSIS — M48.062 SPINAL STENOSIS OF LUMBAR REGION WITH NEUROGENIC CLAUDICATION: ICD-10-CM

## 2023-03-16 DIAGNOSIS — M47.815 SPONDYLOSIS OF THORACOLUMBAR REGION WITHOUT MYELOPATHY OR RADICULOPATHY: ICD-10-CM

## 2023-03-17 RX ORDER — HYDROCODONE BITARTRATE AND ACETAMINOPHEN 5; 325 MG/1; MG/1
TABLET ORAL
Qty: 60 TABLET | Refills: 0 | Status: SHIPPED | OUTPATIENT
Start: 2023-03-17 | End: 2023-04-11

## 2023-03-17 NOTE — TELEPHONE ENCOUNTER
Norco 5-325 mg      Last Written Prescription Date:  2/15/23  Last Fill Quantity: 60,   # refills: 0  Last Office Visit: 1/10/23  Future Office visit:    Next 5 appointments (look out 90 days)    Apr 19, 2023  3:15 PM  (Arrive by 3:00 PM)  SHORT with Ilia Moran MD  [facility] ([facility] [place] ) [address]  [place] [address]  [phone]           Routing refill request to provider for review/approval because:  Drug not on the [facility], [facility] or [facility] refill protocol or controlled substance

## 2023-04-05 DIAGNOSIS — I48.0 PAROXYSMAL ATRIAL FIBRILLATION (H): ICD-10-CM

## 2023-04-06 ENCOUNTER — APPOINTMENT (OUTPATIENT)
Dept: GENERAL RADIOLOGY | Facility: HOSPITAL | Age: 88
End: 2023-04-06
Payer: MEDICARE

## 2023-04-06 ENCOUNTER — HOSPITAL ENCOUNTER (EMERGENCY)
Facility: HOSPITAL | Age: 88
Discharge: HOME | End: 2023-04-06
Payer: MEDICARE

## 2023-04-06 VITALS
OXYGEN SATURATION: 95 % | DIASTOLIC BLOOD PRESSURE: 67 MMHG | SYSTOLIC BLOOD PRESSURE: 112 MMHG | HEART RATE: 76 BPM | RESPIRATION RATE: 18 BRPM | TEMPERATURE: 97.8 F

## 2023-04-06 DIAGNOSIS — S81.811A SKIN TEAR OF LOWER LEG WITHOUT COMPLICATION, RIGHT, INITIAL ENCOUNTER: ICD-10-CM

## 2023-04-06 PROCEDURE — G0463 HOSPITAL OUTPT CLINIC VISIT: HCPCS

## 2023-04-06 PROCEDURE — 73590 X-RAY EXAM OF LOWER LEG: CPT | Mod: RT

## 2023-04-06 PROCEDURE — 99213 OFFICE O/P EST LOW 20 MIN: CPT

## 2023-04-06 ASSESSMENT — ENCOUNTER SYMPTOMS
WOUND: 1
COUGH: 0
ACTIVITY CHANGE: 1
CHILLS: 0
MYALGIAS: 1
FEVER: 0
DIARRHEA: 0
VOMITING: 0
NUMBNESS: 0
JOINT SWELLING: 0
ABDOMINAL PAIN: 0
SHORTNESS OF BREATH: 0
APPETITE CHANGE: 0
DIZZINESS: 0
NAUSEA: 0

## 2023-04-06 NOTE — ED TRIAGE NOTES
Pt presents with c/o a wound to the right lower leg. Reports she was at the salon and bumped it on a metal lluvia. States that since it happened she has had shooting pains going up her leg. Pt is currently on blood thinners. Denies bleeding disorders. Incident happened today. Pt has been taking tylenol, hydrocodone.

## 2023-04-06 NOTE — DISCHARGE INSTRUCTIONS
Tylenol as needed for pain.     Elevate your leg and apply ice for 15-20 minutes every 2-3 hours. Please make sure to protect skin to prevent frost bite.     Please follow up with wound care. Return with any fevers, chills, increased pain, or concerns.

## 2023-04-06 NOTE — ED TRIAGE NOTES
"\"Was at the [facility] solon today and my right lower leg got bumped.  I have a wound to the right lower leg and I put one of my 's dressings on it.  I am also having pain in the right leg at intervals since this occurred.\"        "

## 2023-04-06 NOTE — ED PROVIDER NOTES
History     Chief Complaint   Patient presents with     Wound Check     Leg Pain     HPI  Jacklyntasia Hein is a 87 year old female who complains of an abrasion along with sharp shooting pains to right lower leg after bumping leg on a metal lluvia at hair salon. Incident occurred at 1100 today. At 1430 she noticed the abrasion and started having intermittent sharp shooting pains. She denies numbness/tinlging and inability to bear weight. She does take Eliquis. There is no uncontrolled bleeding. She has taken hydrocodone and acetaminophen without relief. She denies fevers, chills, dizziness, n/v/d, chest pain, and shortness of breath.     Allergies:  No Known Allergies    Problem List:    Patient Active Problem List    Diagnosis Date Noted     Anticoagulated by anticoagulation treatment 11/22/2021     Priority: Medium     Primary pulmonary hypertension (H) 11/22/2021     Priority: Medium     Age-related osteoporosis with current pathological fracture with routine healing, subsequent encounter 11/22/2021     Priority: Medium     Chronic, continuous use of opioids 05/17/2021     Priority: Medium     Bacteremia due to Klebsiella pneumoniae on 1/5/2021 02/22/2021     Priority: Medium     Mixed hyperlipidemia 02/21/2021     Priority: Medium     Osteoarthritis of thoracolumbar spine 01/06/2021     Priority: Medium     Shock liver 01/06/2021     Priority: Medium     Abnormal finding on urinalysis 01/06/2021     Priority: Medium     Neuropathy 08/19/2020     Priority: Medium     Chronic systolic CHF (congestive heart failure) (H) 02/06/2020     Priority: Medium     Spinal stenosis of lumbar region with neurogenic claudication 02/06/2020     Priority: Medium     S/p ERP: Decompression Levels: L3 to L5 Side: Bilat on 2/6/2020 with Dr. 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 06/03/2019     Priority: Medium     LBBB (left bundle branch block) 06/01/2018     Priority: Medium     History of tobacco abuse quitting on 2/1/1998 06/01/2018     Priority: Medium     Mild intermittent asthma, unspecified whether complicated 06/01/2018     Priority: Medium     Chronic obstructive pulmonary disease, unspecified COPD type (H) 06/01/2018     Priority: Medium     SOB (shortness of breath) 06/01/2018     Priority: Medium     Lumbar spine pain 10/27/2015     Priority: Medium     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 Provider to review       Non-ischemic cardiomyopathy (H) 12/10/2013     Priority: Medium     Congestive heart failure, NYHA class 2, unspecified congestive heart failure type (H) 12/10/2013     Priority: Medium     Insomnia 01/01/2011     Priority: Medium     Problem list name updated by automated process. Provider to review       Disorder of bone and cartilage 01/01/2011     Priority: Medium     Problem list name updated by automated process. Provider to review       Essential hypertension 01/01/2011     Priority: Medium     Problem list name updated by automated process. Provider to review       Neck pain, chronic 01/01/2011     Priority: Medium     Hypercholesteremia 06/07/2002     Priority: Medium        Past Medical History:    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     Insomnia, unspecified 01/01/2011     LBBB (left bundle branch block) 01/01/2011     Neck pain, chronic 01/01/2011     Non-ischemic cardiomyopathy (H) 12/10/2013     Obesity, unspecified 01/01/2011     Osteopenia 01/01/2011     Pacemaker       Pain in joint, lower leg 2006     Pure hypercholesterolemia 2002     Unspecified essential hypertension 2011       Past Surgical History:    Past Surgical History:   Procedure Laterality Date     APPENDECTOMY       ARTHROSCOPY KNEE RT/LT      RT     BACK SURGERY  2020     BACK SURGERY  2021    fractured vert repair     CARDIAC SURGERY  2014    Pacemaker     Cataract extraction  2009    Bilateral     CHOLECYSTECTOMY      open      COLONOSCOPY  2001    Normal      COLONOSCOPY N/A 10/20/2016    Procedure: COLONOSCOPY;  Surgeon: Charan Montejo MD;  Location: [facility]     colonoscopy with polypectomy      Repeat 3 years      CT CORONARY ANGIOGRAM      normal     HERPES ZOSTER PCR ([facility])       IR CONSULTATION FOR IR EXAM  2020     left eye scar tissue       normal echo      fen-phen use       x6       REPLACE PACEMAKER GENERATOR N/A 2023    Procedure: REPLACEMENT, PULSE GENERATOR, CARDIAC PACEMAKER;  Surgeon: Isiah Hodge MD;  Location:  OR     SURGICAL RADIOLOGY PROCEDURE N/A 2016    Procedure: SURGICAL RADIOLOGY PROCEDURE;  Surgeon: Provider, Generic Perianesthesia Nursing;  Location: [facility]       Family History:    Family History   Problem Relation Age of Onset     Cancer Mother         lung (cause of death)      Peptic Ulcer Disease Father         gastric        Social History:  Marital Status:   [2]  Social History     Tobacco Use     Smoking status: Former     Packs/day: 1.00     Years: 15.00     Pack years: 15.00     Types: Cigarettes     Start date: 1983     Quit date: 1998     Years since quittin.1     Smokeless tobacco: Never     Tobacco comments:     Passive Exposure: No; Longest Tobacco Free: 15 years    Vaping Use     Vaping status: Never Used   Substance Use Topics     Alcohol use: Not Currently     Comment: Occasionally      Drug use: No        Medications:    acetaminophen (TYLENOL) 325 MG  tablet  budesonide-formoterol (SYMBICORT) 160-4.5 MCG/ACT Inhaler  Calcium Carbonate-Vitamin D (CALCIUM 600 + D OR)  carvedilol (COREG) 12.5 MG tablet  ELIQUIS ANTICOAGULANT 5 MG tablet  ENTRESTO 49-51 MG per tablet  gabapentin (NEURONTIN) 400 MG capsule  HYDROcodone-acetaminophen (NORCO) 5-325 MG tablet  Multiple Vitamin (MULTI-VITAMIN DAILY PO)  oxyCODONE (ROXICODONE) 5 MG tablet  rosuvastatin (CRESTOR) 10 MG tablet  SPIRIVA RESPIMAT 2.5 MCG/ACT inhaler  spironolactone (ALDACTONE) 25 MG tablet          Review of Systems   Constitutional: Positive for activity change. Negative for appetite change, chills and fever.   Respiratory: Negative for cough and shortness of breath.    Cardiovascular: Negative for chest pain.   Gastrointestinal: Negative for abdominal pain, diarrhea, nausea and vomiting.   Musculoskeletal: Positive for myalgias (right lower leg, shooting pain). Negative for gait problem and joint swelling.   Skin: Positive for wound (right lower leg, skin tear).   Neurological: Negative for dizziness and numbness.       Physical Exam   BP: 112/67  Pulse: 76  Temp: 97.8  F (36.6  C)  Resp: 18  SpO2: 95 %      Physical Exam  Vitals and nursing note reviewed.   Constitutional:       General: She is in acute distress.      Appearance: Normal appearance. She is not ill-appearing.   Cardiovascular:      Rate and Rhythm: Normal rate and regular rhythm.   Musculoskeletal:         General: Tenderness (lateral and medial right lower leg) and signs of injury present. No swelling.   Skin:     General: Skin is warm and dry.      Capillary Refill: Capillary refill takes less than 2 seconds.      Coloration: Skin is not pale.      Findings: Bruising present. No erythema or rash.   Neurological:      General: No focal deficit present.      Mental Status: She is alert and oriented to person, place, and time.      Motor: No weakness.         ED Course                 Procedures                Results for orders placed or  performed during the hospital encounter of 04/06/23 (from the past 24 hour(s))   XR Tibia and Fibula Right 2 Views    Narrative    PROCEDURE:  XR TIBIA AND FIBULA RIGHT 2 VIEWS    HISTORY: tenderness to lateral and medial lower leg. Abrasion to  anterior leg. On eliquis    COMPARISON:  Radiographs 6/29/2022    TECHNIQUE:  XR TIBIA AND FIBULA RIGHT 2 VIEWS    FINDINGS:  No fracture or dislocation is identified. Mild to moderate  degenerative disease of the knee. Soft tissue swelling about the  ankle. Venous varicosities project over the proximal calf. No foreign  body is seen.       Impression    IMPRESSION:   No acute osseous abnormality.    DAILY BARAJAS MD         SYSTEM ID:  [id number]       Medications - No data to display    Assessments & Plan (with Medical Decision Making)     I have reviewed the nursing notes.    I have reviewed the findings, diagnosis, plan and need for follow up with the patient.  Jacklyn Hein is a 87 year old female who complains of an abrasion along with sharp shooting pains to right lower leg after bumping leg on a metal lluvia at hair [facility]. Incident occurred at 1100 today. At 1430 she noticed the abrasion and started having intermittent sharp shooting pains. She denies numbness/tinlging and inability to bear weight. She does take Eliquis. There is no uncontrolled bleeding. She has taken hydrocodone and acetaminophen without relief. She denies fevers, chills, dizziness, n/v/d, chest pain, and shortness of breath.     (S81.021W) Skin tear of lower leg without complication, right, initial encounter  Plan: Wound Care Referral ([facility]). mepilex dressing applied in UC. Change dressing every 2-3 days or if it becomes saturated. Elevate leg to reduce swelling. Ice for 15-20 minutes every 2-3 hours making sure to protect skin from frost bite. Tylenol as needed for pain return with any fevers, chills, redness, and drainage from wound. Follow up with PCP as needed. Understanding verbalized.      MDM: Xray of right tib fib negative for fracture, per radiologist. VSS and afebrile. No uncontrolled bleeding. She is able to bear weight. CMS intact and pulses present and equal. There is no numbness or tingling. Adele starting having intermittent shooting pains three hours after injury. Consideration for nerve pain secondary to swelling and injury. She has taken hydrocodone and tylenol at home without relief. Educated on elevating and icing to reduce swelling. She will return with any increased pain or concerns.               Discharge Medication List as of 4/6/2023  6:28 PM          Final diagnoses:   Skin tear of lower leg without complication, right, initial encounter       4/6/2023   [facility]     Tami Covarrubias NP  04/06/23 [id number]

## 2023-04-07 RX ORDER — APIXABAN 5 MG/1
TABLET, FILM COATED ORAL
Qty: 180 TABLET | Refills: 0 | Status: ON HOLD | OUTPATIENT
Start: 2023-04-07 | End: 2023-06-24

## 2023-04-07 NOTE — TELEPHONE ENCOUNTER
ELIQUIS ANTICOAGULANT 5 MG tablet 180 tablet 1 10/5/2022     Last Office Visit: 1/10/23    Future Office visit:    Next 5 appointments (look out 90 days)    Apr 19, 2023  3:15 PM  (Arrive by 3:00 PM)  SHORT with Ilia Moran MD  [facility] [place] ([facility] [place] ) [address]  [place] [address]  [phone]           Routing refill request to provider for review/approval because:

## 2023-04-09 ENCOUNTER — HEALTH MAINTENANCE LETTER (OUTPATIENT)
Age: 88
End: 2023-04-09

## 2023-04-10 ENCOUNTER — TELEPHONE (OUTPATIENT)
Dept: WOUND CARE | Facility: OTHER | Age: 88
End: 2023-04-10

## 2023-04-10 DIAGNOSIS — G62.9 NEUROPATHY: ICD-10-CM

## 2023-04-10 DIAGNOSIS — M48.062 SPINAL STENOSIS OF LUMBAR REGION WITH NEUROGENIC CLAUDICATION: ICD-10-CM

## 2023-04-10 DIAGNOSIS — M47.815 SPONDYLOSIS OF THORACOLUMBAR REGION WITHOUT MYELOPATHY OR RADICULOPATHY: ICD-10-CM

## 2023-04-10 NOTE — TELEPHONE ENCOUNTER
micheleco      Last Written Prescription Date:  3/17/23  Last Fill Quantity: 60,   # refills: 0  Last Office Visit: 1/10/23  Future Office visit:    Next 5 appointments (look out 90 days)    Apr 19, 2023  3:15 PM  (Arrive by 3:00 PM)  SHORT with Ilia Moran MD  [facility] [place] ([facility] [place] ) [address]  [place] [address]  [phone]

## 2023-04-10 NOTE — TELEPHONE ENCOUNTER
Attempted to call patient to get her scheduled with wound care. If patient returns call to clinic please schedule with next available In wound care or you can transfer to my ext. *[phone].

## 2023-04-11 RX ORDER — HYDROCODONE BITARTRATE AND ACETAMINOPHEN 5; 325 MG/1; MG/1
TABLET ORAL
Qty: 60 TABLET | Refills: 0 | Status: SHIPPED | OUTPATIENT
Start: 2023-04-11 | End: 2023-05-02

## 2023-05-01 ENCOUNTER — TELEPHONE (OUTPATIENT)
Dept: FAMILY MEDICINE | Facility: OTHER | Age: 88
End: 2023-05-01

## 2023-05-01 NOTE — TELEPHONE ENCOUNTER
Received a PA from [facility] for Spiriva Respimat 2.5MCG/ACT aerosol. Submitted on CMM. Waiting for a response.

## 2023-05-04 ENCOUNTER — TELEPHONE (OUTPATIENT)
Dept: FAMILY MEDICINE | Facility: OTHER | Age: 88
End: 2023-05-04

## 2023-05-04 DIAGNOSIS — J44.9 CHRONIC OBSTRUCTIVE PULMONARY DISEASE, UNSPECIFIED COPD TYPE (H): Primary | ICD-10-CM

## 2023-05-04 NOTE — TELEPHONE ENCOUNTER
Received a DENIAL from [facility] for Spiriva Respimat 2.5mcg/act aerosol.     Forms scanned to Epic.

## 2023-05-04 NOTE — TELEPHONE ENCOUNTER
LVM calling with medication up date. Please see note below and if patient wants to try alternative.  Ninoska Sanders LPN

## 2023-05-04 NOTE — TELEPHONE ENCOUNTER
Received a PA from [facility] for Budesonide-Formoterol Fumarate 160-4.5MCG/ACT aerosol. Submitted on CMM. Waiting for a response.

## 2023-05-05 DIAGNOSIS — J43.8 OTHER EMPHYSEMA (H): ICD-10-CM

## 2023-05-05 RX ORDER — BUDESONIDE AND FORMOTEROL FUMARATE DIHYDRATE 160; 4.5 UG/1; UG/1
AEROSOL RESPIRATORY (INHALATION)
Qty: 10.2 G | Refills: 11 | Status: ON HOLD | OUTPATIENT
Start: 2023-05-05 | End: 2023-06-20

## 2023-05-05 RX ORDER — BUDESONIDE AND FORMOTEROL FUMARATE DIHYDRATE 160; 4.5 UG/1; UG/1
2 AEROSOL RESPIRATORY (INHALATION) 2 TIMES DAILY
Qty: 10.2 G | Refills: 11 | Status: CANCELLED | OUTPATIENT
Start: 2023-05-05

## 2023-05-05 NOTE — TELEPHONE ENCOUNTER
Hospitalist Progress Note NAME: Natalee Rebollar :  3/15/1932 MRN:  [id number] Assessment / Plan: 
Update Case mgmt could not  Get hold of family to assess home situation and can not be discharged today Chronic URENA  Unclear cause improved ? Relation to Hiatal Hernia, ? Relation to Moderate Mitral Regurgitation Symptoms improved and he is now room air and denies any sob CXR Clear Recent ECHO shows normal EF with and  moderate regurgitation. CXR shows clear lung Check BNP level Hold metoprolol due to bradycardia. Cont lasix. No further work up recommended by Dr Zayda Troy 
-cont  PPI Will need out pt f/u with surgery due to large hiatal hernia Hypothyroidism new onset with very high TSH 
-start levothyroxine 25 mcg and will need repeat TFT in 6 weeks 
  
Unsteady Gait likely due to orthostasis and sinus bradycardia  
-stop seroquel as antipsychotics can cause gait disturbance 
-recent B12 checked and is wnl 
-orthostatics were positive so will hold metoprolol and Norvasc and reassess blood pressure. Recheck orthostatic vitals -PT was not clear in their recommendations for discharge to Home vs SNF, I called them but they did not pick phone 
-MRI from 2018 reviewed, no indication to repeat at this time 
-duplex Carotid shows less than 50% stenosis 
 
  
CKD stage III: 
BPH: 
-renal function appears to be at baseline, continue to monitor 
-continue flomax 
 
  
Dementia: 
-continue Low dose Aricept and Namenda 
-stop Aricept due to bradycardia ? NSVT vs artefact Most likely artifact 
ekg nsr with base line RBBB Bmp and Mg ordered for am  
 
4.5 mm lung nodule Will need out pt f/u Dispo: Called family to discuss home situation but they did not , case mgmt consulted for discharge planning. Reconsult PT for recommendations regarding discharge. Possible discharge in am. I left voice messages to both and son and daughter to call me back. 
  
Code Status: Full Received a DENIAL from [facility] for Budesonide-formoterol Fumarate 160-4.5mcg/act aerosol.     Forms scanned to Epic.   Surrogate Decision Maker: Vito Amador 
  
DVT Prophylaxis: sq heparin GI Prophylaxis: not indicated 
  
Baseline: declining functional status Body mass index is 25.77 kg/m². Subjective: Chief Complaint / Reason for Physician Visit Doing well. Denies dizzy or lightheadedness. Review of Systems: 
Symptom Y/N Comments  Symptom Y/N Comments Fever/Chills    Chest Pain Poor Appetite    Edema Cough    Abdominal Pain Sputum    Joint Pain SOB/URENA y   Pruritis/Rash Nausea/vomit    Tolerating PT/OT Diarrhea    Tolerating Diet Constipation    Other Could NOT obtain due to:   
 
Objective: VITALS:  
Last 24hrs VS reviewed since prior progress note. Most recent are: 
Patient Vitals for the past 24 hrs: 
 Temp Pulse Resp BP SpO2  
01/31/19 1227 97.4 °F (36.3 °C) (!) 57 18 145/70 100 % 01/31/19 0820 97.8 °F (36.6 °C) (!) 50 18 113/64 99 % 01/31/19 0337 97.5 °F (36.4 °C) (!) 52 16 126/64 98 % 01/30/19 2324 97.6 °F (36.4 °C) 60 18 110/61 98 % 01/30/19 1933 97.5 °F (36.4 °C) 61 18 105/72 100 % 01/30/19 1517 97.6 °F (36.4 °C) (!) 56 18 118/60 99 % Intake/Output Summary (Last 24 hours) at 1/31/2019 1347 Last data filed at 1/31/2019 1336 Gross per 24 hour Intake 780 ml Output  Net 780 ml PHYSICAL EXAM: 
General: cooperative, no acute distress   
EENT:  EOMI. Anicteric sclerae. MMM Resp:  CTA bilaterally, no wheezing or rales. No accessory muscle use CV:  Regular  rhythm,  No edema GI:  Soft, Non distended, Non tender.  +Bowel sounds Neurologic:  Alert and awake, normal speech, Psych:   Not anxious nor agitated Skin:  No rashes. No jaundice Reviewed most current lab test results and cultures  YES Reviewed most current radiology test results   YES Review and summation of old records today    NO Reviewed patient's current orders and MAR    YES 
PMH/SH reviewed - no change compared to H&P 
 
 
 
 
 Current Facility-Administered Medications:  
  influenza vaccine 2018-19 (6 mos+)(PF) (FLUARIX QUAD/FLULAVAL QUAD) injection 0.5 mL, 0.5 mL, IntraMUSCular, PRIOR TO DISCHARGE, Erlinda Danielle MD 
  allopurinol (ZYLOPRIM) tablet 100 mg, 100 mg, Oral, DAILY, Erlinda Danielle MD, 100 mg at 01/31/19 1028   furosemide (LASIX) tablet 20 mg, 20 mg, Oral, DAILY, Erlinda Danielle MD, 20 mg at 01/31/19 1028   memantine (NAMENDA) tablet 10 mg, 10 mg, Oral, BID, Erlinda Danielle MD, 10 mg at 01/31/19 1028   tamsulosin (FLOMAX) capsule 0.4 mg, 0.4 mg, Oral, DAILY, Erlinda Danielle MD, 0.4 mg at 01/31/19 1028   sodium chloride (NS) flush 5-40 mL, 5-40 mL, IntraVENous, Q8H, Erlinda Danielle MD, 10 mL at 01/30/19 2128   sodium chloride (NS) flush 5-40 mL, 5-40 mL, IntraVENous, PRN, Erlinda Danielle MD, 10 mL at 01/31/19 8707   acetaminophen (TYLENOL) tablet 650 mg, 650 mg, Oral, Q4H PRN, Erlinda Danielle MD 
  ondansetron (ZOFRAN) injection 4 mg, 4 mg, IntraVENous, Q4H PRN, Erlinda Danielle MD 
  heparin (porcine) injection 5,000 Units, 5,000 Units, SubCUTAneous, Q8H, Erlinda Danielle MD, 5,000 Units at 01/31/19 2501   pantoprazole (PROTONIX) tablet 40 mg, 40 mg, Oral, ACB, Erlinda Danielle MD, 40 mg at 01/31/19 1028   levothyroxine (SYNTHROID) tablet 25 mcg, 25 mcg, Oral, Edmon Call, MD, 25 mcg at 01/31/19 0360 
________________________________________________________________________ Care Plan discussed with: 
  Comments Patient y Family RN y   
Care Manager Consultant Multidiciplinary team rounds were held today with , nursing, pharmacist and clinical coordinator. Patient's plan of care was discussed; medications were reviewed and discharge planning was addressed. ________________________________________________________________________ Total NON critical care TIME:  25   Minutes Total CRITICAL CARE TIME Spent:   Minutes non procedure based Comments >50% of visit spent in counseling and coordination of care    
________________________________________________________________________ Barbi Porter MD  
 
Procedures: see electronic medical records for all procedures/Xrays and details which were not copied into this note but were reviewed prior to creation of Plan. LABS: 
I reviewed today's most current labs and imaging studies. Pertinent labs include: 
Recent Labs  
  01/30/19 
0245 01/28/19 
1708 WBC 5.9 6.6 HGB 11.3* 12.4 HCT 34.0* 37.3  163 Recent Labs  
  01/30/19 
0245 01/28/19 
1708  140  
K 3.5 4.0  
 107 CO2 24 24 GLU 85 80 BUN 32* 33* CREA 1.90* 2.12* CA 8.1* 8.7 ALB 3.1* 3.8 TBILI 0.5 0.9 SGOT 24 28 ALT 26 34 Signed: Barbi Porter MD

## 2023-05-05 NOTE — TELEPHONE ENCOUNTER
Patient is willing to take the alternative medication, Incruse Ellipta and states that they need it today. Patient also requesting Symbicort.

## 2023-05-10 ENCOUNTER — ANCILLARY PROCEDURE (OUTPATIENT)
Dept: CARDIOLOGY | Facility: CLINIC | Age: 88
End: 2023-05-10
Attending: INTERNAL MEDICINE
Payer: MEDICARE

## 2023-05-10 DIAGNOSIS — Z95.810 AUTOMATIC IMPLANTABLE CARDIOVERTER-DEFIBRILLATOR IN SITU: ICD-10-CM

## 2023-05-10 PROCEDURE — 93296 REM INTERROG EVL PM/IDS: CPT

## 2023-05-10 PROCEDURE — 93295 DEV INTERROG REMOTE 1/2/MLT: CPT | Performed by: INTERNAL MEDICINE

## 2023-05-14 LAB
MDC_IDC_LEAD_IMPLANT_DT: NORMAL
MDC_IDC_LEAD_LOCATION: NORMAL
MDC_IDC_LEAD_LOCATION_DETAIL_1: NORMAL
MDC_IDC_LEAD_MFG: NORMAL
MDC_IDC_LEAD_MODEL: NORMAL
MDC_IDC_LEAD_POLARITY_TYPE: NORMAL
MDC_IDC_LEAD_SERIAL: NORMAL
MDC_IDC_MSMT_BATTERY_DTM: NORMAL
MDC_IDC_MSMT_BATTERY_REMAINING_LONGEVITY: 128 MO
MDC_IDC_MSMT_BATTERY_RRT_TRIGGER: NORMAL
MDC_IDC_MSMT_BATTERY_STATUS: NORMAL
MDC_IDC_MSMT_BATTERY_VOLTAGE: 3.07 V
MDC_IDC_MSMT_CAP_CHARGE_DTM: NORMAL
MDC_IDC_MSMT_CAP_CHARGE_ENERGY: 18 J
MDC_IDC_MSMT_CAP_CHARGE_TIME: 3.8 S
MDC_IDC_MSMT_CAP_CHARGE_TYPE: NORMAL
MDC_IDC_MSMT_LEADCHNL_LV_IMPEDANCE_VALUE: 418 OHM
MDC_IDC_MSMT_LEADCHNL_LV_IMPEDANCE_VALUE: 475 OHM
MDC_IDC_MSMT_LEADCHNL_LV_IMPEDANCE_VALUE: 855 OHM
MDC_IDC_MSMT_LEADCHNL_RA_IMPEDANCE_VALUE: 399 OHM
MDC_IDC_MSMT_LEADCHNL_RA_PACING_THRESHOLD_AMPLITUDE: 0.62 V
MDC_IDC_MSMT_LEADCHNL_RA_PACING_THRESHOLD_PULSEWIDTH: 0.4 MS
MDC_IDC_MSMT_LEADCHNL_RA_SENSING_INTR_AMPL: 1.8 MV
MDC_IDC_MSMT_LEADCHNL_RV_IMPEDANCE_VALUE: 342 OHM
MDC_IDC_MSMT_LEADCHNL_RV_IMPEDANCE_VALUE: 399 OHM
MDC_IDC_MSMT_LEADCHNL_RV_PACING_THRESHOLD_AMPLITUDE: 0.88 V
MDC_IDC_MSMT_LEADCHNL_RV_PACING_THRESHOLD_PULSEWIDTH: 0.4 MS
MDC_IDC_MSMT_LEADCHNL_RV_SENSING_INTR_AMPL: 8.3 MV
MDC_IDC_PG_IMPLANT_DTM: NORMAL
MDC_IDC_PG_MFG: NORMAL
MDC_IDC_PG_MODEL: NORMAL
MDC_IDC_PG_SERIAL: NORMAL
MDC_IDC_PG_TYPE: NORMAL
MDC_IDC_SESS_CLINIC_NAME: NORMAL
MDC_IDC_SESS_DTM: NORMAL
MDC_IDC_SESS_TYPE: NORMAL
MDC_IDC_SET_BRADY_AT_MODE_SWITCH_RATE: 171 {BEATS}/MIN
MDC_IDC_SET_BRADY_LOWRATE: 60 {BEATS}/MIN
MDC_IDC_SET_BRADY_MAX_SENSOR_RATE: 130 {BEATS}/MIN
MDC_IDC_SET_BRADY_MAX_TRACKING_RATE: 130 {BEATS}/MIN
MDC_IDC_SET_BRADY_MODE: NORMAL
MDC_IDC_SET_BRADY_PAV_DELAY_LOW: 160 MS
MDC_IDC_SET_BRADY_SAV_DELAY_LOW: 140 MS
MDC_IDC_SET_CRT_LVRV_DELAY: 10 MS
MDC_IDC_SET_CRT_PACED_CHAMBERS: NORMAL
MDC_IDC_SET_LEADCHNL_LV_PACING_AMPLITUDE: 1.5 V
MDC_IDC_SET_LEADCHNL_LV_PACING_ANODE_ELECTRODE_1: NORMAL
MDC_IDC_SET_LEADCHNL_LV_PACING_ANODE_LOCATION_1: NORMAL
MDC_IDC_SET_LEADCHNL_LV_PACING_CAPTURE_MODE: NORMAL
MDC_IDC_SET_LEADCHNL_LV_PACING_CATHODE_ELECTRODE_1: NORMAL
MDC_IDC_SET_LEADCHNL_LV_PACING_CATHODE_LOCATION_1: NORMAL
MDC_IDC_SET_LEADCHNL_LV_PACING_POLARITY: NORMAL
MDC_IDC_SET_LEADCHNL_LV_PACING_PULSEWIDTH: 0.4 MS
MDC_IDC_SET_LEADCHNL_RA_PACING_AMPLITUDE: 1.5 V
MDC_IDC_SET_LEADCHNL_RA_PACING_ANODE_ELECTRODE_1: NORMAL
MDC_IDC_SET_LEADCHNL_RA_PACING_ANODE_LOCATION_1: NORMAL
MDC_IDC_SET_LEADCHNL_RA_PACING_CAPTURE_MODE: NORMAL
MDC_IDC_SET_LEADCHNL_RA_PACING_CATHODE_ELECTRODE_1: NORMAL
MDC_IDC_SET_LEADCHNL_RA_PACING_CATHODE_LOCATION_1: NORMAL
MDC_IDC_SET_LEADCHNL_RA_PACING_POLARITY: NORMAL
MDC_IDC_SET_LEADCHNL_RA_PACING_PULSEWIDTH: 0.4 MS
MDC_IDC_SET_LEADCHNL_RA_SENSING_ANODE_ELECTRODE_1: NORMAL
MDC_IDC_SET_LEADCHNL_RA_SENSING_ANODE_LOCATION_1: NORMAL
MDC_IDC_SET_LEADCHNL_RA_SENSING_CATHODE_ELECTRODE_1: NORMAL
MDC_IDC_SET_LEADCHNL_RA_SENSING_CATHODE_LOCATION_1: NORMAL
MDC_IDC_SET_LEADCHNL_RA_SENSING_POLARITY: NORMAL
MDC_IDC_SET_LEADCHNL_RA_SENSING_SENSITIVITY: 0.3 MV
MDC_IDC_SET_LEADCHNL_RV_PACING_AMPLITUDE: 2 V
MDC_IDC_SET_LEADCHNL_RV_PACING_ANODE_ELECTRODE_1: NORMAL
MDC_IDC_SET_LEADCHNL_RV_PACING_ANODE_LOCATION_1: NORMAL
MDC_IDC_SET_LEADCHNL_RV_PACING_CAPTURE_MODE: NORMAL
MDC_IDC_SET_LEADCHNL_RV_PACING_CATHODE_ELECTRODE_1: NORMAL
MDC_IDC_SET_LEADCHNL_RV_PACING_CATHODE_LOCATION_1: NORMAL
MDC_IDC_SET_LEADCHNL_RV_PACING_POLARITY: NORMAL
MDC_IDC_SET_LEADCHNL_RV_PACING_PULSEWIDTH: 0.4 MS
MDC_IDC_SET_LEADCHNL_RV_SENSING_ANODE_ELECTRODE_1: NORMAL
MDC_IDC_SET_LEADCHNL_RV_SENSING_ANODE_LOCATION_1: NORMAL
MDC_IDC_SET_LEADCHNL_RV_SENSING_CATHODE_ELECTRODE_1: NORMAL
MDC_IDC_SET_LEADCHNL_RV_SENSING_CATHODE_LOCATION_1: NORMAL
MDC_IDC_SET_LEADCHNL_RV_SENSING_POLARITY: NORMAL
MDC_IDC_SET_LEADCHNL_RV_SENSING_SENSITIVITY: 0.3 MV
MDC_IDC_SET_ZONE_DETECTION_BEATS_DENOMINATOR: 40 {BEATS}
MDC_IDC_SET_ZONE_DETECTION_BEATS_NUMERATOR: 30 {BEATS}
MDC_IDC_SET_ZONE_DETECTION_INTERVAL: 240 MS
MDC_IDC_SET_ZONE_DETECTION_INTERVAL: 320 MS
MDC_IDC_SET_ZONE_DETECTION_INTERVAL: 350 MS
MDC_IDC_SET_ZONE_DETECTION_INTERVAL: 360 MS
MDC_IDC_SET_ZONE_DETECTION_INTERVAL: 400 MS
MDC_IDC_SET_ZONE_TYPE: NORMAL
MDC_IDC_STAT_AT_BURDEN_PERCENT: 0 %
MDC_IDC_STAT_AT_DTM_END: NORMAL
MDC_IDC_STAT_AT_DTM_START: NORMAL
MDC_IDC_STAT_BRADY_DTM_END: NORMAL
MDC_IDC_STAT_BRADY_DTM_START: NORMAL
MDC_IDC_STAT_BRADY_RV_PERCENT_PACED: 4.78 %
MDC_IDC_STAT_CRT_DTM_END: NORMAL
MDC_IDC_STAT_CRT_DTM_START: NORMAL
MDC_IDC_STAT_CRT_LV_PERCENT_PACED: 98.39 %
MDC_IDC_STAT_CRT_PERCENT_PACED: 4.75 %
MDC_IDC_STAT_EPISODE_RECENT_COUNT: 0
MDC_IDC_STAT_EPISODE_RECENT_COUNT_DTM_END: NORMAL
MDC_IDC_STAT_EPISODE_RECENT_COUNT_DTM_START: NORMAL
MDC_IDC_STAT_EPISODE_TOTAL_COUNT: 0
MDC_IDC_STAT_EPISODE_TOTAL_COUNT_DTM_END: NORMAL
MDC_IDC_STAT_EPISODE_TOTAL_COUNT_DTM_START: NORMAL
MDC_IDC_STAT_EPISODE_TYPE: NORMAL
MDC_IDC_STAT_TACHYTHERAPY_ATP_DELIVERED_RECENT: 0
MDC_IDC_STAT_TACHYTHERAPY_ATP_DELIVERED_TOTAL: 0
MDC_IDC_STAT_TACHYTHERAPY_RECENT_DTM_END: NORMAL
MDC_IDC_STAT_TACHYTHERAPY_RECENT_DTM_START: NORMAL
MDC_IDC_STAT_TACHYTHERAPY_SHOCKS_ABORTED_RECENT: 0
MDC_IDC_STAT_TACHYTHERAPY_SHOCKS_ABORTED_TOTAL: 0
MDC_IDC_STAT_TACHYTHERAPY_SHOCKS_DELIVERED_RECENT: 0
MDC_IDC_STAT_TACHYTHERAPY_SHOCKS_DELIVERED_TOTAL: 0
MDC_IDC_STAT_TACHYTHERAPY_TOTAL_DTM_END: NORMAL
MDC_IDC_STAT_TACHYTHERAPY_TOTAL_DTM_START: NORMAL

## 2023-05-15 ENCOUNTER — ANCILLARY PROCEDURE (OUTPATIENT)
Dept: GENERAL RADIOLOGY | Facility: OTHER | Age: 88
End: 2023-05-15
Attending: FAMILY MEDICINE
Payer: MEDICARE

## 2023-05-15 ENCOUNTER — OFFICE VISIT (OUTPATIENT)
Dept: FAMILY MEDICINE | Facility: OTHER | Age: 88
End: 2023-05-15
Attending: FAMILY MEDICINE
Payer: COMMERCIAL

## 2023-05-15 ENCOUNTER — TELEPHONE (OUTPATIENT)
Dept: FAMILY MEDICINE | Facility: OTHER | Age: 88
End: 2023-05-15

## 2023-05-15 VITALS
WEIGHT: 150 LBS | SYSTOLIC BLOOD PRESSURE: 80 MMHG | HEART RATE: 73 BPM | TEMPERATURE: 97.9 F | HEIGHT: 64 IN | BODY MASS INDEX: 25.61 KG/M2 | DIASTOLIC BLOOD PRESSURE: 47 MMHG | OXYGEN SATURATION: 92 %

## 2023-05-15 DIAGNOSIS — Z87.81 H/O COMPRESSION FRACTURE OF SPINE: ICD-10-CM

## 2023-05-15 DIAGNOSIS — M47.24 OSTEOARTHRITIS OF SPINE WITH RADICULOPATHY, THORACIC REGION: ICD-10-CM

## 2023-05-15 DIAGNOSIS — M80.00XA AGE-RELATED OSTEOPOROSIS WITH CURRENT PATHOLOGICAL FRACTURE, INITIAL ENCOUNTER: ICD-10-CM

## 2023-05-15 DIAGNOSIS — M54.6 ACUTE MIDLINE THORACIC BACK PAIN: ICD-10-CM

## 2023-05-15 DIAGNOSIS — M54.6 ACUTE MIDLINE THORACIC BACK PAIN: Primary | ICD-10-CM

## 2023-05-15 PROCEDURE — 99213 OFFICE O/P EST LOW 20 MIN: CPT | Performed by: FAMILY MEDICINE

## 2023-05-15 PROCEDURE — G0463 HOSPITAL OUTPT CLINIC VISIT: HCPCS

## 2023-05-15 PROCEDURE — 72070 X-RAY EXAM THORAC SPINE 2VWS: CPT | Mod: TC

## 2023-05-15 RX ORDER — CELECOXIB 200 MG/1
200 CAPSULE ORAL DAILY
Qty: 30 CAPSULE | Refills: 0 | Status: ON HOLD | OUTPATIENT
Start: 2023-05-15 | End: 2023-06-20

## 2023-05-15 RX ORDER — LIDOCAINE 50 MG/G
1 PATCH TOPICAL EVERY 24 HOURS
Qty: 30 PATCH | Refills: 4 | Status: CANCELLED | OUTPATIENT
Start: 2023-05-15

## 2023-05-15 ASSESSMENT — ANXIETY QUESTIONNAIRES
3. WORRYING TOO MUCH ABOUT DIFFERENT THINGS: NOT AT ALL
2. NOT BEING ABLE TO STOP OR CONTROL WORRYING: NOT AT ALL
7. FEELING AFRAID AS IF SOMETHING AWFUL MIGHT HAPPEN: NOT AT ALL
1. FEELING NERVOUS, ANXIOUS, OR ON EDGE: NOT AT ALL
6. BECOMING EASILY ANNOYED OR IRRITABLE: NOT AT ALL
GAD7 TOTAL SCORE: 1
4. TROUBLE RELAXING: SEVERAL DAYS
5. BEING SO RESTLESS THAT IT IS HARD TO SIT STILL: NOT AT ALL
GAD7 TOTAL SCORE: 1

## 2023-05-15 ASSESSMENT — PAIN SCALES - GENERAL: PAINLEVEL: EXTREME PAIN (8)

## 2023-05-15 NOTE — TELEPHONE ENCOUNTER
8:12 AM    Reason for Call: OVERBOOK    Patient is having the following symptoms: Terrible back pain for 3  days. Patient thinks she needs a CAT scan    The patient is requesting an appointment for today with Dr. Moran     Was an appointment offered for this call? No  If yes : Appointment type              Date    Preferred method for responding to this message: Telephone Call  What is your phone number ? [phone]    If we cannot reach you directly, may we leave a detailed response at the number you provided? Yes    Can this message wait until your PCP/provider returns, if unavailable today? Not applicable    Nathalie Sanchez

## 2023-05-15 NOTE — PROGRESS NOTES
Assessment & Plan       ICD-10-CM    1. Acute midline thoracic back pain  M54.6 celecoxib (CELEBREX) 200 MG capsule     CANCELED: XR THORACIC SPINE 3 VW (Clinic Performed)      2. Age-related osteoporosis with current pathological fracture, initial encounter  M80.00XA celecoxib (CELEBREX) 200 MG capsule     CANCELED: XR THORACIC SPINE 3 VW (Clinic Performed)      3. H/O compression fracture of spine  Z87.81 celecoxib (CELEBREX) 200 MG capsule     CANCELED: XR THORACIC SPINE 3 VW (Clinic Performed)      4. Osteoarthritis of spine with radiculopathy, thoracic region  M47.24 celecoxib (CELEBREX) 200 MG capsule        XR did not show much of a significant compression fx where she was having pain.  Radiology later said there is a mild one there.  Will try Celebrex daily/ lidoderm patch otcoines, and pain meds. Along with ice or heat. If not improved- then consider CT scan and see if candidate for Kyphoplasty. Could add Miacalcin .  Follow-up made in 4-6 weeks.     Symptomatic treatment was discussed along when patient should call and/or come back into the clinic or go to ER/Urgent care. All questions answered.              No follow-ups on file.    Ilia Moran MD  [facility] - GABRIEL Angulo is a 87 year old, presenting for the following health issues:  Pain         View : No data to display.              HPI     Chronic/Recurring Back Pain Follow Up      Where is your back pain located? (Select all that apply) middle of back both and upper back both    How would you describe your back pain?  sharp, shooting and stabbing    Where does your back pain spread? Spreads across stomach     Since your last clinic visit for back pain, how has your pain changed? rapidly worsening    Does your back pain interfere with your job? Not applicable    Since your last visit, have you tried any new treatment? 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      Objective    BP (!) 80/47 (BP Location: Left arm, Patient Position: Sitting, Cuff Size: Adult Regular)   Pulse 73   Temp 97.9  F (36.6  C) (Tympanic)   Ht 1.626 m (5' 4\")   Wt 68 kg (150 lb)   SpO2 92%   BMI 25.75 kg/m    Body mass index is 25.75 kg/m .  Physical Exam   GENERAL: healthy, alert and no distress  SKIN: no suspicious lesions or rashes  BACK: no CVA tenderness, no paralumbar tenderness-- pain just under bra line though not real tender.  Marked center of pain and will do XR with BB marker                     "

## 2023-05-18 ENCOUNTER — TELEPHONE (OUTPATIENT)
Dept: FAMILY MEDICINE | Facility: OTHER | Age: 88
End: 2023-05-18

## 2023-05-18 DIAGNOSIS — M54.6 ACUTE MIDLINE THORACIC BACK PAIN: Primary | ICD-10-CM

## 2023-05-18 DIAGNOSIS — Z87.81 H/O COMPRESSION FRACTURE OF SPINE: ICD-10-CM

## 2023-05-18 DIAGNOSIS — M80.00XA AGE-RELATED OSTEOPOROSIS WITH CURRENT PATHOLOGICAL FRACTURE, INITIAL ENCOUNTER: ICD-10-CM

## 2023-05-18 NOTE — PATIENT INSTRUCTIONS
You were seen by Dr. Garcia 5/3/2017     1. You will follow up in with Dr. Garcia in 1 month.    2. You will be scheduled for pulmonary function testing, you will be contacted by the hospital to schedule.     3. You will be scheduled for an Echocardiogram, you will be contacted by the imaging department to schedule.     4. Continue daily weights, please contact the Cardiology RN with a 2 pound weight gain in 24 hours or 5 pounds in one week.     5. Monitor salt intake, avoid adding salt at the dinner table and consider a salt substitute.     6. Have device check 6/27/17 with sergey to check your pacemaker.     7.  Monitor blood pressure at home while resting for at least 20 minutes.     8.  Continue all medications as prescribed.     Osiris SANFORD RN-BSN  Cardiology   [facility]  [phone]     no

## 2023-05-18 NOTE — TELEPHONE ENCOUNTER
Patient stated that her pain is burning and on fire throughout the middle of her back. Patient stated she is not feeling any better.

## 2023-05-18 NOTE — TELEPHONE ENCOUNTER
1:24 PM    Reason for Call: Phone Call    Description: Pain is still the same as of 05/18/2023    Was an appointment offered for this call? No  If yes : Appointment type              Date    Preferred method for responding to this message: Telephone Call  What is your phone number ? [phone]    If we cannot reach you directly, may we leave a detailed response at the number you provided? Yes    Can this message wait until your PCP/provider returns, if available today? Not applicable    Nathalie Sanchez

## 2023-05-24 ENCOUNTER — HOSPITAL ENCOUNTER (OUTPATIENT)
Dept: CT IMAGING | Facility: HOSPITAL | Age: 88
Discharge: HOME | End: 2023-05-24
Attending: FAMILY MEDICINE | Admitting: FAMILY MEDICINE
Payer: MEDICARE

## 2023-05-24 DIAGNOSIS — M80.08XA AGE-RELATED OSTEOPOROSIS WITH CURRENT PATHOLOGICAL FRACTURE, VERTEBRA(E), INITIAL ENCOUNTER FOR FRACTURE (H): ICD-10-CM

## 2023-05-24 DIAGNOSIS — M54.6 ACUTE MIDLINE THORACIC BACK PAIN: ICD-10-CM

## 2023-05-24 DIAGNOSIS — Z87.81 H/O COMPRESSION FRACTURE OF SPINE: ICD-10-CM

## 2023-05-24 DIAGNOSIS — M80.00XA AGE-RELATED OSTEOPOROSIS WITH CURRENT PATHOLOGICAL FRACTURE, INITIAL ENCOUNTER: Primary | ICD-10-CM

## 2023-05-24 DIAGNOSIS — M80.00XA AGE-RELATED OSTEOPOROSIS WITH CURRENT PATHOLOGICAL FRACTURE, INITIAL ENCOUNTER: ICD-10-CM

## 2023-05-24 DIAGNOSIS — I48.0 PAROXYSMAL ATRIAL FIBRILLATION (H): ICD-10-CM

## 2023-05-24 PROCEDURE — G1010 CDSM STANSON: HCPCS

## 2023-05-31 ENCOUNTER — TELEPHONE (OUTPATIENT)
Dept: FAMILY MEDICINE | Facility: OTHER | Age: 88
End: 2023-05-31

## 2023-05-31 ENCOUNTER — HOSPITAL ENCOUNTER (OUTPATIENT)
Dept: INTERVENTIONAL RADIOLOGY/VASCULAR | Facility: HOSPITAL | Age: 88
Discharge: HOME | End: 2023-05-31
Attending: FAMILY MEDICINE
Payer: MEDICARE

## 2023-05-31 DIAGNOSIS — M54.6 ACUTE MIDLINE THORACIC BACK PAIN: Primary | ICD-10-CM

## 2023-05-31 DIAGNOSIS — M54.6 ACUTE MIDLINE THORACIC BACK PAIN: ICD-10-CM

## 2023-05-31 DIAGNOSIS — M47.815 SPONDYLOSIS OF THORACOLUMBAR REGION WITHOUT MYELOPATHY OR RADICULOPATHY: ICD-10-CM

## 2023-05-31 DIAGNOSIS — G62.9 NEUROPATHY: ICD-10-CM

## 2023-05-31 DIAGNOSIS — S22.000A COMPRESSION FX, THORACIC SPINE (H): ICD-10-CM

## 2023-05-31 DIAGNOSIS — M48.062 SPINAL STENOSIS OF LUMBAR REGION WITH NEUROGENIC CLAUDICATION: ICD-10-CM

## 2023-05-31 DIAGNOSIS — Z00.00 PHYSICAL EXAM: ICD-10-CM

## 2023-05-31 PROCEDURE — 76000 FLUOROSCOPY <1 HR PHYS/QHP: CPT

## 2023-05-31 PROCEDURE — G0463 HOSPITAL OUTPT CLINIC VISIT: HCPCS

## 2023-05-31 NOTE — PROGRESS NOTES
I called and spoke with Dr Ybarra-- he will see her in IR consult.  My error - does not need T7 kyphoplasty -- area of pain is T11 in looking at my notes and plain xray . Dr Garza is aware

## 2023-05-31 NOTE — TELEPHONE ENCOUNTER
Patient left VM on my phone on 5/30/2023. kyphoplasty is in the works but has not been scheduled yet, patient is requesting stronger pain management or an injection of some sort as her pain has put her life on hold and she can't take it anymore. Please advise.

## 2023-05-31 NOTE — TELEPHONE ENCOUNTER
Jade from [facility] said it is in the work cue, waiting for it to get authorized and only one provider does it here. She will let us know when it can be scheduled.

## 2023-05-31 NOTE — TELEPHONE ENCOUNTER
Call Radiology and see where are we at on this. Needs ASAP kyphoplasty. Pt is in lots of pain. I or she needs a date!!!    Ask her how she is taking her pain meds. How many and what interval.  Did she try the Lidoderm patch.??

## 2023-05-31 NOTE — TELEPHONE ENCOUNTER
Needs to stop Motrin/Advil along with Celebrex to get ready for Kyphoplasty. I believe I told her on the phone last conversation by phone.    Pt to take 2 Norco every 7-8 hrs or 3 times a day   Try to take Neurontin every 7-8 hrs but alternate every 3.5-4 hrs from when takes Norco- so that takes something every 4 hrs if possible.       She can stop Eliquis -TWO DAYS BEFORE KYPHOPLASTY.    IF Lidoderm patch was helping -- she needs to restart that again -- 12 hrs on and 12 hrs off      AGAIN - push Radiology on when can we get her in for Kyphoplasty

## 2023-06-02 ENCOUNTER — TELEPHONE (OUTPATIENT)
Dept: INTERVENTIONAL RADIOLOGY/VASCULAR | Facility: HOSPITAL | Age: 88
End: 2023-06-02

## 2023-06-02 NOTE — TELEPHONE ENCOUNTER
HYDROCODONE-APAP 5-325 MG       Last Written Prescription Date:  5/9/23  Last Fill Quantity: 60,   # refills: 0  Last Office Visit: 5/15/23  Future Office visit:    Next 5 appointments (look out 90 days)    Jun 12, 2023  2:45 PM  (Arrive by 2:30 PM)  SHORT with Ilia Moran MD  [facility] ([facility] [place] ) [address]  [place] [address]  [phone]           Routing refill request to provider for review/approval because:    Drug not on the [facility], P or [facility] refill protocol or controlled substance

## 2023-06-02 NOTE — TELEPHONE ENCOUNTER
Called patient to remind them of their appt on 6/6. Also reminded patient to not take any antibiotics, steroids, or immunizations two weeks before and after this appt.  And they also need a .     Kim Ybarra

## 2023-06-05 RX ORDER — HYDROCODONE BITARTRATE AND ACETAMINOPHEN 5; 325 MG/1; MG/1
TABLET ORAL
Qty: 60 TABLET | Refills: 0 | Status: SHIPPED | OUTPATIENT
Start: 2023-06-05 | End: 2023-06-26

## 2023-06-06 ENCOUNTER — HOSPITAL ENCOUNTER (OUTPATIENT)
Facility: HOSPITAL | Age: 88
Discharge: HOME | End: 2023-06-06
Attending: RADIOLOGY | Admitting: RADIOLOGY
Payer: MEDICARE

## 2023-06-06 ENCOUNTER — HOSPITAL ENCOUNTER (OUTPATIENT)
Dept: INTERVENTIONAL RADIOLOGY/VASCULAR | Facility: HOSPITAL | Age: 88
Discharge: HOME | End: 2023-06-06
Attending: FAMILY MEDICINE
Payer: MEDICARE

## 2023-06-06 DIAGNOSIS — M60.9 MYOSITIS: ICD-10-CM

## 2023-06-06 PROCEDURE — 96372 THER/PROPH/DIAG INJ SC/IM: CPT | Performed by: RADIOLOGY

## 2023-06-06 PROCEDURE — 250N000009 HC RX 250: Performed by: RADIOLOGY

## 2023-06-06 PROCEDURE — 77002 NEEDLE LOCALIZATION BY XRAY: CPT

## 2023-06-06 PROCEDURE — 250N000011 HC RX IP 250 OP 636: Performed by: RADIOLOGY

## 2023-06-06 PROCEDURE — 20553 NJX 1/MLT TRIGGER POINTS 3/>: CPT

## 2023-06-06 RX ORDER — LIDOCAINE HYDROCHLORIDE 10 MG/ML
10 INJECTION, SOLUTION EPIDURAL; INFILTRATION; INTRACAUDAL; PERINEURAL ONCE
Status: COMPLETED | OUTPATIENT
Start: 2023-06-06 | End: 2023-06-06

## 2023-06-06 RX ORDER — METHYLPREDNISOLONE ACETATE 80 MG/ML
80 INJECTION, SUSPENSION INTRA-ARTICULAR; INTRALESIONAL; INTRAMUSCULAR; SOFT TISSUE ONCE
Status: DISCONTINUED | OUTPATIENT
Start: 2023-06-06 | End: 2023-06-06

## 2023-06-06 RX ORDER — METHYLPREDNISOLONE ACETATE 80 MG/ML
160 INJECTION, SUSPENSION INTRA-ARTICULAR; INTRALESIONAL; INTRAMUSCULAR; SOFT TISSUE ONCE
Status: COMPLETED | OUTPATIENT
Start: 2023-06-06 | End: 2023-06-06

## 2023-06-06 RX ADMIN — METHYLPREDNISOLONE ACETATE 160 MG: 80 INJECTION, SUSPENSION INTRA-ARTICULAR; INTRALESIONAL; INTRAMUSCULAR; SOFT TISSUE at 10:15

## 2023-06-06 RX ADMIN — LIDOCAINE HYDROCHLORIDE 10 ML: 10 INJECTION, SOLUTION EPIDURAL; INFILTRATION; INTRACAUDAL; PERINEURAL at 10:15

## 2023-06-06 NOTE — PROGRESS NOTES
Medication Held for Diagnostic Imaging Procedures:    The following Blood Thinners - eliquis, were held per protocol beginning on 6-3-23.            Medications can be resumed when provider has instructed or the next morning as instructed on the Discharge Summary.      Technologist: Annalise Piña

## 2023-06-06 NOTE — DISCHARGE INSTRUCTIONS
Cell number on file:    Telephone Information:   Mobile [phone]     Is it ok to leave a message at this number(s)? Yes    Dr. Ybarra completed your procedure on 6/6/2023.    Current Pain Level (0-10 Scale): 10/10  Post Pain Level (0-10):  9/10    Radiology Discharge instructions for Steroid Injection    Activity Level:     Do not do any heavy activity or exercise for 24 hours.   Do not drive for 4 hours after your injection.  Diet:   Return to your normal diet.  Medications:   If you have stopped taking your Aspirin, Coumadin/Warfarin, Ibuprofen, or any   other blood thinner for this procedure you may resume in the morning unless   your primary care provider has given you other instructions.    Diabetics may see an increase in blood sugar after steroid injections. If you are concerned about your blood sugar, please contact your family doctor.    Site Care:  Remove the bandage and bathe or shower the morning after the procedure.      This is a Pain Management procedure.  You will be contacted in two weeks for follow up.    Call your Primary Care Provider if you have the following (if your primary care provider is not available please seek emergency care):   Nausea with vomiting   Severe headache   Drowsiness or confusion   Redness or drainage at the injection or puncture site   Temperature over 101 degrees F   Other concerns   Worsening back pain   Stiff neck

## 2023-06-09 ENCOUNTER — TELEPHONE (OUTPATIENT)
Dept: FAMILY MEDICINE | Facility: OTHER | Age: 88
End: 2023-06-09

## 2023-06-09 NOTE — TELEPHONE ENCOUNTER
Patient calling to ask for CT on 5/24 & Trigger inj on 6/6/23 to be faxed to Dr. Jackman @ [facility].    Thank you!!!!!!!

## 2023-06-09 NOTE — TELEPHONE ENCOUNTER
Faxed 6/6/23 IR Trigger Point Inj, and 5/24/23 CT Thoracic Spine image reports to [facility] - Dr. Jackman [phone]    Faxed LILLIAM to DI to push images to PACS, I asked that DI mail CD with the images on it if they are unable to push images to this facility

## 2023-06-20 ENCOUNTER — HOSPITAL ENCOUNTER (INPATIENT)
Facility: HOSPITAL | Age: 88
LOS: 4 days | Discharge: HOME | DRG: 378 | End: 2023-06-24
Attending: STUDENT IN AN ORGANIZED HEALTH CARE EDUCATION/TRAINING PROGRAM | Admitting: INTERNAL MEDICINE
Payer: MEDICARE

## 2023-06-20 ENCOUNTER — TELEPHONE (OUTPATIENT)
Dept: INTERVENTIONAL RADIOLOGY/VASCULAR | Facility: HOSPITAL | Age: 88
End: 2023-06-20

## 2023-06-20 ENCOUNTER — APPOINTMENT (OUTPATIENT)
Dept: CT IMAGING | Facility: HOSPITAL | Age: 88
DRG: 378 | End: 2023-06-20
Attending: STUDENT IN AN ORGANIZED HEALTH CARE EDUCATION/TRAINING PROGRAM
Payer: MEDICARE

## 2023-06-20 DIAGNOSIS — I48.0 PAROXYSMAL ATRIAL FIBRILLATION (H): ICD-10-CM

## 2023-06-20 DIAGNOSIS — M54.6 ACUTE MIDLINE THORACIC BACK PAIN: ICD-10-CM

## 2023-06-20 DIAGNOSIS — K92.2 UPPER GI BLEED: ICD-10-CM

## 2023-06-20 DIAGNOSIS — K92.1 GASTROINTESTINAL HEMORRHAGE WITH MELENA: Primary | ICD-10-CM

## 2023-06-20 LAB
ABO/RH(D): NORMAL
ANION GAP SERPL CALCULATED.3IONS-SCNC: 10 MMOL/L (ref 7–15)
ANION GAP SERPL CALCULATED.3IONS-SCNC: 11 MMOL/L (ref 7–15)
ANTIBODY SCREEN: NEGATIVE
BASOPHILS # BLD AUTO: 0 10E3/UL (ref 0–0.2)
BASOPHILS NFR BLD AUTO: 0 %
BLD PROD TYP BPU: NORMAL
BLOOD COMPONENT TYPE: NORMAL
BUN SERPL-MCNC: 68.4 MG/DL (ref 8–23)
BUN SERPL-MCNC: 72.8 MG/DL (ref 8–23)
CALCIUM SERPL-MCNC: 8.3 MG/DL (ref 8.8–10.2)
CALCIUM SERPL-MCNC: 8.5 MG/DL (ref 8.8–10.2)
CHLORIDE SERPL-SCNC: 109 MMOL/L (ref 98–107)
CHLORIDE SERPL-SCNC: 109 MMOL/L (ref 98–107)
CODING SYSTEM: NORMAL
CREAT SERPL-MCNC: 1.01 MG/DL (ref 0.51–0.95)
CREAT SERPL-MCNC: 1.06 MG/DL (ref 0.51–0.95)
CROSSMATCH: NORMAL
DEPRECATED HCO3 PLAS-SCNC: 20 MMOL/L (ref 22–29)
DEPRECATED HCO3 PLAS-SCNC: 22 MMOL/L (ref 22–29)
EOSINOPHIL # BLD AUTO: 0.1 10E3/UL (ref 0–0.7)
EOSINOPHIL NFR BLD AUTO: 1 %
ERYTHROCYTE [DISTWIDTH] IN BLOOD BY AUTOMATED COUNT: 13.6 % (ref 10–15)
GFR SERPL CREATININE-BSD FRML MDRD: 51 ML/MIN/1.73M2
GFR SERPL CREATININE-BSD FRML MDRD: 54 ML/MIN/1.73M2
GLUCOSE SERPL-MCNC: 116 MG/DL (ref 70–99)
GLUCOSE SERPL-MCNC: 128 MG/DL (ref 70–99)
HCT VFR BLD AUTO: 23.1 % (ref 35–47)
HEMOCCULT SP1 STL QL: POSITIVE
HGB BLD-MCNC: 6.5 G/DL (ref 11.7–15.7)
HGB BLD-MCNC: 7 G/DL (ref 11.7–15.7)
HGB BLD-MCNC: 7.3 G/DL (ref 11.7–15.7)
HGB BLD-MCNC: 7.7 G/DL (ref 11.7–15.7)
HOLD SPECIMEN: NORMAL
IMM GRANULOCYTES # BLD: 0.1 10E3/UL
IMM GRANULOCYTES NFR BLD: 1 %
ISSUE DATE AND TIME: NORMAL
ISSUE DATE AND TIME: NORMAL
LACTATE SERPL-SCNC: 1.4 MMOL/L (ref 0.7–2)
LYMPHOCYTES # BLD AUTO: 1.1 10E3/UL (ref 0.8–5.3)
LYMPHOCYTES NFR BLD AUTO: 9 %
MAGNESIUM SERPL-MCNC: 1.7 MG/DL (ref 1.7–2.3)
MCH RBC QN AUTO: 29.8 PG (ref 26.5–33)
MCHC RBC AUTO-ENTMCNC: 31.6 G/DL (ref 31.5–36.5)
MCV RBC AUTO: 94 FL (ref 78–100)
MONOCYTES # BLD AUTO: 1.3 10E3/UL (ref 0–1.3)
MONOCYTES NFR BLD AUTO: 10 %
NEUTROPHILS # BLD AUTO: 10.1 10E3/UL (ref 1.6–8.3)
NEUTROPHILS NFR BLD AUTO: 79 %
NRBC # BLD AUTO: 0 10E3/UL
NRBC BLD AUTO-RTO: 0 /100
NT-PROBNP SERPL-MCNC: 1034 PG/ML (ref 0–1800)
PLATELET # BLD AUTO: 208 10E3/UL (ref 150–450)
POTASSIUM SERPL-SCNC: 5.1 MMOL/L (ref 3.4–5.3)
POTASSIUM SERPL-SCNC: 5.5 MMOL/L (ref 3.4–5.3)
RBC # BLD AUTO: 2.45 10E6/UL (ref 3.8–5.2)
SODIUM SERPL-SCNC: 140 MMOL/L (ref 136–145)
SODIUM SERPL-SCNC: 141 MMOL/L (ref 136–145)
SPECIMEN EXPIRATION DATE: NORMAL
UNIT ABO/RH: NORMAL
UNIT NUMBER: NORMAL
UNIT STATUS: NORMAL
UNIT TYPE ISBT: 5100
WBC # BLD AUTO: 12.7 10E3/UL (ref 4–11)

## 2023-06-20 PROCEDURE — 82274 ASSAY TEST FOR BLOOD FECAL: CPT | Performed by: STUDENT IN AN ORGANIZED HEALTH CARE EDUCATION/TRAINING PROGRAM

## 2023-06-20 PROCEDURE — 99223 1ST HOSP IP/OBS HIGH 75: CPT | Mod: A1 | Performed by: INTERNAL MEDICINE

## 2023-06-20 PROCEDURE — 250N000013 HC RX MED GY IP 250 OP 250 PS 637: Performed by: STUDENT IN AN ORGANIZED HEALTH CARE EDUCATION/TRAINING PROGRAM

## 2023-06-20 PROCEDURE — 258N000003 HC RX IP 258 OP 636: Performed by: INTERNAL MEDICINE

## 2023-06-20 PROCEDURE — C9113 INJ PANTOPRAZOLE SODIUM, VIA: HCPCS | Performed by: STUDENT IN AN ORGANIZED HEALTH CARE EDUCATION/TRAINING PROGRAM

## 2023-06-20 PROCEDURE — 80048 BASIC METABOLIC PNL TOTAL CA: CPT | Performed by: STUDENT IN AN ORGANIZED HEALTH CARE EDUCATION/TRAINING PROGRAM

## 2023-06-20 PROCEDURE — 85025 COMPLETE CBC W/AUTO DIFF WBC: CPT | Performed by: STUDENT IN AN ORGANIZED HEALTH CARE EDUCATION/TRAINING PROGRAM

## 2023-06-20 PROCEDURE — 86850 RBC ANTIBODY SCREEN: CPT | Performed by: STUDENT IN AN ORGANIZED HEALTH CARE EDUCATION/TRAINING PROGRAM

## 2023-06-20 PROCEDURE — 250N000011 HC RX IP 250 OP 636: Performed by: INTERNAL MEDICINE

## 2023-06-20 PROCEDURE — 99285 EMERGENCY DEPT VISIT HI MDM: CPT | Mod: 25

## 2023-06-20 PROCEDURE — 86901 BLOOD TYPING SEROLOGIC RH(D): CPT | Performed by: STUDENT IN AN ORGANIZED HEALTH CARE EDUCATION/TRAINING PROGRAM

## 2023-06-20 PROCEDURE — 36415 COLL VENOUS BLD VENIPUNCTURE: CPT | Performed by: STUDENT IN AN ORGANIZED HEALTH CARE EDUCATION/TRAINING PROGRAM

## 2023-06-20 PROCEDURE — 30233N1 TRANSFUSION OF NONAUTOLOGOUS RED BLOOD CELLS INTO PERIPHERAL VEIN, PERCUTANEOUS APPROACH: ICD-10-PCS | Performed by: HOSPITALIST

## 2023-06-20 PROCEDURE — G1010 CDSM STANSON: HCPCS

## 2023-06-20 PROCEDURE — 258N000003 HC RX IP 258 OP 636: Performed by: STUDENT IN AN ORGANIZED HEALTH CARE EDUCATION/TRAINING PROGRAM

## 2023-06-20 PROCEDURE — 250N000011 HC RX IP 250 OP 636: Performed by: RADIOLOGY

## 2023-06-20 PROCEDURE — 250N000013 HC RX MED GY IP 250 OP 250 PS 637: Performed by: INTERNAL MEDICINE

## 2023-06-20 PROCEDURE — 250N000011 HC RX IP 250 OP 636: Performed by: STUDENT IN AN ORGANIZED HEALTH CARE EDUCATION/TRAINING PROGRAM

## 2023-06-20 PROCEDURE — P9016 RBC LEUKOCYTES REDUCED: HCPCS | Performed by: STUDENT IN AN ORGANIZED HEALTH CARE EDUCATION/TRAINING PROGRAM

## 2023-06-20 PROCEDURE — 120N000001 HC R&B MED SURG/OB

## 2023-06-20 PROCEDURE — 86923 COMPATIBILITY TEST ELECTRIC: CPT | Performed by: STUDENT IN AN ORGANIZED HEALTH CARE EDUCATION/TRAINING PROGRAM

## 2023-06-20 PROCEDURE — 83735 ASSAY OF MAGNESIUM: CPT | Performed by: STUDENT IN AN ORGANIZED HEALTH CARE EDUCATION/TRAINING PROGRAM

## 2023-06-20 PROCEDURE — 83880 ASSAY OF NATRIURETIC PEPTIDE: CPT | Performed by: INTERNAL MEDICINE

## 2023-06-20 PROCEDURE — 85018 HEMOGLOBIN: CPT | Performed by: INTERNAL MEDICINE

## 2023-06-20 PROCEDURE — 99285 EMERGENCY DEPT VISIT HI MDM: CPT | Performed by: STUDENT IN AN ORGANIZED HEALTH CARE EDUCATION/TRAINING PROGRAM

## 2023-06-20 PROCEDURE — 36415 COLL VENOUS BLD VENIPUNCTURE: CPT | Performed by: INTERNAL MEDICINE

## 2023-06-20 PROCEDURE — 80048 BASIC METABOLIC PNL TOTAL CA: CPT | Performed by: INTERNAL MEDICINE

## 2023-06-20 PROCEDURE — 85018 HEMOGLOBIN: CPT | Performed by: STUDENT IN AN ORGANIZED HEALTH CARE EDUCATION/TRAINING PROGRAM

## 2023-06-20 PROCEDURE — 83605 ASSAY OF LACTIC ACID: CPT | Performed by: INTERNAL MEDICINE

## 2023-06-20 RX ORDER — FLUTICASONE FUROATE AND VILANTEROL 200; 25 UG/1; UG/1
1 POWDER RESPIRATORY (INHALATION) DAILY
Status: DISCONTINUED | OUTPATIENT
Start: 2023-06-20 | End: 2023-06-24 | Stop reason: HOSPADM

## 2023-06-20 RX ORDER — ONDANSETRON 4 MG/1
4 TABLET, ORALLY DISINTEGRATING ORAL EVERY 6 HOURS PRN
Status: DISCONTINUED | OUTPATIENT
Start: 2023-06-20 | End: 2023-06-24 | Stop reason: HOSPADM

## 2023-06-20 RX ORDER — ACETAMINOPHEN 325 MG/1
975 TABLET ORAL ONCE
Status: COMPLETED | OUTPATIENT
Start: 2023-06-20 | End: 2023-06-20

## 2023-06-20 RX ORDER — HYDROMORPHONE HCL IN WATER/PF 6 MG/30 ML
0.2 PATIENT CONTROLLED ANALGESIA SYRINGE INTRAVENOUS
Status: DISCONTINUED | OUTPATIENT
Start: 2023-06-20 | End: 2023-06-24 | Stop reason: HOSPADM

## 2023-06-20 RX ORDER — GABAPENTIN 400 MG/1
400 CAPSULE ORAL 3 TIMES DAILY
Status: DISCONTINUED | OUTPATIENT
Start: 2023-06-20 | End: 2023-06-24 | Stop reason: HOSPADM

## 2023-06-20 RX ORDER — NALOXONE HYDROCHLORIDE 0.4 MG/ML
0.2 INJECTION, SOLUTION INTRAMUSCULAR; INTRAVENOUS; SUBCUTANEOUS
Status: DISCONTINUED | OUTPATIENT
Start: 2023-06-20 | End: 2023-06-24 | Stop reason: HOSPADM

## 2023-06-20 RX ORDER — NALOXONE HYDROCHLORIDE 0.4 MG/ML
0.4 INJECTION, SOLUTION INTRAMUSCULAR; INTRAVENOUS; SUBCUTANEOUS
Status: DISCONTINUED | OUTPATIENT
Start: 2023-06-20 | End: 2023-06-24 | Stop reason: HOSPADM

## 2023-06-20 RX ORDER — LIDOCAINE 40 MG/G
CREAM TOPICAL
Status: DISCONTINUED | OUTPATIENT
Start: 2023-06-20 | End: 2023-06-24 | Stop reason: HOSPADM

## 2023-06-20 RX ORDER — ASCORBIC ACID 500 MG
500 TABLET ORAL DAILY
COMMUNITY

## 2023-06-20 RX ORDER — HYDROCODONE BITARTRATE AND ACETAMINOPHEN 5; 325 MG/1; MG/1
1 TABLET ORAL 3 TIMES DAILY
Status: DISCONTINUED | OUTPATIENT
Start: 2023-06-20 | End: 2023-06-24 | Stop reason: HOSPADM

## 2023-06-20 RX ORDER — ONDANSETRON 2 MG/ML
4 INJECTION INTRAMUSCULAR; INTRAVENOUS EVERY 30 MIN PRN
Status: DISCONTINUED | OUTPATIENT
Start: 2023-06-20 | End: 2023-06-20

## 2023-06-20 RX ORDER — OXYCODONE HYDROCHLORIDE 5 MG/1
5 TABLET ORAL ONCE
Status: COMPLETED | OUTPATIENT
Start: 2023-06-20 | End: 2023-06-20

## 2023-06-20 RX ORDER — SENNOSIDES 8.6 MG
1300 CAPSULE ORAL 2 TIMES DAILY
Status: ON HOLD | COMMUNITY
End: 2023-06-24

## 2023-06-20 RX ORDER — FUROSEMIDE 20 MG/1
10 TABLET ORAL DAILY
Status: DISCONTINUED | OUTPATIENT
Start: 2023-06-22 | End: 2023-06-20

## 2023-06-20 RX ORDER — ACETAMINOPHEN 650 MG/1
650 SUPPOSITORY RECTAL EVERY 6 HOURS PRN
Status: DISCONTINUED | OUTPATIENT
Start: 2023-06-20 | End: 2023-06-20

## 2023-06-20 RX ORDER — IOPAMIDOL 755 MG/ML
53 INJECTION, SOLUTION INTRAVASCULAR ONCE
Status: COMPLETED | OUTPATIENT
Start: 2023-06-20 | End: 2023-06-20

## 2023-06-20 RX ORDER — ACETAMINOPHEN 325 MG/1
650 TABLET ORAL 4 TIMES DAILY
Status: DISCONTINUED | OUTPATIENT
Start: 2023-06-20 | End: 2023-06-24 | Stop reason: HOSPADM

## 2023-06-20 RX ORDER — ONDANSETRON 2 MG/ML
4 INJECTION INTRAMUSCULAR; INTRAVENOUS EVERY 6 HOURS PRN
Status: DISCONTINUED | OUTPATIENT
Start: 2023-06-20 | End: 2023-06-24 | Stop reason: HOSPADM

## 2023-06-20 RX ORDER — SODIUM CHLORIDE 9 MG/ML
INJECTION, SOLUTION INTRAVENOUS CONTINUOUS
Status: DISCONTINUED | OUTPATIENT
Start: 2023-06-20 | End: 2023-06-22

## 2023-06-20 RX ORDER — CALCIUM CARBONATE/VITAMIN D3 600 MG-10
1 TABLET ORAL EVERY MORNING
Status: DISCONTINUED | OUTPATIENT
Start: 2023-06-21 | End: 2023-06-24 | Stop reason: HOSPADM

## 2023-06-20 RX ORDER — CARVEDILOL 12.5 MG/1
12.5 TABLET ORAL 2 TIMES DAILY WITH MEALS
Status: DISCONTINUED | OUTPATIENT
Start: 2023-06-20 | End: 2023-06-24 | Stop reason: HOSPADM

## 2023-06-20 RX ORDER — ACETAMINOPHEN 325 MG/1
650 TABLET ORAL EVERY 6 HOURS PRN
Status: DISCONTINUED | OUTPATIENT
Start: 2023-06-20 | End: 2023-06-20

## 2023-06-20 RX ADMIN — HYDROCODONE BITARTRATE AND ACETAMINOPHEN 1 TABLET: 5; 325 TABLET ORAL at 22:03

## 2023-06-20 RX ADMIN — SODIUM CHLORIDE 8 MG/HR: 9 INJECTION, SOLUTION INTRAVENOUS at 10:04

## 2023-06-20 RX ADMIN — ACETAMINOPHEN 650 MG: 325 TABLET, FILM COATED ORAL at 16:41

## 2023-06-20 RX ADMIN — ONDANSETRON 4 MG: 2 INJECTION INTRAMUSCULAR; INTRAVENOUS at 09:22

## 2023-06-20 RX ADMIN — GABAPENTIN 400 MG: 400 CAPSULE ORAL at 22:03

## 2023-06-20 RX ADMIN — SODIUM CHLORIDE 8 MG/HR: 9 INJECTION, SOLUTION INTRAVENOUS at 17:40

## 2023-06-20 RX ADMIN — PROTHROMBIN, COAGULATION FACTOR VII HUMAN, COAGULATION FACTOR IX HUMAN, COAGULATION FACTOR X HUMAN, PROTEIN C, PROTEIN S HUMAN, AND WATER 3330 UNITS: KIT at 09:32

## 2023-06-20 RX ADMIN — ACETAMINOPHEN 650 MG: 325 TABLET, FILM COATED ORAL at 22:03

## 2023-06-20 RX ADMIN — GABAPENTIN 400 MG: 400 CAPSULE ORAL at 13:58

## 2023-06-20 RX ADMIN — PANTOPRAZOLE SODIUM 40 MG: 40 INJECTION, POWDER, FOR SOLUTION INTRAVENOUS at 09:20

## 2023-06-20 RX ADMIN — HYDROCODONE BITARTRATE AND ACETAMINOPHEN 1 TABLET: 5; 325 TABLET ORAL at 13:59

## 2023-06-20 RX ADMIN — CARVEDILOL 12.5 MG: 12.5 TABLET, FILM COATED ORAL at 16:41

## 2023-06-20 RX ADMIN — ACETAMINOPHEN 975 MG: 325 TABLET ORAL at 08:32

## 2023-06-20 RX ADMIN — OXYCODONE HYDROCHLORIDE 5 MG: 5 TABLET ORAL at 08:33

## 2023-06-20 RX ADMIN — IOPAMIDOL 53 ML: 755 INJECTION, SOLUTION INTRAVENOUS at 09:08

## 2023-06-20 RX ADMIN — SODIUM CHLORIDE: 9 INJECTION, SOLUTION INTRAVENOUS at 14:04

## 2023-06-20 ASSESSMENT — ACTIVITIES OF DAILY LIVING (ADL)
ADLS_ACUITY_SCORE: 35
ADLS_ACUITY_SCORE: 26
ADLS_ACUITY_SCORE: 24
ADLS_ACUITY_SCORE: 24
ADLS_ACUITY_SCORE: 35
ADLS_ACUITY_SCORE: 29
ADLS_ACUITY_SCORE: 28
ADLS_ACUITY_SCORE: 29

## 2023-06-20 NOTE — PLAN OF CARE
Goal Outcome Evaluation:[facility] Inpatient Admission Note:    Patient admitted to 3218/3218-1 at approximately 1100 via cart accompanied by transport tech from emergency room . Report received from Ervin in SBAR format at 1030 via telephone. Patient transferred to bed via slide board.. Patient is alert and oriented X 3, reports pain; rates at 8 on 0-10 scale.  Patient oriented to room, unit, hourly rounding, and plan of care. Explained admission packet and patient handbook with patient bill of rights brochure. Will continue to monitor and document as needed.     Inpatient Nursing criteria listed below was met:    Health care directives status obtained and documented: Yes    Patient identifies a surrogate decision maker: Yes If yes, who:juan luis Chairez Contact Information:see facesheet     If initial lactic acid greater than 2.0, repeat lactic acid drawn within one hour of arrival to unit: NA. If no, state reason:     Clergy visit ordered if patient requests: Yes family to call her  if stays longer than 1 day.    Skin issues/needs documented: skin assessment deferred to RN taking over    Isolation Patient: no Education given, correct sign in place and documentation row added to PCS:  na    Fall Prevention Yes: Care plan updated, education given and documented, sticker and magnet in place: Yes    Care Plan initiated: Yes    Education Documented (including assessment): Yes    Patient has discharge needs : No If yes, please explain:  Face to face report given with opportunity to observe patient.    Report given to Lizzy Hastings RN   6/20/2023  12:23 PM

## 2023-06-20 NOTE — ED TRIAGE NOTES
"Patient presents to the ED via [facility] w/ c/o bloody stools that started during the night, about every hour.  Takes Eliquis daily, last dose 06/19/23.   She is nauseous but has not vomited.   Denies Hx of GI bleed.   Feels lightheaded and dizzy with standing or sitting.     Denies chest pain, fever, chills, increased SOB.   Has ABD pain only with palpation.     Did not take home medications this morning.     EMS established a 20g IV in right hand w/ NS running @ TKO.     Triage Assessment       Row Name 06/20/23 0811       Triage Assessment (Adult)    Airway WDL WDL       Respiratory WDL    Respiratory WDL expansion/retractions;rhythm/pattern    Rhythm/Pattern, Respiratory unlabored;pattern regular;depth regular;shortness of breath  \"I ALWAYS HAVE SHORTNESS OF BREATH    Expansion/Accessory Muscles/Retractions no retractions;no use of accessory muscles       Skin Circulation/Temperature WDL    Skin Circulation/Temperature WDL all    Skin Circulation pallor    Skin Temperature warm                  "

## 2023-06-20 NOTE — MEDICATION SCRIBE - ADMISSION MEDICATION HISTORY
Medication Scribe Admission Medication History    Admission medication history is complete. The information provided in this note is only as accurate as the sources available at the time of the update.    Medication reconciliation/reorder completed by provider prior to medication history? No    Information Source(s): Patient and CareEverywhere/SureScripts via in-person    Pertinent Information:     norco- has been taking 1 tablet three times daily for pain     apap- family reports taking the arthritis strength tylenol up to twice daily most days of the week    Spironolactone- has not been taking for a few months due to frequent urination    Pt confirms no medications taken today PTA. All meds taken as prescribed yesterday 6/20/23    Changes made to PTA medication list:    Added: vit c     Deleted: symbicort-formulary change, pt now on dulera    Changed: None    Medication Affordability:  Not including over the counter (OTC) medications, was there a time in the past 3 months when you did not take your medications as prescribed because of cost?: No    Allergies reviewed with patient and updates made in EHR: yes-NKDA    Medication History Completed By: Debbie Lopez 6/20/2023 11:52 AM    Prior to Admission medications    Medication Sig Last Dose Taking? Auth Provider Long Term End Date   acetaminophen (TYLENOL) 650 MG CR tablet Take 1,300 mg by mouth 2 times daily -most days of the week. MAX 4000MG TYLENOL  IN 24 HRS / NEED TO INCLUDE HER LORTAB. 6/19/2023 Yes Reported, Patient     Calcium Carbonate-Vitamin D (CALCIUM 600 + D OR) Take 1 tablet by mouth every morning 6/19/2023 at AM Yes Reported, Patient     carvedilol (COREG) 12.5 MG tablet TAKE 1 TABLET BY MOUTH 2 TIMES DAILY WITH MEALS 6/19/2023 at 1700 Yes Ilia Moran MD Yes    ELIQUIS ANTICOAGULANT 5 MG tablet TAKE 1 TABLET BY MOUTH 2 TIMES DAILY 6/19/2023 at 1700 Yes Lizzy Pepper MD     ENTRESTO 49-51 MG per tablet TAKE 1 TABLET BY MOUTH TWICE DAILY  6/19/2023 at 1700 Yes Ilia Moran MD Yes    gabapentin (NEURONTIN) 400 MG capsule TAKE 1 CAPSULE BY MOUTH 3 TIMES DAILY FOR PAIN 6/19/2023 at HS Yes Ilia Moran MD Yes    HYDROcodone-acetaminophen (NORCO) 5-325 MG tablet TAKE 1 TO 2 TABLETS BY MOUTH EVERY 6 HOURS AS NEEDED 6/19/2023 Yes Ilia Moran MD     mometasone-formoterol (DULERA) 200-5 MCG/ACT inhaler Inhale 2 puffs into the lungs 2 times daily 6/19/2023 at HS Yes Ilia Moran MD Yes    Multiple Vitamin (MULTI-VITAMIN DAILY PO) Take 1 tablet by mouth every morning 6/19/2023 at AM Yes Reported, Patient     rosuvastatin (CRESTOR) 10 MG tablet TAKE 1 TABLET BY MOUTH DAILY 6/19/2023 at AM Yes Kodi Garcia,  Yes    umeclidinium (INCRUSE ELLIPTA) 62.5 MCG/ACT inhaler Inhale 1 puff into the lungs daily 6/19/2023 at AM Yes Ilia Moran MD     vitamin C (ASCORBIC ACID) 500 MG tablet Take 500 mg by mouth 2 times daily 6/19/2023 Yes Reported, Patient     acetaminophen (TYLENOL) 325 MG tablet Take 2 tablets (650 mg) by mouth every 6 hours as needed for mild pain MAX 4000MG TYLENOL  IN 24 HRS / NEED TO INCLUDE HER LORTAB.  Patient not taking: Reported on 6/20/2023 Not Taking  Ilia Moran MD     spironolactone (ALDACTONE) 25 MG tablet TAKE 1/2 TABLET BY MOUTH DAILY  Patient not taking: Reported on 6/20/2023 Not Taking  Ilia Moran MD Yes

## 2023-06-20 NOTE — ED PROVIDER NOTES
History     Chief Complaint   Patient presents with     GI Problem     GI Bleeding     HPI  Jacklyn Hein is a 87 year old female with history of CHF, nonischemic cardiomyopathy with an AICD, hypertension, chronic opioid use, A-fib on Eliquis, CKD, chronic shortness of breath status postcholecystectomy, appendectomy who presents to the emergency department today complaining of GI bleeding.  She states she has had some dark red stools since last night which are associated with worsening lightheadedness.  Lying down in bed she states she is essentially asymptomatic.  No chest pain, denies abdominal pain at rest but admits to tenderness with palpation.  Shortness of breath is chronic and unchanged.  No recent travel or abnormal exposures.  Last dose of Eliquis was 24 hours prior to arrival.  No fevers.  She reports nausea with no vomiting  She did not take any of her morning medications    Allergies:  No Known Allergies    Problem List:    Patient Active Problem List    Diagnosis Date Noted     Upper GI bleed 06/20/2023     Priority: Medium     Anticoagulated by anticoagulation treatment 11/22/2021     Priority: Medium     Primary pulmonary hypertension (H) 11/22/2021     Priority: Medium     Age-related osteoporosis with current pathological fracture with routine healing, subsequent encounter 11/22/2021     Priority: Medium     Chronic, continuous use of opioids 05/17/2021     Priority: Medium     Bacteremia due to Klebsiella pneumoniae on 1/5/2021 02/22/2021     Priority: Medium     Mixed hyperlipidemia 02/21/2021     Priority: Medium     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 01/06/2021     Priority: Medium     Shock liver 01/06/2021     Priority: Medium     Abnormal finding on urinalysis 01/06/2021     Priority: Medium     Neuropathy 08/19/2020     Priority: Medium     Chronic systolic CHF (congestive heart failure) (H) 02/06/2020     Priority: Medium     Spinal stenosis of  lumbar region with neurogenic claudication 02/06/2020     Priority: Medium     S/p ERP: Decompression Levels: L3 to L5 Side: Bilat on 2/6/2020 with Dr. 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 06/03/2019     Priority: Medium     LBBB (left bundle branch block) 06/01/2018     Priority: Medium     History of tobacco abuse quitting on 2/1/1998 06/01/2018     Priority: Medium     Mild intermittent asthma, unspecified whether complicated 06/01/2018     Priority: Medium     Chronic obstructive pulmonary disease, unspecified COPD type (H) 06/01/2018     Priority: Medium     SOB (shortness of breath) 06/01/2018     Priority: Medium     Lumbar spine pain 10/27/2015     Priority: Medium     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 Provider to review       Non-ischemic cardiomyopathy (H) 12/10/2013     Priority: Medium     Congestive heart failure, NYHA class 2, unspecified congestive heart failure type (H) 12/10/2013     Priority: Medium     Insomnia 01/01/2011     Priority: Medium     Problem list name updated by automated process. Provider to review       Disorder of bone and cartilage 01/01/2011     Priority: Medium     Problem list name updated by automated process. Provider to review       Essential hypertension 01/01/2011     Priority: Medium     Problem list name updated by automated process. Provider to review       Neck pain, chronic 01/01/2011     Priority: Medium     Hypercholesteremia 06/07/2002     Priority: Medium        Past Medical History:    Past Medical History:   Diagnosis Date     Angina pectoris 01/01/2011     CHF (congestive heart failure), NYHA class II (H) 12/10/2013     CHF  (congestive heart failure), NYHA class III (H) 12/10/2013     Hyperhydrosis disorder 2011     Insomnia, unspecified 2011     LBBB (left bundle branch block) 2011     Neck pain, chronic 2011     Non-ischemic cardiomyopathy (H) 12/10/2013     Obesity, unspecified 2011     Osteopenia 2011     Pacemaker      Pain in joint, lower leg 2006     Pure hypercholesterolemia 2002     Unspecified essential hypertension 2011       Past Surgical History:    Past Surgical History:   Procedure Laterality Date     APPENDECTOMY       ARTHROSCOPY KNEE RT/LT      RT     BACK SURGERY  2020     BACK SURGERY  2021    fractured vert repair     CARDIAC SURGERY  2014    Pacemaker     Cataract extraction  2009    Bilateral     CHOLECYSTECTOMY      open      COLONOSCOPY  2001    Normal      COLONOSCOPY N/A 10/20/2016    Procedure: COLONOSCOPY;  Surgeon: Charan Montejo MD;  Location: [facility] OR     colonoscopy with polypectomy      Repeat 3 years      CT CORONARY ANGIOGRAM      normal     HERPES ZOSTER PCR ([facility])       IR CONSULTATION FOR IR EXAM  2020     IR CONSULTATION FOR IR EXAM  2023     IR TRIGGER POINT INJ 3 OR MORE MUSCLES  2023     left eye scar tissue       normal echo      fen-phen use       x6       REPLACE PACEMAKER GENERATOR N/A 2023    Procedure: REPLACEMENT, PULSE GENERATOR, CARDIAC PACEMAKER;  Surgeon: Isiah Hodge MD;  Location:  OR     SURGICAL RADIOLOGY PROCEDURE N/A 2016    Procedure: SURGICAL RADIOLOGY PROCEDURE;  Surgeon: Provider, Generic Perianesthesia Nursing;  Location: [facility] OR       Family History:    Family History   Problem Relation Age of Onset     Cancer Mother         lung (cause of death)      Peptic Ulcer Disease Father         gastric        Social History:  Marital Status:   [2]  Social History     Tobacco Use     Smoking status: Former     Packs/day: 1.00     Years: 15.00      Pack years: 15.00     Types: Cigarettes     Start date: 1983     Quit date: 1998     Years since quittin.3     Smokeless tobacco: Never     Tobacco comments:     Passive Exposure: No; Longest Tobacco Free: 15 years    Vaping Use     Vaping status: Never Used   Substance Use Topics     Alcohol use: Not Currently     Comment: Occasionally      Drug use: No        Medications:    budesonide-formoterol (SYMBICORT) 160-4.5 MCG/ACT Inhaler  Calcium Carbonate-Vitamin D (CALCIUM 600 + D OR)  carvedilol (COREG) 12.5 MG tablet  celecoxib (CELEBREX) 200 MG capsule  ELIQUIS ANTICOAGULANT 5 MG tablet  ENTRESTO 49-51 MG per tablet  gabapentin (NEURONTIN) 400 MG capsule  HYDROcodone-acetaminophen (NORCO) 5-325 MG tablet  mometasone-formoterol (DULERA) 200-5 MCG/ACT inhaler  Multiple Vitamin (MULTI-VITAMIN DAILY PO)  rosuvastatin (CRESTOR) 10 MG tablet  spironolactone (ALDACTONE) 25 MG tablet  umeclidinium (INCRUSE ELLIPTA) 62.5 MCG/ACT inhaler  acetaminophen (TYLENOL) 325 MG tablet          Review of Systems  A complete review of systems was performed and is otherwise negative.     Physical Exam   BP: 127/59  Pulse: 89  Temp: 98.2  F (36.8  C)  Resp: 18  SpO2: 97 %      Physical Exam  Constitutional: Alert and conversant. NAD   HENT: NCAT   Eyes: Normal pupils   Neck: supple   CV: Warm and well-perfused normal rate sinus rhythm on the monitor  Pulmonary/Chest: Non-labored respirations, clear to auscultation bilaterally   Abdominal: Soft, epigastric tenderness with some right upper quadrant tenderness, relatively mild right lower quadrant tenderness, non-distended   MSK: MYERS.   Neuro: Alert and appropriate   Skin: Warm and dry. No diaphoresis. No rashes on exposed skin    Psych: Appropriate mood and affect     ED Course              ED Course as of 23 1047   Tue 2023   0817 87 female with lightheadedness on anticoagulation associated with dark red stools.  Concerning for GI bleed.  Stating that about  every hour she is having bloody stools.  Vitals look okay, however, significant risk with Eliquis.  No previous GI bleed history.  Exam with epigastric tenderness concerning for upper GI bleed.  She is nauseous.  Declining antiemetic at this time.  Labs pending   0820 Vitals normal at this time.  Considering reversal, however, will monitor for now, check labs trend vitals.  We will proceed to imaging.  CTA GI bleed protocol ordered   0900 Dr. Sanz recommends EGD and admission with reversal. Kcentra ordered. Protonix ordered. She is not a liver pt   0905 I spoke with the hospitalist who graciously accepts the admission.  He would like repeat hemoglobin in 2 hours   1046 Hemoglobin(!): 7.7   stable   1047 Admitted for EGD     Procedures             Results for orders placed or performed during the hospital encounter of 06/20/23 (from the past 24 hour(s))   CBC with platelets differential    Narrative    The following orders were created for panel order CBC with platelets differential.  Procedure                               Abnormality         Status                     ---------                               -----------         ------                     CBC with platelets and d...[[id number]]  Abnormal            Final result                 Please view results for these tests on the individual orders.   ABO/Rh type and screen    Narrative    The following orders were created for panel order ABO/Rh type and screen.  Procedure                               Abnormality         Status                     ---------                               -----------         ------                     Adult Type and Screen[[id number]]                            Edited Result - FINAL        Please view results for these tests on the individual orders.   Basic metabolic panel   Result Value Ref Range    Sodium 141 136 - 145 mmol/L    Potassium 5.5 (H) 3.4 - 5.3 mmol/L    Chloride 109 (H) 98 - 107 mmol/L    Carbon Dioxide (CO2) 22 22  - 29 mmol/L    Anion Gap 10 7 - 15 mmol/L    Urea Nitrogen 72.8 (H) 8.0 - 23.0 mg/dL    Creatinine 1.06 (H) 0.51 - 0.95 mg/dL    Calcium 8.5 (L) 8.8 - 10.2 mg/dL    Glucose 128 (H) 70 - 99 mg/dL    GFR Estimate 51 (L) >60 mL/min/1.73m2   Magnesium   Result Value Ref Range    Magnesium 1.7 1.7 - 2.3 mg/dL   [place] Draw    Narrative    The following orders were created for panel order [place] Draw.  Procedure                               Abnormality         Status                     ---------                               -----------         ------                     Extra Blue Top Tube[[id number]]                              Final result               Extra Red Top Tube[[id number]]                               Final result               Extra Blood Bank Purple ...[[id number]]                      Final result               Extra Heparinized Syringe[[id number]]                        Final result                 Please view results for these tests on the individual orders.   CBC with platelets and differential   Result Value Ref Range    WBC Count 12.7 (H) 4.0 - 11.0 10e3/uL    RBC Count 2.45 (L) 3.80 - 5.20 10e6/uL    Hemoglobin 7.3 (L) 11.7 - 15.7 g/dL    Hematocrit 23.1 (L) 35.0 - 47.0 %    MCV 94 78 - 100 fL    MCH 29.8 26.5 - 33.0 pg    MCHC 31.6 31.5 - 36.5 g/dL    RDW 13.6 10.0 - 15.0 %    Platelet Count 208 150 - 450 10e3/uL    % Neutrophils 79 %    % Lymphocytes 9 %    % Monocytes 10 %    % Eosinophils 1 %    % Basophils 0 %    % Immature Granulocytes 1 %    NRBCs per 100 WBC 0 <1 /100    Absolute Neutrophils 10.1 (H) 1.6 - 8.3 10e3/uL    Absolute Lymphocytes 1.1 0.8 - 5.3 10e3/uL    Absolute Monocytes 1.3 0.0 - 1.3 10e3/uL    Absolute Eosinophils 0.1 0.0 - 0.7 10e3/uL    Absolute Basophils 0.0 0.0 - 0.2 10e3/uL    Absolute Immature Granulocytes 0.1 <=0.4 10e3/uL    Absolute NRBCs 0.0 10e3/uL   Adult Type and Screen   Result Value Ref Range    ABO/RH(D) O POS     Antibody Screen Negative Negative     SPECIMEN EXPIRATION DATE [id number]    Extra Blue Top Tube   Result Value Ref Range    Hold Specimen JIC    Extra Red Top Tube   Result Value Ref Range    Hold Specimen JIC    Extra Blood Bank Purple Top Tube   Result Value Ref Range    Hold Specimen JIC    Extra Heparinized Syringe   Result Value Ref Range    Hold Specimen JIC    Immunos occult blood   Result Value Ref Range    Occult Blood Slide 1 Positive (A) Negative   Prepare red blood cells (unit)   Result Value Ref Range    Blood Component Type Red Blood Cells     Product Code N0194K97     Unit Status Ready for issue     Unit Number [id number]     CROSSMATCH Compatible     CODING SYSTEM [id number]    Prepare red blood cells (unit)   Result Value Ref Range    Blood Component Type Red Blood Cells     Product Code B1621U23     Unit Status Ready for issue     Unit Number [id number]     CROSSMATCH Compatible     CODING SYSTEM [id number]    Prepare red blood cells (unit)   Result Value Ref Range    Blood Component Type Red Blood Cells     Product Code R8074T24     Unit Status Ready for issue     Unit Number [id number]     CROSSMATCH Compatible     CODING SYSTEM [id number]    CTA Abdomen Pelvis with Contrast    Narrative    Exam: CTA ABDOMEN PELVIS WITH CONTRAST    Exam reason: GI bleed abdominal pain    Technique: Initial noncontrast images of the abdomen and pelvis were  obtained. Using helical CT technique, axial images of the abdomen and  pelvis were obtained with IV contrast in the arterial and venous  phases.  Coronal and sagittal reconstructions also performed. This CT  was performed using one or more of the following dose reduction  techniques: automated exposure control, adjustment of the mA and/or kV  according to patient size, and/or use of iterative reconstruction  technique.    Meds/Contrast: ISOVUE 370  53mL    Comparison: 1/6/2021     FINDINGS:    ABDOMEN:    Liver: No mass or any significant abnormality.  Gallbladder: Resected.   Bile Ducts:  There is dilation of the extrahepatic common bile duct and  central intrahepatic bile ducts, slightly progressed since 2021,  likely due to the postcholecystectomy setting.   Spleen:  No splenomegaly or focal lesion.  Pancreas: No mass, ductal dilatation, or inflammatory changes.  Kidneys: No solid mass, hydronephrosis, or any definite calculi.   Adrenals:  No nodules.   Lymph Nodes: No adenopathy.   Vascular: No aortic aneurysm.   Abdominal Wall: No acute findings.     PELVIS:   No mass or adenopathy.     Bowel/Mesentery/Peritoneum:   -No bowel obstruction.   -No evidence of active GI bleeding.  -No acute inflammatory findings.  -No ascites.    Visualized portions of the Chest: No consolidation or pleural  effusion.  Musculoskeletal: There is diffuse osteopenia. No acute osseous  abnormalities. Prior T12 and L4 vertebroplasties.       Impression    IMPRESSION:  No evidence of active GI bleed. No acute findings in the abdomen or  pelvis.    DWAYNE KURTZ MD         SYSTEM ID:  [id number]   Hemoglobin   Result Value Ref Range    Hemoglobin 7.7 (L) 11.7 - 15.7 g/dL       Medications   pantoprazole (PROTONIX) 80 mg in sodium chloride 0.9 % 100 mL infusion (8 mg/hr Intravenous $New Bag 6/20/23 1004)   ondansetron (ZOFRAN) injection 4 mg (4 mg Intravenous $Given 6/20/23 0922)   oxyCODONE (ROXICODONE) tablet 5 mg (5 mg Oral $Given 6/20/23 0833)   acetaminophen (TYLENOL) tablet 975 mg (975 mg Oral $Given 6/20/23 0832)   sodium chloride (PF) 0.9% PF flush 100 mL (100 mLs Intravenous $Given 6/20/23 0908)   iopamidol (ISOVUE-370) solution 53 mL (53 mLs Intravenous $Given 6/20/23 0908)   pantoprazole (PROTONIX) IV push injection 40 mg (40 mg Intravenous $Given 6/20/23 0920)   prothrombin 4 factor complex concentrate (KCENTRA) infusion 3,330 Units (3,330 Units Intravenous $Given 6/20/23 0932)       Assessments & Plan (with Medical Decision Making)     I have reviewed the nursing notes.    I have reviewed the findings,  diagnosis, plan and need for follow up with the patient.      New Prescriptions    No medications on file       Final diagnoses:   Upper GI bleed       6/20/2023   [facility]     Tong Munroe MD  06/20/23 1047

## 2023-06-20 NOTE — TELEPHONE ENCOUNTER
CONSULT PATIENT  PAIN INJECTION POST CALL    Procedure: Trigger Point Injection   Radiologist(s): Dr. Edmundo Ybarra  Date of Procedure: 6/6/23    The patient was not available by telephone.    Left a message for patient to call IR department.      Kim Ybarra

## 2023-06-20 NOTE — PLAN OF CARE
Pt up to [facility] with weakness. No stools. Pt denies pain at first but then states shes had back pain that started 1 month ago after her  passed away. Gave scheduled norco for that. Pt awaiting surgery consult. Continues on protonix gtt. NS started at 75/hr.     Face to face report given with opportunity to observe patient.    Report given to Bridget Garcia RN   6/20/2023  4:20 PM

## 2023-06-20 NOTE — DISCHARGE INSTRUCTIONS

## 2023-06-20 NOTE — H&P
[facility]    History and Physical - Hospitalist Service       Date of Admission:  6/20/2023    Assessment & Plan      Jacklyn Hein is a 87 year old female admitted on 6/20/2023. She presents with a GI bleed with melena and severe anemia    #1 GI bleed with melena, acute blood loss anemia  Will monitor patient's hemoglobin level with serial hemoglobin check.  Patient is at significant risk for peptic ulcer disease from NSAID use and bleeding risk from apixaban.  Kcentra has been given to reverse the effect of apixaban.  We will continue with proton pump inhibitor IV for GI protection.  Transfuse if hemoglobin decreased lower than 7.0.  Spoke with Dr. Sanz, who will monitor the patient    #2  CHF history from nonischemic cardiomyopathy  In light of need for fluid resuscitation, will check patient's cardiac function with echocardiogram.  If patient's hemoglobin remained stable and shows no signs of active bleeding, will resume patient's and CHF medications    3.  Atrial fibrillation  Patient's heart rate is controlled.  We will continue with the beta-blocker to prevent beta-blocker withdrawal.  Patient anticoagulation apixaban has been held due to GI bleed.       Diet:   NPO  DVT Prophylaxis: Pneumatic Compression Devices  Johnson Catheter: Not present  Lines: None     Cardiac Monitoring: None  Code Status:   FULL    Clinically Significant Risk Factors Present on Admission        # Hyperkalemia: Highest K = 5.5 mmol/L in last 2 days, will monitor as appropriate        # Drug Induced Coagulation Defect: home medication list includes an anticoagulant medication    # Hypertension: Noted on problem list               Disposition Plan            Pierce Pereira MD  Hospitalist Service  [facility]  Securely message with [employer] (more info)  Text page via [employer] Paging/Directory     ______________________________________________________________________    Chief Complaint   Melena and  anemia    History is obtained from the patient    History of Present Illness   Jacklyn Hein is a 87 year old female who presents with dark stools.  Patient has following cardiac history:  1. CHF-systolic. 50-55%/grade 1 on 8/24/21. 30-35%/diastolic on 1/5/21. 40-45%% on 12/01/2019.  2.  Reduction in severity of COPD-severe to mild/moderate.  Pulmonary concern for asthma based on PFT's from 5/17/17.  3.  Nonischemic cardiomyopathy with NYHA classification 2.  4.  ICD placement on 11/11/2014.  5.  Hypertension-controlled.  6.  Hyperlipidemia-uncontrolled.  7.  H/O tobacco abuse quitting on 2/1/1998  8.  LBBB.  9.  SOB-significantly improved.  10.  CKD-3.  11.  Hypocalcemia.  12.  A. fib up to 23 seconds on 4/9/19.  Less than 1% in 8/9/2022.  13.  CHADSVASC score of 4.   14.  Mitral and tricuspid regurgitation-mild to moderate on 1/5/2021.  15.  Bacteremia on 1/5/2021 with Klebsiella pneumonia with septic shock.    Patient has been maintained on apixaban for atrial fibrillation anticoagulation and she has been taking frequent NSAIDs for chronic back pain.  Patient reported that she was in her usual state of health until this morning when she noted dark stool.  Patient complained of weakness but denies any chest pain, lightheadedness, palpitation, vomiting, dysuria, diarrhea, fever, chills, weight changes.  Patient does report some nausea and right-sided abdominal pain, nonradiating nonexertional, as well as mild lower extreme edema.  Patient also denies headache, neck pain, swallowing difficulties, orthopnea, heat or cold intolerance, polyuria, oliguria.  Patient had recent death of her  but does not feel that she is particularly depressed.  Denies suicidal thoughts.  In the emergency room, patient was noted with significant anemia with a hemoglobin of 7.3; patient's baseline hemoglobin was about 10.  She was evaluated with abdominal CT, which showed no evidence of active GI bleed.  Patient was given  Kcentra to reverse apixaban.  Discussion was held with Dr. Sanz, general surgeon on-call, who will evaluate patient for possible endoscopic intervention.  Patient is now being admitted to the hospital for further care.    Past Medical History    Past Medical History:   Diagnosis Date     Angina pectoris 2011     CHF (congestive heart failure), NYHA class II (H) 12/10/2013     CHF (congestive heart failure), NYHA class III (H) 12/10/2013     Hyperhydrosis disorder 2011     Insomnia, unspecified 2011     LBBB (left bundle branch block) 2011     Neck pain, chronic 2011     Non-ischemic cardiomyopathy (H) 12/10/2013     Obesity, unspecified 2011     Osteopenia 2011     Pacemaker      Pain in joint, lower leg 2006     Pure hypercholesterolemia 2002     Unspecified essential hypertension 2011       Past Surgical History   Past Surgical History:   Procedure Laterality Date     APPENDECTOMY       ARTHROSCOPY KNEE RT/LT      RT     BACK SURGERY  2020     BACK SURGERY  2021    fractured vert repair     CARDIAC SURGERY  2014    Pacemaker     Cataract extraction  2009    Bilateral     CHOLECYSTECTOMY      open      COLONOSCOPY  2001    Normal      COLONOSCOPY N/A 10/20/2016    Procedure: COLONOSCOPY;  Surgeon: Charan Montejo MD;  Location: [facility] OR     colonoscopy with polypectomy      Repeat 3 years      CT CORONARY ANGIOGRAM      normal     HERPES ZOSTER PCR ([facility])       IR CONSULTATION FOR IR EXAM  2020     IR CONSULTATION FOR IR EXAM  2023     IR TRIGGER POINT INJ 3 OR MORE MUSCLES  2023     left eye scar tissue       normal echo      fen-phen use       x6       REPLACE PACEMAKER GENERATOR N/A 2023    Procedure: REPLACEMENT, PULSE GENERATOR, CARDIAC PACEMAKER;  Surgeon: Isiah Hodge MD;  Location:  OR     SURGICAL RADIOLOGY PROCEDURE N/A 2016    Procedure: SURGICAL RADIOLOGY PROCEDURE;   Surgeon: Provider, [facility];  Location: [facility] OR       Prior to Admission Medications   Prior to Admission Medications   Prescriptions Last Dose Informant Patient Reported? Taking?   Calcium Carbonate-Vitamin D (CALCIUM 600 + D OR) 6/19/2023 Self Yes Yes   Sig: Take 1 tablet by mouth daily   ELIQUIS ANTICOAGULANT 5 MG tablet 6/19/2023  No Yes   Sig: TAKE 1 TABLET BY MOUTH 2 TIMES DAILY   ENTRESTO 49-51 MG per tablet 6/19/2023  No Yes   Sig: TAKE 1 TABLET BY MOUTH TWICE DAILY   HYDROcodone-acetaminophen (NORCO) 5-325 MG tablet 6/19/2023  No Yes   Sig: TAKE 1 TO 2 TABLETS BY MOUTH EVERY 6 HOURS AS NEEDED   Multiple Vitamin (MULTI-VITAMIN DAILY PO) 6/19/2023 Self Yes Yes   Sig: Take 1 tablet by mouth daily   acetaminophen (TYLENOL) 325 MG tablet   No No   Sig: Take 2 tablets (650 mg) by mouth every 6 hours as needed for mild pain MAX 4000MG TYLENOL  IN 24 HRS / NEED TO INCLUDE HER LORTAB.   budesonide-formoterol (SYMBICORT) 160-4.5 MCG/ACT Inhaler 6/19/2023  No Yes   Sig: INHALE 2 PUFFS INTO THE LUNGS 2 TIMES DAILY   carvedilol (COREG) 12.5 MG tablet 6/19/2023  No Yes   Sig: TAKE 1 TABLET BY MOUTH 2 TIMES DAILY WITH MEALS   celecoxib (CELEBREX) 200 MG capsule 6/19/2023  No Yes   Sig: Take 1 capsule (200 mg) by mouth daily   gabapentin (NEURONTIN) 400 MG capsule 6/19/2023  No Yes   Sig: TAKE 1 CAPSULE BY MOUTH 3 TIMES DAILY FOR PAIN   mometasone-formoterol (DULERA) 200-5 MCG/ACT inhaler 6/19/2023  No Yes   Sig: Inhale 2 puffs into the lungs 2 times daily   rosuvastatin (CRESTOR) 10 MG tablet 6/19/2023  No Yes   Sig: TAKE 1 TABLET BY MOUTH DAILY   spironolactone (ALDACTONE) 25 MG tablet 6/19/2023  No Yes   Sig: TAKE 1/2 TABLET BY MOUTH DAILY   umeclidinium (INCRUSE ELLIPTA) 62.5 MCG/ACT inhaler 6/19/2023  No Yes   Sig: Inhale 1 puff into the lungs daily      Facility-Administered Medications: None        Review of Systems    The 10 point Review of Systems is negative other than noted in the HPI or  here.      Social History   I have reviewed this patient's social history and updated it with pertinent information if needed.  Social History     Tobacco Use     Smoking status: Former     Packs/day: 1.00     Years: 15.00     Pack years: 15.00     Types: Cigarettes     Start date: 1983     Quit date: 1998     Years since quittin.3     Smokeless tobacco: Never     Tobacco comments:     Passive Exposure: No; Longest Tobacco Free: 15 years    Vaping Use     Vaping status: Never Used   Substance Use Topics     Alcohol use: Not Currently     Comment: Occasionally      Drug use: No       Family History   I have reviewed this patient's family history and updated it with pertinent information if needed.  Family History   Problem Relation Age of Onset     Cancer Mother         lung (cause of death)      Peptic Ulcer Disease Father         gastric        Allergies   No Known Allergies     Physical Exam   Vital Signs: Temp: 98.2  F (36.8  C) Temp src: Tympanic BP: 133/86 Pulse: 82   Resp: 19 SpO2: 97 % O2 Device: None (Room air)    Weight: 0 lbs 0 oz    General Appearance: Lying in bed in no acute distress  Eyes: Cataract surgery noted  HEENT: Clear oropharynx, neck was supple  Respiratory: CTA bilaterally  Cardiovascular: S1-S2, regular rhythm and rate, no murmurs rubs gallops  GI: Soft, nontender, nondistended  Lymph/Hematologic: No palpable lymph nodes  Genitourinary: Deferred  Skin: Intact  Musculoskeletal: No deformities  Neurologic: Cranial nerve exam showed no asymmetry, motor examination showed good strength throughout, sensory examination showed intact light touch sensation throughout  Psychiatric: Appropriate affect    Medical Decision Making       80 MINUTES SPENT BY ME on the date of service doing chart review, history, exam, documentation & further activities per the note.      Data     I have personally reviewed the following data over the past 24 hrs:    12.7 (H)  \   7.7 (L)   / 208     141 109  (H) 72.8 (H) /  128 (H)   5.5 (H) 22 1.06 (H) \       Trop: N/A BNP: 1,034       Procal: N/A CRP: N/A Lactic Acid: 1.4         Imaging results reviewed over the past 24 hrs:   Recent Results (from the past 24 hour(s))   CTA Abdomen Pelvis with Contrast    Narrative    Exam: CTA ABDOMEN PELVIS WITH CONTRAST    Exam reason: GI bleed abdominal pain    Technique: Initial noncontrast images of the abdomen and pelvis were  obtained. Using helical CT technique, axial images of the abdomen and  pelvis were obtained with IV contrast in the arterial and venous  phases.  Coronal and sagittal reconstructions also performed. This CT  was performed using one or more of the following dose reduction  techniques: automated exposure control, adjustment of the mA and/or kV  according to patient size, and/or use of iterative reconstruction  technique.    Meds/Contrast: ISOVUE 370  53mL    Comparison: 1/6/2021     FINDINGS:    ABDOMEN:    Liver: No mass or any significant abnormality.  Gallbladder: Resected.   Bile Ducts: There is dilation of the extrahepatic common bile duct and  central intrahepatic bile ducts, slightly progressed since 2021,  likely due to the postcholecystectomy setting.   Spleen:  No splenomegaly or focal lesion.  Pancreas: No mass, ductal dilatation, or inflammatory changes.  Kidneys: No solid mass, hydronephrosis, or any definite calculi.   Adrenals:  No nodules.   Lymph Nodes: No adenopathy.   Vascular: No aortic aneurysm.   Abdominal Wall: No acute findings.     PELVIS:   No mass or adenopathy.     Bowel/Mesentery/Peritoneum:   -No bowel obstruction.   -No evidence of active GI bleeding.  -No acute inflammatory findings.  -No ascites.    Visualized portions of the Chest: No consolidation or pleural  effusion.  Musculoskeletal: There is diffuse osteopenia. No acute osseous  abnormalities. Prior T12 and L4 vertebroplasties.       Impression    IMPRESSION:  No evidence of active GI bleed. No acute findings in the  abdomen or  pelvis.    DWAYNE KURTZ MD         SYSTEM ID:  [id number]

## 2023-06-20 NOTE — ED NOTES
Up to bedside commode. SBA.  No results.  Denies dizziness when moving positions.  Pt pale and cool to the touch.   Will updated primary RN

## 2023-06-21 ENCOUNTER — APPOINTMENT (OUTPATIENT)
Dept: CARDIOLOGY | Facility: HOSPITAL | Age: 88
DRG: 378 | End: 2023-06-21
Attending: INTERNAL MEDICINE
Payer: MEDICARE

## 2023-06-21 ENCOUNTER — ANESTHESIA (OUTPATIENT)
Dept: SURGERY | Facility: HOSPITAL | Age: 88
DRG: 378 | End: 2023-06-21
Payer: MEDICARE

## 2023-06-21 ENCOUNTER — ANESTHESIA EVENT (OUTPATIENT)
Dept: SURGERY | Facility: HOSPITAL | Age: 88
DRG: 378 | End: 2023-06-21
Payer: MEDICARE

## 2023-06-21 LAB
ALBUMIN SERPL BCG-MCNC: 2.7 G/DL (ref 3.5–5.2)
ANION GAP SERPL CALCULATED.3IONS-SCNC: 12 MMOL/L (ref 7–15)
BASE EXCESS BLDV CALC-SCNC: -6 MMOL/L (ref -7.7–1.9)
BUN SERPL-MCNC: 60.7 MG/DL (ref 8–23)
CALCIUM SERPL-MCNC: 8.2 MG/DL (ref 8.8–10.2)
CHLORIDE SERPL-SCNC: 113 MMOL/L (ref 98–107)
CREAT SERPL-MCNC: 1.05 MG/DL (ref 0.51–0.95)
DEPRECATED HCO3 PLAS-SCNC: 18 MMOL/L (ref 22–29)
ERYTHROCYTE [DISTWIDTH] IN BLOOD BY AUTOMATED COUNT: 15 % (ref 10–15)
GFR SERPL CREATININE-BSD FRML MDRD: 51 ML/MIN/1.73M2
GLUCOSE SERPL-MCNC: 93 MG/DL (ref 70–99)
HCO3 BLDV-SCNC: 20 MMOL/L (ref 21–28)
HCT VFR BLD AUTO: 27.2 % (ref 35–47)
HGB BLD-MCNC: 8.4 G/DL (ref 11.7–15.7)
HGB BLD-MCNC: 8.7 G/DL (ref 11.7–15.7)
HGB BLD-MCNC: 9.4 G/DL (ref 11.7–15.7)
LACTATE SERPL-SCNC: 1.1 MMOL/L (ref 0.7–2)
LVEF ECHO: NORMAL
MCH RBC QN AUTO: 30 PG (ref 26.5–33)
MCHC RBC AUTO-ENTMCNC: 32 G/DL (ref 31.5–36.5)
MCV RBC AUTO: 94 FL (ref 78–100)
O2/TOTAL GAS SETTING VFR VENT: 21 %
OXYHGB MFR BLDV: 80 % (ref 70–75)
PCO2 BLDV: 41 MM HG (ref 40–50)
PH BLDV: 7.3 [PH] (ref 7.32–7.43)
PHOSPHATE SERPL-MCNC: 4.5 MG/DL (ref 2.5–4.5)
PLATELET # BLD AUTO: 138 10E3/UL (ref 150–450)
PO2 BLDV: 46 MM HG (ref 25–47)
POTASSIUM SERPL-SCNC: 4.8 MMOL/L (ref 3.4–5.3)
PROCALCITONIN SERPL IA-MCNC: 0.14 NG/ML
RBC # BLD AUTO: 2.9 10E6/UL (ref 3.8–5.2)
SODIUM SERPL-SCNC: 143 MMOL/L (ref 136–145)
WBC # BLD AUTO: 12.5 10E3/UL (ref 4–11)

## 2023-06-21 PROCEDURE — 84145 PROCALCITONIN (PCT): CPT | Performed by: INTERNAL MEDICINE

## 2023-06-21 PROCEDURE — 0DJD8ZZ INSPECTION OF LOWER INTESTINAL TRACT, VIA NATURAL OR ARTIFICIAL OPENING ENDOSCOPIC: ICD-10-PCS | Performed by: SURGERY

## 2023-06-21 PROCEDURE — 250N000013 HC RX MED GY IP 250 OP 250 PS 637: Performed by: SURGERY

## 2023-06-21 PROCEDURE — 250N000011 HC RX IP 250 OP 636: Performed by: NURSE ANESTHETIST, CERTIFIED REGISTERED

## 2023-06-21 PROCEDURE — 93005 ELECTROCARDIOGRAM TRACING: CPT

## 2023-06-21 PROCEDURE — 250N000009 HC RX 250

## 2023-06-21 PROCEDURE — 258N000003 HC RX IP 258 OP 636: Performed by: INTERNAL MEDICINE

## 2023-06-21 PROCEDURE — 43235 EGD DIAGNOSTIC BRUSH WASH: CPT | Performed by: SURGERY

## 2023-06-21 PROCEDURE — 120N000001 HC R&B MED SURG/OB

## 2023-06-21 PROCEDURE — 43235 EGD DIAGNOSTIC BRUSH WASH: CPT | Performed by: NURSE ANESTHETIST, CERTIFIED REGISTERED

## 2023-06-21 PROCEDURE — 85018 HEMOGLOBIN: CPT | Performed by: INTERNAL MEDICINE

## 2023-06-21 PROCEDURE — 99100 ANES PT EXTEME AGE<1 YR&>70: CPT | Performed by: NURSE ANESTHETIST, CERTIFIED REGISTERED

## 2023-06-21 PROCEDURE — 250N000009 HC RX 250: Performed by: NURSE ANESTHETIST, CERTIFIED REGISTERED

## 2023-06-21 PROCEDURE — 83605 ASSAY OF LACTIC ACID: CPT | Performed by: INTERNAL MEDICINE

## 2023-06-21 PROCEDURE — 93306 TTE W/DOPPLER COMPLETE: CPT

## 2023-06-21 PROCEDURE — 250N000011 HC RX IP 250 OP 636: Performed by: INTERNAL MEDICINE

## 2023-06-21 PROCEDURE — 94640 AIRWAY INHALATION TREATMENT: CPT

## 2023-06-21 PROCEDURE — 250N000011 HC RX IP 250 OP 636: Mod: JZ | Performed by: INTERNAL MEDICINE

## 2023-06-21 PROCEDURE — 272N000001 HC OR GENERAL SUPPLY STERILE: Performed by: SURGERY

## 2023-06-21 PROCEDURE — 82805 BLOOD GASES W/O2 SATURATION: CPT | Performed by: INTERNAL MEDICINE

## 2023-06-21 PROCEDURE — 93306 TTE W/DOPPLER COMPLETE: CPT | Mod: 26 | Performed by: INTERNAL MEDICINE

## 2023-06-21 PROCEDURE — 85018 HEMOGLOBIN: CPT | Performed by: SURGERY

## 2023-06-21 PROCEDURE — 258N000003 HC RX IP 258 OP 636: Performed by: NURSE ANESTHETIST, CERTIFIED REGISTERED

## 2023-06-21 PROCEDURE — 0DJ08ZZ INSPECTION OF UPPER INTESTINAL TRACT, VIA NATURAL OR ARTIFICIAL OPENING ENDOSCOPIC: ICD-10-PCS | Performed by: SURGERY

## 2023-06-21 PROCEDURE — 250N000013 HC RX MED GY IP 250 OP 250 PS 637: Performed by: INTERNAL MEDICINE

## 2023-06-21 PROCEDURE — 36415 COLL VENOUS BLD VENIPUNCTURE: CPT | Performed by: SURGERY

## 2023-06-21 PROCEDURE — 258N000003 HC RX IP 258 OP 636: Performed by: SURGERY

## 2023-06-21 PROCEDURE — 85027 COMPLETE CBC AUTOMATED: CPT | Performed by: INTERNAL MEDICINE

## 2023-06-21 PROCEDURE — 999N000157 HC STATISTIC RCP TIME EA 10 MIN

## 2023-06-21 PROCEDURE — 99233 SBSQ HOSP IP/OBS HIGH 50: CPT | Performed by: INTERNAL MEDICINE

## 2023-06-21 PROCEDURE — 80069 RENAL FUNCTION PANEL: CPT | Performed by: INTERNAL MEDICINE

## 2023-06-21 PROCEDURE — 370N000017 HC ANESTHESIA TECHNICAL FEE, PER MIN: Performed by: SURGERY

## 2023-06-21 PROCEDURE — 360N000075 HC SURGERY LEVEL 2, PER MIN: Performed by: SURGERY

## 2023-06-21 PROCEDURE — 710N000010 HC RECOVERY PHASE 1, LEVEL 2, PER MIN: Performed by: SURGERY

## 2023-06-21 PROCEDURE — 36415 COLL VENOUS BLD VENIPUNCTURE: CPT | Performed by: INTERNAL MEDICINE

## 2023-06-21 RX ORDER — ONDANSETRON 2 MG/ML
4 INJECTION INTRAMUSCULAR; INTRAVENOUS EVERY 30 MIN PRN
Status: DISCONTINUED | OUTPATIENT
Start: 2023-06-21 | End: 2023-06-21 | Stop reason: HOSPADM

## 2023-06-21 RX ORDER — ALBUTEROL SULFATE 0.83 MG/ML
2.5 SOLUTION RESPIRATORY (INHALATION) EVERY 4 HOURS PRN
Status: DISCONTINUED | OUTPATIENT
Start: 2023-06-21 | End: 2023-06-21 | Stop reason: HOSPADM

## 2023-06-21 RX ORDER — LIDOCAINE HYDROCHLORIDE 20 MG/ML
INJECTION, SOLUTION INFILTRATION; PERINEURAL PRN
Status: DISCONTINUED | OUTPATIENT
Start: 2023-06-21 | End: 2023-06-21

## 2023-06-21 RX ORDER — SODIUM CHLORIDE, SODIUM LACTATE, POTASSIUM CHLORIDE, CALCIUM CHLORIDE 600; 310; 30; 20 MG/100ML; MG/100ML; MG/100ML; MG/100ML
INJECTION, SOLUTION INTRAVENOUS CONTINUOUS
Status: DISCONTINUED | OUTPATIENT
Start: 2023-06-21 | End: 2023-06-21 | Stop reason: HOSPADM

## 2023-06-21 RX ORDER — IPRATROPIUM BROMIDE AND ALBUTEROL SULFATE 2.5; .5 MG/3ML; MG/3ML
3 SOLUTION RESPIRATORY (INHALATION) ONCE
Status: DISCONTINUED | OUTPATIENT
Start: 2023-06-21 | End: 2023-06-21 | Stop reason: HOSPADM

## 2023-06-21 RX ORDER — BISACODYL 5 MG/1
TABLET, DELAYED RELEASE ORAL
Qty: 4 TABLET | Refills: 0 | Status: SHIPPED | OUTPATIENT
Start: 2023-06-21 | End: 2023-06-24

## 2023-06-21 RX ORDER — HYDROMORPHONE HYDROCHLORIDE 1 MG/ML
0.4 INJECTION, SOLUTION INTRAMUSCULAR; INTRAVENOUS; SUBCUTANEOUS EVERY 5 MIN PRN
Status: DISCONTINUED | OUTPATIENT
Start: 2023-06-21 | End: 2023-06-21 | Stop reason: HOSPADM

## 2023-06-21 RX ORDER — LABETALOL 20 MG/4 ML (5 MG/ML) INTRAVENOUS SYRINGE
10
Status: DISCONTINUED | OUTPATIENT
Start: 2023-06-21 | End: 2023-06-21 | Stop reason: HOSPADM

## 2023-06-21 RX ORDER — FENTANYL CITRATE 50 UG/ML
50 INJECTION, SOLUTION INTRAMUSCULAR; INTRAVENOUS EVERY 5 MIN PRN
Status: DISCONTINUED | OUTPATIENT
Start: 2023-06-21 | End: 2023-06-21 | Stop reason: HOSPADM

## 2023-06-21 RX ORDER — HYDROMORPHONE HYDROCHLORIDE 1 MG/ML
0.2 INJECTION, SOLUTION INTRAMUSCULAR; INTRAVENOUS; SUBCUTANEOUS EVERY 5 MIN PRN
Status: DISCONTINUED | OUTPATIENT
Start: 2023-06-21 | End: 2023-06-21 | Stop reason: HOSPADM

## 2023-06-21 RX ORDER — FENTANYL CITRATE 50 UG/ML
25 INJECTION, SOLUTION INTRAMUSCULAR; INTRAVENOUS EVERY 5 MIN PRN
Status: DISCONTINUED | OUTPATIENT
Start: 2023-06-21 | End: 2023-06-21 | Stop reason: HOSPADM

## 2023-06-21 RX ORDER — IPRATROPIUM BROMIDE AND ALBUTEROL SULFATE 2.5; .5 MG/3ML; MG/3ML
3 SOLUTION RESPIRATORY (INHALATION) ONCE
Status: COMPLETED | OUTPATIENT
Start: 2023-06-21 | End: 2023-06-21

## 2023-06-21 RX ORDER — PANTOPRAZOLE SODIUM 40 MG/1
40 TABLET, DELAYED RELEASE ORAL
Status: DISCONTINUED | OUTPATIENT
Start: 2023-06-22 | End: 2023-06-24 | Stop reason: HOSPADM

## 2023-06-21 RX ORDER — IPRATROPIUM BROMIDE AND ALBUTEROL SULFATE 2.5; .5 MG/3ML; MG/3ML
SOLUTION RESPIRATORY (INHALATION)
Status: COMPLETED
Start: 2023-06-21 | End: 2023-06-21

## 2023-06-21 RX ORDER — PROPOFOL 10 MG/ML
INJECTION, EMULSION INTRAVENOUS PRN
Status: DISCONTINUED | OUTPATIENT
Start: 2023-06-21 | End: 2023-06-21

## 2023-06-21 RX ORDER — ONDANSETRON 4 MG/1
4 TABLET, ORALLY DISINTEGRATING ORAL EVERY 30 MIN PRN
Status: DISCONTINUED | OUTPATIENT
Start: 2023-06-21 | End: 2023-06-21 | Stop reason: HOSPADM

## 2023-06-21 RX ORDER — SODIUM CHLORIDE, SODIUM LACTATE, POTASSIUM CHLORIDE, CALCIUM CHLORIDE 600; 310; 30; 20 MG/100ML; MG/100ML; MG/100ML; MG/100ML
INJECTION, SOLUTION INTRAVENOUS CONTINUOUS PRN
Status: DISCONTINUED | OUTPATIENT
Start: 2023-06-21 | End: 2023-06-21

## 2023-06-21 RX ADMIN — PROPOFOL 40 MG: 10 INJECTION, EMULSION INTRAVENOUS at 11:19

## 2023-06-21 RX ADMIN — HYDROCODONE BITARTRATE AND ACETAMINOPHEN 1 TABLET: 5; 325 TABLET ORAL at 07:57

## 2023-06-21 RX ADMIN — SODIUM CHLORIDE 8 MG/HR: 9 INJECTION, SOLUTION INTRAVENOUS at 11:10

## 2023-06-21 RX ADMIN — SODIUM CHLORIDE 8 MG/HR: 9 INJECTION, SOLUTION INTRAVENOUS at 00:07

## 2023-06-21 RX ADMIN — GABAPENTIN 400 MG: 400 CAPSULE ORAL at 08:00

## 2023-06-21 RX ADMIN — GABAPENTIN 400 MG: 400 CAPSULE ORAL at 15:09

## 2023-06-21 RX ADMIN — CARVEDILOL 12.5 MG: 12.5 TABLET, FILM COATED ORAL at 07:57

## 2023-06-21 RX ADMIN — ACETAMINOPHEN 650 MG: 325 TABLET, FILM COATED ORAL at 20:39

## 2023-06-21 RX ADMIN — IPRATROPIUM BROMIDE AND ALBUTEROL SULFATE 3 ML: .5; 3 SOLUTION RESPIRATORY (INHALATION) at 10:40

## 2023-06-21 RX ADMIN — PROPOFOL 20 MG: 10 INJECTION, EMULSION INTRAVENOUS at 11:21

## 2023-06-21 RX ADMIN — LIDOCAINE HYDROCHLORIDE 100 MG: 20 INJECTION, SOLUTION INFILTRATION; PERINEURAL at 11:19

## 2023-06-21 RX ADMIN — POLYETHYLENE GLYCOL 3350, SODIUM SULFATE ANHYDROUS, SODIUM BICARBONATE, SODIUM CHLORIDE, POTASSIUM CHLORIDE 4000 ML: 236; 22.74; 6.74; 5.86; 2.97 POWDER, FOR SOLUTION ORAL at 17:27

## 2023-06-21 RX ADMIN — IPRATROPIUM BROMIDE AND ALBUTEROL SULFATE 3 ML: 2.5; .5 SOLUTION RESPIRATORY (INHALATION) at 10:40

## 2023-06-21 RX ADMIN — GABAPENTIN 400 MG: 400 CAPSULE ORAL at 20:39

## 2023-06-21 RX ADMIN — HYDROCODONE BITARTRATE AND ACETAMINOPHEN 1 TABLET: 5; 325 TABLET ORAL at 12:52

## 2023-06-21 RX ADMIN — CALCIUM CARBONATE 600 MG (1,500 MG)-VITAMIN D3 400 UNIT TABLET 1 TABLET: at 08:08

## 2023-06-21 RX ADMIN — ACETAMINOPHEN 650 MG: 325 TABLET, FILM COATED ORAL at 12:52

## 2023-06-21 RX ADMIN — HYDROMORPHONE HYDROCHLORIDE 0.2 MG: 0.2 INJECTION, SOLUTION INTRAMUSCULAR; INTRAVENOUS; SUBCUTANEOUS at 09:29

## 2023-06-21 RX ADMIN — ACETAMINOPHEN 650 MG: 325 TABLET, FILM COATED ORAL at 17:18

## 2023-06-21 RX ADMIN — SODIUM CHLORIDE: 9 INJECTION, SOLUTION INTRAVENOUS at 16:39

## 2023-06-21 RX ADMIN — SODIUM CHLORIDE: 9 INJECTION, SOLUTION INTRAVENOUS at 06:38

## 2023-06-21 RX ADMIN — PROPOFOL 20 MG: 10 INJECTION, EMULSION INTRAVENOUS at 11:23

## 2023-06-21 RX ADMIN — HYDROCODONE BITARTRATE AND ACETAMINOPHEN 1 TABLET: 5; 325 TABLET ORAL at 20:39

## 2023-06-21 RX ADMIN — SODIUM CHLORIDE, POTASSIUM CHLORIDE, SODIUM LACTATE AND CALCIUM CHLORIDE: 600; 310; 30; 20 INJECTION, SOLUTION INTRAVENOUS at 10:02

## 2023-06-21 RX ADMIN — ACETAMINOPHEN 650 MG: 325 TABLET, FILM COATED ORAL at 08:00

## 2023-06-21 ASSESSMENT — COPD QUESTIONNAIRES: COPD: 1

## 2023-06-21 ASSESSMENT — ACTIVITIES OF DAILY LIVING (ADL)
ADLS_ACUITY_SCORE: 28
ADLS_ACUITY_SCORE: 32
ADLS_ACUITY_SCORE: 28
ADLS_ACUITY_SCORE: 28
ADLS_ACUITY_SCORE: 32
ADLS_ACUITY_SCORE: 32
ADLS_ACUITY_SCORE: 33

## 2023-06-21 ASSESSMENT — ENCOUNTER SYMPTOMS: DYSRHYTHMIAS: 1

## 2023-06-21 NOTE — OR NURSING
Patient transferred to back to Med. Surg. Room 3218  via bed. Donaldo score 10/10. Pain level 0/10.  Hand off report given to DELANEY Quezada.

## 2023-06-21 NOTE — PLAN OF CARE
Pt alert and oriented x3, pleasant and cooperative. VS and assessment as charted. Rates pain to back 6/10 and rec'd scheduled meds per MAR with effectiveness. Pt slept throughout the night. Rec'd 2 units PRBC's and tolerated well. % v-paced. Lungs diminished t/o, placed on 1 LPM for SP02 87-88% while asleep, encouraging TCDB. Bowel sounds active, one small black/maroon stool. Voiding without difficulty. Remains bedrest with bedside commode. Independently positions self. Free from falls/ injury, call light within reach and alarms activated.

## 2023-06-21 NOTE — OP NOTE
REPORT OF OPERATION  DATE OF PROCEDURE: 6/21/2023    PATIENT: Jacklyn Hein    SURGERY PERFORMED: Esophagogastroduodenoscopy     PREOPERATIVE DIAGNOSIS: Melena    POSTOPERATIVE DIAGNOSIS:    Mild gastritis without bleeding   Squamous columnar junction at 35    SURGEON: Royal Sanz MD    ASSISTANTS: None    ANESTHESIA: Monitored Anesthesia Care    COMPLICATIONS: None apparent    TRANSFUSIONS: None    TISSUE TO PATHOLOGY: None    FINDINGS: Mild gastritis without bleeding    INDICATIONS: This is a 87 year old female in need of an Esophagogastroduodenoscopy for Melena.  The patient will be taken to the endoscopy suite for that procedure.    DESCRIPTIONS OF PROCEDURE IN DETAIL: After consent was obtained the patient was taken to the operative suite and staci in the left lateral decubitus position.  The patient was identified and the correct patient was confirmed. Monitored Anesthesia Care was given by anesthesia. A time out was performed verifying the correct patient and the correct procedure.  The entire operative team was in agreement.  All necessary equipment and supplies were in the room.    The endoscope was inserted into the mouth and passed without difficulty to the third portion of the duodenum.  Duodenal biopsies were not taken.  The endoscope was then withdrawn through the duodenum, the duodenal bulb and pyloric channel and mild duodenitis was noted no other abnormalities were noted.  The endoscope was brought back into the stomach and antral biopsies were not obtained.  The endoscope was then retroflexed and mild gastritis was noted.  No other lesions of the fundus body or antrum were seen.  The endoscope was straightened back out and brought into the distal esophagus and a well-defined squamocolumnar junction was identified at 35 cm. Biopsies of the distal esophagus were not taken.  The endoscope was slowly withdrawn through the remaining esophagus no other abnormalities are seen,  The endoscope  was withdrawn from the patient the patient was then taken to the recovery room in stable condition tolerated the procedure well.

## 2023-06-21 NOTE — PROGRESS NOTES
06/21/23 0924   Appointment Info   Signing Clinician's Name / Credentials (PT) Adriana Nava DPT   Appointment Canceled Reason (PT) Medical status   Appointment Cancel Comments (PT) Per Dr. Pereira, not medically appropriate for evaluation today.  Will hold and reassess tomorrow.

## 2023-06-21 NOTE — ANESTHESIA CARE TRANSFER NOTE
Patient: Jacklyn Hein    Procedure: Procedure(s):  ESOPHAGOGASTRODUODENOSCOPY       Diagnosis: Upper GI bleed [K92.2]  Diagnosis Additional Information: No value filed.    Anesthesia Type:   MAC     Note:                Vital Signs Stable: post-procedure vital signs reviewed and stable    Patient transferred to: PACU    Handoff Report: Identifed the Patient, Identified the Reponsible Provider, Reviewed the pertinent medical history, Discussed the surgical course, Reviewed Intra-OP anesthesia mangement and issues during anesthesia, Set expectations for post-procedure period and Allowed opportunity for questions and acknowledgement of understanding      Vitals:  Vitals Value Taken Time   /47 06/21/23 1155   Temp 98.2  F (36.8  C) 06/21/23 1151   Pulse 78 06/21/23 1155   Resp 21 06/21/23 1155   SpO2 91 % 06/21/23 1155   Vitals shown include unvalidated device data.    Electronically Signed By: SUSAN Holland CRNA  June 21, 2023  1:54 PM

## 2023-06-21 NOTE — PROGRESS NOTES
[facility]    Medicine Progress Note - Hospitalist Service    Date of Admission:  6/20/2023    Assessment & Plan      Jacklyn Hein is a 87 year old female admitted on 6/20/2023. She presents with a GI bleed with melena and severe anemia    #1 GI bleed with melena, acute blood loss anemia  Will monitor patient's hemoglobin level with serial hemoglobin check.  Patient is at significant risk for peptic ulcer disease from NSAID use and bleeding risk from apixaban.  Kcentra has been given to reverse the effect of apixaban.  We will continue with proton pump inhibitor IV for GI protection.  Transfuse if hemoglobin decreased lower than 7.0.  Spoke with Dr. Sanz, who will monitor the patient  -June 21: Patient's hemoglobin dropped below 7 last night and she was transfused with 2 units of packed red blood cells.  EGD today showed mild gastritis, which might not have been the source of bleeding.  Patient is agreeable to proceed with a colonoscopy.  We will start prepping for colonoscopy today    #2  CHF history from nonischemic cardiomyopathy  In light of need for fluid resuscitation, will check patient's cardiac function with echocardiogram.  If patient's hemoglobin remained stable and shows no signs of active bleeding, will resume patient's and CHF medications  -June 21: Asymptomatic    3.  Atrial fibrillation  Patient's heart rate is controlled.  We will continue with the beta-blocker to prevent beta-blocker withdrawal.  Patient anticoagulation apixaban has been held due to GI bleed.  -June 21: Patient's heart rate is controlled       Diet: NPO for Medical/Clinical Reasons Except for: Meds, Ice Chips  NPO per Anesthesia Guidelines for Procedure/Surgery Except for: Meds    DVT Prophylaxis: VTE Prophylaxis contraindicated due to GI bleed  Johnson Catheter: Not present  Lines: None     Cardiac Monitoring: ACTIVE order. Indication: Tachyarrhythmias, acute (48 hours)  Code Status: Full Code      Clinically  "Significant Risk Factors        # Hyperkalemia: Highest K = 5.5 mmol/L in last 2 days, will monitor as appropriate       # Hypoalbuminemia: Lowest albumin = 2.7 g/dL at 6/21/2023  5:19 AM, will monitor as appropriate     # Hypertension: Noted on problem list        # Overweight: Estimated body mass index is 25.58 kg/m  as calculated from the following:    Height as of this encounter: 1.626 m (5' 4\").    Weight as of this encounter: 67.6 kg (149 lb 0.5 oz)., PRESENT ON ADMISSION          Disposition Plan    pending clinical improvement       Pierce Pereira MD  Hospitalist Service  [facility]  Securely message with [employer] (more info)  Text page via [facility] Paging/Directory   ______________________________________________________________________    Interval History   Patient's hemoglobin dropped below 7 last night and she was transfused with 2 units of packed red blood cells.  EGD today showed mild gastritis, which might not have been the source of bleeding.  Patient is agreeable to proceed with a colonoscopy.     Physical Exam   Vital Signs: Temp: 97.4  F (36.3  C) Temp src: Tympanic BP: (!) 109/39 Pulse: 80   Resp: 16 SpO2: 93 % O2 Device: None (Room air) Oxygen Delivery: 1 LPM  Weight: 149 lbs .5 oz    General Appearance: Lying in bed in no acute distress  Respiratory: Clear to auscultation bilaterally  Cardiovascular: S1-S2, regular rhythm and rate, no murmurs rubs,  GI: Soft, nontender, nondistended  Skin: Intact  Other: Neuro: Nonfocal    Medical Decision Making       75 MINUTES SPENT BY ME on the date of service doing chart review, history, exam, documentation & further activities per the note.      Data     I have personally reviewed the following data over the past 24 hrs:    12.5 (H)  \   8.7 (L)   / 138 (L)     143 113 (H) 60.7 (H) /  93   4.8 18 (L) 1.05 (H) \       ALT: N/A AST: N/A AP: N/A TBILI: N/A   ALB: 2.7 (L) TOT PROTEIN: N/A LIPASE: N/A       Procal: 0.14 (H) CRP: N/A Lactic Acid: 1.1       "   Imaging results reviewed over the past 24 hrs:   Recent Results (from the past 24 hour(s))   Echocardiogram Complete   Result Value    LVEF  55-60%    [facility]    [id number]  [id number]  [id number]  [id number]^[email or web address]^WESLY^RAÚL     [facility]  Echocardiography Laboratory  [address] [place] [id number]     Name: DEBBIE BOYKIN  MRN: [id number]  : 1935  Study Date: 2023 07:01 AM  Age: 87 yrs  Gender: Female  Patient Location: [facility]  Reason For Study: Heart Failure  Ordering Physician: WESLY FERRARI  Performed By: Emmanuelle Mendoza     BSA: 1.7 m2  Height: 64 in  Weight: 150 lb  HR: 87  BP: 133/86 mmHg  ______________________________________________________________________________  Procedure  Complete Portable Echo Adult.  ______________________________________________________________________________  Interpretation Summary  Global and regional left ventricular function is normal with an EF of 55-60%.  Grade I or early diastolic dysfunction.  Global right ventricular function is normal.  Mild to moderate left atrial enlargement is present.  Mild mitral annular calcification is present.  Moderate mitral insufficiency is present.  No aortic stenosis is present.  Mild aortic insufficiency is present.  Mild tricuspid insufficiency is present.  Right ventricular systolic pressure is 25mmHg above the right atrial pressure.  This study was compared with the study from 21 .  No significant changes noted.  ______________________________________________________________________________  Left Ventricle  Global and regional left ventricular function is normal with an EF of 55-60%.  Grade I or early diastolic dysfunction.     Right Ventricle  The right ventricle is normal size. Global right ventricular function is  normal.     Atria  Mild to moderate left atrial enlargement is present.     Mitral Valve  Mild mitral annular calcification is present. Moderate mitral insufficiency  is  present.     Aortic Valve  Aortic valve sclerosis is present. Mild aortic insufficiency is present. The  mean AoV pressure gradient is 6.3 mmHg. The peak AoV pressure gradient is 13.5  mmHg. No aortic stenosis is present.     Tricuspid Valve  Mild tricuspid insufficiency is present. Right ventricular systolic pressure  is 25mmHg above the right atrial pressure.     Pericardium  No pericardial effusion is present.     Compared to Previous Study  This study was compared with the study from 21 . No significant changes  noted.  ______________________________________________________________________________  MMode/2D Measurements & Calculations  IVSd: 0.85 cm  LVIDd: 4.6 cm  LVIDs: 2.8 cm  LVPWd: 1.0 cm  FS: 40.0 %  LV mass(C)d: 148.1 grams  LV mass(C)dI: 85.6 grams/m2  LVOT diam: 2.0 cm  LVOT area: 3.2 cm2  RWT: 0.45  TAPSE: 2.1 cm     Doppler Measurements & Calculations  MV E max faisal: 91.0 cm/sec  MV A max faisal: 122.0 cm/sec  MV E/A: 0.75     MV dec slope: 574.0 cm/sec2  MV dec time: 0.16 sec  Ao V2 max: 184.0 cm/sec  Ao max P.5 mmHg  Ao V2 mean: 116.0 cm/sec  Ao mean P.3 mmHg  Ao V2 VTI: 33.1 cm  AI P1/2t: 354.9 msec  TR max faisal: 250.0 cm/sec  TR max P.0 mmHg     ______________________________________________________________________________  Report approved by: Esteban Bell 2023 01:56 PM

## 2023-06-21 NOTE — CONSULTS
Hospital Consult  2023    Patient: Jacklyn Hein    Admitting Physician: Hospitalist Service    Admitting diagnosis: Upper GI bleed [K92.2]    Reason for consult: GI bleeding: Decreasing hemoglobin    This is a 87 year old female who presented to the emergency room with a complaint of GI bleeding.  She did have some lightheadedness at that time.  Noted to have a decrease in her hemoglobin from 10 to 7.  Was admitted to the hospital.  She is remained hemodynamically stable.  It was noted last night that she did have a decrease in hemoglobin again and she was transferred 2 units of blood.  Neurosurgery was consulted for evaluation and possible endoscopy.  Patient does have a history of A-fib and was on Eliquis.  No current complaints.  Abdominal pain.     Of note she does have a history of back fracture and is scheduled for surgical intervention in approximately 1 month.     IS on anticoagulation agents    Warfarin per flow sheet and Eliquis     Does currently show signs of active bleeding.   IS hemodynamically stable     Has received blood     Baseline HB.8   Current HGB: Recent Labs   Lab Test 23  1207   HGB 8.7*      Past Medical History:  Past Medical History:   Diagnosis Date     Angina pectoris 2011     CHF (congestive heart failure), NYHA class II (H) 12/10/2013     CHF (congestive heart failure), NYHA class III (H) 12/10/2013     Hyperhydrosis disorder 2011     Insomnia, unspecified 2011     LBBB (left bundle branch block) 2011     Neck pain, chronic 2011     Non-ischemic cardiomyopathy (H) 12/10/2013     Obesity, unspecified 2011     Osteopenia 2011     Pacemaker      Pain in joint, lower leg 2006     Pure hypercholesterolemia 2002     Unspecified essential hypertension 2011       Past Surgical History:  Past Surgical History:   Procedure Laterality Date     APPENDECTOMY       ARTHROSCOPY KNEE RT/LT  2009    RT     BACK SURGERY   "2020     BACK SURGERY  2021    fractured vert repair     CARDIAC SURGERY  2014    Pacemaker     Cataract extraction  2009    Bilateral     CHOLECYSTECTOMY      open      COLONOSCOPY  2001    Normal      COLONOSCOPY N/A 10/20/2016    Procedure: COLONOSCOPY;  Surgeon: Charan Montejo MD;  Location: [facility]     colonoscopy with polypectomy      Repeat 3 years      CT CORONARY ANGIOGRAM      normal     HERPES ZOSTER PCR ([facility])       IR CONSULTATION FOR IR EXAM  2020     IR CONSULTATION FOR IR EXAM  2023     IR TRIGGER POINT INJ 3 OR MORE MUSCLES  2023     left eye scar tissue       normal echo      fen-phen use       x6       REPLACE PACEMAKER GENERATOR N/A 2023    Procedure: REPLACEMENT, PULSE GENERATOR, CARDIAC PACEMAKER;  Surgeon: Isiah Hodge MD;  Location:  OR     SURGICAL RADIOLOGY PROCEDURE N/A 2016    Procedure: SURGICAL RADIOLOGY PROCEDURE;  Surgeon: Provider, Generic Perianesthesia Nursing;  Location: [facility]       Family History History:  Family History   Problem Relation Age of Onset     Cancer Mother         lung (cause of death)      Peptic Ulcer Disease Father         gastric        History of Tobacco Use:  History   Smoking Status     Former     Packs/day: 1.00     Years: 15.00     Types: Cigarettes     Start date: 1983     Quit date: 1998   Smokeless Tobacco     Never       Current Medications:  No current outpatient medications on file.       Allergies:  No Known Allergies    ROS:  Pertinent items are noted in HPI.  All other systems are negative.    PHYSICAL EXAM:     Vital signs: BP (!) 116/41 (BP Location: Left arm, Patient Position: Semi-Berman's, Cuff Size: Adult Regular)   Pulse 82   Temp 98.1  F (36.7  C) (Tympanic)   Resp 16   Ht 1.626 m (5' 4\")   Wt 67.6 kg (149 lb 0.5 oz)   SpO2 92%   BMI 25.58 kg/m     Weight: [unfilled]   BMI: Body mass index is 25.58 kg/m .   General: Normal, healthy, cooperative, in no acute " distress, alert   Skin: no jaundice   HEENT: PERRLA and EOMI   Neck: supple   Lungs: clear to auscultation   CV: Regular rate and rhythm without murmer   Abdominal: abdomen is soft without significant tenderness, masses, organomegaly or guarding   Extremities: No cyanosis, clubbing or edema noted bilaterally in Upper and Lower Extremities   Neurological: without deficit    CBC RESULTS:   Recent Labs   Lab Test 06/21/23  1207 06/21/23  0520 06/21/23  0134 06/20/23  1036 06/20/23  0827 01/10/23  1309   WBC  --  12.5*  --   --  12.7* 5.8   RBC  --  2.90*  --   --  2.45* 3.37*   HGB 8.7* 8.7*  8.7* 8.4*   < > 7.3* 9.8*   HCT  --  27.2*  --   --  23.1* 30.5*   MCV  --  94  --   --  94 91   MCH  --  30.0  --   --  29.8 29.1   MCHC  --  32.0  --   --  31.6 32.1   RDW  --  15.0  --   --  13.6 13.1   PLT  --  138*  --   --  208 218    < > = values in this interval not displayed.       Last Comprehensive Metabolic Panel:  Sodium   Date Value Ref Range Status   06/21/2023 143 136 - 145 mmol/L Final   05/11/2021 139 133 - 144 mmol/L Final     Potassium   Date Value Ref Range Status   06/21/2023 4.8 3.4 - 5.3 mmol/L Final   11/22/2021 4.9 3.4 - 5.3 mmol/L Final   05/11/2021 5.2 3.4 - 5.3 mmol/L Final     Chloride   Date Value Ref Range Status   06/21/2023 113 (H) 98 - 107 mmol/L Final   11/22/2021 112 (H) 94 - 109 mmol/L Final   05/11/2021 110 (H) 94 - 109 mmol/L Final     Carbon Dioxide   Date Value Ref Range Status   05/11/2021 26 20 - 32 mmol/L Final     Carbon Dioxide (CO2)   Date Value Ref Range Status   06/21/2023 18 (L) 22 - 29 mmol/L Final   11/22/2021 26 20 - 32 mmol/L Final     Anion Gap   Date Value Ref Range Status   06/21/2023 12 7 - 15 mmol/L Final   11/22/2021 3 3 - 14 mmol/L Final   05/11/2021 3 3 - 14 mmol/L Final     Glucose   Date Value Ref Range Status   06/21/2023 93 70 - 99 mg/dL Final   11/22/2021 89 70 - 99 mg/dL Final   05/11/2021 93 70 - 99 mg/dL Final     Urea Nitrogen   Date Value Ref Range Status    06/21/2023 60.7 (H) 8.0 - 23.0 mg/dL Final   11/22/2021 33 (H) 7 - 30 mg/dL Final   05/11/2021 37 (H) 7 - 30 mg/dL Final     Creatinine   Date Value Ref Range Status   06/21/2023 1.05 (H) 0.51 - 0.95 mg/dL Final   05/11/2021 1.33 (H) 0.52 - 1.04 mg/dL Final     GFR Estimate   Date Value Ref Range Status   06/21/2023 51 (L) >60 mL/min/1.73m2 Final   05/11/2021 36 (L) >60 mL/min/[1.73_m2] Final     Comment:     Non  GFR Calc  Starting 12/18/2018, serum creatinine based estimated GFR (eGFR) will be   calculated using the Chronic Kidney Disease Epidemiology Collaboration   (CKD-EPI) equation.       Calcium   Date Value Ref Range Status   06/21/2023 8.2 (L) 8.8 - 10.2 mg/dL Final   05/11/2021 9.3 8.5 - 10.1 mg/dL Final     Bilirubin Total   Date Value Ref Range Status   01/01/2023 0.4 <=1.2 mg/dL Final   01/18/2021 0.5 0.2 - 1.3 mg/dL Final     Alkaline Phosphatase   Date Value Ref Range Status   01/01/2023 50 35 - 104 U/L Final   01/18/2021 132 40 - 150 U/L Final     ALT   Date Value Ref Range Status   01/01/2023 10 10 - 35 U/L Final   01/18/2021 37 0 - 50 U/L Final     AST   Date Value Ref Range Status   01/01/2023 21 10 - 35 U/L Final   01/18/2021 22 0 - 45 U/L Final         ASSESSMENT: 87 year old female with most likely upper GI bleeding.        IS NOT currently on anticoagulation agents    Was held and reversed on admission     Does currently show signs of active bleeding.   IS hemodynamically stable    PLAN:    We will plan on doing upper endoscopy today.  If upper endoscopy source of the bleeding will stop at that point.  If upper endoscopy does not show source of bleeding will prep the patient and plan on doing a colonoscopy tomorrow.    The plan was dicussed with the patient and/or his/her care giver. All of their questions were answered.  They consent to the plan above.    The risks, benefits, and alternatives to the planned procedure were fully discussed with the patient and/or the patient's  representative(s). The risks of bleeding, infection, death, missing pathology, the need for additional procedures intra-operatively, the possible need for intra-operative consults, the possible need for transfusion therapy, cardiopulmonary compromise, the possible need for additional surgery for a complication were discussed with the patient and/or the patient's representative(s). The patient's and/or patient's representative(s) questions were addressed and answered. Informed consent was obtained from the patient and/or the patient's representative(s). The patient and/or the patient's representative(s) consent to proceed.

## 2023-06-21 NOTE — ANESTHESIA PREPROCEDURE EVALUATION
Anesthesia Pre-Procedure Evaluation    Patient: Jacklyn Hein   MRN: [id number] : 1935        Procedure : Procedure(s):  ESOPHAGOGASTRODUODENOSCOPY          Past Medical History:   Diagnosis Date     Angina pectoris 2011     CHF (congestive heart failure), NYHA class II (H) 12/10/2013     CHF (congestive heart failure), NYHA class III (H) 12/10/2013     Hyperhydrosis disorder 2011     Insomnia, unspecified 2011     LBBB (left bundle branch block) 2011     Neck pain, chronic 2011     Non-ischemic cardiomyopathy (H) 12/10/2013     Obesity, unspecified 2011     Osteopenia 2011     Pacemaker      Pain in joint, lower leg 2006     Pure hypercholesterolemia 2002     Unspecified essential hypertension 2011      Past Surgical History:   Procedure Laterality Date     APPENDECTOMY       ARTHROSCOPY KNEE RT/LT      RT     BACK SURGERY  2020     BACK SURGERY  2021    fractured vert repair     CARDIAC SURGERY  2014    Pacemaker     Cataract extraction  2009    Bilateral     CHOLECYSTECTOMY      open      COLONOSCOPY  2001    Normal      COLONOSCOPY N/A 10/20/2016    Procedure: COLONOSCOPY;  Surgeon: Charan Montejo MD;  Location: [facility]     colonoscopy with polypectomy      Repeat 3 years      CT CORONARY ANGIOGRAM      normal     HERPES ZOSTER PCR ([facility])       IR CONSULTATION FOR IR EXAM  2020     IR CONSULTATION FOR IR EXAM  2023     IR TRIGGER POINT INJ 3 OR MORE MUSCLES  2023     left eye scar tissue       normal echo      fen-phen use       x6       REPLACE PACEMAKER GENERATOR N/A 2023    Procedure: REPLACEMENT, PULSE GENERATOR, CARDIAC PACEMAKER;  Surgeon: Isiah Hodge MD;  Location:  OR     SURGICAL RADIOLOGY PROCEDURE N/A 2016    Procedure: SURGICAL RADIOLOGY PROCEDURE;  Surgeon: Provider, Generic Perianesthesia Nursing;  Location: [facility]      No Known Allergies   Social  History     Tobacco Use     Smoking status: Former     Packs/day: 1.00     Years: 15.00     Pack years: 15.00     Types: Cigarettes     Start date: 1983     Quit date: 1998     Years since quittin.4     Smokeless tobacco: Never     Tobacco comments:     Passive Exposure: No; Longest Tobacco Free: 15 years    Substance Use Topics     Alcohol use: Not Currently     Comment: Occasionally       Wt Readings from Last 1 Encounters:   23 67.6 kg (149 lb 0.5 oz)        Anesthesia Evaluation   Pt has had prior anesthetic.     No history of anesthetic complications       ROS/MED HX  ENT/Pulmonary:     (+) Mild Persistent, asthma Treatment: Inhaler daily and Inhaled steroids,  COPD,     Neurologic:  - neg neurologic ROS     Cardiovascular: Comment: Device: Medtronic [id number] Oden XT HF CRT-D MRI  Normal Device Function  Mode: DDDR  bpm  AP: 12%  : 98.4% (Bi-V: 4.8% LV: 5.1%)  VSR pace: 1.5%  Presenting EGM: AS/-AP/ 61-67 bpm  Thoracic Impedance: Near reference line.   Short V-V intervals: 0  Lead Trends Appear Stable: yes  Estimated battery longevity to RRT = 10.7 years.(9.9 years min-11.6 years max)/Battery voltage = 3.07 V.   Atrial Arrhythmia: Device shows 4 sec in AT/AF. No additional EGMs or episodes available for review.  AF [place]: <0.1%  Anticoagulant: Eliquis  Ventricular Arrhythmia: None  5 V-sensing episodes since 3/14/23 lasting 4-30 sec at 115-167 bpm. Marker channel reveals 1:1 conduction.   Pt Notified of Transmission Results: Yes, phone call to daughter per M.P.       (+) Dyslipidemia hypertension-----Taking blood thinners Instructions Given to patient: apixaban last taken 2 days ago. CHF Last EF: 55-60 date:  pacemaker, Reason placed: Device: Medtronic [id number] Oden XT HF CRT-D MRI. dysrhythmias, a-fib, pulmonary hypertension, Previous cardiac testing   Echo: Date: Results:    Stress Test: Date: Results:    ECG Reviewed: Date:  Results:  V paced  Cath: Date: Results:       METS/Exercise Tolerance: 3 - Able to walk 1-2 blocks without stopping    Hematologic:     (+) anemia, history of blood transfusion, no previous transfusion reaction,     Musculoskeletal:   (+) arthritis,     GI/Hepatic: Comment: GI bleed      Renal/Genitourinary:     (+) renal disease, type: CRI,     Endo:  - neg endo ROS     Psychiatric/Substance Use:  - neg psychiatric ROS     Infectious Disease:  - neg infectious disease ROS     Malignancy:  - neg malignancy ROS     Other:  - neg other ROS          Physical Exam    Airway        Mallampati: III   TM distance: < 3 FB   Neck ROM: full   Mouth opening: > 3 cm    Respiratory Devices and Support         Dental       (+) Multiple crowns, permanant bridges      Cardiovascular   cardiovascular exam normal    Comment: V paced   Rhythm and rate: regular and normal     Pulmonary           (+) decreased breath sounds and rales           OUTSIDE LABS:  CBC:   Lab Results   Component Value Date    WBC 12.5 (H) 06/21/2023    WBC 12.7 (H) 06/20/2023    HGB 8.7 (L) 06/21/2023    HGB 8.7 (L) 06/21/2023    HCT 27.2 (L) 06/21/2023    HCT 23.1 (L) 06/20/2023     (L) 06/21/2023     06/20/2023     BMP:   Lab Results   Component Value Date     06/21/2023     06/20/2023    POTASSIUM 4.8 06/21/2023    POTASSIUM 5.1 06/20/2023    CHLORIDE 113 (H) 06/21/2023    CHLORIDE 109 (H) 06/20/2023    CO2 18 (L) 06/21/2023    CO2 20 (L) 06/20/2023    BUN 60.7 (H) 06/21/2023    BUN 68.4 (H) 06/20/2023    CR 1.05 (H) 06/21/2023    CR 1.01 (H) 06/20/2023    GLC 93 06/21/2023     (H) 06/20/2023     COAGS:   Lab Results   Component Value Date    PTT 28 05/26/2014    INR 1.2 11/11/2015     POC:   Lab Results   Component Value Date    BGM 70 01/08/2021     HEPATIC:   Lab Results   Component Value Date    ALBUMIN 2.7 (L) 06/21/2023    PROTTOTAL 6.3 (L) 01/01/2023    ALT 10 01/01/2023    AST 21 01/01/2023    ALKPHOS 50 01/01/2023    BILITOTAL 0.4 01/01/2023     OTHER:   Lab  Results   Component Value Date    LACT 1.1 06/21/2023    A1C 5.5 12/04/2019    LORRAINE 8.2 (L) 06/21/2023    PHOS 4.5 06/21/2023    MAG 1.7 06/20/2023    LIPASE 107 01/05/2021    TSH 2.02 11/22/2021    CRP 18.9 (H) 01/05/2021    SED 35 (H) 05/26/2014       Anesthesia Plan    ASA Status:  3   NPO Status:  NPO Appropriate    Anesthesia Type: MAC.     - Reason for MAC: straight local not clinically adequate, chronic cardiopulmonary disease   Induction: Intravenous, Propofol.   Maintenance: Balanced.        Consents    Anesthesia Plan(s) and associated risks, benefits, and realistic alternatives discussed. Questions answered and patient/representative(s) expressed understanding.     - Discussed: Risks, Benefits and Alternatives for BOTH SEDATION and the PROCEDURE were discussed     - Discussed with:  Patient      - Extended Intubation/Ventilatory Support Discussed: No.      - Patient is DNR/DNI Status: No    Use of blood products discussed: No .     Postoperative Care            Comments:                SUSAN Kline CRNA

## 2023-06-21 NOTE — PLAN OF CARE
[facility] Inpatient Admission Note:    Patient admitted to 3218/3218-1 at approximately 1200 via bed accompanied by nurse from surgery . Report received from DELANEY Escoto in SBAR format at 1200 via face to face in room. Patient wheeled in bed to room Patient is alert and oriented X 3, denies pain; rates at 1 on 0-10 scale.  Patient oriented to room, unit, hourly rounding, and plan of care. Explained admission packet and patient handbook with patient bill of rights brochure. Will continue to monitor and document as needed.     Inpatient Nursing criteria listed below was met:    Health care directives status obtained and documented: Yes    Patient identifies a surrogate decision maker: No      If initial lactic acid greater than 2.0, repeat lactic acid drawn within one hour of arrival to unit: NA.     Clergy visit ordered if patient requests: N/A    Skin issues/needs documented: No    Isolation Patient: no     Fall Prevention Yes: Care plan updated, education given and documented, sticker and magnet in place: Yes    Care Plan initiated: Yes    Education Documented (including assessment): Yes    Patient has discharge needs : No

## 2023-06-21 NOTE — OR NURSING
PACU Transfer Note    Jacklyn Hein was transferred to back to [facility]. Surg, room 3218 via bed.  Equipment used for transport:  none.  Accompanied by:  DELANEY Escoto and DELANEY Newell       PACU Respiratory Event Documentation     1) Episodes of Apnea greater than or equal to 10 seconds: 0    2) Bradypnea - less than 8 breaths per minute: 0    3) Pain score on 0 to 10 scale: 0    4) Pain-sedation mismatch (yes or no): no    5) Repeated 02 desaturation less than 90% (yes or no): no    Anesthesia notified? (yes or no): no    Any of the above events occuring repeatedly in separate 30 minute intervals may be considered recurrent PACU respiratory events.    Patient stable and meets phase 1 discharge criteria for transport from PACU.

## 2023-06-21 NOTE — PROGRESS NOTES
06/21/23 0900   Appointment Info   Signing Clinician's Name / Credentials (OT) Heena Sanz OTR/L   Appointment Canceled Reason (OT) Medical status   Appointment Cancel Comments (OT) Hold OT evaluation today per Dr. Pereira as pt is not medically appropriate for therapy. Will reassess tomorrow and complete as indicated.

## 2023-06-21 NOTE — ANESTHESIA POSTPROCEDURE EVALUATION
Patient: Jacklyn Hein    Procedure: Procedure(s):  ESOPHAGOGASTRODUODENOSCOPY       Anesthesia Type:  MAC    Note:  Disposition: Outpatient   Postop Pain Control: Uneventful            Sign Out: Well controlled pain   PONV: No   Neuro/Psych: Uneventful            Sign Out: Acceptable/Baseline neuro status   Airway/Respiratory: Uneventful            Sign Out: Acceptable/Baseline resp. status   CV/Hemodynamics: Uneventful            Sign Out: Acceptable CV status; No obvious hypovolemia; No obvious fluid overload   Other NRE: NONE   DID A NON-ROUTINE EVENT OCCUR? No           Last vitals:  Vitals Value Taken Time   /47 06/21/23 1155   Temp 98.2  F (36.8  C) 06/21/23 1151   Pulse 78 06/21/23 1155   Resp 21 06/21/23 1155   SpO2 91 % 06/21/23 1155   Vitals shown include unvalidated device data.    Electronically Signed By: SUSAN Holland CRNA  June 21, 2023  1:54 PM

## 2023-06-21 NOTE — PLAN OF CARE
"Reason for hospital stay:  Upper GI Bleed  Living situation PTA: Home alone  Most recent vitals:BP (!) 124/41   Pulse 87   Temp 97.4  F (36.3  C) (Tympanic)   Resp 16   Ht 1.626 m (5' 4\")   Wt 67.6 kg (149 lb 0.5 oz)   SpO2 93%   BMI 25.58 kg/m      Pain Management:  Rates pain at 3-8/10. Scheduled Hydrocodone TID. Dilaudid x 1 this shift w/ relief.  LOC:  A&O x 4  Cardiac:  HRR regular, Diastolic runs a little low at baseline. Coreg held this afternoon per MDs orders.  Respiratory:  Lungs diminished throughout. 93% on room air.  GI:  Normoactive BS x 4  :  Voids spontaneously w/o difficulty.  Skin Issues:  Scattered bruising     IVF:  NS @ 75 mL/hr  ABX:  N/A    Nutrition: NPO-Colonoscopy tomorrow/ currently consuming GoLytely  Activity: Assist x 1 w/ bedrest and BR priviledges  Safety:  Bed in lowest position, wheels locked, and alarms in place. Call light and personal items within reach. Room near nurses station w/ door open and frequent rounding.  Face to face report given with opportunity to observe patient.    Report given to DELANEY López RN   6/21/2023  7:19 PM              "

## 2023-06-21 NOTE — PLAN OF CARE
Free from falls/injuries this shift. Assess as charted. Up to BSC to void. No stools this shift. HGB Q6H. At 1800: hgb 6.5. Order rec'd to transfuse 2 units RBC's. Permit signed by pt but pt also requested we call daughter Ninoska to update her. Daughter updated. IVF: NS @ 75/hr. PPI gtt. Remains NPO- except meds and ice chips. Pt does use heating pad to back for chronic back pain- pt reports she has compresssion fx.     Face to face report given with opportunity to observe patient.    Report given to Margarita Lyons RN   6/20/2023  10:07 PM

## 2023-06-21 NOTE — PLAN OF CARE
1st unit RBC's started at this time  Attempted to reach pts daughterstalin at number listed in chart. No answer.       Addendum- Was able to reach daughterStalin at [phone].  Updates on blood transfusion

## 2023-06-21 NOTE — PLAN OF CARE
Face to face report given with opportunity to observe patient.    Report given to DELANEY Quezada RN   6/21/2023  7:02 AM

## 2023-06-22 ENCOUNTER — ANESTHESIA (OUTPATIENT)
Dept: SURGERY | Facility: HOSPITAL | Age: 88
DRG: 378 | End: 2023-06-22
Payer: MEDICARE

## 2023-06-22 ENCOUNTER — ANESTHESIA EVENT (OUTPATIENT)
Dept: SURGERY | Facility: HOSPITAL | Age: 88
DRG: 378 | End: 2023-06-22
Payer: MEDICARE

## 2023-06-22 LAB
ALBUMIN SERPL BCG-MCNC: 2.7 G/DL (ref 3.5–5.2)
ANION GAP SERPL CALCULATED.3IONS-SCNC: 11 MMOL/L (ref 7–15)
BUN SERPL-MCNC: 32.9 MG/DL (ref 8–23)
CALCIUM SERPL-MCNC: 8.3 MG/DL (ref 8.8–10.2)
CHLORIDE SERPL-SCNC: 111 MMOL/L (ref 98–107)
CREAT SERPL-MCNC: 0.83 MG/DL (ref 0.51–0.95)
DEPRECATED HCO3 PLAS-SCNC: 19 MMOL/L (ref 22–29)
ERYTHROCYTE [DISTWIDTH] IN BLOOD BY AUTOMATED COUNT: 15.3 % (ref 10–15)
GFR SERPL CREATININE-BSD FRML MDRD: 68 ML/MIN/1.73M2
GLUCOSE SERPL-MCNC: 72 MG/DL (ref 70–99)
HCT VFR BLD AUTO: 25.9 % (ref 35–47)
HGB BLD-MCNC: 8.3 G/DL (ref 11.7–15.7)
LACTATE SERPL-SCNC: 1.3 MMOL/L (ref 0.7–2)
MCH RBC QN AUTO: 30.1 PG (ref 26.5–33)
MCHC RBC AUTO-ENTMCNC: 32 G/DL (ref 31.5–36.5)
MCV RBC AUTO: 94 FL (ref 78–100)
PHOSPHATE SERPL-MCNC: 3.1 MG/DL (ref 2.5–4.5)
PLATELET # BLD AUTO: 136 10E3/UL (ref 150–450)
POTASSIUM SERPL-SCNC: 4.4 MMOL/L (ref 3.4–5.3)
RBC # BLD AUTO: 2.76 10E6/UL (ref 3.8–5.2)
SODIUM SERPL-SCNC: 141 MMOL/L (ref 136–145)
WBC # BLD AUTO: 11 10E3/UL (ref 4–11)

## 2023-06-22 PROCEDURE — 83605 ASSAY OF LACTIC ACID: CPT | Performed by: INTERNAL MEDICINE

## 2023-06-22 PROCEDURE — 120N000001 HC R&B MED SURG/OB

## 2023-06-22 PROCEDURE — 250N000013 HC RX MED GY IP 250 OP 250 PS 637: Performed by: INTERNAL MEDICINE

## 2023-06-22 PROCEDURE — 250N000011 HC RX IP 250 OP 636: Mod: JZ | Performed by: SURGERY

## 2023-06-22 PROCEDURE — 250N000009 HC RX 250: Performed by: NURSE ANESTHETIST, CERTIFIED REGISTERED

## 2023-06-22 PROCEDURE — 45378 DIAGNOSTIC COLONOSCOPY: CPT | Performed by: SURGERY

## 2023-06-22 PROCEDURE — 250N000011 HC RX IP 250 OP 636: Performed by: NURSE ANESTHETIST, CERTIFIED REGISTERED

## 2023-06-22 PROCEDURE — 370N000017 HC ANESTHESIA TECHNICAL FEE, PER MIN: Performed by: SURGERY

## 2023-06-22 PROCEDURE — 36415 COLL VENOUS BLD VENIPUNCTURE: CPT | Performed by: INTERNAL MEDICINE

## 2023-06-22 PROCEDURE — 710N000010 HC RECOVERY PHASE 1, LEVEL 2, PER MIN: Performed by: SURGERY

## 2023-06-22 PROCEDURE — 85027 COMPLETE CBC AUTOMATED: CPT | Performed by: INTERNAL MEDICINE

## 2023-06-22 PROCEDURE — 258N000003 HC RX IP 258 OP 636: Performed by: NURSE ANESTHETIST, CERTIFIED REGISTERED

## 2023-06-22 PROCEDURE — 80069 RENAL FUNCTION PANEL: CPT | Performed by: INTERNAL MEDICINE

## 2023-06-22 PROCEDURE — 99100 ANES PT EXTEME AGE<1 YR&>70: CPT | Performed by: NURSE ANESTHETIST, CERTIFIED REGISTERED

## 2023-06-22 PROCEDURE — 250N000013 HC RX MED GY IP 250 OP 250 PS 637: Performed by: SURGERY

## 2023-06-22 PROCEDURE — 360N000075 HC SURGERY LEVEL 2, PER MIN: Performed by: SURGERY

## 2023-06-22 PROCEDURE — 45378 DIAGNOSTIC COLONOSCOPY: CPT | Performed by: NURSE ANESTHETIST, CERTIFIED REGISTERED

## 2023-06-22 PROCEDURE — 258N000003 HC RX IP 258 OP 636: Performed by: SURGERY

## 2023-06-22 RX ORDER — SODIUM CHLORIDE, SODIUM LACTATE, POTASSIUM CHLORIDE, CALCIUM CHLORIDE 600; 310; 30; 20 MG/100ML; MG/100ML; MG/100ML; MG/100ML
INJECTION, SOLUTION INTRAVENOUS CONTINUOUS
Status: CANCELLED | OUTPATIENT
Start: 2023-06-22

## 2023-06-22 RX ORDER — PROPOFOL 10 MG/ML
INJECTION, EMULSION INTRAVENOUS PRN
Status: DISCONTINUED | OUTPATIENT
Start: 2023-06-22 | End: 2023-06-22

## 2023-06-22 RX ORDER — LIDOCAINE HYDROCHLORIDE 20 MG/ML
INJECTION, SOLUTION INFILTRATION; PERINEURAL PRN
Status: DISCONTINUED | OUTPATIENT
Start: 2023-06-22 | End: 2023-06-22

## 2023-06-22 RX ORDER — SODIUM CHLORIDE, SODIUM LACTATE, POTASSIUM CHLORIDE, CALCIUM CHLORIDE 600; 310; 30; 20 MG/100ML; MG/100ML; MG/100ML; MG/100ML
INJECTION, SOLUTION INTRAVENOUS CONTINUOUS PRN
Status: DISCONTINUED | OUTPATIENT
Start: 2023-06-22 | End: 2023-06-22

## 2023-06-22 RX ADMIN — CARVEDILOL 12.5 MG: 12.5 TABLET, FILM COATED ORAL at 08:22

## 2023-06-22 RX ADMIN — SODIUM CHLORIDE: 9 INJECTION, SOLUTION INTRAVENOUS at 06:13

## 2023-06-22 RX ADMIN — ACETAMINOPHEN 650 MG: 325 TABLET, FILM COATED ORAL at 21:07

## 2023-06-22 RX ADMIN — PROPOFOL 20 MG: 10 INJECTION, EMULSION INTRAVENOUS at 12:59

## 2023-06-22 RX ADMIN — CALCIUM CARBONATE 600 MG (1,500 MG)-VITAMIN D3 400 UNIT TABLET 1 TABLET: at 08:21

## 2023-06-22 RX ADMIN — PROPOFOL 30 MG: 10 INJECTION, EMULSION INTRAVENOUS at 13:10

## 2023-06-22 RX ADMIN — GABAPENTIN 400 MG: 400 CAPSULE ORAL at 14:49

## 2023-06-22 RX ADMIN — PANTOPRAZOLE SODIUM 40 MG: 40 TABLET, DELAYED RELEASE ORAL at 08:21

## 2023-06-22 RX ADMIN — PROPOFOL 20 MG: 10 INJECTION, EMULSION INTRAVENOUS at 13:05

## 2023-06-22 RX ADMIN — PROPOFOL 20 MG: 10 INJECTION, EMULSION INTRAVENOUS at 13:07

## 2023-06-22 RX ADMIN — HYDROCODONE BITARTRATE AND ACETAMINOPHEN 1 TABLET: 5; 325 TABLET ORAL at 08:22

## 2023-06-22 RX ADMIN — HYDROMORPHONE HYDROCHLORIDE 0.2 MG: 0.2 INJECTION, SOLUTION INTRAMUSCULAR; INTRAVENOUS; SUBCUTANEOUS at 14:49

## 2023-06-22 RX ADMIN — PROPOFOL 20 MG: 10 INJECTION, EMULSION INTRAVENOUS at 13:01

## 2023-06-22 RX ADMIN — HYDROMORPHONE HYDROCHLORIDE 0.2 MG: 0.2 INJECTION, SOLUTION INTRAMUSCULAR; INTRAVENOUS; SUBCUTANEOUS at 05:51

## 2023-06-22 RX ADMIN — PROPOFOL 50 MG: 10 INJECTION, EMULSION INTRAVENOUS at 12:57

## 2023-06-22 RX ADMIN — ACETAMINOPHEN 650 MG: 325 TABLET, FILM COATED ORAL at 18:05

## 2023-06-22 RX ADMIN — HYDROCODONE BITARTRATE AND ACETAMINOPHEN 1 TABLET: 5; 325 TABLET ORAL at 21:07

## 2023-06-22 RX ADMIN — PROPOFOL 20 MG: 10 INJECTION, EMULSION INTRAVENOUS at 13:03

## 2023-06-22 RX ADMIN — CARVEDILOL 12.5 MG: 12.5 TABLET, FILM COATED ORAL at 18:05

## 2023-06-22 RX ADMIN — ACETAMINOPHEN 650 MG: 325 TABLET, FILM COATED ORAL at 08:22

## 2023-06-22 RX ADMIN — GABAPENTIN 400 MG: 400 CAPSULE ORAL at 21:07

## 2023-06-22 RX ADMIN — SODIUM CHLORIDE, POTASSIUM CHLORIDE, SODIUM LACTATE AND CALCIUM CHLORIDE: 600; 310; 30; 20 INJECTION, SOLUTION INTRAVENOUS at 12:45

## 2023-06-22 RX ADMIN — HYDROCODONE BITARTRATE AND ACETAMINOPHEN 1 TABLET: 5; 325 TABLET ORAL at 15:11

## 2023-06-22 RX ADMIN — LIDOCAINE HYDROCHLORIDE 40 MG: 20 INJECTION, SOLUTION INFILTRATION; PERINEURAL at 12:57

## 2023-06-22 RX ADMIN — GABAPENTIN 400 MG: 400 CAPSULE ORAL at 08:22

## 2023-06-22 RX ADMIN — HYDROMORPHONE HYDROCHLORIDE 0.2 MG: 0.2 INJECTION, SOLUTION INTRAMUSCULAR; INTRAVENOUS; SUBCUTANEOUS at 00:18

## 2023-06-22 ASSESSMENT — ACTIVITIES OF DAILY LIVING (ADL)
ADLS_ACUITY_SCORE: 33
ADLS_ACUITY_SCORE: 31
ADLS_ACUITY_SCORE: 33
ADLS_ACUITY_SCORE: 39
ADLS_ACUITY_SCORE: 39
ADLS_ACUITY_SCORE: 33
ADLS_ACUITY_SCORE: 31
ADLS_ACUITY_SCORE: 37
ADLS_ACUITY_SCORE: 33
ADLS_ACUITY_SCORE: 37
ADLS_ACUITY_SCORE: 39
ADLS_ACUITY_SCORE: 31

## 2023-06-22 ASSESSMENT — COPD QUESTIONNAIRES: COPD: 1

## 2023-06-22 ASSESSMENT — ENCOUNTER SYMPTOMS: DYSRHYTHMIAS: 1

## 2023-06-22 NOTE — PROGRESS NOTES
06/22/23 1029   Appointment Info   Signing Clinician's Name / Credentials (PT) Adriana Nava DPT   Appointment Canceled Reason (PT) Other (see Cancel Comments row)   Appointment Cancel Comments (PT) Per Dr. Walters, PT eval may not be indicated.  Pt to have colonoscy today, nursing will mobilize post procedure and if doing well may discharge home.  Per pt, her daughter is staying with her and helping to take care of her.  Pt has a FWW and SEC at home.  Will hold PT at this time unless deficits identified by nursing.

## 2023-06-22 NOTE — PLAN OF CARE
"Most recent vitals: BP (!) 111/37 (BP Location: Right arm, Cuff Size: Adult Regular)   Pulse 84   Temp 97.1  F (36.2  C) (Tympanic)   Resp 16   Ht 1.626 m (5' 4\")   Wt 67.8 kg (149 lb 7.6 oz)   SpO2 94%   BMI 25.66 kg/m      Pain Management: Reports back pain- repositioning/pillows/heat offered w/ no relief- IV 0.2 mg Dilaudid given w/ effective results  LOC:  A&O x4  Cardiac:  Telemetry- ventricular 80s  Respiratory:  Diminished lung sounds  GI:  Soft, nontender- loose/watery stool throughout night  :  Incontinent at times throughout shift  Skin Issues:  Scattered bruising & scabs, skin barrier applied to buttocks/sacrum    IVF:  NS @ 75 ml/hr  ABX:  NA    Nutrition: NPO  ADL's:  Assistance x1  Ambulation: Ax1-2 belt & walker  Safety:  Call light in reach, bed in lowest position, alarms in place    Face to face report given with opportunity to observe patient.    Report given to DELANEY Collazo RN   6/22/2023  7:26 AM                        "

## 2023-06-22 NOTE — PROGRESS NOTES
[facility]    Hospitalist Progress Note      Jacklyn Hein is a 87 year old female admitted on 6/20/2023. She presents with a GI bleed with melena and severe anemia     #1 GI bleed with melena, acute blood loss anemia  Will monitor patient's hemoglobin level with serial hemoglobin check.  Patient is at significant risk for peptic ulcer disease from NSAID use and bleeding risk from apixaban.  Kcentra has been given to reverse the effect of apixaban.  We will continue with proton pump inhibitor IV for GI protection.  Transfuse if hemoglobin decreased lower than 7.0.  Spoke with Dr. Sanz, who will monitor the patient  -June 21: Patient's hemoglobin dropped to 6.5 last night and she was transfused with 2 units of packed red blood cells.  EGD today showed mild gastritis, which might not have been the source of bleeding.  Patient is agreeable to proceed with a colonoscopy.  We will start prepping for colonoscopy today  -6/22: Patient has been stable since transfusion hemoglobin is 8.3 today no evidence of acute bleeding.  She will be undergoing colonoscopy today.  She is not having any nausea vomiting or abdominal pain.  Colon prep was, rough for her but is doing well at this time     #2  CHF history from nonischemic cardiomyopathy  In light of need for fluid resuscitation, will check patient's cardiac function with echocardiogram.  If patient's hemoglobin remained stable and shows no signs of active bleeding, will resume patient's and CHF medications  -June 21: Asymptomatic  -6/22: Hemodynamically stable.  Blood pressures 111 systolic heart rates in the 75-85 range A-fib afebrile room air sats are good.  No evidence of volume overload     3.  Atrial fibrillation  Patient's heart rate is controlled.  We will continue with the beta-blocker to prevent beta-blocker withdrawal.  Patient anticoagulation apixaban has been held due to GI bleed.  -June 21: Patient's heart rate is controlled  -6/22: She is rate  controlled hemodynamically stable off of anticoagulations at this point.  Depending upon her colonoscopy may be able to restart the apixaban in a day or 2.     4.  Back pain: This has been going on for some time.  She has had numerous injections.  The bed is not the most comfortable and getting up and down multiple times for the colon prep has caused this to flareup a bit she is tolerating it well at this time.    5.  Disposition: I did discuss with her plans by home upon discharge she is planning at this point to return home.  Once we get her colonoscopy done if all is well we can start working on a physical therapy standpoint and hopefully discharge in the next 1 to 2 days.  Follows well regarding colonoscopy    Diet: NPO per Anesthesia Guidelines for Procedure/Surgery Except for: Meds  Fluids: Saline 75 cc an hour    DVT Prophylaxis: Pneumatic Compression Devices  Code Status: Full Code    Disposition: Expected discharge in 1-2 days once colonoscopy procedures finished results reviewed.    Robert Walters MD    Interval History   Alert pleasant mild distress due to her back.  No nausea vomiting no abdominal pain no dyspnea no chest pains.  Back is rather sore from getting up and down for the colonoscopy prep.  She is ready for the colonoscopy at this point.  Hemoglobin is stable 8.3 no evidence of active bleeding.  Hemodynamically stable also.    -Data reviewed today: I reviewed all new labs and imaging results over the last 24 hours. I personally reviewed no images or EKG's today.    Physical Exam   Temp: 97.1  F (36.2  C) Temp src: Tympanic BP: (!) 111/37 Pulse: 84   Resp: 16 SpO2: 94 % O2 Device: None (Room air) Oxygen Delivery: 1 LPM  Vitals:    06/20/23 1107 06/21/23 0402 06/22/23 0557   Weight: 68.4 kg (150 lb 12.7 oz) 67.6 kg (149 lb 0.5 oz) 67.8 kg (149 lb 7.6 oz)     Vital Signs with Ranges  Temp:  [96.5  F (35.8  C)-98.3  F (36.8  C)] 97.1  F (36.2  C)  Pulse:  [77-87] 84  Resp:  [16-22] 16  BP:  ()/(36-57) 111/37  SpO2:  [91 %-98 %] 94 %  I/O last 3 completed shifts:  In: 2199 [I.V.:1892]  Out: 1450 [Urine:1150; Other:300]    Peripheral IV 06/20/23 Anterior;Left Upper forearm (Active)   Site Assessment WDL 06/22/23 0000   Line Status Infusing 06/22/23 0000   Dressing Transparent 06/22/23 0000   Dressing Status clean;dry;intact 06/22/23 0000   Dressing Intervention New dressing  06/21/23 0748   Phlebitis Scale 0-->no symptoms 06/22/23 0000   Infiltration? no 06/21/23 1151   Number of days: 2       Incision/Surgical Site 01/30/23 Left Chest (Active)   Number of days: 143     No line/device    Constitutional: Alert and oriented x3. No distress    HEENT: Normocephalic/atraumatic, PERRL, EOMI, mouth moist, neck supple, no LN.     Cardiovascular: Irregular RRR.  No murmur, no  rubs, or gallops.  JVD no.  Bruits no.  Pulses 2    Respiratory:  Clear to auscultation bilaterally    Abdomen: Soft, nontender, nondistended, no organomegaly. Bowel sounds present    Extremities: Warm/dry. No edema    Neuro:   Non focal, cranial nerves intact, Moves all extremities.  Medications     sodium chloride 75 mL/hr at 06/22/23 0613       acetaminophen  650 mg Oral 4x Daily     calcium carbonate-vitamin D  1 tablet Oral QAM     carvedilol  12.5 mg Oral BID w/meals     fluticasone-vilanterol  1 puff Inhalation Daily     gabapentin  400 mg Oral TID     HYDROcodone-acetaminophen  1 tablet Oral TID     pantoprazole  40 mg Oral QAM AC     sodium chloride (PF)  3 mL Intracatheter Q8H     umeclidinium  1 puff Inhalation Daily       Data   Recent Labs   Lab 06/22/23  0527 06/21/23  1824 06/21/23  1207 06/21/23  0520 06/21/23  0519 06/20/23  1835 06/20/23  1507 06/20/23  1036 06/20/23  0827   WBC 11.0  --   --  12.5*  --   --   --   --  12.7*   HGB 8.3* 9.4* 8.7* 8.7*  8.7*  --    < > 7.0*   < > 7.3*   MCV 94  --   --  94  --   --   --   --  94   *  --   --  138*  --   --   --   --  208     --   --   --  143  --  140  --   141   POTASSIUM 4.4  --   --   --  4.8  --  5.1  --  5.5*   CHLORIDE 111*  --   --   --  113*  --  109*  --  109*   CO2 19*  --   --   --  18*  --  20*  --  22   BUN 32.9*  --   --   --  60.7*  --  68.4*  --  72.8*   CR 0.83  --   --   --  1.05*  --  1.01*  --  1.06*   ANIONGAP 11  --   --   --  12  --  11  --  10   LORRAINE 8.3*  --   --   --  8.2*  --  8.3*  --  8.5*   GLC 72  --   --   --  93  --  116*  --  128*   ALBUMIN 2.7*  --   --   --  2.7*  --   --   --   --     < > = values in this interval not displayed.       Recent Results (from the past 24 hour(s))   Echocardiogram Complete   Result Value    LVEF  55-60%    [facility]    [id number]  [id number]  [id number]  [id number]^VEGA^WESLY^RAÚL     [facility]  Echocardiography Laboratory  [address] [place] [id number]     Name: DEBBIE SALOMON  MRN: [id number]  : 1935  Study Date: 2023 07:01 AM  Age: 87 yrs  Gender: Female  Patient Location: [facility]  Reason For Study: Heart Failure  Ordering Physician: WESLY FERRARI  Performed By: Emmanuelle Mendoza     BSA: 1.7 m2  Height: 64 in  Weight: 150 lb  HR: 87  BP: 133/86 mmHg  ______________________________________________________________________________  Procedure  Complete Portable Echo Adult.  ______________________________________________________________________________  Interpretation Summary  Global and regional left ventricular function is normal with an EF of 55-60%.  Grade I or early diastolic dysfunction.  Global right ventricular function is normal.  Mild to moderate left atrial enlargement is present.  Mild mitral annular calcification is present.  Moderate mitral insufficiency is present.  No aortic stenosis is present.  Mild aortic insufficiency is present.  Mild tricuspid insufficiency is present.  Right ventricular systolic pressure is 25mmHg above the right atrial pressure.  This study was compared with the study from 21 .  No significant changes  noted.  ______________________________________________________________________________  Left Ventricle  Global and regional left ventricular function is normal with an EF of 55-60%.  Grade I or early diastolic dysfunction.     Right Ventricle  The right ventricle is normal size. Global right ventricular function is  normal.     Atria  Mild to moderate left atrial enlargement is present.     Mitral Valve  Mild mitral annular calcification is present. Moderate mitral insufficiency is  present.     Aortic Valve  Aortic valve sclerosis is present. Mild aortic insufficiency is present. The  mean AoV pressure gradient is 6.3 mmHg. The peak AoV pressure gradient is 13.5  mmHg. No aortic stenosis is present.     Tricuspid Valve  Mild tricuspid insufficiency is present. Right ventricular systolic pressure  is 25mmHg above the right atrial pressure.     Pericardium  No pericardial effusion is present.     Compared to Previous Study  This study was compared with the study from 21 . No significant changes  noted.  ______________________________________________________________________________  MMode/2D Measurements & Calculations  IVSd: 0.85 cm  LVIDd: 4.6 cm  LVIDs: 2.8 cm  LVPWd: 1.0 cm  FS: 40.0 %  LV mass(C)d: 148.1 grams  LV mass(C)dI: 85.6 grams/m2  LVOT diam: 2.0 cm  LVOT area: 3.2 cm2  RWT: 0.45  TAPSE: 2.1 cm     Doppler Measurements & Calculations  MV E max faisal: 91.0 cm/sec  MV A max faisal: 122.0 cm/sec  MV E/A: 0.75     MV dec slope: 574.0 cm/sec2  MV dec time: 0.16 sec  Ao V2 max: 184.0 cm/sec  Ao max P.5 mmHg  Ao V2 mean: 116.0 cm/sec  Ao mean P.3 mmHg  Ao V2 VTI: 33.1 cm  AI P1/2t: 354.9 msec  TR max faisal: 250.0 cm/sec  TR max P.0 mmHg     ______________________________________________________________________________  Report approved by: Esteban Bell 2023 01:56 PM

## 2023-06-22 NOTE — PLAN OF CARE
Free of falls/injuries this shift. Assess as charted. Bowelprep in progress. Still passing very dark liq stool. Enciuraged to continue to dring prep. IV infusing. Hgb Q6- 1830: 9.4.     Face to face report given with opportunity to observe patient.    Report given to Jenn Lyons RN   6/21/2023  10:57 PM

## 2023-06-22 NOTE — PLAN OF CARE
"Most recent vitals: BP (!) 138/45 (BP Location: Right arm, Patient Position: Semi-Berman's)   Pulse 75   Temp 97.9  F (36.6  C) (Temporal)   Resp 16   Ht 1.626 m (5' 4\")   Wt 67.8 kg (149 lb 7.6 oz)   SpO2 95%   BMI 25.66 kg/m      Patient A&Ox4, up with SBA-Ax1 to the bathroom with gait belt and walker. Was NPO this AM for colonoscopy. Had procedure in the afternoon. C/o 8/10 back pain when she returned from her procedure. Scheduled norco given with good relief. Diet advanced. Patient has tolerated water and apple juice so far. Stated she wasn't \"to hungry quite yet\". Family visiting off and on today. Patient up in the chair for meals. PIV is saline locked. Fluids were discontinued this shift. Did get a complete bed bath with linen change this morning. Call light within reach, able to make needs known. Has been calling appropriately this shift.     Face to face report given with opportunity to observe patient.    Report given to Kiersten (late note)    Vale King RN   6/26/2023  7:04 PM   "

## 2023-06-22 NOTE — PROGRESS NOTES
06/22/23 1400   Appointment Info   Signing Clinician's Name / Credentials (PT) David Napier DPT   Appointment Canceled Reason (PT) Patient declined   Appointment Cancel Comments (PT) Pt returned from colonoscopy 30-40 min ago and nursing had been unable to get pt up yet so attempted to see for PT eval to assist with D/C planning.  Pt was resting in bed and declined PT citing still very tired/fatigued from a long day - has been up since 2:30 AM.  Pt requested PT return tomorrow morning.  PT informed RN.  Will attempt to see pt if nursing staff reports pt having any difficulty with mobility later today/night.

## 2023-06-22 NOTE — PROGRESS NOTES
Colonoscopy did not show anything remarkable.  At this point will DC IV fluids advance her diet.  Ambulate.  Reevaluate tomorrow morning regarding repeat hemoglobin and possibility of discharge

## 2023-06-22 NOTE — OP NOTE
REPORT OF OPERATION  DATE OF PROCEDURE: 6/22/2023    PATIENT: Jacklyn Hein    SURGERY PERFORMED: Colonoscopy    PREOPERATIVE DIAGNOSIS: Rectal Bleeding    POSTOPERATIVE DIAGNOSIS:    Same   Normal colonoscopy   Diverticulosis was identified.   Hemorrhoids  were  identified.    SURGEON: Royal Sanz MD    ASSISTANTS: None    ANESTHESIA: Monitored Anesthesia Care    COMPLICATIONS: None apparent    TRANSFUSIONS: None    TISSUE TO PATHOLOGY: None    FINDINGS: Normal colonoscopy.  Diverticulosis was identified.  Hemorrhoids  were  identified.    INDICATIONS: This is a 87 year old female in need of a colonoscopy for Rectal Bleeding.  The patient will be taken to the endoscopy suite for that procedure.    DESCRIPTIONS OF PROCEDURE IN DETAIL: After consent was obtained the patient was taken to the endoscopy suite and placed in the left lateral decubitus position.  The patient was identified and the correct patient was confirmed.  Monitored Anesthesia Care was given.  A time out was performed verifying the correct patient and the correct procedure.  The entire operative team was in agreement.  All necessary equipment and supplies were in the room.    Rectal exam was performed and no lesions of the anal canal were noted.  The colonoscope was inserted into the anus and passed without difficulty to the cecum.  The cecum was identified by the ileocecal valve, the coalescence of the tinea and the appendiceal orifice.  Upon withdrawal all walls of the colon were visualized.  There were no polyps, masses or evidence of colitis seen.  Diverticulosis was seen.  Upon reaching the rectum the scope was retroflexed and internal hemorrhoids  were  seen.  The scope was straightened back out and removed from the patient.  The patient was then taken to the recovery room in stable condition tolerating the procedure well.      Prep: poor    Withdrawal time was 6 minutes.    It is recommended that the patient have another  colonoscopy PRN.

## 2023-06-22 NOTE — ANESTHESIA CARE TRANSFER NOTE
Patient: Jacklyn Hein    Procedure: Procedure(s):  COLONOSCOPY       Diagnosis: Upper GI bleed [K92.2]  Diagnosis Additional Information: No value filed.    Anesthesia Type:   MAC     Note:    Oropharynx: oropharynx clear of all foreign objects and spontaneously breathing  Level of Consciousness: drowsy  Oxygen Supplementation: nasal cannula  Level of Supplemental Oxygen (L/min / FiO2): 2  Independent Airway: airway patency satisfactory and stable  Dentition: dentition unchanged  Vital Signs Stable: post-procedure vital signs reviewed and stable  Report to RN Given: handoff report given  Patient transferred to: PACU    Handoff Report: Identifed the Patient, Identified the Reponsible Provider, Reviewed the pertinent medical history, Discussed the surgical course, Reviewed Intra-OP anesthesia mangement and issues during anesthesia, Set expectations for post-procedure period and Allowed opportunity for questions and acknowledgement of understanding      Vitals:  Vitals Value Taken Time   /59 06/22/23 1325   Temp 97.9  F (36.6  C) 06/22/23 1325   Pulse 82 06/22/23 1325   Resp 16 06/22/23 1325   SpO2 81 % 06/22/23 1326   Vitals shown include unvalidated device data.    Electronically Signed By: SUSAN Day CRNA  June 22, 2023  1:36 PM

## 2023-06-22 NOTE — PROGRESS NOTES
06/22/23 1300   Appointment Info   Signing Clinician's Name / Credentials (OT) Heena Sanz OTR/L   Appointment Canceled Reason (OT) Other (see Cancel Comments row)   Appointment Cancel Comments (OT) Pt to have a colonoscopy today. Skilled therapy services may not be needed per Dr. Walters. Pt to mobility with nursing after colonoscopy and she may discharge home pending results/how she is doing. Pt has good support from her family who is staying and assisting her. Will hold OT evaluation at this time.

## 2023-06-22 NOTE — ANESTHESIA POSTPROCEDURE EVALUATION
Patient: Jacklyn Hein    Procedure: Procedure(s):  COLONOSCOPY       Anesthesia Type:  MAC    Note:  Disposition: Inpatient   Postop Pain Control: Uneventful            Sign Out: Well controlled pain   PONV: No   Neuro/Psych: Uneventful            Sign Out: Acceptable/Baseline neuro status   Airway/Respiratory: Uneventful            Sign Out: Acceptable/Baseline resp. status   CV/Hemodynamics: Uneventful            Sign Out: Acceptable CV status; No obvious hypovolemia; No obvious fluid overload   Other NRE: NONE   DID A NON-ROUTINE EVENT OCCUR? No           Last vitals:  Vitals Value Taken Time   /59 06/22/23 1325   Temp 97.9  F (36.6  C) 06/22/23 1325   Pulse 82 06/22/23 1325   Resp 16 06/22/23 1325   SpO2 81 % 06/22/23 1326   Vitals shown include unvalidated device data.    Electronically Signed By: SUSAN Day CRNA  June 22, 2023  1:36 PM

## 2023-06-22 NOTE — OR NURSING
Face to face report given with opportunity to observe patient. Patents denies pain and discomforts    Report given to DELANEY Collazo RN   6/22/2023  1:33 PM

## 2023-06-22 NOTE — ANESTHESIA PREPROCEDURE EVALUATION
Anesthesia Pre-Procedure Evaluation    Patient: Jacklyn Hein   MRN: [id number] : 1935        Procedure : Procedure(s):  COLONOSCOPY          Past Medical History:   Diagnosis Date     Angina pectoris 2011     CHF (congestive heart failure), NYHA class II (H) 12/10/2013     CHF (congestive heart failure), NYHA class III (H) 12/10/2013     Hyperhydrosis disorder 2011     Insomnia, unspecified 2011     LBBB (left bundle branch block) 2011     Neck pain, chronic 2011     Non-ischemic cardiomyopathy (H) 12/10/2013     Obesity, unspecified 2011     Osteopenia 2011     Pacemaker      Pain in joint, lower leg 2006     Pure hypercholesterolemia 2002     Unspecified essential hypertension 2011      Past Surgical History:   Procedure Laterality Date     APPENDECTOMY       ARTHROSCOPY KNEE RT/LT      RT     BACK SURGERY  2020     BACK SURGERY  2021    fractured vert repair     CARDIAC SURGERY  2014    Pacemaker     Cataract extraction  2009    Bilateral     CHOLECYSTECTOMY      open      COLONOSCOPY  2001    Normal      COLONOSCOPY N/A 10/20/2016    Procedure: COLONOSCOPY;  Surgeon: Charan Montejo MD;  Location: [facility] OR     colonoscopy with polypectomy      Repeat 3 years      CT CORONARY ANGIOGRAM      normal     HERPES ZOSTER PCR ([facility])       IR CONSULTATION FOR IR EXAM  2020     IR CONSULTATION FOR IR EXAM  2023     IR TRIGGER POINT INJ 3 OR MORE MUSCLES  2023     left eye scar tissue       normal echo      fen-phen use       x6       REPLACE PACEMAKER GENERATOR N/A 2023    Procedure: REPLACEMENT, PULSE GENERATOR, CARDIAC PACEMAKER;  Surgeon: Isiah Hodge MD;  Location:  OR     SURGICAL RADIOLOGY PROCEDURE N/A 2016    Procedure: SURGICAL RADIOLOGY PROCEDURE;  Surgeon: Provider, Generic Perianesthesia Nursing;  Location: [facility] OR      No Known Allergies   Social History      Tobacco Use     Smoking status: Former     Packs/day: 1.00     Years: 15.00     Pack years: 15.00     Types: Cigarettes     Start date: 1983     Quit date: 1998     Years since quittin.4     Smokeless tobacco: Never     Tobacco comments:     Passive Exposure: No; Longest Tobacco Free: 15 years    Substance Use Topics     Alcohol use: Not Currently     Comment: Occasionally       Wt Readings from Last 1 Encounters:   23 67.8 kg (149 lb 7.6 oz)        Anesthesia Evaluation   Pt has had prior anesthetic.     No history of anesthetic complications       ROS/MED HX  ENT/Pulmonary:     (+) Mild Persistent, asthma Treatment: Inhaler daily and Inhaled steroids,  COPD,     Neurologic:  - neg neurologic ROS     Cardiovascular: Comment: Device: Medtronic [id number] [place] XT HF CRT-D MRI  Normal Device Function  Mode: DDDR  bpm  AP: 12%  : 98.4% (Bi-V: 4.8% LV: 5.1%)  VSR pace: 1.5%  Presenting EGM: AS/-AP/ 61-67 bpm  Thoracic Impedance: Near reference line.   Short V-V intervals: 0  Lead Trends Appear Stable: yes  Estimated battery longevity to RRT = 10.7 years.(9.9 years min-11.6 years max)/Battery voltage = 3.07 V.   Atrial Arrhythmia: Device shows 4 sec in AT/AF. No additional EGMs or episodes available for review.  AF [place]: <0.1%  Anticoagulant: Eliquis  Ventricular Arrhythmia: None  5 V-sensing episodes since 3/14/23 lasting 4-30 sec at 115-167 bpm. Marker channel reveals 1:1 conduction.   Pt Notified of Transmission Results: Yes, phone call to daughter per M.P.       (+) Dyslipidemia hypertension-----Taking blood thinners Instructions Given to patient: apixaban last taken 2 days ago. CHF Last EF: 55-60 date:  pacemaker, Reason placed: Device: Medtronic [id number] Cayce XT HF CRT-D MRI. dysrhythmias, a-fib, pulmonary hypertension, Previous cardiac testing   Echo: Date: Results:    Stress Test: Date: Results:    ECG Reviewed: Date:  Results:  V paced  Cath: Date: Results:       METS/Exercise Tolerance: 3 - Able to walk 1-2 blocks without stopping    Hematologic:     (+) anemia, history of blood transfusion, no previous transfusion reaction,     Musculoskeletal:   (+) arthritis,     GI/Hepatic: Comment: GI bleed      Renal/Genitourinary:     (+) renal disease, type: CRI,     Endo:  - neg endo ROS     Psychiatric/Substance Use:  - neg psychiatric ROS     Infectious Disease:  - neg infectious disease ROS     Malignancy:  - neg malignancy ROS     Other:  - neg other ROS          Physical Exam    Airway        Mallampati: III   TM distance: < 3 FB   Neck ROM: full   Mouth opening: > 3 cm    Respiratory Devices and Support         Dental       (+) Multiple crowns, permanant bridges      Cardiovascular       Comment: V paced   Rhythm and rate: regular and normal     Pulmonary           (+) decreased breath sounds and rales           OUTSIDE LABS:  CBC:   Lab Results   Component Value Date    WBC 11.0 06/22/2023    WBC 12.5 (H) 06/21/2023    HGB 8.3 (L) 06/22/2023    HGB 9.4 (L) 06/21/2023    HCT 25.9 (L) 06/22/2023    HCT 27.2 (L) 06/21/2023     (L) 06/22/2023     (L) 06/21/2023     BMP:   Lab Results   Component Value Date     06/22/2023     06/21/2023    POTASSIUM 4.4 06/22/2023    POTASSIUM 4.8 06/21/2023    CHLORIDE 111 (H) 06/22/2023    CHLORIDE 113 (H) 06/21/2023    CO2 19 (L) 06/22/2023    CO2 18 (L) 06/21/2023    BUN 32.9 (H) 06/22/2023    BUN 60.7 (H) 06/21/2023    CR 0.83 06/22/2023    CR 1.05 (H) 06/21/2023    GLC 72 06/22/2023    GLC 93 06/21/2023     COAGS:   Lab Results   Component Value Date    PTT 28 05/26/2014    INR 1.2 11/11/2015     POC:   Lab Results   Component Value Date    BGM 70 01/08/2021     HEPATIC:   Lab Results   Component Value Date    ALBUMIN 2.7 (L) 06/22/2023    PROTTOTAL 6.3 (L) 01/01/2023    ALT 10 01/01/2023    AST 21 01/01/2023    ALKPHOS 50 01/01/2023    BILITOTAL 0.4 01/01/2023     OTHER:   Lab Results   Component Value Date     LACT 1.3 06/22/2023    A1C 5.5 12/04/2019    LORRAINE 8.3 (L) 06/22/2023    PHOS 3.1 06/22/2023    MAG 1.7 06/20/2023    LIPASE 107 01/05/2021    TSH 2.02 11/22/2021    CRP 18.9 (H) 01/05/2021    SED 35 (H) 05/26/2014       Anesthesia Plan    ASA Status:  3   NPO Status:  NPO Appropriate    Anesthesia Type: MAC.     - Reason for MAC: straight local not clinically adequate, chronic cardiopulmonary disease              Consents    Anesthesia Plan(s) and associated risks, benefits, and realistic alternatives discussed. Questions answered and patient/representative(s) expressed understanding.     - Discussed: Risks, Benefits and Alternatives for BOTH SEDATION and the PROCEDURE were discussed     - Discussed with:  Patient      - Extended Intubation/Ventilatory Support Discussed: No.      - Patient is DNR/DNI Status: No    Use of blood products discussed: No .     Postoperative Care            Comments:    Other Comments: Had EGD yesterday, no issues            SUSAN Garcia CRNA

## 2023-06-22 NOTE — PROGRESS NOTES
"CLINICAL NUTRITION SERVICES  -  ASSESSMENT NOTE    Jacklyn Hein : Admission Nutrition Risk Screen - 2-13lb weight loss, reduced appetite/intake, CHF diet education    87 yof admitted for upper GI bleed. Medical hx includes CHF, cardiomyopathy, osteopenia, hypercholesterolemia, CKD stage 3b. Weight loss of 6lbs in the last 5 months. Nausea, reduced intake prior to admission. Pt had EGD yesterday and colonoscopy today. Diverticulosis was identified per surgery's note. Diet advanced to clear liquids this afternoon. Pt is tired after colonoscopy, will attempt diet education tomorrow.    Diet Order: Clear liquid  Intake: none    Height: 5' 4\"  Weight: 149 lbs 7.55 oz  Body mass index is 25.66 kg/m .  Weight Status:  Overweight BMI 25-29.9  Weight History:  Wt Readings from Last 10 Encounters:   06/22/23 67.8 kg (149 lb 7.6 oz)   05/15/23 68 kg (150 lb)   01/30/23 68.9 kg (152 lb)   01/10/23 70.4 kg (155 lb 1.6 oz)   01/04/23 69.4 kg (153 lb)   10/19/22 69.4 kg (153 lb)   09/12/22 69.9 kg (154 lb)   08/09/22 69.9 kg (154 lb)   11/22/21 69.9 kg (154 lb)   09/24/21 70.3 kg (155 lb)      Malnutrition Diagnosis: Patient does not meet two of the criteria necessary for diagnosing malnutrition    NUTRITION RECOMMENDATIONS  - Advance diet as able    MONITORING AND EVALUATION:  RD will monitor intake, weight, labs        "

## 2023-06-23 ENCOUNTER — APPOINTMENT (OUTPATIENT)
Dept: OCCUPATIONAL THERAPY | Facility: HOSPITAL | Age: 88
DRG: 378 | End: 2023-06-23
Attending: SURGERY
Payer: MEDICARE

## 2023-06-23 ENCOUNTER — APPOINTMENT (OUTPATIENT)
Dept: PHYSICAL THERAPY | Facility: HOSPITAL | Age: 88
DRG: 378 | End: 2023-06-23
Attending: SURGERY
Payer: MEDICARE

## 2023-06-23 LAB
ANION GAP SERPL CALCULATED.3IONS-SCNC: 14 MMOL/L (ref 7–15)
BUN SERPL-MCNC: 22.6 MG/DL (ref 8–23)
CALCIUM SERPL-MCNC: 8.7 MG/DL (ref 8.8–10.2)
CHLORIDE SERPL-SCNC: 106 MMOL/L (ref 98–107)
CREAT SERPL-MCNC: 0.8 MG/DL (ref 0.51–0.95)
DEPRECATED HCO3 PLAS-SCNC: 18 MMOL/L (ref 22–29)
ERYTHROCYTE [DISTWIDTH] IN BLOOD BY AUTOMATED COUNT: 15.2 % (ref 10–15)
GFR SERPL CREATININE-BSD FRML MDRD: 71 ML/MIN/1.73M2
GLUCOSE SERPL-MCNC: 78 MG/DL (ref 70–99)
HCT VFR BLD AUTO: 25.6 % (ref 35–47)
HGB BLD-MCNC: 8.1 G/DL (ref 11.7–15.7)
MCH RBC QN AUTO: 29.7 PG (ref 26.5–33)
MCHC RBC AUTO-ENTMCNC: 31.6 G/DL (ref 31.5–36.5)
MCV RBC AUTO: 94 FL (ref 78–100)
PLATELET # BLD AUTO: 129 10E3/UL (ref 150–450)
POTASSIUM SERPL-SCNC: 4.5 MMOL/L (ref 3.4–5.3)
RBC # BLD AUTO: 2.73 10E6/UL (ref 3.8–5.2)
SODIUM SERPL-SCNC: 138 MMOL/L (ref 136–145)
WBC # BLD AUTO: 8.7 10E3/UL (ref 4–11)

## 2023-06-23 PROCEDURE — 250N000013 HC RX MED GY IP 250 OP 250 PS 637: Performed by: SURGERY

## 2023-06-23 PROCEDURE — 97161 PT EVAL LOW COMPLEX 20 MIN: CPT | Mod: GP | Performed by: PHYSICAL THERAPIST

## 2023-06-23 PROCEDURE — 85027 COMPLETE CBC AUTOMATED: CPT | Performed by: SURGERY

## 2023-06-23 PROCEDURE — 97530 THERAPEUTIC ACTIVITIES: CPT | Mod: GP | Performed by: PHYSICAL THERAPIST

## 2023-06-23 PROCEDURE — 36415 COLL VENOUS BLD VENIPUNCTURE: CPT | Performed by: SURGERY

## 2023-06-23 PROCEDURE — 250N000013 HC RX MED GY IP 250 OP 250 PS 637: Performed by: INTERNAL MEDICINE

## 2023-06-23 PROCEDURE — 99232 SBSQ HOSP IP/OBS MODERATE 35: CPT | Performed by: INTERNAL MEDICINE

## 2023-06-23 PROCEDURE — 97165 OT EVAL LOW COMPLEX 30 MIN: CPT | Mod: GO

## 2023-06-23 PROCEDURE — 80048 BASIC METABOLIC PNL TOTAL CA: CPT | Performed by: SURGERY

## 2023-06-23 PROCEDURE — 120N000001 HC R&B MED SURG/OB

## 2023-06-23 RX ADMIN — HYDROCODONE BITARTRATE AND ACETAMINOPHEN 1 TABLET: 5; 325 TABLET ORAL at 13:38

## 2023-06-23 RX ADMIN — CALCIUM CARBONATE 600 MG (1,500 MG)-VITAMIN D3 400 UNIT TABLET 1 TABLET: at 08:29

## 2023-06-23 RX ADMIN — GABAPENTIN 400 MG: 400 CAPSULE ORAL at 08:29

## 2023-06-23 RX ADMIN — ACETAMINOPHEN 650 MG: 325 TABLET, FILM COATED ORAL at 20:02

## 2023-06-23 RX ADMIN — SACUBITRIL AND VALSARTAN 1 TABLET: 49; 51 TABLET, FILM COATED ORAL at 10:38

## 2023-06-23 RX ADMIN — GABAPENTIN 400 MG: 400 CAPSULE ORAL at 20:02

## 2023-06-23 RX ADMIN — UMECLIDINIUM 1 PUFF: 62.5 AEROSOL, POWDER ORAL at 09:33

## 2023-06-23 RX ADMIN — GABAPENTIN 400 MG: 400 CAPSULE ORAL at 13:25

## 2023-06-23 RX ADMIN — ACETAMINOPHEN 650 MG: 325 TABLET, FILM COATED ORAL at 08:29

## 2023-06-23 RX ADMIN — CARVEDILOL 12.5 MG: 12.5 TABLET, FILM COATED ORAL at 07:34

## 2023-06-23 RX ADMIN — FLUTICASONE FUROATE AND VILANTEROL TRIFENATATE 1 PUFF: 200; 25 POWDER RESPIRATORY (INHALATION) at 09:33

## 2023-06-23 RX ADMIN — ACETAMINOPHEN 650 MG: 325 TABLET, FILM COATED ORAL at 13:25

## 2023-06-23 RX ADMIN — HYDROCODONE BITARTRATE AND ACETAMINOPHEN 1 TABLET: 5; 325 TABLET ORAL at 07:34

## 2023-06-23 RX ADMIN — SACUBITRIL AND VALSARTAN 1 TABLET: 49; 51 TABLET, FILM COATED ORAL at 20:02

## 2023-06-23 RX ADMIN — HYDROCODONE BITARTRATE AND ACETAMINOPHEN 1 TABLET: 5; 325 TABLET ORAL at 19:58

## 2023-06-23 RX ADMIN — PANTOPRAZOLE SODIUM 40 MG: 40 TABLET, DELAYED RELEASE ORAL at 07:33

## 2023-06-23 ASSESSMENT — ACTIVITIES OF DAILY LIVING (ADL)
ADLS_ACUITY_SCORE: 31
PREVIOUS_RESPONSIBILITIES: MEAL PREP;MEDICATION MANAGEMENT;FINANCES
ADLS_ACUITY_SCORE: 31
DEPENDENT_IADLS:: TRANSPORTATION
ADLS_ACUITY_SCORE: 31
ADLS_ACUITY_SCORE: 27

## 2023-06-23 NOTE — PLAN OF CARE
"Most recent vitals: BP (!) 118/41 (BP Location: Left arm, Patient Position: Chair, Cuff Size: Adult Regular)   Pulse 74   Temp 98.1  F (36.7  C) (Tympanic)   Resp 14   Ht 1.626 m (5' 4\")   Wt 67.5 kg (148 lb 13 oz)   SpO2 94%   BMI 25.54 kg/m      A&O x4. Reports pain throughout the shift, given pain medication as scheduled. Tylenol at 1700 had to be held since it would be going over the 4 g/day limit. On room air lung sounds are clear bilaterally but dim in the bases. Blood pressure running lower, PM Coreg was held per provider. No IV access at this time. Stand by assist of 1 with GB and walker, independent with ADLs. 1+ edema to lower extremities. Abdomen is non distended, soft, non tender, audible/normoactive x4. Family visiting on and off. Generalized weakness when ambulating, ambulated frequently throughout the shift and did well. Remained free from falls this shift, call light within reach, wheels locked, alarms on and audible.    Face to face report given with opportunity to observe patient.    Report given to DELANEY Xiao.    Dana Lilly RN   6/23/2023  7:24 PM             "

## 2023-06-23 NOTE — CONSULTS
Reason for consult: Heart Failure - Dietitian to instruct patient on 2 gram sodium diet    Offered low sodium diet education. Pt felt like she is well educated on diet recommendations, but accepted written education material. Pt denied having any nutrition questions at this time. Provided RD's contact information and encouraged pt to call with questions.

## 2023-06-23 NOTE — PROGRESS NOTES
06/23/23 1434   Appointment Info   Signing Clinician's Name / Credentials (PT) Adriana Nava DPT   Living Environment   People in Home alone;child(qiana), adult  (Pt's daughter will be staying with her for at least a couple weeks upon discharge)   Current Living Arrangements house   Home Accessibility stairs to enter home   Number of Stairs, Main Entrance 1   Transportation Anticipated family or friend will provide   Living Environment Comments Pt has 1 step to enter and 1 step up to main living area   Self-Care   Usual Activity Tolerance good   Current Activity Tolerance moderate   Equipment Currently Used at Home cane, quad;walker, rolling   Fall history within last six months no   Activity/Exercise/Self-Care Comment Pt reports she has been independent at baseline with all ADLs and functional mobility.  Pt has been ambulating with 4WW at home recently but also has a cane.  Pt's daughter will be coming to stay with pt upon discharge for at least a couple of weeks   General Information   Onset of Illness/Injury or Date of Surgery 06/20/23   Referring Physician Dr. Pereira   Patient/Family Therapy Goals Statement (PT) to go home with daughter at discharge   Pertinent History of Current Problem (include personal factors and/or comorbidities that impact the POC) Pt admitted with GI bleed.   General Observations No acute distress, reports feeling tired   Cognition   Affect/Mental Status (Cognition) WFL   Orientation Status (Cognition) oriented x 3   Follows Commands (Cognition) WFL   Pain Assessment   Patient Currently in Pain Yes, see Vital Sign flowsheet  (5/10 in back, reports this has been her recent normal level of back pain)   Posture    Posture Protracted shoulders;Forward head position   Range of Motion (ROM)   Range of Motion ROM is WFL   Strength (Manual Muscle Testing)   Strength (Manual Muscle Testing) Deficits observed during functional mobility   Bed Mobility   Bed Mobility no deficits identified    Transfers   Transfers sit-stand transfer   Sit-Stand Transfer   Sit-Stand [place] (Transfers) contact guard   Assistive Device (Sit-Stand Transfers) walker, front-wheeled   Gait/Stairs (Locomotion)   [place] Level (Gait) supervision   Assistive Device (Gait) walker, front-wheeled   Distance in Feet 75'   Pattern (Gait) step-through   Balance   Balance Comments good with support from walker   Clinical Impression   Criteria for Skilled Therapeutic Intervention Yes, treatment indicated   PT Diagnosis (PT) gait disturbance   Influenced by the following impairments weakness, decreased activity tolerance, back pain   Functional limitations due to impairments decreased tolerance for functional mobility and ADLs   Clinical Presentation (PT Evaluation Complexity) Stable/Uncomplicated   Clinical Presentation Rationale clinical judgement   Clinical Decision Making (Complexity) low complexity   Planned Therapy Interventions (PT) balance training;gait training;neuromuscular re-education;patient/family education;stair training;strengthening;transfer training;progressive activity/exercise;risk factor education   Risk & Benefits of therapy have been explained evaluation/treatment results reviewed;care plan/treatment goals reviewed;risks/benefits reviewed;participants included;patient   Clinical Impression Comments PT evaluation completed.  Pt lives at home alone but will be having her daughter come stay with her for a couple weeks upon discharge.  Pt is independent with ADLs at baseline and ambulates with FWW recently.  Pt dealing with increased back pain and is scheduled to have a fusion in mid-July she reports.  Pt currently demonstrates some generalized weakness and deconditioning.  Pt is safe to return home with daughter's assist as needed, may benefit from some home PT services to work on strength and conditioning leading up to upcoming back surgery.   PT Total Evaluation Time   PT Kevin, Low Complexity Minutes  (83126) 13   Physical Therapy Goals   PT Frequency 6x/week   PT Predicted Duration/Target Date for Goal Attainment 06/30/23   PT Goals Transfers;Gait;Stairs   PT: Transfers Modified independent;Sit to/from stand;Bed to/from chair;Assistive device   PT: Gait Modified independent;Rolling walker;150 feet   PT: Stairs Modified independent;1 stair   Interventions   Interventions Quick Adds Therapeutic Activity   Therapeutic Activity   Therapeutic Activities: dynamic activities to improve functional performance Minutes (67806) 16   Symptoms Noted During/After Treatment Fatigue   Treatment Detail/Skilled Intervention Pt managed 4 steps with bilateral rails CGA with step-to pattern with fatigue noted.  Pt then transferred sit>stand SBA from w/c and ambulated an additional 100' with FWW SBA, slow but steady pace.  Pt reports feeling tired and fatigued.  Did discuss importance of ambulating with nursing staff today to improve that activity level. Also discussed home with home PT services to work on continued strength and conditioning leading up to her back surgery and pt was agreeable to this.  Pt left sitting in chair with call light in reach and clip alarm on   PT Discharge Planning   PT Plan Progress ambulation and activity tolerance   PT Discharge Recommendation (DC Rec) home with assist;home with home care physical therapy   PT Rationale for DC Rec PT evaluation completed.  Pt lives at home alone but will be having her daughter come stay with her for a couple weeks upon discharge.  Pt is independent with ADLs at baseline and ambulates with FWW recently.  Pt dealing with increased back pain and is scheduled to have a fusion in mid-July she reports.  Pt currently demonstrates some generalized weakness and deconditioning.  Pt is safe to return home with daughter's assist as needed, may benefit from some home PT services to work on strength and conditioning leading up to upcoming back surgery.   PT Brief overview of current  status sup>sit mod I, sit>stand CGA, ambulated 75'x1, 100'x1 with FWW SBA, managed 4 steps with bilateral rails CGA.   Total Session Time   Timed Code Treatment Minutes 16   Total Session Time (sum of timed and untimed services) 29

## 2023-06-23 NOTE — PROGRESS NOTES
[facility]    Hospitalist Progress Note    Jacklyn Hein is a 87 year old female admitted on 6/20/2023. She presents with a GI bleed with melena and severe anemia     #1 GI bleed with melena, acute blood loss anemia  Will monitor patient's hemoglobin level with serial hemoglobin check.  Patient is at significant risk for peptic ulcer disease from NSAID use and bleeding risk from apixaban.  Kcentra has been given to reverse the effect of apixaban.  We will continue with proton pump inhibitor IV for GI protection.  Transfuse if hemoglobin decreased lower than 7.0.  Spoke with Dr. Sanz, who will monitor the patient  -June 21: Patient's hemoglobin dropped to 6.5 last night and she was transfused with 2 units of packed red blood cells.  EGD today showed mild gastritis, which might not have been the source of bleeding.  Patient is agreeable to proceed with a colonoscopy.  We will start prepping for colonoscopy today  -6/22: Patient has been stable since transfusion hemoglobin is 8.3 today no evidence of acute bleeding.  She will be undergoing colonoscopy today.  She is not having any nausea vomiting or abdominal pain.  Colon prep was, rough for her but is doing well at this time  -6/23: Colonoscopy is unremarkable.  Assuming this was an upper GI bleed.  Has been using significant mount of NSAIDs and was on the apixaban.  Hemoglobin stable no active bleeding.  We will continue with diet which was started last night she is tolerating it well hemoglobin is 8.1 hemodynamically stable.  We will get patient up and ambulate today see how she does possible discharge home today but more likely tomorrow given her overall status.  Our tentative plan will be to hold the apixaban for at least several days and then restart.  Continue on the Protonix     #2  CHF history from nonischemic cardiomyopathy  In light of need for fluid resuscitation, will check patient's cardiac function with echocardiogram.  If patient's  hemoglobin remained stable and shows no signs of active bleeding, will resume patient's and CHF medications  -June 21: Asymptomatic  -6/22: Hemodynamically stable.  Blood pressures 111 systolic heart rates in the 75-85 range A-fib afebrile room air sats are good.  No evidence of volume overload  -6/23: Hemodynamically stable no evidence of failure.  Sats good on room air.     3.  Atrial fibrillation  Patient's heart rate is controlled.  We will continue with the beta-blocker to prevent beta-blocker withdrawal.  Patient anticoagulation apixaban has been held due to GI bleed.  -June 21: Patient's heart rate is controlled  -6/22: She is rate controlled hemodynamically stable off of anticoagulations at this point.  Depending upon her colonoscopy may be able to restart the apixaban in a day or 2.  -6/23: Rate adequately controlled.  After discussion with her and her family we will hold the apixaban for at least the next several days restart probably next week.     4.  Back pain: This has been going on for some time.  She has had numerous injections.  The bed is not the most comfortable and getting up and down multiple times for the colon prep has caused this to flareup a bit she is tolerating it well at this time.  -6/23: Does have some compression fractures they tentatively have a kyphoplasty scheduled in the next month.     5.  Disposition: I did discuss with her plans by home upon discharge she is planning at this point to return home.  Once we get her colonoscopy done if all is well we can start working on a physical therapy standpoint and hopefully discharge in the next 1 to 2 days  .-6/23: Possible discharge today versus tomorrow depending upon her ability to get up and ambulate.           Diet: Combination Diet Regular Diet  Fluids: None    DVT Prophylaxis: Pneumatic Compression Devices  Code Status: Full Code    Disposition: Expected discharge in 1 days once safe to discharge home..    Robert Walters,  MD    Interval History   Alert pleasant no distress.  Did not eat a lot last night.  We will trial a more substantial food here this morning she has not really been out of bed since her admission.  Will be rather weak we will have physical therapy see her ambulate anticipate likely discharge tomorrow depending on how she does.  Hemoglobin stable no further bleeding.    -Data reviewed today: I reviewed all new labs and imaging results over the last 24 hours. I personally reviewed no images or EKG's today.    Physical Exam   Temp: 97.2  F (36.2  C) Temp src: Tympanic BP: (!) 139/44 Pulse: 81   Resp: 16 SpO2: 94 % O2 Device: None (Room air)    Vitals:    06/21/23 0402 06/22/23 0557 06/23/23 0603   Weight: 67.6 kg (149 lb 0.5 oz) 67.8 kg (149 lb 7.6 oz) 67.5 kg (148 lb 13 oz)     Vital Signs with Ranges  Temp:  [97.2  F (36.2  C)-98.5  F (36.9  C)] 97.2  F (36.2  C)  Pulse:  [71-87] 81  Resp:  [16] 16  BP: ()/(41-59) 139/44  SpO2:  [92 %-96 %] 94 %  I/O last 3 completed shifts:  In: 5777.75 [P.O.:4240; I.V.:1537.75]  Out: 1150 [Urine:650; Other:500]    Peripheral IV 06/20/23 Anterior;Left Upper forearm (Active)   Site Assessment WDL 06/23/23 0300   Line Status Saline locked 06/23/23 0300   Dressing Transparent 06/23/23 0300   Dressing Status dry;intact 06/23/23 0300   Dressing Intervention New dressing  06/21/23 0748   Line Intervention Flushed 06/23/23 0300   Phlebitis Scale 0-->no symptoms 06/23/23 0300   Infiltration? no 06/21/23 1151   Number of days: 3       Incision/Surgical Site 01/30/23 Left Chest (Active)   Number of days: 144     No line/device    Constitutional: Alert and oriented x3. No distress    HEENT: Normocephalic/atraumatic, PERRL, EOMI, mouth moist, neck supple, no LN.     Cardiovascular: Regular RRR.  No murmur, no  rubs, or gallops.  JVD no.  Bruits no.  Pulses 2    Respiratory: Few crackles at the bases bilaterally.  Otherwise clear to auscultation bilaterally    Abdomen: Soft, nontender,  nondistended, no organomegaly. Bowel sounds present    Extremities: Warm/dry.  Trace edema    Neuro:   Non focal, cranial nerves intact, Moves all extremities.  Medications       acetaminophen  650 mg Oral 4x Daily     calcium carbonate-vitamin D  1 tablet Oral QAM     carvedilol  12.5 mg Oral BID w/meals     fluticasone-vilanterol  1 puff Inhalation Daily     gabapentin  400 mg Oral TID     HYDROcodone-acetaminophen  1 tablet Oral TID     pantoprazole  40 mg Oral QAM AC     sodium chloride (PF)  3 mL Intracatheter Q8H     umeclidinium  1 puff Inhalation Daily       Data   Recent Labs   Lab 06/23/23  0538 06/22/23  0527 06/21/23  1824 06/21/23  1207 06/21/23  0520 06/21/23  0519   WBC 8.7 11.0  --   --  12.5*  --    HGB 8.1* 8.3* 9.4*   < > 8.7*  8.7*  --    MCV 94 94  --   --  94  --    * 136*  --   --  138*  --     141  --   --   --  143   POTASSIUM 4.5 4.4  --   --   --  4.8   CHLORIDE 106 111*  --   --   --  113*   CO2 18* 19*  --   --   --  18*   BUN 22.6 32.9*  --   --   --  60.7*   CR 0.80 0.83  --   --   --  1.05*   ANIONGAP 14 11  --   --   --  12   LORRAINE 8.7* 8.3*  --   --   --  8.2*   GLC 78 72  --   --   --  93   ALBUMIN  --  2.7*  --   --   --  2.7*    < > = values in this interval not displayed.       No results found for this or any previous visit (from the past 24 hour(s)).

## 2023-06-23 NOTE — PROGRESS NOTES
06/23/23 1400   General Information   Assessment completed with: Patient;Children   Type of CM/SW Visit Initial Assessment   Primary Care Provider verified and updated as needed? Yes   Readmission Within the Last 30 Days no previous admission in last 30 days   Reason for Consult discharge planning   Advance Care Planning Reviewed present on chart;verified with patient   Communication Assessment   Patient / Family communication style spoken language (English or Bilingual)   Advance Directives   Scanned docmt in ACP Activity? Yes, scanned ACP docmt   Pt confirms current doc scanned Current scanned   Living Environment   People in Home alone   Current Living Arrangements house   Care Provided by self   Provides Care For no one   Able to Return to Prior Arrangements yes   Assessment of Family/Social Support   Marital Status    Who is your support system? Children   Description of Support System Supportive;Involved   Current Resources   Patient receiving home care services: No   Community Resources None   Functional Status   Usual Activity Tolerance good   Current Activity Tolerance moderate   Assessment of Functional Status   Dependent ADLs: Independent   Dependent IADLs: Transportation   Assesssment of Functional Status Needs assistance with transportation   Mental Status   Mental Health Status No Current Concerns   Intellectual Performance WDL   Level of Consciousness alert   Personal Safety   Feels Unsafe at Home or Work/School no   Feels Threatened by Someone no   Does Anyone Try to Keep You From Having Contact with Others or Doing Things Outside Your Home? no   Physical Signs of Abuse Present no   Discharge Needs Assessment   Patient/Family Anticipates Transition to home with family   Equipment Currently Used at Home cane, quad;walker, rolling;shower chair   Patient/Family Anticipated Services at Transition home health care   Transportation Anticipated family or friend will provide   CM/SW Discharge  Planning   Anticipated Discharge Disposition (CM/SW) Home   Offered/Gave Vendor List yes

## 2023-06-23 NOTE — PROGRESS NOTES
06/23/23 1100   Appointment Info   Signing Clinician's Name / Credentials (OT) Heenatasia Sanz, OTR/L   Living Environment   People in Home alone;child(qiana), adult   Current Living Arrangements house   Home Accessibility stairs to enter home   Number of Stairs, Main Entrance 1   Transportation Anticipated family or friend will provide   Living Environment Comments Pt lives alone but her daughter has been staying with her and will continue to stay with her upon hospital  discharge. After this daughter has to go back home, another daughter will likely be staying with her. Her home is 1 story + a basement but pt does not have to go to the basement; her bedroom and bathroom are on the main floor. Pt's bathroom has a tub/shower combo with 2 grab bars and a shower seat and grab bars by the taller toilet.   Self-Care   Usual Activity Tolerance good   Current Activity Tolerance moderate   Equipment Currently Used at Home cane, quad;walker, rolling;grab bar, toilet;grab bar, tub/shower;shower chair   Fall history within last six months no   Activity/Exercise/Self-Care Comment Pt reports she is independent in ADLs and has been using a 4WW for the past month or so since she has been dealing with back pain. She reports she has a fx in her back and is having surgery mid-July. Prior to that, she used a cane for functional mobility. This back fx/pain also limited her ability to doff/don socks but she hasn't been wearing them during the summer and wears slip on shoes. She does have a sock aid of her spouse's (who recently passed) that she could use if needed otherwise her daughters can assist her. A daughter is also bringing some to put over her toilet (commode overlay? high rise toilet seat?).   Instrumental Activities of Daily Living (IADL)   Previous Responsibilities meal prep;medication management;finances   IADL Comments Pt can do meal prep and manages her own meds/finances. She hasn't been driving- family has been transporting.  She has a cleaning lady who can assist with laundry (basement). Family has been helping with grocery shopping. Pt hires out for yard work.   General Information   Onset of Illness/Injury or Date of Surgery 06/20/23   Referring Physician Dr. Pereira   Patient/Family Therapy Goal Statement (OT) Return home with daughter's assistance/support   Additional Occupational Profile Info/Pertinent History of Current Problem Pt admitted d/t upper GI bleed. Pt had an EGD which showed mild gastritis, which might not have been the source of bleeding. She then had a colonoscopy which did not show anything remarkable. Her hemoglobin has stabilized. She has a h/o back pain, CHF, and afib.   Cognitive Status Examination   Orientation Status orientation to person, place and time   Affect/Mental Status (Cognitive) WNL   Follows Commands WNL   Visual Perception   Visual Impairment/Limitations corrective lenses full-time   Sensory   Sensory Quick Adds sensation intact   Pain Assessment   Patient Currently in Pain No   Range of Motion Comprehensive   General Range of Motion bilateral upper extremity ROM WFL   Strength Comprehensive (MMT)   Comment, General Manual Muscle Testing (MMT) Assessment WFL - mild generalized weakness observed during ADLs   Muscle Tone Assessment   Muscle Tone Quick Adds No deficits were identified   Coordination   Upper Extremity Coordination No deficits were identified   Bed Mobility   Comment (Bed Mobility) NT, pt already up OOB upon OT arrival   Transfers   Transfers sit-stand transfer;toilet transfer   Sit-Stand Transfer   Sit-Stand [place] (Transfers) supervision   Assistive Device (Sit-Stand Transfers) walker, front-wheeled   Toilet Transfer   Type (Toilet Transfer) sit-stand;stand-sit   [place] Level (Toilet Transfer) contact guard   Assistive Device (Toilet Transfer) walker, front-wheeled   Toilet Transfer Comments This toilet is lower compared to pt's at home and the grab bars are in a poor  position here. She has grab bars at home and will have additional equipment on the toilet to make it higher   Balance   Balance Comments Good with use of FWW - pt ambulated to/from the BR with AD with SBA   Activities of Daily Living   BADL Assessment/Intervention grooming;lower body dressing   Lower Body Dressing Assessment/Training   Comment, (Lower Body Dressing) Did not assess due to recent difficulties at home due to back pain/fx, pt having a sock aid to be able to use, and/or her daughter will be able to assist   Grooming Assessment/Training   Position (Grooming) unsupported standing   [place] Level (Grooming) supervision   Comment, (Grooming) Pt stood at the sink to brush her teeth   Clinical Impression   Criteria for Skilled Therapeutic Interventions Met (OT) Yes, treatment indicated   OT Diagnosis Mild weakness and deconditioning   OT Problem List-Impairments impacting ADL problems related to;activity tolerance impaired;strength;pain   Assessment of Occupational Performance 1-3 Performance Deficits   Identified Performance Deficits ADLs   Planned Therapy Interventions (OT) ADL retraining;strengthening;risk factor education;home program guidelines;progressive activity/exercise   Clinical Decision Making Complexity (OT) low complexity   Risk & Benefits of therapy have been explained evaluation/treatment results reviewed;care plan/treatment goals reviewed;risks/benefits reviewed;current/potential barriers reviewed;participants voiced agreement with care plan;participants included;patient   Clinical Impression Comments OT evaluation completed. Pt presents with mild deficits with strength and activity tolerance, though was still able to complete ADLs with SBA to CGA. Pt appears appropriate to return home with assistance from her daughter when medically ready. She would benefit from ongoing skilled OT services during her acute care stay to address her deficits and maximize her safety and independence in ADLs  for a safe return home. Anticipate no further skilled OT services will be necessary upon discharge.   OT Total Evaluation Time   OT Eval, Low Complexity Minutes (18882) 15   OT Goals   Therapy Frequency (OT) 5 times/wk   OT Predicted Duration/Target Date for Goal Attainment 06/27/23   OT Goals Hygiene/Grooming;Toilet Transfer/Toileting;Aerobic Activity   OT: Hygiene/Grooming modified independent   OT: Toilet Transfer/Toileting Modified independent   OT: Perform aerobic activity with stable cardiovascular response 15 minutes   OT Discharge Planning   OT Plan Progress strength, activity tolerance, and ADLs as able   OT Discharge Recommendation (DC Rec) home with assist   OT Rationale for DC Rec Pt presents with mild deficits with strength and activity tolerance, though was still able to complete ADLs with SBA to CGA. Pt appears appropriate to return home with assistance from her daughter when medically ready. She would benefit from ongoing skilled OT services during her acute care stay to address her deficits and maximize her safety and independence in ADLs for a safe return home. Anticipate no further skilled OT services will be necessary upon discharge.   OT Brief overview of current status SBA sit<>stand from chair, SBA ambulating to/from the BR using FWW, CGA on/off the toilet, SBA for grooming tasks standing at the sink, mild deficits with strength and activity tolerance   Total Session Time   Total Session Time (sum of timed and untimed services) 15

## 2023-06-24 VITALS
OXYGEN SATURATION: 95 % | DIASTOLIC BLOOD PRESSURE: 59 MMHG | BODY MASS INDEX: 25.41 KG/M2 | HEART RATE: 76 BPM | HEIGHT: 64 IN | TEMPERATURE: 98.3 F | WEIGHT: 148.81 LBS | RESPIRATION RATE: 16 BRPM | SYSTOLIC BLOOD PRESSURE: 150 MMHG

## 2023-06-24 PROBLEM — D62 ACUTE ON CHRONIC BLOOD LOSS ANEMIA: Status: ACTIVE | Noted: 2023-06-24

## 2023-06-24 PROBLEM — M54.6 ACUTE MIDLINE THORACIC BACK PAIN: Status: ACTIVE | Noted: 2023-06-24

## 2023-06-24 PROBLEM — I48.20 CHRONIC ATRIAL FIBRILLATION (H): Status: ACTIVE | Noted: 2023-06-24

## 2023-06-24 LAB
ANION GAP SERPL CALCULATED.3IONS-SCNC: 12 MMOL/L (ref 7–15)
BUN SERPL-MCNC: 18.5 MG/DL (ref 8–23)
CALCIUM SERPL-MCNC: 9 MG/DL (ref 8.8–10.2)
CHLORIDE SERPL-SCNC: 108 MMOL/L (ref 98–107)
CREAT SERPL-MCNC: 0.77 MG/DL (ref 0.51–0.95)
DEPRECATED HCO3 PLAS-SCNC: 21 MMOL/L (ref 22–29)
ERYTHROCYTE [DISTWIDTH] IN BLOOD BY AUTOMATED COUNT: 15.2 % (ref 10–15)
GFR SERPL CREATININE-BSD FRML MDRD: 75 ML/MIN/1.73M2
GLUCOSE SERPL-MCNC: 118 MG/DL (ref 70–99)
HCT VFR BLD AUTO: 25 % (ref 35–47)
HGB BLD-MCNC: 8.2 G/DL (ref 11.7–15.7)
MCH RBC QN AUTO: 30.3 PG (ref 26.5–33)
MCHC RBC AUTO-ENTMCNC: 32.8 G/DL (ref 31.5–36.5)
MCV RBC AUTO: 92 FL (ref 78–100)
PLATELET # BLD AUTO: 142 10E3/UL (ref 150–450)
POTASSIUM SERPL-SCNC: 4.1 MMOL/L (ref 3.4–5.3)
RBC # BLD AUTO: 2.71 10E6/UL (ref 3.8–5.2)
SODIUM SERPL-SCNC: 141 MMOL/L (ref 136–145)
WBC # BLD AUTO: 7.5 10E3/UL (ref 4–11)

## 2023-06-24 PROCEDURE — 250N000013 HC RX MED GY IP 250 OP 250 PS 637: Performed by: INTERNAL MEDICINE

## 2023-06-24 PROCEDURE — 250N000013 HC RX MED GY IP 250 OP 250 PS 637: Performed by: SURGERY

## 2023-06-24 PROCEDURE — 80048 BASIC METABOLIC PNL TOTAL CA: CPT | Performed by: SURGERY

## 2023-06-24 PROCEDURE — 99239 HOSP IP/OBS DSCHRG MGMT >30: CPT | Performed by: INTERNAL MEDICINE

## 2023-06-24 PROCEDURE — 36415 COLL VENOUS BLD VENIPUNCTURE: CPT | Performed by: SURGERY

## 2023-06-24 PROCEDURE — 85027 COMPLETE CBC AUTOMATED: CPT | Performed by: SURGERY

## 2023-06-24 RX ORDER — PANTOPRAZOLE SODIUM 40 MG/1
40 TABLET, DELAYED RELEASE ORAL
Qty: 30 TABLET | Refills: 1 | Status: ON HOLD | OUTPATIENT
Start: 2023-06-25 | End: 2024-03-08

## 2023-06-24 RX ADMIN — HYDROCODONE BITARTRATE AND ACETAMINOPHEN 1 TABLET: 5; 325 TABLET ORAL at 07:36

## 2023-06-24 RX ADMIN — CALCIUM CARBONATE 600 MG (1,500 MG)-VITAMIN D3 400 UNIT TABLET 1 TABLET: at 07:35

## 2023-06-24 RX ADMIN — ACETAMINOPHEN 650 MG: 325 TABLET, FILM COATED ORAL at 07:36

## 2023-06-24 RX ADMIN — UMECLIDINIUM 1 PUFF: 62.5 AEROSOL, POWDER ORAL at 07:25

## 2023-06-24 RX ADMIN — GABAPENTIN 400 MG: 400 CAPSULE ORAL at 07:36

## 2023-06-24 RX ADMIN — FLUTICASONE FUROATE AND VILANTEROL TRIFENATATE 1 PUFF: 200; 25 POWDER RESPIRATORY (INHALATION) at 07:25

## 2023-06-24 RX ADMIN — PANTOPRAZOLE SODIUM 40 MG: 40 TABLET, DELAYED RELEASE ORAL at 06:31

## 2023-06-24 RX ADMIN — SACUBITRIL AND VALSARTAN 1 TABLET: 49; 51 TABLET, FILM COATED ORAL at 07:36

## 2023-06-24 RX ADMIN — CARVEDILOL 12.5 MG: 12.5 TABLET, FILM COATED ORAL at 07:36

## 2023-06-24 ASSESSMENT — ACTIVITIES OF DAILY LIVING (ADL)
ADLS_ACUITY_SCORE: 26
ADLS_ACUITY_SCORE: 27

## 2023-06-24 NOTE — PLAN OF CARE
Patient discharged at 10:20 AM via wheel chair accompanied by daughter and staff. Prescriptions filled and sent with patient upon discharge. All belongings sent with patient.     Discharge instructions reviewed with patient and daughter. Listed belongings gathered and returned to patient. yes    Patient discharged to home.   Report called to N/A    Surgical Patient   Surgical Procedures during stay: Yes  Did patient receive discharge instruction on wound care and recognition of infection symptoms? N/A    MISC  Follow up appointment made:  No : already scheduled for 6/26  Home medications returned to patient: Yes  Patient reports pain was well managed at discharge: Yes       Vitally stable. Steady on feet with walker. Tolerating regular diet. Norco and Tylenol for chronic back pain. Able to dress self prior to discharge. Follow up appointment already made with PCP.

## 2023-06-24 NOTE — PROGRESS NOTES
06/24/23 1000   Appointment Info   Signing Clinician's Name / Credentials (PT) David Napier DPT   Appointment Canceled Reason (PT) Patient declined   Appointment Cancel Comments (PT) Pt reclined back in recliner with heating pad on her back - declined PT treatment stating she wanted to conserve her energy and did not want to flare her back pain up as she is scheduled to D/C home today.   PT Discharge Planning   PT Plan D/C PT as pt discharging home today.   PT Discharge Recommendation (DC Rec) home with assist;home with home care physical therapy   PT Rationale for DC Rec Pt lives at home alone but will be having her daughter come stay with her for a couple weeks upon discharge.  Pt is independent with ADLs at baseline and ambulates with FWW recently.  Pt dealing with increased back pain and is scheduled to have a fusion in mid-July she reports.  Pt currently demonstrates some generalized weakness and deconditioning.  Pt is safe to return home with daughter's assist as needed, may benefit from some home PT services to work on strength and conditioning leading up to upcoming back surgery.   PT Brief overview of current status sup>sit mod I, sit>stand CGA, ambulated 75'x1, 100'x1 with FWW SBA, managed 4 steps with bilateral rails CGA.

## 2023-06-24 NOTE — PROGRESS NOTES
Physical Therapy Discharge Summary    Reason for therapy discharge:    Discharged to home with home therapy.    Progress towards therapy goal(s). See goals on Care Plan in [facility] electronic health record for goal details.  Goals partially met.  Barriers to achieving goals:   discharge from facility.    Therapy recommendation(s):    Continued therapy is recommended.  Rationale/Recommendations:  To improve strength and balance for return to PLOF especially ahead of upcoming back surgery..

## 2023-06-24 NOTE — PLAN OF CARE
Goal Outcome Evaluation:  No acute changes this shift. VSS. Plan for possible discharge today.    Neuro  A&Ox4, PERRL, no numbness or tingling    Cardio  Stable BP, pulses palpable.    Resp  RA, LS clear diminished     GI/  Voiding adequate UO, tolerating Regular diet, no BM     Skin  Up to bathroom with walker and gait belt    Face to face report given with opportunity to observe patient.    Report given to Nataly Hughes RN   6/24/2023  7:06 AM

## 2023-06-24 NOTE — DISCHARGE SUMMARY
"[facility]  Hospitalist Discharge Summary      Date of Admission:  6/20/2023  Date of Discharge:  6/24/2023  Discharging Provider: Robert Walters MD  Discharge Service: Hospitalist Service    Discharge Diagnoses   Acute upper GI bleed  Acute on chronic blood loss anemia  Nonischemic cardiomyopathy  Hypertension  Chronic atrial fibrillation  Status post automatic implantable cardioverter defibrillator  Acute/chronic mid thoracic back pain    Clinically Significant Risk Factors     # Overweight: Estimated body mass index is 25.54 kg/m  as calculated from the following:    Height as of this encounter: 1.626 m (5' 4\").    Weight as of this encounter: 67.5 kg (148 lb 13 oz).       Follow-ups Needed After Discharge   Follow-up Appointments     Follow-up and recommended labs and tests       Follow up with primary care provider, Ilia Moran, within 5-7 days   for hospital follow- up.  The following labs/tests are recommended: CBC.    At this appointment they should discuss restarting Apixaban        None    Unresulted Labs Ordered in the Past 30 Days of this Admission     No orders found from 5/21/2023 to 6/21/2023.      These results will be followed up by not needed  Discharge Disposition   Discharged to home  Condition at discharge: Stable    Hospital Course      Patient 87-year-old female who presented to the ER for evaluation of some rectal bleeding.  She stated the night prior she had multiple stools that were somewhat known looking.  When she would get up she feels lightheaded when she laid flat no issues.  This continued she had no abdominal pain no nausea vomiting and finally called EMS who brought her to the ER for evaluation.  Upon arrival she was hemodynamically stable.  Blood pressure is 127/59 pulse 90 temp 98.2 sats are 97% on room air.  Hemoglobin on arrival was 7.3 previous values have been 10 last on January 10, 2023.  CT of the abdomen was performed showing no acute abnormalities no " evidence of acute bleeding.  She has chronically been on apixaban for chronic atrial fibrillation.  In the ER surgery was contacted they recommended giving Kcentra.  And admitting the patient for endoscopy.  Patient never had any previous GI bleeds.  Of note she has been under a great deal of stress over the last several months with the death of her  and problems with chronic back pain with numerous injections and planned kyphoplasty in the near future.  She has been taking a fair amount of ibuprofen also.     #1 GI bleed with melena, acute blood loss anemia  Will monitor patient's hemoglobin level with serial hemoglobin check.  Patient is at significant risk for peptic ulcer disease from NSAID use and bleeding risk from apixaban.  Kcentra has been given to reverse the effect of apixaban.  We will continue with proton pump inhibitor IV for GI protection.  Transfuse if hemoglobin decreased lower than 7.0.  Spoke with Dr. Sanz, who will monitor the patient  -June 21: Patient's hemoglobin dropped to 6.5 last night and she was transfused with 2 units of packed red blood cells.  EGD today showed mild gastritis, which might not have been the source of bleeding.  Patient is agreeable to proceed with a colonoscopy.  We will start prepping for colonoscopy today  -6/22: Patient has been stable since transfusion hemoglobin is 8.3 today no evidence of acute bleeding.  She will be undergoing colonoscopy today.  She is not having any nausea vomiting or abdominal pain.  Colon prep was, rough for her but is doing well at this time  -6/23: Colonoscopy is unremarkable.  Assuming this was an upper GI bleed.  Has been using significant mount of NSAIDs and was on the apixaban.  Hemoglobin stable no active bleeding.  We will continue with diet which was started last night she is tolerating it well hemoglobin is 8.1 hemodynamically stable.  We will get patient up and ambulate today see how she does possible discharge home today  but more likely tomorrow given her overall status.  Our tentative plan will be to hold the apixaban for at least several days and then restart.  Continue on the Protonix  -6/24: So at this point patient's been stable hemoglobin is 8.1 has had no further evidence of bleeding since her admission.  Her apixaban remains on hold would have her discuss resuming this with primary care doctor for such upon follow-up.  Source is likely upper GI endoscopy did show some gastritis.  She has been under a great deal of stress while taking a lot of ibuprofen.  This has not been stopped her apixaban is on hold she is on Protonix.  She has been tolerating regular diet without problem.  Her colonoscopy did not reveal any evidence of acute bleeding she does have diverticulosis however.     #2  CHF history from nonischemic cardiomyopathy  In light of need for fluid resuscitation, will check patient's cardiac function with echocardiogram.  If patient's hemoglobin remained stable and shows no signs of active bleeding, will resume patient's and CHF medications  -June 21: Asymptomatic  -6/22: Hemodynamically stable.  Blood pressures 111 systolic heart rates in the 75-85 range A-fib afebrile room air sats are good.  No evidence of volume overload  -6/23: Hemodynamically stable no evidence of failure.  Sats good on room air.  -6/24: No issues with this at all.  No signs of heart failure.  We did restart her Entresto and Coreg.     3.  Atrial fibrillation  Patient's heart rate is controlled.  We will continue with the beta-blocker to prevent beta-blocker withdrawal.  Patient anticoagulation apixaban has been held due to GI bleed.  -June 21: Patient's heart rate is controlled  -6/22: She is rate controlled hemodynamically stable off of anticoagulations at this point.  Depending upon her colonoscopy may be able to restart the apixaban in a day or 2.  -6/23: Rate adequately controlled.  After discussion with her and her family we will hold the  apixaban for at least the next several days restart probably next week.  -6/24: Rate has been adequately controlled.  Her apixaban has been on hold likely can restart this upon follow-up with her primary care provider.     4.  Back pain: This has been going on for some time.  She has had numerous injections.  The bed is not the most comfortable and getting up and down multiple times for the colon prep has caused this to flareup a bit she is tolerating it well at this time.  -6/23: Does have some compression fractures they tentatively have a kyphoplasty scheduled in the next month.  -6/24: She has a tentative appointment I believe on July 18 for this procedure.  I think is fine to proceed with this.  As long as she has no further complicating problems over the next couple of weeks.     5.  Disposition: I did discuss with her plans by home upon discharge she is planning at this point to return home.  Once we get her colonoscopy done if all is well we can start working on a physical therapy standpoint and hopefully discharge in the next 1 to 2 days  .-6/23: Possible discharge today versus tomorrow depending upon her ability to get up and ambulate.  -6/24: Patient did ambulate with physical therapy.  They did not have any great concerns with her going home.  She will be rather weak over the next week or so.  Her daughter will stay with her.  I did set up for home care with physical therapy.       Consultations This Hospital Stay   PHARMACY IP CONSULT  OCCUPATIONAL THERAPY ADULT IP CONSULT  NUTRITION SERVICES ADULT IP CONSULT  CARE MANAGEMENT / SOCIAL WORK IP CONSULT  SURGERY GENERAL IP CONSULT  PHYSICAL THERAPY ADULT IP CONSULT    Code Status   Full Code    Time Spent on this Encounter   I, Robert Walters MD, personally saw the patient today and spent greater than 30 minutes discharging this patient.       Robert Walters MD  [facility]  [address] [place] [id number]  Phone: [phone]  Fax:  [phone]  ______________________________________________________________________    Physical Exam   Vital Signs: Temp: 98.3  F (36.8  C) Temp src: Tympanic BP: 150/59 Pulse: 76   Resp: 16 SpO2: 95 % O2 Device: None (Room air)    Weight: 148 lbs 12.97 oz  Constitutional: awake, alert, cooperative, no apparent distress, and appears stated age  Respiratory: No increased work of breathing, good air exchange, clear to auscultation bilaterally, no crackles or wheezing  Cardiovascular: Irregularly irregular no audible murmurs neck veins are flat pulses are 2+ equal  GI: No scars, normal bowel sounds, soft, non-distended, non-tender, no masses palpated, no hepatosplenomegally  Musculoskeletal: There is no redness, warmth, or swelling of the joints.  Full range of motion noted.  Motor strength is 5 out of 5 all extremities bilaterally.  Tone is normal.       Primary Care Physician   Ilia Moran    Discharge Orders      Home Care Referral      Reason for your hospital stay    Patient was admitted with rectal bleeding.  Hemodynamically stable.  Did require 2 units of packed red blood cells as hemoglobin dropped to 6.5.  Endoscopy showed gastritis colonoscopy showed nothing of significance.  Had been on apixaban which is currently on hold.  Hemoglobin stable in the 8 range.     Activity    Your activity upon discharge: activity as tolerated     Follow-up and recommended labs and tests     Follow up with primary care provider, Ilia Moran, within 5-7 days for hospital follow- up.  The following labs/tests are recommended: CBC.  At this appointment they should discuss restarting Apixaban     Diet    Follow this diet upon discharge: Orders Placed This Encounter      Combination Diet Regular Diet       Significant Results and Procedures   Most Recent 3 CBC's:Recent Labs   Lab Test 06/24/23  0541 06/23/23  0538 06/22/23  0527   WBC 7.5 8.7 11.0   HGB 8.2* 8.1* 8.3*   MCV 92 94 94   * 129* 136*     Most Recent 3  BMP's:Recent Labs   Lab Test 06/24/23  0541 06/23/23  0538 06/22/23  0527    138 141   POTASSIUM 4.1 4.5 4.4   CHLORIDE 108* 106 111*   CO2 21* 18* 19*   BUN 18.5 22.6 32.9*   CR 0.77 0.80 0.83   ANIONGAP 12 14 11   LORRAINE 9.0 8.7* 8.3*   * 78 72     Most Recent 2 LFT's:Recent Labs   Lab Test 01/01/23  0652 10/19/22  1424   AST 21 23   ALT 10 14   ALKPHOS 50 70   BILITOTAL 0.4 0.2     Most Recent 3 INR's:Recent Labs   Lab Test 11/11/15  1042   INR 1.2     Most Recent 3 Troponin's:Recent Labs   Lab Test 01/06/21  0441 01/05/21  2316 03/03/20  0950   TROPI <0.015 <0.015 <0.015     7-Day Micro Results     No results found for the last 168 hours.        Most Recent 6 glucoses:Recent Labs   Lab Test 06/24/23  0541 06/23/23  0538 06/22/23  0527 06/21/23  0519 06/20/23  1507 06/20/23  0827   * 78 72 93 116* 128*     Most Recent Urinalysis:Recent Labs   Lab Test 01/10/23  1417   COLOR Yellow   APPEARANCE Clear   URINEGLC Negative   URINEBILI Negative   URINEKETONE Negative   SG 1.016   UBLD Negative   URINEPH 5.0   PROTEIN Negative   NITRITE Negative   LEUKEST Small*   RBCU 2   WBCU 6*     Most Recent ESR & CRP:Recent Labs   Lab Test 01/01/23  0652 01/05/21  2316   CRP  --  18.9*   CRPI 81.87*  --      Most Recent Anemia Panel:Recent Labs   Lab Test 06/24/23  0541   WBC 7.5   HGB 8.2*   HCT 25.0*   MCV 92   *   ,   Results for orders placed or performed during the hospital encounter of 06/20/23   CTA Abdomen Pelvis with Contrast    Narrative    Exam: CTA ABDOMEN PELVIS WITH CONTRAST    Exam reason: GI bleed abdominal pain    Technique: Initial noncontrast images of the abdomen and pelvis were  obtained. Using helical CT technique, axial images of the abdomen and  pelvis were obtained with IV contrast in the arterial and venous  phases.  Coronal and sagittal reconstructions also performed. This CT  was performed using one or more of the following dose reduction  techniques: automated exposure control,  adjustment of the mA and/or kV  according to patient size, and/or use of iterative reconstruction  technique.    Meds/Contrast: ISOVUE 370  53mL    Comparison: 2021     FINDINGS:    ABDOMEN:    Liver: No mass or any significant abnormality.  Gallbladder: Resected.   Bile Ducts: There is dilation of the extrahepatic common bile duct and  central intrahepatic bile ducts, slightly progressed since ,  likely due to the postcholecystectomy setting.   Spleen:  No splenomegaly or focal lesion.  Pancreas: No mass, ductal dilatation, or inflammatory changes.  Kidneys: No solid mass, hydronephrosis, or any definite calculi.   Adrenals:  No nodules.   Lymph Nodes: No adenopathy.   Vascular: No aortic aneurysm.   Abdominal Wall: No acute findings.     PELVIS:   No mass or adenopathy.     Bowel/Mesentery/Peritoneum:   -No bowel obstruction.   -No evidence of active GI bleeding.  -No acute inflammatory findings.  -No ascites.    Visualized portions of the Chest: No consolidation or pleural  effusion.  Musculoskeletal: There is diffuse osteopenia. No acute osseous  abnormalities. Prior T12 and L4 vertebroplasties.       Impression    IMPRESSION:  No evidence of active GI bleed. No acute findings in the abdomen or  pelvis.    DWAYNE KURTZ MD         SYSTEM ID:  [id number]   Echocardiogram Complete     Value    LVEF  55-60%    Narrative    [id number]  [id number]  [id number]  [id number]^WESLY^RAÚL     [facility]  Echocardiography Laboratory  [address] [place] [address]     Name: LUCRETIA DEBBIE C  MRN: [id number]  : 1935  Study Date: 2023 07:01 AM  Age: 87 yrs  Gender: Female  Patient Location: [facility]  Reason For Study: Heart Failure  Ordering Physician: WESLY FERRARI  Performed By: Emmanuelle Mendoza     BSA: 1.7 m2  Height: 64 in  Weight: 150 lb  HR: 87  BP: 133/86 mmHg  ______________________________________________________________________________  Procedure  Complete Portable  Echo Adult.  ______________________________________________________________________________  Interpretation Summary  Global and regional left ventricular function is normal with an EF of 55-60%.  Grade I or early diastolic dysfunction.  Global right ventricular function is normal.  Mild to moderate left atrial enlargement is present.  Mild mitral annular calcification is present.  Moderate mitral insufficiency is present.  No aortic stenosis is present.  Mild aortic insufficiency is present.  Mild tricuspid insufficiency is present.  Right ventricular systolic pressure is 25mmHg above the right atrial pressure.  This study was compared with the study from 8/24/21 .  No significant changes noted.  ______________________________________________________________________________  Left Ventricle  Global and regional left ventricular function is normal with an EF of 55-60%.  Grade I or early diastolic dysfunction.     Right Ventricle  The right ventricle is normal size. Global right ventricular function is  normal.     Atria  Mild to moderate left atrial enlargement is present.     Mitral Valve  Mild mitral annular calcification is present. Moderate mitral insufficiency is  present.     Aortic Valve  Aortic valve sclerosis is present. Mild aortic insufficiency is present. The  mean AoV pressure gradient is 6.3 mmHg. The peak AoV pressure gradient is 13.5  mmHg. No aortic stenosis is present.     Tricuspid Valve  Mild tricuspid insufficiency is present. Right ventricular systolic pressure  is 25mmHg above the right atrial pressure.     Pericardium  No pericardial effusion is present.     Compared to Previous Study  This study was compared with the study from 8/24/21 . No significant changes  noted.  ______________________________________________________________________________  MMode/2D Measurements & Calculations  IVSd: 0.85 cm  LVIDd: 4.6 cm  LVIDs: 2.8 cm  LVPWd: 1.0 cm  FS: 40.0 %  LV mass(C)d: 148.1 grams  LV  mass(C)dI: 85.6 grams/m2  LVOT diam: 2.0 cm  LVOT area: 3.2 cm2  RWT: 0.45  TAPSE: 2.1 cm     Doppler Measurements & Calculations  MV E max faisal: 91.0 cm/sec  MV A max faisal: 122.0 cm/sec  MV E/A: 0.75     MV dec slope: 574.0 cm/sec2  MV dec time: 0.16 sec  Ao V2 max: 184.0 cm/sec  Ao max P.5 mmHg  Ao V2 mean: 116.0 cm/sec  Ao mean P.3 mmHg  Ao V2 VTI: 33.1 cm  AI P1/2t: 354.9 msec  TR max faisal: 250.0 cm/sec  TR max P.0 mmHg     ______________________________________________________________________________  Report approved by: Esteban Bell 2023 01:56 PM               Discharge Medications   Current Discharge Medication List      START taking these medications    Details   pantoprazole (PROTONIX) 40 MG EC tablet Take 1 tablet (40 mg) by mouth every morning (before breakfast)  Qty: 30 tablet, Refills: 1    Associated Diagnoses: Upper GI bleed         CONTINUE these medications which have CHANGED    Details   apixaban ANTICOAGULANT (ELIQUIS ANTICOAGULANT) 5 MG tablet Patient should not take apixaban until seen by primary care provider  Qty: 180 tablet, Refills: 0    Associated Diagnoses: Paroxysmal atrial fibrillation (H)         CONTINUE these medications which have NOT CHANGED    Details   Calcium Carbonate-Vitamin D (CALCIUM 600 + D OR) Take 1 tablet by mouth every morning      carvedilol (COREG) 12.5 MG tablet TAKE 1 TABLET BY MOUTH 2 TIMES DAILY WITH MEALS  Qty: 180 tablet, Refills: 3    Associated Diagnoses: Non-ischemic cardiomyopathy (H)      ENTRESTO 49-51 MG per tablet TAKE 1 TABLET BY MOUTH TWICE DAILY  Qty: 180 tablet, Refills: 3    Associated Diagnoses: Congestive heart failure, NYHA class 2, unspecified congestive heart failure type (H); Combined systolic and diastolic cardiac dysfunction; Chronic systolic CHF (congestive heart failure) (H); Non-ischemic cardiomyopathy (H); Essential hypertension; SOB (shortness of breath)      gabapentin (NEURONTIN) 400 MG capsule TAKE 1 CAPSULE  BY MOUTH 3 TIMES DAILY FOR PAIN  Qty: 90 capsule, Refills: 11    Associated Diagnoses: Spinal stenosis of lumbar region with neurogenic claudication; Neuropathy      HYDROcodone-acetaminophen (NORCO) 5-325 MG tablet TAKE 1 TO 2 TABLETS BY MOUTH EVERY 6 HOURS AS NEEDED  Qty: 60 tablet, Refills: 0    Associated Diagnoses: Spinal stenosis of lumbar region with neurogenic claudication; Neuropathy; 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      mometasone-formoterol (DULERA) 200-5 MCG/ACT inhaler Inhale 2 puffs into the lungs 2 times daily  Qty: 13 g, Refills: 11    Comments: Replace of Symbicort due to insurance  Associated Diagnoses: Chronic obstructive pulmonary disease, unspecified COPD type (H)      Multiple Vitamin (MULTI-VITAMIN DAILY PO) Take 1 tablet by mouth every morning      rosuvastatin (CRESTOR) 10 MG tablet TAKE 1 TABLET BY MOUTH DAILY  Qty: 90 tablet, Refills: 3    Associated Diagnoses: Mixed hyperlipidemia; Hypercholesteremia; Non-ischemic cardiomyopathy (H)      umeclidinium (INCRUSE ELLIPTA) 62.5 MCG/ACT inhaler Inhale 1 puff into the lungs daily  Qty: 30 each, Refills: 11    Comments: Replace of Spiriva- insurance  Associated Diagnoses: Other emphysema (H)      vitamin C (ASCORBIC ACID) 500 MG tablet Take 500 mg by mouth 2 times daily      acetaminophen (TYLENOL) 325 MG tablet Take 2 tablets (650 mg) by mouth every 6 hours as needed for mild pain MAX 4000MG TYLENOL  IN 24 HRS / NEED TO INCLUDE HER LORTAB.  Qty: 50 tablet, Refills: 4    Associated Diagnoses: Pacemaker end of life         STOP taking these medications       acetaminophen (TYLENOL) 650 MG CR tablet Comments:   Reason for Stopping:         spironolactone (ALDACTONE) 25 MG tablet Comments:   Reason for Stopping:             Allergies   No Known Allergies

## 2023-06-26 ENCOUNTER — OFFICE VISIT (OUTPATIENT)
Dept: FAMILY MEDICINE | Facility: OTHER | Age: 88
End: 2023-06-26
Attending: FAMILY MEDICINE
Payer: MEDICARE

## 2023-06-26 ENCOUNTER — LAB (OUTPATIENT)
Dept: LAB | Facility: OTHER | Age: 88
End: 2023-06-26
Attending: FAMILY MEDICINE
Payer: COMMERCIAL

## 2023-06-26 ENCOUNTER — DOCUMENTATION ONLY (OUTPATIENT)
Dept: CARE COORDINATION | Facility: CLINIC | Age: 88
End: 2023-06-26
Payer: COMMERCIAL

## 2023-06-26 VITALS
WEIGHT: 150 LBS | HEART RATE: 85 BPM | TEMPERATURE: 98.2 F | SYSTOLIC BLOOD PRESSURE: 113 MMHG | DIASTOLIC BLOOD PRESSURE: 56 MMHG | OXYGEN SATURATION: 95 % | BODY MASS INDEX: 25.75 KG/M2 | RESPIRATION RATE: 16 BRPM

## 2023-06-26 DIAGNOSIS — M48.062 SPINAL STENOSIS OF LUMBAR REGION WITH NEUROGENIC CLAUDICATION: ICD-10-CM

## 2023-06-26 DIAGNOSIS — M80.00XD AGE-RELATED OSTEOPOROSIS WITH CURRENT PATHOLOGICAL FRACTURE WITH ROUTINE HEALING, SUBSEQUENT ENCOUNTER: ICD-10-CM

## 2023-06-26 DIAGNOSIS — Z09 HOSPITAL DISCHARGE FOLLOW-UP: ICD-10-CM

## 2023-06-26 DIAGNOSIS — D62 ACUTE BLOOD LOSS AS CAUSE OF POSTOPERATIVE ANEMIA: ICD-10-CM

## 2023-06-26 DIAGNOSIS — Z45.018 PACEMAKER END OF LIFE: ICD-10-CM

## 2023-06-26 DIAGNOSIS — K92.2 UGI BLEED: ICD-10-CM

## 2023-06-26 DIAGNOSIS — G62.9 NEUROPATHY: ICD-10-CM

## 2023-06-26 DIAGNOSIS — M47.815 SPONDYLOSIS OF THORACOLUMBAR REGION WITHOUT MYELOPATHY OR RADICULOPATHY: ICD-10-CM

## 2023-06-26 DIAGNOSIS — I48.0 PAROXYSMAL ATRIAL FIBRILLATION (H): ICD-10-CM

## 2023-06-26 DIAGNOSIS — Z09 HOSPITAL DISCHARGE FOLLOW-UP: Primary | ICD-10-CM

## 2023-06-26 DIAGNOSIS — Z79.01 ADEQUATE ANTICOAGULATION ON ANTICOAGULANT THERAPY: ICD-10-CM

## 2023-06-26 LAB
ANION GAP SERPL CALCULATED.3IONS-SCNC: 11 MMOL/L (ref 7–15)
BASOPHILS # BLD AUTO: 0 10E3/UL (ref 0–0.2)
BASOPHILS NFR BLD AUTO: 0 %
BUN SERPL-MCNC: 20 MG/DL (ref 8–23)
CALCIUM SERPL-MCNC: 9.3 MG/DL (ref 8.8–10.2)
CHLORIDE SERPL-SCNC: 106 MMOL/L (ref 98–107)
CREAT SERPL-MCNC: 0.9 MG/DL (ref 0.51–0.95)
DEPRECATED HCO3 PLAS-SCNC: 23 MMOL/L (ref 22–29)
EOSINOPHIL # BLD AUTO: 0.2 10E3/UL (ref 0–0.7)
EOSINOPHIL NFR BLD AUTO: 2 %
ERYTHROCYTE [DISTWIDTH] IN BLOOD BY AUTOMATED COUNT: 15.9 % (ref 10–15)
GFR SERPL CREATININE-BSD FRML MDRD: 62 ML/MIN/1.73M2
GLUCOSE SERPL-MCNC: 125 MG/DL (ref 70–99)
HCT VFR BLD AUTO: 33.5 % (ref 35–47)
HGB BLD-MCNC: 10.4 G/DL (ref 11.7–15.7)
IMM GRANULOCYTES # BLD: 0 10E3/UL
IMM GRANULOCYTES NFR BLD: 0 %
LYMPHOCYTES # BLD AUTO: 0.7 10E3/UL (ref 0.8–5.3)
LYMPHOCYTES NFR BLD AUTO: 7 %
MCH RBC QN AUTO: 30.3 PG (ref 26.5–33)
MCHC RBC AUTO-ENTMCNC: 31 G/DL (ref 31.5–36.5)
MCV RBC AUTO: 98 FL (ref 78–100)
MONOCYTES # BLD AUTO: 0.7 10E3/UL (ref 0–1.3)
MONOCYTES NFR BLD AUTO: 7 %
NEUTROPHILS # BLD AUTO: 7.5 10E3/UL (ref 1.6–8.3)
NEUTROPHILS NFR BLD AUTO: 84 %
NRBC # BLD AUTO: 0 10E3/UL
NRBC BLD AUTO-RTO: 0 /100
PLATELET # BLD AUTO: 177 10E3/UL (ref 150–450)
POTASSIUM SERPL-SCNC: 4.3 MMOL/L (ref 3.4–5.3)
RBC # BLD AUTO: 3.43 10E6/UL (ref 3.8–5.2)
SODIUM SERPL-SCNC: 140 MMOL/L (ref 136–145)
WBC # BLD AUTO: 9.1 10E3/UL (ref 4–11)

## 2023-06-26 PROCEDURE — G0463 HOSPITAL OUTPT CLINIC VISIT: HCPCS

## 2023-06-26 PROCEDURE — 80048 BASIC METABOLIC PNL TOTAL CA: CPT | Mod: ZL

## 2023-06-26 PROCEDURE — 85025 COMPLETE CBC W/AUTO DIFF WBC: CPT | Mod: ZL

## 2023-06-26 PROCEDURE — 99496 TRANSJ CARE MGMT HIGH F2F 7D: CPT | Performed by: FAMILY MEDICINE

## 2023-06-26 PROCEDURE — 36415 COLL VENOUS BLD VENIPUNCTURE: CPT | Mod: ZL

## 2023-06-26 RX ORDER — HYDROCODONE BITARTRATE AND ACETAMINOPHEN 5; 325 MG/1; MG/1
2 TABLET ORAL 3 TIMES DAILY PRN
Qty: 180 TABLET | Refills: 0 | Status: SHIPPED | OUTPATIENT
Start: 2023-06-26 | End: 2023-07-27

## 2023-06-26 RX ORDER — ACETAMINOPHEN 325 MG/1
650 TABLET ORAL EVERY 6 HOURS PRN
Qty: 50 TABLET | Refills: 4 | Status: CANCELLED | OUTPATIENT
Start: 2023-06-26

## 2023-06-26 ASSESSMENT — PAIN SCALES - GENERAL: PAINLEVEL: MILD PAIN (3)

## 2023-06-26 NOTE — PLAN OF CARE
Occupational Therapy Discharge Summary    Reason for therapy discharge:    Discharged to home with home therapy.    Progress towards therapy goal(s). See goals on Care Plan in [facility] electronic health record for goal details.  Goals partially met.  Barriers to achieving goals:   discharge from facility.    Therapy recommendation(s):    Pt was doing well with ADLs on 6/23/23 completing with SBA to CGA. She does exhibit mild deficits with strength and activity tolerance and home PT would likely be sufficient to address those deficits.

## 2023-06-26 NOTE — PROGRESS NOTES
Received a referral for SN and PT in home for patient.  At this time, referral will need to be declined due to low SN staffing.

## 2023-06-26 NOTE — PROGRESS NOTES
Assessment & Plan     Hospital discharge follow-up  Doing better.   - CBC with Platelets & Differential; Future  - Basic metabolic panel; Future    UGI bleed  Resolved. On PPI and off NSAIDS and Eliquis  - CBC with Platelets & Differential; Future  - Basic metabolic panel; Future    Acute blood loss as cause of postoperative anemia  Improved   - CBC with Platelets & Differential; Future  - Basic metabolic panel; Future    Adequate anticoagulation on anticoagulant therapy  Pt wants to hold on restarting Eliquis til after Kyphoplasty on 7/18-- pt and dtrs today know the 4% risk or so of having CVA off this and having A-fib.   - CBC with Platelets & Differential; Future  - Basic metabolic panel; Future    Paroxysmal atrial fibrillation (H)  As above.   - CBC with Platelets & Differential; Future  - Basic metabolic panel; Future    Age-related osteoporosis with current pathological fracture with routine healing, subsequent encounter  Has Kyphoplasty coming up on 7/18-- will see for preop in 2 weeks along with follow-up above  - CBC with Platelets & Differential; Future  - Basic metabolic panel; Future    Spinal stenosis of lumbar region with neurogenic claudication  As silvana.   - HYDROcodone-acetaminophen (NORCO) 5-325 MG tablet; Take 2 tablets by mouth 3 times daily as needed    Neuropathy  As above. 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  As above   - HYDROcodone-acetaminophen (NORCO) 5-325 MG tablet; Take 2 tablets by mouth 3 times daily as needed        Assessment requiring an independent historian(s) - family - 2 dtrs  Diagnosis or treatment significantly limited by social determinants of health - complex   Ordering of each unique  test  Prescription drug management  45 minutes spent by me on the date of the encounter doing chart review, history and exam, documentation and further activities per the note     MED REC REQUIRED  Post Medication Reconciliation Status: discharge medications reconciled, continue medications without change      No follow-ups on file.    Ilia Moran MD  [facility] - GABRIEL Angulo is a 87 year old, presenting for the following health issues:  Hospital F/U        5/15/2023     1:21 PM   Additional Questions   Roomed by isiah javy   Accompanied by daniel  daughter     [facility]       Hospital Follow-up Visit:    Hospital/Nursing Home/IP Rehab Facility: [facility]  Date of Admission: 6/20/2023  Date of Discharge: 6/24/2023  Reason(s) for Admission: upper GI bleed- EGD and Colonoscopy done     Was your hospitalization related to COVID-19? No   Problems taking medications regularly:  None  Medication changes since discharge: stopped eliquis until follow up with PCP  Problems adhering to non-medication therapy:  None    Summary of hospitalization:  [facility] discharge summary reviewed  Diagnostic Tests/Treatments reviewed.  Follow up needed: none  Other Healthcare Providers Involved in Patient s Care:         None  Home care ordered  Update since discharge: improved.   Plan of care communicated with patient and family       Pt in lots of pain and using way too much advil and also Celebrex  Dealing with Thoracic Compression Fx and radicular pain       Review of Systems   Constitutional, HEENT, cardiovascular, pulmonary, gi and gu systems are negative, except as otherwise noted.      Objective    /56 (BP Location: Right arm, Patient Position: Sitting, Cuff Size: Adult Regular)   Pulse 85   Temp 98.2  F (36.8  C) (Tympanic)   Resp 16   Wt 68 kg (150 lb)   SpO2 95%   BMI 25.75 kg/m    Body mass index is 25.75 kg/m .  Physical Exam   GENERAL: healthy, alert and  no distress  NECK: no adenopathy, no asymmetry, masses, or scars and thyroid normal to palpation  RESP: lungs clear to auscultation - no rales, rhonchi or wheezes  CV: regular rate and rhythm, normal S1 S2, no S3 or S4, no murmur, click or rub, no peripheral edema and peripheral pulses strong  ABDOMEN: soft, nontender, no hepatosplenomegaly, no masses and bowel sounds normal  MS: no gross musculoskeletal defects noted, plus 1-2 to mid shin edema        Results for orders placed or performed in visit on 06/26/23   Basic metabolic panel     Status: Abnormal   Result Value Ref Range    Sodium 140 136 - 145 mmol/L    Potassium 4.3 3.4 - 5.3 mmol/L    Chloride 106 98 - 107 mmol/L    Carbon Dioxide (CO2) 23 22 - 29 mmol/L    Anion Gap 11 7 - 15 mmol/L    Urea Nitrogen 20.0 8.0 - 23.0 mg/dL    Creatinine 0.90 0.51 - 0.95 mg/dL    Calcium 9.3 8.8 - 10.2 mg/dL    Glucose 125 (H) 70 - 99 mg/dL    GFR Estimate 62 >60 mL/min/1.73m2   CBC with platelets and differential     Status: Abnormal   Result Value Ref Range    WBC Count 9.1 4.0 - 11.0 10e3/uL    RBC Count 3.43 (L) 3.80 - 5.20 10e6/uL    Hemoglobin 10.4 (L) 11.7 - 15.7 g/dL    Hematocrit 33.5 (L) 35.0 - 47.0 %    MCV 98 78 - 100 fL    MCH 30.3 26.5 - 33.0 pg    MCHC 31.0 (L) 31.5 - 36.5 g/dL    RDW 15.9 (H) 10.0 - 15.0 %    Platelet Count 177 150 - 450 10e3/uL    % Neutrophils 84 %    % Lymphocytes 7 %    % Monocytes 7 %    % Eosinophils 2 %    % Basophils 0 %    % Immature Granulocytes 0 %    NRBCs per 100 WBC 0 <1 /100    Absolute Neutrophils 7.5 1.6 - 8.3 10e3/uL    Absolute Lymphocytes 0.7 (L) 0.8 - 5.3 10e3/uL    Absolute Monocytes 0.7 0.0 - 1.3 10e3/uL    Absolute Eosinophils 0.2 0.0 - 0.7 10e3/uL    Absolute Basophils 0.0 0.0 - 0.2 10e3/uL    Absolute Immature Granulocytes 0.0 <=0.4 10e3/uL    Absolute NRBCs 0.0 10e3/uL   CBC with Platelets & Differential     Status: Abnormal    Narrative    The following orders were created for panel order CBC with Platelets &  Differential.  Procedure                               Abnormality         Status                     ---------                               -----------         ------                     CBC with platelets and d...[[id number]]  Abnormal            Final result                 Please view results for these tests on the individual orders.

## 2023-06-28 ENCOUNTER — MEDICAL CORRESPONDENCE (OUTPATIENT)
Dept: HEALTH INFORMATION MANAGEMENT | Facility: CLINIC | Age: 88
End: 2023-06-28

## 2023-06-28 ENCOUNTER — TELEPHONE (OUTPATIENT)
Dept: FAMILY MEDICINE | Facility: OTHER | Age: 88
End: 2023-06-28

## 2023-06-28 DIAGNOSIS — Z53.9 PERSONS ENCOUNTERING HEALTH SERVICES FOR SPECIFIC PROCEDURES, NOT CARRIED OUT: Primary | ICD-10-CM

## 2023-06-28 PROCEDURE — G0180 MD CERTIFICATION HHA PATIENT: HCPCS | Performed by: FAMILY MEDICINE

## 2023-06-28 NOTE — TELEPHONE ENCOUNTER
Ladi Mesa calling for verbal orders for skilled nursing 1X3.    [facility] contact number:  [phone]  Approval to leave detailed message

## 2023-06-28 NOTE — TELEPHONE ENCOUNTER
Cristal Mesa  [phone]    Verbal order for skilled nurse to assess for admission to HC and PT to evaluate and treat.

## 2023-06-30 ENCOUNTER — TELEPHONE (OUTPATIENT)
Dept: FAMILY MEDICINE | Facility: OTHER | Age: 88
End: 2023-06-30

## 2023-06-30 NOTE — TELEPHONE ENCOUNTER
Brandi Mesa  [phone]    Verbal order for PT 1x 5 weeks for therapeutic exercise and activity.

## 2023-07-03 ENCOUNTER — TELEPHONE (OUTPATIENT)
Dept: INTERVENTIONAL RADIOLOGY/VASCULAR | Facility: HOSPITAL | Age: 88
End: 2023-07-03

## 2023-07-11 ENCOUNTER — LAB (OUTPATIENT)
Dept: LAB | Facility: OTHER | Age: 88
End: 2023-07-11
Payer: COMMERCIAL

## 2023-07-11 ENCOUNTER — OFFICE VISIT (OUTPATIENT)
Dept: FAMILY MEDICINE | Facility: OTHER | Age: 88
End: 2023-07-11
Attending: FAMILY MEDICINE
Payer: MEDICARE

## 2023-07-11 ENCOUNTER — TELEPHONE (OUTPATIENT)
Dept: FAMILY MEDICINE | Facility: OTHER | Age: 88
End: 2023-07-11

## 2023-07-11 VITALS
TEMPERATURE: 97.6 F | SYSTOLIC BLOOD PRESSURE: 103 MMHG | OXYGEN SATURATION: 96 % | BODY MASS INDEX: 24.2 KG/M2 | DIASTOLIC BLOOD PRESSURE: 43 MMHG | HEART RATE: 71 BPM | RESPIRATION RATE: 18 BRPM | WEIGHT: 141 LBS

## 2023-07-11 DIAGNOSIS — Z79.01 ANTICOAGULATED BY ANTICOAGULATION TREATMENT: ICD-10-CM

## 2023-07-11 DIAGNOSIS — I48.20 CHRONIC ATRIAL FIBRILLATION (H): ICD-10-CM

## 2023-07-11 DIAGNOSIS — D64.9 ANEMIA, UNSPECIFIED TYPE: ICD-10-CM

## 2023-07-11 DIAGNOSIS — I27.0 PRIMARY PULMONARY HYPERTENSION (H): ICD-10-CM

## 2023-07-11 DIAGNOSIS — M80.00XD AGE-RELATED OSTEOPOROSIS WITH CURRENT PATHOLOGICAL FRACTURE WITH ROUTINE HEALING, SUBSEQUENT ENCOUNTER: ICD-10-CM

## 2023-07-11 DIAGNOSIS — I42.8 NON-ISCHEMIC CARDIOMYOPATHY (H): ICD-10-CM

## 2023-07-11 DIAGNOSIS — N18.32 STAGE 3B CHRONIC KIDNEY DISEASE (H): ICD-10-CM

## 2023-07-11 DIAGNOSIS — M48.54XS NONTRAUMATIC COMPRESSION FRACTURE OF T7 VERTEBRA, SEQUELA: ICD-10-CM

## 2023-07-11 DIAGNOSIS — Z95.0 PACEMAKER ECG PATTERN: ICD-10-CM

## 2023-07-11 DIAGNOSIS — D63.8 ANEMIA OF CHRONIC DISEASE: ICD-10-CM

## 2023-07-11 DIAGNOSIS — I10 ESSENTIAL HYPERTENSION: ICD-10-CM

## 2023-07-11 DIAGNOSIS — Z01.810 PRE-OPERATIVE CARDIOVASCULAR EXAMINATION: Primary | ICD-10-CM

## 2023-07-11 DIAGNOSIS — Z01.810 PRE-OPERATIVE CARDIOVASCULAR EXAMINATION: ICD-10-CM

## 2023-07-11 LAB
ANION GAP SERPL CALCULATED.3IONS-SCNC: 10 MMOL/L (ref 7–15)
BASOPHILS # BLD AUTO: 0 10E3/UL (ref 0–0.2)
BASOPHILS NFR BLD AUTO: 1 %
BUN SERPL-MCNC: 28.4 MG/DL (ref 8–23)
CALCIUM SERPL-MCNC: 9.4 MG/DL (ref 8.8–10.2)
CHLORIDE SERPL-SCNC: 108 MMOL/L (ref 98–107)
CREAT SERPL-MCNC: 1.03 MG/DL (ref 0.51–0.95)
DEPRECATED HCO3 PLAS-SCNC: 21 MMOL/L (ref 22–29)
EOSINOPHIL # BLD AUTO: 0.2 10E3/UL (ref 0–0.7)
EOSINOPHIL NFR BLD AUTO: 4 %
ERYTHROCYTE [DISTWIDTH] IN BLOOD BY AUTOMATED COUNT: 15.4 % (ref 10–15)
GFR SERPL CREATININE-BSD FRML MDRD: 53 ML/MIN/1.73M2
GLUCOSE SERPL-MCNC: 133 MG/DL (ref 70–99)
HCT VFR BLD AUTO: 29.1 % (ref 35–47)
HGB BLD-MCNC: 8.9 G/DL (ref 11.7–15.7)
IMM GRANULOCYTES # BLD: 0 10E3/UL
IMM GRANULOCYTES NFR BLD: 1 %
LYMPHOCYTES # BLD AUTO: 0.7 10E3/UL (ref 0.8–5.3)
LYMPHOCYTES NFR BLD AUTO: 19 %
MCH RBC QN AUTO: 29.3 PG (ref 26.5–33)
MCHC RBC AUTO-ENTMCNC: 30.6 G/DL (ref 31.5–36.5)
MCV RBC AUTO: 96 FL (ref 78–100)
MONOCYTES # BLD AUTO: 0.3 10E3/UL (ref 0–1.3)
MONOCYTES NFR BLD AUTO: 9 %
NEUTROPHILS # BLD AUTO: 2.4 10E3/UL (ref 1.6–8.3)
NEUTROPHILS NFR BLD AUTO: 66 %
NRBC # BLD AUTO: 0 10E3/UL
NRBC BLD AUTO-RTO: 0 /100
PLATELET # BLD AUTO: 194 10E3/UL (ref 150–450)
POTASSIUM SERPL-SCNC: 4.6 MMOL/L (ref 3.4–5.3)
RBC # BLD AUTO: 3.04 10E6/UL (ref 3.8–5.2)
SODIUM SERPL-SCNC: 139 MMOL/L (ref 136–145)
WBC # BLD AUTO: 3.6 10E3/UL (ref 4–11)

## 2023-07-11 PROCEDURE — 85025 COMPLETE CBC W/AUTO DIFF WBC: CPT | Mod: ZL

## 2023-07-11 PROCEDURE — 99214 OFFICE O/P EST MOD 30 MIN: CPT | Performed by: FAMILY MEDICINE

## 2023-07-11 PROCEDURE — G0463 HOSPITAL OUTPT CLINIC VISIT: HCPCS

## 2023-07-11 PROCEDURE — 80048 BASIC METABOLIC PNL TOTAL CA: CPT | Mod: ZL

## 2023-07-11 PROCEDURE — 36415 COLL VENOUS BLD VENIPUNCTURE: CPT | Mod: ZL

## 2023-07-11 RX ORDER — FERROUS SULFATE 325(65) MG
325 TABLET ORAL
Qty: 30 TABLET | Refills: 3 | Status: SHIPPED | OUTPATIENT
Start: 2023-07-11 | End: 2023-10-02

## 2023-07-11 ASSESSMENT — PAIN SCALES - GENERAL: PAINLEVEL: NO PAIN (0)

## 2023-07-11 NOTE — PROGRESS NOTES
[facility] - [place]  [address]  [place] [address]  Phone: [phone]  Primary Provider: Larry Bernstein  Pre-op Performing Provider: LARRY BERNSTEIN      PREOPERATIVE EVALUATION:  Today's date: 7/11/2023    Jacklyn Hein is a 87 year old female who presents for a preoperative evaluation.      7/11/2023     1:10 PM   Additional Questions   Roomed by erna christine   Accompanied by daughter     Surgical Information:  Surgery/Procedure: back surgery- KYPHOPLASTY  OF T7  Surgery Location: [facility], operated by [facility] surgical suites  Surgeon: Dr. Jackman  Surgery Date: 7/18/2023  Time of Surgery: TBD  Where patient plans to recover: At home with family  Fax number for surgical facility:     Assessment & Plan     The proposed surgical procedure is considered LOW risk.      ICD-10-CM    1. Pre-operative cardiovascular examination  Z01.810 CBC with Platelets & Differential     Basic metabolic panel      2. Age-related osteoporosis with current pathological fracture with routine healing, subsequent encounter  M80.00XD CBC with Platelets & Differential     Basic metabolic panel      3. Nontraumatic compression fracture of T7 vertebra, sequela  M48.54XS CBC with Platelets & Differential     Basic metabolic panel      4. Chronic atrial fibrillation (H)  I48.20 CBC with Platelets & Differential     Basic metabolic panel      5. Anticoagulated by anticoagulation treatment  Z79.01 CBC with Platelets & Differential     Basic metabolic panel      6. Pacemaker ECG pattern  Z95.0 CBC with Platelets & Differential     Basic metabolic panel      7. Essential hypertension  I10 CBC with Platelets & Differential     Basic metabolic panel      8. Non-ischemic cardiomyopathy (H)  I42.8 CBC with Platelets & Differential     Basic metabolic panel      9. Stage 3b chronic kidney disease  N18.32 CBC with Platelets & Differential     Basic metabolic panel      10. Primary pulmonary hypertension (H)  I27.0 CBC with Platelets & Differential      Basic metabolic panel      11. Anemia, unspecified type  D64.9 ferrous sulfate (FEROSUL) 325 (65 Fe) MG tablet            MED REC REQUIRED  Post Medication Reconciliation Status: discharge medications reconciled, continue medications without change   Implanted Device:  Cardiac Pacemaker        - No identified additional risk factors other than previously addressed    Antiplatelet or Anticoagulation Medication Instructions:   - apixaban (Eliquis), edoxaban (Savaysa), rivaroxaban (Xarelto): Bleeding risk is moderate or high for this procedure AND CrCl  (>=) 50 mL/min. HOLD 2 days before surgery.   HAS BEEN OFF FOR SEVERAL WEEKS DUE TO HER WISH- restart after procedure     Additional Medication Instructions:  Can take all morning meds     RECOMMENDATION:  APPROVAL GIVEN to proceed with proposed procedure, without further diagnostic evaluation.            Subjective       HPI related to upcoming procedure:   86 yO female  presents for cardiopulmonary/general clearance to undergo the above procedure.  His surgeon listed above has asked for this clearance to undergo anesthesia. Pt has had this condition for approximately over a month or two of sx- pain has improved a fair amount.   Overall pt is of good health and has not  had any perioperative complications with previous surgeries.      Lives alone not very active         7/11/2023     1:05 PM   Preop Questions   1. Have you ever had a heart attack or stroke? No   2. Have you ever had surgery on your heart or blood vessels, such as a stent placement, a coronary artery bypass, or surgery on an artery in your head, neck, heart, or legs? YES - heart surgery   3. Do you have chest pain with activity? No   4. Do you have a history of  heart failure? YES    5. Do you currently have a cold, bronchitis or symptoms of other infection? No   6. Do you have a cough, shortness of breath, or wheezing? YES - shortness of breath   7. Do you or anyone in your family have previous  history of blood clots? No   8. Do you or does anyone in your family have a serious bleeding problem such as prolonged bleeding following surgeries or cuts? YES - self   9. Have you ever had problems with anemia or been told to take iron pills? No   10. Have you had any abnormal blood loss such as black, tarry or bloody stools, or abnormal vaginal bleeding? YES - upper GI   11. Have you ever had a blood transfusion? YES - when in hospital with upper GI bleed   11a. Have you ever had a transfusion reaction? No   12. Are you willing to have a blood transfusion if it is medically needed before, during, or after your surgery? Yes   13. Have you or any of your relatives ever had problems with anesthesia? No   14. Do you have sleep apnea, excessive snoring or daytime drowsiness? No   15. Do you have any artifical heart valves or other implanted medical devices like a pacemaker, defibrillator, or continuous glucose monitor? YES - pacemaker   15a. What type of device do you have? pacemaker   15b. Name of the clinic that manages your device:  [facility]   16. Do you have artificial joints? No   17. Are you allergic to latex? No       Health Care Directive:  Patient has a Health Care Directive on file      Preoperative Review of :   reviewed - controlled substances reflected in medication list.      Status of Chronic Conditions:  COPD - Patient has a longstanding history of moderate-severe COPD . Patient has been doing well overall noting SOB, GURROLA, COUGH and DYSPNEA and continues on medication regimen consisting of MDI without adverse reactions or side effects.    HYPERLIPIDEMIA - Patient has a long history of significant Hyperlipidemia requiring medication for treatment with recent good control. Patient reports no problems or side effects with the medication.     HYPERTENSION - Patient has longstanding history of HTN , currently denies any symptoms referable to elevated blood pressure. Specifically denies chest  pain, palpitations, dyspnea, orthopnea, PND or peripheral edema. Blood pressure readings have been in normal range. Current medication regimen is as listed below. Patient denies any side effects of medication.     RENAL INSUFFICIENCY - Patient has a longstanding history of moderate-severe chronic renal insufficiency. Last Cr today . Baseline is 1.2-1.4.     Anemia of chronic Dz-- baseline 9-10.5    Review of Systems  Constitutional, neuro, ENT, endocrine, pulmonary, cardiac, gastrointestinal, genitourinary, musculoskeletal, integument and psychiatric systems are negative, except as otherwise noted.    Patient Active Problem List    Diagnosis Date Noted     Pacemaker ECG pattern 07/11/2023     Priority: Medium     Acute on chronic blood loss anemia 06/24/2023     Priority: Medium     Chronic atrial fibrillation (H) 06/24/2023     Priority: Medium     Acute midline thoracic back pain 06/24/2023     Priority: Medium     Upper GI bleed 06/20/2023     Priority: Medium     Anticoagulated by anticoagulation treatment 11/22/2021     Priority: Medium     Primary pulmonary hypertension (H) 11/22/2021     Priority: Medium     Age-related osteoporosis with current pathological fracture with routine healing, subsequent encounter 11/22/2021     Priority: Medium     Chronic, continuous use of opioids 05/17/2021     Priority: Medium     Bacteremia due to Klebsiella pneumoniae on 1/5/2021 02/22/2021     Priority: Medium     Mixed hyperlipidemia 02/21/2021     Priority: Medium     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 01/06/2021     Priority: Medium     Shock liver 01/06/2021     Priority: Medium     Abnormal finding on urinalysis 01/06/2021     Priority: Medium     Neuropathy 08/19/2020     Priority: Medium     Chronic systolic CHF (congestive heart failure) (H) 02/06/2020     Priority: Medium     Spinal stenosis of lumbar region with neurogenic claudication 02/06/2020     Priority: Medium     S/p  ERP: Decompression Levels: L3 to L5 Side: Bilat on 2/6/2020 with Dr. 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 06/03/2019     Priority: Medium     LBBB (left bundle branch block) 06/01/2018     Priority: Medium     History of tobacco abuse quitting on 2/1/1998 06/01/2018     Priority: Medium     Mild intermittent asthma, unspecified whether complicated 06/01/2018     Priority: Medium     Chronic obstructive pulmonary disease, unspecified COPD type (H) 06/01/2018     Priority: Medium     SOB (shortness of breath) 06/01/2018     Priority: Medium     Lumbar spine pain 10/27/2015     Priority: Medium     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 Provider to review       Non-ischemic cardiomyopathy (H) 12/10/2013     Priority: Medium     Congestive heart failure, NYHA class 2, unspecified congestive heart failure type (H) 12/10/2013     Priority: Medium     Insomnia 01/01/2011     Priority: Medium     Problem list name updated by automated process. Provider to review       Disorder of bone and cartilage 01/01/2011     Priority: Medium     Problem list name updated by automated process. Provider to review       Essential hypertension 01/01/2011     Priority: Medium     Problem list name updated by automated process. 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     Insomnia,  unspecified 2011     LBBB (left bundle branch block) 2011     Neck pain, chronic 2011     Non-ischemic cardiomyopathy (H) 12/10/2013     Obesity, unspecified 2011     Osteopenia 2011     Pacemaker      Pain in joint, lower leg 2006     Pure hypercholesterolemia 2002     Unspecified essential hypertension 2011     Past Surgical History:   Procedure Laterality Date     APPENDECTOMY       ARTHROSCOPY KNEE RT/LT  2009    RT     BACK SURGERY  2020     BACK SURGERY  2021    fractured vert repair     CARDIAC SURGERY  2014    Pacemaker     Cataract extraction  2009    Bilateral     CHOLECYSTECTOMY      open      COLONOSCOPY  2001    Normal      COLONOSCOPY N/A 10/20/2016    Procedure: COLONOSCOPY;  Surgeon: Charan Montejo MD;  Location: [facility]     COLONOSCOPY N/A 2023    Procedure: COLONOSCOPY;  Surgeon: Royal Sanz MD;  Location: [facility]     colonoscopy with polypectomy      Repeat 3 years      CT CORONARY ANGIOGRAM      normal     ESOPHAGOSCOPY, GASTROSCOPY, DUODENOSCOPY (EGD), COMBINED N/A 2023    Procedure: ESOPHAGOGASTRODUODENOSCOPY;  Surgeon: Royal Sanz MD;  Location: [facility]     HERPES ZOSTER PCR ([facility])       IR CONSULTATION FOR IR EXAM  2020     IR CONSULTATION FOR IR EXAM  2023     IR TRIGGER POINT INJ 3 OR MORE MUSCLES  2023     left eye scar tissue       normal echo      fen-phen use       x6       REPLACE PACEMAKER GENERATOR N/A 2023    Procedure: REPLACEMENT, PULSE GENERATOR, CARDIAC PACEMAKER;  Surgeon: Isiah Hodge MD;  Location:  OR     SURGICAL RADIOLOGY PROCEDURE N/A 2016    Procedure: SURGICAL RADIOLOGY PROCEDURE;  Surgeon: Provider, [facility] Nursing;  Location: [facility]     Current Outpatient Medications   Medication Sig Dispense Refill     acetaminophen (TYLENOL) 325 MG tablet Take 2 tablets (650 mg) by mouth every 6 hours as needed for mild pain  MAX 4000MG TYLENOL  IN 24 HRS / NEED TO INCLUDE HER LORTAB. 50 tablet 4     Calcium Carbonate-Vitamin D (CALCIUM 600 + D OR) Take 1 tablet by mouth every morning       carvedilol (COREG) 12.5 MG tablet TAKE 1 TABLET BY MOUTH 2 TIMES DAILY WITH MEALS 180 tablet 3     ENTRESTO 49-51 MG per tablet TAKE 1 TABLET BY MOUTH TWICE DAILY 180 tablet 3     gabapentin (NEURONTIN) 400 MG capsule TAKE 1 CAPSULE BY MOUTH 3 TIMES DAILY FOR PAIN 90 capsule 11     HYDROcodone-acetaminophen (NORCO) 5-325 MG tablet Take 2 tablets by mouth 3 times daily as needed 180 tablet 0     mometasone-formoterol (DULERA) 200-5 MCG/ACT inhaler Inhale 2 puffs into the lungs 2 times daily 13 g 11     Multiple Vitamin (MULTI-VITAMIN DAILY PO) Take 1 tablet by mouth every morning       pantoprazole (PROTONIX) 40 MG EC tablet Take 1 tablet (40 mg) by mouth every morning (before breakfast) 30 tablet 1     rosuvastatin (CRESTOR) 10 MG tablet TAKE 1 TABLET BY MOUTH DAILY 90 tablet 3     umeclidinium (INCRUSE ELLIPTA) 62.5 MCG/ACT inhaler Inhale 1 puff into the lungs daily 30 each 11     vitamin C (ASCORBIC ACID) 500 MG tablet Take 500 mg by mouth 2 times daily       apixaban ANTICOAGULANT (ELIQUIS ANTICOAGULANT) 5 MG tablet Patient should not take apixaban until seen by primary care provider (Patient not taking: Reported on 2023) 180 tablet 0       No Known Allergies     Social History     Tobacco Use     Smoking status: Former     Packs/day: 1.00     Years: 15.00     Pack years: 15.00     Types: Cigarettes     Start date: 1983     Quit date: 1998     Years since quittin.4     Smokeless tobacco: Never     Tobacco comments:     Passive Exposure: No; Longest Tobacco Free: 15 years    Substance Use Topics     Alcohol use: Not Currently     Comment: Occasionally      Family History   Problem Relation Age of Onset     Cancer Mother         lung (cause of death)      Peptic Ulcer Disease Father         gastric      History   Drug Use No          Objective     /43 (BP Location: Right arm, Patient Position: Sitting, Cuff Size: Adult Regular)   Pulse 71   Temp 97.6  F (36.4  C) (Tympanic)   Resp 18   Wt 64 kg (141 lb)   SpO2 96%   BMI 24.20 kg/m      Physical Exam    GENERAL APPEARANCE: healthy, alert and no distress     EYES: EOMI, PERRL     HENT: ear canals and TM's normal and nose and mouth without ulcers or lesions     NECK: no adenopathy, no asymmetry, masses, or scars and thyroid normal to palpation     RESP: lungs clear to auscultation - no rales, rhonchi or wheezes     CV: regular rates and rhythm, normal S1 S2, no S3 or S4 and no murmur, click or rub     ABDOMEN:  soft, nontender, no HSM or masses and bowel sounds normal     MS: extremities normal- no gross deformities noted, no evidence of inflammation in joints, FROM in all extremities.     SKIN: no suspicious lesions or rashes     NEURO: Normal strength and tone, sensory exam grossly normal, mentation intact and speech normal     PSYCH: mentation appears normal. and affect normal/bright     LYMPHATICS: No cervical adenopathy    Recent Labs   Lab Test 07/11/23  1300 06/26/23  1025 06/24/23  0541   HGB 8.9* 10.4* 8.2*    177 142*   NA  --  140 141   POTASSIUM  --  4.3 4.1   CR  --  0.90 0.77        Diagnostics:  Recent Results (from the past 24 hour(s))   Basic metabolic panel    Collection Time: 07/11/23  1:00 PM   Result Value Ref Range    Sodium 139 136 - 145 mmol/L    Potassium 4.6 3.4 - 5.3 mmol/L    Chloride 108 (H) 98 - 107 mmol/L    Carbon Dioxide (CO2) 21 (L) 22 - 29 mmol/L    Anion Gap 10 7 - 15 mmol/L    Urea Nitrogen 28.4 (H) 8.0 - 23.0 mg/dL    Creatinine 1.03 (H) 0.51 - 0.95 mg/dL    Calcium 9.4 8.8 - 10.2 mg/dL    Glucose 133 (H) 70 - 99 mg/dL    GFR Estimate 53 (L) >60 mL/min/1.73m2   CBC with platelets and differential    Collection Time: 07/11/23  1:00 PM   Result Value Ref Range    WBC Count 3.6 (L) 4.0 - 11.0 10e3/uL    RBC Count 3.04 (L) 3.80 - 5.20 10e6/uL     Hemoglobin 8.9 (L) 11.7 - 15.7 g/dL    Hematocrit 29.1 (L) 35.0 - 47.0 %    MCV 96 78 - 100 fL    MCH 29.3 26.5 - 33.0 pg    MCHC 30.6 (L) 31.5 - 36.5 g/dL    RDW 15.4 (H) 10.0 - 15.0 %    Platelet Count 194 150 - 450 10e3/uL    % Neutrophils 66 %    % Lymphocytes 19 %    % Monocytes 9 %    % Eosinophils 4 %    % Basophils 1 %    % Immature Granulocytes 1 %    NRBCs per 100 WBC 0 <1 /100    Absolute Neutrophils 2.4 1.6 - 8.3 10e3/uL    Absolute Lymphocytes 0.7 (L) 0.8 - 5.3 10e3/uL    Absolute Monocytes 0.3 0.0 - 1.3 10e3/uL    Absolute Eosinophils 0.2 0.0 - 0.7 10e3/uL    Absolute Basophils 0.0 0.0 - 0.2 10e3/uL    Absolute Immature Granulocytes 0.0 <=0.4 10e3/uL    Absolute NRBCs 0.0 10e3/uL      6/20/23  EKG: ventricular paced rhythm     5/24/23  CT THORACIC SPINE W/O CONTRAST     HISTORY: evaluate C7 thoracic compression fx better that was seen on  XR// would like done in next 3-4 business days; Acute midline thoracic  back pain; Age-related osteoporosis with current pathological  fracture, initial encounter; H/O compression fracture of spine .     COMPARISON: 1/31/2018.     TECHNIQUE: Helical noncontrast CT images of the thoracic spine.     FINDINGS:     Osteoporosis.      Postoperative changes of prior T12 vertebral augmentation are  redemonstrated. T12 vertebral body height loss is stable.     There is moderate to severe thoracic vertebral body height loss at T7  associated with vertebral body sclerosis. There is posterior superior  endplate retropulsion measuring 4.5 mm.     No other acute fracture is identified. Thoracic kyphosis is mildly  accentuated due to the T7 vertebral body height loss.      Retropulsion of the posterior superior endplate as well as slight  gibbus deformity at T6-7 results in mild spinal narrowing. Posterior  superior endplate retropulsion at T12 also results in mild spinal as  well as mild bilateral foraminal stenoses.      No high-grade thoracic stenosis is identified.                                                                       IMPRESSION:      Age indeterminant, potentially acute, pathologic osteoporotic fracture  of T7 associated with moderate to severe vertebral body height loss.  No additional fracture is identified at this time.     TANIA RUIZ MD        Revised Cardiac Risk Index (RCRI):  The patient has the following serious cardiovascular risks for perioperative complications:   - No serious cardiac risks = 0 points     RCRI Interpretation: 0 points: Class I (very low risk - 0.4% complication rate)           Signed Electronically by: Ilia Moran MD  Copy of this evaluation report is provided to requesting physician.

## 2023-07-14 ENCOUNTER — MEDICAL CORRESPONDENCE (OUTPATIENT)
Dept: HEALTH INFORMATION MANAGEMENT | Facility: CLINIC | Age: 88
End: 2023-07-14
Payer: COMMERCIAL

## 2023-07-18 ENCOUNTER — MEDICAL CORRESPONDENCE (OUTPATIENT)
Dept: HEALTH INFORMATION MANAGEMENT | Facility: CLINIC | Age: 88
End: 2023-07-18
Payer: COMMERCIAL

## 2023-07-25 DIAGNOSIS — M48.062 SPINAL STENOSIS OF LUMBAR REGION WITH NEUROGENIC CLAUDICATION: ICD-10-CM

## 2023-07-25 DIAGNOSIS — M47.815 SPONDYLOSIS OF THORACOLUMBAR REGION WITHOUT MYELOPATHY OR RADICULOPATHY: ICD-10-CM

## 2023-07-25 DIAGNOSIS — G62.9 NEUROPATHY: ICD-10-CM

## 2023-07-27 RX ORDER — HYDROCODONE BITARTRATE AND ACETAMINOPHEN 5; 325 MG/1; MG/1
2 TABLET ORAL 3 TIMES DAILY PRN
Qty: 180 TABLET | Refills: 0 | Status: SHIPPED | OUTPATIENT
Start: 2023-07-27 | End: 2023-08-23

## 2023-07-27 NOTE — TELEPHONE ENCOUNTER
Norco      Last Written Prescription Date:  6.26.23  Last Fill Quantity: #180,   # refills: 0  Last Office Visit: 7.11.23  Future Office visit:    Next 5 appointments (look out 90 days)      Aug 09, 2023  1:30 PM  (Arrive by 1:15 PM)  Return Visit with Kodi Garcia DO  [facility] [place] ([facility] [place] ) [address]  [place] [address]  [phone]     Aug 23, 2023  2:15 PM  (Arrive by 2:00 PM)  SHORT with Ilia Moran MD  [facility] [place] ([facility] [place] ) [address]  [place] [address]  [phone]     Sep 13, 2023  1:00 PM  (Arrive by 12:45 PM)  Return Visit with Kodi Garcia DO  [facility] [place] ([facility] [place] ) [address]  [place] [address]  [phone]             Routing refill request to provider for review/approval because:  Drug not on the [facility], [facility] or [facility] refill protocol or controlled substance

## 2023-08-02 DIAGNOSIS — J43.8 OTHER EMPHYSEMA (H): ICD-10-CM

## 2023-08-03 RX ORDER — UMECLIDINIUM 62.5 UG/1
AEROSOL, POWDER ORAL
Qty: 30 EACH | Refills: 3 | Status: SHIPPED | OUTPATIENT
Start: 2023-08-03 | End: 2023-10-02

## 2023-08-08 ENCOUNTER — ANCILLARY PROCEDURE (OUTPATIENT)
Dept: CARDIOLOGY | Facility: OTHER | Age: 88
End: 2023-08-08
Attending: INTERNAL MEDICINE
Payer: COMMERCIAL

## 2023-08-08 DIAGNOSIS — Z95.810 AUTOMATIC IMPLANTABLE CARDIOVERTER-DEFIBRILLATOR IN SITU: ICD-10-CM

## 2023-08-08 PROCEDURE — 93284 PRGRMG EVAL IMPLANTABLE DFB: CPT | Performed by: INTERNAL MEDICINE

## 2023-08-08 PROCEDURE — 93284 PRGRMG EVAL IMPLANTABLE DFB: CPT | Mod: 26 | Performed by: INTERNAL MEDICINE

## 2023-08-09 ENCOUNTER — OFFICE VISIT (OUTPATIENT)
Dept: CARDIOLOGY | Facility: OTHER | Age: 88
End: 2023-08-09
Attending: FAMILY MEDICINE
Payer: COMMERCIAL

## 2023-08-09 VITALS
WEIGHT: 140 LBS | BODY MASS INDEX: 24.03 KG/M2 | DIASTOLIC BLOOD PRESSURE: 66 MMHG | RESPIRATION RATE: 17 BRPM | HEART RATE: 96 BPM | SYSTOLIC BLOOD PRESSURE: 108 MMHG | TEMPERATURE: 98.1 F

## 2023-08-09 DIAGNOSIS — E78.00 HYPERCHOLESTEREMIA: ICD-10-CM

## 2023-08-09 DIAGNOSIS — I44.7 LBBB (LEFT BUNDLE BRANCH BLOCK): ICD-10-CM

## 2023-08-09 DIAGNOSIS — R06.09 DOE (DYSPNEA ON EXERTION): ICD-10-CM

## 2023-08-09 DIAGNOSIS — Z87.891 HISTORY OF TOBACCO ABUSE: ICD-10-CM

## 2023-08-09 DIAGNOSIS — E78.2 MIXED HYPERLIPIDEMIA: ICD-10-CM

## 2023-08-09 DIAGNOSIS — N18.32 STAGE 3B CHRONIC KIDNEY DISEASE (H): ICD-10-CM

## 2023-08-09 DIAGNOSIS — I42.8 NON-ISCHEMIC CARDIOMYOPATHY (H): ICD-10-CM

## 2023-08-09 DIAGNOSIS — I10 ESSENTIAL HYPERTENSION: ICD-10-CM

## 2023-08-09 DIAGNOSIS — J44.9 CHRONIC OBSTRUCTIVE PULMONARY DISEASE, UNSPECIFIED COPD TYPE (H): ICD-10-CM

## 2023-08-09 DIAGNOSIS — I50.9 CONGESTIVE HEART FAILURE, NYHA CLASS 2, UNSPECIFIED CONGESTIVE HEART FAILURE TYPE (H): ICD-10-CM

## 2023-08-09 DIAGNOSIS — I48.0 PAROXYSMAL ATRIAL FIBRILLATION (H): ICD-10-CM

## 2023-08-09 DIAGNOSIS — I50.32 CHRONIC HEART FAILURE WITH PRESERVED EJECTION FRACTION (H): Primary | ICD-10-CM

## 2023-08-09 DIAGNOSIS — J81.1 CHRONIC PULMONARY EDEMA: ICD-10-CM

## 2023-08-09 DIAGNOSIS — Z95.810 AUTOMATIC IMPLANTABLE CARDIOVERTER-DEFIBRILLATOR IN SITU: ICD-10-CM

## 2023-08-09 PROCEDURE — G0463 HOSPITAL OUTPT CLINIC VISIT: HCPCS | Performed by: INTERNAL MEDICINE

## 2023-08-09 PROCEDURE — 99215 OFFICE O/P EST HI 40 MIN: CPT | Performed by: INTERNAL MEDICINE

## 2023-08-09 RX ORDER — SACUBITRIL AND VALSARTAN 97; 103 MG/1; MG/1
1 TABLET, FILM COATED ORAL 2 TIMES DAILY
Qty: 180 TABLET | Refills: 3 | Status: SHIPPED | OUTPATIENT
Start: 2023-08-09 | End: 2023-10-18 | Stop reason: DRUGHIGH

## 2023-08-09 RX ORDER — FUROSEMIDE 20 MG
20 TABLET ORAL DAILY
Qty: 90 TABLET | Refills: 3 | Status: SHIPPED | OUTPATIENT
Start: 2023-08-09 | End: 2023-10-18

## 2023-08-09 ASSESSMENT — PAIN SCALES - GENERAL: PAINLEVEL: NO PAIN (0)

## 2023-08-09 NOTE — PROGRESS NOTES
[facility] HEART CARE   CARDIOLOGY PROGRESS NOTE     Chief Complaint   Patient presents with    Follow Up     New swelling  Increased SOB            Diagnosis:  1. CHF-systolic. 50-55%/grade 1 on 6/2/23. 30-35%/diastolic on 1/5/21. 40-45%% on 12/01/2019.  2.  Reduction in severity of COPD-severe to mild/moderate.  Pulmonary concern for asthma based on PFT's from 5/17/17.  3.  Nonischemic cardiomyopathy with NYHA classification 2.  4.  ICD placement on 11/11/2014. Gen change on 1/30/23, [facility].   5.  Hypertension-controlled.  6.  Hyperlipidemia-uncontrolled.  7.  H/O tobacco abuse quitting on 2/1/1998  8.  LBBB.  9.  SOB-significantly improved.  10.  CKD-3.  11.  Hypocalcemia.  12.  A. fib up to 23 seconds on 4/9/19.  Less than 1% in 8/9/2022.  13.  CHADSVASC score of 4.   14.  Mitral and tricuspid regurgitation-mild to moderate on 1/5/2021.  15.  Bacteremia on 1/5/2021 with Klebsiella pneumonia with septic shock.    Assessment/Plan:    1.  CHF: Systolic with recovered EF.  Continues with edema/shortness of breath with diastolic dysfunction.  Increase Entresto to 97/103 mg twice a day.  Continue Lasix 20 mg daily.  Salt restriction, fluid restriction, and daily weights.  2.  ICD: Generator changed on 1/30/2023.  Area is completely healed.  Discussed her device interrogation from 8/8/2023.  Limited episodes of A-fib.  3.  A-fib: Has had only brief runs.  Had been on Eliquis.  Eliquis discontinued.  Eliquis discontinued understanding her risk that she may have longer episodes of A-fib in the future.  If she is discovered to have extended runs of A-fib, will need to start back on anticoagulation.  4.  GI bleed: Eliquis has been stopped.  See above.  5.  Hyperlipidemia: Uncontrolled currently not on statin.  6.  Follow-up in 1 year or sooner if issues.      Interval history:  Jacklyn was hospitalized for GI bleed in 6/20/2023 in [place].  Blood pressure was stable.  Hemoglobin 7.3 which was a drop from 10 previously.  Off to  6.5 resulting in x 2 units of blood.  CT scan did not show any acute abnormalities.  No evidence for bleeding.  She had been on Eliquis chronically for A-fib.  There was recommendations of case or not.  Had been taking a fair amount ibuprofen.  Colonoscopy was unremarkable.  Bleed was likely from NSAID/apixaban.  Eliquis was hold and continue on Protonix.  We discussed her Eliquis today.  Being that she has very short runs of A-fib, she will remain off of Eliquis.  However, in the future she could have long episodes of A-fib.  She understands that A-fib puts her at risk for stroke.  Understanding her risk, Eliquis was discontinued today.  She continues to be short of breath with peripheral edema.  I believe her edema/shortness of breath is from diastolic dysfunction as her EF has recovered.  She has a history of ICD.  It was changed and granted task on 1/30/2023.  Her site is completely healed.  There is no evidence of bruising, infection, or swelling.  No other issues or concerns.  Increase Entresto and was given a prescription for Lasix 20 mg daily.      HPI:    Jacklyn BONNER Asteranish is being seen by cardiology for dyspnea with chronic systolic EF of 40-45% and evidence for diastolic heart failure on 12/31/2019.     She has been short of breath with a diagnosis of asthma and COPD.  She is followed for a history of severe nonischemic cardiomyopathy with a previously ejection fraction at less than 35%. More recently, her EF on 12/31/2019 was 40-45%.  EF of 30-35% on 1/5/21. She also has diastolic heart failure, ICD placed on 11/11/16, her recent visit with pulmonary secondary to what was thought to be severe COPD but was downgraded to mild to moderate with a greater concern for asthma, per the patient.     She was started on some breathing treatments for asthma/COPD by pulmonary.  With these treatments, she says she feels much better but remains short of breath.       With having diastolic and systolic heart failure,  she has minimal to no lower extremity edema. Her activity has been limited secondary to chronic back pain and generalized weakness.  It was not for back pain, she would be doing really well.     She has a history of severe nonischemic cardiomyopathy S/P ICD placement on 11/11/2014. She had her ICD checked on 10/22/2019.  It showed she was bi-V paced 96.8% of the time with 3.0 years left on her battery.  = 98.1%. She had up to 20 seconds of A. fib with a total of 3 episodes.  Her device was described as functioning appropriately.     Echo from 12/31/2019.  She has an EF of 40% to 45%, grade 1 diastolic dysfunction, mild left atrial enlargement, mild to moderate AI, and mild MI      Relevant testing:  ECHO on 6/20/23:  Global and regional left ventricular function is normal with an EF of 55-60%.  Grade I or early diastolic dysfunction.  Global right ventricular function is normal.  Mild to moderate left atrial enlargement is present.  Mild mitral annular calcification is present.  Moderate mitral insufficiency is present.  No aortic stenosis is present.  Mild aortic insufficiency is present.  Mild tricuspid insufficiency is present.  Right ventricular systolic pressure is 25 mmHg above the right atrial pressure.  This study was compared with the study from 8/24/21 .  No significant changes noted.    ECHO on 8/24/21:  Technically difficult study. Poor acoustic windows.  Left ventricular function is decreased. The ejection fraction is 50-55%  (borderline). Mild diffuse hypokinesis is present. Diastolic Doppler findings (E/E' ratio and/or other parameters) suggest left ventricular filling pressures are increased.  Global right ventricular function is normal. The right ventricle is normal size.  No significant valvular abnormalities.  The estimated PA systolic pressure is 17 mmHg above the RA pressure. The RA pressure could not be estimated.  This study was compared with the study from 01/06/2021. There is  no  significant change in the biventricular function upon direct visual  comparison.    Echo on 12/31/2019:  No pericardial effusion is present.  Left ventricular size is normal.  The Ejection Fraction is estimated at 40-45%.  Grade I or early diastolic dysfunction.  Mild diffuse hypokinesis is present.  The right ventricle is normal size.  Global right ventricular function is normal.  Mild left atrial enlargement is present.  Mild mitral insufficiency is present.  Mild to moderate aortic insufficiency is present.  The mean gradient across the aortic valve is 3.9 mmHg.  Trace tricuspid insufficiency is present.  Right ventricular systolic pressure is 20 mmHg above the right atrial pressure.  The pulmonic valve is normal.  The aorta root is normal.    Echocardiogram on 8/24/2021:  Technically difficult study. Poor acoustic windows.  Left ventricular function is decreased. The ejection fraction is 50-55%  (borderline). Mild diffuse hypokinesis is present. Diastolic Doppler findings  (E/E' ratio and/or other parameters) suggest left ventricular filling  pressures are increased.  Global right ventricular function is normal. The right ventricle is normal  size.  No significant valvular abnormalities.  The estimated PA systolic pressure is 17 mmHg above the RA pressure. The RA  pressure could not be estimated.  This study was compared with the study from 01/06/2021. There is no  significant change in the biventricular function upon direct visual  comparison.      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    Insomnia, unspecified 01/01/2011    LBBB (left bundle branch block) 01/01/2011    Neck pain, chronic 01/01/2011    Non-ischemic cardiomyopathy (H) 12/10/2013    Obesity, unspecified 01/01/2011    Osteopenia 01/01/2011     Pacemaker     Pain in joint, lower leg 2006    Pure hypercholesterolemia 2002    Unspecified essential hypertension 2011       Past Surgical History:   Procedure Laterality Date    APPENDECTOMY      ARTHROSCOPY KNEE RT/LT      RT    BACK SURGERY  2020    BACK SURGERY  2021    fractured vert repair    CARDIAC SURGERY  2014    Pacemaker    Cataract extraction  2009    Bilateral    CHOLECYSTECTOMY      open     COLONOSCOPY  2001    Normal     COLONOSCOPY N/A 10/20/2016    Procedure: COLONOSCOPY;  Surgeon: Charan Montejo MD;  Location: [facility]    COLONOSCOPY N/A 2023    Procedure: COLONOSCOPY;  Surgeon: Royal Sanz MD;  Location: [facility] OR    colonoscopy with polypectomy      Repeat 3 years     CT CORONARY ANGIOGRAM      normal    ESOPHAGOSCOPY, GASTROSCOPY, DUODENOSCOPY (EGD), COMBINED N/A 2023    Procedure: ESOPHAGOGASTRODUODENOSCOPY;  Surgeon: Royal Sanz MD;  Location: [facility] OR    HERPES ZOSTER PCR ([facility])      IR CONSULTATION FOR IR EXAM  2020    IR CONSULTATION FOR IR EXAM  2023    IR TRIGGER POINT INJ 3 OR MORE MUSCLES  2023    left eye scar tissue      normal echo      fen-phen use      x6      REPLACE PACEMAKER GENERATOR N/A 2023    Procedure: REPLACEMENT, PULSE GENERATOR, CARDIAC PACEMAKER;  Surgeon: Isiah Hodge MD;  Location:  OR    SURGICAL RADIOLOGY PROCEDURE N/A 2016    Procedure: SURGICAL RADIOLOGY PROCEDURE;  Surgeon: Provider, Generic Perianesthesia Nursing;  Location: [facility] OR       No Known Allergies    Current Outpatient Medications   Medication Sig Dispense Refill    furosemide (LASIX) 20 MG tablet Take 1 tablet (20 mg) by mouth daily 90 tablet 3    sacubitril-valsartan (ENTRESTO)  MG per tablet Take 1 tablet by mouth 2 times daily 180 tablet 3    acetaminophen (TYLENOL) 325 MG tablet Take 2 tablets (650 mg) by mouth every 6 hours as needed for mild pain MAX 4000MG TYLENOL  IN 24  HRS / NEED TO INCLUDE HER LORTAB. 50 tablet 4    Calcium Carbonate-Vitamin D (CALCIUM 600 + D OR) Take 1 tablet by mouth every morning      carvedilol (COREG) 12.5 MG tablet TAKE 1 TABLET BY MOUTH 2 TIMES DAILY WITH MEALS 180 tablet 3    ferrous sulfate (FEROSUL) 325 (65 Fe) MG tablet Take 1 tablet (325 mg) by mouth daily (with breakfast) 30 tablet 3    gabapentin (NEURONTIN) 400 MG capsule TAKE 1 CAPSULE BY MOUTH 3 TIMES DAILY FOR PAIN 90 capsule 11    HYDROcodone-acetaminophen (NORCO) 5-325 MG tablet TAKE 2 TABLETS BY MOUTH 3 TIMES DAILY AS NEEDED 180 tablet 0    INCRUSE ELLIPTA 62.5 MCG/ACT inhaler INHALE 1 PUFF INTO THE LUNGS DAILY 30 each 3    mometasone-formoterol (DULERA) 200-5 MCG/ACT inhaler Inhale 2 puffs into the lungs 2 times daily 13 g 11    Multiple Vitamin (MULTI-VITAMIN DAILY PO) Take 1 tablet by mouth every morning      pantoprazole (PROTONIX) 40 MG EC tablet Take 1 tablet (40 mg) by mouth every morning (before breakfast) 30 tablet 1    rosuvastatin (CRESTOR) 10 MG tablet TAKE 1 TABLET BY MOUTH DAILY 90 tablet 3    vitamin C (ASCORBIC ACID) 500 MG tablet Take 500 mg by mouth 2 times daily         Social History     Socioeconomic History    Marital status:      Spouse name: Not on file    Number of children: Not on file    Years of education: Not on file    Highest education level: Not on file   Occupational History    Occupation:       Employer: RETIRED   Tobacco Use    Smoking status: Former     Packs/day: 1.00     Years: 15.00     Pack years: 15.00     Types: Cigarettes     Start date: 1983     Quit date: 1998     Years since quittin.5    Smokeless tobacco: Never    Tobacco comments:     Passive Exposure: No; Longest Tobacco Free: 15 years    Vaping Use    Vaping Use: Never used   Substance and Sexual Activity    Alcohol use: Not Currently     Comment: Occasionally     Drug use: No    Sexual activity: Not Currently   Other Topics Concern     Service Not  Asked    Blood Transfusions Not Asked    Caffeine Concern Yes     Comment: Coffee 5 cups daily     Occupational Exposure Not Asked    Hobby Hazards Not Asked    Sleep Concern Not Asked    Stress Concern Not Asked    Weight Concern Not Asked    Special Diet Not Asked    Back Care Not Asked    Exercise Not Asked    Bike Helmet Not Asked    Seat Belt Not Asked    Self-Exams Not Asked    Parent/sibling w/ CABG, MI or angioplasty before 65F 55M? No   Social History Narrative    Not on file     Social Determinants of Health     Financial Resource Strain: Not on file   Food Insecurity: Not on file   Transportation Needs: Not on file   Physical Activity: Not on file   Stress: Not on file   Social Connections: Not on file   Intimate Partner Violence: Not on file   Housing Stability: Not on file       LAB RESULTS:   Office Visit on 08/08/2020   Component Date Value Ref Range Status    COVID-19 Virus PCR to [facility] - So* 08/08/2020 Nasopharyngeal   Final    COVID-19 Virus PCR to [facility] - Re* 08/08/2020 Test received-See reflex to IDDL test SARS CoV2 (COVID-19) Virus RT-PCR   Final    SARS-CoV-2 Virus Specimen Source 08/08/2020 Nasopharyngeal   Final    SARS-CoV-2 PCR Result 08/08/2020 NEGATIVE   Final    SARS-CoV-2 PCR Comment 08/08/2020    Final                    Value:Testing was performed using the Aptima SARS-CoV-2 Assay on the K[employer] Instrument System.   Additional information about this Emergency Use Authorization (EUA) assay can be found via   the Lab Guide.     Ancillary Procedure on 08/04/2020   Component Date Value Ref Range Status    Date Time Interrogation Session 08/05/2020 [id number]   Final    Implantable Pulse Generator Manufa* 08/05/2020 Medtronic   Final    Implantable Pulse Generator Model 08/05/2020 [id number] Viva XT CRT-D   Final    Implantable Pulse Generator Serial* 08/05/2020 [id number]   Final    Type Interrogation Session 08/05/2020 Remote    Final    Clinic Name 08/05/2020 [facility]   Final    Implantable Pulse Generator Type 08/05/2020 Cardiac Resynchronization Therapy - Defibrillator   Final    Implantable Pulse Generator Implan* 08/05/2020 [id number]   Final    Implantable Lead  08/05/2020 Medtronic   Final    Implantable Lead Model 08/05/2020 4296 Attain Ability Plus   Final    Implantable Lead Serial Number 08/05/2020 [id number]   Final    Implantable Lead Implant Date 08/05/2020 [id number]   Final    Implantable Lead Polarity Type 08/05/2020 Bipolar Lead   Final    Implantable Lead Location Detail 1 08/05/2020 UNKNOWN   Final    Implantable Lead Location 08/05/2020 Left Ventricle   Final    Implantable Lead  08/05/2020 Medtronic   Final    Implantable Lead Model 08/05/2020 5076 CapSureFix Novus   Final    Implantable Lead Serial Number 08/05/2020 [id number]   Final    Implantable Lead Implant Date 08/05/2020 [id number]   Final    Implantable Lead Polarity Type 08/05/2020 Bipolar Lead   Final    Implantable Lead Location Detail 1 08/05/2020 APPENDAGE   Final    Implantable Lead Location 08/05/2020 Right Atrium   Final    Implantable Lead  08/05/2020 Medtronic   Final    Implantable Lead Model 08/05/2020 6947 Sprint Quattro Secure   Final    Implantable Lead Serial Number 08/05/2020 [id number]   Final    Implantable Lead Implant Date 08/05/2020 [id number]   Final    Implantable Lead Polarity Type 08/05/2020 Quadripolar Lead   Final    Implantable Lead Location Detail 1 08/05/2020 APEX   Final    Implantable Lead Location 08/05/2020 Right Ventricle   Final    Vamshi Setting Mode (NBG Code) 08/05/2020 DDDR   Final    Vamshi Setting Lower Rate Limit 08/05/2020 60  [beats]/min Final    Vamshi Setting Maximum Tracking Rate 08/05/2020 130  [beats]/min Final    Vamshi Setting Maximum Sensor Rate 08/05/2020 130  [beats]/min Final    Vamshi Setting NAJMA Delay Low 08/05/2020 140  ms Final    Vamshi Setting PAV Delay Low 08/05/2020 160  ms Final    Vamshi  Setting AT Mode Switch Rate 08/05/2020 171  [beats]/min Final    Lead Channel Setting Sensing Polar* 08/05/2020 Bipolar   Final    Lead Channel Setting Sensing Anode* 08/05/2020 Right Atrium   Final    Lead Channel Setting Sensing Anode* 08/05/2020 Ring   Final    Lead Channel Setting Sensing Catho* 08/05/2020 Right Atrium   Final    Lead Channel Setting Sensing Catho* 08/05/2020 Tip   Final    Lead Channel Setting Sensing Sensi* 08/05/2020 0.3  mV Final    Lead Channel Setting Sensing Polar* 08/05/2020 Bipolar   Final    Lead Channel Setting Sensing Anode* 08/05/2020 Right Ventricle   Final    Lead Channel Setting Sensing Anode* 08/05/2020 Coil   Final    Lead Channel Setting Sensing Catho* 08/05/2020 Right Ventricle   Final    Lead Channel Setting Sensing Catho* 08/05/2020 Tip   Final    Lead Channel Setting Sensing Sensi* 08/05/2020 0.3  mV Final    Ventricular chambers paced during * 08/05/2020 LVOnly   Final    Lead Channel Setting Pacing Polari* 08/05/2020 Bipolar   Final    Lead Channel Setting Pacing Anode * 08/05/2020 Right Atrium   Final    Lead Channel Setting Pacing Anode * 08/05/2020 Ring   Final    Lead Channel Setting Sensing Catho* 08/05/2020 Right Atrium   Final    Lead Channel Setting Sensing Catho* 08/05/2020 Tip   Final    Lead Channel Setting Pacing Pulse * 08/05/2020 0.4  ms Final    Lead Channel Setting Pacing Amplit* 08/05/2020 1.5  V Final    Lead Channel Setting Pacing Captur* 08/05/2020 Adaptive   Final    Lead Channel Setting Pacing Polari* 08/05/2020 Bipolar   Final    Lead Channel Setting Pacing Anode * 08/05/2020 Right Ventricle   Final    Lead Channel Setting Pacing Anode * 08/05/2020 Ring   Final    Lead Channel Setting Sensing Catho* 08/05/2020 Right Ventricle   Final    Lead Channel Setting Sensing Catho* 08/05/2020 Tip   Final    Lead Channel Setting Pacing Pulse * 08/05/2020 0.4  ms Final    Lead Channel Setting Pacing Amplit* 08/05/2020 2  V Final    Lead Channel Setting Pacing  Captur* 08/05/2020 Adaptive   Final    Lead Channel Setting Pacing Polari* 08/05/2020 Bipolar   Final    Lead Channel Setting Pacing Anode * 08/05/2020 Right Ventricle   Final    Lead Channel Setting Pacing Anode * 08/05/2020 Coil   Final    Lead Channel Setting Sensing Catho* 08/05/2020 Left Ventricle   Final    Lead Channel Setting Sensing Catho* 08/05/2020 Tip   Final    Lead Channel Setting Pacing Pulse * 08/05/2020 0.6  ms Final    Lead Channel Setting Pacing Amplit* 08/05/2020 1.5  V Final    Lead Channel Setting Pacing Captur* 08/05/2020 Adaptive   Final    Zone Setting Type Category 08/05/2020 VF   Final    Zone Setting Detection Interval 08/05/2020 320  ms Final    Zone Setting Detection Beats Numer* 08/05/2020 30  [beats] Final    Zone Setting Detection Beats Denom* 08/05/2020 40  [beats] Final    Zone Setting Type Category 08/05/2020 VT   Final    Zone Setting Detection Interval 08/05/2020 Blank   Final    Zone Setting Type Category 08/05/2020 VT   Final    Zone Setting Detection Interval 08/05/2020 360  ms Final    Zone Setting Type Category 08/05/2020 VT   Final    Zone Setting Detection Interval 08/05/2020 400  ms Final    Zone Setting Type Category 08/05/2020 ATRIAL_FIBRILLATION   Final    Zone Setting Type Category 08/05/2020 AT/AF   Final    Zone Setting Detection Interval 08/05/2020 350  ms Final    Lead Channel Impedance Value 08/05/2020 437  ohm Final    Lead Channel Sensing Intrinsic Amp* 08/05/2020 2.375  mV Final    Lead Channel Sensing Intrinsic Amp* 08/05/2020 2.375  mV Final    Lead Channel Pacing Threshold Ampl* 08/05/2020 0.375  V Final    Lead Channel Pacing Threshold Puls* 08/05/2020 0.4  ms Final    Lead Channel Impedance Value 08/05/2020 399  ohm Final    Lead Channel Impedance Value 08/05/2020 323  ohm Final    Lead Channel Sensing Intrinsic Amp* 08/05/2020 5  mV Final    Lead Channel Sensing Intrinsic Amp* 08/05/2020 5  mV Final    Lead Channel Pacing Threshold Ampl* 08/05/2020  0.625  V Final    Lead Channel Pacing Threshold Puls* 08/05/2020 0.4  ms Final    Lead Channel Impedance Value 08/05/2020 893  ohm Final    Lead Channel Impedance Value 08/05/2020 456  ohm Final    Lead Channel Impedance Value 08/05/2020 570  ohm Final    Lead Channel Pacing Threshold Ampl* 08/05/2020 0.5  V Final    Lead Channel Pacing Threshold Puls* 08/05/2020 0.6  ms Final    Battery Date Time of Measurements 08/05/2020 20200804012400   Final    Battery Status 08/05/2020 OK   Final    Battery RRT Trigger 08/05/2020 2.727   Final    Battery Remaining Longevity 08/05/2020 26  mo Final    Battery Voltage 08/05/2020 2.94  V Final    Capacitor Charge Type 08/05/2020 Reformation   Final    Capacitor Last Charge Date Time 08/05/2020 [id number]   Final    Capacitor Charge Time 08/05/2020 4.314   Final    Capacitor Charge Energy 08/05/2020 18  J Final    Vamshi Statistic Date Time Start 08/05/2020 20200504124338   Final    Vamshi Statistic Date Time End 08/05/2020 20200804012403   Final    Vamshi Statistic RA Percent Paced 08/05/2020 9.93  % Final    Vamshi Statistic RV Percent Paced 08/05/2020 7.92  % Final    CRT Statistic LV Percent Paced 08/05/2020 98.09  % Final    Vamshi Statistic AP  Percent 08/05/2020 9.75  % Final    Vamshi Statistic AS  Percent 08/05/2020 88.58  % Final    Vamshi Statistic AP VS Percent 08/05/2020 0.19  % Final    Vamshi Statistic AS VS Percent 08/05/2020 1.47  % Final    CRT Statistic Date Time Start 08/05/2020 [id number]   Final    CRT Statistic Date Time End 08/05/2020 20200804012403   Final    CRT Statistic CRT Percent Paced 08/05/2020 98.09  % Final    Atrial Tachy Statistic Date Time S* 08/05/2020 [id number]   Final    Atrial Tachy Statistic Date Time E* 08/05/2020 20200804012403   Final    Atrial Tachy Statistic AT/AF Burde* 08/05/2020 0.1  % Final    Therapy Statistic Recent Shocks De* 08/05/2020 0   Final    Therapy Statistic Recent Shocks Ab* 08/05/2020 0   Final    Therapy  Statistic Recent ATP Deliv* 08/05/2020 0   Final    Therapy Statistic Recent Date Time* 08/05/2020 [id number]   Final    Therapy Statistic Recent Date Time* 08/05/2020 20200804012403   Final    Therapy Statistic Total Shocks Del* 08/05/2020 1   Final    Therapy Statistic Total Shocks Abo* 08/05/2020 0   Final    Therapy Statistic Total ATP Delive* 08/05/2020 0   Final    Therapy Statistic Total  Date Time* 08/05/2020 [id number]   Final    Therapy Statistic Total  Date Time* 08/05/2020 20200804012403   Final    Episode Statistic Recent Count 08/05/2020 1   Final    Episode Statistic Type Category 08/05/2020 AT/AF   Final    Episode Statistic Recent Count 08/05/2020 0   Final    Episode Statistic Type Category 08/05/2020 SVT   Final    Episode Statistic Recent Count 08/05/2020 0   Final    Episode Statistic Type Category 08/05/2020 VT   Final    Episode Statistic Recent Count 08/05/2020 0   Final    Episode Statistic Type Category 08/05/2020 VF   Final    Episode Statistic Recent Count 08/05/2020 0   Final    Episode Statistic Type Category 08/05/2020 VT   Final    Episode Statistic Recent Count 08/05/2020 0   Final    Episode Statistic Type Category 08/05/2020 VT   Final    Episode Statistic Recent Count 08/05/2020 0   Final    Episode Statistic Type Category 08/05/2020 VT   Final    Episode Statistic Recent Date Time* 08/05/2020 [id number]   Final    Episode Statistic Recent Date Time* 08/05/2020 20200804012403   Final    Episode Statistic Recent Date Time* 08/05/2020 [id number]   Final    Episode Statistic Recent Date Time* 08/05/2020 20200804012403   Final    Episode Statistic Recent Date Time* 08/05/2020 [id number]   Final    Episode Statistic Recent Date Time* 08/05/2020 20200804012403   Final    Episode Statistic Recent Date Time* 08/05/2020 [id number]   Final    Episode Statistic Recent Date Time* 08/05/2020 20200804012403   Final    Episode Statistic Recent Date Time* 08/05/2020  [id number]   Final    Episode Statistic Recent Date Time* 08/05/2020 20200804012403   Final    Episode Statistic Recent Date Time* 08/05/2020 [id number]   Final    Episode Statistic Recent Date Time* 08/05/2020 20200804012403   Final    Episode Statistic Recent Date Time* 08/05/2020 [id number]   Final    Episode Statistic Recent Date Time* 08/05/2020 20200804012403   Final    Episode Statistic Total Count 08/05/2020 0   Final    Episode Statistic Type Category 08/05/2020 AT/AF   Final    Episode Statistic Total Count 08/05/2020 0   Final    Episode Statistic Type Category 08/05/2020 SVT   Final    Episode Statistic Total Count 08/05/2020 66   Final    Episode Statistic Type Category 08/05/2020 VT   Final    Episode Statistic Total Count 08/05/2020 1   Final    Episode Statistic Type Category 08/05/2020 VF   Final    Episode Statistic Total Count 08/05/2020 0   Final    Episode Statistic Type Category 08/05/2020 VT   Final    Episode Statistic Total Count 08/05/2020 0   Final    Episode Statistic Type Category 08/05/2020 VT   Final    Episode Statistic Total Count 08/05/2020 0   Final    Episode Statistic Type Category 08/05/2020 VT   Final    Episode Statistic Total Date Time * 08/05/2020 [id number]   Final    Episode Statistic Total Date Time * 08/05/2020 20200804012403   Final    Episode Statistic Total Date Time * 08/05/2020 [id number]   Final    Episode Statistic Total Date Time * 08/05/2020 20200804012403   Final    Episode Statistic Total Date Time * 08/05/2020 [id number]   Final    Episode Statistic Total Date Time * 08/05/2020 20200804012403   Final    Episode Statistic Total Date Time * 08/05/2020 20140506113519   Final    Episode Statistic Total Date Time * 08/05/2020 20200804012403   Final    Episode Statistic Total Date Time * 08/05/2020 20140506113519   Final    Episode Statistic Total Date Time * 08/05/2020 20200804012403   Final    Episode Statistic Total Date Time * 08/05/2020  49125390156358   Final    Episode Statistic Total Date Time * 08/05/2020 20200804012403   Final    Episode Statistic Total Date Time * 08/05/2020 20140506113519   Final    Episode Statistic Total Date Time * 08/05/2020 20200804012403   Final    Episode Identifier 08/05/2020 716   Final    Episode Type Category 08/05/2020 VSE   Final    Episode Date Time 08/05/2020 20200716092155   Final    Episode Duration 08/05/2020 9  s Final    Episode Identifier 08/05/2020 715   Final    Episode Type Category 08/05/2020 VSE   Final    Episode Date Time 08/05/2020 [id number]   Final    Episode Duration 08/05/2020 3  s Final    Episode Identifier 08/05/2020 714   Final    Episode Type Category 08/05/2020 VSE   Final    Episode Date Time 08/05/2020 20200625235723   Final    Episode Duration 08/05/2020 4  s Final    Episode Identifier 08/05/2020 713   Final    Episode Type Category 08/05/2020 VSE   Final    Episode Date Time 08/05/2020 20200607131142   Final    Episode Duration 08/05/2020 6  s Final    Episode Identifier 08/05/2020 712   Final    Episode Type Category 08/05/2020 VSE   Final    Episode Date Time 08/05/2020 20200523024049   Final    Episode Duration 08/05/2020 18  s Final    Episode Identifier 08/05/2020 711   Final    Episode Type Category 08/05/2020 VSE   Final    Episode Date Time 08/05/2020 20200505043457   Final    Episode Duration 08/05/2020 4  s Final        Review of systems: Negative except that which was noted in the HPI.    Physical examination:  /66 (BP Location: Left arm, Patient Position: Sitting, Cuff Size: Adult Regular)   Pulse 96   Temp 98.1  F (36.7  C) (Tympanic)   Resp 17   Wt 63.5 kg (140 lb)   BMI 24.03 kg/m      GENERAL APPEARANCE: healthy, alert and no distress  CHEST: lungs clear to auscultation.  CARDIOVASCULAR: Irregular rate irregular rhythm, normal S1 with physiologic split S2, no S3 or S4 and no murmur, click or rub  ABDOMEN: soft, non tender, bowel sounds normal.  Distant  sounds.  EXTREMITIES: no clubbing, cyanosis but with mild lower extremity edema.    Total time spent on day of visit, including review of tests, obtaining/reviewing separately obtained history, ordering medications/tests/procedures, communicating with PCP/consultants, and documenting in electronic medical record: 40 minutes.          Thank you for allowing me to participate in the care of your patient. Please do not hesitate to contact me if you have any questions.     Kodi Garcia, DO

## 2023-08-09 NOTE — PATIENT INSTRUCTIONS
It was a pleasure to see you in clinic today.  Please do not hesitate to call with any questions or concerns.  You are scheduled for a remote transmission on 11/14/23.  We look forward to seeing you in clinic at your next device check in 6 months.    Desiree Luevano, RN, MS, CCRN  Electrophysiology Nurse Clinician  [facility]    During Business Hours Please Call:  [phone]  After Hours Please Call:  [phone] - select option #[phone] and ask for job code [phone]

## 2023-08-09 NOTE — PATIENT INSTRUCTIONS
Thank you for allowing Dr. Garcia and our  team to participate in your care. Please call our office at [phone] with scheduling questions or if you need to cancel or change your appointment. With any other questions or concerns you may call [facility] cardiology nurse at [phone].       If you experience chest pain, chest pressure, chest tightness, shortness of breath, fainting, lightheadedness, nausea, vomiting, or other concerning symptoms, please report to the Emergency Department or call 911. These symptoms may be emergent, and best treated in the Emergency Department.    Follow up in 1 year or sooner with issues.     Lasix 20 mg daily.     Increase entresto 97/103 twice daily

## 2023-09-02 LAB
MDC_IDC_EPISODE_DTM: NORMAL
MDC_IDC_EPISODE_DURATION: 1 S
MDC_IDC_EPISODE_ID: 1
MDC_IDC_EPISODE_TYPE: NORMAL
MDC_IDC_LEAD_IMPLANT_DT: NORMAL
MDC_IDC_LEAD_LOCATION: NORMAL
MDC_IDC_LEAD_LOCATION_DETAIL_1: NORMAL
MDC_IDC_LEAD_MFG: NORMAL
MDC_IDC_LEAD_MODEL: NORMAL
MDC_IDC_LEAD_POLARITY_TYPE: NORMAL
MDC_IDC_LEAD_SERIAL: NORMAL
MDC_IDC_MSMT_BATTERY_DTM: NORMAL
MDC_IDC_MSMT_BATTERY_REMAINING_LONGEVITY: 126 MO
MDC_IDC_MSMT_BATTERY_RRT_TRIGGER: NORMAL
MDC_IDC_MSMT_BATTERY_VOLTAGE: 3.03 V
MDC_IDC_MSMT_CAP_CHARGE_DTM: NORMAL
MDC_IDC_MSMT_CAP_CHARGE_ENERGY: 18 J
MDC_IDC_MSMT_CAP_CHARGE_TIME: 3.8 S
MDC_IDC_MSMT_CAP_CHARGE_TYPE: NORMAL
MDC_IDC_MSMT_LEADCHNL_LV_IMPEDANCE_VALUE: 399 OHM
MDC_IDC_MSMT_LEADCHNL_LV_IMPEDANCE_VALUE: 437 OHM
MDC_IDC_MSMT_LEADCHNL_LV_IMPEDANCE_VALUE: 779 OHM
MDC_IDC_MSMT_LEADCHNL_LV_PACING_THRESHOLD_AMPLITUDE: 0.75 V
MDC_IDC_MSMT_LEADCHNL_LV_PACING_THRESHOLD_PULSEWIDTH: 0.4 MS
MDC_IDC_MSMT_LEADCHNL_RA_IMPEDANCE_VALUE: 380 OHM
MDC_IDC_MSMT_LEADCHNL_RA_PACING_THRESHOLD_AMPLITUDE: 0.5 V
MDC_IDC_MSMT_LEADCHNL_RA_PACING_THRESHOLD_AMPLITUDE: 0.62 V
MDC_IDC_MSMT_LEADCHNL_RA_PACING_THRESHOLD_PULSEWIDTH: 0.4 MS
MDC_IDC_MSMT_LEADCHNL_RA_PACING_THRESHOLD_PULSEWIDTH: 0.4 MS
MDC_IDC_MSMT_LEADCHNL_RA_SENSING_INTR_AMPL: 2 MV
MDC_IDC_MSMT_LEADCHNL_RV_IMPEDANCE_VALUE: 342 OHM
MDC_IDC_MSMT_LEADCHNL_RV_IMPEDANCE_VALUE: 418 OHM
MDC_IDC_MSMT_LEADCHNL_RV_PACING_THRESHOLD_AMPLITUDE: 1.12 V
MDC_IDC_MSMT_LEADCHNL_RV_PACING_THRESHOLD_AMPLITUDE: 1.25 V
MDC_IDC_MSMT_LEADCHNL_RV_PACING_THRESHOLD_PULSEWIDTH: 0.4 MS
MDC_IDC_MSMT_LEADCHNL_RV_PACING_THRESHOLD_PULSEWIDTH: 0.4 MS
MDC_IDC_MSMT_LEADCHNL_RV_SENSING_INTR_AMPL: 4.6 MV
MDC_IDC_PG_IMPLANT_DTM: NORMAL
MDC_IDC_PG_MFG: NORMAL
MDC_IDC_PG_MODEL: NORMAL
MDC_IDC_PG_SERIAL: NORMAL
MDC_IDC_PG_TYPE: NORMAL
MDC_IDC_SESS_CLINIC_NAME: NORMAL
MDC_IDC_SESS_DTM: NORMAL
MDC_IDC_SESS_TYPE: NORMAL
MDC_IDC_SET_BRADY_AT_MODE_SWITCH_RATE: 171 {BEATS}/MIN
MDC_IDC_SET_BRADY_LOWRATE: 60 {BEATS}/MIN
MDC_IDC_SET_BRADY_MAX_SENSOR_RATE: 130 {BEATS}/MIN
MDC_IDC_SET_BRADY_MAX_TRACKING_RATE: 130 {BEATS}/MIN
MDC_IDC_SET_BRADY_MODE: NORMAL
MDC_IDC_SET_BRADY_PAV_DELAY_LOW: 170 MS
MDC_IDC_SET_BRADY_SAV_DELAY_LOW: 140 MS
MDC_IDC_SET_CRT_LVRV_DELAY: 10 MS
MDC_IDC_SET_CRT_PACED_CHAMBERS: NORMAL
MDC_IDC_SET_LEADCHNL_LV_PACING_AMPLITUDE: 1.5 V
MDC_IDC_SET_LEADCHNL_LV_PACING_ANODE_ELECTRODE_1: NORMAL
MDC_IDC_SET_LEADCHNL_LV_PACING_ANODE_LOCATION_1: NORMAL
MDC_IDC_SET_LEADCHNL_LV_PACING_CAPTURE_MODE: NORMAL
MDC_IDC_SET_LEADCHNL_LV_PACING_CATHODE_ELECTRODE_1: NORMAL
MDC_IDC_SET_LEADCHNL_LV_PACING_CATHODE_LOCATION_1: NORMAL
MDC_IDC_SET_LEADCHNL_LV_PACING_POLARITY: NORMAL
MDC_IDC_SET_LEADCHNL_LV_PACING_PULSEWIDTH: 0.4 MS
MDC_IDC_SET_LEADCHNL_RA_PACING_AMPLITUDE: 1.5 V
MDC_IDC_SET_LEADCHNL_RA_PACING_ANODE_ELECTRODE_1: NORMAL
MDC_IDC_SET_LEADCHNL_RA_PACING_ANODE_LOCATION_1: NORMAL
MDC_IDC_SET_LEADCHNL_RA_PACING_CAPTURE_MODE: NORMAL
MDC_IDC_SET_LEADCHNL_RA_PACING_CATHODE_ELECTRODE_1: NORMAL
MDC_IDC_SET_LEADCHNL_RA_PACING_CATHODE_LOCATION_1: NORMAL
MDC_IDC_SET_LEADCHNL_RA_PACING_POLARITY: NORMAL
MDC_IDC_SET_LEADCHNL_RA_PACING_PULSEWIDTH: 0.4 MS
MDC_IDC_SET_LEADCHNL_RA_SENSING_ANODE_ELECTRODE_1: NORMAL
MDC_IDC_SET_LEADCHNL_RA_SENSING_ANODE_LOCATION_1: NORMAL
MDC_IDC_SET_LEADCHNL_RA_SENSING_CATHODE_ELECTRODE_1: NORMAL
MDC_IDC_SET_LEADCHNL_RA_SENSING_CATHODE_LOCATION_1: NORMAL
MDC_IDC_SET_LEADCHNL_RA_SENSING_POLARITY: NORMAL
MDC_IDC_SET_LEADCHNL_RA_SENSING_SENSITIVITY: 0.3 MV
MDC_IDC_SET_LEADCHNL_RV_PACING_AMPLITUDE: 2 V
MDC_IDC_SET_LEADCHNL_RV_PACING_ANODE_ELECTRODE_1: NORMAL
MDC_IDC_SET_LEADCHNL_RV_PACING_ANODE_LOCATION_1: NORMAL
MDC_IDC_SET_LEADCHNL_RV_PACING_CAPTURE_MODE: NORMAL
MDC_IDC_SET_LEADCHNL_RV_PACING_CATHODE_ELECTRODE_1: NORMAL
MDC_IDC_SET_LEADCHNL_RV_PACING_CATHODE_LOCATION_1: NORMAL
MDC_IDC_SET_LEADCHNL_RV_PACING_POLARITY: NORMAL
MDC_IDC_SET_LEADCHNL_RV_PACING_PULSEWIDTH: 0.4 MS
MDC_IDC_SET_LEADCHNL_RV_SENSING_ANODE_ELECTRODE_1: NORMAL
MDC_IDC_SET_LEADCHNL_RV_SENSING_ANODE_LOCATION_1: NORMAL
MDC_IDC_SET_LEADCHNL_RV_SENSING_CATHODE_ELECTRODE_1: NORMAL
MDC_IDC_SET_LEADCHNL_RV_SENSING_CATHODE_LOCATION_1: NORMAL
MDC_IDC_SET_LEADCHNL_RV_SENSING_POLARITY: NORMAL
MDC_IDC_SET_LEADCHNL_RV_SENSING_SENSITIVITY: 0.3 MV
MDC_IDC_SET_ZONE_DETECTION_BEATS_DENOMINATOR: 40 {BEATS}
MDC_IDC_SET_ZONE_DETECTION_BEATS_NUMERATOR: 30 {BEATS}
MDC_IDC_SET_ZONE_DETECTION_INTERVAL: 240 MS
MDC_IDC_SET_ZONE_DETECTION_INTERVAL: 320 MS
MDC_IDC_SET_ZONE_DETECTION_INTERVAL: 350 MS
MDC_IDC_SET_ZONE_DETECTION_INTERVAL: 360 MS
MDC_IDC_SET_ZONE_DETECTION_INTERVAL: 400 MS
MDC_IDC_SET_ZONE_TYPE: NORMAL
MDC_IDC_STAT_AT_BURDEN_PERCENT: 0.1 %
MDC_IDC_STAT_AT_DTM_END: NORMAL
MDC_IDC_STAT_AT_DTM_START: NORMAL
MDC_IDC_STAT_BRADY_AP_VP_PERCENT: 7.75 %
MDC_IDC_STAT_BRADY_AP_VS_PERCENT: 0.16 %
MDC_IDC_STAT_BRADY_AS_VP_PERCENT: 90.59 %
MDC_IDC_STAT_BRADY_AS_VS_PERCENT: 1.5 %
MDC_IDC_STAT_BRADY_DTM_END: NORMAL
MDC_IDC_STAT_BRADY_DTM_START: NORMAL
MDC_IDC_STAT_BRADY_RA_PERCENT_PACED: 7.91 %
MDC_IDC_STAT_BRADY_RV_PERCENT_PACED: 2.93 %
MDC_IDC_STAT_CRT_DTM_END: NORMAL
MDC_IDC_STAT_CRT_DTM_START: NORMAL
MDC_IDC_STAT_CRT_LV_PERCENT_PACED: 98.32 %
MDC_IDC_STAT_CRT_PERCENT_PACED: 2.9 %
MDC_IDC_STAT_EPISODE_RECENT_COUNT: 0
MDC_IDC_STAT_EPISODE_RECENT_COUNT: 1
MDC_IDC_STAT_EPISODE_RECENT_COUNT_DTM_END: NORMAL
MDC_IDC_STAT_EPISODE_RECENT_COUNT_DTM_START: NORMAL
MDC_IDC_STAT_EPISODE_TOTAL_COUNT: 0
MDC_IDC_STAT_EPISODE_TOTAL_COUNT: 1
MDC_IDC_STAT_EPISODE_TOTAL_COUNT_DTM_END: NORMAL
MDC_IDC_STAT_EPISODE_TOTAL_COUNT_DTM_START: NORMAL
MDC_IDC_STAT_EPISODE_TYPE: NORMAL
MDC_IDC_STAT_TACHYTHERAPY_ATP_DELIVERED_RECENT: 0
MDC_IDC_STAT_TACHYTHERAPY_ATP_DELIVERED_TOTAL: 0
MDC_IDC_STAT_TACHYTHERAPY_RECENT_DTM_END: NORMAL
MDC_IDC_STAT_TACHYTHERAPY_RECENT_DTM_START: NORMAL
MDC_IDC_STAT_TACHYTHERAPY_SHOCKS_ABORTED_RECENT: 0
MDC_IDC_STAT_TACHYTHERAPY_SHOCKS_ABORTED_TOTAL: 0
MDC_IDC_STAT_TACHYTHERAPY_SHOCKS_DELIVERED_RECENT: 0
MDC_IDC_STAT_TACHYTHERAPY_SHOCKS_DELIVERED_TOTAL: 0
MDC_IDC_STAT_TACHYTHERAPY_TOTAL_DTM_END: NORMAL
MDC_IDC_STAT_TACHYTHERAPY_TOTAL_DTM_START: NORMAL

## 2023-09-26 ENCOUNTER — OFFICE VISIT (OUTPATIENT)
Dept: FAMILY MEDICINE | Facility: OTHER | Age: 88
End: 2023-09-26
Attending: FAMILY MEDICINE
Payer: MEDICARE

## 2023-09-26 ENCOUNTER — APPOINTMENT (OUTPATIENT)
Dept: GENERAL RADIOLOGY | Facility: HOSPITAL | Age: 88
End: 2023-09-26
Attending: NURSE PRACTITIONER
Payer: MEDICARE

## 2023-09-26 ENCOUNTER — APPOINTMENT (OUTPATIENT)
Dept: ULTRASOUND IMAGING | Facility: HOSPITAL | Age: 88
End: 2023-09-26
Attending: NURSE PRACTITIONER
Payer: MEDICARE

## 2023-09-26 ENCOUNTER — HOSPITAL ENCOUNTER (EMERGENCY)
Facility: HOSPITAL | Age: 88
Discharge: HOME | End: 2023-09-26
Attending: NURSE PRACTITIONER | Admitting: NURSE PRACTITIONER
Payer: MEDICARE

## 2023-09-26 VITALS
OXYGEN SATURATION: 92 % | BODY MASS INDEX: 23 KG/M2 | WEIGHT: 134 LBS | DIASTOLIC BLOOD PRESSURE: 47 MMHG | SYSTOLIC BLOOD PRESSURE: 76 MMHG | TEMPERATURE: 97.4 F | HEART RATE: 71 BPM

## 2023-09-26 VITALS
DIASTOLIC BLOOD PRESSURE: 79 MMHG | SYSTOLIC BLOOD PRESSURE: 130 MMHG | WEIGHT: 142.42 LBS | HEART RATE: 74 BPM | RESPIRATION RATE: 16 BRPM | TEMPERATURE: 96.9 F | BODY MASS INDEX: 24.45 KG/M2 | OXYGEN SATURATION: 95 %

## 2023-09-26 DIAGNOSIS — N18.9 ACUTE ON CHRONIC RENAL INSUFFICIENCY: ICD-10-CM

## 2023-09-26 DIAGNOSIS — N28.9 ACUTE ON CHRONIC RENAL INSUFFICIENCY: ICD-10-CM

## 2023-09-26 DIAGNOSIS — M62.81 GENERALIZED MUSCLE WEAKNESS: ICD-10-CM

## 2023-09-26 DIAGNOSIS — I95.9 HYPOTENSION, UNSPECIFIED HYPOTENSION TYPE: ICD-10-CM

## 2023-09-26 DIAGNOSIS — I95.9 HYPOTENSION, UNSPECIFIED HYPOTENSION TYPE: Primary | ICD-10-CM

## 2023-09-26 LAB
ALBUMIN SERPL BCG-MCNC: 3.6 G/DL (ref 3.5–5.2)
ALP SERPL-CCNC: 65 U/L (ref 35–104)
ALT SERPL W P-5'-P-CCNC: 10 U/L (ref 0–50)
ANION GAP SERPL CALCULATED.3IONS-SCNC: 10 MMOL/L (ref 7–15)
AST SERPL W P-5'-P-CCNC: 20 U/L (ref 0–45)
BASOPHILS # BLD AUTO: 0 10E3/UL (ref 0–0.2)
BASOPHILS NFR BLD AUTO: 1 %
BILIRUB SERPL-MCNC: 0.3 MG/DL
BUN SERPL-MCNC: 35.7 MG/DL (ref 8–23)
CALCIUM SERPL-MCNC: 9.6 MG/DL (ref 8.8–10.2)
CHLORIDE SERPL-SCNC: 104 MMOL/L (ref 98–107)
CREAT SERPL-MCNC: 1.45 MG/DL (ref 0.51–0.95)
DEPRECATED HCO3 PLAS-SCNC: 25 MMOL/L (ref 22–29)
EGFRCR SERPLBLD CKD-EPI 2021: 35 ML/MIN/1.73M2
EOSINOPHIL # BLD AUTO: 0.1 10E3/UL (ref 0–0.7)
EOSINOPHIL NFR BLD AUTO: 2 %
ERYTHROCYTE [DISTWIDTH] IN BLOOD BY AUTOMATED COUNT: 13.6 % (ref 10–15)
GLUCOSE SERPL-MCNC: 122 MG/DL (ref 70–99)
HCT VFR BLD AUTO: 35.7 % (ref 35–47)
HGB BLD-MCNC: 11 G/DL (ref 11.7–15.7)
HOLD SPECIMEN: NORMAL
IMM GRANULOCYTES # BLD: 0 10E3/UL
IMM GRANULOCYTES NFR BLD: 0 %
LYMPHOCYTES # BLD AUTO: 0.9 10E3/UL (ref 0.8–5.3)
LYMPHOCYTES NFR BLD AUTO: 18 %
MAGNESIUM SERPL-MCNC: 2.1 MG/DL (ref 1.7–2.3)
MCH RBC QN AUTO: 29.8 PG (ref 26.5–33)
MCHC RBC AUTO-ENTMCNC: 30.8 G/DL (ref 31.5–36.5)
MCV RBC AUTO: 97 FL (ref 78–100)
MONOCYTES # BLD AUTO: 0.5 10E3/UL (ref 0–1.3)
MONOCYTES NFR BLD AUTO: 10 %
NEUTROPHILS # BLD AUTO: 3.5 10E3/UL (ref 1.6–8.3)
NEUTROPHILS NFR BLD AUTO: 69 %
NRBC # BLD AUTO: 0 10E3/UL
NRBC BLD AUTO-RTO: 0 /100
NT-PROBNP SERPL-MCNC: 880 PG/ML (ref 0–1800)
PLATELET # BLD AUTO: 152 10E3/UL (ref 150–450)
POTASSIUM SERPL-SCNC: 4.7 MMOL/L (ref 3.4–5.3)
PROT SERPL-MCNC: 6.4 G/DL (ref 6.4–8.3)
RBC # BLD AUTO: 3.69 10E6/UL (ref 3.8–5.2)
SODIUM SERPL-SCNC: 139 MMOL/L (ref 135–145)
TROPONIN T SERPL HS-MCNC: 53 NG/L
TROPONIN T SERPL HS-MCNC: 64 NG/L
WBC # BLD AUTO: 4.9 10E3/UL (ref 4–11)

## 2023-09-26 PROCEDURE — 85025 COMPLETE CBC W/AUTO DIFF WBC: CPT | Performed by: NURSE PRACTITIONER

## 2023-09-26 PROCEDURE — 96360 HYDRATION IV INFUSION INIT: CPT

## 2023-09-26 PROCEDURE — 93308 TTE F-UP OR LMTD: CPT | Mod: TC

## 2023-09-26 PROCEDURE — 71045 X-RAY EXAM CHEST 1 VIEW: CPT

## 2023-09-26 PROCEDURE — 93005 ELECTROCARDIOGRAM TRACING: CPT

## 2023-09-26 PROCEDURE — 83880 ASSAY OF NATRIURETIC PEPTIDE: CPT | Performed by: NURSE PRACTITIONER

## 2023-09-26 PROCEDURE — 99285 EMERGENCY DEPT VISIT HI MDM: CPT | Mod: 25

## 2023-09-26 PROCEDURE — 80053 COMPREHEN METABOLIC PANEL: CPT | Performed by: NURSE PRACTITIONER

## 2023-09-26 PROCEDURE — 99207 PR NO CHARGE LOS: CPT | Performed by: FAMILY MEDICINE

## 2023-09-26 PROCEDURE — 84484 ASSAY OF TROPONIN QUANT: CPT | Performed by: NURSE PRACTITIONER

## 2023-09-26 PROCEDURE — 83735 ASSAY OF MAGNESIUM: CPT | Performed by: NURSE PRACTITIONER

## 2023-09-26 PROCEDURE — 93308 TTE F-UP OR LMTD: CPT | Mod: 26 | Performed by: NURSE PRACTITIONER

## 2023-09-26 PROCEDURE — 258N000003 HC RX IP 258 OP 636: Performed by: NURSE PRACTITIONER

## 2023-09-26 PROCEDURE — 36415 COLL VENOUS BLD VENIPUNCTURE: CPT | Performed by: NURSE PRACTITIONER

## 2023-09-26 PROCEDURE — G0463 HOSPITAL OUTPT CLINIC VISIT: HCPCS

## 2023-09-26 PROCEDURE — 99284 EMERGENCY DEPT VISIT MOD MDM: CPT | Mod: 25 | Performed by: NURSE PRACTITIONER

## 2023-09-26 RX ADMIN — SODIUM CHLORIDE, POTASSIUM CHLORIDE, SODIUM LACTATE AND CALCIUM CHLORIDE 500 ML: 600; 310; 30; 20 INJECTION, SOLUTION INTRAVENOUS at 13:04

## 2023-09-26 ASSESSMENT — ENCOUNTER SYMPTOMS
GASTROINTESTINAL NEGATIVE: 1
NEUROLOGICAL NEGATIVE: 1
SHORTNESS OF BREATH: 1
EYES NEGATIVE: 1
BACK PAIN: 1
COUGH: 1
ALLERGIC/IMMUNOLOGIC NEGATIVE: 1
ENDOCRINE NEGATIVE: 1
HEMATOLOGIC/LYMPHATIC NEGATIVE: 1
FATIGUE: 1
PSYCHIATRIC NEGATIVE: 1

## 2023-09-26 ASSESSMENT — PAIN SCALES - GENERAL: PAINLEVEL: MODERATE PAIN (5)

## 2023-09-26 ASSESSMENT — ACTIVITIES OF DAILY LIVING (ADL): ADLS_ACUITY_SCORE: 35

## 2023-09-26 NOTE — ED PROVIDER NOTES
"  History     Chief Complaint   Patient presents with    Hypotension     HPI  Jacklyn Hein is a 88 year old individual with history of hypertension, cardiomyopathy, congestive heart failure asthma, COPD, stage III chronic kidney disease, chronic pulmonary edema, is sent over from clinic for hypotension.  Patient states has not been feeling well for the past week.  This includes worsening shortness of breath and cough.  Also is having pain in the thoracic spine where patient does note she has fracture and also a left fractured rib.  Went to the clinic today and apparently had systolic blood pressure in the 70's.  For this reason patient brought to the ER.  Patient states does have some dizziness and shortness of breath.  No chest pain reported.  No obvious hemoptysis reported.  No recent fall reported.  No fever or chills.  No lower extremity edema of the ordinary reported.  Patient apparently usually does have \"lower blood pressure\" but not systolic of 70's.    Allergies:  No Known Allergies    Problem List:    Patient Active Problem List    Diagnosis Date Noted    GURROLA (dyspnea on exertion) 08/09/2023     Priority: Medium    Chronic heart failure with preserved ejection fraction (H) 08/09/2023     Priority: Medium    Chronic pulmonary edema 08/09/2023     Priority: Medium    Anemia of chronic disease 07/11/2023     Priority: Medium    Acute on chronic blood loss anemia 06/24/2023     Priority: Medium    Acute midline thoracic back pain 06/24/2023     Priority: Medium    Upper GI bleed 06/20/2023     Priority: Medium    Age-related osteoporosis with current pathological fracture with routine healing, subsequent encounter 11/22/2021     Priority: Medium    Chronic, continuous use of opioids 05/17/2021     Priority: Medium    Bacteremia due to Klebsiella pneumoniae on 1/5/2021 02/22/2021     Priority: Medium    Mixed hyperlipidemia 02/21/2021     Priority: Medium    Spondylosis of thoracolumbar region without " myelopathy or radiculopathy 01/06/2021     Priority: Medium    Abnormal finding on urinalysis 01/06/2021     Priority: Medium    Neuropathy 08/19/2020     Priority: Medium    Spinal stenosis of lumbar region with neurogenic claudication 02/06/2020     Priority: Medium     S/p ERP: Decompression Levels: L3 to L5 Side: Bilat on 2/6/2020 with Dr. Torres      Paroxysmal atrial fibrillation (H) 12/04/2019     Priority: Medium    Stage 3b chronic kidney disease 12/04/2019     Priority: Medium    LBBB (left bundle branch block) 06/01/2018     Priority: Medium    History of tobacco abuse quitting on 2/1/1998 06/01/2018     Priority: Medium    Mild intermittent asthma, unspecified whether complicated 06/01/2018     Priority: Medium    Chronic obstructive pulmonary disease, unspecified COPD type (H) 06/01/2018     Priority: Medium    Lumbar spine pain 10/27/2015     Priority: Medium    Automatic implantable cardioverter-defibrillator - [employer]onic multi lead ICD on 11/11/2014.  Upgrade on 1/30/2023, [facility] 11/11/2014     Priority: Medium     Implant date - 5/6/14  Problem list name updated by automated process. Provider to review      Non-ischemic cardiomyopathy (H) 12/10/2013     Priority: Medium    Congestive heart failure, NYHA class 2, unspecified congestive heart failure type (H) 12/10/2013     Priority: Medium    Insomnia 01/01/2011     Priority: Medium     Problem list name updated by automated process. Provider to review      Disorder of bone and cartilage 01/01/2011     Priority: Medium     Problem list name updated by automated process. Provider to review      Essential hypertension 01/01/2011     Priority: Medium     Problem list name updated by automated process. Provider to review      Neck pain, chronic 01/01/2011     Priority: Medium    Hypercholesteremia 06/07/2002     Priority: Medium        Past Medical History:    Past Medical History:   Diagnosis Date    Angina pectoris 01/01/2011    CHF (congestive heart  failure), NYHA class II (H) 12/10/2013    CHF (congestive heart failure), NYHA class III (H) 12/10/2013    Hyperhydrosis disorder 2011    Insomnia, unspecified 2011    LBBB (left bundle branch block) 2011    Neck pain, chronic 2011    Non-ischemic cardiomyopathy (H) 12/10/2013    Obesity, unspecified 2011    Osteopenia 2011    Pacemaker     Pain in joint, lower leg 2006    Pure hypercholesterolemia 2002    Unspecified essential hypertension 2011       Past Surgical History:    Past Surgical History:   Procedure Laterality Date    APPENDECTOMY      ARTHROSCOPY KNEE RT/LT      RT    BACK SURGERY  2020    BACK SURGERY  2021    fractured vert repair    CARDIAC SURGERY  2014    Pacemaker    Cataract extraction  2009    Bilateral    CHOLECYSTECTOMY      open     COLONOSCOPY  2001    Normal     COLONOSCOPY N/A 10/20/2016    Procedure: COLONOSCOPY;  Surgeon: Charan Montejo MD;  Location: [facility] OR    COLONOSCOPY N/A 2023    Procedure: COLONOSCOPY;  Surgeon: Royal Snaz MD;  Location: [facility] OR    colonoscopy with polypectomy      Repeat 3 years     CT CORONARY ANGIOGRAM      normal    ESOPHAGOSCOPY, GASTROSCOPY, DUODENOSCOPY (EGD), COMBINED N/A 2023    Procedure: ESOPHAGOGASTRODUODENOSCOPY;  Surgeon: Royal Sanz MD;  Location: [facility] OR    HERPES ZOSTER PCR ([facility])      IR CONSULTATION FOR IR EXAM  2020    IR CONSULTATION FOR IR EXAM  2023    IR TRIGGER POINT INJ 3 OR MORE MUSCLES  2023    left eye scar tissue      normal echo      fen-phen use      x6      REPLACE PACEMAKER GENERATOR N/A 2023    Procedure: REPLACEMENT, PULSE GENERATOR, CARDIAC PACEMAKER;  Surgeon: Isiah Hodge MD;  Location:  OR    SURGICAL RADIOLOGY PROCEDURE N/A 2016    Procedure: SURGICAL RADIOLOGY PROCEDURE;  Surgeon: Provider, Generic Perianesthesia Nursing;  Location: [facility] OR       Family History:     Family History   Problem Relation Age of Onset    Cancer Mother         lung (cause of death)     Peptic Ulcer Disease Father         gastric        Social History:  Marital Status:   [2]  Social History     Tobacco Use    Smoking status: Former     Packs/day: 1.00     Years: 15.00     Pack years: 15.00     Types: Cigarettes     Start date: 1983     Quit date: 1998     Years since quittin.6    Smokeless tobacco: Never    Tobacco comments:     Passive Exposure: No; Longest Tobacco Free: 15 years    Vaping Use    Vaping Use: Never used   Substance Use Topics    Alcohol use: Not Currently     Comment: Occasionally     Drug use: No        Medications:    acetaminophen (TYLENOL) 325 MG tablet  Calcium Carbonate-Vitamin D (CALCIUM 600 + D OR)  carvedilol (COREG) 12.5 MG tablet  ferrous sulfate (FEROSUL) 325 (65 Fe) MG tablet  furosemide (LASIX) 20 MG tablet  gabapentin (NEURONTIN) 400 MG capsule  HYDROcodone-acetaminophen (NORCO) 5-325 MG tablet  INCRUSE ELLIPTA 62.5 MCG/ACT inhaler  mometasone-formoterol (DULERA) 200-5 MCG/ACT inhaler  Multiple Vitamin (MULTI-VITAMIN DAILY PO)  pantoprazole (PROTONIX) 40 MG EC tablet  rosuvastatin (CRESTOR) 10 MG tablet  sacubitril-valsartan (ENTRESTO)  MG per tablet  vitamin C (ASCORBIC ACID) 500 MG tablet          Review of Systems   Constitutional:  Positive for fatigue.   HENT: Negative.     Eyes: Negative.    Respiratory:  Positive for cough and shortness of breath.    Cardiovascular:  Positive for leg swelling.   Gastrointestinal: Negative.    Endocrine: Negative.    Genitourinary: Negative.    Musculoskeletal:  Positive for back pain (Thoracic back pain and left rib pain.).   Skin: Negative.    Allergic/Immunologic: Negative.    Neurological: Negative.    Hematological: Negative.    Psychiatric/Behavioral: Negative.         Physical Exam   BP: (!) 109/49  Pulse: 65  Temp: 97.7  F (36.5  C)  Resp: 16  Weight: 64.6 kg (142 lb 6.7 oz)  SpO2: 95 %  Lying  Orthostatic BP: 118/52  Sitting Orthostatic BP: 128/60  Standing Orthostatic BP: 102/68      GENERAL APPEARANCE:  The patient is a 88 year old well-developed, well-nourished individual in no acute distress that appears as stated age.  NECK:  Supple.  Trachea is midline.   No carotid bruits present on auscultation.  CHEST:  Symmetric.  Non-tender to palpation.  No crepitus or deformity.  LUNGS:  Breathing is easy.  Breath sounds are equal bilaterally.  Coarse crackles in bilateral lower lobes.  HEART: Bradycardic rate with regular rhythm with normal S1 and S2.  No murmurs, gallops, or rubs.  ABDOMEN:  No abdominal bruits or thrills present upon auscultation/palpation.  EXTREMITIES: 1+ bilateral lower extremity edema present.    NEUROLOGIC:  No focal sensory or motor deficits are noted.    PSYCHIATRIC:  The patient is awake, alert, and oriented x4.  Recent and remote memory is intact.  Appropriate mood and affect.  Calm and cooperative with history and physical exam.  SKIN:  Warm, dry, and well perfused.  Good turgor.  No lesions, nodules, or rashes are noted.  No bruising noted.      Comment: Discrepancies between my note and notes on behalf of the nursing team or other care providers are secondary to my findings reflecting my physical examination and questioning of the patient.  Any conflicting information provided is not in line with my examination of the patient.       ED Course              ED Course as of 09/26/23 1413   Tue Sep 26, 2023   1125 In to see patient and history/physical completed.    1133 Labs, ECG, chest x-ray ordered.   1153 POCUS echocardiogram difficult to complete.  Aortic outflow track normal.  EF normal.  No pericardial effusion noted.  Unable to obtain IVC.   1155 EKG 12-lead, tracing only  No STEMI noted.   1235 Troponin T, High Sensitivity(!): 64  2-hour repeat troponin ordered for 1330.   1245 Patient does have new ALVARADO noted compared to 3 months ago.  With history of CHF did order 500 cc  bolus of LR.   1406 Troponin T, High Sensitivity(!): 53  High-sensitivity repeat troponin 53 making ACS unlikely.  We will discharge patient home to continue hydration therapy.  Follow-up with PCP.  Return if worsening.  Patient and daughter in agreement.     POC US ECHO LIMITED    Date/Time: 9/26/2023 11:50 AM    Performed by: Min Granados APRN CNP  Authorized by: Min Granados APRN CNP    Comments:      Limited Bedside Cardiac Ultrasound, performed and interpreted by me.   Indication: Weakness and Hypotension/shock.  Parasternal long axis, parasternal short axis, and apical 4 chamber views were acquired.   Image quality was satisfactory.    Findings:    Global left ventricular function appears intact.  Chambers do not appear dilated.  There is no evidence of free fluid within the pericardium.    IMPRESSION: Grossly normal left ventricular function and chamber size.  No pericardial effusion.       ECG:    ECG competed at 1154 and personally reviewed at 1155 showing atrial sensed ventricular pacing with frequent AV dual pacing complexes present.  Ventricular rate 66.  QTc 446.  Normal axis.  No eschar Bosa criteria met.  Abnormal ECG.  When compared to ECG from 6/20/2023, patient does have atrial pacing now noted.  No other significant changes present.         Results for orders placed or performed during the hospital encounter of 09/26/23 (from the past 24 hour(s))   CBC with platelets differential    Narrative    The following orders were created for panel order CBC with platelets differential.  Procedure                               Abnormality         Status                     ---------                               -----------         ------                     CBC with platelets and d...[[id number]]  Abnormal            Final result                 Please view results for these tests on the individual orders.   Comprehensive metabolic panel   Result Value Ref Range    Sodium 139 135 - 145 mmol/L     Potassium 4.7 3.4 - 5.3 mmol/L    Carbon Dioxide (CO2) 25 22 - 29 mmol/L    Anion Gap 10 7 - 15 mmol/L    Urea Nitrogen 35.7 (H) 8.0 - 23.0 mg/dL    Creatinine 1.45 (H) 0.51 - 0.95 mg/dL    GFR Estimate 35 (L) >60 mL/min/1.73m2    Calcium 9.6 8.8 - 10.2 mg/dL    Chloride 104 98 - 107 mmol/L    Glucose 122 (H) 70 - 99 mg/dL    Alkaline Phosphatase 65 35 - 104 U/L    AST 20 0 - 45 U/L    ALT 10 0 - 50 U/L    Protein Total 6.4 6.4 - 8.3 g/dL    Albumin 3.6 3.5 - 5.2 g/dL    Bilirubin Total 0.3 <=1.2 mg/dL   Troponin T, High Sensitivity   Result Value Ref Range    Troponin T, High Sensitivity 64 (H) <=14 ng/L   Magnesium   Result Value Ref Range    Magnesium 2.1 1.7 - 2.3 mg/dL   Nt probnp inpatient (BNP)   Result Value Ref Range    N terminal Pro BNP Inpatient 880 0 - 1,800 pg/mL   [place] Draw    Narrative    The following orders were created for panel order [place] Draw.  Procedure                               Abnormality         Status                     ---------                               -----------         ------                     Extra Blue Top Tube[[id number]]                              Final result               Extra Red Top Tube[[id number]]                               Final result               Extra Green Top (Lithium...[[id number]]                      Final result               Extra Purple Top Tube[[id number]]                            Final result               Extra Green Top (Lithium...[[id number]]                      Final result                 Please view results for these tests on the individual orders.   CBC with platelets and differential   Result Value Ref Range    WBC Count 4.9 4.0 - 11.0 10e3/uL    RBC Count 3.69 (L) 3.80 - 5.20 10e6/uL    Hemoglobin 11.0 (L) 11.7 - 15.7 g/dL    Hematocrit 35.7 35.0 - 47.0 %    MCV 97 78 - 100 fL    MCH 29.8 26.5 - 33.0 pg    MCHC 30.8 (L) 31.5 - 36.5 g/dL    RDW 13.6 10.0 - 15.0 %    Platelet Count 152 150 - 450 10e3/uL    % Neutrophils 69 %    %  Lymphocytes 18 %    % Monocytes 10 %    % Eosinophils 2 %    % Basophils 1 %    % Immature Granulocytes 0 %    NRBCs per 100 WBC 0 <1 /100    Absolute Neutrophils 3.5 1.6 - 8.3 10e3/uL    Absolute Lymphocytes 0.9 0.8 - 5.3 10e3/uL    Absolute Monocytes 0.5 0.0 - 1.3 10e3/uL    Absolute Eosinophils 0.1 0.0 - 0.7 10e3/uL    Absolute Basophils 0.0 0.0 - 0.2 10e3/uL    Absolute Immature Granulocytes 0.0 <=0.4 10e3/uL    Absolute NRBCs 0.0 10e3/uL   Extra Blue Top Tube   Result Value Ref Range    Hold Specimen JIC    Extra Red Top Tube   Result Value Ref Range    Hold Specimen JIC    Extra Green Top (Lithium Heparin) Tube   Result Value Ref Range    Hold Specimen JIC    Extra Purple Top Tube   Result Value Ref Range    Hold Specimen JIC    Extra Green Top (Lithium Heparin) ON ICE   Result Value Ref Range    Hold Specimen JIC    EKG 12-lead, tracing only   Result Value Ref Range    Systolic Blood Pressure  mmHg    Diastolic Blood Pressure  mmHg    Ventricular Rate 66 BPM    Atrial Rate 66 BPM    DE Interval 180 ms    QRS Duration 132 ms     ms    QTc 446 ms    P Axis 92 degrees    R AXIS 270 degrees    T Axis 48 degrees    Interpretation ECG       Atrial-sensed ventricular-paced rhythm with frequent AV dual-paced complexes  Abnormal ECG  When compared with ECG of 06-MAY-2014 12:30,  Vent. rate has decreased BY   3 BPM     XR Chest Port 1 View    Narrative    PROCEDURE:  XR CHEST PORT 1 VIEW    HISTORY: Shortness of breath. .    COMPARISON:  1/1/2023    FINDINGS:    The cardiomediastinal contours are stable.  No focal consolidation, effusion or pneumothorax. Underlying emphysema  is again suggested.      Impression    IMPRESSION:  Stable portable chest.      TANIA RUIZ MD         SYSTEM ID:  [id number]   Troponin T, High Sensitivity   Result Value Ref Range    Troponin T, High Sensitivity 53 (H) <=14 ng/L       Medications   lactated ringers BOLUS 500 mL (0 mLs Intravenous Stopped 9/26/23 5966)        Assessments & Plan (with Medical Decision Making)     I have reviewed the nursing notes.    I have reviewed the findings, diagnosis, plan and need for follow up with the patient.      Summary:  Patient presents to the ER today for hypotension.  Potential diagnosis which have been considered and evaluated include dehydration, MI/NSTEMI, CHF, sepsis, obstructive shock, as well as others. Many of these have been excluded using the various modalities and assessment as noted on the chart. At the present time, the diagnosis given seems to be the most likely dehydration causing hypotension.  Upon arrival, vitals signs are normal.  The patient is alert and oriented no distress.  Cardiac examination normal.  Does have coarse crackles in bilateral bases but no respiratory distress, wheezes, stridor.  Trace lower extremity edema is present.  ECG obtained showing no acute abnormalities but patient is ventricular lead paced and occasional atrial paced beats present.  Initial troponin did come back elevated at 64 and 2-hour repeat is 53.  With patient having pacemaker and AICD, this is likely patient's baseline troponin level and this is not ACS.  Lab work showed WBC of 4.9 with hemoglobin of 11.0 which is improved from previous.  Electrolytes and hepatic functions benign.  Does have BUN of 35.7 with creatinine of 1.45 and GFR of 35 which is slightly worse than baseline.   which is reassuring.  Technically difficult bedside echo was completed showing no pleural effusion, normal ejection fraction, and no obvious chamber dilation which is reassuring.  Patient hydrated with 500 cc LR due to slight hypotension and acute on chronic ALVARADO.  Patient is feeling better.  We will discharge patient home at this time to follow-up with her PCP.  Good hydration at home recommended.  Return to ER if worsening or any concerns, otherwise follow-up with PCP as described above.  Patient and daughter verbalized understanding agree with  plan of care.  Patient discharged home.        Critical Care Time: None    Impression and plan discussed with patient. Questions answered, concerns addressed, indications for urgent re-evaluation reviewed, and  given. Patient/Parent/Caregiver agree with treatment plan and have no further questions at this time.  AVS provided at discharge.    This note was created by the Dragon Voice Dictation System. Inadvertent typographical errors, due to software recognition problems, may still exist.             New Prescriptions    No medications on file       Final diagnoses:   Acute on chronic renal insufficiency   Hypotension, unspecified hypotension type       9/26/2023   [facility]       Min Granados APRN CNP  09/26/23 7177

## 2023-09-26 NOTE — ED TRIAGE NOTES
Pt presents from the clinic with c/o hypotension. Pt was going to the clinic for follow up for rib pain.

## 2023-09-26 NOTE — DISCHARGE INSTRUCTIONS
Hydrate:   During this time it is important to keep well hydrated with water, juices, or low calorie sports drinks.  Using 1/2 strength G2, Gatorade Zero, or PowerAde Zero is best choice (mix half and half with water).  DO NOT use caffeinated or alcoholic beverages for hydration, as these actually dehydrate you.         Follow-up with your primary care provider for reevaluation.  Contact your primary care provider if you have any questions or concerns.  Do not hesitate to return to the ER if any new or worsening symptoms.     Please read the attached instructions (if any).  They highlight more specific treatments and interventions for you at home.              Thank you for letting me participate in your care and wish you a fast and uneventful recovery,    Min DAVIS, CNP    Do not hesitate to contact me with questions or concerns.  [email or web address]  [email or web address]

## 2023-09-26 NOTE — LETTER
Opioid / Opioid Plus Controlled Substance Agreement    This is an agreement between you and your provider about the safe and appropriate use of controlled substance/opioids prescribed by your care team. Controlled substances are medicines that can cause physical and mental dependence (abuse).    There are strict laws about having and using these medicines. We here at [facility] are committing to working with you in your efforts to get better. To support you in this work, we ll help you schedule regular office appointments for medicine refills. If we must cancel or change your appointment for any reason, we ll make sure you have enough medicine to last until your next appointment.     As a Provider, I will:  Listen carefully to your concerns and treat you with respect.   Recommend a treatment plan that I believe is in your best interest. This plan may involve therapies other than opioid pain medication.   Talk with you often about the possible benefits, and the risk of harm of any medicine that we prescribe for you.   Provide a plan on how to taper (discontinue or go off) using this medicine if the decision is made to stop its use.    As a Patient, I understand that opioid(s):   Are a controlled substance prescribed by my care team to help me function or work and manage my condition(s).   Are strong medicines and can cause serious side effects such as:  Drowsiness, which can seriously affect my driving ability  A lower breathing rate, enough to cause death  Harm to my thinking ability   Depression   Abuse of and addiction to this medicine  Need to be taken exactly as prescribed. Combining opioids with certain medicines or chemicals (such as illegal drugs, sedatives, sleeping pills, and benzodiazepines) can be dangerous or even fatal. If I stop opioids suddenly, I may have severe withdrawal symptoms.  Do not work for all types of pain nor for all patients. If they re not helpful, I may be asked to stop  them.        The risks, benefits and side effects of these medicine(s) were explained to me. I agree that:  I will take part in other treatments as advised by my care team. This may be psychiatry or counseling, physical therapy, behavioral therapy, group treatment or a referral to a specialist.     I will keep all my appointments. I understand that this is part of the monitoring of opioids. My care team may require an office visit for EVERY opioid/controlled substance refill. If I miss appointments or don t follow instructions, my care team may stop my medicine.    I will take my medicines as prescribed. I will not change the dose or schedule unless my care team tells me to. There will be no refills if I run out early.     I may be asked to come to the clinic and complete a urine drug test or complete a pill count at any time. If I don t give a urine sample or participate in a pill count, the care team may stop my medicine.    I will only receive prescriptions from this clinic for chronic pain. If I am treated by another provider for acute pain issues, I will tell them that I am taking opioid pain medication for chronic pain and that I have a treatment agreement with this provider. I will inform my [facility] care team within one business day if I am given a prescription for any pain medication by another healthcare provider. My [facility] care team can contact other providers and pharmacists about my use of any medicines.    It is up to me to make sure that I don t run out of my medicines on weekends or holidays. If my care team is willing to refill my opioid prescription without a visit, I must request refills only during office hours. Refills may take up to 3 business days to process. I will use one pharmacy to fill all my opioid and other controlled substance prescriptions. I will notify the clinic about any changes to my insurance or medication availability.    I am responsible for my  prescriptions. If the medicine/prescription is lost, stolen or destroyed, it will not be replaced. I also agree not to share controlled substance medicines with anyone.    I am aware I should not use any illegal or recreational drugs. I agree not to drink alcohol unless my care team says I can.       If I enroll in the [place] [facility] program, I will tell my care team prior to my next refill.     I will tell my care team right away if I become pregnant, have a new medical problem treated outside of my regular clinic, or have a change in my medications.    I understand that this medicine can affect my thinking, judgment and reaction time. Alcohol and drugs affect the brain and body, which can affect the safety of my driving. Being under the influence of alcohol or drugs can affect my decision-making, behaviors, personal safety, and the safety of others. Driving while impaired (DWI) can occur if a person is driving, operating, or in physical control of a car, motorcycle, boat, snowmobile, ATV, motorbike, off-road vehicle, or any other motor vehicle ([place] Statute 169A.20). I understand the risk if I choose to drive or operate any vehicle or machinery.    I understand that if I do not follow any of the conditions above, my prescriptions or treatment may be stopped or changed.          Opioids  What You Need to Know    What are opioids?   Opioids are pain medicines that must be prescribed by a doctor. They are also known as narcotics.     Examples are:   morphine (MS Contin, Selene)  oxycodone (Oxycontin)  oxycodone and acetaminophen (Percocet)  hydrocodone and acetaminophen (Vicodin, Norco)   fentanyl patch (Duragesic)   hydromorphone (Dilaudid)   methadone  codeine (Tylenol #3)     What do opioids do well?   Opioids are best for severe short-term pain such as after a surgery or injury. They may work well for cancer pain. They may help some people with long-lasting (chronic) pain.     What do opioids NOT do  well?   Opioids never get rid of pain entirely, and they don t work well for most patients with chronic pain. Opioids don t reduce swelling, one of the causes of pain.                                    Other ways to manage chronic pain and improve function include:     Treat the health problem that may be causing pain  Anti-inflammation medicines, which reduce swelling and tenderness, such as ibuprofen (Advil, Motrin) or naproxen (Aleve)  Acetaminophen (Tylenol)  Antidepressants and anti-seizure medicines, especially for nerve pain  Topical treatments such as patches or creams  Injections or nerve blocks  Chiropractic or osteopathic treatment  Acupuncture, massage, deep breathing, meditation, visual imagery, aromatherapy  Use heat or ice at the pain site  Physical therapy   Exercise  Stop smoking  Take part in therapy       Risks and side effects     Talk to your doctor before you start or decide to keep taking opioids. Possible side effects include:    Lowering your breathing rate enough to cause death  Overdose, including death, especially if taking higher than prescribed doses  Worse depression symptoms; less pleasure in things you usually enjoy  Feeling tired or sluggish  Slower thoughts or cloudy thinking  Being more sensitive to pain over time; pain is harder to control  Trouble sleeping or restless sleep  Changes in hormone levels (for example, less testosterone)  Changes in sex drive or ability to have sex  Constipation  Unsafe driving  Itching and sweating  Dizziness  Nausea, throwing up and dry mouth    What else should I know about opioids?    Opioids may lead to dependence, tolerance, or addiction.    Dependence means that if you stop or reduce the medicine too quickly, you will have withdrawal symptoms. These include loose poop (diarrhea), jitters, flu-like symptoms, nervousness and tremors. Dependence is not the same as addiction.                     Tolerance means needing higher doses over time to  get the same effect. This may increase the chance of serious side effects.    Addiction is when people improperly use a substance that harms their body, their mind or their relations with others. Use of opiates can cause a relapse of addiction if you have a history of drug or alcohol abuse.    People who have used opioids for a long time may have a lower quality of life, worse depression, higher levels of pain and more visits to doctors.    You can overdose on opioids. Take these steps to lower your risk of overdose:    Recognize the signs:  Signs of overdose include decrease or loss of consciousness (blackout), slowed breathing, trouble waking up and blue lips. If someone is worried about overdose, they should call 911.    Talk to your doctor about Narcan (naloxone).   If you are at risk for overdose, you may be given a prescription for Narcan. This medicine very quickly reverses the effects of opioids.   If you overdose, a friend or family member can give you Narcan while waiting for the ambulance. They need to know the signs of overdose and how to give Narcan.     Don't use alcohol or street drugs.   Taking them with opioids can cause death.    Do not take any of these medicines unless your doctor says it s OK. Taking these with opioids can cause death:  Benzodiazepines, such as lorazepam (Ativan), alprazolam (Xanax) or diazepam (Valium)  Muscle relaxers, such as cyclobenzaprine (Flexeril)  Sleeping pills like zolpidem (Ambien)   Other opioids      How to keep you and other people safe while taking opioids:    Never share your opioids with others.  Opioid medicines are regulated by the Drug Enforcement Agency ([place]). Selling or sharing medications is a criminal act.    2. Be sure to store opioids in a secure place, locked up if possible. Young children can easily swallow them and overdose.    3. When you are traveling with your medicines, keep them in the original bottles. If you use a pill box, be sure you also  carry a copy of your medicine list from your clinic or pharmacy.    4. Safe disposal of opioids    Most pharmacies have places to get rid of medicine, called disposal kiosks. Medicine disposal options are also available in every [facility]. Search your county and  medication disposal  to find more options. You can find more details at:  [email or web address]://[email or web address]-green/managing-unwanted-medications     I agree that my provider, clinic care team, and pharmacy may work with any city, state or federal law enforcement agency that investigates the misuse, sale, or other diversion of my controlled medicine. I will allow my provider to discuss my care with, or share a copy of, this agreement with any other treating provider, pharmacy or emergency room where I receive care.    I have read this agreement and have asked questions about anything I did not understand.    _______________________________________________________  Patient Signature - Jacklyn Hein _____________________                   Date     _______________________________________________________  Provider Signature - Ilia Moran MD   _____________________                   Date     _______________________________________________________  Witness Signature (required if provider not present while patient signing)   _____________________                   Date

## 2023-09-26 NOTE — PROGRESS NOTES
"  Assessment & Plan     Hypotension, unspecified hypotension type  Generalized muscle weakness  Quick evaluation done. Concerned about her general state and symptomatic Hypotension-- pt needed to be evaluated in ER and discussed  with pt and dtr.   DR Hollingsworth graciously accepted . Pt sent to ER             BMI:   Estimated body mass index is 23 kg/m  as calculated from the following:    Height as of 6/20/23: 1.626 m (5' 4\").    Weight as of this encounter: 60.8 kg (134 lb).           No follow-ups on file.    Ilia Moran MD  [facility] - GABRIEL golden is a 88 year old, presenting for the following health issues:  No chief complaint on file.      HPI     Patient has been very weak since hospital admission on 6/20/2023 but has gotten much weaker since 9/20/2023      Hypertension Follow-up    Do you check your blood pressure regularly outside of the clinic? No   Are you following a low salt diet? Yes  Are your blood pressures ever more than 140 on the top number (systolic) OR more   than 90 on the bottom number (diastolic), for example 140/90? Unknown patient is not checking blood pressure outside of the clinic    Atrial Fibrillation Follow-up    Symptoms: no recent chest pain, significant palpitations, dizziness/lightheadedness, dyspnea, or increased peripheral edema., dizziness/lightheadedness, and dyspnea  Stroke prevention: None        6/24/2023     5:00 AM 6/24/2023     7:28 AM 6/26/2023    10:47 AM 7/11/2023     1:11 PM 8/9/2023     1:27 PM   Date   Pulse 70 76 85 71 96     Current [id number] Score: 5 points - A score of 5 or greater represents a 7.2 - 12.2% annual risk of major embolic event, without anti-coagulation or an LAAO device.       Chronic Kidney Disease Follow-up    Do you take any over the counter pain medicine?: Yes  What over the counter medicine are you taking for your pain?:  Tylenol    How often do you take this medicine?:  Two times daily  Chronic/Recurring " Back Pain Follow Up    Where is your back pain located? (Select all that apply) middle of back bilateral into ribs   How would you describe your back pain?  sharp, shooting, and stabbing  Where does your back pain spread?the middle neck  Since your last clinic visit for back pain, how has your pain changed? gradually worsening  Does your back pain interfere with your job? Not applicable  Since your last visit, have you tried any new treatment? No          Review of Systems         Objective    BP (!) 76/47 (BP Location: Right arm, Patient Position: Sitting, Cuff Size: Adult Regular)   Pulse 71   Temp 97.4  F (36.3  C) (Tympanic)   Wt 60.8 kg (134 lb)   SpO2 92%   BMI 23.00 kg/m    Body mass index is 23 kg/m .  Manual recheck BP - 70/40  Physical Exam   GENERAL: healthy, alert and no distress- laying on exam -- weak moving and getting up. Looks dry   RESP: lungs clear to auscultation - no rales, rhonchi or wheezes  CV: regular rate and rhythm, normal S1 S2, no S3 or S4, no murmur, click or rub, no peripheral edema and peripheral pulses strong

## 2023-09-27 ENCOUNTER — TELEPHONE (OUTPATIENT)
Dept: FAMILY MEDICINE | Facility: OTHER | Age: 88
End: 2023-09-27

## 2023-09-27 NOTE — TELEPHONE ENCOUNTER
1:59 PM    Reason for Call: OVERBOOK    Patient is having the following symptoms: Patient needs ER follow up next week with Dr Moran    The patient is requesting an appointment for NEXT WEEK with Dr Moran.    Was an appointment offered for this call? No    Preferred method for responding to this message: Telephone Call  What is your phone number ?[phone] - Patient     If we cannot reach you directly, may we leave a detailed response at the number you provided? Yes    Can this message wait until your PCP/provider returns, if unavailable today? Not applicable    Diane Atkins

## 2023-09-28 ENCOUNTER — TELEPHONE (OUTPATIENT)
Dept: FAMILY MEDICINE | Facility: OTHER | Age: 88
End: 2023-09-28

## 2023-09-28 NOTE — TELEPHONE ENCOUNTER
Emergency Department and Urgent Care Follow-up    Reason for ER/UC visit: hypotension  Date seen: 09/26/23    New or Worsening symptoms:  no     Prescription Received/Picked up from Pharmacy?: no   Medications started? N/a  Any questions or issues regarding your prescription?: n/a    Follow-up Results or Labs that are pending: everything completed    Questions or concerns?: no    ER Recommends Follow-up by: within 1 week    RN Recommendations: be reseen in UC/ED with any new or worsening symptoms  Appointment scheduled: 10/02/23    If you start feeling worse, or have any further questions, please feel free to contact Nurse Triage at [phone].  If needing immediate medical attention at any time please call 911/Go to the ER.

## 2023-09-29 LAB
ATRIAL RATE - MUSE: 66 BPM
DIASTOLIC BLOOD PRESSURE - MUSE: NORMAL MMHG
INTERPRETATION ECG - MUSE: NORMAL
P AXIS - MUSE: 92 DEGREES
PR INTERVAL - MUSE: 180 MS
QRS DURATION - MUSE: 132 MS
QT - MUSE: 426 MS
QTC - MUSE: 446 MS
R AXIS - MUSE: 270 DEGREES
SYSTOLIC BLOOD PRESSURE - MUSE: NORMAL MMHG
T AXIS - MUSE: 48 DEGREES
VENTRICULAR RATE- MUSE: 66 BPM

## 2023-09-29 NOTE — PATIENT INSTRUCTIONS
Complete audiogram.   Ears were cleaned. Normal ear exam  Follow up in 1 year or as needed for ear cleaning      Thank you for allowing ROSA Farah and our ENT team to participate in your care.  If your medications are too expensive, please give the nurse a call.  We can possibly change this medication.  If you have a scheduling or an appointment question please contact our Health Unit Coordinator at their direct line [phone].   ALL nursing questions or concerns can be directed to your ENT nurse, Patricia at: [phone].

## 2023-10-02 ENCOUNTER — LAB (OUTPATIENT)
Dept: LAB | Facility: OTHER | Age: 88
End: 2023-10-02
Attending: FAMILY MEDICINE
Payer: COMMERCIAL

## 2023-10-02 ENCOUNTER — OFFICE VISIT (OUTPATIENT)
Dept: FAMILY MEDICINE | Facility: OTHER | Age: 88
End: 2023-10-02
Attending: FAMILY MEDICINE
Payer: MEDICARE

## 2023-10-02 VITALS
BODY MASS INDEX: 24.24 KG/M2 | TEMPERATURE: 97.3 F | HEART RATE: 65 BPM | OXYGEN SATURATION: 96 % | DIASTOLIC BLOOD PRESSURE: 56 MMHG | SYSTOLIC BLOOD PRESSURE: 102 MMHG | RESPIRATION RATE: 16 BRPM | HEIGHT: 64 IN | WEIGHT: 142 LBS

## 2023-10-02 DIAGNOSIS — F11.90 CHRONIC, CONTINUOUS USE OF OPIOIDS: Chronic | ICD-10-CM

## 2023-10-02 DIAGNOSIS — J43.8 OTHER EMPHYSEMA (H): ICD-10-CM

## 2023-10-02 DIAGNOSIS — M47.815 SPONDYLOSIS OF THORACOLUMBAR REGION WITHOUT MYELOPATHY OR RADICULOPATHY: ICD-10-CM

## 2023-10-02 DIAGNOSIS — I42.8 NON-ISCHEMIC CARDIOMYOPATHY (H): ICD-10-CM

## 2023-10-02 DIAGNOSIS — G62.9 NEUROPATHY: ICD-10-CM

## 2023-10-02 DIAGNOSIS — M48.062 SPINAL STENOSIS OF LUMBAR REGION WITH NEUROGENIC CLAUDICATION: ICD-10-CM

## 2023-10-02 DIAGNOSIS — I95.9 HYPOTENSION, UNSPECIFIED HYPOTENSION TYPE: Primary | ICD-10-CM

## 2023-10-02 DIAGNOSIS — N18.32 STAGE 3B CHRONIC KIDNEY DISEASE (H): ICD-10-CM

## 2023-10-02 DIAGNOSIS — M62.81 GENERALIZED MUSCLE WEAKNESS: ICD-10-CM

## 2023-10-02 LAB
ANION GAP SERPL CALCULATED.3IONS-SCNC: 11 MMOL/L (ref 7–15)
BUN SERPL-MCNC: 31 MG/DL (ref 8–23)
CALCIUM SERPL-MCNC: 9.6 MG/DL (ref 8.8–10.2)
CHLORIDE SERPL-SCNC: 99 MMOL/L (ref 98–107)
CREAT SERPL-MCNC: 1.25 MG/DL (ref 0.51–0.95)
DEPRECATED HCO3 PLAS-SCNC: 25 MMOL/L (ref 22–29)
EGFRCR SERPLBLD CKD-EPI 2021: 42 ML/MIN/1.73M2
GLUCOSE SERPL-MCNC: 98 MG/DL (ref 70–99)
POTASSIUM SERPL-SCNC: 4.7 MMOL/L (ref 3.4–5.3)
SODIUM SERPL-SCNC: 135 MMOL/L (ref 135–145)

## 2023-10-02 PROCEDURE — 90662 IIV NO PRSV INCREASED AG IM: CPT

## 2023-10-02 PROCEDURE — 80048 BASIC METABOLIC PNL TOTAL CA: CPT | Mod: ZL

## 2023-10-02 PROCEDURE — 36415 COLL VENOUS BLD VENIPUNCTURE: CPT | Mod: ZL

## 2023-10-02 PROCEDURE — 99214 OFFICE O/P EST MOD 30 MIN: CPT | Performed by: FAMILY MEDICINE

## 2023-10-02 PROCEDURE — G0463 HOSPITAL OUTPT CLINIC VISIT: HCPCS | Mod: 25 | Performed by: FAMILY MEDICINE

## 2023-10-02 RX ORDER — GABAPENTIN 400 MG/1
CAPSULE ORAL
Qty: 90 CAPSULE | Refills: 11 | Status: SHIPPED | OUTPATIENT
Start: 2023-10-02

## 2023-10-02 RX ORDER — HYDROCODONE BITARTRATE AND ACETAMINOPHEN 5; 325 MG/1; MG/1
2 TABLET ORAL 3 TIMES DAILY PRN
Qty: 180 TABLET | Refills: 0 | Status: SHIPPED | OUTPATIENT
Start: 2023-10-02 | End: 2023-11-30

## 2023-10-02 RX ORDER — UMECLIDINIUM 62.5 UG/1
1 AEROSOL, POWDER ORAL DAILY
Qty: 30 EACH | Refills: 3 | Status: SHIPPED | OUTPATIENT
Start: 2023-10-02 | End: 2024-01-30

## 2023-10-02 ASSESSMENT — PAIN SCALES - GENERAL: PAINLEVEL: MILD PAIN (2)

## 2023-10-02 NOTE — PROGRESS NOTES
Assessment & Plan       ICD-10-CM    1. Hypotension, unspecified hypotension type  I95.9       2. Generalized muscle weakness  M62.81 Physical Therapy Referral      3. Non-ischemic cardiomyopathy (H)  I42.8 Physical Therapy Referral      4. Stage 3b chronic kidney disease  N18.32       5. Spinal stenosis of lumbar region with neurogenic claudication  M48.062 HYDROcodone-acetaminophen (NORCO) 5-325 MG tablet     gabapentin (NEURONTIN) 400 MG capsule      6. Chronic, continuous use of opioids  F11.90       7. Other emphysema (H)  J43.8 umeclidinium (INCRUSE ELLIPTA) 62.5 MCG/ACT inhaler      8. Neuropathy  G62.9 HYDROcodone-acetaminophen (NORCO) 5-325 MG tablet     gabapentin (NEURONTIN) 400 MG capsule     Physical Therapy Referral      9. 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 RF on meds done  Flu shot given   No change in meds  Push fluids discussed  Will refer to PT due to slow progressive weakness   Follow-up in 2 months   Symptomatic treatment was discussed along when patient should call and/or come back into the clinic or go to ER/Urgent care. All questions answered.              MED REC REQUIRED  Post Medication Reconciliation Status: discharge medications reconciled, continue medications without change      No follow-ups on file.    Ilia Moran MD  [facility] - GABRIEL Angulo is a 88 year old, presenting for the following health issues:  ER F/U        10/2/2023     1:30 PM   Additional Questions   Roomed by LOUIS Goldstein   Accompanied by daughter       HPI     ED/UC Followup:    Facility:  [facility]  Date of visit: 09/26/2023  Reason for visit: Hypotension  Current Status: improving, still weak and tired   ER note reviewed  Feels much better but generalized weakness and tired  Pain in back controlled with lortab  "using most days 2 / 2    Eating and drinking better  BP at home  systolic    Review of Systems   Constitutional, HEENT, cardiovascular, pulmonary, gi and gu systems are negative, except as otherwise noted.      Objective    /56 (BP Location: Left arm, Patient Position: Sitting, Cuff Size: Adult Regular)   Pulse 65   Temp 97.3  F (36.3  C) (Tympanic)   Resp 16   Ht 1.626 m (5' 4\")   Wt 64.4 kg (142 lb)   SpO2 96%   BMI 24.37 kg/m    There is no height or weight on file to calculate BMI.  Physical Exam   GENERAL: healthy, alert and no distress  NECK: no adenopathy, no asymmetry, masses, or scars and thyroid normal to palpation  RESP: lungs clear to auscultation - no rales, rhonchi or wheezes  CV: regular rate and rhythm, normal S1 S2, no S3 or S4, no murmur, click or rub, no peripheral edema and peripheral pulses strong  ABDOMEN: soft, nontender, no hepatosplenomegaly, no masses and bowel sounds normal  MS: no gross musculoskeletal defects noted, no edema    Results for orders placed or performed in visit on 10/02/23   Basic metabolic panel     Status: Abnormal   Result Value Ref Range    Sodium 135 135 - 145 mmol/L    Potassium 4.7 3.4 - 5.3 mmol/L    Chloride 99 98 - 107 mmol/L    Carbon Dioxide (CO2) 25 22 - 29 mmol/L    Anion Gap 11 7 - 15 mmol/L    Urea Nitrogen 31.0 (H) 8.0 - 23.0 mg/dL    Creatinine 1.25 (H) 0.51 - 0.95 mg/dL    GFR Estimate 42 (L) >60 mL/min/1.73m2    Calcium 9.6 8.8 - 10.2 mg/dL    Glucose 98 70 - 99 mg/dL                       "

## 2023-10-05 ENCOUNTER — HOSPITAL ENCOUNTER (EMERGENCY)
Facility: HOSPITAL | Age: 88
Discharge: HOME | End: 2023-10-05
Attending: NURSE PRACTITIONER | Admitting: NURSE PRACTITIONER
Payer: MEDICARE

## 2023-10-05 ENCOUNTER — TELEPHONE (OUTPATIENT)
Dept: FAMILY MEDICINE | Facility: OTHER | Age: 88
End: 2023-10-05

## 2023-10-05 ENCOUNTER — OFFICE VISIT (OUTPATIENT)
Dept: OTOLARYNGOLOGY | Facility: OTHER | Age: 88
End: 2023-10-05
Attending: PHYSICIAN ASSISTANT
Payer: MEDICARE

## 2023-10-05 VITALS
SYSTOLIC BLOOD PRESSURE: 68 MMHG | OXYGEN SATURATION: 94 % | RESPIRATION RATE: 18 BRPM | WEIGHT: 142 LBS | TEMPERATURE: 97.3 F | DIASTOLIC BLOOD PRESSURE: 38 MMHG | HEART RATE: 74 BPM | BODY MASS INDEX: 24.37 KG/M2

## 2023-10-05 VITALS
TEMPERATURE: 97.3 F | OXYGEN SATURATION: 96 % | RESPIRATION RATE: 14 BRPM | SYSTOLIC BLOOD PRESSURE: 128 MMHG | HEART RATE: 75 BPM | DIASTOLIC BLOOD PRESSURE: 98 MMHG

## 2023-10-05 DIAGNOSIS — I95.9 HYPOTENSION, UNSPECIFIED HYPOTENSION TYPE: ICD-10-CM

## 2023-10-05 DIAGNOSIS — H91.93 DECREASED HEARING OF BOTH EARS: ICD-10-CM

## 2023-10-05 DIAGNOSIS — H61.23 KERATIN DEBRIS OF BOTH EAR CANALS: Primary | ICD-10-CM

## 2023-10-05 DIAGNOSIS — I95.1 ORTHOSTASIS: ICD-10-CM

## 2023-10-05 LAB
ANION GAP SERPL CALCULATED.3IONS-SCNC: 9 MMOL/L (ref 7–15)
BASO+EOS+MONOS # BLD AUTO: ABNORMAL 10*3/UL
BASO+EOS+MONOS NFR BLD AUTO: ABNORMAL %
BASOPHILS # BLD AUTO: 0 10E3/UL (ref 0–0.2)
BASOPHILS NFR BLD AUTO: 1 %
BUN SERPL-MCNC: 27 MG/DL (ref 8–23)
CALCIUM SERPL-MCNC: 9.5 MG/DL (ref 8.8–10.2)
CHLORIDE SERPL-SCNC: 100 MMOL/L (ref 98–107)
CREAT SERPL-MCNC: 1.26 MG/DL (ref 0.51–0.95)
DEPRECATED HCO3 PLAS-SCNC: 26 MMOL/L (ref 22–29)
EGFRCR SERPLBLD CKD-EPI 2021: 41 ML/MIN/1.73M2
EOSINOPHIL # BLD AUTO: 0.1 10E3/UL (ref 0–0.7)
EOSINOPHIL NFR BLD AUTO: 3 %
ERYTHROCYTE [DISTWIDTH] IN BLOOD BY AUTOMATED COUNT: 13.3 % (ref 10–15)
GLUCOSE SERPL-MCNC: 98 MG/DL (ref 70–99)
HCT VFR BLD AUTO: 33.9 % (ref 35–47)
HGB BLD-MCNC: 11 G/DL (ref 11.7–15.7)
IMM GRANULOCYTES # BLD: 0 10E3/UL
IMM GRANULOCYTES NFR BLD: 0 %
LYMPHOCYTES # BLD AUTO: 0.8 10E3/UL (ref 0.8–5.3)
LYMPHOCYTES NFR BLD AUTO: 21 %
MCH RBC QN AUTO: 30.7 PG (ref 26.5–33)
MCHC RBC AUTO-ENTMCNC: 32.4 G/DL (ref 31.5–36.5)
MCV RBC AUTO: 95 FL (ref 78–100)
MONOCYTES # BLD AUTO: 0.4 10E3/UL (ref 0–1.3)
MONOCYTES NFR BLD AUTO: 10 %
NEUTROPHILS # BLD AUTO: 2.6 10E3/UL (ref 1.6–8.3)
NEUTROPHILS NFR BLD AUTO: 65 %
NRBC # BLD AUTO: 0 10E3/UL
NRBC BLD AUTO-RTO: 0 /100
PLATELET # BLD AUTO: 139 10E3/UL (ref 150–450)
POTASSIUM SERPL-SCNC: 4.6 MMOL/L (ref 3.4–5.3)
RBC # BLD AUTO: 3.58 10E6/UL (ref 3.8–5.2)
SODIUM SERPL-SCNC: 135 MMOL/L (ref 135–145)
WBC # BLD AUTO: 4 10E3/UL (ref 4–11)

## 2023-10-05 PROCEDURE — 85004 AUTOMATED DIFF WBC COUNT: CPT | Performed by: STUDENT IN AN ORGANIZED HEALTH CARE EDUCATION/TRAINING PROGRAM

## 2023-10-05 PROCEDURE — 93005 ELECTROCARDIOGRAM TRACING: CPT | Mod: XU | Performed by: STUDENT IN AN ORGANIZED HEALTH CARE EDUCATION/TRAINING PROGRAM

## 2023-10-05 PROCEDURE — 69210 REMOVE IMPACTED EAR WAX UNI: CPT | Performed by: PHYSICIAN ASSISTANT

## 2023-10-05 PROCEDURE — 82374 ASSAY BLOOD CARBON DIOXIDE: CPT | Performed by: STUDENT IN AN ORGANIZED HEALTH CARE EDUCATION/TRAINING PROGRAM

## 2023-10-05 PROCEDURE — 96361 HYDRATE IV INFUSION ADD-ON: CPT | Performed by: STUDENT IN AN ORGANIZED HEALTH CARE EDUCATION/TRAINING PROGRAM

## 2023-10-05 PROCEDURE — 96360 HYDRATION IV INFUSION INIT: CPT | Performed by: STUDENT IN AN ORGANIZED HEALTH CARE EDUCATION/TRAINING PROGRAM

## 2023-10-05 PROCEDURE — 36415 COLL VENOUS BLD VENIPUNCTURE: CPT | Performed by: STUDENT IN AN ORGANIZED HEALTH CARE EDUCATION/TRAINING PROGRAM

## 2023-10-05 PROCEDURE — 99284 EMERGENCY DEPT VISIT MOD MDM: CPT | Mod: 25 | Performed by: STUDENT IN AN ORGANIZED HEALTH CARE EDUCATION/TRAINING PROGRAM

## 2023-10-05 PROCEDURE — 82310 ASSAY OF CALCIUM: CPT | Performed by: STUDENT IN AN ORGANIZED HEALTH CARE EDUCATION/TRAINING PROGRAM

## 2023-10-05 PROCEDURE — 92504 EAR MICROSCOPY EXAMINATION: CPT | Performed by: PHYSICIAN ASSISTANT

## 2023-10-05 PROCEDURE — G0463 HOSPITAL OUTPT CLINIC VISIT: HCPCS

## 2023-10-05 PROCEDURE — 258N000003 HC RX IP 258 OP 636: Performed by: STUDENT IN AN ORGANIZED HEALTH CARE EDUCATION/TRAINING PROGRAM

## 2023-10-05 PROCEDURE — 99213 OFFICE O/P EST LOW 20 MIN: CPT | Mod: 25 | Performed by: PHYSICIAN ASSISTANT

## 2023-10-05 PROCEDURE — 99284 EMERGENCY DEPT VISIT MOD MDM: CPT | Performed by: STUDENT IN AN ORGANIZED HEALTH CARE EDUCATION/TRAINING PROGRAM

## 2023-10-05 RX ADMIN — SODIUM CHLORIDE 500 ML: 9 INJECTION, SOLUTION INTRAVENOUS at 12:28

## 2023-10-05 ASSESSMENT — ENCOUNTER SYMPTOMS
EYES NEGATIVE: 1
ALLERGIC/IMMUNOLOGIC NEGATIVE: 1
CARDIOVASCULAR NEGATIVE: 1
ANAL BLEEDING: 0
FATIGUE: 1
HEMATOLOGIC/LYMPHATIC NEGATIVE: 1
RESPIRATORY NEGATIVE: 1
PSYCHIATRIC NEGATIVE: 1
NAUSEA: 0
ABDOMINAL PAIN: 0
ENDOCRINE NEGATIVE: 1
MUSCULOSKELETAL NEGATIVE: 1
NEUROLOGICAL NEGATIVE: 1
VOMITING: 0
DIARRHEA: 1
CONSTIPATION: 0

## 2023-10-05 ASSESSMENT — ACTIVITIES OF DAILY LIVING (ADL): ADLS_ACUITY_SCORE: 35

## 2023-10-05 ASSESSMENT — PAIN SCALES - GENERAL: PAINLEVEL: MODERATE PAIN (5)

## 2023-10-05 NOTE — ED PROVIDER NOTES
"  History     Chief Complaint   Patient presents with    Hypotension     HPI  Jacklyn Hein is a 88 year old  individual with history of hypertension, cardiomyopathy, congestive heart failure asthma, COPD, stage III chronic kidney disease, chronic pulmonary edema, is sent over from clinic for hypotension.   Patient apparently had hypotension in the clinic so was sent over the ER.  Patient states was at ENT clinic and went to the bathroom.  Had diarrheal episode.  Apparently had \"a lot of diarrhea\" and when finished, did have some dizziness.  Blood pressure was checked and it was low so sent over to the ER.  Patient alert and oriented and states does feel fatigued but no chest pain, fever, chills, nausea, vomiting, abdominal pain, back pain out of the ordinary reported.  Patient was actually seen on 9/26/2023 for same issue and diagnosed with ALVARADO.  Patient was hydrated and advised to follow-up.    Allergies:  No Known Allergies    Problem List:    Patient Active Problem List    Diagnosis Date Noted    GURROLA (dyspnea on exertion) 08/09/2023     Priority: Medium    Chronic heart failure with preserved ejection fraction (H) 08/09/2023     Priority: Medium    Chronic pulmonary edema 08/09/2023     Priority: Medium    Anemia of chronic disease 07/11/2023     Priority: Medium    Acute on chronic blood loss anemia 06/24/2023     Priority: Medium    Acute midline thoracic back pain 06/24/2023     Priority: Medium    Upper GI bleed 06/20/2023     Priority: Medium    Age-related osteoporosis with current pathological fracture with routine healing, subsequent encounter 11/22/2021     Priority: Medium    Chronic, continuous use of opioids 05/17/2021     Priority: Medium    Bacteremia due to Klebsiella pneumoniae on 1/5/2021 02/22/2021     Priority: Medium    Mixed hyperlipidemia 02/21/2021     Priority: Medium    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 01/06/2021     Priority: Medium    " Abnormal finding on urinalysis 01/06/2021     Priority: Medium    Neuropathy 08/19/2020     Priority: Medium    Spinal stenosis of lumbar region with neurogenic claudication 02/06/2020     Priority: Medium     S/p ERP: Decompression Levels: L3 to L5 Side: Bilat on 2/6/2020 with Dr. Torres      Paroxysmal atrial fibrillation (H) 12/04/2019     Priority: Medium    Stage 3b chronic kidney disease 12/04/2019     Priority: Medium    LBBB (left bundle branch block) 06/01/2018     Priority: Medium    History of tobacco abuse quitting on 2/1/1998 06/01/2018     Priority: Medium    Mild intermittent asthma, unspecified whether complicated 06/01/2018     Priority: Medium    Chronic obstructive pulmonary disease, unspecified COPD type (H) 06/01/2018     Priority: Medium    Lumbar spine pain 10/27/2015     Priority: Medium    Automatic implantable cardioverter-defibrillator - [employer] multi lead ICD on 11/11/2014.  Upgrade on 1/30/2023, [facility] 11/11/2014     Priority: Medium     Implant date - 5/6/14  Problem list name updated by automated process. Provider to review      Non-ischemic cardiomyopathy (H) 12/10/2013     Priority: Medium    Congestive heart failure, NYHA class 2, unspecified congestive heart failure type (H) 12/10/2013     Priority: Medium    Insomnia 01/01/2011     Priority: Medium     Problem list name updated by automated process. Provider to review      Disorder of bone and cartilage 01/01/2011     Priority: Medium     Problem list name updated by automated process. Provider to review      Essential hypertension 01/01/2011     Priority: Medium     Problem list name updated by automated process. Provider to review      Neck pain, chronic 01/01/2011     Priority: Medium    Hypercholesteremia 06/07/2002     Priority: Medium        Past Medical History:    Past Medical History:   Diagnosis Date    Angina pectoris 01/01/2011    CHF (congestive heart failure), NYHA class II (H) 12/10/2013    CHF (congestive heart  failure), NYHA class III (H) 12/10/2013    Hyperhydrosis disorder 2011    Insomnia, unspecified 2011    LBBB (left bundle branch block) 2011    Neck pain, chronic 2011    Non-ischemic cardiomyopathy (H) 12/10/2013    Obesity, unspecified 2011    Osteopenia 2011    Pacemaker     Pain in joint, lower leg 2006    Pure hypercholesterolemia 2002    Unspecified essential hypertension 2011       Past Surgical History:    Past Surgical History:   Procedure Laterality Date    APPENDECTOMY      ARTHROSCOPY KNEE RT/LT      RT    BACK SURGERY  2020    BACK SURGERY  2021    fractured vert repair    CARDIAC SURGERY  2014    Pacemaker    Cataract extraction  2009    Bilateral    CHOLECYSTECTOMY      open     COLONOSCOPY  2001    Normal     COLONOSCOPY N/A 10/20/2016    Procedure: COLONOSCOPY;  Surgeon: Charan Montejo MD;  Location: [facility]    COLONOSCOPY N/A 2023    Procedure: COLONOSCOPY;  Surgeon: Royal Sanz MD;  Location: [facility] OR    colonoscopy with polypectomy      Repeat 3 years     CT CORONARY ANGIOGRAM      normal    ESOPHAGOSCOPY, GASTROSCOPY, DUODENOSCOPY (EGD), COMBINED N/A 2023    Procedure: ESOPHAGOGASTRODUODENOSCOPY;  Surgeon: Royal Sanz MD;  Location: [facility] OR    HERPES ZOSTER PCR ([facility])      IR CONSULTATION FOR IR EXAM  2020    IR CONSULTATION FOR IR EXAM  2023    IR TRIGGER POINT INJ 3 OR MORE MUSCLES  2023    left eye scar tissue      normal echo      fen-phen use      x6      REPLACE PACEMAKER GENERATOR N/A 2023    Procedure: REPLACEMENT, PULSE GENERATOR, CARDIAC PACEMAKER;  Surgeon: Isiah Hodge MD;  Location:  OR    SURGICAL RADIOLOGY PROCEDURE N/A 2016    Procedure: SURGICAL RADIOLOGY PROCEDURE;  Surgeon: Provider, Generic Perianesthesia Nursing;  Location: [facility]       Family History:    Family History   Problem Relation Age of Onset    Cancer Mother          lung (cause of death)     Peptic Ulcer Disease Father         gastric        Social History:  Marital Status:   [2]  Social History     Tobacco Use    Smoking status: Former     Packs/day: 1.00     Years: 15.00     Pack years: 15.00     Types: Cigarettes     Start date: 1983     Quit date: 1998     Years since quittin.6     Passive exposure: Never    Smokeless tobacco: Never    Tobacco comments:     Passive Exposure: No; Longest Tobacco Free: 15 years    Vaping Use    Vaping Use: Never used   Substance Use Topics    Alcohol use: Not Currently     Comment: Occasionally     Drug use: No        Medications:    acetaminophen (TYLENOL) 325 MG tablet  Calcium Carbonate-Vitamin D (CALCIUM 600 + D OR)  carvedilol (COREG) 12.5 MG tablet  furosemide (LASIX) 20 MG tablet  gabapentin (NEURONTIN) 400 MG capsule  HYDROcodone-acetaminophen (NORCO) 5-325 MG tablet  mometasone-formoterol (DULERA) 200-5 MCG/ACT inhaler  Multiple Vitamin (MULTI-VITAMIN DAILY PO)  pantoprazole (PROTONIX) 40 MG EC tablet  rosuvastatin (CRESTOR) 10 MG tablet  sacubitril-valsartan (ENTRESTO)  MG per tablet  umeclidinium (INCRUSE ELLIPTA) 62.5 MCG/ACT inhaler  vitamin C (ASCORBIC ACID) 500 MG tablet          Review of Systems   Constitutional:  Positive for fatigue.   HENT: Negative.     Eyes: Negative.    Respiratory: Negative.     Cardiovascular: Negative.    Gastrointestinal:  Positive for diarrhea. Negative for abdominal pain, anal bleeding, constipation, nausea and vomiting.   Endocrine: Negative.    Genitourinary: Negative.    Musculoskeletal: Negative.    Skin: Negative.    Allergic/Immunologic: Negative.    Neurological: Negative.    Hematological: Negative.    Psychiatric/Behavioral: Negative.         Physical Exam   BP: (!) 79/54 (left arm)  Pulse: 67  Temp: 97.3  F (36.3  C)  Resp: 18  SpO2: 95 %      GENERAL APPEARANCE:  The patient is a 88 year old well-developed, well-nourished individual in no acute distress that  appears as stated age.  NECK:  Supple.  Trachea is midline.  No carotid bruits present on auscultation.  LUNGS:  Breathing is easy.  Breath sounds are equal bilaterally.  Coarse crackles in right lower lobe noted.  HEART:  Regular rate and rhythm normal.  No murmurs, gallops, or rubs.  ABDOMEN:  Soft and rotund.  No mass, tenderness, guarding, or rebound.  No organomegaly or hernia.  Bowel sounds are present.  No CVA tenderness or flank mass.  No abdominal bruits or thrills present upon auscultation/palpation.  EXTREMITIES: Trace lower extremity edema bilaterally.  Pedal and post-tibial pulses are 2+ bilaterally.  Radial pulses are 2+ bilaterally.  NEUROLOGIC:  No focal sensory or motor deficits are noted.    PSYCHIATRIC:  The patient is awake, alert, and oriented x4.  Recent and remote memory is intact.  Appropriate mood and affect.  Calm and cooperative with history and physical exam.  SKIN:  Warm, dry, and well perfused.  Good turgor.  No lesions, nodules, or rashes are noted.  No bruising noted.      Comment: Discrepancies between my note and notes on behalf of the nursing team or other care providers are secondary to my findings reflecting my physical examination and questioning of the patient.  Any conflicting information provided is not in line with my examination of the patient.       ED Course              ED Course as of 10/05/23 1412   Thu Oct 05, 2023   1228 Labs ordered.  1 L 0.9% NS ordered.   1236 In to see patient and history/physical completed.    1359 ECG benign.  Blood pressure improved with 500 cc bolus of 0.9% NS.  This recurrent hypotension appears to be more orthostasis and blood pressure medication related.  Patient to discuss this with her cardiologist and discuss changes.  Patient otherwise will be discharged home at this time.  Patient and daughter in agreement.            ECG:    ECG competed at 1221 and personally reviewed at 1225 showing ventricularly paced rhythm.  No Sgarbossa  criteria present.  Abnormal ECG.  When compared to ECG from 9/26/2023, no significant changes noted.         Results for orders placed or performed during the hospital encounter of 10/05/23 (from the past 24 hour(s))   EKG 12-lead, tracing only   Result Value Ref Range    Systolic Blood Pressure  mmHg    Diastolic Blood Pressure  mmHg    Ventricular Rate 62 BPM    Atrial Rate 62 BPM    OK Interval 152 ms    QRS Duration 160 ms     ms    QTc 446 ms    P Axis  degrees    R AXIS 183 degrees    T Axis 33 degrees    Interpretation ECG       AV dual-paced rhythm  Biventricular pacemaker detected  Abnormal ECG  When compared with ECG of 26-SEP-2023 11:54,  Vent. rate has decreased BY   4 BPM     CBC with platelets differential    Narrative    The following orders were created for panel order CBC with platelets differential.  Procedure                               Abnormality         Status                     ---------                               -----------         ------                     CBC with platelets and d...[[id number]]  Abnormal            Final result                 Please view results for these tests on the individual orders.   Basic metabolic panel   Result Value Ref Range    Sodium 135 135 - 145 mmol/L    Potassium 4.6 3.4 - 5.3 mmol/L    Chloride 100 98 - 107 mmol/L    Carbon Dioxide (CO2) 26 22 - 29 mmol/L    Anion Gap 9 7 - 15 mmol/L    Urea Nitrogen 27.0 (H) 8.0 - 23.0 mg/dL    Creatinine 1.26 (H) 0.51 - 0.95 mg/dL    GFR Estimate 41 (L) >60 mL/min/1.73m2    Calcium 9.5 8.8 - 10.2 mg/dL    Glucose 98 70 - 99 mg/dL   CBC with platelets and differential   Result Value Ref Range    WBC Count 4.0 4.0 - 11.0 10e3/uL    RBC Count 3.58 (L) 3.80 - 5.20 10e6/uL    Hemoglobin 11.0 (L) 11.7 - 15.7 g/dL    Hematocrit 33.9 (L) 35.0 - 47.0 %    MCV 95 78 - 100 fL    MCH 30.7 26.5 - 33.0 pg    MCHC 32.4 31.5 - 36.5 g/dL    RDW 13.3 10.0 - 15.0 %    Platelet Count 139 (L) 150 - 450 10e3/uL    % Neutrophils 65  %    % Lymphocytes 21 %    % Monocytes 10 %    Mids % (Monos, Eos, Basos)      % Eosinophils 3 %    % Basophils 1 %    % Immature Granulocytes 0 %    NRBCs per 100 WBC 0 <1 /100    Absolute Neutrophils 2.6 1.6 - 8.3 10e3/uL    Absolute Lymphocytes 0.8 0.8 - 5.3 10e3/uL    Absolute Monocytes 0.4 0.0 - 1.3 10e3/uL    Mids Abs (Monos, Eos, Basos)      Absolute Eosinophils 0.1 0.0 - 0.7 10e3/uL    Absolute Basophils 0.0 0.0 - 0.2 10e3/uL    Absolute Immature Granulocytes 0.0 <=0.4 10e3/uL    Absolute NRBCs 0.0 10e3/uL       Medications   sodium chloride 0.9% BOLUS 500 mL (500 mLs Intravenous $New Bag 10/5/23 0334)       Assessments & Plan (with Medical Decision Making)     I have reviewed the nursing notes.    I have reviewed the findings, diagnosis, plan and need for follow up with the patient.      Summary:  Patient presents to the ER today for hypotension.  Potential diagnosis which have been considered and evaluated include orthostasis, dehydration, medication reaction, MI/NSTEMI, as well as others. Many of these have been excluded using the various modalities and assessment as noted on the chart. At the present time, the diagnosis given seems to be the most likely orthostasis and likely blood pressure medication reaction.  Upon arrival, vitals signs show blood pressure 79/54 with a pulse of 68.  Temperature 36.3  C.  Respirations 20 with oxygenation of 95% on room air.  The patient is alert and oriented no distress.  Patient has pacemaker in place.  Rate is controlled at this time.  Patient does have noted crackles in right lower lobe which is unchanged from previous.  Patient does state that she is feeling slightly fatigued but not bad at this time.  When laying down blood pressure did improve to 110'-120's systolic.  Patient was given 500 cc bolus of 0.9% NS.  ECG showed no acute abnormalities.  Patient did not have any chest pain during this time so troponin was not conducted as this has been recurrent for  multiple weeks.  Also patient had just had loose stool prior to this happening which could be possible vasovagal related in addition to orthostasis.    Patient is asymptomatic after treatment and no acute findings on labs or ECG.  We will discharge patient home at this time to follow-up with cardiology for adjustment of medications as she is on Entresto, carvedilol, and furosemide which is likely needing dosage adjustment.  Daughter is here and will go down to cardiology department as we speak to see if patient can be seen.  Patient agrees with this plan of care.  Patient will be discharged home to follow-up with cardiology.  Return if worsening.        Critical Care Time: None    Impression and plan discussed with patient. Questions answered, concerns addressed, indications for urgent re-evaluation reviewed, and  given. Patient/Parent/Caregiver agree with treatment plan and have no further questions at this time.  AVS provided at discharge.    This note was created by the Dragon Voice Dictation System. Inadvertent typographical errors, due to software recognition problems, may still exist.             New Prescriptions    No medications on file       Final diagnoses:   Orthostasis   Hypotension, unspecified hypotension type       10/5/2023   [facility]       Min Granados APRN CNP  10/05/23 141

## 2023-10-05 NOTE — PROGRESS NOTES
Chief Complaint   Patient presents with    Procedure     Ear check/ear cleaning       Adele Hein is a 88 year old female seen today for ear cleaning. She reports her ears are full of cerumen.        Denies COM or otologic surgeries.   Denies otalgia, otorrhea.  Denies worrisome tinnitus.  Denies fluctuating hearing loss or tinnitus.   She does have hearing loss, but declines audiogram/ aids.   Denies vertigo or facial paraesthesia.      She did have low BP at today's visit.   Denies dyspnea, weakness. Recent ED evaluation regarding hypotension as well.   She did have a loose BM prior to today's visit.       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    Insomnia, unspecified 01/01/2011    LBBB (left bundle branch block) 01/01/2011    Neck pain, chronic 01/01/2011    Non-ischemic cardiomyopathy (H) 12/10/2013    Obesity, unspecified 01/01/2011    Osteopenia 01/01/2011    Pacemaker     Pain in joint, lower leg 07/31/2006    Pure hypercholesterolemia 06/07/2002    Unspecified essential hypertension 01/01/2011      No Known Allergies  Current Outpatient Medications   Medication    acetaminophen (TYLENOL) 325 MG tablet    Calcium Carbonate-Vitamin D (CALCIUM 600 + D OR)    carvedilol (COREG) 12.5 MG tablet    furosemide (LASIX) 20 MG tablet    gabapentin (NEURONTIN) 400 MG capsule    HYDROcodone-acetaminophen (NORCO) 5-325 MG tablet    mometasone-formoterol (DULERA) 200-5 MCG/ACT inhaler    Multiple Vitamin (MULTI-VITAMIN DAILY PO)    pantoprazole (PROTONIX) 40 MG EC tablet    rosuvastatin (CRESTOR) 10 MG tablet    sacubitril-valsartan (ENTRESTO)  MG per tablet    umeclidinium (INCRUSE ELLIPTA) 62.5 MCG/ACT inhaler    vitamin C (ASCORBIC ACID) 500 MG tablet     No current facility-administered medications for this  visit.     ROS- SEE HPI  BP (!) 66/40   Pulse 74   Temp 97.3  F (36.3  C) (Tympanic)   Resp 18   Wt 64.4 kg (142 lb)   SpO2 94%   BMI 24.37 kg/m      General - The patient is well nourished and well developed, and appears to have good nutritional status.  Alert and oriented to person and place, answers questions and cooperates with examination appropriately. Ambulated in room, has wheelchair for transfer.   Head and Face - Normocephalic and atraumatic, with no gross asymmetry noted.  The facial nerve is intact, with strong symmetric movements.  Voice and Breathing - The patient was breathing comfortably without the use of accessory muscles. There was no wheezing, stridor, or stertor.  The patients voice was clear and strong, and had appropriate pitch and quality.  On examination of the ears, I found that the ear(s) were completely impacted with dense cerumen.  Therefore, I positioned them in the examination chair in a semi-supine position, beginning with the right side.  I used the binocular surgical microscope to perform cerumen removal.  I began by using a cerumen loop to gently lift the edges of the cerumen mass away from the walls of the external canal.   Once the mass was loose enough, the entire plug was pulled from the canal.  The tympanic membrane was intact, no sign of perforation or middle ear effusion.     I turned my attention to the contralateral side once again using the binocular surgical microscope to perform cerumen removal.  I began by using a cerumen loop to gently lift the edges of the cerumen mass away from the walls of the external canal.   Once the mass was loose enough, the entire plug was pulled from the canal.  The tympanic membrane was intact, no sign of perforation or middle ear effusion.     Eyes - Extraocular movements intact, and the pupils were reactive to light.  Sclera were not icteric or injected, conjunctiva were pink and moist.  Mouth - Examination of the oral cavity showed  pink, healthy oral mucosa. No lesions or ulcerations noted.  The tongue was mobile and midline, and the dentition were in good condition.    Throat - The walls of the oropharynx were smooth, pink, moist, symmetric, and had no lesions or ulcerations.  The tonsillar pillars and soft palate were symmetric.  The uvula was midline on elevation.    Neck - Normal midline excursion of the laryngotracheal complex during swallowing.  Full range of motion on passive movement.  Palpation of the occipital, submental, submandibular, internal jugular chain, and supraclavicular nodes did not demonstrate any abnormal lymph nodes or masses.  Palpation of the thyroid was soft and smooth, with no nodules or goiter appreciated.  The trachea was mobile and midline.          ASSESSMENT/ PLAN:    ICD-10-CM    1. Keratin debris of both ear canals  H61.23       2. Decreased hearing of both ears  H91.93       3. Hypotension, unspecified hypotension type  I95.9               Complete audiogram.   Ears were cleaned. Normal ear exam  Follow up in 1 year or as needed for ear cleaning    She does have hypotension on exam and discussed w/ FP. Recommended ED evaluation. Patient transported via wheelchair to ED for evaluation.       Brandi Zhang PA-C  ENT  [facility]

## 2023-10-05 NOTE — DISCHARGE INSTRUCTIONS
Contact your cardiologist and discuss blood pressure medication regiment change.       Follow-up with your primary care provider for reevaluation.  Contact your primary care provider if you have any questions or concerns.  Do not hesitate to return to the ER if any new or worsening symptoms.     Please read the attached instructions (if any).  They highlight more specific treatments and interventions for you at home.              Thank you for letting me participate in your care and wish you a fast and uneventful recovery,    Min DAVIS, CNP    Do not hesitate to contact me with questions or concerns.  [email or web address]  [email or web address]

## 2023-10-05 NOTE — LETTER
10/5/2023         RE: Jacklyn Hein  [address]  [place] [address]        Dear Colleague,    Thank you for referring your patient, Jacklyn Hein, to the [facility] [place]. Please see a copy of my visit note below.    Chief Complaint   Patient presents with     Procedure     Ear check/ear cleaning       Adele Hein is a 88 year old female seen today for ear cleaning. She reports her ears are full of cerumen.        Denies COM or otologic surgeries.   Denies otalgia, otorrhea.  Denies worrisome tinnitus.  Denies fluctuating hearing loss or tinnitus.   She does have hearing loss, but declines audiogram/ aids.   Denies vertigo or facial paraesthesia.      She did have low BP at today's visit.   Denies dyspnea, weakness. Recent ED evaluation regarding hypotension as well.   She did have a loose BM prior to today's visit.       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     Insomnia, unspecified 01/01/2011     LBBB (left bundle branch block) 01/01/2011     Neck pain, chronic 01/01/2011     Non-ischemic cardiomyopathy (H) 12/10/2013     Obesity, unspecified 01/01/2011     Osteopenia 01/01/2011     Pacemaker      Pain in joint, lower leg 07/31/2006     Pure hypercholesterolemia 06/07/2002     Unspecified essential hypertension 01/01/2011      No Known Allergies  Current Outpatient Medications   Medication     acetaminophen (TYLENOL) 325 MG tablet     Calcium Carbonate-Vitamin D (CALCIUM 600 + D OR)     carvedilol (COREG) 12.5 MG tablet     furosemide (LASIX) 20 MG tablet     gabapentin (NEURONTIN) 400 MG capsule     HYDROcodone-acetaminophen (NORCO) 5-325 MG tablet     mometasone-formoterol (DULERA) 200-5 MCG/ACT inhaler     Multiple Vitamin (MULTI-VITAMIN DAILY PO)     pantoprazole  (PROTONIX) 40 MG EC tablet     rosuvastatin (CRESTOR) 10 MG tablet     sacubitril-valsartan (ENTRESTO)  MG per tablet     umeclidinium (INCRUSE ELLIPTA) 62.5 MCG/ACT inhaler     vitamin C (ASCORBIC ACID) 500 MG tablet     No current facility-administered medications for this visit.     ROS- SEE HPI  BP (!) 66/40   Pulse 74   Temp 97.3  F (36.3  C) (Tympanic)   Resp 18   Wt 64.4 kg (142 lb)   SpO2 94%   BMI 24.37 kg/m      General - The patient is well nourished and well developed, and appears to have good nutritional status.  Alert and oriented to person and place, answers questions and cooperates with examination appropriately. Ambulated in room, has wheelchair for transfer.   Head and Face - Normocephalic and atraumatic, with no gross asymmetry noted.  The facial nerve is intact, with strong symmetric movements.  Voice and Breathing - The patient was breathing comfortably without the use of accessory muscles. There was no wheezing, stridor, or stertor.  The patients voice was clear and strong, and had appropriate pitch and quality.  On examination of the ears, I found that the ear(s) were completely impacted with dense cerumen.  Therefore, I positioned them in the examination chair in a semi-supine position, beginning with the right side.  I used the binocular surgical microscope to perform cerumen removal.  I began by using a cerumen loop to gently lift the edges of the cerumen mass away from the walls of the external canal.   Once the mass was loose enough, the entire plug was pulled from the canal.  The tympanic membrane was intact, no sign of perforation or middle ear effusion.     I turned my attention to the contralateral side once again using the binocular surgical microscope to perform cerumen removal.  I began by using a cerumen loop to gently lift the edges of the cerumen mass away from the walls of the external canal.   Once the mass was loose enough, the entire plug was pulled from the  canal.  The tympanic membrane was intact, no sign of perforation or middle ear effusion.     Eyes - Extraocular movements intact, and the pupils were reactive to light.  Sclera were not icteric or injected, conjunctiva were pink and moist.  Mouth - Examination of the oral cavity showed pink, healthy oral mucosa. No lesions or ulcerations noted.  The tongue was mobile and midline, and the dentition were in good condition.    Throat - The walls of the oropharynx were smooth, pink, moist, symmetric, and had no lesions or ulcerations.  The tonsillar pillars and soft palate were symmetric.  The uvula was midline on elevation.    Neck - Normal midline excursion of the laryngotracheal complex during swallowing.  Full range of motion on passive movement.  Palpation of the occipital, submental, submandibular, internal jugular chain, and supraclavicular nodes did not demonstrate any abnormal lymph nodes or masses.  Palpation of the thyroid was soft and smooth, with no nodules or goiter appreciated.  The trachea was mobile and midline.          ASSESSMENT/ PLAN:    ICD-10-CM    1. Keratin debris of both ear canals  H61.23       2. Decreased hearing of both ears  H91.93       3. Hypotension, unspecified hypotension type  I95.9               Complete audiogram.   Ears were cleaned. Normal ear exam  Follow up in 1 year or as needed for ear cleaning    She does have hypotension on exam and discussed w/ FP. Recommended ED evaluation. Patient transported via wheelchair to ED for evaluation.       Brandi Zhang PA-C  ENT  [facility], [place]      Again, thank you for allowing me to participate in the care of your patient.        Sincerely,        Brandi Zhang PA-C

## 2023-10-05 NOTE — CONFIDENTIAL NOTE
ENT called with patient BP of 68/38, patient is not currently symptomatic but the BP was checked 3 times and was in the same range. Writer spoke with covering provider Dr. Pepper who advised she go to the ER.

## 2023-10-07 LAB
ATRIAL RATE - MUSE: 62 BPM
DIASTOLIC BLOOD PRESSURE - MUSE: NORMAL MMHG
INTERPRETATION ECG - MUSE: NORMAL
P AXIS - MUSE: NORMAL DEGREES
PR INTERVAL - MUSE: 152 MS
QRS DURATION - MUSE: 160 MS
QT - MUSE: 440 MS
QTC - MUSE: 446 MS
R AXIS - MUSE: 183 DEGREES
SYSTOLIC BLOOD PRESSURE - MUSE: NORMAL MMHG
T AXIS - MUSE: 33 DEGREES
VENTRICULAR RATE- MUSE: 62 BPM

## 2023-10-18 ENCOUNTER — OFFICE VISIT (OUTPATIENT)
Dept: CARDIOLOGY | Facility: OTHER | Age: 88
End: 2023-10-18
Attending: INTERNAL MEDICINE
Payer: COMMERCIAL

## 2023-10-18 VITALS
DIASTOLIC BLOOD PRESSURE: 65 MMHG | BODY MASS INDEX: 23.22 KG/M2 | HEART RATE: 79 BPM | WEIGHT: 136 LBS | OXYGEN SATURATION: 93 % | SYSTOLIC BLOOD PRESSURE: 107 MMHG | HEIGHT: 64 IN

## 2023-10-18 DIAGNOSIS — Z95.810 AUTOMATIC IMPLANTABLE CARDIOVERTER-DEFIBRILLATOR IN SITU: ICD-10-CM

## 2023-10-18 DIAGNOSIS — I44.7 LBBB (LEFT BUNDLE BRANCH BLOCK): ICD-10-CM

## 2023-10-18 DIAGNOSIS — I48.0 PAROXYSMAL ATRIAL FIBRILLATION (H): ICD-10-CM

## 2023-10-18 DIAGNOSIS — R06.09 DOE (DYSPNEA ON EXERTION): Primary | ICD-10-CM

## 2023-10-18 DIAGNOSIS — Z87.891 HISTORY OF TOBACCO ABUSE: ICD-10-CM

## 2023-10-18 DIAGNOSIS — E78.2 MIXED HYPERLIPIDEMIA: ICD-10-CM

## 2023-10-18 DIAGNOSIS — I10 ESSENTIAL HYPERTENSION: ICD-10-CM

## 2023-10-18 DIAGNOSIS — I42.8 NON-ISCHEMIC CARDIOMYOPATHY (H): ICD-10-CM

## 2023-10-18 DIAGNOSIS — I50.32 CHRONIC HEART FAILURE WITH PRESERVED EJECTION FRACTION (H): ICD-10-CM

## 2023-10-18 DIAGNOSIS — J81.1 CHRONIC PULMONARY EDEMA: ICD-10-CM

## 2023-10-18 DIAGNOSIS — I50.9 CONGESTIVE HEART FAILURE, NYHA CLASS 2, UNSPECIFIED CONGESTIVE HEART FAILURE TYPE (H): ICD-10-CM

## 2023-10-18 DIAGNOSIS — N18.32 STAGE 3B CHRONIC KIDNEY DISEASE (H): ICD-10-CM

## 2023-10-18 DIAGNOSIS — J44.9 CHRONIC OBSTRUCTIVE PULMONARY DISEASE, UNSPECIFIED COPD TYPE (H): ICD-10-CM

## 2023-10-18 PROCEDURE — G0463 HOSPITAL OUTPT CLINIC VISIT: HCPCS

## 2023-10-18 PROCEDURE — 99213 OFFICE O/P EST LOW 20 MIN: CPT | Performed by: INTERNAL MEDICINE

## 2023-10-18 RX ORDER — SACUBITRIL AND VALSARTAN 49; 51 MG/1; MG/1
1 TABLET, FILM COATED ORAL 2 TIMES DAILY
Qty: 180 TABLET | Refills: 3 | Status: SHIPPED | OUTPATIENT
Start: 2023-10-18 | End: 2024-03-06

## 2023-10-18 ASSESSMENT — PAIN SCALES - GENERAL: PAINLEVEL: NO PAIN (0)

## 2023-10-18 NOTE — PROGRESS NOTES
[facility] HEART CARE   CARDIOLOGY PROGRESS NOTE     Chief Complaint   Patient presents with    Follow Up          Diagnosis:  1. CHF-systolic. 50-55%/grade 1 on 6/2/23. 30-35%/diastolic on 1/5/21. 40-45%% on 12/01/2019.  2.  Reduction in severity of COPD-severe to mild/moderate.  Pulmonary concern for asthma based on PFT's from 5/17/17.  3.  Nonischemic cardiomyopathy with NYHA classification 2.  4.  ICD placement on 11/11/2014. Gen change on 1/30/23, [facility].   5.  Hypertension-controlled.  6.  Hyperlipidemia-uncontrolled.  7.  H/O tobacco abuse quitting on 2/1/1998  8.  LBBB.  9.  SOB-significantly improved.  10.  CKD-3.  11.  Hypocalcemia.  12.  A. fib up to 23 seconds on 4/9/19.  Less than 1% in 8/9/2022.  13.  CHADSVASC score of 4.   14.  Mitral and tricuspid regurgitation-mild to moderate on 1/5/2021.  15.  Bacteremia on 1/5/2021 with Klebsiella pneumonia with septic shock.    Assessment/Plan:    1.  Hypotension: At her last visit, Entresto was increased from 49/51 mg twice a day to 97/103 mg twice a day.  She has been to the ER on 9/26/23 and 10/5/2023 both for low blood pressures.  It is reported his systolic blood pressure was in the 70's.  She felt dizzy and lightheaded.  She has had other lesser recurrence of dizziness/lightheadedness.  She has since stopped Lasix 20 mg daily.  She has been off the Lasix/furosemide for greater than a month.  She has not had significant swelling.  She is presently on Entresto 97/103 twice a day and Coreg 12.5 mg twice a day.  She has been on Coreg for a long time.  She has not seen a significant improvement of her dyspnea on the higher dose of Entresto.  I suggested she decrease Entresto back to the 49/51 mg twice a day.  Originally, Entresto was increased as she had been dyspneic on exertion.  I believe the dyspnea is related to diastolic dysfunction.  She will take 1/2 tablet of the 97/103 Entresto in the morning and 12 hours later take the second half.  She will be started  on Jardiance 10 mg daily for diastolic dysfunction.  She is aware if Jardiance is too expensive, she would not pick it up.  There are no generic alternatives.  The only other alternative would be Farxiga which also is a brand-name.  2.  Follow-up as planned.      Interval history:  See above.      HPI:    Jacklyn Hein is being seen by cardiology for dyspnea with chronic systolic EF of 40-45% and evidence for diastolic heart failure on 12/31/2019.     She has been short of breath with a diagnosis of asthma and COPD.  She is followed for a history of severe nonischemic cardiomyopathy with a previously ejection fraction at less than 35%. More recently, her EF on 12/31/2019 was 40-45%.  EF of 30-35% on 1/5/21. She also has diastolic heart failure, ICD placed on 11/11/16, her recent visit with pulmonary secondary to what was thought to be severe COPD but was downgraded to mild to moderate with a greater concern for asthma, per the patient.     She was started on some breathing treatments for asthma/COPD by pulmonary.  With these treatments, she says she feels much better but remains short of breath.       With having diastolic and systolic heart failure, she has minimal to no lower extremity edema. Her activity has been limited secondary to chronic back pain and generalized weakness.  It was not for back pain, she would be doing really well.     She has a history of severe nonischemic cardiomyopathy S/P ICD placement on 11/11/2014. She had her ICD checked on 10/22/2019.  It showed she was bi-V paced 96.8% of the time with 3.0 years left on her battery.  = 98.1%. She had up to 20 seconds of A. fib with a total of 3 episodes.  Her device was described as functioning appropriately.     Echo from 12/31/2019.  She has an EF of 40% to 45%, grade 1 diastolic dysfunction, mild left atrial enlargement, mild to moderate AI, and mild MI      Relevant testing:  ECHO on 6/20/23:  Global and regional left ventricular  function is normal with an EF of 55-60%.  Grade I or early diastolic dysfunction.  Global right ventricular function is normal.  Mild to moderate left atrial enlargement is present.  Mild mitral annular calcification is present.  Moderate mitral insufficiency is present.  No aortic stenosis is present.  Mild aortic insufficiency is present.  Mild tricuspid insufficiency is present.  Right ventricular systolic pressure is 25 mmHg above the right atrial pressure.  This study was compared with the study from 8/24/21 .  No significant changes noted.    ECHO on 8/24/21:  Technically difficult study. Poor acoustic windows.  Left ventricular function is decreased. The ejection fraction is 50-55%  (borderline). Mild diffuse hypokinesis is present. Diastolic Doppler findings (E/E' ratio and/or other parameters) suggest left ventricular filling pressures are increased.  Global right ventricular function is normal. The right ventricle is normal size.  No significant valvular abnormalities.  The estimated PA systolic pressure is 17 mmHg above the RA pressure. The RA pressure could not be estimated.  This study was compared with the study from 01/06/2021. There is no  significant change in the biventricular function upon direct visual  comparison.    Echo on 12/31/2019:  No pericardial effusion is present.  Left ventricular size is normal.  The Ejection Fraction is estimated at 40-45%.  Grade I or early diastolic dysfunction.  Mild diffuse hypokinesis is present.  The right ventricle is normal size.  Global right ventricular function is normal.  Mild left atrial enlargement is present.  Mild mitral insufficiency is present.  Mild to moderate aortic insufficiency is present.  The mean gradient across the aortic valve is 3.9 mmHg.  Trace tricuspid insufficiency is present.  Right ventricular systolic pressure is 20 mmHg above the right atrial pressure.  The pulmonic valve is normal.  The aorta root is normal.    Echocardiogram on  8/24/2021:  Technically difficult study. Poor acoustic windows.  Left ventricular function is decreased. The ejection fraction is 50-55%  (borderline). Mild diffuse hypokinesis is present. Diastolic Doppler findings  (E/E' ratio and/or other parameters) suggest left ventricular filling  pressures are increased.  Global right ventricular function is normal. The right ventricle is normal  size.  No significant valvular abnormalities.  The estimated PA systolic pressure is 17 mmHg above the RA pressure. The RA  pressure could not be estimated.  This study was compared with the study from 01/06/2021. There is no  significant change in the biventricular function upon direct visual  comparison.      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    Insomnia, unspecified 01/01/2011    LBBB (left bundle branch block) 01/01/2011    Neck pain, chronic 01/01/2011    Non-ischemic cardiomyopathy (H) 12/10/2013    Obesity, unspecified 01/01/2011    Osteopenia 01/01/2011    Pacemaker     Pain in joint, lower leg 07/31/2006    Pure hypercholesterolemia 06/07/2002    Unspecified essential hypertension 01/01/2011       Past Surgical History:   Procedure Laterality Date    APPENDECTOMY      ARTHROSCOPY KNEE RT/LT  2009    RT    BACK SURGERY  02/06/2020    BACK SURGERY  06/2021    fractured vert repair    CARDIAC SURGERY  05/06/2014    Pacemaker    Cataract extraction  2009    Bilateral    CHOLECYSTECTOMY      open     COLONOSCOPY  05/2001    Normal     COLONOSCOPY N/A 10/20/2016    Procedure: COLONOSCOPY;  Surgeon: Charan Montejo MD;  Location: [facility] OR    COLONOSCOPY N/A 6/22/2023    Procedure: COLONOSCOPY;  Surgeon: Royal Sanz MD;  Location: [facility] OR    colonoscopy with polypectomy  2011    Repeat 3 years     CT CORONARY ANGIOGRAM  1999     normal    ESOPHAGOSCOPY, GASTROSCOPY, DUODENOSCOPY (EGD), COMBINED N/A 2023    Procedure: ESOPHAGOGASTRODUODENOSCOPY;  Surgeon: Royal Sanz MD;  Location: [facility] OR    HERPES ZOSTER PCR ([facility])      IR CONSULTATION FOR IR EXAM  2020    IR CONSULTATION FOR IR EXAM  2023    IR TRIGGER POINT INJ 3 OR MORE MUSCLES  2023    left eye scar tissue      normal echo      fen-phen use      x6      REPLACE PACEMAKER GENERATOR N/A 2023    Procedure: REPLACEMENT, PULSE GENERATOR, CARDIAC PACEMAKER;  Surgeon: Isiah Hodge MD;  Location:  OR    SURGICAL RADIOLOGY PROCEDURE N/A 2016    Procedure: SURGICAL RADIOLOGY PROCEDURE;  Surgeon: Provider, Generic Perianesthesia Nursing;  Location: [facility] OR       No Known Allergies    Current Outpatient Medications   Medication Sig Dispense Refill    acetaminophen (TYLENOL) 325 MG tablet Take 2 tablets (650 mg) by mouth every 6 hours as needed for mild pain MAX 4000MG TYLENOL  IN 24 HRS / NEED TO INCLUDE HER LORTAB. 50 tablet 4    Calcium Carbonate-Vitamin D (CALCIUM 600 + D OR) Take 1 tablet by mouth every morning      carvedilol (COREG) 12.5 MG tablet TAKE 1 TABLET BY MOUTH 2 TIMES DAILY WITH MEALS 180 tablet 3    empagliflozin (JARDIANCE) 10 MG TABS tablet Take 1 tablet (10 mg) by mouth daily 90 tablet 3    gabapentin (NEURONTIN) 400 MG capsule TAKE 1 CAPSULE BY MOUTH 3 TIMES DAILY FOR PAIN 90 capsule 11    HYDROcodone-acetaminophen (NORCO) 5-325 MG tablet Take 2 tablets by mouth 3 times daily as needed 180 tablet 0    mometasone-formoterol (DULERA) 200-5 MCG/ACT inhaler Inhale 2 puffs into the lungs 2 times daily 13 g 11    Multiple Vitamin (MULTI-VITAMIN DAILY PO) Take 1 tablet by mouth every morning      pantoprazole (PROTONIX) 40 MG EC tablet Take 1 tablet (40 mg) by mouth every morning (before breakfast) 30 tablet 1    rosuvastatin (CRESTOR) 10 MG tablet TAKE 1 TABLET BY MOUTH DAILY 90 tablet 3    sacubitril-valsartan (ENTRESTO) 49-51  MG per tablet Take 1 tablet by mouth 2 times daily 180 tablet 3    umeclidinium (INCRUSE ELLIPTA) 62.5 MCG/ACT inhaler Inhale 1 puff into the lungs daily 30 each 3    vitamin C (ASCORBIC ACID) 500 MG tablet Take 500 mg by mouth 2 times daily         Social History     Socioeconomic History    Marital status:      Spouse name: Not on file    Number of children: Not on file    Years of education: Not on file    Highest education level: Not on file   Occupational History    Occupation:       Employer: RETIRED   Tobacco Use    Smoking status: Former     Packs/day: 1.00     Years: 15.00     Additional pack years: 0.00     Total pack years: 15.00     Types: Cigarettes     Start date: 1983     Quit date: 1998     Years since quittin.7     Passive exposure: Never    Smokeless tobacco: Never    Tobacco comments:     Passive Exposure: No; Longest Tobacco Free: 15 years    Vaping Use    Vaping Use: Never used   Substance and Sexual Activity    Alcohol use: Not Currently     Comment: Occasionally     Drug use: No    Sexual activity: Not Currently   Other Topics Concern     Service Not Asked    Blood Transfusions Not Asked    Caffeine Concern Yes     Comment: Coffee 5 cups daily     Occupational Exposure Not Asked    Hobby Hazards Not Asked    Sleep Concern Not Asked    Stress Concern Not Asked    Weight Concern Not Asked    Special Diet Not Asked    Back Care Not Asked    Exercise Not Asked    Bike Helmet Not Asked    Seat Belt Not Asked    Self-Exams Not Asked    Parent/sibling w/ CABG, MI or angioplasty before 65F 55M? No   Social History Narrative    Not on file     Social Determinants of Health     Financial Resource Strain: Low Risk  (10/2/2023)    Financial Resource Strain     Within the past 12 months, have you or your family members you live with been unable to get utilities (heat, electricity) when it was really needed?: No   Food Insecurity: Low Risk  (10/2/2023)    Food  Insecurity     Within the past 12 months, did you worry that your food would run out before you got money to buy more?: No     Within the past 12 months, did the food you bought just not last and you didn t have money to get more?: No   Transportation Needs: Low Risk  (10/2/2023)    Transportation Needs     Within the past 12 months, has lack of transportation kept you from medical appointments, getting your medicines, non-medical meetings or appointments, work, or from getting things that you need?: No   Physical Activity: Not on file   Stress: Not on file   Social Connections: Not on file   Interpersonal Safety: Not on file   Housing Stability: Low Risk  (10/2/2023)    Housing Stability     Do you have housing? : Yes     Are you worried about losing your housing?: No       LAB RESULTS:   Office Visit on 08/08/2020   Component Date Value Ref Range Status    COVID-19 Virus PCR to [facility] - So* 08/08/2020 Nasopharyngeal   Final    COVID-19 Virus PCR to [facility] - Re* 08/08/2020 Test received-See reflex to IDDL test SARS CoV2 (COVID-19) Virus RT-PCR   Final    SARS-CoV-2 Virus Specimen Source 08/08/2020 Nasopharyngeal   Final    SARS-CoV-2 PCR Result 08/08/2020 NEGATIVE   Final    SARS-CoV-2 PCR Comment 08/08/2020    Final                    Value:Testing was performed using the Aptima SARS-CoV-2 Assay on the [employer] Instrument System.   Additional information about this Emergency Use Authorization (EUA) assay can be found via   the Lab Guide.     Ancillary Procedure on 08/04/2020   Component Date Value Ref Range Status    Date Time Interrogation Session 08/05/2020 [id number]   Final    Implantable Pulse Generator Manufa* 08/05/2020 Medtronic   Final    Implantable Pulse Generator Model 08/05/2020 [id number] Viva XT CRT-D   Final    Implantable Pulse Generator Serial* 08/05/2020 [id number]   Final    Type Interrogation Session 08/05/2020 Remote    Final    Clinic Name 08/05/2020 [facility]    Final    Implantable Pulse Generator Type 08/05/2020 Cardiac Resynchronization Therapy - Defibrillator   Final    Implantable Pulse Generator Implan* 08/05/2020 [id number]   Final    Implantable Lead  08/05/2020 Medtronic   Final    Implantable Lead Model 08/05/2020 4296 Attain Ability Plus   Final    Implantable Lead Serial Number 08/05/2020 [id number]   Final    Implantable Lead Implant Date 08/05/2020 [id number]   Final    Implantable Lead Polarity Type 08/05/2020 Bipolar Lead   Final    Implantable Lead Location Detail 1 08/05/2020 UNKNOWN   Final    Implantable Lead Location 08/05/2020 Left Ventricle   Final    Implantable Lead  08/05/2020 Medtronic   Final    Implantable Lead Model 08/05/2020 5076 CapSureFix Novus   Final    Implantable Lead Serial Number 08/05/2020 [id number]   Final    Implantable Lead Implant Date 08/05/2020 [id number]   Final    Implantable Lead Polarity Type 08/05/2020 Bipolar Lead   Final    Implantable Lead Location Detail 1 08/05/2020 APPENDAGE   Final    Implantable Lead Location 08/05/2020 Right Atrium   Final    Implantable Lead  08/05/2020 Medtronic   Final    Implantable Lead Model 08/05/2020 6947 Sprint Quattro Secure   Final    Implantable Lead Serial Number 08/05/2020 [id number]   Final    Implantable Lead Implant Date 08/05/2020 [id number]   Final    Implantable Lead Polarity Type 08/05/2020 Quadripolar Lead   Final    Implantable Lead Location Detail 1 08/05/2020 APEX   Final    Implantable Lead Location 08/05/2020 Right Ventricle   Final    Vamshi Setting Mode (NBG Code) 08/05/2020 DDDR   Final    Vamshi Setting Lower Rate Limit 08/05/2020 60  [beats]/min Final    Vamshi Setting Maximum Tracking Rate 08/05/2020 130  [beats]/min Final    Vamshi Setting Maximum Sensor Rate 08/05/2020 130  [beats]/min Final    Vamshi Setting NAJMA Delay Low 08/05/2020 140  ms Final    Vamshi Setting PAV Delay Low 08/05/2020 160  ms Final    Vamshi Setting AT Mode Switch Rate  08/05/2020 171  [beats]/min Final    Lead Channel Setting Sensing Polar* 08/05/2020 Bipolar   Final    Lead Channel Setting Sensing Anode* 08/05/2020 Right Atrium   Final    Lead Channel Setting Sensing Anode* 08/05/2020 Ring   Final    Lead Channel Setting Sensing Catho* 08/05/2020 Right Atrium   Final    Lead Channel Setting Sensing Catho* 08/05/2020 Tip   Final    Lead Channel Setting Sensing Sensi* 08/05/2020 0.3  mV Final    Lead Channel Setting Sensing Polar* 08/05/2020 Bipolar   Final    Lead Channel Setting Sensing Anode* 08/05/2020 Right Ventricle   Final    Lead Channel Setting Sensing Anode* 08/05/2020 Coil   Final    Lead Channel Setting Sensing Catho* 08/05/2020 Right Ventricle   Final    Lead Channel Setting Sensing Catho* 08/05/2020 Tip   Final    Lead Channel Setting Sensing Sensi* 08/05/2020 0.3  mV Final    Ventricular chambers paced during * 08/05/2020 LVOnly   Final    Lead Channel Setting Pacing Polari* 08/05/2020 Bipolar   Final    Lead Channel Setting Pacing Anode * 08/05/2020 Right Atrium   Final    Lead Channel Setting Pacing Anode * 08/05/2020 Ring   Final    Lead Channel Setting Sensing Catho* 08/05/2020 Right Atrium   Final    Lead Channel Setting Sensing Catho* 08/05/2020 Tip   Final    Lead Channel Setting Pacing Pulse * 08/05/2020 0.4  ms Final    Lead Channel Setting Pacing Amplit* 08/05/2020 1.5  V Final    Lead Channel Setting Pacing Captur* 08/05/2020 Adaptive   Final    Lead Channel Setting Pacing Polari* 08/05/2020 Bipolar   Final    Lead Channel Setting Pacing Anode * 08/05/2020 Right Ventricle   Final    Lead Channel Setting Pacing Anode * 08/05/2020 Ring   Final    Lead Channel Setting Sensing Catho* 08/05/2020 Right Ventricle   Final    Lead Channel Setting Sensing Catho* 08/05/2020 Tip   Final    Lead Channel Setting Pacing Pulse * 08/05/2020 0.4  ms Final    Lead Channel Setting Pacing Amplit* 08/05/2020 2  V Final    Lead Channel Setting Pacing Captur* 08/05/2020 Adaptive    Final    Lead Channel Setting Pacing Polari* 08/05/2020 Bipolar   Final    Lead Channel Setting Pacing Anode * 08/05/2020 Right Ventricle   Final    Lead Channel Setting Pacing Anode * 08/05/2020 Coil   Final    Lead Channel Setting Sensing Catho* 08/05/2020 Left Ventricle   Final    Lead Channel Setting Sensing Catho* 08/05/2020 Tip   Final    Lead Channel Setting Pacing Pulse * 08/05/2020 0.6  ms Final    Lead Channel Setting Pacing Amplit* 08/05/2020 1.5  V Final    Lead Channel Setting Pacing Captur* 08/05/2020 Adaptive   Final    Zone Setting Type Category 08/05/2020 VF   Final    Zone Setting Detection Interval 08/05/2020 320  ms Final    Zone Setting Detection Beats Numer* 08/05/2020 30  [beats] Final    Zone Setting Detection Beats Denom* 08/05/2020 40  [beats] Final    Zone Setting Type Category 08/05/2020 VT   Final    Zone Setting Detection Interval 08/05/2020 Blank   Final    Zone Setting Type Category 08/05/2020 VT   Final    Zone Setting Detection Interval 08/05/2020 360  ms Final    Zone Setting Type Category 08/05/2020 VT   Final    Zone Setting Detection Interval 08/05/2020 400  ms Final    Zone Setting Type Category 08/05/2020 ATRIAL_FIBRILLATION   Final    Zone Setting Type Category 08/05/2020 AT/AF   Final    Zone Setting Detection Interval 08/05/2020 350  ms Final    Lead Channel Impedance Value 08/05/2020 437  ohm Final    Lead Channel Sensing Intrinsic Amp* 08/05/2020 2.375  mV Final    Lead Channel Sensing Intrinsic Amp* 08/05/2020 2.375  mV Final    Lead Channel Pacing Threshold Ampl* 08/05/2020 0.375  V Final    Lead Channel Pacing Threshold Puls* 08/05/2020 0.4  ms Final    Lead Channel Impedance Value 08/05/2020 399  ohm Final    Lead Channel Impedance Value 08/05/2020 323  ohm Final    Lead Channel Sensing Intrinsic Amp* 08/05/2020 5  mV Final    Lead Channel Sensing Intrinsic Amp* 08/05/2020 5  mV Final    Lead Channel Pacing Threshold Ampl* 08/05/2020 0.625  V Final    Lead Channel  Pacing Threshold Puls* 08/05/2020 0.4  ms Final    Lead Channel Impedance Value 08/05/2020 893  ohm Final    Lead Channel Impedance Value 08/05/2020 456  ohm Final    Lead Channel Impedance Value 08/05/2020 570  ohm Final    Lead Channel Pacing Threshold Ampl* 08/05/2020 0.5  V Final    Lead Channel Pacing Threshold Puls* 08/05/2020 0.6  ms Final    Battery Date Time of Measurements 08/05/2020 20200804012400   Final    Battery Status 08/05/2020 OK   Final    Battery RRT Trigger 08/05/2020 2.727   Final    Battery Remaining Longevity 08/05/2020 26  mo Final    Battery Voltage 08/05/2020 2.94  V Final    Capacitor Charge Type 08/05/2020 Reformation   Final    Capacitor Last Charge Date Time 08/05/2020 [id number]   Final    Capacitor Charge Time 08/05/2020 4.314   Final    Capacitor Charge Energy 08/05/2020 18  J Final    Vamshi Statistic Date Time Start 08/05/2020 [id number]   Final    Vamshi Statistic Date Time End 08/05/2020 20200804012403   Final    Vamshi Statistic RA Percent Paced 08/05/2020 9.93  % Final    Vamshi Statistic RV Percent Paced 08/05/2020 7.92  % Final    CRT Statistic LV Percent Paced 08/05/2020 98.09  % Final    Vamshi Statistic AP  Percent 08/05/2020 9.75  % Final    Vamshi Statistic AS  Percent 08/05/2020 88.58  % Final    Vamshi Statistic AP VS Percent 08/05/2020 0.19  % Final    Vamshi Statistic AS VS Percent 08/05/2020 1.47  % Final    CRT Statistic Date Time Start 08/05/2020 [id number]   Final    CRT Statistic Date Time End 08/05/2020 20200804012403   Final    CRT Statistic CRT Percent Paced 08/05/2020 98.09  % Final    Atrial Tachy Statistic Date Time S* 08/05/2020 [id number]   Final    Atrial Tachy Statistic Date Time E* 08/05/2020 20200804012403   Final    Atrial Tachy Statistic AT/AF Burde* 08/05/2020 0.1  % Final    Therapy Statistic Recent Shocks De* 08/05/2020 0   Final    Therapy Statistic Recent Shocks Ab* 08/05/2020 0   Final    Therapy Statistic Recent ATP Deliv*  08/05/2020 0   Final    Therapy Statistic Recent Date Time* 08/05/2020 [id number]   Final    Therapy Statistic Recent Date Time* 08/05/2020 20200804012403   Final    Therapy Statistic Total Shocks Del* 08/05/2020 1   Final    Therapy Statistic Total Shocks Abo* 08/05/2020 0   Final    Therapy Statistic Total ATP Delive* 08/05/2020 0   Final    Therapy Statistic Total  Date Time* 08/05/2020 [id number]   Final    Therapy Statistic Total  Date Time* 08/05/2020 20200804012403   Final    Episode Statistic Recent Count 08/05/2020 1   Final    Episode Statistic Type Category 08/05/2020 AT/AF   Final    Episode Statistic Recent Count 08/05/2020 0   Final    Episode Statistic Type Category 08/05/2020 SVT   Final    Episode Statistic Recent Count 08/05/2020 0   Final    Episode Statistic Type Category 08/05/2020 VT   Final    Episode Statistic Recent Count 08/05/2020 0   Final    Episode Statistic Type Category 08/05/2020 VF   Final    Episode Statistic Recent Count 08/05/2020 0   Final    Episode Statistic Type Category 08/05/2020 VT   Final    Episode Statistic Recent Count 08/05/2020 0   Final    Episode Statistic Type Category 08/05/2020 VT   Final    Episode Statistic Recent Count 08/05/2020 0   Final    Episode Statistic Type Category 08/05/2020 VT   Final    Episode Statistic Recent Date Time* 08/05/2020 [id number]   Final    Episode Statistic Recent Date Time* 08/05/2020 20200804012403   Final    Episode Statistic Recent Date Time* 08/05/2020 [id number]   Final    Episode Statistic Recent Date Time* 08/05/2020 20200804012403   Final    Episode Statistic Recent Date Time* 08/05/2020 [id number]   Final    Episode Statistic Recent Date Time* 08/05/2020 20200804012403   Final    Episode Statistic Recent Date Time* 08/05/2020 [id number]   Final    Episode Statistic Recent Date Time* 08/05/2020 20200804012403   Final    Episode Statistic Recent Date Time* 08/05/2020 [id number]   Final    Episode  Statistic Recent Date Time* 08/05/2020 20200804012403   Final    Episode Statistic Recent Date Time* 08/05/2020 [id number]   Final    Episode Statistic Recent Date Time* 08/05/2020 20200804012403   Final    Episode Statistic Recent Date Time* 08/05/2020 [id number]   Final    Episode Statistic Recent Date Time* 08/05/2020 20200804012403   Final    Episode Statistic Total Count 08/05/2020 0   Final    Episode Statistic Type Category 08/05/2020 AT/AF   Final    Episode Statistic Total Count 08/05/2020 0   Final    Episode Statistic Type Category 08/05/2020 SVT   Final    Episode Statistic Total Count 08/05/2020 66   Final    Episode Statistic Type Category 08/05/2020 VT   Final    Episode Statistic Total Count 08/05/2020 1   Final    Episode Statistic Type Category 08/05/2020 VF   Final    Episode Statistic Total Count 08/05/2020 0   Final    Episode Statistic Type Category 08/05/2020 VT   Final    Episode Statistic Total Count 08/05/2020 0   Final    Episode Statistic Type Category 08/05/2020 VT   Final    Episode Statistic Total Count 08/05/2020 0   Final    Episode Statistic Type Category 08/05/2020 VT   Final    Episode Statistic Total Date Time * 08/05/2020 [id number]   Final    Episode Statistic Total Date Time * 08/05/2020 20200804012403   Final    Episode Statistic Total Date Time * 08/05/2020 [id number]   Final    Episode Statistic Total Date Time * 08/05/2020 20200804012403   Final    Episode Statistic Total Date Time * 08/05/2020 [id number]   Final    Episode Statistic Total Date Time * 08/05/2020 20200804012403   Final    Episode Statistic Total Date Time * 08/05/2020 [id number]   Final    Episode Statistic Total Date Time * 08/05/2020 20200804012403   Final    Episode Statistic Total Date Time * 08/05/2020 [id number]   Final    Episode Statistic Total Date Time * 08/05/2020 20200804012403   Final    Episode Statistic Total Date Time * 08/05/2020 [id number]   Final    Episode  "Statistic Total Date Time * 08/05/2020 20200804012403   Final    Episode Statistic Total Date Time * 08/05/2020 [id number]   Final    Episode Statistic Total Date Time * 08/05/2020 20200804012403   Final    Episode Identifier 08/05/2020 716   Final    Episode Type Category 08/05/2020 VSE   Final    Episode Date Time 08/05/2020 [id number]   Final    Episode Duration 08/05/2020 9  s Final    Episode Identifier 08/05/2020 715   Final    Episode Type Category 08/05/2020 VSE   Final    Episode Date Time 08/05/2020 [id number]   Final    Episode Duration 08/05/2020 3  s Final    Episode Identifier 08/05/2020 714   Final    Episode Type Category 08/05/2020 VSE   Final    Episode Date Time 08/05/2020 [id number]   Final    Episode Duration 08/05/2020 4  s Final    Episode Identifier 08/05/2020 713   Final    Episode Type Category 08/05/2020 VSE   Final    Episode Date Time 08/05/2020 [id number]   Final    Episode Duration 08/05/2020 6  s Final    Episode Identifier 08/05/2020 712   Final    Episode Type Category 08/05/2020 VSE   Final    Episode Date Time 08/05/2020 [id number]   Final    Episode Duration 08/05/2020 18  s Final    Episode Identifier 08/05/2020 711   Final    Episode Type Category 08/05/2020 VSE   Final    Episode Date Time 08/05/2020 [id number]   Final    Episode Duration 08/05/2020 4  s Final        Review of systems: Negative except that which was noted in the HPI.    Physical examination:  /65 (BP Location: Left arm, Cuff Size: Adult Regular)   Pulse 79   Ht 1.626 m (5' 4\")   Wt 61.7 kg (136 lb)   SpO2 93%   BMI 23.34 kg/m      GENERAL APPEARANCE: healthy, alert and no distress  CHEST: lungs clear to auscultation.  CARDIOVASCULAR: Irregular rate irregular rhythm, normal S1 with physiologic split S2, no S3 or S4 and no murmur, click or rub  ABDOMEN: soft, non tender, bowel sounds normal.  Distant sounds.  EXTREMITIES: no clubbing, cyanosis but with minimal lower " extremity edema.    Total time spent on day of visit, including review of tests, obtaining/reviewing separately obtained history, ordering medications/tests/procedures, communicating with PCP/consultants, and documenting in electronic medical record: 23 minutes.           Thank you for allowing me to participate in the care of your patient. Please do not hesitate to contact me if you have any questions.     Kodi Garcia, DO

## 2023-11-14 ENCOUNTER — ANCILLARY PROCEDURE (OUTPATIENT)
Dept: CARDIOLOGY | Facility: CLINIC | Age: 88
End: 2023-11-14
Attending: INTERNAL MEDICINE
Payer: MEDICARE

## 2023-11-14 DIAGNOSIS — Z95.810 AUTOMATIC IMPLANTABLE CARDIOVERTER-DEFIBRILLATOR IN SITU: ICD-10-CM

## 2023-11-14 PROCEDURE — 93296 REM INTERROG EVL PM/IDS: CPT

## 2023-11-14 PROCEDURE — 93295 DEV INTERROG REMOTE 1/2/MLT: CPT | Performed by: INTERNAL MEDICINE

## 2023-11-27 DIAGNOSIS — H91.93 DECREASED HEARING OF BOTH EARS: Primary | ICD-10-CM

## 2023-11-28 DIAGNOSIS — G62.9 NEUROPATHY: ICD-10-CM

## 2023-11-28 DIAGNOSIS — M47.815 SPONDYLOSIS OF THORACOLUMBAR REGION WITHOUT MYELOPATHY OR RADICULOPATHY: ICD-10-CM

## 2023-11-28 DIAGNOSIS — M48.062 SPINAL STENOSIS OF LUMBAR REGION WITH NEUROGENIC CLAUDICATION: ICD-10-CM

## 2023-11-30 RX ORDER — HYDROCODONE BITARTRATE AND ACETAMINOPHEN 5; 325 MG/1; MG/1
2 TABLET ORAL 3 TIMES DAILY PRN
Qty: 180 TABLET | Refills: 0 | Status: SHIPPED | OUTPATIENT
Start: 2023-11-30 | End: 2024-03-14

## 2023-11-30 NOTE — TELEPHONE ENCOUNTER
[facility]  Last Written Prescription Date: 10/2/23  Last Fill Quantity: 180 # of Refills: 0  Last Office Visit: 10/2/23

## 2023-12-04 ENCOUNTER — OFFICE VISIT (OUTPATIENT)
Dept: AUDIOLOGY | Facility: OTHER | Age: 88
End: 2023-12-04
Attending: PHYSICIAN ASSISTANT
Payer: MEDICARE

## 2023-12-04 DIAGNOSIS — H90.3 SENSORINEURAL HEARING LOSS (SNHL) OF BOTH EARS: Primary | ICD-10-CM

## 2023-12-04 DIAGNOSIS — H91.93 DECREASED HEARING OF BOTH EARS: ICD-10-CM

## 2023-12-04 PROCEDURE — 92557 COMPREHENSIVE HEARING TEST: CPT | Performed by: AUDIOLOGIST

## 2023-12-04 PROCEDURE — 92550 TYMPANOMETRY & REFLEX THRESH: CPT | Performed by: AUDIOLOGIST

## 2023-12-04 NOTE — PROGRESS NOTES
Audiology Evaluation Completed. Please refer SCANNED AUDIOGRAM and/or TYMPANOGRAM for BACKGROUND, RESULTS, RECOMMENDATIONS.      Leila KIM, [facility]-A  Audiologist #[id number]    Audiology Evaluation Completed. Please refer SCANNED AUDIOGRAM and/or TYMPANOGRAM for BACKGROUND, RESULTS, RECOMMENDATIONS.      Leila KIM, CCC-A  Audiologist #[id number]

## 2023-12-11 DIAGNOSIS — I42.8 NON-ISCHEMIC CARDIOMYOPATHY (H): ICD-10-CM

## 2023-12-11 NOTE — TELEPHONE ENCOUNTER
[facility]      Last Written Prescription Date:  11-  Last Fill Quantity: 180,   # refills: 3  Last Office Visit: 10-2-23  Future Office visit:       Routing refill request to provider for review/approval because:  Drug not on the [facility], P or [facility] refill protocol or controlled substance

## 2023-12-13 RX ORDER — CARVEDILOL 12.5 MG/1
TABLET ORAL
Qty: 180 TABLET | Refills: 2 | Status: ON HOLD | OUTPATIENT
Start: 2023-12-13 | End: 2024-03-10

## 2023-12-18 ENCOUNTER — OFFICE VISIT (OUTPATIENT)
Dept: FAMILY MEDICINE | Facility: OTHER | Age: 88
End: 2023-12-18
Attending: FAMILY MEDICINE
Payer: COMMERCIAL

## 2023-12-18 VITALS
HEIGHT: 64 IN | WEIGHT: 125.9 LBS | DIASTOLIC BLOOD PRESSURE: 50 MMHG | HEART RATE: 84 BPM | TEMPERATURE: 97.7 F | SYSTOLIC BLOOD PRESSURE: 110 MMHG | OXYGEN SATURATION: 95 % | BODY MASS INDEX: 21.49 KG/M2

## 2023-12-18 DIAGNOSIS — M48.062 SPINAL STENOSIS OF LUMBAR REGION WITH NEUROGENIC CLAUDICATION: Primary | ICD-10-CM

## 2023-12-18 DIAGNOSIS — I10 ESSENTIAL HYPERTENSION: ICD-10-CM

## 2023-12-18 DIAGNOSIS — I42.8 NON-ISCHEMIC CARDIOMYOPATHY (H): ICD-10-CM

## 2023-12-18 DIAGNOSIS — F11.90 CHRONIC, CONTINUOUS USE OF OPIOIDS: ICD-10-CM

## 2023-12-18 DIAGNOSIS — I50.9 CONGESTIVE HEART FAILURE, NYHA CLASS 2, UNSPECIFIED CONGESTIVE HEART FAILURE TYPE (H): ICD-10-CM

## 2023-12-18 DIAGNOSIS — N18.32 STAGE 3B CHRONIC KIDNEY DISEASE (H): ICD-10-CM

## 2023-12-18 DIAGNOSIS — M47.815 SPONDYLOSIS OF THORACOLUMBAR REGION WITHOUT MYELOPATHY OR RADICULOPATHY: ICD-10-CM

## 2023-12-18 PROCEDURE — 99214 OFFICE O/P EST MOD 30 MIN: CPT | Performed by: FAMILY MEDICINE

## 2023-12-18 PROCEDURE — G0463 HOSPITAL OUTPT CLINIC VISIT: HCPCS

## 2023-12-18 ASSESSMENT — PATIENT HEALTH QUESTIONNAIRE - PHQ9
10. IF YOU CHECKED OFF ANY PROBLEMS, HOW DIFFICULT HAVE THESE PROBLEMS MADE IT FOR YOU TO DO YOUR WORK, TAKE CARE OF THINGS AT HOME, OR GET ALONG WITH OTHER PEOPLE: NOT DIFFICULT AT ALL
SUM OF ALL RESPONSES TO PHQ QUESTIONS 1-9: 4
SUM OF ALL RESPONSES TO PHQ QUESTIONS 1-9: 4

## 2023-12-18 ASSESSMENT — PAIN SCALES - GENERAL: PAINLEVEL: NO PAIN (0)

## 2023-12-18 NOTE — LETTER
Opioid / Opioid Plus Controlled Substance Agreement    This is an agreement between you and your provider about the safe and appropriate use of controlled substance/opioids prescribed by your care team. Controlled substances are medicines that can cause physical and mental dependence (abuse).    There are strict laws about having and using these medicines. We here at [facility] are committing to working with you in your efforts to get better. To support you in this work, we ll help you schedule regular office appointments for medicine refills. If we must cancel or change your appointment for any reason, we ll make sure you have enough medicine to last until your next appointment.     As a Provider, I will:  Listen carefully to your concerns and treat you with respect.   Recommend a treatment plan that I believe is in your best interest. This plan may involve therapies other than opioid pain medication.   Talk with you often about the possible benefits, and the risk of harm of any medicine that we prescribe for you.   Provide a plan on how to taper (discontinue or go off) using this medicine if the decision is made to stop its use.    As a Patient, I understand that opioid(s):   Are a controlled substance prescribed by my care team to help me function or work and manage my condition(s).   Are strong medicines and can cause serious side effects such as:  Drowsiness, which can seriously affect my driving ability  A lower breathing rate, enough to cause death  Harm to my thinking ability   Depression   Abuse of and addiction to this medicine  Need to be taken exactly as prescribed. Combining opioids with certain medicines or chemicals (such as illegal drugs, sedatives, sleeping pills, and benzodiazepines) can be dangerous or even fatal. If I stop opioids suddenly, I may have severe withdrawal symptoms.  Do not work for all types of pain nor for all patients. If they re not helpful, I may be asked to stop  them.        The risks, benefits and side effects of these medicine(s) were explained to me. I agree that:  I will take part in other treatments as advised by my care team. This may be psychiatry or counseling, physical therapy, behavioral therapy, group treatment or a referral to a specialist.     I will keep all my appointments. I understand that this is part of the monitoring of opioids. My care team may require an office visit for EVERY opioid/controlled substance refill. If I miss appointments or don t follow instructions, my care team may stop my medicine.    I will take my medicines as prescribed. I will not change the dose or schedule unless my care team tells me to. There will be no refills if I run out early.     I may be asked to come to the clinic and complete a urine drug test or complete a pill count at any time. If I don t give a urine sample or participate in a pill count, the care team may stop my medicine.    I will only receive prescriptions from this clinic for chronic pain. If I am treated by another provider for acute pain issues, I will tell them that I am taking opioid pain medication for chronic pain and that I have a treatment agreement with this provider. I will inform my [facility] care team within one business day if I am given a prescription for any pain medication by another healthcare provider. My [facility] care team can contact other providers and pharmacists about my use of any medicines.    It is up to me to make sure that I don t run out of my medicines on weekends or holidays. If my care team is willing to refill my opioid prescription without a visit, I must request refills only during office hours. Refills may take up to 3 business days to process. I will use one pharmacy to fill all my opioid and other controlled substance prescriptions. I will notify the clinic about any changes to my insurance or medication availability.    I am responsible for my  prescriptions. If the medicine/prescription is lost, stolen or destroyed, it will not be replaced. I also agree not to share controlled substance medicines with anyone.    I am aware I should not use any illegal or recreational drugs. I agree not to drink alcohol unless my care team says I can.       If I enroll in the [place] [facility] Cann[facility] program, I will tell my care team prior to my next refill.     I will tell my care team right away if I become pregnant, have a new medical problem treated outside of my regular clinic, or have a change in my medications.    I understand that this medicine can affect my thinking, judgment and reaction time. Alcohol and drugs affect the brain and body, which can affect the safety of my driving. Being under the influence of alcohol or drugs can affect my decision-making, behaviors, personal safety, and the safety of others. Driving while impaired (DWI) can occur if a person is driving, operating, or in physical control of a car, motorcycle, boat, snowmobile, ATV, motorbike, off-road vehicle, or any other motor vehicle ([place] Statute 169A.20). I understand the risk if I choose to drive or operate any vehicle or machinery.    I understand that if I do not follow any of the conditions above, my prescriptions or treatment may be stopped or changed.          Opioids  What You Need to Know    What are opioids?   Opioids are pain medicines that must be prescribed by a doctor. They are also known as narcotics.     Examples are:   morphine (MS Contin, Selene)  oxycodone (Oxycontin)  oxycodone and acetaminophen (Percocet)  hydrocodone and acetaminophen (Vicodin, Norco)   fentanyl patch (Duragesic)   hydromorphone (Dilaudid)   methadone  codeine (Tylenol #3)     What do opioids do well?   Opioids are best for severe short-term pain such as after a surgery or injury. They may work well for cancer pain. They may help some people with long-lasting (chronic) pain.     What do opioids NOT do  well?   Opioids never get rid of pain entirely, and they don t work well for most patients with chronic pain. Opioids don t reduce swelling, one of the causes of pain.                                    Other ways to manage chronic pain and improve function include:     Treat the health problem that may be causing pain  Anti-inflammation medicines, which reduce swelling and tenderness, such as ibuprofen (Advil, Motrin) or naproxen (Aleve)  Acetaminophen (Tylenol)  Antidepressants and anti-seizure medicines, especially for nerve pain  Topical treatments such as patches or creams  Injections or nerve blocks  Chiropractic or osteopathic treatment  Acupuncture, massage, deep breathing, meditation, visual imagery, aromatherapy  Use heat or ice at the pain site  Physical therapy   Exercise  Stop smoking  Take part in therapy       Risks and side effects     Talk to your doctor before you start or decide to keep taking opioids. Possible side effects include:    Lowering your breathing rate enough to cause death  Overdose, including death, especially if taking higher than prescribed doses  Worse depression symptoms; less pleasure in things you usually enjoy  Feeling tired or sluggish  Slower thoughts or cloudy thinking  Being more sensitive to pain over time; pain is harder to control  Trouble sleeping or restless sleep  Changes in hormone levels (for example, less testosterone)  Changes in sex drive or ability to have sex  Constipation  Unsafe driving  Itching and sweating  Dizziness  Nausea, throwing up and dry mouth    What else should I know about opioids?    Opioids may lead to dependence, tolerance, or addiction.    Dependence means that if you stop or reduce the medicine too quickly, you will have withdrawal symptoms. These include loose poop (diarrhea), jitters, flu-like symptoms, nervousness and tremors. Dependence is not the same as addiction.                     Tolerance means needing higher doses over time to  get the same effect. This may increase the chance of serious side effects.    Addiction is when people improperly use a substance that harms their body, their mind or their relations with others. Use of opiates can cause a relapse of addiction if you have a history of drug or alcohol abuse.    People who have used opioids for a long time may have a lower quality of life, worse depression, higher levels of pain and more visits to doctors.    You can overdose on opioids. Take these steps to lower your risk of overdose:    Recognize the signs:  Signs of overdose include decrease or loss of consciousness (blackout), slowed breathing, trouble waking up and blue lips. If someone is worried about overdose, they should call 911.    Talk to your doctor about Narcan (naloxone).   If you are at risk for overdose, you may be given a prescription for Narcan. This medicine very quickly reverses the effects of opioids.   If you overdose, a friend or family member can give you Narcan while waiting for the ambulance. They need to know the signs of overdose and how to give Narcan.     Don't use alcohol or street drugs.   Taking them with opioids can cause death.    Do not take any of these medicines unless your doctor says it s OK. Taking these with opioids can cause death:  Benzodiazepines, such as lorazepam (Ativan), alprazolam (Xanax) or diazepam (Valium)  Muscle relaxers, such as cyclobenzaprine (Flexeril)  Sleeping pills like zolpidem (Ambien)   Other opioids      How to keep you and other people safe while taking opioids:    Never share your opioids with others.  Opioid medicines are regulated by the Drug Enforcement Agency ([place]). Selling or sharing medications is a criminal act.    2. Be sure to store opioids in a secure place, locked up if possible. Young children can easily swallow them and overdose.    3. When you are traveling with your medicines, keep them in the original bottles. If you use a pill box, be sure you also  carry a copy of your medicine list from your clinic or pharmacy.    4. Safe disposal of opioids    Most pharmacies have places to get rid of medicine, called disposal kiosks. Medicine disposal options are also available in every [facility]. Search your county and  medication disposal  to find more options. You can find more details at:  https://www.pca.[facility].[place]./living-green/managing-unwanted-medications     I agree that my provider, clinic care team, and pharmacy may work with any city, state or federal law enforcement agency that investigates the misuse, sale, or other diversion of my controlled medicine. I will allow my provider to discuss my care with, or share a copy of, this agreement with any other treating provider, pharmacy or emergency room where I receive care.    I have read this agreement and have asked questions about anything I did not understand.    _______________________________________________________  Patient Signature - Jacklyn Hein _____________________                   Date     _______________________________________________________  Provider Signature - Ilia Moran MD   _____________________                   Date     _______________________________________________________  Witness Signature (required if provider not present while patient signing)   _____________________                   Date

## 2023-12-18 NOTE — PROGRESS NOTES
Assessment & Plan     Spinal stenosis of lumbar region with neurogenic claudication  Doing well. No issues  pain controlled fairly well. - Continue current medications and behavioral changes.   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    Chronic, continuous use of opioids  Stable - helps pt keep active and lives by self     Stage 3b chronic kidney disease  Stable     Essential hypertension  No issues - stable. No labs needed    Non-ischemic cardiomyopathy (H)  Well compensated     Congestive heart failure, NYHA class 2, unspecified congestive heart failure type (H)  Well compensated-Continue current medications and behavioral changes.     WT loss - no red flags- discussed high calorie foods and grazing along with Ensure etc. Will watch.                  No follow-ups on file.    Ilia Moran MD  [facility] - GABRIEL Angulo is a 88 year old, presenting for the following health issues:  RECHECK        12/18/2023     3:27 PM   Additional Questions   Roomed by Zabrina MEHTA LPN   Accompanied by self       HPI       Hypertension Follow-up    Do you check your blood pressure regularly outside of the clinic? No   Are you following a low salt diet? No  Are your blood pressures ever more than 140 on the top number (systolic) OR more   than 90 on the bottom number (diastolic), for example 140/90? No  H/o drug induced hypotension - worked with Dr Garcia and doing better    Heart Failure Follow-up   Are you experiencing any shortness of breath? Yes, with activity only     How would you describe your shortness of breath?  Same as usual  Are you experiencing any swelling in your legs or feet?  No  Are you using more pillows than usual? No  Do you cough at night?  No  Do you check your weight daily?  Yes  Have you had a weight change recently?  Weight decrease  Are you having any of the following side effects from your medications? (Select all  "that apply)  The patient does not report symptoms of dizziness, fatigue, cough, swelling, or slow heart beat.  Since your last visit, how many times have you gone to the cardiologist, urgent care, emergency room, or hospital because of your heart failure?   None  Doing well.    Last Echo: Echo result w/o MOPS: Interpretation SummaryGlobal and regional left ventricular function is normal with an EF of 55-60%.Grade I or early diastolic dysfunction.Global right ventricular function is normal.Mild to moderate left atrial enlargement is present.Mild mitral annular calcification is present.Moderate mitral insufficiency is present.No aortic stenosis is present.Mild aortic insufficiency is present.Mild tricuspid insufficiency is present.Right ventricular systolic pressure is 25mmHg above the right atrial pressure.This study was compared with the study from 8/24/21 .No significant changes noted.      COPD Follow-Up  Overall, how are your COPD symptoms since your last clinic visit?  No change  How much fatigue or shortness of breath do you have when you are walking?  None  How much shortness of breath do you have when you are resting?  None  How often do you cough? Never  Have you noticed any change in your sputum/phlegm?  No  Have you experienced a recent fever? No  Please describe how far you can walk without stopping to rest:  The length of 1-2 rooms  How many flights of stairs are you able to walk up without stopping?  None  Have you had any Emergency Room Visits, Urgent Care Visits, or Hospital Admissions because of your COPD since your last office visit?  No  Stable - needs RSV vax - pt aware     History   Smoking Status    Former    Packs/day: 1.00    Years: 15.00    Types: Cigarettes    Start date: 1/1/1983    Quit date: 2/1/1998   Smokeless Tobacco    Never     No results found for: \"FEV1\", \"UDN8OPH\"    WT LOSS--  fairly good appetite / cooks for self/ recently got some Ensure - has not used yet   No red flags " "    Chronic Kidney Disease Follow-up    Do you take any over the counter pain medicine?: No  How many servings of fruits and vegetables do you eat daily?  2-3  On average, how many sweetened beverages do you drink each day (Examples: soda, juice, sweet tea, etc.  Do NOT count diet or artificially sweetened beverages)?   0  How many days per week do you exercise enough to make your heart beat faster? 3 or less  How many minutes a day do you exercise enough to make your heart beat faster? 9 or less  How many days per week do you miss taking your medication? 0          Doing well.  No to minimal pain   H/o compression fx- much improved  Was on Fosamax for years and still on VitD /Ca       Review of Systems   Constitutional, HEENT, cardiovascular, pulmonary, gi and gu systems are negative, except as otherwise noted.      Objective    /50 (BP Location: Right arm, Patient Position: Sitting, Cuff Size: Adult Regular)   Pulse 84   Temp 97.7  F (36.5  C) (Tympanic)   Ht 1.626 m (5' 4\")   Wt 57.1 kg (125 lb 14.4 oz)   SpO2 95%   BMI 21.61 kg/m    Body mass index is 21.61 kg/m .  Physical Exam   GENERAL: healthy, alert and no distress  NECK: no adenopathy, no asymmetry, masses, or scars and thyroid normal to palpation  RESP: lungs clear to auscultation - no rales, rhonchi or wheezes  CV: regular rate and rhythm, normal S1 S2, no S3 or S4, no murmur, click or rub, no peripheral edema and peripheral pulses strong  ABDOMEN: soft, nontender, no hepatosplenomegaly, no masses and bowel sounds normal  MS: no gross musculoskeletal defects noted, no edema                      "

## 2024-01-30 DIAGNOSIS — J43.8 OTHER EMPHYSEMA (H): ICD-10-CM

## 2024-01-30 RX ORDER — UMECLIDINIUM 62.5 UG/1
1 AEROSOL, POWDER ORAL DAILY
Qty: 30 EACH | Refills: 11 | Status: SHIPPED | OUTPATIENT
Start: 2024-01-30

## 2024-01-30 NOTE — TELEPHONE ENCOUNTER
Louisa Cook      Last Written Prescription Date:  10/2/23  Last Fill Quantity: 30,   # refills: 3  Last Office Visit: 12/18/23  Future Office visit:    Next 5 appointments (look out 90 days)      Mar 21, 2024  1:00 PM  (Arrive by 12:45 PM)  Nurse Only with Audra Plummer  [facility] [place] ([facility] [place] ) [address]  [place] [address]  [phone]     Apr 24, 2024  2:15 PM  (Arrive by 2:00 PM)  SHORT [facility] Ilia Moran [facility]  [facility] [place] ) [address]  [place] [address]  [phone]             Routing refill request to provider for review/approval because:

## 2024-01-30 NOTE — TELEPHONE ENCOUNTER
Failed protocol        1/31/2020     9:50 AM 11/22/2021     2:00 PM   ACT Total Scores   ACT TOTAL SCORE (Goal Greater than or Equal to 20) 19 22   In the past 12 months, how many times did you visit the emergency room for your asthma without being admitted to the hospital? 0 0   In the past 12 months, how many times were you hospitalized overnight because of your asthma? 0 0

## 2024-02-06 LAB
MDC_IDC_EPISODE_DTM: NORMAL
MDC_IDC_EPISODE_DURATION: 1 S
MDC_IDC_EPISODE_ID: 2
MDC_IDC_EPISODE_TYPE: NORMAL
MDC_IDC_LEAD_CONNECTION_STATUS: NORMAL
MDC_IDC_LEAD_IMPLANT_DT: NORMAL
MDC_IDC_LEAD_LOCATION: NORMAL
MDC_IDC_LEAD_LOCATION_DETAIL_1: NORMAL
MDC_IDC_LEAD_MFG: NORMAL
MDC_IDC_LEAD_MODEL: NORMAL
MDC_IDC_LEAD_POLARITY_TYPE: NORMAL
MDC_IDC_LEAD_SERIAL: NORMAL
MDC_IDC_MSMT_BATTERY_DTM: NORMAL
MDC_IDC_MSMT_BATTERY_REMAINING_LONGEVITY: 123 MO
MDC_IDC_MSMT_BATTERY_RRT_TRIGGER: NORMAL
MDC_IDC_MSMT_BATTERY_VOLTAGE: 3.01 V
MDC_IDC_MSMT_CAP_CHARGE_DTM: NORMAL
MDC_IDC_MSMT_CAP_CHARGE_ENERGY: 18 J
MDC_IDC_MSMT_CAP_CHARGE_TIME: 3.9 S
MDC_IDC_MSMT_CAP_CHARGE_TYPE: NORMAL
MDC_IDC_MSMT_LEADCHNL_LV_IMPEDANCE_VALUE: 418 OHM
MDC_IDC_MSMT_LEADCHNL_LV_IMPEDANCE_VALUE: 456 OHM
MDC_IDC_MSMT_LEADCHNL_LV_IMPEDANCE_VALUE: 836 OHM
MDC_IDC_MSMT_LEADCHNL_RA_IMPEDANCE_VALUE: 380 OHM
MDC_IDC_MSMT_LEADCHNL_RA_PACING_THRESHOLD_AMPLITUDE: 0.62 V
MDC_IDC_MSMT_LEADCHNL_RA_PACING_THRESHOLD_PULSEWIDTH: 0.4 MS
MDC_IDC_MSMT_LEADCHNL_RA_SENSING_INTR_AMPL: 1.3 MV
MDC_IDC_MSMT_LEADCHNL_RV_IMPEDANCE_VALUE: 342 OHM
MDC_IDC_MSMT_LEADCHNL_RV_IMPEDANCE_VALUE: 418 OHM
MDC_IDC_MSMT_LEADCHNL_RV_PACING_THRESHOLD_AMPLITUDE: 1 V
MDC_IDC_MSMT_LEADCHNL_RV_PACING_THRESHOLD_PULSEWIDTH: 0.4 MS
MDC_IDC_MSMT_LEADCHNL_RV_SENSING_INTR_AMPL: 4.5 MV
MDC_IDC_PG_IMPLANT_DTM: NORMAL
MDC_IDC_PG_MFG: NORMAL
MDC_IDC_PG_MODEL: NORMAL
MDC_IDC_PG_SERIAL: NORMAL
MDC_IDC_PG_TYPE: NORMAL
MDC_IDC_SESS_CLINIC_NAME: NORMAL
MDC_IDC_SESS_DTM: NORMAL
MDC_IDC_SESS_TYPE: NORMAL
MDC_IDC_SET_BRADY_AT_MODE_SWITCH_RATE: 171 {BEATS}/MIN
MDC_IDC_SET_BRADY_LOWRATE: 60 {BEATS}/MIN
MDC_IDC_SET_BRADY_MAX_SENSOR_RATE: 130 {BEATS}/MIN
MDC_IDC_SET_BRADY_MAX_TRACKING_RATE: 130 {BEATS}/MIN
MDC_IDC_SET_BRADY_MODE: NORMAL
MDC_IDC_SET_BRADY_PAV_DELAY_LOW: 160 MS
MDC_IDC_SET_BRADY_SAV_DELAY_LOW: 140 MS
MDC_IDC_SET_CRT_LVRV_DELAY: 10 MS
MDC_IDC_SET_CRT_PACED_CHAMBERS: NORMAL
MDC_IDC_SET_LEADCHNL_LV_PACING_AMPLITUDE: 1.5 V
MDC_IDC_SET_LEADCHNL_LV_PACING_ANODE_ELECTRODE_1: NORMAL
MDC_IDC_SET_LEADCHNL_LV_PACING_ANODE_LOCATION_1: NORMAL
MDC_IDC_SET_LEADCHNL_LV_PACING_CAPTURE_MODE: NORMAL
MDC_IDC_SET_LEADCHNL_LV_PACING_CATHODE_ELECTRODE_1: NORMAL
MDC_IDC_SET_LEADCHNL_LV_PACING_CATHODE_LOCATION_1: NORMAL
MDC_IDC_SET_LEADCHNL_LV_PACING_POLARITY: NORMAL
MDC_IDC_SET_LEADCHNL_LV_PACING_PULSEWIDTH: 0.4 MS
MDC_IDC_SET_LEADCHNL_RA_PACING_AMPLITUDE: 1.5 V
MDC_IDC_SET_LEADCHNL_RA_PACING_ANODE_ELECTRODE_1: NORMAL
MDC_IDC_SET_LEADCHNL_RA_PACING_ANODE_LOCATION_1: NORMAL
MDC_IDC_SET_LEADCHNL_RA_PACING_CAPTURE_MODE: NORMAL
MDC_IDC_SET_LEADCHNL_RA_PACING_CATHODE_ELECTRODE_1: NORMAL
MDC_IDC_SET_LEADCHNL_RA_PACING_CATHODE_LOCATION_1: NORMAL
MDC_IDC_SET_LEADCHNL_RA_PACING_POLARITY: NORMAL
MDC_IDC_SET_LEADCHNL_RA_PACING_PULSEWIDTH: 0.4 MS
MDC_IDC_SET_LEADCHNL_RA_SENSING_ANODE_ELECTRODE_1: NORMAL
MDC_IDC_SET_LEADCHNL_RA_SENSING_ANODE_LOCATION_1: NORMAL
MDC_IDC_SET_LEADCHNL_RA_SENSING_CATHODE_ELECTRODE_1: NORMAL
MDC_IDC_SET_LEADCHNL_RA_SENSING_CATHODE_LOCATION_1: NORMAL
MDC_IDC_SET_LEADCHNL_RA_SENSING_POLARITY: NORMAL
MDC_IDC_SET_LEADCHNL_RA_SENSING_SENSITIVITY: 0.3 MV
MDC_IDC_SET_LEADCHNL_RV_PACING_AMPLITUDE: 2 V
MDC_IDC_SET_LEADCHNL_RV_PACING_ANODE_ELECTRODE_1: NORMAL
MDC_IDC_SET_LEADCHNL_RV_PACING_ANODE_LOCATION_1: NORMAL
MDC_IDC_SET_LEADCHNL_RV_PACING_CAPTURE_MODE: NORMAL
MDC_IDC_SET_LEADCHNL_RV_PACING_CATHODE_ELECTRODE_1: NORMAL
MDC_IDC_SET_LEADCHNL_RV_PACING_CATHODE_LOCATION_1: NORMAL
MDC_IDC_SET_LEADCHNL_RV_PACING_POLARITY: NORMAL
MDC_IDC_SET_LEADCHNL_RV_PACING_PULSEWIDTH: 0.4 MS
MDC_IDC_SET_LEADCHNL_RV_SENSING_ANODE_ELECTRODE_1: NORMAL
MDC_IDC_SET_LEADCHNL_RV_SENSING_ANODE_LOCATION_1: NORMAL
MDC_IDC_SET_LEADCHNL_RV_SENSING_CATHODE_ELECTRODE_1: NORMAL
MDC_IDC_SET_LEADCHNL_RV_SENSING_CATHODE_LOCATION_1: NORMAL
MDC_IDC_SET_LEADCHNL_RV_SENSING_POLARITY: NORMAL
MDC_IDC_SET_LEADCHNL_RV_SENSING_SENSITIVITY: 0.3 MV
MDC_IDC_SET_ZONE_DETECTION_BEATS_DENOMINATOR: 16 {BEATS}
MDC_IDC_SET_ZONE_DETECTION_BEATS_DENOMINATOR: 32 {BEATS}
MDC_IDC_SET_ZONE_DETECTION_BEATS_DENOMINATOR: 40 {BEATS}
MDC_IDC_SET_ZONE_DETECTION_BEATS_NUMERATOR: 16 {BEATS}
MDC_IDC_SET_ZONE_DETECTION_BEATS_NUMERATOR: 30 {BEATS}
MDC_IDC_SET_ZONE_DETECTION_BEATS_NUMERATOR: 32 {BEATS}
MDC_IDC_SET_ZONE_DETECTION_INTERVAL: 240 MS
MDC_IDC_SET_ZONE_DETECTION_INTERVAL: 320 MS
MDC_IDC_SET_ZONE_DETECTION_INTERVAL: 350 MS
MDC_IDC_SET_ZONE_DETECTION_INTERVAL: 360 MS
MDC_IDC_SET_ZONE_DETECTION_INTERVAL: 400 MS
MDC_IDC_SET_ZONE_STATUS: NORMAL
MDC_IDC_SET_ZONE_TYPE: NORMAL
MDC_IDC_SET_ZONE_VENDOR_TYPE: NORMAL
MDC_IDC_STAT_AT_BURDEN_PERCENT: 0.1 %
MDC_IDC_STAT_AT_DTM_END: NORMAL
MDC_IDC_STAT_AT_DTM_START: NORMAL
MDC_IDC_STAT_BRADY_AP_VP_PERCENT: 8.84 %
MDC_IDC_STAT_BRADY_AP_VS_PERCENT: 0.18 %
MDC_IDC_STAT_BRADY_AS_VP_PERCENT: 89.42 %
MDC_IDC_STAT_BRADY_AS_VS_PERCENT: 1.56 %
MDC_IDC_STAT_BRADY_DTM_END: NORMAL
MDC_IDC_STAT_BRADY_DTM_START: NORMAL
MDC_IDC_STAT_BRADY_RA_PERCENT_PACED: 9.03 %
MDC_IDC_STAT_BRADY_RV_PERCENT_PACED: 3.12 %
MDC_IDC_STAT_CRT_DTM_END: NORMAL
MDC_IDC_STAT_CRT_DTM_START: NORMAL
MDC_IDC_STAT_CRT_LV_PERCENT_PACED: 98.23 %
MDC_IDC_STAT_CRT_PERCENT_PACED: 3.09 %
MDC_IDC_STAT_EPISODE_RECENT_COUNT: 0
MDC_IDC_STAT_EPISODE_RECENT_COUNT: 1
MDC_IDC_STAT_EPISODE_RECENT_COUNT: 1
MDC_IDC_STAT_EPISODE_RECENT_COUNT_DTM_END: NORMAL
MDC_IDC_STAT_EPISODE_RECENT_COUNT_DTM_START: NORMAL
MDC_IDC_STAT_EPISODE_TOTAL_COUNT: 0
MDC_IDC_STAT_EPISODE_TOTAL_COUNT: 2
MDC_IDC_STAT_EPISODE_TOTAL_COUNT_DTM_END: NORMAL
MDC_IDC_STAT_EPISODE_TOTAL_COUNT_DTM_START: NORMAL
MDC_IDC_STAT_EPISODE_TYPE: NORMAL
MDC_IDC_STAT_TACHYTHERAPY_ATP_DELIVERED_RECENT: 0
MDC_IDC_STAT_TACHYTHERAPY_ATP_DELIVERED_TOTAL: 0
MDC_IDC_STAT_TACHYTHERAPY_RECENT_DTM_END: NORMAL
MDC_IDC_STAT_TACHYTHERAPY_RECENT_DTM_START: NORMAL
MDC_IDC_STAT_TACHYTHERAPY_SHOCKS_ABORTED_RECENT: 0
MDC_IDC_STAT_TACHYTHERAPY_SHOCKS_ABORTED_TOTAL: 0
MDC_IDC_STAT_TACHYTHERAPY_SHOCKS_DELIVERED_RECENT: 0
MDC_IDC_STAT_TACHYTHERAPY_SHOCKS_DELIVERED_TOTAL: 0
MDC_IDC_STAT_TACHYTHERAPY_TOTAL_DTM_END: NORMAL
MDC_IDC_STAT_TACHYTHERAPY_TOTAL_DTM_START: NORMAL

## 2024-02-07 NOTE — PLAN OF CARE
Goal Outcome Evaluation: Progressing. Patient stated she slept well for several hours overnight. Chronic back pain controlled with scheduled norco and tylenol. Up to bathroom with standby assist and walker. No bowel movement overnight. Alert and oriented, calls appropriately for assistance.         Face to face report given with opportunity to observe patient.    Report given to Dana Robb RN   6/23/2023  7:25 AM                      Barrientos.

## 2024-02-13 ENCOUNTER — ANCILLARY PROCEDURE (OUTPATIENT)
Dept: CARDIOLOGY | Facility: OTHER | Age: 89
End: 2024-02-13
Attending: INTERNAL MEDICINE
Payer: COMMERCIAL

## 2024-02-13 DIAGNOSIS — Z95.810 AUTOMATIC IMPLANTABLE CARDIOVERTER-DEFIBRILLATOR IN SITU: ICD-10-CM

## 2024-02-13 PROCEDURE — 93284 PRGRMG EVAL IMPLANTABLE DFB: CPT | Performed by: INTERNAL MEDICINE

## 2024-02-13 NOTE — PATIENT INSTRUCTIONS
It was a pleasure to see you in clinic today.  Please do not hesitate to call with any questions or concerns.  You are scheduled for remote transmissions on 5/15/24, 8/19/24 and 11/21/24.  We look forward to seeing you in clinic at your next device check in 1 year.    Desiree Luevano, RN, MS, CCRN  Electrophysiology Nurse Clinician  [facility]    During Business Hours Please Call:  [phone]  After Hours Please Call:  [phone] - select option #4 and ask for job code [phone]

## 2024-02-14 ENCOUNTER — OFFICE VISIT (OUTPATIENT)
Dept: AUDIOLOGY | Facility: OTHER | Age: 89
End: 2024-02-14
Attending: AUDIOLOGIST
Payer: MEDICARE

## 2024-02-14 DIAGNOSIS — H90.3 SENSORINEURAL HEARING LOSS (SNHL) OF BOTH EARS: Primary | ICD-10-CM

## 2024-02-14 PROCEDURE — 92593 HC HEARING AID CHECK, BINAURAL: CPT | Mod: GA | Performed by: AUDIOLOGIST

## 2024-02-14 NOTE — PROGRESS NOTES
BACKGROUND:  Jacklyn was seen today for consult regarding hearing aids. The patient notes difficulty with communication in a variety of listening situations and would like to explore possible benefit obtained via use of amplification.      Patient has received medical clearance for hearing aid use. Jacklyn is a binaural hearing aid candidate and will receive a Hearing Aid Recommendation today.    RESULTS:  Audiology Assistant reviewed below information:    Estimated insurance coverage for hearing aids reviewed.  [place] Department of Health form titled 'Legal rights and consumer information about purchasing a hearing instrument verbally reviewed and given to patient. Trial Period, cancellation fee, warranties highlighted. Patient risk factors have been provided to the patient in writing prior to the sale of the hearing aid per FDA regulation. The risk factors are also available in the User Instructional Booklet to be presented on the day of the hearing aid fitting.  ABN (Advanced Beneficiary Notice of Non-Coverage) completed and copy given to patient.       Hearing aid recommendation provided by Audiologist.    Thru use of hearing aids patient would like to improve ability to hear:  where there are groups and noise.   to hear the television at a lower volume to enjoy this activity with other people.   Jacklyn also reports trouble hearing in the car, at home, and on the telephone if there is not a volume control.      Discussed hearing aid styles, technology, features, and options at length with Jacklyn.  Sample devices were shown. Pros/Cons of options reviewed.  Expectations for amplification discussed. .Also discussed the importance of binaural amplification for hearing speech in the presence of background noise and localizing the directions of sounds.  Wireless options were also discussed at length and sample devices were shown.       Hearing Aid Recommendations: Hearing Aid Recommendation reviewed with patient. Pt  chose to proceed with order and Purchase Agreement completed. Copies provided to patient.      Hearing Aids:  Binaural Oticon Real 3 miniRITE T  Color: beige  Battery Size: rechargeable  Earmold/Tips: 2-85 SV or DV 8mm         RECOMMENDATIONS:  The 45 day trial period was explained to patient, and they expressed understanding. Ms. Hein signed the Hearing Aid Purchase Agreement and was given a copy, as well as details on her hearing aids. Patient was counseled that exact out of pocket amounts cannot be determined for hearing aid claims being sent to insurance. Any insurance coverage information presented to the patient is an estimate only, and is not a guarantee of payment. Patient has been advised to check with their own insurance.      Patient scheduled to return to clinic in 2 weeks for hearing aid fitting, programming and orientation.    Leila Reynolds M.S.,[facility]  Audiologist #[id number]

## 2024-02-20 LAB
MDC_IDC_LEAD_CONNECTION_STATUS: NORMAL
MDC_IDC_LEAD_IMPLANT_DT: NORMAL
MDC_IDC_LEAD_LOCATION: NORMAL
MDC_IDC_LEAD_LOCATION_DETAIL_1: NORMAL
MDC_IDC_LEAD_MFG: NORMAL
MDC_IDC_LEAD_MODEL: NORMAL
MDC_IDC_LEAD_POLARITY_TYPE: NORMAL
MDC_IDC_LEAD_SERIAL: NORMAL
MDC_IDC_MSMT_BATTERY_DTM: NORMAL
MDC_IDC_MSMT_BATTERY_REMAINING_LONGEVITY: 118 MO
MDC_IDC_MSMT_BATTERY_RRT_TRIGGER: NORMAL
MDC_IDC_MSMT_BATTERY_VOLTAGE: 3.01 V
MDC_IDC_MSMT_CAP_CHARGE_DTM: NORMAL
MDC_IDC_MSMT_CAP_CHARGE_ENERGY: 18 J
MDC_IDC_MSMT_CAP_CHARGE_TIME: 3.9 S
MDC_IDC_MSMT_CAP_CHARGE_TYPE: NORMAL
MDC_IDC_MSMT_LEADCHNL_LV_IMPEDANCE_VALUE: 380 OHM
MDC_IDC_MSMT_LEADCHNL_LV_IMPEDANCE_VALUE: 437 OHM
MDC_IDC_MSMT_LEADCHNL_LV_IMPEDANCE_VALUE: 779 OHM
MDC_IDC_MSMT_LEADCHNL_LV_PACING_THRESHOLD_AMPLITUDE: 1 V
MDC_IDC_MSMT_LEADCHNL_LV_PACING_THRESHOLD_PULSEWIDTH: 0.4 MS
MDC_IDC_MSMT_LEADCHNL_RA_IMPEDANCE_VALUE: 342 OHM
MDC_IDC_MSMT_LEADCHNL_RA_PACING_THRESHOLD_AMPLITUDE: 0.5 V
MDC_IDC_MSMT_LEADCHNL_RA_PACING_THRESHOLD_AMPLITUDE: 0.62 V
MDC_IDC_MSMT_LEADCHNL_RA_PACING_THRESHOLD_PULSEWIDTH: 0.4 MS
MDC_IDC_MSMT_LEADCHNL_RA_PACING_THRESHOLD_PULSEWIDTH: 0.4 MS
MDC_IDC_MSMT_LEADCHNL_RA_SENSING_INTR_AMPL: 1.5 MV
MDC_IDC_MSMT_LEADCHNL_RV_IMPEDANCE_VALUE: 285 OHM
MDC_IDC_MSMT_LEADCHNL_RV_IMPEDANCE_VALUE: 342 OHM
MDC_IDC_MSMT_LEADCHNL_RV_PACING_THRESHOLD_AMPLITUDE: 1.38 V
MDC_IDC_MSMT_LEADCHNL_RV_PACING_THRESHOLD_AMPLITUDE: 1.75 V
MDC_IDC_MSMT_LEADCHNL_RV_PACING_THRESHOLD_PULSEWIDTH: 0.4 MS
MDC_IDC_MSMT_LEADCHNL_RV_PACING_THRESHOLD_PULSEWIDTH: 0.4 MS
MDC_IDC_MSMT_LEADCHNL_RV_SENSING_INTR_AMPL: 4 MV
MDC_IDC_PG_IMPLANT_DTM: NORMAL
MDC_IDC_PG_MFG: NORMAL
MDC_IDC_PG_MODEL: NORMAL
MDC_IDC_PG_SERIAL: NORMAL
MDC_IDC_PG_TYPE: NORMAL
MDC_IDC_SESS_CLINIC_NAME: NORMAL
MDC_IDC_SESS_DTM: NORMAL
MDC_IDC_SESS_TYPE: NORMAL
MDC_IDC_SET_BRADY_AT_MODE_SWITCH_RATE: 171 {BEATS}/MIN
MDC_IDC_SET_BRADY_LOWRATE: 60 {BEATS}/MIN
MDC_IDC_SET_BRADY_MAX_SENSOR_RATE: 130 {BEATS}/MIN
MDC_IDC_SET_BRADY_MAX_TRACKING_RATE: 130 {BEATS}/MIN
MDC_IDC_SET_BRADY_MODE: NORMAL
MDC_IDC_SET_BRADY_PAV_DELAY_LOW: 170 MS
MDC_IDC_SET_BRADY_SAV_DELAY_LOW: 140 MS
MDC_IDC_SET_CRT_LVRV_DELAY: 10 MS
MDC_IDC_SET_CRT_PACED_CHAMBERS: NORMAL
MDC_IDC_SET_LEADCHNL_LV_PACING_AMPLITUDE: 1.5 V
MDC_IDC_SET_LEADCHNL_LV_PACING_ANODE_ELECTRODE_1: NORMAL
MDC_IDC_SET_LEADCHNL_LV_PACING_ANODE_LOCATION_1: NORMAL
MDC_IDC_SET_LEADCHNL_LV_PACING_CAPTURE_MODE: NORMAL
MDC_IDC_SET_LEADCHNL_LV_PACING_CATHODE_ELECTRODE_1: NORMAL
MDC_IDC_SET_LEADCHNL_LV_PACING_CATHODE_LOCATION_1: NORMAL
MDC_IDC_SET_LEADCHNL_LV_PACING_POLARITY: NORMAL
MDC_IDC_SET_LEADCHNL_LV_PACING_PULSEWIDTH: 0.4 MS
MDC_IDC_SET_LEADCHNL_RA_PACING_AMPLITUDE: 1.5 V
MDC_IDC_SET_LEADCHNL_RA_PACING_ANODE_ELECTRODE_1: NORMAL
MDC_IDC_SET_LEADCHNL_RA_PACING_ANODE_LOCATION_1: NORMAL
MDC_IDC_SET_LEADCHNL_RA_PACING_CAPTURE_MODE: NORMAL
MDC_IDC_SET_LEADCHNL_RA_PACING_CATHODE_ELECTRODE_1: NORMAL
MDC_IDC_SET_LEADCHNL_RA_PACING_CATHODE_LOCATION_1: NORMAL
MDC_IDC_SET_LEADCHNL_RA_PACING_POLARITY: NORMAL
MDC_IDC_SET_LEADCHNL_RA_PACING_PULSEWIDTH: 0.4 MS
MDC_IDC_SET_LEADCHNL_RA_SENSING_ANODE_ELECTRODE_1: NORMAL
MDC_IDC_SET_LEADCHNL_RA_SENSING_ANODE_LOCATION_1: NORMAL
MDC_IDC_SET_LEADCHNL_RA_SENSING_CATHODE_ELECTRODE_1: NORMAL
MDC_IDC_SET_LEADCHNL_RA_SENSING_CATHODE_LOCATION_1: NORMAL
MDC_IDC_SET_LEADCHNL_RA_SENSING_POLARITY: NORMAL
MDC_IDC_SET_LEADCHNL_RA_SENSING_SENSITIVITY: 0.3 MV
MDC_IDC_SET_LEADCHNL_RV_PACING_AMPLITUDE: 3 V
MDC_IDC_SET_LEADCHNL_RV_PACING_ANODE_ELECTRODE_1: NORMAL
MDC_IDC_SET_LEADCHNL_RV_PACING_ANODE_LOCATION_1: NORMAL
MDC_IDC_SET_LEADCHNL_RV_PACING_CAPTURE_MODE: NORMAL
MDC_IDC_SET_LEADCHNL_RV_PACING_CATHODE_ELECTRODE_1: NORMAL
MDC_IDC_SET_LEADCHNL_RV_PACING_CATHODE_LOCATION_1: NORMAL
MDC_IDC_SET_LEADCHNL_RV_PACING_POLARITY: NORMAL
MDC_IDC_SET_LEADCHNL_RV_PACING_PULSEWIDTH: 0.4 MS
MDC_IDC_SET_LEADCHNL_RV_SENSING_ANODE_ELECTRODE_1: NORMAL
MDC_IDC_SET_LEADCHNL_RV_SENSING_ANODE_LOCATION_1: NORMAL
MDC_IDC_SET_LEADCHNL_RV_SENSING_CATHODE_ELECTRODE_1: NORMAL
MDC_IDC_SET_LEADCHNL_RV_SENSING_CATHODE_LOCATION_1: NORMAL
MDC_IDC_SET_LEADCHNL_RV_SENSING_POLARITY: NORMAL
MDC_IDC_SET_LEADCHNL_RV_SENSING_SENSITIVITY: 0.3 MV
MDC_IDC_SET_ZONE_DETECTION_BEATS_DENOMINATOR: 16 {BEATS}
MDC_IDC_SET_ZONE_DETECTION_BEATS_DENOMINATOR: 32 {BEATS}
MDC_IDC_SET_ZONE_DETECTION_BEATS_DENOMINATOR: 40 {BEATS}
MDC_IDC_SET_ZONE_DETECTION_BEATS_NUMERATOR: 16 {BEATS}
MDC_IDC_SET_ZONE_DETECTION_BEATS_NUMERATOR: 30 {BEATS}
MDC_IDC_SET_ZONE_DETECTION_BEATS_NUMERATOR: 32 {BEATS}
MDC_IDC_SET_ZONE_DETECTION_INTERVAL: 240 MS
MDC_IDC_SET_ZONE_DETECTION_INTERVAL: 320 MS
MDC_IDC_SET_ZONE_DETECTION_INTERVAL: 350 MS
MDC_IDC_SET_ZONE_DETECTION_INTERVAL: 360 MS
MDC_IDC_SET_ZONE_DETECTION_INTERVAL: 400 MS
MDC_IDC_SET_ZONE_STATUS: NORMAL
MDC_IDC_SET_ZONE_TYPE: NORMAL
MDC_IDC_SET_ZONE_VENDOR_TYPE: NORMAL
MDC_IDC_STAT_AT_BURDEN_PERCENT: 0.1 %
MDC_IDC_STAT_AT_DTM_END: NORMAL
MDC_IDC_STAT_AT_DTM_START: NORMAL
MDC_IDC_STAT_BRADY_AP_VP_PERCENT: 7.93 %
MDC_IDC_STAT_BRADY_AP_VS_PERCENT: 0.16 %
MDC_IDC_STAT_BRADY_AS_VP_PERCENT: 90.29 %
MDC_IDC_STAT_BRADY_AS_VS_PERCENT: 1.61 %
MDC_IDC_STAT_BRADY_DTM_END: NORMAL
MDC_IDC_STAT_BRADY_DTM_START: NORMAL
MDC_IDC_STAT_BRADY_RA_PERCENT_PACED: 8.16 %
MDC_IDC_STAT_BRADY_RV_PERCENT_PACED: 4.42 %
MDC_IDC_STAT_CRT_DTM_END: NORMAL
MDC_IDC_STAT_CRT_DTM_START: NORMAL
MDC_IDC_STAT_CRT_LV_PERCENT_PACED: 98.2 %
MDC_IDC_STAT_CRT_PERCENT_PACED: 4.4 %
MDC_IDC_STAT_EPISODE_RECENT_COUNT: 0
MDC_IDC_STAT_EPISODE_RECENT_COUNT_DTM_END: NORMAL
MDC_IDC_STAT_EPISODE_RECENT_COUNT_DTM_START: NORMAL
MDC_IDC_STAT_EPISODE_TOTAL_COUNT: 0
MDC_IDC_STAT_EPISODE_TOTAL_COUNT: 2
MDC_IDC_STAT_EPISODE_TOTAL_COUNT_DTM_END: NORMAL
MDC_IDC_STAT_EPISODE_TOTAL_COUNT_DTM_START: NORMAL
MDC_IDC_STAT_EPISODE_TYPE: NORMAL
MDC_IDC_STAT_TACHYTHERAPY_ATP_DELIVERED_RECENT: 0
MDC_IDC_STAT_TACHYTHERAPY_ATP_DELIVERED_TOTAL: 0
MDC_IDC_STAT_TACHYTHERAPY_RECENT_DTM_END: NORMAL
MDC_IDC_STAT_TACHYTHERAPY_RECENT_DTM_START: NORMAL
MDC_IDC_STAT_TACHYTHERAPY_SHOCKS_ABORTED_RECENT: 0
MDC_IDC_STAT_TACHYTHERAPY_SHOCKS_ABORTED_TOTAL: 0
MDC_IDC_STAT_TACHYTHERAPY_SHOCKS_DELIVERED_RECENT: 0
MDC_IDC_STAT_TACHYTHERAPY_SHOCKS_DELIVERED_TOTAL: 0
MDC_IDC_STAT_TACHYTHERAPY_TOTAL_DTM_END: NORMAL
MDC_IDC_STAT_TACHYTHERAPY_TOTAL_DTM_START: NORMAL

## 2024-03-06 ENCOUNTER — HOSPITAL ENCOUNTER (INPATIENT)
Facility: HOSPITAL | Age: 89
LOS: 4 days | Discharge: HOME | DRG: 690 | End: 2024-03-10
Attending: PHYSICIAN ASSISTANT | Admitting: INTERNAL MEDICINE
Payer: MEDICARE

## 2024-03-06 ENCOUNTER — APPOINTMENT (OUTPATIENT)
Dept: GENERAL RADIOLOGY | Facility: HOSPITAL | Age: 89
DRG: 690 | End: 2024-03-06
Attending: PHYSICIAN ASSISTANT
Payer: MEDICARE

## 2024-03-06 DIAGNOSIS — N30.00 ACUTE CYSTITIS WITHOUT HEMATURIA: Primary | ICD-10-CM

## 2024-03-06 DIAGNOSIS — I10 ESSENTIAL HYPERTENSION: ICD-10-CM

## 2024-03-06 DIAGNOSIS — I42.8 NON-ISCHEMIC CARDIOMYOPATHY (H): ICD-10-CM

## 2024-03-06 DIAGNOSIS — N39.0 UTI (URINARY TRACT INFECTION): ICD-10-CM

## 2024-03-06 DIAGNOSIS — R53.1 WEAKNESS GENERALIZED: ICD-10-CM

## 2024-03-06 LAB
ALBUMIN SERPL BCG-MCNC: 3.8 G/DL (ref 3.5–5.2)
ALBUMIN UR-MCNC: 30 MG/DL
ALP SERPL-CCNC: 60 U/L (ref 40–150)
ALT SERPL W P-5'-P-CCNC: 13 U/L (ref 0–50)
ANION GAP SERPL CALCULATED.3IONS-SCNC: 13 MMOL/L (ref 7–15)
APPEARANCE UR: ABNORMAL
AST SERPL W P-5'-P-CCNC: 22 U/L (ref 0–45)
BACTERIA #/AREA URNS HPF: ABNORMAL /HPF
BASOPHILS # BLD AUTO: 0 10E3/UL (ref 0–0.2)
BASOPHILS NFR BLD AUTO: 0 %
BILIRUB SERPL-MCNC: 0.7 MG/DL
BILIRUB UR QL STRIP: NEGATIVE
BUN SERPL-MCNC: 15.2 MG/DL (ref 8–23)
CALCIUM SERPL-MCNC: 9.3 MG/DL (ref 8.8–10.2)
CHLORIDE SERPL-SCNC: 102 MMOL/L (ref 98–107)
COLOR UR AUTO: YELLOW
CREAT SERPL-MCNC: 0.9 MG/DL (ref 0.51–0.95)
DEPRECATED HCO3 PLAS-SCNC: 25 MMOL/L (ref 22–29)
EGFRCR SERPLBLD CKD-EPI 2021: 62 ML/MIN/1.73M2
EOSINOPHIL # BLD AUTO: 0 10E3/UL (ref 0–0.7)
EOSINOPHIL NFR BLD AUTO: 0 %
ERYTHROCYTE [DISTWIDTH] IN BLOOD BY AUTOMATED COUNT: 12.6 % (ref 10–15)
FLUAV RNA SPEC QL NAA+PROBE: NEGATIVE
FLUBV RNA RESP QL NAA+PROBE: NEGATIVE
GLUCOSE SERPL-MCNC: 100 MG/DL (ref 70–99)
GLUCOSE UR STRIP-MCNC: NEGATIVE MG/DL
HCT VFR BLD AUTO: 35.5 % (ref 35–47)
HGB BLD-MCNC: 11.6 G/DL (ref 11.7–15.7)
HGB UR QL STRIP: ABNORMAL
HOLD SPECIMEN: NORMAL
IMM GRANULOCYTES # BLD: 0 10E3/UL
IMM GRANULOCYTES NFR BLD: 0 %
KETONES UR STRIP-MCNC: 20 MG/DL
LACTATE SERPL-SCNC: 1.1 MMOL/L (ref 0.7–2)
LEUKOCYTE ESTERASE UR QL STRIP: ABNORMAL
LYMPHOCYTES # BLD AUTO: 0.9 10E3/UL (ref 0–5.3)
LYMPHOCYTES NFR BLD AUTO: 10 %
MCH RBC QN AUTO: 30.4 PG (ref 26.5–33)
MCHC RBC AUTO-ENTMCNC: 32.7 G/DL (ref 31.5–36.5)
MCV RBC AUTO: 93 FL (ref 78–100)
MONOCYTES # BLD AUTO: 0.8 10E3/UL (ref 0–1.3)
MONOCYTES NFR BLD AUTO: 9 %
MUCOUS THREADS #/AREA URNS LPF: PRESENT /LPF
NEUTROPHILS # BLD AUTO: 8 10E3/UL (ref 1.6–8.3)
NEUTROPHILS NFR BLD AUTO: 81 %
NITRATE UR QL: NEGATIVE
NRBC # BLD AUTO: 0 10E3/UL
NRBC BLD AUTO-RTO: 0 /100
PH UR STRIP: 5.5 [PH] (ref 4.7–8)
PLATELET # BLD AUTO: 124 10E3/UL (ref 150–450)
POTASSIUM SERPL-SCNC: 3.9 MMOL/L (ref 3.4–5.3)
PROCALCITONIN SERPL IA-MCNC: 0.1 NG/ML
PROT SERPL-MCNC: 7.2 G/DL (ref 6.4–8.3)
RBC # BLD AUTO: 3.81 10E6/UL (ref 3.8–5.2)
RBC URINE: 2 /HPF
RSV RNA SPEC NAA+PROBE: NEGATIVE
SARS-COV-2 RNA RESP QL NAA+PROBE: NEGATIVE
SODIUM SERPL-SCNC: 140 MMOL/L (ref 135–145)
SP GR UR STRIP: 1.02 (ref 1–1.03)
SQUAMOUS EPITHELIAL: 0 /HPF
UROBILINOGEN UR STRIP-MCNC: NORMAL MG/DL
WBC # BLD AUTO: 9.9 10E3/UL (ref 4–11)
WBC CLUMPS #/AREA URNS HPF: PRESENT /HPF
WBC URINE: 38 /HPF
YEAST #/AREA URNS HPF: ABNORMAL /HPF

## 2024-03-06 PROCEDURE — G0378 HOSPITAL OBSERVATION PER HR: HCPCS

## 2024-03-06 PROCEDURE — 36415 COLL VENOUS BLD VENIPUNCTURE: CPT | Performed by: PHYSICIAN ASSISTANT

## 2024-03-06 PROCEDURE — 87186 SC STD MICRODIL/AGAR DIL: CPT | Performed by: PHYSICIAN ASSISTANT

## 2024-03-06 PROCEDURE — 87086 URINE CULTURE/COLONY COUNT: CPT | Performed by: PHYSICIAN ASSISTANT

## 2024-03-06 PROCEDURE — 84145 PROCALCITONIN (PCT): CPT | Performed by: PHYSICIAN ASSISTANT

## 2024-03-06 PROCEDURE — 93010 ELECTROCARDIOGRAM REPORT: CPT | Performed by: INTERNAL MEDICINE

## 2024-03-06 PROCEDURE — 250N000013 HC RX MED GY IP 250 OP 250 PS 637: Performed by: INTERNAL MEDICINE

## 2024-03-06 PROCEDURE — 87637 SARSCOV2&INF A&B&RSV AMP PRB: CPT | Performed by: PHYSICIAN ASSISTANT

## 2024-03-06 PROCEDURE — 83605 ASSAY OF LACTIC ACID: CPT | Performed by: PHYSICIAN ASSISTANT

## 2024-03-06 PROCEDURE — 96365 THER/PROPH/DIAG IV INF INIT: CPT

## 2024-03-06 PROCEDURE — 81001 URINALYSIS AUTO W/SCOPE: CPT | Performed by: PHYSICIAN ASSISTANT

## 2024-03-06 PROCEDURE — 120N000001 HC R&B MED SURG/OB

## 2024-03-06 PROCEDURE — 99284 EMERGENCY DEPT VISIT MOD MDM: CPT | Performed by: PHYSICIAN ASSISTANT

## 2024-03-06 PROCEDURE — 96375 TX/PRO/DX INJ NEW DRUG ADDON: CPT

## 2024-03-06 PROCEDURE — 99222 1ST HOSP IP/OBS MODERATE 55: CPT | Mod: AI | Performed by: INTERNAL MEDICINE

## 2024-03-06 PROCEDURE — 250N000011 HC RX IP 250 OP 636: Performed by: PHYSICIAN ASSISTANT

## 2024-03-06 PROCEDURE — 99285 EMERGENCY DEPT VISIT HI MDM: CPT | Mod: 25

## 2024-03-06 PROCEDURE — 80053 COMPREHEN METABOLIC PANEL: CPT | Performed by: PHYSICIAN ASSISTANT

## 2024-03-06 PROCEDURE — 85025 COMPLETE CBC W/AUTO DIFF WBC: CPT | Performed by: PHYSICIAN ASSISTANT

## 2024-03-06 PROCEDURE — 258N000003 HC RX IP 258 OP 636: Performed by: INTERNAL MEDICINE

## 2024-03-06 PROCEDURE — 258N000003 HC RX IP 258 OP 636: Performed by: PHYSICIAN ASSISTANT

## 2024-03-06 PROCEDURE — 71045 X-RAY EXAM CHEST 1 VIEW: CPT

## 2024-03-06 RX ORDER — SODIUM CHLORIDE 9 MG/ML
INJECTION, SOLUTION INTRAVENOUS CONTINUOUS
Status: ACTIVE | OUTPATIENT
Start: 2024-03-06 | End: 2024-03-07

## 2024-03-06 RX ORDER — NALOXONE HYDROCHLORIDE 0.4 MG/ML
0.2 INJECTION, SOLUTION INTRAMUSCULAR; INTRAVENOUS; SUBCUTANEOUS
Status: DISCONTINUED | OUTPATIENT
Start: 2024-03-06 | End: 2024-03-10 | Stop reason: HOSPADM

## 2024-03-06 RX ORDER — PANTOPRAZOLE SODIUM 40 MG/1
40 TABLET, DELAYED RELEASE ORAL
Status: DISCONTINUED | OUTPATIENT
Start: 2024-03-06 | End: 2024-03-10 | Stop reason: HOSPADM

## 2024-03-06 RX ORDER — NALOXONE HYDROCHLORIDE 0.4 MG/ML
0.4 INJECTION, SOLUTION INTRAMUSCULAR; INTRAVENOUS; SUBCUTANEOUS
Status: DISCONTINUED | OUTPATIENT
Start: 2024-03-06 | End: 2024-03-10 | Stop reason: HOSPADM

## 2024-03-06 RX ORDER — CEFTRIAXONE SODIUM 1 G/50ML
1 INJECTION, SOLUTION INTRAVENOUS EVERY 24 HOURS
Status: DISCONTINUED | OUTPATIENT
Start: 2024-03-07 | End: 2024-03-10

## 2024-03-06 RX ORDER — KETOROLAC TROMETHAMINE 15 MG/ML
15 INJECTION, SOLUTION INTRAMUSCULAR; INTRAVENOUS ONCE
Status: COMPLETED | OUTPATIENT
Start: 2024-03-06 | End: 2024-03-06

## 2024-03-06 RX ORDER — CALCIUM CARBONATE 500 MG/1
1000 TABLET, CHEWABLE ORAL 4 TIMES DAILY PRN
Status: DISCONTINUED | OUTPATIENT
Start: 2024-03-06 | End: 2024-03-10 | Stop reason: HOSPADM

## 2024-03-06 RX ORDER — AMOXICILLIN 250 MG
2 CAPSULE ORAL 2 TIMES DAILY PRN
Status: DISCONTINUED | OUTPATIENT
Start: 2024-03-06 | End: 2024-03-10 | Stop reason: HOSPADM

## 2024-03-06 RX ORDER — LIDOCAINE 40 MG/G
CREAM TOPICAL
Status: DISCONTINUED | OUTPATIENT
Start: 2024-03-06 | End: 2024-03-10 | Stop reason: HOSPADM

## 2024-03-06 RX ORDER — ONDANSETRON 4 MG/1
4 TABLET, ORALLY DISINTEGRATING ORAL EVERY 6 HOURS PRN
Status: DISCONTINUED | OUTPATIENT
Start: 2024-03-06 | End: 2024-03-10 | Stop reason: HOSPADM

## 2024-03-06 RX ORDER — HYDROCODONE BITARTRATE AND ACETAMINOPHEN 5; 325 MG/1; MG/1
2 TABLET ORAL 3 TIMES DAILY PRN
Status: DISCONTINUED | OUTPATIENT
Start: 2024-03-06 | End: 2024-03-10 | Stop reason: HOSPADM

## 2024-03-06 RX ORDER — FLUTICASONE FUROATE AND VILANTEROL 200; 25 UG/1; UG/1
1 POWDER RESPIRATORY (INHALATION) DAILY
Status: DISCONTINUED | OUTPATIENT
Start: 2024-03-06 | End: 2024-03-10 | Stop reason: HOSPADM

## 2024-03-06 RX ORDER — AMOXICILLIN 250 MG
1 CAPSULE ORAL 2 TIMES DAILY PRN
Status: DISCONTINUED | OUTPATIENT
Start: 2024-03-06 | End: 2024-03-10 | Stop reason: HOSPADM

## 2024-03-06 RX ORDER — SODIUM CHLORIDE 9 MG/ML
INJECTION, SOLUTION INTRAVENOUS CONTINUOUS
Status: DISCONTINUED | OUTPATIENT
Start: 2024-03-06 | End: 2024-03-06

## 2024-03-06 RX ORDER — ACETAMINOPHEN 325 MG/1
650 TABLET ORAL EVERY 6 HOURS PRN
Status: DISCONTINUED | OUTPATIENT
Start: 2024-03-06 | End: 2024-03-10 | Stop reason: HOSPADM

## 2024-03-06 RX ORDER — ONDANSETRON 2 MG/ML
4 INJECTION INTRAMUSCULAR; INTRAVENOUS EVERY 6 HOURS PRN
Status: DISCONTINUED | OUTPATIENT
Start: 2024-03-06 | End: 2024-03-10 | Stop reason: HOSPADM

## 2024-03-06 RX ORDER — CARVEDILOL 12.5 MG/1
12.5 TABLET ORAL 2 TIMES DAILY WITH MEALS
Status: DISCONTINUED | OUTPATIENT
Start: 2024-03-06 | End: 2024-03-08

## 2024-03-06 RX ORDER — ROSUVASTATIN CALCIUM 10 MG/1
10 TABLET, COATED ORAL DAILY
Status: DISCONTINUED | OUTPATIENT
Start: 2024-03-06 | End: 2024-03-10 | Stop reason: HOSPADM

## 2024-03-06 RX ORDER — CEFTRIAXONE SODIUM 2 G/50ML
2 INJECTION, SOLUTION INTRAVENOUS ONCE
Status: COMPLETED | OUTPATIENT
Start: 2024-03-06 | End: 2024-03-06

## 2024-03-06 RX ADMIN — KETOROLAC TROMETHAMINE 15 MG: 15 INJECTION, SOLUTION INTRAMUSCULAR; INTRAVENOUS at 15:28

## 2024-03-06 RX ADMIN — CEFTRIAXONE SODIUM 2 G: 2 INJECTION, SOLUTION INTRAVENOUS at 15:29

## 2024-03-06 RX ADMIN — SODIUM CHLORIDE: 9 INJECTION, SOLUTION INTRAVENOUS at 15:20

## 2024-03-06 RX ADMIN — PANTOPRAZOLE SODIUM 40 MG: 40 TABLET, DELAYED RELEASE ORAL at 20:19

## 2024-03-06 RX ADMIN — CARVEDILOL 12.5 MG: 12.5 TABLET, FILM COATED ORAL at 20:19

## 2024-03-06 RX ADMIN — ROSUVASTATIN 10 MG: 10 TABLET, FILM COATED ORAL at 20:19

## 2024-03-06 RX ADMIN — SODIUM CHLORIDE: 9 INJECTION, SOLUTION INTRAVENOUS at 20:22

## 2024-03-06 ASSESSMENT — ACTIVITIES OF DAILY LIVING (ADL)
ADLS_ACUITY_SCORE: 38
ADLS_ACUITY_SCORE: 27
ADLS_ACUITY_SCORE: 27
DOING_ERRANDS_INDEPENDENTLY_DIFFICULTY: OTHER (SEE COMMENTS)
DIFFICULTY_COMMUNICATING: NO
WALKING_OR_CLIMBING_STAIRS_DIFFICULTY: YES
ADLS_ACUITY_SCORE: 38
DRESSING/BATHING_DIFFICULTY: NO
FALL_HISTORY_WITHIN_LAST_SIX_MONTHS: NO
WEAR_GLASSES_OR_BLIND: YES
ADLS_ACUITY_SCORE: 38
TOILETING_ISSUES: NO
ADLS_ACUITY_SCORE: 27
WALKING_OR_CLIMBING_STAIRS: OTHER (SEE COMMENTS)
CONCENTRATING,_REMEMBERING_OR_MAKING_DECISIONS_DIFFICULTY: NO
CHANGE_IN_FUNCTIONAL_STATUS_SINCE_ONSET_OF_CURRENT_ILLNESS/INJURY: NO
EQUIPMENT_CURRENTLY_USED_AT_HOME: CANE, QUAD
DIFFICULTY_EATING/SWALLOWING: NO
ADLS_ACUITY_SCORE: 38

## 2024-03-06 ASSESSMENT — ENCOUNTER SYMPTOMS
ABDOMINAL PAIN: 1
CHILLS: 1
COUGH: 1
ACTIVITY CHANGE: 1
FATIGUE: 1
SHORTNESS OF BREATH: 0
FEVER: 1
WEAKNESS: 1
NAUSEA: 1
DIZZINESS: 1
APPETITE CHANGE: 1

## 2024-03-06 ASSESSMENT — COLUMBIA-SUICIDE SEVERITY RATING SCALE - C-SSRS
6. HAVE YOU EVER DONE ANYTHING, STARTED TO DO ANYTHING, OR PREPARED TO DO ANYTHING TO END YOUR LIFE?: NO
1. IN THE PAST MONTH, HAVE YOU WISHED YOU WERE DEAD OR WISHED YOU COULD GO TO SLEEP AND NOT WAKE UP?: NO
2. HAVE YOU ACTUALLY HAD ANY THOUGHTS OF KILLING YOURSELF IN THE PAST MONTH?: NO

## 2024-03-06 NOTE — ED PROVIDER NOTES
History     Chief Complaint   Patient presents with    Fever    Chills     The history is provided by the patient.     Jacklyn Hein is a 88 year old female who presented to the emergency department via EMS for evaluation of a few days of fevers, chills, and weakness.  No vomiting.  Some lower abdominal discomfort.    Allergies:  No Known Allergies    Problem List:    Patient Active Problem List    Diagnosis Date Noted    UTI (urinary tract infection) 03/06/2024     Priority: Medium    Weakness generalized 03/06/2024     Priority: Medium    GURROLA (dyspnea on exertion) 08/09/2023     Priority: Medium    Chronic heart failure with preserved ejection fraction (H) 08/09/2023     Priority: Medium    Chronic pulmonary edema 08/09/2023     Priority: Medium    Anemia of chronic disease 07/11/2023     Priority: Medium    Acute on chronic blood loss anemia 06/24/2023     Priority: Medium    Acute midline thoracic back pain 06/24/2023     Priority: Medium    Upper GI bleed 06/20/2023     Priority: Medium    Age-related osteoporosis with current pathological fracture with routine healing, subsequent encounter 11/22/2021     Priority: Medium    Chronic, continuous use of opioids 05/17/2021     Priority: Medium    Bacteremia due to Klebsiella pneumoniae on 1/5/2021 02/22/2021     Priority: Medium    Mixed hyperlipidemia 02/21/2021     Priority: Medium    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  Upgrade on 1/30/2023, [facility] 11/11/2014     Priority: Medium     Implant date - 5/6/14  Problem list name updated by automated process. Provider to review      Non-ischemic cardiomyopathy (H) 12/10/2013     Priority: Medium    Congestive heart failure, NYHA class 2, unspecified congestive heart failure type (H) 12/10/2013     Priority: Medium    Insomnia 01/01/2011     Priority: Medium     Problem list name updated by automated process. Provider to review      Disorder of bone and cartilage 01/01/2011     Priority: Medium     Problem list name updated by automated process. Provider to review      Essential hypertension 01/01/2011     Priority: Medium     Problem list name updated by automated process. Provider to review      Neck pain, chronic 01/01/2011     Priority: Medium        Past Medical History:    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    Insomnia, unspecified 01/01/2011    LBBB (left bundle branch block) 01/01/2011    Neck pain, chronic 01/01/2011    Non-ischemic cardiomyopathy (H) 12/10/2013    Obesity, unspecified 01/01/2011    Osteopenia 01/01/2011    Pacemaker     Pain in joint, lower leg 07/31/2006    Pure hypercholesterolemia 06/07/2002    Unspecified essential hypertension 01/01/2011       Past Surgical History:    Past Surgical History:   Procedure Laterality  Date    APPENDECTOMY      ARTHROSCOPY KNEE RT/LT  2009    RT    BACK SURGERY  2020    BACK SURGERY  2021    fractured vert repair    CARDIAC SURGERY  2014    Pacemaker    Cataract extraction  2009    Bilateral    CHOLECYSTECTOMY      open     COLONOSCOPY  2001    Normal     COLONOSCOPY N/A 10/20/2016    Procedure: COLONOSCOPY;  Surgeon: Charan Montejo MD;  Location: [facility] OR    COLONOSCOPY N/A 2023    Procedure: COLONOSCOPY;  Surgeon: Royal Sanz MD;  Location: [facility] OR    colonoscopy with polypectomy      Repeat 3 years     CT CORONARY ANGIOGRAM      normal    ESOPHAGOSCOPY, GASTROSCOPY, DUODENOSCOPY (EGD), COMBINED N/A 2023    Procedure: ESOPHAGOGASTRODUODENOSCOPY;  Surgeon: Royal Sanz MD;  Location: [facility] OR    HERPES ZOSTER PCR ([facility])      IR CONSULTATION FOR IR EXAM  2020    IR CONSULTATION FOR IR EXAM  2023    IR TRIGGER POINT INJ 3 OR MORE MUSCLES  2023    left eye scar tissue      normal echo      fen-phen use      x6      REPLACE PACEMAKER GENERATOR N/A 2023    Procedure: REPLACEMENT, PULSE GENERATOR, CARDIAC PACEMAKER;  Surgeon: Isiah Hodge MD;  Location:  OR    SURGICAL RADIOLOGY PROCEDURE N/A 2016    Procedure: SURGICAL RADIOLOGY PROCEDURE;  Surgeon: Provider, Generic Perianesthesia Nursing;  Location: [facility] OR       Family History:    Family History   Problem Relation Age of Onset    Cancer Mother         lung (cause of death)     Peptic Ulcer Disease Father         gastric        Social History:  Marital Status:   [2]  Social History     Tobacco Use    Smoking status: Former     Packs/day: 1.00     Years: 15.00     Additional pack years: 0.00     Total pack years: 15.00     Types: Cigarettes     Start date: 1983     Quit date: 1998     Years since quittin.1     Passive exposure: Never    Smokeless tobacco: Never    Tobacco comments:     Passive Exposure: No; Longest Tobacco Free: 15 years     Vaping Use    Vaping Use: Never used   Substance Use Topics    Alcohol use: Not Currently     Comment: Occasionally     Drug use: No        Medications:    acetaminophen (TYLENOL) 325 MG tablet  Calcium Carbonate-Vitamin D (CALCIUM 600 + D OR)  carvedilol (COREG) 12.5 MG tablet  gabapentin (NEURONTIN) 400 MG capsule  HYDROcodone-acetaminophen (NORCO) 5-325 MG tablet  mometasone-formoterol (DULERA) 200-5 MCG/ACT inhaler  Multiple Vitamin (MULTI-VITAMIN DAILY PO)  pantoprazole (PROTONIX) 40 MG EC tablet  rosuvastatin (CRESTOR) 10 MG tablet  umeclidinium (INCRUSE ELLIPTA) 62.5 MCG/ACT inhaler  vitamin C (ASCORBIC ACID) 500 MG tablet          Review of Systems   Constitutional:  Positive for activity change, appetite change, chills, fatigue and fever.   HENT:  Positive for congestion.    Respiratory:  Positive for cough. Negative for shortness of breath.    Cardiovascular:  Negative for chest pain.   Gastrointestinal:  Positive for abdominal pain and nausea.   Genitourinary: Negative.    Allergic/Immunologic: Negative for immunocompromised state.   Neurological:  Positive for dizziness and weakness.       Physical Exam   BP: 146/65  Pulse: 91  Temp: 99.6  F (37.6  C)  Resp: 24  SpO2: 95 %      Physical Exam  Vitals and nursing note reviewed.   Constitutional:       General: She is not in acute distress.     Appearance: Normal appearance. She is normal weight. She is not ill-appearing, toxic-appearing or diaphoretic.   Pulmonary:      Effort: Pulmonary effort is normal.   Abdominal:      Palpations: Abdomen is soft.      Comments: Very mild tenderness upon deep palpation of the lower abdomen   Skin:     General: Skin is warm and dry.      Capillary Refill: Capillary refill takes less than 2 seconds.   Neurological:      General: No focal deficit present.      Mental Status: She is alert and oriented to person, place, and time.         ED Course     ED Course as of 03/06/24 1835   Wed Mar 06, 2024   1503 Broad  differential diagnosis is considered and includes but is not limited to any number of viral infections, urinary tract infection, pyelonephritis, diverticulitis, intra-abdominal infection, sepsis.  Plan will be for laboratory evaluation, chest x-ray, IV fluids   1516 Review of previous urine culture shows sensitivity to ceftriaxone.   1629 Normal lactic and normal procalcitonin.   1629 Low threshold for hospital observation.   1747 Patient reports feeling weak and lethargic.  Patient like to be admitted as this family.  Certainly reasonable.     Procedures              Critical Care time:  none               Results for orders placed or performed during the hospital encounter of 03/06/24 (from the past 24 hour(s))   Symptomatic Influenza A/B, RSV, & SARS-CoV2 PCR (COVID-19) Nasopharyngeal    Specimen: Nasopharyngeal; Swab   Result Value Ref Range    Influenza A PCR Negative Negative    Influenza B PCR Negative Negative    RSV PCR Negative Negative    SARS CoV2 PCR Negative Negative    Narrative    Testing was performed using the Xpert Xpress CoV2/Flu/RSV Assay on the [employer] GeneXpert Instrument. This test should be ordered for the detection of SARS-CoV-2, influenza, and RSV viruses in individuals who meet clinical and/or epidemiological criteria. Test performance is unknown in asymptomatic patients. This test is for in vitro diagnostic use under the FDA EUA for laboratories certified under CLIA to perform high or moderate complexity testing. This test has not been FDA cleared or approved. A negative result does not rule out the presence of PCR inhibitors in the specimen or target RNA in concentration below the limit of detection for the assay. If only one viral target is positive but coinfection with multiple targets is suspected, the sample should be re-tested with another FDA cleared, approved, or authorized test, if coinfection would change clinical management. This test was validated by the [facility]  Laboratories. These laboratories are certified under the Clinical Laboratory Improvement Amendments of 1988 (CLIA-88) as qualified to perform high complexity laboratory testing.   CBC with platelets differential    Narrative    The following orders were created for panel order CBC with platelets differential.  Procedure                               Abnormality         Status                     ---------                               -----------         ------                     CBC with platelets and d...[[id number]]  Abnormal            Final result                 Please view results for these tests on the individual orders.   Comprehensive metabolic panel   Result Value Ref Range    Sodium 140 135 - 145 mmol/L    Potassium 3.9 3.4 - 5.3 mmol/L    Carbon Dioxide (CO2) 25 22 - 29 mmol/L    Anion Gap 13 7 - 15 mmol/L    Urea Nitrogen 15.2 8.0 - 23.0 mg/dL    Creatinine 0.90 0.51 - 0.95 mg/dL    GFR Estimate 62 >60 mL/min/1.73m2    Calcium 9.3 8.8 - 10.2 mg/dL    Chloride 102 98 - 107 mmol/L    Glucose 100 (H) 70 - 99 mg/dL    Alkaline Phosphatase 60 40 - 150 U/L    AST 22 0 - 45 U/L    ALT 13 0 - 50 U/L    Protein Total 7.2 6.4 - 8.3 g/dL    Albumin 3.8 3.5 - 5.2 g/dL    Bilirubin Total 0.7 <=1.2 mg/dL   Lactic acid whole blood   Result Value Ref Range    Lactic Acid 1.1 0.7 - 2.0 mmol/L   [place] Draw    Narrative    The following orders were created for panel order [place] Draw.  Procedure                               Abnormality         Status                     ---------                               -----------         ------                     Extra Blue Top Tube[[id number]]                              Final result               Extra Red Top Tube[[id number]]                               Final result               Extra Green Top (Lithium...[[id number]]                      Final result               Extra Purple Top Tube[[id number]]                            Final result               Extra Green Top  (Lithium...[[id number]]                      Final result                 Please view results for these tests on the individual orders.   CBC with platelets and differential   Result Value Ref Range    WBC Count 9.9 4.0 - 11.0 10e3/uL    RBC Count 3.81 3.80 - 5.20 10e6/uL    Hemoglobin 11.6 (L) 11.7 - 15.7 g/dL    Hematocrit 35.5 35.0 - 47.0 %    MCV 93 78 - 100 fL    MCH 30.4 26.5 - 33.0 pg    MCHC 32.7 31.5 - 36.5 g/dL    RDW 12.6 10.0 - 15.0 %    Platelet Count 124 (L) 150 - 450 10e3/uL    % Neutrophils 81 %    % Lymphocytes 10 %    % Monocytes 9 %    % Eosinophils 0 %    % Basophils 0 %    % Immature Granulocytes 0 %    NRBCs per 100 WBC 0 <1 /100    Absolute Neutrophils 8.0 1.6 - 8.3 10e3/uL    Absolute Lymphocytes 0.9 0.0 - 5.3 10e3/uL    Absolute Monocytes 0.8 0.0 - 1.3 10e3/uL    Absolute Eosinophils 0.0 0.0 - 0.7 10e3/uL    Absolute Basophils 0.0 0.0 - 0.2 10e3/uL    Absolute Immature Granulocytes 0.0 <=0.4 10e3/uL    Absolute NRBCs 0.0 10e3/uL   Extra Blue Top Tube   Result Value Ref Range    Hold Specimen JIC    Extra Red Top Tube   Result Value Ref Range    Hold Specimen JIC    Extra Green Top (Lithium Heparin) Tube   Result Value Ref Range    Hold Specimen JIC    Extra Purple Top Tube   Result Value Ref Range    Hold Specimen JIC    Extra Green Top (Lithium Heparin) ON ICE   Result Value Ref Range    Hold Specimen JIC    Procalcitonin   Result Value Ref Range    Procalcitonin 0.10 <0.50 ng/mL   UA with Microscopic reflex to Culture    Specimen: Urine, Catheter   Result Value Ref Range    Color Urine Yellow Colorless, Straw, Light Yellow, Yellow    Appearance Urine Slightly Cloudy (A) Clear    Glucose Urine Negative Negative mg/dL    Bilirubin Urine Negative Negative    Ketones Urine 20 (A) Negative mg/dL    Specific Gravity Urine 1.020 1.003 - 1.035    Blood Urine Small (A) Negative    pH Urine 5.5 4.7 - 8.0    Protein Albumin Urine 30 (A) Negative mg/dL    Urobilinogen Urine Normal Normal, 2.0 mg/dL     Nitrite Urine Negative Negative    Leukocyte Esterase Urine Moderate (A) Negative    Bacteria Urine Many (A) None Seen /HPF    WBC Clumps Urine Present (A) None Seen /HPF    Budding Yeast Urine Few (A) None Seen /HPF    Mucus Urine Present (A) None Seen /LPF    RBC Urine 2 <=2 /HPF    WBC Urine 38 (H) <=5 /HPF    Squamous Epithelials Urine 0 <=1 /HPF    Narrative    Urine Culture ordered based on laboratory criteria   XR Chest Port 1 View    Narrative    Procedure:XR CHEST PORT 1 VIEW    Clinical history:Female, 88 years, fever    Technique: Single view was obtained.    Comparison: 9/26/2023    Findings: The cardiac silhouette is within normal limits.. The  pulmonary vasculature is within normal limits.    The lungs are clear. Bony structures are unremarkable.  Transvenous  pacer is again seen, similar in appearance. There is persistent  blunting of the left costophrenic angle.      Impression    Impression:   No acute abnormality.     Chronic changes are similar in appearance with hyperinflation of the  lungs and persistent blunting of the left costophrenic angle.    RADHA HAMMER MD         SYSTEM ID:  [id number]       Medications   sodium chloride 0.9 % infusion ( Intravenous $New Bag 3/6/24 1520)   ketorolac (TORADOL) injection 15 mg (15 mg Intravenous $Given 3/6/24 1528)   cefTRIAXone IN D5W (ROCEPHIN) intermittent infusion 2 g (0 g Intravenous Stopped 3/6/24 1550)       Assessments & Plan (with Medical Decision Making)   88-year-old female with urinary tract infection and subjective symptoms.  Complains mostly of generalized weakness, fatigue, as well as chills.  Laboratory evaluation as above.  At least 50 recent occasion for observation.  She was treated with IV fluids and IV Rocephin in the emergency department.  Graciously accepted by Dr. Pelayo.     This document was prepared using a combination of typing and voice generated software.  While every attempt was made for accuracy, spelling and grammatical  errors may exist.   I have reviewed the nursing notes.    I have reviewed the findings, diagnosis, plan and need for follow up with the patient.           Medical Decision Making  The patient's presentation was of moderate complexity (an acute illness with systemic symptoms).    The patient's evaluation involved:  ordering and/or review of 3+ test(s) in this encounter (multiple labs and images)    The patient's management necessitated moderate risk (prescription drug management including medications given in the ED) and high risk (a decision regarding hospitalization).        New Prescriptions    No medications on file       Final diagnoses:   UTI (urinary tract infection)   Weakness generalized       3/6/2024   [facility]       Munira Lafleur PA-C  03/06/24 [id number]

## 2024-03-06 NOTE — ED NOTES
Pt presents from home by [facility]. Pt reports fever, chills, and fatigue that started yesterday. She reports that she went to bed at 1700 last night which is out of her normal. Pt also complains of generalized tenderness in her abdomen. Pt denies chest pain, SOB, NVD.

## 2024-03-07 ENCOUNTER — DOCUMENTATION ONLY (OUTPATIENT)
Dept: OTHER | Facility: CLINIC | Age: 89
End: 2024-03-07
Payer: COMMERCIAL

## 2024-03-07 LAB
ALBUMIN SERPL BCG-MCNC: 3.1 G/DL (ref 3.5–5.2)
ALP SERPL-CCNC: 53 U/L (ref 40–150)
ALT SERPL W P-5'-P-CCNC: 10 U/L (ref 0–50)
ANION GAP SERPL CALCULATED.3IONS-SCNC: 14 MMOL/L (ref 7–15)
AST SERPL W P-5'-P-CCNC: 19 U/L (ref 0–45)
ATRIAL RATE - MUSE: 79 BPM
BASOPHILS # BLD AUTO: 0 10E3/UL (ref 0–0.2)
BASOPHILS NFR BLD AUTO: 0 %
BILIRUB SERPL-MCNC: 0.4 MG/DL
BUN SERPL-MCNC: 17.8 MG/DL (ref 8–23)
CALCIUM SERPL-MCNC: 8.3 MG/DL (ref 8.8–10.2)
CHLORIDE SERPL-SCNC: 108 MMOL/L (ref 98–107)
CREAT SERPL-MCNC: 0.91 MG/DL (ref 0.51–0.95)
DEPRECATED HCO3 PLAS-SCNC: 19 MMOL/L (ref 22–29)
DIASTOLIC BLOOD PRESSURE - MUSE: NORMAL MMHG
EGFRCR SERPLBLD CKD-EPI 2021: 61 ML/MIN/1.73M2
EOSINOPHIL # BLD AUTO: 0 10E3/UL (ref 0–0.7)
EOSINOPHIL NFR BLD AUTO: 0 %
ERYTHROCYTE [DISTWIDTH] IN BLOOD BY AUTOMATED COUNT: 12.8 % (ref 10–15)
GLUCOSE SERPL-MCNC: 72 MG/DL (ref 70–99)
HCT VFR BLD AUTO: 29.6 % (ref 35–47)
HGB BLD-MCNC: 9.3 G/DL (ref 11.7–15.7)
IMM GRANULOCYTES # BLD: 0 10E3/UL
IMM GRANULOCYTES NFR BLD: 0 %
INTERPRETATION ECG - MUSE: NORMAL
LYMPHOCYTES # BLD AUTO: 0.9 10E3/UL (ref 0–5.3)
LYMPHOCYTES NFR BLD AUTO: 12 %
MCH RBC QN AUTO: 30.2 PG (ref 26.5–33)
MCHC RBC AUTO-ENTMCNC: 31.4 G/DL (ref 31.5–36.5)
MCV RBC AUTO: 96 FL (ref 78–100)
MONOCYTES # BLD AUTO: 0.7 10E3/UL (ref 0–1.3)
MONOCYTES NFR BLD AUTO: 10 %
NEUTROPHILS # BLD AUTO: 5.8 10E3/UL (ref 1.6–8.3)
NEUTROPHILS NFR BLD AUTO: 78 %
NRBC # BLD AUTO: 0 10E3/UL
NRBC BLD AUTO-RTO: 0 /100
P AXIS - MUSE: 53 DEGREES
PLATELET # BLD AUTO: 111 10E3/UL (ref 150–450)
POTASSIUM SERPL-SCNC: 3.7 MMOL/L (ref 3.4–5.3)
PR INTERVAL - MUSE: 180 MS
PROT SERPL-MCNC: 5.9 G/DL (ref 6.4–8.3)
QRS DURATION - MUSE: 130 MS
QT - MUSE: 394 MS
QTC - MUSE: 451 MS
R AXIS - MUSE: -88 DEGREES
RBC # BLD AUTO: 3.08 10E6/UL (ref 3.8–5.2)
SODIUM SERPL-SCNC: 141 MMOL/L (ref 135–145)
SYSTOLIC BLOOD PRESSURE - MUSE: NORMAL MMHG
T AXIS - MUSE: 48 DEGREES
VENTRICULAR RATE- MUSE: 79 BPM
WBC # BLD AUTO: 7.5 10E3/UL (ref 4–11)

## 2024-03-07 PROCEDURE — 85004 AUTOMATED DIFF WBC COUNT: CPT | Performed by: INTERNAL MEDICINE

## 2024-03-07 PROCEDURE — 93005 ELECTROCARDIOGRAM TRACING: CPT

## 2024-03-07 PROCEDURE — 250N000013 HC RX MED GY IP 250 OP 250 PS 637: Performed by: INTERNAL MEDICINE

## 2024-03-07 PROCEDURE — 250N000011 HC RX IP 250 OP 636: Performed by: HOSPITALIST

## 2024-03-07 PROCEDURE — 99233 SBSQ HOSP IP/OBS HIGH 50: CPT | Performed by: HOSPITALIST

## 2024-03-07 PROCEDURE — 258N000003 HC RX IP 258 OP 636: Performed by: HOSPITALIST

## 2024-03-07 PROCEDURE — 36415 COLL VENOUS BLD VENIPUNCTURE: CPT | Performed by: INTERNAL MEDICINE

## 2024-03-07 PROCEDURE — 120N000001 HC R&B MED SURG/OB

## 2024-03-07 PROCEDURE — 258N000003 HC RX IP 258 OP 636: Performed by: INTERNAL MEDICINE

## 2024-03-07 PROCEDURE — 80053 COMPREHEN METABOLIC PANEL: CPT | Performed by: INTERNAL MEDICINE

## 2024-03-07 PROCEDURE — 250N000011 HC RX IP 250 OP 636: Performed by: INTERNAL MEDICINE

## 2024-03-07 RX ORDER — ENOXAPARIN SODIUM 100 MG/ML
40 INJECTION SUBCUTANEOUS EVERY 24 HOURS
Status: DISCONTINUED | OUTPATIENT
Start: 2024-03-07 | End: 2024-03-10 | Stop reason: HOSPADM

## 2024-03-07 RX ORDER — SODIUM CHLORIDE 9 MG/ML
INJECTION, SOLUTION INTRAVENOUS CONTINUOUS
Status: DISCONTINUED | OUTPATIENT
Start: 2024-03-07 | End: 2024-03-10

## 2024-03-07 RX ADMIN — SODIUM CHLORIDE: 9 INJECTION, SOLUTION INTRAVENOUS at 22:43

## 2024-03-07 RX ADMIN — SODIUM CHLORIDE: 9 INJECTION, SOLUTION INTRAVENOUS at 12:38

## 2024-03-07 RX ADMIN — CARVEDILOL 12.5 MG: 12.5 TABLET, FILM COATED ORAL at 18:04

## 2024-03-07 RX ADMIN — CEFTRIAXONE SODIUM 1 G: 1 INJECTION, SOLUTION INTRAVENOUS at 17:24

## 2024-03-07 RX ADMIN — ENOXAPARIN SODIUM 40 MG: 40 INJECTION SUBCUTANEOUS at 12:35

## 2024-03-07 RX ADMIN — FLUTICASONE FUROATE AND VILANTEROL TRIFENATATE 1 PUFF: 200; 25 POWDER RESPIRATORY (INHALATION) at 08:22

## 2024-03-07 RX ADMIN — SODIUM CHLORIDE: 9 INJECTION, SOLUTION INTRAVENOUS at 02:59

## 2024-03-07 RX ADMIN — UMECLIDINIUM 1 PUFF: 62.5 AEROSOL, POWDER ORAL at 08:22

## 2024-03-07 RX ADMIN — ONDANSETRON 4 MG: 4 TABLET, ORALLY DISINTEGRATING ORAL at 12:53

## 2024-03-07 RX ADMIN — ONDANSETRON 4 MG: 4 TABLET, ORALLY DISINTEGRATING ORAL at 05:24

## 2024-03-07 RX ADMIN — PANTOPRAZOLE SODIUM 40 MG: 40 TABLET, DELAYED RELEASE ORAL at 07:55

## 2024-03-07 RX ADMIN — ROSUVASTATIN 10 MG: 10 TABLET, FILM COATED ORAL at 08:22

## 2024-03-07 RX ADMIN — CARVEDILOL 12.5 MG: 12.5 TABLET, FILM COATED ORAL at 08:22

## 2024-03-07 ASSESSMENT — ACTIVITIES OF DAILY LIVING (ADL)
ADLS_ACUITY_SCORE: 33
ADLS_ACUITY_SCORE: 27
ADLS_ACUITY_SCORE: 31
ADLS_ACUITY_SCORE: 35
ADLS_ACUITY_SCORE: 31
ADLS_ACUITY_SCORE: 33
ADLS_ACUITY_SCORE: 33
ADLS_ACUITY_SCORE: 30
ADLS_ACUITY_SCORE: 30
ADLS_ACUITY_SCORE: 33
ADLS_ACUITY_SCORE: 33
ADLS_ACUITY_SCORE: 31
ADLS_ACUITY_SCORE: 33
ADLS_ACUITY_SCORE: 31
ADLS_ACUITY_SCORE: 27
ADLS_ACUITY_SCORE: 31
ADLS_ACUITY_SCORE: 33
ADLS_ACUITY_SCORE: 33

## 2024-03-07 NOTE — PHARMACY-MEDICATION REGIMEN REVIEW
Pharmacy Antimicrobial Stewardship Assessment     Current Antimicrobial Therapy:  Anti-infectives (From now, onward)      Start     Dose/Rate Route Frequency Ordered Stop    24 1700  cefTRIAXone in d5w (ROCEPHIN) intermittent infusion 1 g         1 g  over 30 Minutes Intravenous EVERY 24 HOURS 24 1935              Indication: UTI     Days of Therapy: 2     Pertinent Labs:  Recent Labs   Lab Test 24  0505 24  1451 10/05/23  1228   WBC 7.5 9.9 4.0     Recent Labs   Lab Test 24  1451 23  0051 23  0742 23  0519 21  0441 21  2316 20  0950   LACT 1.1 1.3   < >  --    < > 3.1* 1.5   CRP  --   --   --   --   --  18.9* <2.9   PCAL 0.10  --   --  0.14*   < > 26.54*  --     < > = values in this interval not displayed.        Temperature:  Temp (24hrs), Av.2  F (37.3  C), Min:98.4  F (36.9  C), Max:100  F (37.8  C)      Culture Results:   30-Day Micro Results       Collected Updated Procedure Result Status      2024 1459 2024 0657 Urine Culture [[id number]]   (Abnormal)   Urine, Catheter    Preliminary result Component Value   Culture 50,000-100,000 CFU/mL Lactose fermenting gram negative bacilli  [P]                2024 1450 2024 1532 Symptomatic Influenza A/B, RSV, & SARS-CoV2 PCR (COVID-19) Nasopharyngeal [[id number]]    Swab from Nasopharyngeal    Final result Component Value   Influenza A PCR Negative   Influenza B PCR Negative   RSV PCR Negative   SARS CoV2 PCR Negative   NEGATIVE: SARS-CoV-2 (COVID-19) RNA not detected, presumed negative.                     Recent Antibiotics:  --    Recommendations/Interventions:  No recommendations at this time. Will continue to monitor.     Eugenia Salazar [facility]  2024

## 2024-03-07 NOTE — PROGRESS NOTES
Assessment completed  with Flora    LOC: alert     Dx: Weakness, UTI  Chronic Disease Management: HF, pulmonary edema, hyperlipidemia, neuropathy, A-Fib, COPD, HTN    Lives with: alone  Living at:  home in [place]  Transportation: YES, family provides    Primary PCP: Ilia Moran  Insurance:  Medicare and BCBS    Support System:  family  Homecare/PCA: no  /County Services:   no  : NO      How was the [facility] notification completed: n/a    Health Care Directive: on file  Guardian: no  POA: no    Pharmacy: [facility]  Meds management: manages own    Adequate Resources for needs (housing, utilities, food/med): YES  Household chores: has a cleaning lady 1X weekly  Work/community/social activity: YES, gets out as desired    ADLs: independent  Ambulation:uses a walker or cane  Falls: no  Nutrition: no concern  Sleep: sleeps well    Equipment used: shower chair, grab bars, walker and cane      Oxygen supplier: no      Does the supplier have valid oxygen orders: n/a    Mental health: denies  Substance abuse: denies  Exposure to violence/abuse: denies  Stressors: denies    Able to Return to Prior Living Arrangements: YES    Choice of Vendor: n/a    Barriers: none identified    GREGORY: low    Plan: home with family is able.    Millie Barbosa CM

## 2024-03-07 NOTE — H&P
[facility]    History and Physical - Hospitalist Service       Date of Admission:  3/6/2024    Assessment & Plan      Jacklyn Hein is a 88 year old female admitted on 3/6/2024. She presented to emergency room by [facility] because of the fever chills and generalized weakness which started day prior to admission diagnosed with UTI/cystitis and will be admitted for IV antibiotics, hydration.  She is DNR/DNI.    Hospital problems  Urinary tract infection, cystitis with microhematuria  -Based on previous culture sensitivity we will treat with Rocephin and follow cultures    Generalized weakness  -Due to infection  -PT when she is able to cooperate and able to participate    Paroxysmal A-fib  -ECG and telemetry    Nonischemic cardiomyopathy  -AICD with dual-chamber pacing function  -Recent check showed no issues with device    Chronic diastolic heart failure  -Looks euvolemic but some crackles in the lungs.  -Will monitor    COPD  -No signs of exacerbation  -Continue home inhalers  -Spot pulse oximetry check    Kyphosis  -Most likely due to postoperative deformity    Chronic kidney disease stage IIIa  -Creatinine is 0.9 and GFR is 62 today  -Will monitor  -Hydration and avoid NSAIDs    Dyslipidemia  -Continue Crestor    Chronic normocytic anemia  -Hemoglobin stable  -No signs or complaints suspect blood loss        Diet: Regular Diet Adult    DVT Prophylaxis: Enoxaparin (Lovenox) SQ  Johnson Catheter: Not present  Lines: None     Cardiac Monitoring: None  Code Status:  DNR/DNI    Clinically Significant Risk Factors Present on Admission                # Thrombocytopenia: Lowest platelets = 124 in last 2 days, will monitor for bleeding   # Hypertension: Noted on problem list  # Chronic heart failure with preserved ejection fraction: heart failure noted on problem list and last echo with EF >50%         # COPD: noted on problem list  # ICD device present       Disposition Plan Home in stable     Expected  Discharge Date: Expect at least 2 to 3 days inpatient stay       Aníbal Pelayo MD  Hospitalist Service  [facility]  Securely message with [employer] (more info)  Text page via [facility] Paging/Directory     ______________________________________________________________________    Chief Complaint   Weakness generalized, increased sleepiness, lower abdominal discomfort    History is obtained from the patient, electronic health record, and emergency department physician    History of Present Illness   Jacklyn Hein is a 88 year old female who lives alone and has several medical problems but they are well compensated including paroxysmal A-fib, nonischemic cardiomyopathy, status post dual-chamber pacemaker and AICD placement, stage IIIa kidney disease, osteoporosis, COPD, who came to emergency room because of the relatively new and past progressing generalized weakness, fever, chills.  She stated she was surprised how things changed from yesterday when she was feeling at her baseline and today she is weak and wants to sleep.  She admits fever and lower abdominal discomfort, but denies hematuria or dysuria flank pain.  She also denies chest pain, productive cough, shortness of breath.  The rest is also negative.  10 points of review of system obtained.  ED course: Presented mildly tachycardic normotensive tachypneic and desaturated to a few occasions but not very low.  Lowest was 89.  Now she is 92% on room air and respiratory rate is 20.  ED test results: Her chemistry unremarkable including normal lactic and normal procalcitonin.  Electrolytes and liver function is normal also.  Hemoglobin is 11.6 but this is her baseline.  White cell count 9.9.  Urine is abnormal with white blood cell count 38 many bacteria white blood cell clumps present yeast present with small blood and leukocyte esterase moderate.  Chest x-ray shows no active pulmonary infection.  She also tested negative for SARS influenza a and B  and RSV  ED treatment: Normal saline bolus given and 2 g Rocephin started.  Cultures obtained.    Past Medical History    Past Medical History:   Diagnosis Date    Angina pectoris 2011    CHF (congestive heart failure), NYHA class II (H) 12/10/2013    CHF (congestive heart failure), NYHA class III (H) 12/10/2013    Hyperhydrosis disorder 2011    Insomnia, unspecified 2011    LBBB (left bundle branch block) 2011    Neck pain, chronic 2011    Non-ischemic cardiomyopathy (H) 12/10/2013    Obesity, unspecified 2011    Osteopenia 2011    Pacemaker     Pain in joint, lower leg 2006    Pure hypercholesterolemia 2002    Unspecified essential hypertension 2011       Past Surgical History   Past Surgical History:   Procedure Laterality Date    APPENDECTOMY      ARTHROSCOPY KNEE RT/LT      RT    BACK SURGERY  2020    BACK SURGERY  2021    fractured vert repair    CARDIAC SURGERY  2014    Pacemaker    Cataract extraction  2009    Bilateral    CHOLECYSTECTOMY      open     COLONOSCOPY  2001    Normal     COLONOSCOPY N/A 10/20/2016    Procedure: COLONOSCOPY;  Surgeon: Charan Montejo MD;  Location: [facility]    COLONOSCOPY N/A 2023    Procedure: COLONOSCOPY;  Surgeon: Royal Sanz MD;  Location: [facility] OR    colonoscopy with polypectomy      Repeat 3 years     CT CORONARY ANGIOGRAM      normal    ESOPHAGOSCOPY, GASTROSCOPY, DUODENOSCOPY (EGD), COMBINED N/A 2023    Procedure: ESOPHAGOGASTRODUODENOSCOPY;  Surgeon: Royal Sanz MD;  Location: [facility] OR    HERPES ZOSTER PCR ([facility])      IR CONSULTATION FOR IR EXAM  2020    IR CONSULTATION FOR IR EXAM  2023    IR TRIGGER POINT INJ 3 OR MORE MUSCLES  2023    left eye scar tissue      normal echo      fen-phen use      x6      REPLACE PACEMAKER GENERATOR N/A 2023    Procedure: REPLACEMENT, PULSE GENERATOR, CARDIAC PACEMAKER;  Surgeon: Isiah Hodge  MD RYAN;  Location:  OR    SURGICAL RADIOLOGY PROCEDURE N/A 7/27/2016    Procedure: SURGICAL RADIOLOGY PROCEDURE;  Surgeon: Provider, [facility];  Location: [facility]       Prior to Admission Medications   Prior to Admission Medications   Prescriptions Last Dose Informant Patient Reported? Taking?   Calcium Carbonate-Vitamin D (CALCIUM 600 + D OR) 3/5/2024 Self Yes Yes   Sig: Take 1 tablet by mouth every morning   HYDROcodone-acetaminophen (NORCO) 5-325 MG tablet 3/5/2024  No Yes   Sig: Take 2 tablets by mouth 3 times daily as needed for moderate to severe pain   Multiple Vitamin (MULTI-VITAMIN DAILY PO) 3/5/2024 Self Yes Yes   Sig: Take 1 tablet by mouth every morning   acetaminophen (TYLENOL) 325 MG tablet 3/5/2024  No Yes   Sig: Take 2 tablets (650 mg) by mouth every 6 hours as needed for mild pain MAX 4000MG TYLENOL  IN 24 HRS / NEED TO INCLUDE HER LORTAB.   carvedilol (COREG) 12.5 MG tablet 3/5/2024  No Yes   Sig: TAKE 1 TABLET BY MOUTH 2 TIMES DAILY WITH MEALS   gabapentin (NEURONTIN) 400 MG capsule 3/5/2024  No Yes   Sig: TAKE 1 CAPSULE BY MOUTH 3 TIMES DAILY FOR PAIN   mometasone-formoterol (DULERA) 200-5 MCG/ACT inhaler 3/5/2024  No Yes   Sig: Inhale 2 puffs into the lungs 2 times daily   pantoprazole (PROTONIX) 40 MG EC tablet 3/5/2024  No Yes   Sig: Take 1 tablet (40 mg) by mouth every morning (before breakfast)   rosuvastatin (CRESTOR) 10 MG tablet 3/5/2024  No Yes   Sig: TAKE 1 TABLET BY MOUTH DAILY   umeclidinium (INCRUSE ELLIPTA) 62.5 MCG/ACT inhaler 3/5/2024  No Yes   Sig: INHALE 1 PUFF INTO THE LUNGS DAILY   vitamin C (ASCORBIC ACID) 500 MG tablet 3/5/2024  Yes Yes   Sig: Take 500 mg by mouth 2 times daily      Facility-Administered Medications: None        Review of Systems    10 points of review of system obtained.  Pertinent positives and negatives listed in history of present illness.  The rest is negative.    Social History   I have reviewed this patient's social history and  updated it with pertinent information if needed.  Social History     Tobacco Use    Smoking status: Former     Packs/day: 1.00     Years: 15.00     Additional pack years: 0.00     Total pack years: 15.00     Types: Cigarettes     Start date: 1983     Quit date: 1998     Years since quittin.1     Passive exposure: Never    Smokeless tobacco: Never    Tobacco comments:     Passive Exposure: No; Longest Tobacco Free: 15 years    Vaping Use    Vaping Use: Never used   Substance Use Topics    Alcohol use: Not Currently     Comment: Occasionally     Drug use: No         Family History   I have reviewed this patient's family history and updated it with pertinent information if needed.  Family History   Problem Relation Age of Onset    Cancer Mother         lung (cause of death)     Peptic Ulcer Disease Father         gastric          Allergies   No Known Allergies     Physical Exam   Vital Signs: Temp: 98.4  F (36.9  C) Temp src: Tympanic BP: 119/55 Pulse: 84   Resp: 20 SpO2: 92 % O2 Device: None (Room air)    Weight: 0 lbs 0 oz    General Appearance: elderly woman but not in distress.  Respiratory: She has bilateral scattered rhonchi  Cardiovascular: Irregular due to extra beats but A-fib possible.  Will check ECG  GI: Soft nondistended nontender  : Bladder nonpalpable, no CVA tenderness  Skin: Atrophic lower extremity skin changes but no edema.    Neurologic exam: Nonfocal  Psychiatric exam: She started but awake alert oriented x 3.  Affect appropriate.  Lymphatic hematologic no petechia no ecchymoses no lymphadenopathy    Medical Decision Making       50 MINUTES SPENT BY ME on the date of service doing chart review, history, exam, documentation & further activities per the note.      Data     I have personally reviewed the following data over the past 24 hrs:    9.9  \   11.6 (L)   / 124 (L)     140 102 15.2 /  100 (H)   3.9 25 0.90 \     ALT: 13 AST: 22 AP: 60 TBILI: 0.7   ALB: 3.8 TOT PROTEIN: 7.2  LIPASE: N/A     Procal: 0.10 CRP: N/A Lactic Acid: 1.1         Imaging results reviewed over the past 24 hrs:   Recent Results (from the past 24 hour(s))   XR Chest Port 1 View    Narrative    Procedure:XR CHEST PORT 1 VIEW    Clinical history:Female, 88 years, fever    Technique: Single view was obtained.    Comparison: 9/26/2023    Findings: The cardiac silhouette is within normal limits.. The  pulmonary vasculature is within normal limits.    The lungs are clear. Bony structures are unremarkable.  Transvenous  pacer is again seen, similar in appearance. There is persistent  blunting of the left costophrenic angle.      Impression    Impression:   No acute abnormality.     Chronic changes are similar in appearance with hyperinflation of the  lungs and persistent blunting of the left costophrenic angle.    RADHA HAMMER MD         SYSTEM ID:  [id number]

## 2024-03-07 NOTE — PLAN OF CARE
A&O x 4. Remains on 1L NC to maintain sats >90%. Afebrile. Denies pain. Diastolic BP soft, clarified with HCP to give or not give Coreg, ordered to give. C/O nausea, given PO Zofran x 2 today with interventions effective per pt. AX1 w/ belt and walker to BSC. IV infusing w/ NS at 50ml/hr. Fluids were discontinued but then re-started d/t to pt not really taking in any fluids herself. IV Rocephin given. Pt remains weak/tired and requesting to stay in bed. Writer encouraged her to eat/drink at least something but pt just not feeling up to it. Poor appetite, but trying. Lovenox subcutaneous started on pt today. Family in and out throughout the day. Bed in low position, alarms activated and audible, uses call light appropriately.  here to see patient today, and she really enjoyed that.    Face to face report given with opportunity to observe patient.    Report given to Stalin Cope RN   3/7/2024  7:35 PM

## 2024-03-07 NOTE — PROGRESS NOTES
[facility]    Medicine Progress Note - Hospitalist Service    Date of Admission:  3/6/2024    Assessment & Plan   Patient is a      88 year old female admitted on 3/6/2024. She presented to emergency room by [facility] because of the fever chills and generalized weakness which started day prior to admission diagnosed with UTI/cystitis and will be admitted for IV antibiotics, hydration.  She is DNR/DNI.     Hospital problems  Urinary tract infection, cystitis with microhematuria  -On rocephin  -monitor cultures     Generalized weakness  -pt when able     Paroxysmal A-fib  -tele  -on coreg    Nonischemic cardiomyopathy  AICD with dual-chamber pacing function  -Recent check showed no issues with device     Chronic diastolic heart failure  -Looks euvolemic but some crackles in the lungs.  -Will monitor     COPD- not in exacerbation      Kyphosis  -pt     Chronic kidney disease stage IIIa  -daily labs  -iv hydration  -monitor cr     Dyslipidemia  -home crestor      Chronic normocytic anemia  -monitor hgb          Diet: Combination Diet Regular Diet Adult; 2 gm NA Diet; Thin Liquids (level 0)    DVT Prophylaxis: Enoxaparin (Lovenox) SQ  Johnson Catheter: Not present  Lines: None     Cardiac Monitoring: ACTIVE order. Indication: heart failure, AICD, paroxysmal afib  Code Status: No CPR- Do NOT Intubate      Clinically Significant Risk Factors Present on Admission              # Hypoalbuminemia: Lowest albumin = 3.1 g/dL at 3/7/2024  5:05 AM, will monitor as appropriate   # Thrombocytopenia: Lowest platelets = 111 in last 2 days, will monitor for bleeding   # Hypertension: Noted on problem list  # Chronic heart failure with preserved ejection fraction: heart failure noted on problem list and last echo with EF >50%         # COPD: noted on problem list  # ICD device present       Disposition Plan     pending clinical improvement          Julian Granados,   Hospitalist Service  [facility]  Securely message with [employer] (more info)  Text page via [facility] Paging/Directory   ______________________________________________________________________    Interval History   Patient was seen this morning for medical rounds. NO chest pain or shortness of breath.   She does feel weak    Physical Exam   Vital Signs: Temp: 98.6  F (37  C) Temp src: Tympanic BP: 124/57 Pulse: 81   Resp: 16 SpO2: 96 % O2 Device: Nasal cannula Oxygen Delivery: 1 LPM  Weight: 130 lbs 11.72 oz  Physical Exam  Constitutional:       Appearance: She is obese.      Comments: Frail appearing stated age female    HENT:      Right Ear: External ear normal.      Left Ear: External ear normal.      Nose: Nose normal.      Mouth/Throat:      Pharynx: Oropharynx is clear.   Cardiovascular:      Rate and Rhythm: Normal rate.   Pulmonary:      Effort: Pulmonary effort is normal.   Abdominal:      General: Abdomen is flat.   Musculoskeletal:         General: No swelling.   Skin:     Coloration: Skin is not jaundiced.   Neurological:      Mental Status: She is alert. Mental status is at baseline.         Medical Decision Making       50 MINUTES SPENT BY ME on the date of service doing chart review, history, exam, documentation & further activities per the note.      Data     I have personally reviewed the following data over the past 24 hrs:    7.5  \   9.3 (L)   / 111 (L)     141 108 (H) 17.8 /  72   3.7 19 (L) 0.91 \     ALT: 10 AST: 19 AP: 53 TBILI: 0.4   ALB: 3.1 (L) TOT PROTEIN: 5.9 (L) LIPASE: N/A     Procal: 0.10 CRP: N/A Lactic Acid: 1.1         Imaging results reviewed over the past 24 hrs:   Recent Results (from the past 24 hour(s))   XR Chest Port 1 View    Narrative    Procedure:XR CHEST PORT 1 VIEW    Clinical history:Female, 88 years, fever    Technique: Single view was obtained.    Comparison: 9/26/2023    Findings: The cardiac silhouette is within normal limits.. The  pulmonary vasculature is within normal limits.    The lungs are  clear. Bony structures are unremarkable.  Transvenous  pacer is again seen, similar in appearance. There is persistent  blunting of the left costophrenic angle.      Impression    Impression:   No acute abnormality.     Chronic changes are similar in appearance with hyperinflation of the  lungs and persistent blunting of the left costophrenic angle.    RADHA HAMMER MD         SYSTEM ID:  [id number]

## 2024-03-07 NOTE — PLAN OF CARE
[facility] Inpatient Admission Note:    Patient admitted to 3112/3112-1 at approximately 1920  via cart accompanied by transport tech from emergency room . Report received from Cristal in SBAR format at 1845   via telephone. Patient transferred to bed via self.. Patient is alert and oriented X 3, denies pain; rates at 0 on 0-10 scale.  Patient oriented to room, unit, hourly rounding, and plan of care. Explained admission packet and patient handbook with patient bill of rights brochure. Will continue to monitor and document as needed.     Inpatient Nursing criteria listed below was met:    Health care directives status obtained and documented: Yes    Patient identifies a surrogate decision maker: Yes If yes, who:daughter and son Contact Information:see index     If initial lactic acid greater than 2.0, repeat lactic acid drawn within one hour of arrival to unit: NA. If no, state reason:     Clergy visit ordered if patient requests: N/A    Skin issues/needs documented: N/A    Isolation Patient: no Education given, correct sign in place and documentation row added to PCS:  No    Fall Prevention Yes: Care plan updated, education given and documented, sticker and magnet in place: Yes    Care Plan initiated: Yes    Education Documented (including assessment): Yes    Patient has discharge needs : No If yes, please explain:

## 2024-03-07 NOTE — PLAN OF CARE
Free from falls/injuries this shift. Up to BSC with assist of 1. Assess as charted. Turns indep. Bed alarm on at all times. IVF @ 100/hr. Temp max : 99.2.Telemetry intact. Slept well on hourly rounds.  Face to face report given with opportunity to observe patient.    Report given to Mayra Lyons RN   3/7/2024  4:30 AM

## 2024-03-08 ENCOUNTER — APPOINTMENT (OUTPATIENT)
Dept: OCCUPATIONAL THERAPY | Facility: HOSPITAL | Age: 89
DRG: 690 | End: 2024-03-08
Payer: MEDICARE

## 2024-03-08 ENCOUNTER — APPOINTMENT (OUTPATIENT)
Dept: PHYSICAL THERAPY | Facility: HOSPITAL | Age: 89
DRG: 690 | End: 2024-03-08
Attending: HOSPITALIST
Payer: MEDICARE

## 2024-03-08 LAB
ANION GAP SERPL CALCULATED.3IONS-SCNC: 14 MMOL/L (ref 7–15)
BASOPHILS # BLD AUTO: 0 10E3/UL (ref 0–0.2)
BASOPHILS NFR BLD AUTO: 0 %
BUN SERPL-MCNC: 18.8 MG/DL (ref 8–23)
CALCIUM SERPL-MCNC: 8.4 MG/DL (ref 8.8–10.2)
CHLORIDE SERPL-SCNC: 105 MMOL/L (ref 98–107)
CREAT SERPL-MCNC: 0.81 MG/DL (ref 0.51–0.95)
DEPRECATED HCO3 PLAS-SCNC: 19 MMOL/L (ref 22–29)
EGFRCR SERPLBLD CKD-EPI 2021: 70 ML/MIN/1.73M2
EOSINOPHIL # BLD AUTO: 0 10E3/UL (ref 0–0.7)
EOSINOPHIL NFR BLD AUTO: 1 %
ERYTHROCYTE [DISTWIDTH] IN BLOOD BY AUTOMATED COUNT: 12.7 % (ref 10–15)
GLUCOSE SERPL-MCNC: 85 MG/DL (ref 70–99)
HCT VFR BLD AUTO: 29.9 % (ref 35–47)
HGB BLD-MCNC: 9.5 G/DL (ref 11.7–15.7)
IMM GRANULOCYTES # BLD: 0 10E3/UL
IMM GRANULOCYTES NFR BLD: 1 %
LYMPHOCYTES # BLD AUTO: 0.7 10E3/UL (ref 0–5.3)
LYMPHOCYTES NFR BLD AUTO: 11 %
MCH RBC QN AUTO: 30.2 PG (ref 26.5–33)
MCHC RBC AUTO-ENTMCNC: 31.8 G/DL (ref 31.5–36.5)
MCV RBC AUTO: 95 FL (ref 78–100)
MONOCYTES # BLD AUTO: 0.5 10E3/UL (ref 0–1.3)
MONOCYTES NFR BLD AUTO: 8 %
NEUTROPHILS # BLD AUTO: 4.8 10E3/UL (ref 1.6–8.3)
NEUTROPHILS NFR BLD AUTO: 80 %
NRBC # BLD AUTO: 0 10E3/UL
NRBC BLD AUTO-RTO: 0 /100
PLATELET # BLD AUTO: 110 10E3/UL (ref 150–450)
POTASSIUM SERPL-SCNC: 4 MMOL/L (ref 3.4–5.3)
RBC # BLD AUTO: 3.15 10E6/UL (ref 3.8–5.2)
SODIUM SERPL-SCNC: 138 MMOL/L (ref 135–145)
WBC # BLD AUTO: 6.1 10E3/UL (ref 4–11)

## 2024-03-08 PROCEDURE — 85025 COMPLETE CBC W/AUTO DIFF WBC: CPT | Performed by: HOSPITALIST

## 2024-03-08 PROCEDURE — 250N000011 HC RX IP 250 OP 636: Performed by: HOSPITALIST

## 2024-03-08 PROCEDURE — 97161 PT EVAL LOW COMPLEX 20 MIN: CPT | Mod: GP

## 2024-03-08 PROCEDURE — 99233 SBSQ HOSP IP/OBS HIGH 50: CPT | Performed by: HOSPITALIST

## 2024-03-08 PROCEDURE — 250N000011 HC RX IP 250 OP 636: Performed by: INTERNAL MEDICINE

## 2024-03-08 PROCEDURE — 82565 ASSAY OF CREATININE: CPT | Performed by: HOSPITALIST

## 2024-03-08 PROCEDURE — 97165 OT EVAL LOW COMPLEX 30 MIN: CPT | Mod: GO

## 2024-03-08 PROCEDURE — 36415 COLL VENOUS BLD VENIPUNCTURE: CPT | Performed by: HOSPITALIST

## 2024-03-08 PROCEDURE — 250N000013 HC RX MED GY IP 250 OP 250 PS 637: Performed by: INTERNAL MEDICINE

## 2024-03-08 PROCEDURE — 82374 ASSAY BLOOD CARBON DIOXIDE: CPT | Performed by: HOSPITALIST

## 2024-03-08 PROCEDURE — 250N000013 HC RX MED GY IP 250 OP 250 PS 637: Performed by: HOSPITALIST

## 2024-03-08 PROCEDURE — 120N000001 HC R&B MED SURG/OB

## 2024-03-08 RX ORDER — SENNOSIDES 8.6 MG
650 CAPSULE ORAL EVERY MORNING
COMMUNITY
End: 2024-03-15

## 2024-03-08 RX ORDER — FUROSEMIDE 20 MG
20 TABLET ORAL DAILY
Status: ON HOLD | COMMUNITY
Start: 2024-01-08 | End: 2024-03-10

## 2024-03-08 RX ORDER — GABAPENTIN 400 MG/1
400 CAPSULE ORAL 3 TIMES DAILY
Status: DISCONTINUED | OUTPATIENT
Start: 2024-03-08 | End: 2024-03-10 | Stop reason: HOSPADM

## 2024-03-08 RX ORDER — CARVEDILOL 3.12 MG/1
6.25 TABLET ORAL 2 TIMES DAILY WITH MEALS
Status: DISCONTINUED | OUTPATIENT
Start: 2024-03-08 | End: 2024-03-10 | Stop reason: HOSPADM

## 2024-03-08 RX ADMIN — GABAPENTIN 400 MG: 400 CAPSULE ORAL at 20:47

## 2024-03-08 RX ADMIN — ENOXAPARIN SODIUM 40 MG: 40 INJECTION SUBCUTANEOUS at 10:25

## 2024-03-08 RX ADMIN — HYDROCODONE BITARTRATE AND ACETAMINOPHEN 2 TABLET: 5; 325 TABLET ORAL at 20:47

## 2024-03-08 RX ADMIN — UMECLIDINIUM 1 PUFF: 62.5 AEROSOL, POWDER ORAL at 08:03

## 2024-03-08 RX ADMIN — PANTOPRAZOLE SODIUM 40 MG: 40 TABLET, DELAYED RELEASE ORAL at 08:02

## 2024-03-08 RX ADMIN — CARVEDILOL 6.25 MG: 3.12 TABLET, FILM COATED ORAL at 16:19

## 2024-03-08 RX ADMIN — GABAPENTIN 400 MG: 400 CAPSULE ORAL at 12:04

## 2024-03-08 RX ADMIN — CEFTRIAXONE SODIUM 1 G: 1 INJECTION, SOLUTION INTRAVENOUS at 16:19

## 2024-03-08 RX ADMIN — HYDROCODONE BITARTRATE AND ACETAMINOPHEN 2 TABLET: 5; 325 TABLET ORAL at 10:24

## 2024-03-08 RX ADMIN — ROSUVASTATIN 10 MG: 10 TABLET, FILM COATED ORAL at 08:02

## 2024-03-08 RX ADMIN — FLUTICASONE FUROATE AND VILANTEROL TRIFENATATE 1 PUFF: 200; 25 POWDER RESPIRATORY (INHALATION) at 08:03

## 2024-03-08 ASSESSMENT — ACTIVITIES OF DAILY LIVING (ADL)
ADLS_ACUITY_SCORE: 35
ADLS_ACUITY_SCORE: 34
ADLS_ACUITY_SCORE: 34
ADLS_ACUITY_SCORE: 35
ADLS_ACUITY_SCORE: 34
ADLS_ACUITY_SCORE: 33
ADLS_ACUITY_SCORE: 32
ADLS_ACUITY_SCORE: 34
ADLS_ACUITY_SCORE: 35
ADLS_ACUITY_SCORE: 34
ADLS_ACUITY_SCORE: 35
ADLS_ACUITY_SCORE: 34
ADLS_ACUITY_SCORE: 35
ADLS_ACUITY_SCORE: 34
ADLS_ACUITY_SCORE: 34
PREVIOUS_RESPONSIBILITIES: MEAL PREP;LAUNDRY;MEDICATION MANAGEMENT;DRIVING
ADLS_ACUITY_SCORE: 33
ADLS_ACUITY_SCORE: 35
ADLS_ACUITY_SCORE: 35
ADLS_ACUITY_SCORE: 34
ADLS_ACUITY_SCORE: 34
ADLS_ACUITY_SCORE: 32
ADLS_ACUITY_SCORE: 35
ADLS_ACUITY_SCORE: 34

## 2024-03-08 NOTE — PROGRESS NOTES
03/08/24 1400   Appointment Info   Signing Clinician's Name / Credentials (PT) David Napier DPT   Rehab Comments (PT) Pt was up in recliner upon approach with 3 daughters visiting, but pt was agreeable to PT eval.   Living Environment   People in Home alone   Current Living Arrangements house   Home Accessibility stairs to enter home   Number of Stairs, Main Entrance 1   Stair Railings, Main Entrance none  (Pt holds on to door)   Transportation Anticipated car, drives self;family or friend will provide   Living Environment Comments Patient reported that she was living alone in a single level home with a basement and 1 step to enter. Patient reported that she uses the basement ~1x/week for laundry.  Approx 12 steps down to basement with R side railing ascenidng. Bathroom has a tub shower with grab bars and bench.   Self-Care   Usual Activity Tolerance good   Current Activity Tolerance fair   Equipment Currently Used at Home cane, quad;walker, rolling   Fall history within last six months no   Activity/Exercise/Self-Care Comment Patient reported that she was independent with ADLs at baseline. She reported that she uses 4WW in her home, sometimes quad cane to walk out to her patio.  She  usually takes 4WW out in community as well. Patient's daughter reported that patient was able to walk for ~30 minutes when grocery shopping last week.  Pt stated she did have a cleaning lady whom also helped with making her bed and laundry.   General Information   Onset of Illness/Injury or Date of Surgery 03/06/24   Referring Physician Dr. Granados   Patient/Family Therapy Goals Statement (PT) Return home   Pertinent History of Current Problem (include personal factors and/or comorbidities that impact the POC) She presented to emergency room by [facility] because of the fever chills and generalized weakness which started day prior to admission diagnosed with UTI/cystitis and will be admitted for IV antibiotics, hydration.    Existing Precautions/Restrictions oxygen therapy device and L/min   General Observations Pt currently on O2 at 0.5 LPM with SpO2 = 98%, HR = 60.  Pt does not use O2 at home.   Cognition   Affect/Mental Status (Cognition) WFL   Orientation Status (Cognition) oriented x 4   Follows Commands (Cognition) [facility]   Pain Assessment   Patient Currently in Pain No  (Pt voiced no c/o pain during this session.)   Integumentary/Edema   Integumentary/Edema no deficits were identifed   Posture    Posture Forward head position;Protracted shoulders;Kyphosis   Range of Motion (ROM)   ROM Comment LE AROM was WFL bilat   Strength (Manual Muscle Testing)   Strength (Manual Muscle Testing) Deficits observed during functional mobility  (Min/mild)   Bed Mobility   Bed Mobility rolling left;supine-sit;sit-supine   Rolling Left [place] (Bed Mobility) modified independence   Supine-Sit [place] (Bed Mobility) modified independence   Sit-Supine [place] (Bed Mobility) modified independence   Assistive Device (Bed Mobility) bed rails  (Light use)   Transfers   Transfers bed-chair transfer;sit-stand transfer   Transfer Safety Concerns Noted decreased step length   Impairments Contributing to Impaired Transfers decreased strength   Bed-Chair Transfer   Assistive Device (Bed-Chair Transfers) walker, front-wheeled   Bed-Chair [place] (Transfers) contact guard;supervision  (CGA/SBA)   Comment, (Bed-Chair Transfer) Bed->recliner   Sit-Stand Transfer   Sit-Stand [place] (Transfers) supervision   Assistive Device (Sit-Stand Transfers) walker, front-wheeled   Comment, (Sit-Stand Transfer) From recliner and bed   Gait/Stairs (Locomotion)   [place] Level (Gait) contact guard;supervision  (CGA/SBA)   Assistive Device (Gait) walker, front-wheeled   Distance in Feet (Gait) 100   Comment, (Gait/Stairs) Pt amb approx 100 ft CGA/SBA with FWW exhibiting Fair+ pace and mild decreased step length with mild decreased foot clearance at  times, but no instability.  With amb, SpO2 = 92%, HR = 80.   Balance   Balance Comments Sitting balance = Good- w/o support.  Standing balance = Good- w/ FWW support.   Sensory Examination   Sensory Perception patient reports no sensory changes   Coordination   Coordination no deficits were identified   Muscle Tone   Muscle Tone no deficits were identified   Clinical Impression   Criteria for Skilled Therapeutic Intervention Yes, treatment indicated   PT Diagnosis (PT) Generalized weakness   Influenced by the following impairments Functional LE weakness; decreased activity tolerance; decreased balance.   Functional limitations due to impairments Gait, transfers, stair negotiation.   Clinical Presentation (PT Evaluation Complexity) stable   Clinical Presentation Rationale Clinical judgment   Clinical Decision Making (Complexity) low complexity   Planned Therapy Interventions (PT) gait training;home exercise program;manual therapy techniques;neuromuscular re-education;patient/family education;ROM (range of motion);stair training;strengthening;transfer training;progressive activity/exercise   Risk & Benefits of therapy have been explained evaluation/treatment results reviewed;care plan/treatment goals reviewed;risks/benefits reviewed;current/potential barriers reviewed;participants voiced agreement with care plan;participants included;patient   Clinical Impression Comments PT eval completed and pt presents with mild/mod general weakness and deconditioning leading to mild impairment of gait and transfers.   PT Total Evaluation Time   PT Eval, Low Complexity Minutes (87397) 22   Physical Therapy Goals   PT Frequency 6x/week   PT Predicted Duration/Target Date for Goal Attainment 03/15/24   PT Goals Transfers;Gait;Stairs   PT: Transfers Modified independent;Sit to/from stand;Bed to/from chair;Assistive device   PT: Gait Modified independent;Greater than 200 feet;Rolling walker   PT: Stairs 4 stairs;Supervision/stand-by  assist;Rail on both sides   PT Discharge Planning   PT Plan Progress mobility and activity tolerance as able.   PT Discharge Recommendation (DC Rec) home with assist   PT Rationale for DC Rec Pt although endurance is reduced compared to normal, pt completed mobility without much issue today.  She lives alone, but stated one of her daughter's will be staying with her for a week.  Thus pt safe to return home and feel no need for homecare PT at this time as anticipate she will return to her PLOF as her medical issues resolve and she resumes her usual activities at home with SPV from her daughter initially.   PT Brief overview of current status Sit->stand = SBA.  Amb x 100 ft CGA/SBA with FWW; bed mob = Mari   PT Equipment Needed at Discharge   (None)   Total Session Time   Total Session Time (sum of timed and untimed services) 22   Psychosocial Support   Trust Relationship/Rapport care explained;choices provided;emotional support provided;empathic listening provided;questions answered;questions encouraged;reassurance provided;thoughts/feelings acknowledged

## 2024-03-08 NOTE — PLAN OF CARE
Goal Outcome Evaluation:    Pt is alert and oriented. Denies any pain. On 1/2 L of oxygen sats at 96-97% VSS Assessment as charted. Call light in reach and bed alarm activated.     Face to face report given with opportunity to observe patient.    Report given to DELANEY Nunez RN   3/8/2024  3:11 AM

## 2024-03-08 NOTE — MEDICATION SCRIBE - ADMISSION MEDICATION HISTORY
Medication Scribe Admission Medication History    Admission medication history is complete. The information provided in this note is only as accurate as the sources available at the time of the update.    Information Source(s): Patient and CareEverywhere/SureScripts via in-person    Pertinent Information:   Patient manages her own medications and is a good historian.     Changes made to PTA medication list:  Added: lasix, tylenol PM  Deleted: pantoprazole- pt completed therapy from June 2023  Changed:   Norco- pt takes 1 tab TID  Dulera- only takes 2 puffs once daily in the AM- reports she feels like she can breathe fine on one dose per day so she decreased her dose  Vit C from BID to once daily    Allergies reviewed with patient and updates made in EHR: yes    Medication History Completed By: Debbie Lopez 3/8/2024 12:53 PM    PTA Med List   Medication Sig Last Dose    acetaminophen (TYLENOL) 650 MG CR tablet Take 650 mg by mouth every morning 3/5/2024 at AM    Calcium Carbonate-Vitamin D (CALCIUM 600 + D OR) Take 1 tablet by mouth every morning 3/5/2024 at AM    carvedilol (COREG) 12.5 MG tablet TAKE 1 TABLET BY MOUTH 2 TIMES DAILY WITH MEALS 3/5/2024 at BID    diphenhydrAMINE-acetaminophen (TYLENOL PM)  MG tablet Take 2 tablets by mouth at bedtime 3/5/2024 at HS    furosemide (LASIX) 20 MG tablet Take 20 mg by mouth daily 3/5/2024 at AM    gabapentin (NEURONTIN) 400 MG capsule TAKE 1 CAPSULE BY MOUTH 3 TIMES DAILY FOR PAIN 3/5/2024 at TID    HYDROcodone-acetaminophen (NORCO) 5-325 MG tablet Take 2 tablets by mouth 3 times daily as needed for moderate to severe pain (Patient taking differently: Take 1 tablet by mouth 3 times daily) 3/5/2024    mometasone-formoterol (DULERA) 200-5 MCG/ACT inhaler Inhale 2 puffs into the lungs 2 times daily (Patient taking differently: Inhale 2 puffs into the lungs every morning) 3/5/2024    Multiple Vitamin (MULTI-VITAMIN DAILY PO) Take 1 tablet by mouth every morning 3/5/2024  at AM    rosuvastatin (CRESTOR) 10 MG tablet TAKE 1 TABLET BY MOUTH DAILY 3/5/2024 at AM    umeclidinium (INCRUSE ELLIPTA) 62.5 MCG/ACT inhaler INHALE 1 PUFF INTO THE LUNGS DAILY 3/5/2024 at AM    vitamin C (ASCORBIC ACID) 500 MG tablet Take 500 mg by mouth daily 3/5/2024 at AM

## 2024-03-08 NOTE — PLAN OF CARE
Goal Outcome Evaluation:    Alert and oriented x4.   Calm and cooperative.   Able to make needs known. Calls appropriately.   Up SBA with FWW.   Pain managed with norco.   1L of O2 via NC.  in place.   IVF infusing per orders.   IV infiltrated. Anesthesia start for new PIV.   Discharge plans pending.     Face to face report given with opportunity to observe patient.    Report given to Sun Covarrubias RN   3/8/2024  7:00 PM

## 2024-03-08 NOTE — PROGRESS NOTES
03/08/24 1300   Appointment Info   Signing Clinician's Name / Credentials (OT) Mayra Polanco, OTR/L   Living Environment   People in Home alone   Current Living Arrangements house   Home Accessibility stairs to enter home   Number of Stairs, Main Entrance 1   Stair Railings, Main Entrance none   Transportation Anticipated car, drives self;family or friend will provide   Living Environment Comments Patient reported that she was living alone in a single level home with a basement and 1 step to enter. Patient reported that she uses the basement ~1x/week for laundry. Bathroom has a tub shower with grab bars and bench.   Self-Care   Usual Activity Tolerance good   Current Activity Tolerance fair   Equipment Currently Used at Home cane, quad;walker, rolling   Fall history within last six months no   Activity/Exercise/Self-Care Comment Patient reported that she was independent with ADLs at baseline. She reported that she has a cane and a 4WW at home for ambulation. Patient's daughter reported that patient was able to walk for ~30 minutes when grocery shopping last week.   Instrumental Activities of Daily Living (IADL)   Previous Responsibilities meal prep;laundry;medication management;driving   IADL Comments Patient reported that she is independent with IADLs such as meal preparation, home maintenance, and medication management. She reported that her daughters assist with grocery shopping and she has a cleaning lady who comes 1x/week.   General Information   Onset of Illness/Injury or Date of Surgery 03/06/24   Referring Physician Julian Granados, DO   Patient/Family Therapy Goal Statement (OT) Patient's daughters are open to STR but patient is currently refusing STR and would like to return home.   Additional Occupational Profile Info/Pertinent History of Current Problem Patient presented to the hospital on 3/6/24 with generalized weakness and UTI.   Cognitive Status Examination   Orientation Status orientation to  person, place and time   Posture   Posture not impaired   Range of Motion Comprehensive   General Range of Motion no range of motion deficits identified   Strength Comprehensive (MMT)   General Manual Muscle Testing (MMT) Assessment upper extremity strength deficits identified   Comment, General Manual Muscle Testing (MMT) Assessment LUE shoulder flexion: 4-/5; RUE shoulder flexion: 4/5   Bed Mobility   Bed Mobility   (Did not assess bed mobility as patient was seated in recliner at time of OT arrival.)   Transfers   Transfers sit-stand transfer   Sit-Stand Transfer   Sit-Stand [place] (Transfers) supervision   Assistive Device (Sit-Stand Transfers) walker, front-wheeled   Sit/Stand Transfer Comments Patient was able to stand from recliner with SBA and fww.   Activities of Daily Living   BADL Assessment/Intervention lower body dressing;toileting   Lower Body Dressing Assessment/Training   Position (Lower Body Dressing) supported sitting   Comment, (Lower Body Dressing) Patient was able to don/doff socks mod I while seated in recliner.   [place] Level (Lower Body Dressing) doff;don;socks;modified independence   Toileting   Position (Toileting) supported standing   Comment, (Toileting) Patient demonstrated ability to reach and adjust brief with CGA while standing with fww.   [place] Level (Toileting) adjust/manage clothing;contact guard assist   Clinical Impression   Criteria for Skilled Therapeutic Interventions Met (OT) Yes, treatment indicated   OT Diagnosis Impaired ADLs and activity tolerance   Influenced by the following impairments Generalized weakness; UTI   OT Problem List-Impairments impacting ADL activity tolerance impaired;strength;mobility   Assessment of Occupational Performance 1-3 Performance Deficits   Identified Performance Deficits Impaired ADLs, decreased activity tolerance   Planned Therapy Interventions (OT) ADL retraining;strengthening;home program guidelines;progressive  activity/exercise   Clinical Decision Making Complexity (OT) problem focused assessment/low complexity   Risk & Benefits of therapy have been explained evaluation/treatment results reviewed;risks/benefits reviewed;care plan/treatment goals reviewed;current/potential barriers reviewed;participants voiced agreement with care plan;participants included;patient;daughter   Clinical Impression Comments Patient was previously living alone and was independent with ADLs and most IADLs at baseline. Patient reported having a great family support system. During evaluation patient was able to complete sit-stand transfer with SBA and fww and was able to complete lower body dressing tasks mod I while seated.   OT Total Evaluation Time   OT Eval, Low Complexity Minutes (58617) 16   OT Goals   Therapy Frequency (OT) 5 times/week   OT Predicted Duration/Target Date for Goal Attainment 03/15/24   OT Goals Lower Body Dressing;Lower Body Bathing;Toilet Transfer/Toileting   OT: Lower Body Dressing Modified independent   OT: Lower Body Bathing Supervision/stand-by assist   OT: Toilet Transfer/Toileting Modified independent   OT Discharge Planning   OT Plan Progress ADLs, strength, and activity tolerance   OT Discharge Recommendation (DC Rec) Transitional Care Facility;home with assist   OT Rationale for DC Rec Patient was previously living alone and was independent with ADLs and most IADLs at baseline. Patient reported having a great family support system. During evaluation patient was able to complete sit-stand transfer with SBA and fww and was able to complete lower body dressing tasks mod I while seated. Patient demonstrated decreased standing/activity tolerance. Recommend STR but patient is currently not agreeable to STR. Will continue to assess progress during inpatient stay and update discharge recommendations. If patient returns home, recommend daily assistance.   OT Brief overview of current status SBA for transfers; decreased  activity tolerance; mod I with lower body dressing; CGA for clothing management   Total Session Time   Total Session Time (sum of timed and untimed services) 16   Psychosocial Support   Trust Relationship/Rapport care explained;choices provided;emotional support provided;empathic listening provided;questions answered;questions encouraged

## 2024-03-08 NOTE — PROVIDER NOTIFICATION
Writer text paged Dr. Pelayo to see if he still wanted PO Coreg to be given 12.5 mg d/t pt's diastolic BP being soft, see flowsheets.     1755: Dr. Pelayo ordered writer to go ahead and give

## 2024-03-08 NOTE — PLAN OF CARE
Resumed cares of patient at 0300 from Marj BALTAZAR. Pt A&O x 4. Denies pain. Writer able to turn patient's O2 off and sats remained in the 90's. SBA to the commode, pt appears to be much stronger today comparing to yesterday when patient was extremely weak getting up to the commode. Voiding spontaneously w/ loose, soft BM at times probably d/t ABX. IVF infusing at 50ml/hr. Diastolic BP remains soft for pt. LS clear. Able to make needs known. Bed in low position, alarms activated and audible.     Face to face report given with opportunity to observe patient.    Report given to Cait Cope RN   3/8/2024  07:30 AM

## 2024-03-08 NOTE — PROGRESS NOTES
[facility]    Medicine Progress Note - Hospitalist Service    Date of Admission:  3/6/2024    Assessment & Plan   Patient is a      88 year old female admitted on 3/6/2024. She presented to emergency room by [facility] because of the fever chills and generalized weakness which started day prior to admission diagnosed with UTI/cystitis and will be admitted for IV antibiotics, hydration.  She is DNR/DNI.     Hospital problems  Urinary tract infection, cystitis with microhematuria POA  Culture growing     50,000-100,000 CFU/mL Lactose fermenting gram negative bacilli Abnormal      -On rocephin  -monitor cultures     Generalized weakness  -PT ordered      Paroxysmal A-fib  -tele  -on coreg    Nonischemic cardiomyopathy  AICD with dual-chamber pacing function  -Recent check showed no issues with device     Chronic diastolic heart failure  -Looks euvolemic but some crackles in the lungs.  -Will monitor     COPD- not in exacerbation      Kyphosis  -pt     Chronic kidney disease stage IIIa  -daily labs  -iv hydration  -monitor cr     Dyslipidemia  -home crestor      Chronic normocytic anemia  -monitor hgb          Diet: Combination Diet Regular Diet Adult; 2 gm NA Diet; Thin Liquids (level 0)    DVT Prophylaxis: Enoxaparin (Lovenox) SQ  Johnson Catheter: Not present  Lines: None     Cardiac Monitoring: ACTIVE order. Indication: heart failure, AICD, paroxysmal afib  Code Status: No CPR- Do NOT Intubate      Clinically Significant Risk Factors              # Hypoalbuminemia: Lowest albumin = 3.1 g/dL at 3/7/2024  5:05 AM, will monitor as appropriate   # Thrombocytopenia: Lowest platelets = 111 in last 2 days, will monitor for bleeding   # Hypertension: Noted on problem list  # Chronic heart failure with preserved ejection fraction: heart failure noted on problem list and last echo with EF >50%           # COPD: noted on problem list  # ICD device present       Disposition Plan     pending clinical improvement           Julian Granados DO  Hospitalist Service  [facility]  Securely message with [employer] (more info)  Text page via [employer] Paging/Directory   ______________________________________________________________________    Interval History   Patient was seen this morning for medical rounds. NO chest pain or shortness of breath.     Physical Exam   Vital Signs: Temp: 98.8  F (37.1  C) Temp src: Tympanic BP: 128/52 Pulse: 76   Resp: 20 SpO2: 98 % O2 Device: Nasal cannula Oxygen Delivery: 1/2 LPM  Weight: 132 lbs 15 oz    Physical Exam  Constitutional:       Comments: Frail appearing stated age female    HENT:      Right Ear: External ear normal.      Left Ear: External ear normal.      Nose: Nose normal.      Mouth/Throat:      Pharynx: Oropharynx is clear.   Cardiovascular:      Rate and Rhythm: Normal rate.   Pulmonary:      Effort: Pulmonary effort is normal.   Musculoskeletal:         General: No swelling.   Skin:     Coloration: Skin is not jaundiced.   Neurological:      Mental Status: Mental status is at baseline.         Medical Decision Making       50 MINUTES SPENT BY ME on the date of service doing chart review, history, exam, documentation & further activities per the note.      Data         Imaging results reviewed over the past 24 hrs:   No results found for this or any previous visit (from the past 24 hour(s)).

## 2024-03-08 NOTE — PHARMACY-MEDICATION REGIMEN REVIEW
Pharmacy Antimicrobial Stewardship Assessment     Current Antimicrobial Therapy:  Anti-infectives (From now, onward)      Start     Dose/Rate Route Frequency Ordered Stop    24 1700  cefTRIAXone in d5w (ROCEPHIN) intermittent infusion 1 g         1 g  over 30 Minutes Intravenous EVERY 24 HOURS 24 1935              Indication: UTI    Days of Therapy: 3     Pertinent Labs:  Recent Labs   Lab Test 24  0505 24  1451 10/05/23  1228   WBC 7.5 9.9 4.0     Recent Labs   Lab Test 24  1451 23  0051 23  0742 23  0519 21  0441 21  2316 20  0950   LACT 1.1 1.3   < >  --    < > 3.1* 1.5   CRP  --   --   --   --   --  18.9* <2.9   PCAL 0.10  --   --  0.14*   < > 26.54*  --     < > = values in this interval not displayed.        Temperature:  Temp (24hrs), Av.9  F (37.2  C), Min:98.8  F (37.1  C), Max:99.2  F (37.3  C)      Culture Results:   30-Day Micro Results       Collected Updated Procedure Result Status      2024 1459 2024 0657 Urine Culture [[id number]]   (Abnormal)   Urine, Catheter    Preliminary result Component Value   Culture 50,000-100,000 CFU/mL Lactose fermenting gram negative bacilli  [P]                2024 1450 2024 1532 Symptomatic Influenza A/B, RSV, & SARS-CoV2 PCR (COVID-19) Nasopharyngeal [[id number]]    Swab from Nasopharyngeal    Final result Component Value   Influenza A PCR Negative   Influenza B PCR Negative   RSV PCR Negative   SARS CoV2 PCR Negative   NEGATIVE: SARS-CoV-2 (COVID-19) RNA not detected, presumed negative.                          Recommendations/Interventions:  Per nursing notes, pt remains weak/tired and requesting to stay in bed. Poor appetite. No recommendations at this time. Will continue to monitor.     Eugenia Salazar RPH  2024

## 2024-03-08 NOTE — PLAN OF CARE
Goal Outcome Evaluation:  Pt is A&O, Max T 99.2, remains on 1 LPM O2.  Gets up with Ax1 to bedside commode, steady on feet.  Has IV fluids infusing at 50 mls/hr.  Offered oral fluids at bedtime, pt declined.  Pt is pleasant, calm and cooperative.  Bed alarm placed on and call light in reach.       Face to face report given with opportunity to observe patient.    Report given to Marj Hastings RN   3/7/2024  11:18 PM

## 2024-03-09 ENCOUNTER — APPOINTMENT (OUTPATIENT)
Dept: PHYSICAL THERAPY | Facility: HOSPITAL | Age: 89
DRG: 690 | End: 2024-03-09
Payer: MEDICARE

## 2024-03-09 LAB
ANION GAP SERPL CALCULATED.3IONS-SCNC: 11 MMOL/L (ref 7–15)
BACTERIA UR CULT: ABNORMAL
BASOPHILS # BLD AUTO: 0 10E3/UL (ref 0–0.2)
BASOPHILS NFR BLD AUTO: 1 %
BUN SERPL-MCNC: 21.4 MG/DL (ref 8–23)
CALCIUM SERPL-MCNC: 8.2 MG/DL (ref 8.8–10.2)
CHLORIDE SERPL-SCNC: 108 MMOL/L (ref 98–107)
CREAT SERPL-MCNC: 0.89 MG/DL (ref 0.51–0.95)
DEPRECATED HCO3 PLAS-SCNC: 20 MMOL/L (ref 22–29)
EGFRCR SERPLBLD CKD-EPI 2021: 62 ML/MIN/1.73M2
EOSINOPHIL # BLD AUTO: 0.1 10E3/UL (ref 0–0.7)
EOSINOPHIL NFR BLD AUTO: 2 %
ERYTHROCYTE [DISTWIDTH] IN BLOOD BY AUTOMATED COUNT: 12.8 % (ref 10–15)
GLUCOSE SERPL-MCNC: 84 MG/DL (ref 70–99)
HCT VFR BLD AUTO: 28.5 % (ref 35–47)
HGB BLD-MCNC: 8.9 G/DL (ref 11.7–15.7)
IMM GRANULOCYTES # BLD: 0 10E3/UL
IMM GRANULOCYTES NFR BLD: 0 %
LYMPHOCYTES # BLD AUTO: 1.1 10E3/UL (ref 0–5.3)
LYMPHOCYTES NFR BLD AUTO: 21 %
MCH RBC QN AUTO: 30.1 PG (ref 26.5–33)
MCHC RBC AUTO-ENTMCNC: 31.2 G/DL (ref 31.5–36.5)
MCV RBC AUTO: 96 FL (ref 78–100)
MONOCYTES # BLD AUTO: 0.6 10E3/UL (ref 0–1.3)
MONOCYTES NFR BLD AUTO: 11 %
NEUTROPHILS # BLD AUTO: 3.4 10E3/UL (ref 1.6–8.3)
NEUTROPHILS NFR BLD AUTO: 65 %
NRBC # BLD AUTO: 0 10E3/UL
NRBC BLD AUTO-RTO: 0 /100
PLATELET # BLD AUTO: 120 10E3/UL (ref 150–450)
POTASSIUM SERPL-SCNC: 3.8 MMOL/L (ref 3.4–5.3)
RBC # BLD AUTO: 2.96 10E6/UL (ref 3.8–5.2)
SODIUM SERPL-SCNC: 139 MMOL/L (ref 135–145)
WBC # BLD AUTO: 5.2 10E3/UL (ref 4–11)

## 2024-03-09 PROCEDURE — 250N000013 HC RX MED GY IP 250 OP 250 PS 637: Performed by: HOSPITALIST

## 2024-03-09 PROCEDURE — 250N000011 HC RX IP 250 OP 636: Performed by: INTERNAL MEDICINE

## 2024-03-09 PROCEDURE — 99232 SBSQ HOSP IP/OBS MODERATE 35: CPT | Performed by: HOSPITALIST

## 2024-03-09 PROCEDURE — 97530 THERAPEUTIC ACTIVITIES: CPT | Mod: GP

## 2024-03-09 PROCEDURE — 120N000001 HC R&B MED SURG/OB

## 2024-03-09 PROCEDURE — 85025 COMPLETE CBC W/AUTO DIFF WBC: CPT | Performed by: HOSPITALIST

## 2024-03-09 PROCEDURE — 36415 COLL VENOUS BLD VENIPUNCTURE: CPT | Performed by: HOSPITALIST

## 2024-03-09 PROCEDURE — 80048 BASIC METABOLIC PNL TOTAL CA: CPT | Performed by: HOSPITALIST

## 2024-03-09 PROCEDURE — 250N000011 HC RX IP 250 OP 636: Performed by: HOSPITALIST

## 2024-03-09 PROCEDURE — 250N000013 HC RX MED GY IP 250 OP 250 PS 637: Performed by: INTERNAL MEDICINE

## 2024-03-09 PROCEDURE — 258N000003 HC RX IP 258 OP 636: Performed by: HOSPITALIST

## 2024-03-09 RX ADMIN — FLUTICASONE FUROATE AND VILANTEROL TRIFENATATE 1 PUFF: 200; 25 POWDER RESPIRATORY (INHALATION) at 08:38

## 2024-03-09 RX ADMIN — HYDROCODONE BITARTRATE AND ACETAMINOPHEN 2 TABLET: 5; 325 TABLET ORAL at 14:56

## 2024-03-09 RX ADMIN — SODIUM CHLORIDE: 9 INJECTION, SOLUTION INTRAVENOUS at 20:45

## 2024-03-09 RX ADMIN — HYDROCODONE BITARTRATE AND ACETAMINOPHEN 2 TABLET: 5; 325 TABLET ORAL at 20:47

## 2024-03-09 RX ADMIN — CARVEDILOL 6.25 MG: 3.12 TABLET, FILM COATED ORAL at 08:38

## 2024-03-09 RX ADMIN — CARVEDILOL 6.25 MG: 3.12 TABLET, FILM COATED ORAL at 17:03

## 2024-03-09 RX ADMIN — GABAPENTIN 400 MG: 400 CAPSULE ORAL at 14:56

## 2024-03-09 RX ADMIN — GABAPENTIN 400 MG: 400 CAPSULE ORAL at 20:45

## 2024-03-09 RX ADMIN — UMECLIDINIUM 1 PUFF: 62.5 AEROSOL, POWDER ORAL at 08:38

## 2024-03-09 RX ADMIN — ROSUVASTATIN 10 MG: 10 TABLET, FILM COATED ORAL at 08:38

## 2024-03-09 RX ADMIN — CEFTRIAXONE SODIUM 1 G: 1 INJECTION, SOLUTION INTRAVENOUS at 17:03

## 2024-03-09 RX ADMIN — ENOXAPARIN SODIUM 40 MG: 40 INJECTION SUBCUTANEOUS at 12:06

## 2024-03-09 RX ADMIN — PANTOPRAZOLE SODIUM 40 MG: 40 TABLET, DELAYED RELEASE ORAL at 06:46

## 2024-03-09 RX ADMIN — GABAPENTIN 400 MG: 400 CAPSULE ORAL at 08:38

## 2024-03-09 ASSESSMENT — ACTIVITIES OF DAILY LIVING (ADL)
ADLS_ACUITY_SCORE: 34
ADLS_ACUITY_SCORE: 29
ADLS_ACUITY_SCORE: 32
ADLS_ACUITY_SCORE: 29
ADLS_ACUITY_SCORE: 29
ADLS_ACUITY_SCORE: 32
ADLS_ACUITY_SCORE: 29
ADLS_ACUITY_SCORE: 32
ADLS_ACUITY_SCORE: 32
ADLS_ACUITY_SCORE: 34
ADLS_ACUITY_SCORE: 31
ADLS_ACUITY_SCORE: 32
ADLS_ACUITY_SCORE: 32
ADLS_ACUITY_SCORE: 34
ADLS_ACUITY_SCORE: 29
ADLS_ACUITY_SCORE: 34
ADLS_ACUITY_SCORE: 29
ADLS_ACUITY_SCORE: 32
ADLS_ACUITY_SCORE: 29
ADLS_ACUITY_SCORE: 31
ADLS_ACUITY_SCORE: 31

## 2024-03-09 NOTE — PLAN OF CARE
"/51 (BP Location: Right arm, Cuff Size: Adult Regular)   Pulse 79   Temp 97.3  F (36.3  C) (Tympanic)   Resp 18   Ht 1.626 m (5' 4\")   Wt 61.7 kg (136 lb 0.4 oz)   SpO2 92%   BMI 23.35 kg/m          A&O x4. Denies pain & afebrile. NS @ 100 ml/hr, voiding spontaneously. Reports some generalized weakness while transferring, improvement w use of walker. Scattered bruising present. Call light in reach, alarms activated and room near unit      Face to face report given with opportunity to observe patient.    Report given to DELANEY Long RN   3/9/2024  7:20 AM            "

## 2024-03-09 NOTE — PROGRESS NOTES
[facility]    Medicine Progress Note - Hospitalist Service    Date of Admission:  3/6/2024    Assessment & Plan   Patient is a      88 year old female admitted on 3/6/2024. She presented to emergency room by [facility] because of the fever chills and generalized weakness which started day prior to admission diagnosed with UTI/cystitis and will be admitted for IV antibiotics, hydration. She is DNR/DNI.     Hospital problems  Urinary tract infection, cystitis with microhematuria POA  Culture growing     50,000-100,000 CFU/mL Escherichia coli Abnormal            Susceptibility     Escherichia coli     MIKAELA     Ampicillin 4 Susceptible     Ampicillin/ Sulbactam <=2 Susceptible     Cefazolin <=4 Susceptible     Cefepime <=1 Susceptible     Ceftazidime <=1 Susceptible     Ceftriaxone <=1 Susceptible     Ciprofloxacin <=0.25 Susceptible     Ertapenem <=0.5 Susceptible     Gentamicin <=1 Susceptible     Imipenem <=0.25 Susceptible     Levofloxacin <=0.12 Susceptible     Nitrofurantoin <=16 Susceptible     Piperacillin/Tazobactam <=4 Susceptible     Tobramycin <=1 Susceptible     Trimethoprim/Sulfamethoxazole <=1/19 Susceptible            -On rocephin  -monitor cultures     Dizziness upon standing  Will get orthostatics on her  -iv fluids    Generalized weakness  -PT ordered      Paroxysmal A-fib  -tele  -on coreg    Nonischemic cardiomyopathy  AICD with dual-chamber pacing function  -Recent check showed no issues with device     Chronic diastolic heart failure  -Looks euvolemic but some crackles in the lungs.  -Will monitor     COPD- not in exacerbation      Kyphosis  -pt     Chronic kidney disease stage IIIa  -daily labs  -iv hydration  -monitor cr     Dyslipidemia  -home crestor      Chronic normocytic anemia  -monitor hgb          Diet: Combination Diet Regular Diet Adult; 2 gm NA Diet; Thin Liquids (level 0)    DVT Prophylaxis: Enoxaparin (Lovenox) SQ  Johnson Catheter: Not present  Lines: None     Cardiac  Monitoring: None  Code Status: No CPR- Do NOT Intubate      Clinically Significant Risk Factors              # Hypoalbuminemia: Lowest albumin = 3.1 g/dL at 3/7/2024  5:05 AM, will monitor as appropriate   # Thrombocytopenia: Lowest platelets = 110 in last 2 days, will monitor for bleeding   # Hypertension: Noted on problem list  # Chronic heart failure with preserved ejection fraction: heart failure noted on problem list and last echo with EF >50%           # COPD: noted on problem list  # ICD device present       Disposition Plan    pending clinical improvement          Julian Granados DO  Hospitalist Service  [facility]  Securely message with Voxxter (more info)  Text page via Linkpass Paging/Directory   ______________________________________________________________________    Interval History   Patient was seen this morning for medical rounds.     Physical Exam   Vital Signs: Temp: 98.4  F (36.9  C) Temp src: Tympanic BP: 130/52 Pulse: 81   Resp: 18 SpO2: 92 % O2 Device: None (Room air)    Weight: 136 lbs .38 oz    Physical Exam  Constitutional:       Comments: Frail appearing stated age female   HENT:      Right Ear: External ear normal.      Left Ear: External ear normal.      Nose: Nose normal.      Mouth/Throat:      Pharynx: Oropharynx is clear.   Cardiovascular:      Rate and Rhythm: Normal rate.   Pulmonary:      Effort: Pulmonary effort is normal.   Abdominal:      General: Abdomen is flat.   Musculoskeletal:         General: No swelling.   Skin:     Coloration: Skin is not jaundiced.   Neurological:      Mental Status: Mental status is at baseline.         Medical Decision Making       45 MINUTES SPENT BY ME on the date of service doing chart review, history, exam, documentation & further activities per the note.      Data     I have personally reviewed the following data over the past 24 hrs:    5.2  \   8.9 (L)   / 120 (L)     139 108 (H) 21.4 /  84   3.8 20 (L) 0.89 \       Imaging  results reviewed over the past 24 hrs:   No results found for this or any previous visit (from the past 24 hour(s)).

## 2024-03-09 NOTE — PHARMACY-MEDICATION REGIMEN REVIEW
Pharmacy Antimicrobial Stewardship Assessment     Current Antimicrobial Therapy:  Anti-infectives (From now, onward)      Start     Dose/Rate Route Frequency Ordered Stop    24 1700  cefTRIAXone in d5w (ROCEPHIN) intermittent infusion 1 g         1 g  over 30 Minutes Intravenous EVERY 24 HOURS 24 1935              Indication: UTI    Days of Therapy: 4     Pertinent Labs:  Recent Labs   Lab Test 24  0523 24  1027 24  0505   WBC 5.2 6.1 7.5     Recent Labs   Lab Test 24  1451   LACT 1.1   CRP  --    PCAL 0.10    < > = values in this interval not displayed.        Temperature:  Temp (24hrs), Av  F (36.7  C), Min:97.3  F (36.3  C), Max:98.6  F (37  C)      Culture Results:         Recent Antibiotics:  none    Recommendations/Interventions:  1. none    Gilda King [facility]  2024

## 2024-03-09 NOTE — PROGRESS NOTES
03/09/24 1000   Appointment Info   Signing Clinician's Name / Credentials (PT) Debora Napier DPT   Interventions   Interventions Quick Adds Therapeutic Activity   Therapeutic Activity   Therapeutic Activities: dynamic activities to improve functional performance Minutes (79290) 25   Symptoms Noted During/After Treatment None   Treatment Detail/Skilled Intervention Pt seated in recliner at start of session and agreeable to PT. Needed to use restroom. Completed sit to stand from recliner c supervision. Pt ambulated to bathroom c supervision using FWW. Completed toilet transfer c supervision using grab bars. Pt was able to complete self cares independently. Tolerated standing at sink for brushing teeth c supervision. Pt then ambulated about 150' c FWW and CGA limited by fatigue. Pt steady with walker, mildy decreased gait speed. Once in room pt transferred to recliner c supervision.   PT Discharge Planning   PT Plan Progress mobility and activity tolerance as able.   PT Discharge Recommendation (DC Rec) home with assist   PT Rationale for DC Rec Pt although endurance is reduced compared to normal, pt completed mobility without much issue today.  She lives alone, but stated one of her daughter's will be staying with her for a week.  Thus pt safe to return home and feel no need for homecare PT at this time as anticipate she will return to her PLOF as her medical issues resolve and she resumes her usual activities at home with SPV from her daughter initially.   PT Brief overview of current status Sit->stand = SBA.  Amb x 150 ft CGA/SBA with FWW; bed mob = Mari   Total Session Time   Timed Code Treatment Minutes 25   Total Session Time (sum of timed and untimed services) 25

## 2024-03-09 NOTE — PLAN OF CARE
Assumed care of pt at 1900. Pt is A&O, vs and assessments as charted. Rated pain at 8/10 to lower back. PRN norco and gabapentin given with adequate relief. Ambulated in room SBA. IV NS @ 50 mL/hr. Remains in low 90's on RA. Bed is locked and low, call light within reeach. Pt makes needs known.       Face to face report given with opportunity to observe patient.    Report given to DELANEY Rico RN   3/8/2024  11:28 PM

## 2024-03-10 VITALS
OXYGEN SATURATION: 94 % | DIASTOLIC BLOOD PRESSURE: 52 MMHG | SYSTOLIC BLOOD PRESSURE: 134 MMHG | RESPIRATION RATE: 18 BRPM | BODY MASS INDEX: 23.71 KG/M2 | HEIGHT: 64 IN | HEART RATE: 71 BPM | WEIGHT: 138.87 LBS | TEMPERATURE: 97.8 F

## 2024-03-10 LAB
ANION GAP SERPL CALCULATED.3IONS-SCNC: 8 MMOL/L (ref 7–15)
BASOPHILS # BLD AUTO: 0 10E3/UL (ref 0–0.2)
BASOPHILS NFR BLD AUTO: 1 %
BUN SERPL-MCNC: 15.2 MG/DL (ref 8–23)
CALCIUM SERPL-MCNC: 8.3 MG/DL (ref 8.8–10.2)
CHLORIDE SERPL-SCNC: 109 MMOL/L (ref 98–107)
CREAT SERPL-MCNC: 0.81 MG/DL (ref 0.51–0.95)
DEPRECATED HCO3 PLAS-SCNC: 22 MMOL/L (ref 22–29)
EGFRCR SERPLBLD CKD-EPI 2021: 70 ML/MIN/1.73M2
EOSINOPHIL # BLD AUTO: 0.1 10E3/UL (ref 0–0.7)
EOSINOPHIL NFR BLD AUTO: 2 %
ERYTHROCYTE [DISTWIDTH] IN BLOOD BY AUTOMATED COUNT: 12.6 % (ref 10–15)
GLUCOSE SERPL-MCNC: 89 MG/DL (ref 70–99)
HCT VFR BLD AUTO: 28.2 % (ref 35–47)
HGB BLD-MCNC: 9.1 G/DL (ref 11.7–15.7)
IMM GRANULOCYTES # BLD: 0 10E3/UL
IMM GRANULOCYTES NFR BLD: 0 %
LYMPHOCYTES # BLD AUTO: 1 10E3/UL (ref 0–5.3)
LYMPHOCYTES NFR BLD AUTO: 21 %
MCH RBC QN AUTO: 30.3 PG (ref 26.5–33)
MCHC RBC AUTO-ENTMCNC: 32.3 G/DL (ref 31.5–36.5)
MCV RBC AUTO: 94 FL (ref 78–100)
MONOCYTES # BLD AUTO: 0.6 10E3/UL (ref 0–1.3)
MONOCYTES NFR BLD AUTO: 12 %
NEUTROPHILS # BLD AUTO: 3 10E3/UL (ref 1.6–8.3)
NEUTROPHILS NFR BLD AUTO: 64 %
NRBC # BLD AUTO: 0 10E3/UL
NRBC BLD AUTO-RTO: 0 /100
PLATELET # BLD AUTO: 128 10E3/UL (ref 150–450)
POTASSIUM SERPL-SCNC: 4.2 MMOL/L (ref 3.4–5.3)
RBC # BLD AUTO: 3 10E6/UL (ref 3.8–5.2)
SODIUM SERPL-SCNC: 139 MMOL/L (ref 135–145)
WBC # BLD AUTO: 4.7 10E3/UL (ref 4–11)

## 2024-03-10 PROCEDURE — 99239 HOSP IP/OBS DSCHRG MGMT >30: CPT | Performed by: HOSPITALIST

## 2024-03-10 PROCEDURE — 80048 BASIC METABOLIC PNL TOTAL CA: CPT | Performed by: HOSPITALIST

## 2024-03-10 PROCEDURE — 85025 COMPLETE CBC W/AUTO DIFF WBC: CPT | Performed by: HOSPITALIST

## 2024-03-10 PROCEDURE — 36415 COLL VENOUS BLD VENIPUNCTURE: CPT | Performed by: HOSPITALIST

## 2024-03-10 PROCEDURE — 250N000013 HC RX MED GY IP 250 OP 250 PS 637: Performed by: HOSPITALIST

## 2024-03-10 PROCEDURE — 250N000013 HC RX MED GY IP 250 OP 250 PS 637: Performed by: INTERNAL MEDICINE

## 2024-03-10 RX ORDER — CEFUROXIME AXETIL 500 MG/1
500 TABLET ORAL EVERY 12 HOURS
Qty: 6 TABLET | Refills: 0 | Status: SHIPPED | OUTPATIENT
Start: 2024-03-10 | End: 2024-03-13

## 2024-03-10 RX ORDER — CEFUROXIME AXETIL 250 MG/1
500 TABLET ORAL EVERY 12 HOURS SCHEDULED
Status: DISCONTINUED | OUTPATIENT
Start: 2024-03-10 | End: 2024-03-10 | Stop reason: HOSPADM

## 2024-03-10 RX ORDER — CARVEDILOL 6.25 MG/1
6.25 TABLET ORAL 2 TIMES DAILY WITH MEALS
Qty: 60 TABLET | Refills: 0 | Status: SHIPPED | OUTPATIENT
Start: 2024-03-10 | End: 2024-03-25

## 2024-03-10 RX ADMIN — PANTOPRAZOLE SODIUM 40 MG: 40 TABLET, DELAYED RELEASE ORAL at 05:20

## 2024-03-10 RX ADMIN — ROSUVASTATIN 10 MG: 10 TABLET, FILM COATED ORAL at 09:47

## 2024-03-10 RX ADMIN — GABAPENTIN 400 MG: 400 CAPSULE ORAL at 09:45

## 2024-03-10 RX ADMIN — UMECLIDINIUM 1 PUFF: 62.5 AEROSOL, POWDER ORAL at 09:46

## 2024-03-10 RX ADMIN — CARVEDILOL 6.25 MG: 3.12 TABLET, FILM COATED ORAL at 09:45

## 2024-03-10 RX ADMIN — FLUTICASONE FUROATE AND VILANTEROL TRIFENATATE 1 PUFF: 200; 25 POWDER RESPIRATORY (INHALATION) at 09:46

## 2024-03-10 RX ADMIN — CEFUROXIME AXETIL 500 MG: 250 TABLET ORAL at 09:45

## 2024-03-10 ASSESSMENT — ACTIVITIES OF DAILY LIVING (ADL)
ADLS_ACUITY_SCORE: 32

## 2024-03-10 NOTE — PHARMACY-MEDICATION REGIMEN REVIEW
Pharmacy Antimicrobial Stewardship Assessment     Current Antimicrobial Therapy:  Anti-infectives (From now, onward)      Start     Dose/Rate Route Frequency Ordered Stop    03/10/24 0800  cefuroxime (CEFTIN) tablet 500 mg         500 mg Oral EVERY 12 HOURS SCHEDULED 03/10/24 0741         Indication: UTI    Days of Therapy: 1 (had 4 days of Rocephin)     Pertinent Labs:  Recent Labs   Lab Test 03/10/24  0512 24  0523 24  1027   WBC 4.7 5.2 6.1     Recent Labs   Lab Test 24  1451   LACT 1.1   CRP  --    PCAL 0.10    < > = values in this interval not displayed.        Temperature:  Temp (24hrs), Av.4  F (36.9  C), Min:97.8  F (36.6  C), Max:98.7  F (37.1  C)      Culture Results:         Recommendations/Interventions:  1. none    Gilda King RP  March 10, 2024

## 2024-03-10 NOTE — PLAN OF CARE
Patient discharged at 11:30 AM via wheel chair accompanied by daughter and staff. Prescriptions sent to patients preferred pharmacy. All belongings sent with patient.     Discharge instructions reviewed with Nishi Angulo . Listed belongings gathered and returned to patient. Yes    Patient discharged to home.   Report called to N/A    Surgical Patient   Surgical Procedures during stay: no  Did patient receive discharge instruction on wound care and recognition of infection symptoms? N/A    MISC  Follow up appointment made:  No. Instructions were given along with phone number to make her follow-up appointment on Monday, 3/10/2024 with her primary doctor.   Home medications returned to patient: N/A  Patient reports pain was well managed at discharge: Yes

## 2024-03-10 NOTE — PLAN OF CARE
VS as charted. Denies pain.   A&O x 4.   RA. RRR.   HRR. Permanent pacemaker.   IV: Left hand. Old drainage. Hard stick. NS @ 50 mL.   2 gm sodium diet.   Good appetite for breakfast. Fair appetite for dinner.   Pt will not ask for her Norco even though she is in pain. Please watch her facial expressions/body movement to determine when she needs her next dose. Family informed me today that she takes Norco 3x daily at home.   Antx: IV Rocephin.   Napped on & off throughout the day.   Assist x 1 w/ GB & walker. Generalized weakness. Ambulated the hallways twice today.   Call light within reach, use of call light appropriately, & makes needs known.   Face to face report given with opportunity to observe patient.    Report given to Damien SARAH RN.     Cristal Carrillo RN   3/9/2024  7:52 PM

## 2024-03-10 NOTE — PROGRESS NOTES
Documentation of Face to Face and Certification for Home Health Services    I certify that patient: Jacklyn Hein is under my care and that I, or a nurse practitioner or physician's assistant working with me, had a face-to-face encounter that meets the physician face-to-face encounter requirements with this patient on: 3/10/2024.    This encounter with the patient was in whole, or in part, for the following medical condition, which is the primary reason for home health care: weakness, hypertension, fatigue, recent UTI.    I certify that, based on my findings, the following services are medically necessary home health services: Nursing, Occupational Therapy, Physical Therapy, and skilled nursing .    My clinical findings support the need for the above services because: Nurse is needed: To provide caregiver training to assist with: patient cares.., Occupational Therapy Services are needed to assess and treat cognitive ability and address ADL safety due to impairment in ADL retraining;strengthening;home program guidelines;progressive activity/exercise., and Physical Therapy Services are needed to assess and treat the following functional impairments: gait training;home exercise program;manual therapy techniques;neuromuscular re-education;patient/family education;ROM (range of motion);stair training;strengthening;transfer training;progressive activity/exercise.    Further, I certify that my clinical findings support that this patient is homebound (i.e. absences from home require considerable and taxing effort and are for medical reasons or Confucianism services or infrequently or of short duration when for other reasons) because: Requires assistance of another person or specialized equipment to access medical services because patient: Requires supervision of another for safe transfer...    Based on the above findings. I certify that this patient is confined to the home and needs intermittent skilled nursing care,  physical therapy and/or speech therapy.  The patient is under my care, and I have initiated the establishment of the plan of care.  This patient will be followed by a physician who will periodically review the plan of care.  Physician/Provider to provide follow up care: Ilia Moran    Attending hospital physician (the Medicare certified [facility] provider): Julian Granados DO  Physician Signature: See electronic signature associated with these discharge orders.  Date: 3/10/2024

## 2024-03-10 NOTE — PLAN OF CARE
"Reason for hospital stay:  Weakness  Living situation PTA: Home Alone  Most recent vitals: /52 (BP Location: Left arm, Patient Position: Semi-Berman's, Cuff Size: Adult Regular)   Pulse 71   Temp 97.8  F (36.6  C) (Tympanic)   Resp 16   Ht 1.626 m (5' 4\")   Wt 63 kg (138 lb 13.9 oz)   SpO2 95%   BMI 23.84 kg/m        Pain Management:  Denies this shift, given PRN Norco as she takes chronically at home TID.  LOC:  A&O x 4  Cardiac:  HRR regular, BP stable  Respiratory:  Lungs clear and equal bilat. O2 94% on RA.  GI:  Normoactive BS x 4  :  Voids spontaneously w/o difficulty, up to bathroom this shift.  Skin Issues:  Some scattered bruising present.     IVF:  NS @ 50 mL/hr  ABX:  Rocephin    Nutrition: Regular diet, 2 gm NA, thin liquids level 0  Activity: A x 1 w/ gait belt and walker  Safety:  Bed in lowest position, wheels locked and alarms in place. Call light and personal items within reach and makes needs known.    Face to face report given with opportunity to observe patient.    Report given to DELANEY Rincon RN   3/10/2024  7:15 AM      "

## 2024-03-11 ENCOUNTER — PATIENT OUTREACH (OUTPATIENT)
Dept: CARE COORDINATION | Facility: OTHER | Age: 89
End: 2024-03-11

## 2024-03-11 ENCOUNTER — TELEPHONE (OUTPATIENT)
Dept: CARE COORDINATION | Facility: OTHER | Age: 89
End: 2024-03-11

## 2024-03-11 ENCOUNTER — TELEPHONE (OUTPATIENT)
Dept: FAMILY MEDICINE | Facility: OTHER | Age: 89
End: 2024-03-11

## 2024-03-11 NOTE — DISCHARGE SUMMARY
[facility]  Hospitalist Discharge Summary      Date of Admission:  3/6/2024  Date of Discharge:  3/10/2024 12:29 PM  Discharging Provider: Julian Granados DO  Discharge Service: Hospitalist Service    Discharge Diagnoses    Urinary tract infection, cystitis with microhematuria POA  Dizziness upon standing  Generalized weakness  Paroxysmal A-fib  Nonischemic cardiomyopathy  AICD with dual-chamber pacing function  Chronic diastolic heart failure  COPD- not in exacerbation   Kyphosis  Chronic kidney disease stage IIIa  Dyslipidemia  Chronic normocytic anemia      Clinically Significant Risk Factors          Follow-ups Needed After Discharge   Follow-up Appointments     Follow-up and recommended labs and tests       Follow up with primary care provider, Ilia Moran, within 7 days for   hospital follow- up.  No follow up labs or test are needed.             Unresulted Labs Ordered in the Past 30 Days of this Admission       No orders found from 2/5/2024 to 3/7/2024.        These results will be followed up by  Ilia Moran MD      Discharge Disposition   Discharged to home  Condition at discharge: Stable    Hospital Course   Patient is a   88 year old female admitted on 3/6/2024. She presented to emergency room by [facility] because of the fever chills and generalized weakness which started day prior to admission diagnosed with UTI/cystitis and will be admitted for IV antibiotics, hydration. She was on  rocephin, urine culture positive for ecoli and sensitive to cephalosporins. She was placed on Ceftin oral to complete course of antibiotics. She was was seen by pt and felt she can be discharged home with out patient follow up and assistance at home from family. Patient also had some dizziness and she was hydrated with iv fluids and  her symptoms resolved. We did decrease he coreg dose to 6.25 mg twice daily and held her lasix. She will need to see her primary care doctor out patient for  restarting lasix or changing her meds as I think her blood pressure might run  lower at home. Her appetite also improved. Discussed with patient and family regarding care and plan. She has been improving and walking 150 feet with physical therapy but  requires supervision. She does use her front wheeled walker safely. I did discuss with patient about rehab stay but likely insurance will not cover this as physical therapy yesterday did not recommend rehab stay, patient declined rehab stay. Family will stay with her for a week and she will see Dr Moran for follow up. At some point she may need assisted living but can be further discussed depending on her functional status in future. I did place referral for home health care PT OT Nursing and Home health aid.    Consultations This Hospital Stay   PHYSICAL THERAPY ADULT IP CONSULT  OCCUPATIONAL THERAPY ADULT IP CONSULT    Code Status   Prior    Time Spent on this Encounter   I, Julian Granados DO, personally saw the patient today and spent greater than 30 minutes discharging this patient.       Julian Granados DO  [facility]  [address] [place] [id number]  Phone: [phone]  Fax: [phone]  ______________________________________________________________________    Physical Exam   Vital Signs:                    Weight: 138 lbs 13.92 oz   Constitutional:       Comments: Frail appearing stated age female   HENT:      Right Ear: External ear normal.      Left Ear: External ear normal.      Nose: Nose normal.      Mouth/Throat:      Pharynx: Oropharynx is clear.   Cardiovascular:      Rate and Rhythm: Normal rate.   Pulmonary:      Effort: Pulmonary effort is normal.   Abdominal:      General: Abdomen is flat.   Musculoskeletal:         General: No swelling.   Skin:     Coloration: Skin is not jaundiced.   Neurological:      Mental Status: Mental status is at baseline.         Primary Care Physician   Ilia Moran    Discharge  Orders      Home Care Referral      Follow-up and recommended labs and tests     Follow up with primary care provider, Ilia Moran, within 7 days for hospital follow- up.  No follow up labs or test are needed.     Activity    Your activity upon discharge: activity as tolerated     Reason for your hospital stay    Patient is a   88 year old female admitted on 3/6/2024. She presented to emergency room by [facility] because of the fever chills and generalized weakness which started day prior to admission diagnosed with UTI/cystitis and will be admitted for IV antibiotics, hydration. She was on  rocephin, urine culture positive for ecoli and sensitive to cephalosporins. She was placed on Ceftin oral to complete course of antibiotics. She was was seen by pt and felt she can be discharged home with out patient follow up and assistance at home from family. Patient also had some dizziness and she was hydrated with iv fluids and  her symptoms resolved. We did decrease he coreg dose to 6.25 mg twice daily and held her lasix. She will need to see her primary care doctor out patient for restarting lasix or changing her meds as I think her blood pressure might run  lower at home. Her appetite also improved. Discussed with patient and family regarding care and plan. She has been improving and walking 150 feet with physical therapy but  requires supervision. She does use her front wheeled walker safely. I did discuss with patient about rehab stay but likely insurance will not cover this as physical therapy yesterday did not recommend rehab stay, patient declined rehab stay. Family will stay with her for a week and she will see Dr oMran for follow up. At some point she may need assisted living but can be further discussed depending on her functional status in future. I did place referral for home health care PT OT Nursing and Home health aid.     Diet    Follow this diet upon discharge: Orders Placed This Encounter       Combination Diet Regular Diet Adult; 2 gm NA Diet; Thin Liquids (level 0)       Significant Results and Procedures   Most Recent 3 CBC's:  Recent Labs   Lab Test 03/10/24  0512 03/09/24  0523 03/08/24  1027   WBC 4.7 5.2 6.1   HGB 9.1* 8.9* 9.5*   MCV 94 96 95   * 120* 110*     Most Recent 3 BMP's:  Recent Labs   Lab Test 03/10/24  0512 03/09/24  0523 03/08/24  1027    139 138   POTASSIUM 4.2 3.8 4.0   CHLORIDE 109* 108* 105   CO2 22 20* 19*   BUN 15.2 21.4 18.8   CR 0.81 0.89 0.81   ANIONGAP 8 11 14   LORRAINE 8.3* 8.2* 8.4*   GLC 89 84 85   ,   Results for orders placed or performed during the hospital encounter of 03/06/24   XR Chest Port 1 View    Narrative    Procedure:XR CHEST PORT 1 VIEW    Clinical history:Female, 88 years, fever    Technique: Single view was obtained.    Comparison: 9/26/2023    Findings: The cardiac silhouette is within normal limits.. The  pulmonary vasculature is within normal limits.    The lungs are clear. Bony structures are unremarkable.  Transvenous  pacer is again seen, similar in appearance. There is persistent  blunting of the left costophrenic angle.      Impression    Impression:   No acute abnormality.     Chronic changes are similar in appearance with hyperinflation of the  lungs and persistent blunting of the left costophrenic angle.    RADHA HAMMER MD         SYSTEM ID:  [id number]       Discharge Medications   Discharge Medication List as of 3/10/2024 11:16 AM        START taking these medications    Details   cefuroxime (CEFTIN) 500 MG tablet Take 1 tablet (500 mg) by mouth every 12 hours for 3 days, Disp-6 tablet, R-0, E-Prescribe           CONTINUE these medications which have CHANGED    Details   carvedilol (COREG) 6.25 MG tablet Take 1 tablet (6.25 mg) by mouth 2 times daily (with meals) for 30 days, Disp-60 tablet, R-0, E-Prescribe           CONTINUE these medications which have NOT CHANGED    Details   acetaminophen (TYLENOL) 650 MG CR tablet Take 650 mg  by mouth every morning, Historical      Calcium Carbonate-Vitamin D (CALCIUM 600 + D OR) Take 1 tablet by mouth every morning, Historical      diphenhydrAMINE-acetaminophen (TYLENOL PM)  MG tablet Take 2 tablets by mouth at bedtime, Historical      gabapentin (NEURONTIN) 400 MG capsule TAKE 1 CAPSULE BY MOUTH 3 TIMES DAILY FOR PAIN, Disp-90 capsule, R-11, E-Prescribe      HYDROcodone-acetaminophen (NORCO) 5-325 MG tablet Take 2 tablets by mouth 3 times daily as needed for moderate to severe pain, Disp-180 tablet, R-0, E-Prescribe      mometasone-formoterol (DULERA) 200-5 MCG/ACT inhaler Inhale 2 puffs into the lungs 2 times daily, Disp-13 g, R-11, E-PrescribeReplace of Symbicort due to insurance      Multiple Vitamin (MULTI-VITAMIN DAILY PO) Take 1 tablet by mouth every morning, Historical      rosuvastatin (CRESTOR) 10 MG tablet TAKE 1 TABLET BY MOUTH DAILY, Disp-90 tablet, R-3, E-Prescribe      umeclidinium (INCRUSE ELLIPTA) 62.5 MCG/ACT inhaler INHALE 1 PUFF INTO THE LUNGS DAILY, Disp-30 each, R-11, E-Prescribe      vitamin C (ASCORBIC ACID) 500 MG tablet Take 500 mg by mouth daily, Historical           STOP taking these medications       acetaminophen (TYLENOL) 325 MG tablet Comments:   Reason for Stopping:         furosemide (LASIX) 20 MG tablet Comments:   Reason for Stopping:         pantoprazole (PROTONIX) 40 MG EC tablet Comments:   Reason for Stopping:             Allergies   No Known Allergies

## 2024-03-11 NOTE — TELEPHONE ENCOUNTER
8:08 AM    Reason for Call: OVERBOOK/HOSPITAL FOLLOW UP     Patient is having the following symptoms: Adele was discharged from the [facility] and she needs a hospital follow up .    The patient is requesting an appointment for a hospital follow up with Dr Moran.    Was an appointment offered for this call? No  If yes : Appointment type              Date    Preferred method for responding to this message: Telephone Call  What is your phone number ? [phone]    If we cannot reach you directly, may we leave a detailed response at the number you provided? Yes    Can this message wait until your PCP/provider returns, if unavailable today? No

## 2024-03-11 NOTE — PROGRESS NOTES
Physical Therapy Discharge Summary    Reason for therapy discharge:    Discharged to home with home therapy.    Progress towards therapy goal(s). See goals on Care Plan in [facility] electronic health record for goal details.  Goals partially met.  Barriers to achieving goals:   discharge from facility.    Therapy recommendation(s):    Continued therapy is recommended.  Rationale/Recommendations:  Progress mobility to return to PLOF and improve safety.

## 2024-03-14 ENCOUNTER — TELEPHONE (OUTPATIENT)
Dept: FAMILY MEDICINE | Facility: OTHER | Age: 89
End: 2024-03-14

## 2024-03-14 ENCOUNTER — TELEPHONE (OUTPATIENT)
Dept: CARE COORDINATION | Facility: OTHER | Age: 89
End: 2024-03-14

## 2024-03-14 DIAGNOSIS — E78.00 HYPERCHOLESTEREMIA: ICD-10-CM

## 2024-03-14 DIAGNOSIS — M47.815 SPONDYLOSIS OF THORACOLUMBAR REGION WITHOUT MYELOPATHY OR RADICULOPATHY: ICD-10-CM

## 2024-03-14 DIAGNOSIS — E78.2 MIXED HYPERLIPIDEMIA: ICD-10-CM

## 2024-03-14 DIAGNOSIS — Z53.9 PERSONS ENCOUNTERING HEALTH SERVICES FOR SPECIFIC PROCEDURES, NOT CARRIED OUT: Primary | ICD-10-CM

## 2024-03-14 DIAGNOSIS — I42.8 NON-ISCHEMIC CARDIOMYOPATHY (H): ICD-10-CM

## 2024-03-14 DIAGNOSIS — I50.9 CONGESTIVE HEART FAILURE, NYHA CLASS 2, UNSPECIFIED CONGESTIVE HEART FAILURE TYPE (H): ICD-10-CM

## 2024-03-14 DIAGNOSIS — I10 ESSENTIAL HYPERTENSION: Primary | ICD-10-CM

## 2024-03-14 DIAGNOSIS — M48.062 SPINAL STENOSIS OF LUMBAR REGION WITH NEUROGENIC CLAUDICATION: ICD-10-CM

## 2024-03-14 DIAGNOSIS — G62.9 NEUROPATHY: ICD-10-CM

## 2024-03-14 PROCEDURE — G0180 MD CERTIFICATION HHA PATIENT: HCPCS | Performed by: FAMILY MEDICINE

## 2024-03-14 RX ORDER — FUROSEMIDE 20 MG
20 TABLET ORAL DAILY
Qty: 30 TABLET | Refills: 5 | Status: SHIPPED | OUTPATIENT
Start: 2024-03-14

## 2024-03-14 RX ORDER — HYDROCODONE BITARTRATE AND ACETAMINOPHEN 5; 325 MG/1; MG/1
TABLET ORAL
Qty: 180 TABLET | Refills: 0 | Status: SHIPPED | OUTPATIENT
Start: 2024-03-14 | End: 2024-03-26

## 2024-03-14 RX ORDER — ROSUVASTATIN CALCIUM 10 MG/1
TABLET, COATED ORAL
Qty: 90 TABLET | Refills: 0 | Status: SHIPPED | OUTPATIENT
Start: 2024-03-14 | End: 2024-07-08

## 2024-03-14 NOTE — TELEPHONE ENCOUNTER
FIRST --- notes or msg like this should be in telephone or some type of encounter so that it can be seen. Staff msg can not be seen in the chart    Second-- restart her lasix TODAY-- 20mg daily - take now and then every morning .    This may help both her breathing and her BP  Your note below did not give me her oxygen sats. She possibly not qualify for oxygen .  Full vitals would be helpful.     Third - see if she can come in tomorrow for evaluation - instead of 3/18 - fit her in at 1100- there will be little bit of a wait.  LAB first though                       Rosalba Fairbanks RN Versich, Mark A, MD  Good afternoon,  Messaging you regarding Adele s home care as she was admitted to home care today 3/14/24. She has verbalized a lot of concerns about her dose of Coreg, her blood pressure was 144/74 and rechecked later on during visit and it was 166/79 wondering if we can increase the dose or go back to what she was originally on. Also client verbalized that she is extremely short of breath upon waking in the morning. Wonder what your thoughts are on maybe ordering her oxygen as needed or to be used only at night.

## 2024-03-14 NOTE — TELEPHONE ENCOUNTER
LVM for Rosalba Fairbanks, RN @ [facility]  Will relay PCP's bullet points below        Called patient with updates  Scheduled tomorrow for labs & recheck  Updated to restart the Lasix   Recommended to take today's dose by 4 pm if possible  No further concerns at calls end.  Phone call ended pleasantly.

## 2024-03-14 NOTE — TELEPHONE ENCOUNTER
ROHAN Martinez @  [facility]    Calling for verbal orders to continue OT    1x week  X 4 weeks      T: [phone]  Ok for verbal orders given by Emily Alfred RN

## 2024-03-15 ENCOUNTER — LAB (OUTPATIENT)
Dept: LAB | Facility: OTHER | Age: 89
End: 2024-03-15
Payer: MEDICARE

## 2024-03-15 ENCOUNTER — OFFICE VISIT (OUTPATIENT)
Dept: FAMILY MEDICINE | Facility: OTHER | Age: 89
End: 2024-03-15
Attending: FAMILY MEDICINE
Payer: MEDICARE

## 2024-03-15 ENCOUNTER — ANCILLARY PROCEDURE (OUTPATIENT)
Dept: GENERAL RADIOLOGY | Facility: OTHER | Age: 89
End: 2024-03-15
Attending: FAMILY MEDICINE
Payer: MEDICARE

## 2024-03-15 VITALS
HEIGHT: 64 IN | WEIGHT: 135.5 LBS | SYSTOLIC BLOOD PRESSURE: 156 MMHG | HEART RATE: 67 BPM | OXYGEN SATURATION: 94 % | TEMPERATURE: 97.9 F | RESPIRATION RATE: 24 BRPM | BODY MASS INDEX: 23.13 KG/M2 | DIASTOLIC BLOOD PRESSURE: 70 MMHG

## 2024-03-15 DIAGNOSIS — Z09 HOSPITAL DISCHARGE FOLLOW-UP: ICD-10-CM

## 2024-03-15 DIAGNOSIS — Z09 HOSPITAL DISCHARGE FOLLOW-UP: Primary | ICD-10-CM

## 2024-03-15 DIAGNOSIS — I95.9 HYPOTENSION, UNSPECIFIED HYPOTENSION TYPE: ICD-10-CM

## 2024-03-15 DIAGNOSIS — I48.0 PAROXYSMAL ATRIAL FIBRILLATION (H): ICD-10-CM

## 2024-03-15 DIAGNOSIS — M62.81 GENERALIZED MUSCLE WEAKNESS: ICD-10-CM

## 2024-03-15 DIAGNOSIS — N18.32 STAGE 3B CHRONIC KIDNEY DISEASE (H): ICD-10-CM

## 2024-03-15 DIAGNOSIS — R06.02 SOB (SHORTNESS OF BREATH): ICD-10-CM

## 2024-03-15 DIAGNOSIS — R06.09 DOE (DYSPNEA ON EXERTION): ICD-10-CM

## 2024-03-15 DIAGNOSIS — Z79.01 ANTICOAGULATED BY ANTICOAGULATION TREATMENT: ICD-10-CM

## 2024-03-15 DIAGNOSIS — I42.8 NON-ISCHEMIC CARDIOMYOPATHY (H): ICD-10-CM

## 2024-03-15 DIAGNOSIS — I50.9 CONGESTIVE HEART FAILURE, NYHA CLASS 2, UNSPECIFIED CONGESTIVE HEART FAILURE TYPE (H): ICD-10-CM

## 2024-03-15 DIAGNOSIS — N30.00 ACUTE CYSTITIS WITHOUT HEMATURIA: ICD-10-CM

## 2024-03-15 LAB
ANION GAP SERPL CALCULATED.3IONS-SCNC: 9 MMOL/L (ref 7–15)
ATRIAL RATE - MUSE: 73 BPM
BASOPHILS # BLD AUTO: 0.1 10E3/UL (ref 0–0.2)
BASOPHILS NFR BLD AUTO: 1 %
BUN SERPL-MCNC: 11.5 MG/DL (ref 8–23)
CALCIUM SERPL-MCNC: 9.6 MG/DL (ref 8.8–10.2)
CHLORIDE SERPL-SCNC: 100 MMOL/L (ref 98–107)
CREAT SERPL-MCNC: 0.84 MG/DL (ref 0.51–0.95)
DEPRECATED HCO3 PLAS-SCNC: 30 MMOL/L (ref 22–29)
DIASTOLIC BLOOD PRESSURE - MUSE: NORMAL MMHG
EGFRCR SERPLBLD CKD-EPI 2021: 67 ML/MIN/1.73M2
EOSINOPHIL # BLD AUTO: 0.1 10E3/UL (ref 0–0.7)
EOSINOPHIL NFR BLD AUTO: 3 %
ERYTHROCYTE [DISTWIDTH] IN BLOOD BY AUTOMATED COUNT: 12.3 % (ref 10–15)
GLUCOSE SERPL-MCNC: 96 MG/DL (ref 70–99)
HCT VFR BLD AUTO: 32.8 % (ref 35–47)
HGB BLD-MCNC: 10.5 G/DL (ref 11.7–15.7)
IMM GRANULOCYTES # BLD: 0 10E3/UL
IMM GRANULOCYTES NFR BLD: 0 %
INTERPRETATION ECG - MUSE: NORMAL
LYMPHOCYTES # BLD AUTO: 1 10E3/UL (ref 0.8–5.3)
LYMPHOCYTES NFR BLD AUTO: 20 %
MCH RBC QN AUTO: 29.9 PG (ref 26.5–33)
MCHC RBC AUTO-ENTMCNC: 32 G/DL (ref 31.5–36.5)
MCV RBC AUTO: 93 FL (ref 78–100)
MONOCYTES # BLD AUTO: 0.6 10E3/UL (ref 0–1.3)
MONOCYTES NFR BLD AUTO: 11 %
NEUTROPHILS # BLD AUTO: 3.3 10E3/UL (ref 1.6–8.3)
NEUTROPHILS NFR BLD AUTO: 65 %
NRBC # BLD AUTO: 0 10E3/UL
NRBC BLD AUTO-RTO: 0 /100
NT-PROBNP SERPL-MCNC: 5305 PG/ML (ref 0–1800)
P AXIS - MUSE: NORMAL DEGREES
PLATELET # BLD AUTO: 208 10E3/UL (ref 150–450)
POTASSIUM SERPL-SCNC: 4.3 MMOL/L (ref 3.4–5.3)
PR INTERVAL - MUSE: 144 MS
QRS DURATION - MUSE: 152 MS
QT - MUSE: 414 MS
QTC - MUSE: 456 MS
R AXIS - MUSE: 248 DEGREES
RBC # BLD AUTO: 3.51 10E6/UL (ref 3.8–5.2)
SODIUM SERPL-SCNC: 139 MMOL/L (ref 135–145)
SYSTOLIC BLOOD PRESSURE - MUSE: NORMAL MMHG
T AXIS - MUSE: 11 DEGREES
VENTRICULAR RATE- MUSE: 73 BPM
WBC # BLD AUTO: 5.1 10E3/UL (ref 4–11)

## 2024-03-15 PROCEDURE — 85025 COMPLETE CBC W/AUTO DIFF WBC: CPT | Mod: ZL

## 2024-03-15 PROCEDURE — 93010 ELECTROCARDIOGRAM REPORT: CPT | Performed by: INTERNAL MEDICINE

## 2024-03-15 PROCEDURE — 36415 COLL VENOUS BLD VENIPUNCTURE: CPT | Mod: ZL

## 2024-03-15 PROCEDURE — 83880 ASSAY OF NATRIURETIC PEPTIDE: CPT | Mod: ZL

## 2024-03-15 PROCEDURE — 80048 BASIC METABOLIC PNL TOTAL CA: CPT | Mod: ZL

## 2024-03-15 PROCEDURE — 99496 TRANSJ CARE MGMT HIGH F2F 7D: CPT | Mod: 25 | Performed by: FAMILY MEDICINE

## 2024-03-15 PROCEDURE — 71046 X-RAY EXAM CHEST 2 VIEWS: CPT | Mod: TC

## 2024-03-15 PROCEDURE — 93005 ELECTROCARDIOGRAM TRACING: CPT | Performed by: FAMILY MEDICINE

## 2024-03-15 PROCEDURE — 99496 TRANSJ CARE MGMT HIGH F2F 7D: CPT | Performed by: FAMILY MEDICINE

## 2024-03-15 ASSESSMENT — PAIN SCALES - GENERAL: PAINLEVEL: NO PAIN (0)

## 2024-03-15 NOTE — TELEPHONE ENCOUNTER
Nurse Rosalba returned call.  Rosalba updated on provider's bullet points below and verbalized understanding.   Rosalba notified patient was updated with provider recommendation and had no further concerns.  Patient is scheduled today with PCP.

## 2024-03-15 NOTE — PROGRESS NOTES
Assessment & Plan       ICD-10-CM    1. Hospital discharge follow-up  Z09 CBC with Platelets & Differential     Basic metabolic panel     N terminal pro BNP outpatient     XR Chest 2 Views     EKG 12-lead complete w/read - Clinics     EKG 12-lead complete w/read - Clinics      2. Acute cystitis without hematuria  N30.00 CBC with Platelets & Differential     Basic metabolic panel     N terminal pro BNP outpatient     XR Chest 2 Views     EKG 12-lead complete w/read - Clinics     EKG 12-lead complete w/read - Clinics      3. GURROLA (dyspnea on exertion)  R06.09 CBC with Platelets & Differential     Basic metabolic panel     N terminal pro BNP outpatient     XR Chest 2 Views     EKG 12-lead complete w/read - Clinics     EKG 12-lead complete w/read - Clinics      4. SOB (shortness of breath)  R06.02 CBC with Platelets & Differential     Basic metabolic panel     N terminal pro BNP outpatient     XR Chest 2 Views     EKG 12-lead complete w/read - Clinics     EKG 12-lead complete w/read - Clinics      5. Congestive heart failure, NYHA class 2, unspecified congestive heart failure type (H)  I50.9 CBC with Platelets & Differential     Basic metabolic panel     N terminal pro BNP outpatient     XR Chest 2 Views     EKG 12-lead complete w/read - Clinics     EKG 12-lead complete w/read - Clinics      6. Non-ischemic cardiomyopathy (H)  I42.8 CBC with Platelets & Differential     Basic metabolic panel     N terminal pro BNP outpatient     XR Chest 2 Views     EKG 12-lead complete w/read - Clinics     EKG 12-lead complete w/read - Clinics      7. Hypotension, unspecified hypotension type  I95.9 CBC with Platelets & Differential     Basic metabolic panel     N terminal pro BNP outpatient     XR Chest 2 Views     EKG 12-lead complete w/read - Clinics     EKG 12-lead complete w/read - Clinics      8. Stage 3b chronic kidney disease  N18.32 CBC with Platelets & Differential     Basic metabolic panel     N terminal pro BNP outpatient      XR Chest 2 Views     EKG 12-lead complete w/read - Clinics     EKG 12-lead complete w/read - Clinics      9. Generalized muscle weakness  M62.81 CBC with Platelets & Differential     Basic metabolic panel     N terminal pro BNP outpatient     XR Chest 2 Views     EKG 12-lead complete w/read - Clinics     EKG 12-lead complete w/read - Clinics      10. Anticoagulated by anticoagulation treatment  Z79.01 CBC with Platelets & Differential     Basic metabolic panel     N terminal pro BNP outpatient     XR Chest 2 Views     EKG 12-lead complete w/read - Clinics     EKG 12-lead complete w/read - Clinics      11. Paroxysmal atrial fibrillation (H)  I48.0 CBC with Platelets & Differential     Basic metabolic panel     N terminal pro BNP outpatient     XR Chest 2 Views     EKG 12-lead complete w/read - Clinics     EKG 12-lead complete w/read - Clinics      Overall now in more of CHF picture with some mild COPD exacerbation with wheezing from fluid retention  EKG - paced. CXR more fluid  Started Lasix yesterday - one dose and feeling better  Pt to continue Lasix daily   Daily wts   No change in Coreg - stay at half dose  Low salt  Take Dulera BID - was taking daily   Follow-up in a week       Assessment requiring an independent historian(s) - family - 2 dtrs  Diagnosis or treatment significantly limited by social determinants of health - complex with explaining in detail   Ordering of each unique test  Prescription drug management   minutes spent by me on the date of the encounter doing chart review, history and exam, documentation and further activities per the note    MED REC REQUIRED  Post Medication Reconciliation Status: discharge medications reconciled, continue medications without change        No follow-ups on file.    Franca Angulo is a 88 year old, presenting for the following health issues:  Hospital F/U        3/15/2024    11:31 AM   Additional Questions   Roomed by Angie Stroud   Accompanied by  "Daughters         3/15/2024    11:31 AM   Patient Reported Additional Medications   Patient reports taking the following new medications None     HPI         3/11/2024    10:58 AM   Post Discharge Outreach   Admission Date 3/6/2024   Reason for Admission Non-ischemic cardiomyopathy (H)  Congestive heart failure, NYHA class 2, unspecified congestive heart failure type (H)  Chronic obstructive pulmonary disease, unspecified COPD type (H)  Paroxysmal atrial fibrillation (H)  Stage 3b chronic kidney disease  UTI (urinary tract infection)   Weakness generalized   Discharge Date 3/10/2024   Discharge Diagnosis Non-ischemic cardiomyopathy (H)  Congestive heart failure, NYHA class 2, unspecified congestive heart failure type (H)  Chronic obstructive pulmonary disease, unspecified COPD type (H)  Paroxysmal atrial fibrillation (H)  Stage 3b chronic kidney disease  UTI (urinary tract infection)   Weakness generalized   How are you doing now that you are home? Patient states feeling \"pretty good\". Still weak; using her walker   How are your symptoms? (Red Flag symptoms escalate to triage hotline per guidelines) Improved   Do you feel your condition is stable enough to be safe at home until your provider visit? Yes   Does the patient have their discharge instructions?  Yes   Does the patient have questions regarding their discharge instructions?  No   Were you started on any new medications or were there changes to any of your previous medications?  Yes   Does the patient have all of their medications? Yes   Do you have questions regarding any of your medications?  No   Do you have all of your needed medical supplies or equipment (DME)?  (i.e. oxygen tank, CPAP, cane, etc.) Yes   Discharge follow-up appointment scheduled within 14 calendar days?  Yes   Discharge Follow Up Appointment Date 3/18/2024   Discharge Follow Up Appointment Scheduled with? Primary Care Provider     Hospital Follow-up Visit:    Hospital/Nursing Home/IP " "Rehab Facility: [facility]  Date of Admission: 3/6/2024  Date of Discharge: 3/10/2024  Reason(s) for Admission: UTI; Dizziness; generalized weakness     Was your hospitalization related to COVID-19? No   Problems taking medications regularly:  None  Medication changes since discharge: updated in patient's chart Coreg dose cut in half   Problems adhering to non-medication therapy:  None      Summary of hospitalization:  [facility] discharge summary reviewed  Diagnostic Tests/Treatments reviewed.  Follow up needed: none  Other Healthcare Providers Involved in Patient s Care:         Homecare  Update since discharge: worsened.         Plan of care communicated with patient and family           Got a call on elevated BP and more GURROLA/SOB  On half dose Coreg and off lasix   Told to start lasix asap  Breathing better   New leg swelling since in hospital - has had before - worse recently   More wheezing when laying down              Review of Systems  Constitutional, neuro, ENT, endocrine, pulmonary, cardiac, gastrointestinal, genitourinary, musculoskeletal, integument and psychiatric systems are negative, except as otherwise noted.      Objective    BP (!) 156/70   Pulse 67   Temp 97.9  F (36.6  C) (Tympanic)   Resp 24   Ht 1.626 m (5' 4\")   Wt 61.5 kg (135 lb 8 oz)   SpO2 94%   BMI 23.26 kg/m    Body mass index is 23.83 kg/m .  Physical Exam   GENERAL: alert and no distress  NECK: no adenopathy, no asymmetry, masses, or scars  RESP: lungs clear to auscultation - few basilar rales, no rhonchi or wheezes  CV: regular rate and rhythm, normal S1 S2, no S3 or S4, no murmur, click or rub, no peripheral edema  ABDOMEN: soft, nontender, no hepatosplenomegaly, no masses and bowel sounds normal  MS: no gross musculoskeletal defects noted, plus 2 to mid shin edema    Results for orders placed or performed in visit on 03/15/24   XR Chest 2 Views     Status: None    Narrative    PROCEDURE:  XR CHEST " 2 VIEWS    HISTORY:  Hospital discharge follow-up; Acute cystitis without  hematuria; GURROLA (dyspnea on exertion); SOB (shortness of breath);  Congestive heart failure, NYHA class 2, unspecified congestive heart  failure type (H); Non-ischemic cardiomyopathy (H); Hypotension,  unspecified hypotension type; Stage 3b chronic kidney disease (H);  Generalized muscle weakness; Anticoagulated by anticoagulation  treatment; Paroxysm.     COMPARISON:  3/6/2024    FINDINGS:   The cardiac silhouette is normal in size. There is a transvenous  pacemaker in place The pulmonary vasculature is normal.  The lungs are  clear. There is blunting of the costophrenic angles. This is unchanged  on the left and worsened on the right as compared to 3/6/2024      Impression    IMPRESSION:  Worsening of the blunting of the right costophrenic angle  which represents a change from 3/6/2024 and most likely represents a  small right-sided pleural effusion.      MARII GARCIA MD         SYSTEM ID:  [id number]   Results for orders placed or performed in visit on 03/15/24   EKG 12-lead complete w/read - Clinics     Status: None (Preliminary result)   Result Value Ref Range    Systolic Blood Pressure  mmHg    Diastolic Blood Pressure  mmHg    Ventricular Rate 73 BPM    Atrial Rate 73 BPM    IL Interval 144 ms    QRS Duration 152 ms     ms    QTc 456 ms    P Axis  degrees    R AXIS 248 degrees    T Axis 11 degrees    Interpretation ECG       AV dual-paced rhythm with occasional ventricular-paced complexes  Abnormal ECG  When compared with ECG of 06-MAR-2024 20:00,  Vent. rate has decreased BY   6 BPM     Results for orders placed or performed in visit on 03/15/24   Basic metabolic panel     Status: Abnormal   Result Value Ref Range    Sodium 139 135 - 145 mmol/L    Potassium 4.3 3.4 - 5.3 mmol/L    Chloride 100 98 - 107 mmol/L    Carbon Dioxide (CO2) 30 (H) 22 - 29 mmol/L    Anion Gap 9 7 - 15 mmol/L    Urea Nitrogen 11.5 8.0 - 23.0 mg/dL     Creatinine 0.84 0.51 - 0.95 mg/dL    GFR Estimate 67 >60 mL/min/1.73m2    Calcium 9.6 8.8 - 10.2 mg/dL    Glucose 96 70 - 99 mg/dL   N terminal pro BNP outpatient     Status: Abnormal   Result Value Ref Range    N Terminal Pro BNP Outpatient 5,305 (H) 0 - 1,800 pg/mL   CBC with platelets and differential     Status: Abnormal   Result Value Ref Range    WBC Count 5.1 4.0 - 11.0 10e3/uL    RBC Count 3.51 (L) 3.80 - 5.20 10e6/uL    Hemoglobin 10.5 (L) 11.7 - 15.7 g/dL    Hematocrit 32.8 (L) 35.0 - 47.0 %    MCV 93 78 - 100 fL    MCH 29.9 26.5 - 33.0 pg    MCHC 32.0 31.5 - 36.5 g/dL    RDW 12.3 10.0 - 15.0 %    Platelet Count 208 150 - 450 10e3/uL    % Neutrophils 65 %    % Lymphocytes 20 %    % Monocytes 11 %    % Eosinophils 3 %    % Basophils 1 %    % Immature Granulocytes 0 %    NRBCs per 100 WBC 0 <1 /100    Absolute Neutrophils 3.3 1.6 - 8.3 10e3/uL    Absolute Lymphocytes 1.0 0.8 - 5.3 10e3/uL    Absolute Monocytes 0.6 0.0 - 1.3 10e3/uL    Absolute Eosinophils 0.1 0.0 - 0.7 10e3/uL    Absolute Basophils 0.1 0.0 - 0.2 10e3/uL    Absolute Immature Granulocytes 0.0 <=0.4 10e3/uL    Absolute NRBCs 0.0 10e3/uL   CBC with Platelets & Differential     Status: Abnormal    Narrative    The following orders were created for panel order CBC with Platelets & Differential.  Procedure                               Abnormality         Status                     ---------                               -----------         ------                     CBC with platelets and d...[[id number]]  Abnormal            Final result                 Please view results for these tests on the individual orders.             Signed Electronically by: Ilia Moran MD

## 2024-03-21 ENCOUNTER — HOSPITAL ENCOUNTER (EMERGENCY)
Facility: HOSPITAL | Age: 89
Discharge: HOME | End: 2024-03-21
Attending: STUDENT IN AN ORGANIZED HEALTH CARE EDUCATION/TRAINING PROGRAM | Admitting: STUDENT IN AN ORGANIZED HEALTH CARE EDUCATION/TRAINING PROGRAM
Payer: MEDICARE

## 2024-03-21 ENCOUNTER — TELEPHONE (OUTPATIENT)
Dept: CARE COORDINATION | Facility: OTHER | Age: 89
End: 2024-03-21

## 2024-03-21 ENCOUNTER — APPOINTMENT (OUTPATIENT)
Dept: GENERAL RADIOLOGY | Facility: HOSPITAL | Age: 89
End: 2024-03-21
Attending: STUDENT IN AN ORGANIZED HEALTH CARE EDUCATION/TRAINING PROGRAM
Payer: MEDICARE

## 2024-03-21 VITALS
RESPIRATION RATE: 20 BRPM | HEART RATE: 80 BPM | DIASTOLIC BLOOD PRESSURE: 83 MMHG | TEMPERATURE: 97.8 F | SYSTOLIC BLOOD PRESSURE: 166 MMHG | OXYGEN SATURATION: 93 %

## 2024-03-21 DIAGNOSIS — E86.0 MILD DEHYDRATION: ICD-10-CM

## 2024-03-21 DIAGNOSIS — R53.1 WEAKNESS: ICD-10-CM

## 2024-03-21 LAB
ALBUMIN SERPL BCG-MCNC: 3.9 G/DL (ref 3.5–5.2)
ALBUMIN UR-MCNC: NEGATIVE MG/DL
ALP SERPL-CCNC: 77 U/L (ref 40–150)
ALT SERPL W P-5'-P-CCNC: 18 U/L (ref 0–50)
ANION GAP SERPL CALCULATED.3IONS-SCNC: 9 MMOL/L (ref 7–15)
APPEARANCE UR: CLEAR
AST SERPL W P-5'-P-CCNC: 28 U/L (ref 0–45)
BASOPHILS # BLD AUTO: 0 10E3/UL (ref 0–0.2)
BASOPHILS NFR BLD AUTO: 1 %
BILIRUB SERPL-MCNC: 0.3 MG/DL
BILIRUB UR QL STRIP: NEGATIVE
BUN SERPL-MCNC: 23.8 MG/DL (ref 8–23)
CALCIUM SERPL-MCNC: 9.9 MG/DL (ref 8.8–10.2)
CHLORIDE SERPL-SCNC: 98 MMOL/L (ref 98–107)
COLOR UR AUTO: ABNORMAL
CREAT SERPL-MCNC: 1.05 MG/DL (ref 0.51–0.95)
DEPRECATED HCO3 PLAS-SCNC: 28 MMOL/L (ref 22–29)
EGFRCR SERPLBLD CKD-EPI 2021: 51 ML/MIN/1.73M2
EOSINOPHIL # BLD AUTO: 0.1 10E3/UL (ref 0–0.7)
EOSINOPHIL NFR BLD AUTO: 1 %
ERYTHROCYTE [DISTWIDTH] IN BLOOD BY AUTOMATED COUNT: 12.7 % (ref 10–15)
GLUCOSE SERPL-MCNC: 98 MG/DL (ref 70–99)
GLUCOSE UR STRIP-MCNC: NEGATIVE MG/DL
HCT VFR BLD AUTO: 35.5 % (ref 35–47)
HGB BLD-MCNC: 11.5 G/DL (ref 11.7–15.7)
HGB UR QL STRIP: NEGATIVE
HOLD SPECIMEN: NORMAL
HOLD SPECIMEN: NORMAL
IMM GRANULOCYTES # BLD: 0 10E3/UL
IMM GRANULOCYTES NFR BLD: 0 %
KETONES UR STRIP-MCNC: NEGATIVE MG/DL
LACTATE SERPL-SCNC: 0.9 MMOL/L (ref 0.7–2)
LEUKOCYTE ESTERASE UR QL STRIP: ABNORMAL
LIPASE SERPL-CCNC: 33 U/L (ref 13–60)
LYMPHOCYTES # BLD AUTO: 1.2 10E3/UL (ref 0.8–5.3)
LYMPHOCYTES NFR BLD AUTO: 18 %
MAGNESIUM SERPL-MCNC: 1.9 MG/DL (ref 1.7–2.3)
MCH RBC QN AUTO: 30.1 PG (ref 26.5–33)
MCHC RBC AUTO-ENTMCNC: 32.4 G/DL (ref 31.5–36.5)
MCV RBC AUTO: 93 FL (ref 78–100)
MONOCYTES # BLD AUTO: 0.8 10E3/UL (ref 0–1.3)
MONOCYTES NFR BLD AUTO: 12 %
MUCOUS THREADS #/AREA URNS LPF: PRESENT /LPF
NEUTROPHILS # BLD AUTO: 4.8 10E3/UL (ref 1.6–8.3)
NEUTROPHILS NFR BLD AUTO: 69 %
NITRATE UR QL: NEGATIVE
NRBC # BLD AUTO: 0 10E3/UL
NRBC BLD AUTO-RTO: 0 /100
NT-PROBNP SERPL-MCNC: 1754 PG/ML (ref 0–1800)
PH UR STRIP: 6.5 [PH] (ref 4.7–8)
PLATELET # BLD AUTO: 223 10E3/UL (ref 150–450)
POTASSIUM SERPL-SCNC: 4 MMOL/L (ref 3.4–5.3)
PROCALCITONIN SERPL IA-MCNC: 0.08 NG/ML
PROT SERPL-MCNC: 7.6 G/DL (ref 6.4–8.3)
RBC # BLD AUTO: 3.82 10E6/UL (ref 3.8–5.2)
RBC URINE: 1 /HPF
SODIUM SERPL-SCNC: 135 MMOL/L (ref 135–145)
SP GR UR STRIP: 1.01 (ref 1–1.03)
SQUAMOUS EPITHELIAL: 0 /HPF
UROBILINOGEN UR STRIP-MCNC: NORMAL MG/DL
WBC # BLD AUTO: 7 10E3/UL (ref 4–11)
WBC URINE: 6 /HPF

## 2024-03-21 PROCEDURE — 83690 ASSAY OF LIPASE: CPT | Performed by: STUDENT IN AN ORGANIZED HEALTH CARE EDUCATION/TRAINING PROGRAM

## 2024-03-21 PROCEDURE — 81001 URINALYSIS AUTO W/SCOPE: CPT | Performed by: STUDENT IN AN ORGANIZED HEALTH CARE EDUCATION/TRAINING PROGRAM

## 2024-03-21 PROCEDURE — 83880 ASSAY OF NATRIURETIC PEPTIDE: CPT | Performed by: STUDENT IN AN ORGANIZED HEALTH CARE EDUCATION/TRAINING PROGRAM

## 2024-03-21 PROCEDURE — 99283 EMERGENCY DEPT VISIT LOW MDM: CPT | Performed by: STUDENT IN AN ORGANIZED HEALTH CARE EDUCATION/TRAINING PROGRAM

## 2024-03-21 PROCEDURE — 85049 AUTOMATED PLATELET COUNT: CPT | Performed by: STUDENT IN AN ORGANIZED HEALTH CARE EDUCATION/TRAINING PROGRAM

## 2024-03-21 PROCEDURE — 250N000013 HC RX MED GY IP 250 OP 250 PS 637: Performed by: STUDENT IN AN ORGANIZED HEALTH CARE EDUCATION/TRAINING PROGRAM

## 2024-03-21 PROCEDURE — 258N000003 HC RX IP 258 OP 636: Performed by: STUDENT IN AN ORGANIZED HEALTH CARE EDUCATION/TRAINING PROGRAM

## 2024-03-21 PROCEDURE — 99285 EMERGENCY DEPT VISIT HI MDM: CPT | Mod: 25 | Performed by: STUDENT IN AN ORGANIZED HEALTH CARE EDUCATION/TRAINING PROGRAM

## 2024-03-21 PROCEDURE — 83605 ASSAY OF LACTIC ACID: CPT | Performed by: STUDENT IN AN ORGANIZED HEALTH CARE EDUCATION/TRAINING PROGRAM

## 2024-03-21 PROCEDURE — 93010 ELECTROCARDIOGRAM REPORT: CPT | Performed by: INTERNAL MEDICINE

## 2024-03-21 PROCEDURE — 36415 COLL VENOUS BLD VENIPUNCTURE: CPT | Performed by: STUDENT IN AN ORGANIZED HEALTH CARE EDUCATION/TRAINING PROGRAM

## 2024-03-21 PROCEDURE — 96360 HYDRATION IV INFUSION INIT: CPT | Performed by: STUDENT IN AN ORGANIZED HEALTH CARE EDUCATION/TRAINING PROGRAM

## 2024-03-21 PROCEDURE — 83735 ASSAY OF MAGNESIUM: CPT | Performed by: STUDENT IN AN ORGANIZED HEALTH CARE EDUCATION/TRAINING PROGRAM

## 2024-03-21 PROCEDURE — 80053 COMPREHEN METABOLIC PANEL: CPT | Performed by: STUDENT IN AN ORGANIZED HEALTH CARE EDUCATION/TRAINING PROGRAM

## 2024-03-21 PROCEDURE — 84145 PROCALCITONIN (PCT): CPT | Performed by: STUDENT IN AN ORGANIZED HEALTH CARE EDUCATION/TRAINING PROGRAM

## 2024-03-21 PROCEDURE — 71045 X-RAY EXAM CHEST 1 VIEW: CPT

## 2024-03-21 PROCEDURE — 93005 ELECTROCARDIOGRAM TRACING: CPT | Performed by: STUDENT IN AN ORGANIZED HEALTH CARE EDUCATION/TRAINING PROGRAM

## 2024-03-21 RX ORDER — CEPHALEXIN 500 MG/1
500 CAPSULE ORAL 3 TIMES DAILY
Qty: 21 CAPSULE | Refills: 0 | Status: SHIPPED | OUTPATIENT
Start: 2024-03-21 | End: 2024-03-28

## 2024-03-21 RX ORDER — CEPHALEXIN 500 MG/1
500 CAPSULE ORAL ONCE
Status: COMPLETED | OUTPATIENT
Start: 2024-03-21 | End: 2024-03-21

## 2024-03-21 RX ADMIN — CEPHALEXIN 500 MG: 500 CAPSULE ORAL at 18:37

## 2024-03-21 RX ADMIN — SODIUM CHLORIDE 500 ML: 9 INJECTION, SOLUTION INTRAVENOUS at 16:49

## 2024-03-21 ASSESSMENT — ACTIVITIES OF DAILY LIVING (ADL)
ADLS_ACUITY_SCORE: 38
ADLS_ACUITY_SCORE: 38

## 2024-03-21 ASSESSMENT — COLUMBIA-SUICIDE SEVERITY RATING SCALE - C-SSRS
2. HAVE YOU ACTUALLY HAD ANY THOUGHTS OF KILLING YOURSELF IN THE PAST MONTH?: NO
6. HAVE YOU EVER DONE ANYTHING, STARTED TO DO ANYTHING, OR PREPARED TO DO ANYTHING TO END YOUR LIFE?: NO
1. IN THE PAST MONTH, HAVE YOU WISHED YOU WERE DEAD OR WISHED YOU COULD GO TO SLEEP AND NOT WAKE UP?: NO

## 2024-03-21 NOTE — TELEPHONE ENCOUNTER
Rosalba, RN @ [facility]  Calling to report patient c/o difficulty breathing  Onset last night while getting ready for bed  Mildly improved this a.m.  Patient is hoarse - losing her voice  Denies dizzy, faint    O2 -   85% with exertion  93% at rest  BP - 129/67  T - 97.7 F   RR 16    No humidifier   No TEDS  No rescue inhaler / nebs    Patient has adult children staying in home with her  Recommended ED/UC today for assessment especially if decline in condition  F/up appt already set for tomorrow with PCP  Gave CT2 line for callback with any further questions    Patient relayed understanding.  No further concerns at calls end.

## 2024-03-21 NOTE — ED PROVIDER NOTES
[facility]  ED Provider Note    Chief Complaint   Patient presents with    Generalized Weakness     C/o generalized weakness and feeling tired. Notes in hospital a couple of weeks ago for a uti. Notes same symptoms. C/o pain in mid lt back.      History:  Jacklyn Hein is a 88 year old female with concern for weakness.  She has been losing weight, she feels dehydrated, she has urinary frequency but uses Lasix.  She had a recent hospitalization for UTI and feels similar to how she did at that time.  There is some right flank pain.  No chest pain or shortness of breath.  No cough or fever.  No sputum production.  Denies wounds or rashes.    Review of Systems   Performed; see HPI for pertinent positives and negatives.     Medical history, surgical history, and social history was reviewed.  Nursing documentation, triage note, and vitals were reviewed.    Vitals:  BP: 109/58  Pulse: 84  Temp: 98.5  F (36.9  C)  Resp: 16  SpO2: (!) 91 %    Physical Exam:  Constitutional: Alert and conversant. NAD   HENT: NCAT   Eyes: Normal pupils   Neck: supple   CV: No pallor  Pulmonary/Chest: Non-labored respirations clear to auscultation bilaterally  Abdominal: non-distended   MSK: MYERS.  Right CVA tenderness, suprapubic tenderness palpation negative Armstrong sign no rebound  Neuro: Alert and appropriate   Skin: Warm and dry. No diaphoresis. No rashes on exposed skin    Psych: Appropriate mood and affect       MDM:      ED Course as of 03/21/24 1822   Thu Mar 21, 2024   1722 88-year-old female with weight loss and weakness, appears clinically dry, suprapubic tenderness with urinary symptoms and right flank pain potentially suspicious for pyelonephritis.  Urine sample ordered but she missed the hat.  Will try again   1818 WBC Urine(!): 6  Some evidence of UTI.  With her clinical symptoms I think it is reasonable to treat although the degree to which the UA shows infection is fairly minimal.  The remainder of the  laboratory findings is overall reassuring.  Some evidence of dehydration on the BUN/creatinine levels consistent with the clinical picture       Procedures:  Procedures    Impression:  Final diagnoses:   Weakness   Mild dehydration           Tong Munroe MD  03/21/24 [id number]

## 2024-03-21 NOTE — DISCHARGE INSTRUCTIONS
Return to emergency room if worsening symptoms or concerning symptoms.  Skip 1 day of your diuretic, Lasix.  Follow-up tomorrow with Dr. Shah to discuss your Dehydration.  Use the antibiotics as directed

## 2024-03-22 ENCOUNTER — CARE COORDINATION (OUTPATIENT)
Dept: CASE MANAGEMENT | Facility: HOSPITAL | Age: 89
End: 2024-03-22

## 2024-03-22 ENCOUNTER — OFFICE VISIT (OUTPATIENT)
Dept: FAMILY MEDICINE | Facility: OTHER | Age: 89
End: 2024-03-22
Attending: FAMILY MEDICINE
Payer: COMMERCIAL

## 2024-03-22 VITALS
WEIGHT: 128 LBS | SYSTOLIC BLOOD PRESSURE: 116 MMHG | HEART RATE: 81 BPM | HEIGHT: 64 IN | DIASTOLIC BLOOD PRESSURE: 70 MMHG | RESPIRATION RATE: 24 BRPM | BODY MASS INDEX: 21.85 KG/M2 | TEMPERATURE: 98.9 F | OXYGEN SATURATION: 94 %

## 2024-03-22 DIAGNOSIS — R62.7 FAILURE TO THRIVE IN ADULT: Primary | ICD-10-CM

## 2024-03-22 DIAGNOSIS — I42.8 NON-ISCHEMIC CARDIOMYOPATHY (H): ICD-10-CM

## 2024-03-22 DIAGNOSIS — I50.9 CONGESTIVE HEART FAILURE, NYHA CLASS 2, UNSPECIFIED CONGESTIVE HEART FAILURE TYPE (H): ICD-10-CM

## 2024-03-22 DIAGNOSIS — J44.9 CHRONIC OBSTRUCTIVE PULMONARY DISEASE, UNSPECIFIED COPD TYPE (H): ICD-10-CM

## 2024-03-22 DIAGNOSIS — M62.81 GENERALIZED MUSCLE WEAKNESS: ICD-10-CM

## 2024-03-22 LAB
ATRIAL RATE - MUSE: 76 BPM
DIASTOLIC BLOOD PRESSURE - MUSE: NORMAL MMHG
INTERPRETATION ECG - MUSE: NORMAL
P AXIS - MUSE: 69 DEGREES
PR INTERVAL - MUSE: 186 MS
QRS DURATION - MUSE: 128 MS
QT - MUSE: 394 MS
QTC - MUSE: 443 MS
R AXIS - MUSE: -86 DEGREES
SYSTOLIC BLOOD PRESSURE - MUSE: NORMAL MMHG
T AXIS - MUSE: 48 DEGREES
VENTRICULAR RATE- MUSE: 76 BPM

## 2024-03-22 PROCEDURE — G0463 HOSPITAL OUTPT CLINIC VISIT: HCPCS | Performed by: FAMILY MEDICINE

## 2024-03-22 PROCEDURE — 99215 OFFICE O/P EST HI 40 MIN: CPT | Performed by: FAMILY MEDICINE

## 2024-03-22 ASSESSMENT — PAIN SCALES - GENERAL: PAINLEVEL: NO PAIN (0)

## 2024-03-22 NOTE — PROGRESS NOTES
Assessment & Plan     Failure to thrive in adult  Needs more asst than has at home - long discussion. Pt recognizing can not live alone on how she is doing now. I feel using all her energy on her ADL- dressing /bathing /toileting  then has to make supper /drink /eat/exercise etc . RN Jill and FARAZ Ni working on short term and long term plan.  Pt declined to got to NH for Rehab - still qualifies - will try to get admitted there and then also look at asst living or foster home after discharge.    Generalized muscle weakness  As above     Congestive heart failure, NYHA class 2, unspecified congestive heart failure type (H)  CXR better/BNP better - she feels worse . No change in meds recommended     Non-ischemic cardiomyopathy (H)  As above     Chronic obstructive pulmonary disease, unspecified COPD type (H)  As well compensated as we can get - Continue current medications and behavioral changes.     Review of external notes as documented elsewhere in note  Assessment requiring an independent historian(s) - family - dtr and son  Discussion of management or test interpretation with external physician/other qualified healthcare professional/appropriate source - CC and FARAZ  Diagnosis or treatment significantly limited by social determinants of health - complex  Prescription drug management  45 minutes spent by me on the date of the encounter doing chart review, history and exam, documentation and further activities per the note    MED REC REQUIRED  Post Medication Reconciliation Status: discharge medications reconciled, continue medications without change        No follow-ups on file.    Franca Angulo is a 88 year old, presenting for the following health issues:  ER F/U        3/22/2024     9:20 AM   Additional Questions   Roomed by Angie Stroud   Accompanied by Son and Daughter         3/22/2024     9:20 AM   Patient Reported Additional Medications   Patient reports taking the following new medications None  "    HPI     ED/UC Followup:    [place]  [facility] [place] of [place]: 3/21/2024  Reason for visit: Weakness; Mild dehydration   Current Status: patient reports still feeling very weak   Seen in ER   Gave fluids and treated for possible UTI  Still weak   Dry throat /\" hollow head\"    Last note reviewed - back to Dulera BID\  Restarted Lasix   BNP over 5K last timeand over 1 K yesterday   ER note reviewed  Struggling at home since discharge from last hospitalization  Lives alone   Family tries to help       Review of Systems  Constitutional, HEENT, cardiovascular, pulmonary, gi and gu systems are negative, except as otherwise noted.      Objective    /70 (BP Location: Right arm, Patient Position: Sitting, Cuff Size: Adult Regular)   Pulse 81   Temp 98.9  F (37.2  C) (Tympanic)   Resp 24   Ht 1.626 m (5' 4\")   Wt 58.1 kg (128 lb)   SpO2 94%   BMI 21.97 kg/m    Body mass index is 21.97 kg/m .  Physical Exam   GENERAL: alert and no distress  NECK: no adenopathy, no asymmetry, masses, or scars  RESP: lungs clear to auscultation - no rales, rhonchi or wheezes  CV: regular rate and rhythm, normal S1 S2, no S3 or S4, no murmur, click or rub, no peripheral edema  ABDOMEN: soft, nontender, no hepatosplenomegaly, no masses and bowel sounds normal  MS: no gross musculoskeletal defects noted, no edema  PSYCH: mentation appears normal, affect normal/bright    Labs reviewed form yesterday         Signed Electronically by: Ilia Moran MD    "

## 2024-03-22 NOTE — PROGRESS NOTES
FARAZ Ni had nice sit down with patient, son, & daughter.  Plan is to transfer to [facility] @ local skilled nursing facility  FARAZ taking charge of calling [facility] Rehab team for condition update & notes  Then will call facilities for placement options  No further concerns at visits end.  Visit ended pleasantly.

## 2024-03-22 NOTE — PROGRESS NOTES
Care Transitions focused note:      Accessed chart after receiving a call from the clinic for assistance with placement.    Millie Barbosa CM

## 2024-03-22 NOTE — PROGRESS NOTES
3/22/24  Care Transitions focused note:      Met with Adele, daughter, Kayleen and son, Jim during clinic visit today after receiving phone call from DELANEY Diaz in regards to questions about transitioning to a nursing home.  Adele indicated how she is continually feeling weaker.  She and family indicate that home care had started shortly after she was discharged from the hospital but therapy availability has been limited d/t staffing.  Discussed that she did have a qualifying hospital stay as long as she meet Medicare requirements for short term rehab.  Did discuss that if she would want to go to short term rehab and she doesn't meet Medicare requirements for coverage the initial out of pocket is $10,000.  Adele indicates that if that is the case she may consider remaining at home.  Kayleen and Jim indicate that family is able to continue to assist Adele at home for the foreseeable future while things are arranged.  All three verbalized understanding that it may not happen today.  Choice of vendor and [place] resource guide.  Will contact Adele/family and care team when an update available.        Pt/family was provided with the Medicare Compare list for Skilled Nursing Facilities.  Discussed associated Medicare star ratings to assist with choice for referrals/discharge planning.    Education was given to pt/family that star ratings are updated/maintained by Medicare and can be reviewed by visiting www.medicare.gov.    Patient/family choices for vendor in priority are:   First Choice : [facility]; message left and referral sent    Second Choice: [facility] [place]; message left and referral sent     Third Choice: [facility]      Call placed to Cristal at [facility] to get updated clinical information to send to nursing homes.        03/25/24  Messages out to [facility] and [facility] to follow up on referral.  1230- Received acceptance from [facility].   Adele/family and care team notified.  Adele to admit to [facility] on Wednesday, 3/27 via family transportation at 1000.    PAS-RR    Per DHS regulation, CTS team completed and submitted PAS-RR to [place] [employer] Direct Connect via the [employer]. CTS team advised SNF and they are aware a PAS-RR has been submitted.     CTS team reviewed with pt or health care agent that they may be contacted for a follow up appointment within 10 days of hospital discharge if SNF stay is less than 30 days. Contact information for [employer] was also provided.     Pt or health care agent verbalized understanding.     PAS-RR # [id number]

## 2024-03-22 NOTE — LETTER
My Heart Failure Action Plan  Name: Jacklyn Hein   YOB: 1935  Date: 3/21/2024   [facility] doctor[facility]   [facility]   [address] [place] [address]  [phone] My Diagnosis: HF-pEF (EF > 40%)  My Ejection Fraction:   Lab Results   Component Value Date    LVEF 55-60% 06/21/2023     Over 50%  My Exercise Goal: Start exercise slowly - to begin, do a few minutes of exercise, several times a day. Increase your time and speed [id number] to build tolerance, with a goal of 30 minutes of exercise daily. Steady, slow, and consistent exercise is both safe and healthy. Stop and rest when you feel tired or become short of breath. Do not push yourself on days when you don t feel well.       My Weight Plan:   Wt Readings from Last 2 Encounters:   03/15/24 61.5 kg (135 lb 8 oz)   03/10/24 63 kg (138 lb 13.9 oz)     Weigh yourself daily using the same scale. If you gain more than 2 pounds in 24 hours or 5 pounds in 7 days. call the clinic    My Diet Goal:     Emergency Room Visits:    Our goal is to improve your quality of life and help you avoid a visit to the emergency room or hospital.  If we work together, we can achieve this goal. But, if you feel you need to call 911 or go to the emergency room, please do so.  If you go to the emergency room, please bring your list of medicines and your daily weight chart with you.    Each day ask yourself:  Is my weight up?  Do I have swelling?  Do I have trouble breathing?  How did I sleep?  Other problems?       GREEN ZONE     Weight gained is no more than 2 pounds a day or 5 pounds a in 7 days.  No swelling in feet, ankles, legs or stomach.  No more swelling than usual.  No more trouble breathing than usual.  No change in my sleep.  No other problems. What should I do?  I am doing fine. I will take my medicine, follow my diet, see my doctor, exercise, and watch for symptoms.           YELLOW ZONE         Weight gain of more  than 2 pounds in one day or 5 pounds in 7 days.  New swelling in ankle, leg, knee or thigh.  Bloating in belly, pants feel tighter.  Swelling in hands or face.  Coughing or trouble breathing while walking or talking.  Harder to breathe last night.  Have trouble sleeping, wake up short of breath.  Unusually tired.  Not eating.  Nausea, vomiting, or diarrhea  Pain in my chest or bad  leg cramps.  Feel weak or dizzy. What should I do?  I need to take action and call my doctor or nurse today.                 RED ZONE         Weight gain of 5 pounds overnight.  Chest pain or pressure that does not go away.  Feel less alert.  Wheezing or have trouble breathing when at rest.  Cannot sleep lying down.  Cannot take my medicines.  Pass out or faint. What should I do?  I need to call my doctor or nurse now!  Call 911 if I have chest pain or cannot breathe.

## 2024-03-25 ENCOUNTER — TELEPHONE (OUTPATIENT)
Dept: CARE COORDINATION | Facility: OTHER | Age: 89
End: 2024-03-25

## 2024-03-25 DIAGNOSIS — M62.81 GENERALIZED MUSCLE WEAKNESS: ICD-10-CM

## 2024-03-25 DIAGNOSIS — I42.8 NON-ISCHEMIC CARDIOMYOPATHY (H): Primary | ICD-10-CM

## 2024-03-25 DIAGNOSIS — R62.7 FAILURE TO THRIVE IN ADULT: ICD-10-CM

## 2024-03-25 RX ORDER — CARVEDILOL 6.25 MG/1
6.25 TABLET ORAL 2 TIMES DAILY WITH MEALS
Qty: 90 TABLET | Refills: 3 | Status: SHIPPED | OUTPATIENT
Start: 2024-03-25 | End: 2024-08-14

## 2024-03-25 NOTE — TELEPHONE ENCOUNTER
Patient will admit to [facility] for STR.  Call from [facility] requesting orders for Therapy  Pended to PCP to review          Meds:  Refill needed on carvedilol

## 2024-03-26 DIAGNOSIS — M48.062 SPINAL STENOSIS OF LUMBAR REGION WITH NEUROGENIC CLAUDICATION: ICD-10-CM

## 2024-03-26 DIAGNOSIS — M47.815 SPONDYLOSIS OF THORACOLUMBAR REGION WITHOUT MYELOPATHY OR RADICULOPATHY: ICD-10-CM

## 2024-03-26 DIAGNOSIS — G62.9 NEUROPATHY: ICD-10-CM

## 2024-03-26 RX ORDER — HYDROCODONE BITARTRATE AND ACETAMINOPHEN 5; 325 MG/1; MG/1
TABLET ORAL
Qty: 180 TABLET | Refills: 0 | Status: SHIPPED | OUTPATIENT
Start: 2024-03-26 | End: 2024-06-25

## 2024-03-28 VITALS
SYSTOLIC BLOOD PRESSURE: 125 MMHG | WEIGHT: 126.1 LBS | HEIGHT: 64 IN | OXYGEN SATURATION: 90 % | BODY MASS INDEX: 21.53 KG/M2 | HEART RATE: 60 BPM | RESPIRATION RATE: 20 BRPM | DIASTOLIC BLOOD PRESSURE: 75 MMHG | TEMPERATURE: 97 F

## 2024-04-01 ENCOUNTER — NURSING HOME VISIT (OUTPATIENT)
Dept: FAMILY MEDICINE | Facility: OTHER | Age: 89
End: 2024-04-01
Attending: FAMILY MEDICINE
Payer: COMMERCIAL

## 2024-04-01 DIAGNOSIS — N18.32 STAGE 3B CHRONIC KIDNEY DISEASE (H): ICD-10-CM

## 2024-04-01 DIAGNOSIS — I44.7 LBBB (LEFT BUNDLE BRANCH BLOCK): ICD-10-CM

## 2024-04-01 DIAGNOSIS — I10 ESSENTIAL HYPERTENSION: ICD-10-CM

## 2024-04-01 DIAGNOSIS — Z95.810 AUTOMATIC IMPLANTABLE CARDIOVERTER-DEFIBRILLATOR IN SITU: ICD-10-CM

## 2024-04-01 DIAGNOSIS — J81.1 CHRONIC PULMONARY EDEMA: ICD-10-CM

## 2024-04-01 DIAGNOSIS — J44.9 CHRONIC OBSTRUCTIVE PULMONARY DISEASE, UNSPECIFIED COPD TYPE (H): ICD-10-CM

## 2024-04-01 DIAGNOSIS — M62.81 GENERALIZED MUSCLE WEAKNESS: ICD-10-CM

## 2024-04-01 DIAGNOSIS — E78.2 MIXED HYPERLIPIDEMIA: ICD-10-CM

## 2024-04-01 DIAGNOSIS — I50.32 CHRONIC HEART FAILURE WITH PRESERVED EJECTION FRACTION (H): ICD-10-CM

## 2024-04-01 DIAGNOSIS — I42.8 NON-ISCHEMIC CARDIOMYOPATHY (H): ICD-10-CM

## 2024-04-01 DIAGNOSIS — I50.9 CONGESTIVE HEART FAILURE, NYHA CLASS 2, UNSPECIFIED CONGESTIVE HEART FAILURE TYPE (H): ICD-10-CM

## 2024-04-01 DIAGNOSIS — M80.00XD AGE-RELATED OSTEOPOROSIS WITH CURRENT PATHOLOGICAL FRACTURE WITH ROUTINE HEALING, SUBSEQUENT ENCOUNTER: ICD-10-CM

## 2024-04-01 DIAGNOSIS — Z78.9 NURSING HOME RESIDENT: Primary | ICD-10-CM

## 2024-04-01 DIAGNOSIS — M47.815 SPONDYLOSIS OF THORACOLUMBAR REGION WITHOUT MYELOPATHY OR RADICULOPATHY: ICD-10-CM

## 2024-04-01 DIAGNOSIS — I48.0 PAROXYSMAL ATRIAL FIBRILLATION (H): ICD-10-CM

## 2024-04-01 PROCEDURE — 99310 SBSQ NF CARE HIGH MDM 45: CPT | Performed by: FAMILY MEDICINE

## 2024-04-02 ENCOUNTER — TELEPHONE (OUTPATIENT)
Dept: FAMILY MEDICINE | Facility: OTHER | Age: 89
End: 2024-04-02

## 2024-04-03 NOTE — TELEPHONE ENCOUNTER
Approved by Dr. Hull  Notification sent to [facility] [place].  Mayra Bradshaw, [facility]

## 2024-04-04 PROBLEM — Z78.9 NURSING HOME RESIDENT: Status: ACTIVE | Noted: 2024-04-04

## 2024-04-04 ASSESSMENT — ENCOUNTER SYMPTOMS
EYE PROBLEMS: 0
DIZZINESS: 0
NERVOUS/ANXIOUS: 0
SEIZURES: 0
LEG SWELLING: 0
BLOOD IN STOOL: 0
SHORTNESS OF BREATH: 0
CONFUSION: 0
PALPITATIONS: 0
DIARRHEA: 0
MYALGIAS: 0
DYSURIA: 0
BACK PAIN: 1
TROUBLE SWALLOWING: 0
HEMATURIA: 0
ADENOPATHY: 0
SLEEP DISTURBANCE: 0
EXTREMITY WEAKNESS: 0
COUGH: 0
ABDOMINAL PAIN: 0
ARTHRALGIAS: 1
FEVER: 0
CONSTIPATION: 1
VOICE CHANGE: 0
APPETITE CHANGE: 1
DEPRESSION: 0
FATIGUE: 1
SPEECH DIFFICULTY: 0
NUMBNESS: 0
FREQUENCY: 1
WOUND: 0
UNEXPECTED WEIGHT CHANGE: 0
WHEEZING: 0
LIGHT-HEADEDNESS: 1

## 2024-04-04 NOTE — PROGRESS NOTES
Nursing Home Initial Visit    HISTORY OF PRESENT ILLNESS:  Jacklyn is a 88 year old female (1935)  resident of [facility]  who is being seen today for initial Nursing Home Visit.     She has a past medical history significant for but not limited to non ischemic cardiomyopathy, chronic heart failure with preserved ejection fraction, Automatic Implantable Cardioverter Defibrillator (AICD), chronic pulmonary edema, hypertension, paroxysmal atrial fibrillation, dyslipidemia, chronic kidney disease 3b, anemia of chronic kidney disease, age related osteoporosis, as well as lumbar spinal stenosis.    She was admitted from home after being seen in the Emergency Room with generalized weakness.  She was felt to have mild dehydration and was discharged to home.  Her symptoms persisted and was subsequently admitted for short term rehabilitation.    Advanced Directives:  POLST DNR DNI    The patient notes no complaints.  Wishes to go home.    Discussed with nursing staff who note no concerns.    The patient is seen in her room accompanied by facility nurse.  Family is not present.     Medical records are reviewed.    Current medications, allergies, and interdisciplinary care plan are reviewed.      Patient Active Problem List    Diagnosis Date Noted    Generalized muscle weakness 03/22/2024     Priority: Medium    Failure to thrive in adult 03/22/2024     Priority: Medium    UTI (urinary tract infection) 03/06/2024     Priority: Medium    Weakness generalized 03/06/2024     Priority: Medium    GURROLA (dyspnea on exertion) 08/09/2023     Priority: Medium    Chronic heart failure with preserved ejection fraction (H) 08/09/2023     Priority: Medium    Chronic pulmonary edema 08/09/2023     Priority: Medium    Anemia of chronic disease 07/11/2023     Priority: Medium    Acute on chronic blood loss anemia 06/24/2023     Priority: Medium    Acute midline thoracic back pain 06/24/2023     Priority: Medium    Upper GI  bleed 06/20/2023     Priority: Medium    Age-related osteoporosis with current pathological fracture with routine healing, subsequent encounter 11/22/2021     Priority: Medium    Chronic, continuous use of opioids 05/17/2021     Priority: Medium    Bacteremia due to Klebsiella pneumoniae on 1/5/2021 02/22/2021     Priority: Medium    Mixed hyperlipidemia 02/21/2021     Priority: Medium    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 Torres      Paroxysmal atrial fibrillation (H) 12/04/2019     Priority: Medium    Stage 3b chronic kidney disease 12/04/2019     Priority: Medium    LBBB (left bundle branch block) 06/01/2018     Priority: Medium    History of tobacco abuse quitting on 2/1/1998 06/01/2018     Priority: Medium    Mild intermittent asthma, unspecified whether complicated 06/01/2018     Priority: Medium    Chronic obstructive pulmonary disease, unspecified COPD type (H) 06/01/2018     Priority: Medium    Lumbar spine pain 10/27/2015     Priority: Medium    Automatic implantable cardioverter-defibrillator - Medtronic multi lead ICD on 11/11/2014.  Upgrade on 1/30/2023, [facility] 11/11/2014     Priority: Medium     Implant date - 5/6/14  Problem list name updated by automated process. Provider to review      Non-ischemic cardiomyopathy (H) 12/10/2013     Priority: Medium    Congestive heart failure, NYHA class 2, unspecified congestive heart failure type (H) 12/10/2013     Priority: Medium    Insomnia 01/01/2011     Priority: Medium     Problem list name updated by automated process. Provider to review      Disorder of bone and cartilage 01/01/2011     Priority: Medium      Problem list name updated by automated process. Provider to review      Essential hypertension 2011     Priority: Medium     Problem list name updated by automated process. Provider to review      Neck pain, chronic 2011     Priority: Medium          Social History     Socioeconomic History    Marital status:      Spouse name: Not on file    Number of children: Not on file    Years of education: Not on file    Highest education level: Not on file   Occupational History    Occupation:       Employer: RETIRED   Tobacco Use    Smoking status: Former     Packs/day: 1.00     Years: 15.00     Additional pack years: 0.00     Total pack years: 15.00     Types: Cigarettes     Start date: 1983     Quit date: 1998     Years since quittin.1     Passive exposure: Never    Smokeless tobacco: Never    Tobacco comments:     Passive Exposure: No; Longest Tobacco Free: 15 years    Vaping Use    Vaping Use: Never used   Substance and Sexual Activity    Alcohol use: Not Currently     Comment: Occasionally     Drug use: No    Sexual activity: Not Currently   Other Topics Concern     Service Not Asked    Blood Transfusions Not Asked    Caffeine Concern Yes     Comment: Coffee 5 cups daily     Occupational Exposure Not Asked    Hobby Hazards Not Asked    Sleep Concern Not Asked    Stress Concern Not Asked    Weight Concern Not Asked    Special Diet Not Asked    Back Care Not Asked    Exercise Not Asked    Bike Helmet Not Asked    Seat Belt Not Asked    Self-Exams Not Asked    Parent/sibling w/ CABG, MI or angioplasty before 65F 55M? No   Social History Narrative    Not on file     Social Determinants of Health     Financial Resource Strain: Low Risk  (3/15/2024)    Financial Resource Strain     Within the past 12 months, have you or your family members you live with been unable to get utilities (heat, electricity) when it was really needed?: No   Food Insecurity: Low Risk  (3/15/2024)     Food Insecurity     Within the past 12 months, did you worry that your food would run out before you got money to buy more?: No     Within the past 12 months, did the food you bought just not last and you didn t have money to get more?: No   Transportation Needs: Low Risk  (3/15/2024)    Transportation Needs     Within the past 12 months, has lack of transportation kept you from medical appointments, getting your medicines, non-medical meetings or appointments, work, or from getting things that you need?: No   Physical Activity: Not on file   Stress: Not on file   Social Connections: Not on file   Interpersonal Safety: Not on file   Housing Stability: Low Risk  (3/15/2024)    Housing Stability     Do you have housing? : Yes     Are you worried about losing your housing?: No        Current Outpatient Medications   Medication Sig Dispense Refill    Calcium Carbonate-Vitamin D (CALCIUM 600 + D OR) Take 1 tablet by mouth every morning      carvedilol (COREG) 6.25 MG tablet Take 1 tablet (6.25 mg) by mouth 2 times daily (with meals) 90 tablet 3    diphenhydrAMINE-acetaminophen (TYLENOL PM)  MG tablet Take 2 tablets by mouth at bedtime      furosemide (LASIX) 20 MG tablet Take 1 tablet (20 mg) by mouth daily 30 tablet 5    gabapentin (NEURONTIN) 400 MG capsule TAKE 1 CAPSULE BY MOUTH 3 TIMES DAILY FOR PAIN 90 capsule 11    HYDROcodone-acetaminophen (NORCO) 5-325 MG tablet Take one tablet by mouth , up to 3 times daily as needed for moderate to severe pain 180 tablet 0    mometasone-formoterol (DULERA) 200-5 MCG/ACT inhaler Inhale 2 puffs into the lungs 2 times daily (Patient taking differently: Inhale 2 puffs into the lungs every morning) 13 g 11    Multiple Vitamin (MULTI-VITAMIN DAILY PO) Take 1 tablet by mouth every morning      rosuvastatin (CRESTOR) 10 MG tablet TAKE 1 TABLET BY MOUTH DAILY 90 tablet 0    umeclidinium (INCRUSE ELLIPTA) 62.5 MCG/ACT inhaler INHALE 1 PUFF INTO THE LUNGS DAILY 30 each 11     "vitamin C (ASCORBIC ACID) 500 MG tablet Take 500 mg by mouth daily       No current facility-administered medications for this visit.       No Known Allergies    I have reviewed the MDS and I have reviewed the care plan and do agree with the plan.      ROS:  Review of Systems   Constitutional:  Positive for appetite change and fatigue. Negative for fever and unexpected weight change.   HENT:   Negative for hearing loss, trouble swallowing and voice change.    Eyes:  Negative for eye problems.   Respiratory:  Negative for cough, shortness of breath and wheezing.    Cardiovascular:  Negative for chest pain, leg swelling and palpitations.   Gastrointestinal:  Positive for constipation. Negative for abdominal pain, blood in stool and diarrhea.   Genitourinary:  Positive for frequency. Negative for bladder incontinence, dysuria and hematuria.    Musculoskeletal:  Positive for arthralgias and back pain. Negative for gait problem and myalgias.   Skin:  Negative for itching, rash and wound.   Neurological:  Positive for light-headedness. Negative for dizziness, extremity weakness, gait problem, numbness, seizures and speech difficulty.   Hematological:  Negative for adenopathy.   Psychiatric/Behavioral:  Negative for confusion, depression and sleep disturbance. The patient is not nervous/anxious.    All other systems reviewed and are negative.        OBJECTIVE:  /75   Pulse 60   Temp 97  F (36.1  C)   Resp 20   Ht 1.626 m (5' 4\")   Wt 57.2 kg (126 lb 1.6 oz)   SpO2 90%   BMI 21.65 kg/m      Physical Exam  Vitals and nursing note reviewed.   Constitutional:       General: She is not in acute distress.     Appearance: She is well-developed.   HENT:      Head: Normocephalic and atraumatic.      Right Ear: External ear normal.      Left Ear: External ear normal.      Nose: Nose normal.      Mouth/Throat:      Mouth: Mucous membranes are moist.   Eyes:      Conjunctiva/sclera: Conjunctivae normal.      Pupils: " Pupils are equal, round, and reactive to light.   Cardiovascular:      Rate and Rhythm: Normal rate and regular rhythm.      Heart sounds: Normal heart sounds. No murmur heard.     No friction rub. No gallop.   Pulmonary:      Effort: Pulmonary effort is normal.      Breath sounds: Normal breath sounds.   Musculoskeletal:      Right lower leg: Edema present.      Left lower leg: Edema present.   Skin:     General: Skin is warm and dry.   Neurological:      Mental Status: She is alert and oriented to person, place, and time. Mental status is at baseline.       Lab/Diagnostic data:    Reviewed    ASSESSMENT/ORDERS:  Nursing Home Visit  Discussed with staff. Care plan reviewed and orders updated.  She will be admitted short term rehabilitation.Chronic issues are stable. Routine 30 day Nursing Home follow up. Medicare Face to Face completed.    Documentation of Face to Face and Certification for Home Health Services    I certify that patient: Jacklyn Hein is under my care and that I, or a nurse practitioner or physician's assistant working with me, had a face-to-face encounter that meets the physician face-to-face encounter requirements with this patient on: 4/1/2024.    This encounter with the patient was in whole, or in part, for the following medical condition, which is the primary reason for home health care: generalized muscle weakness.    I certify that, based on my findings, the following services are medically necessary home health services: Occupational Therapy and Physical Therapy.    My clinical findings support the need for the above services because: Occupational Therapy Services are needed to assess and treat cognitive ability and address ADL safety due to impairment in ambulation and transfers. and Physical Therapy Services are needed to assess and treat the following functional impairments: ambulation and transfers.    Further, I certify that my clinical findings support that this patient is  homebound (i.e. absences from home require considerable and taxing effort and are for medical reasons or Alevism services or infrequently or of short duration when for other reasons) because: Requires assistance of another person or specialized equipment to access medical services because patient: Requires supervision of another for safe transfer...    Based on the above findings. I certify that this patient is confined to the home and needs intermittent skilled nursing care, physical therapy and/or speech therapy.  The patient is under my care, and I have initiated the establishment of the plan of care.  This patient will be followed by a physician who will periodically review the plan of care.   Physician/Provider to provide follow up care: Ilia Moran    Attending hospital physician (the Medicare certified [facility] provider): Keo Hull MD  Physician Signature: See electronic signature associated with these discharge orders.  Date: 4/4/2024      Generalized muscle weakness  Physical Therapy and Occupational Therapy to see    Chronic heart failure with preserved ejection fraction (H)  Most recent echocardiogram,EKG, and electrolytes, and renal function reviewed. Monitor for signs and symptoms of exacerbation.     Congestive heart failure, NYHA class 2, unspecified congestive heart failure type (H)  As above    Non-ischemic cardiomyopathy (H)  As above    Chronic pulmonary edema  Stable. On diuretic therapy.    Paroxysmal atrial fibrillation (H)  Rate controlled     Automatic implantable cardioverter-defibrillator - Medtronic multi lead ICD on 11/11/2014.  Upgrade on 1/30/2023, [facility]  Reviewed    LBBB (left bundle branch block)  Chronic    Essential hypertension  Available blood pressure readings are reviewed.  Goals discussed. Will continue same antihypertensive regimen. Monitor electrolytes and renal function every 6 months.      Chronic obstructive pulmonary disease, unspecified COPD type (H)  Most  recent pulmonary function testing and chest xray reviewed. Continue same bronchodilator regimen. Monitor for signs of exacerbation including increased dyspnea, cough, wheezing, as well as sputum production.      Stage 3b chronic kidney disease  Most recent GFR, creatinine, and UA reviewed.  Primary cause is felt to be long standing hypertension .  Will follow every 6 months.       Mixed hyperlipidemia  Not on statin.  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      Keo Hull MD FAAFP [facility]  Geriatrics  Hospice and Palliative Care   Statement Selected

## 2024-04-19 ENCOUNTER — OFFICE VISIT (OUTPATIENT)
Dept: AUDIOLOGY | Facility: OTHER | Age: 89
End: 2024-04-19
Attending: AUDIOLOGIST
Payer: MEDICARE

## 2024-04-19 DIAGNOSIS — H90.3 SENSORINEURAL HEARING LOSS (SNHL) OF BOTH EARS: Primary | ICD-10-CM

## 2024-04-19 PROCEDURE — V5020 CONFORMITY EVALUATION: HCPCS | Performed by: AUDIOLOGIST

## 2024-04-19 PROCEDURE — V5160 DISPENSING FEE BINAURAL: HCPCS | Performed by: AUDIOLOGIST

## 2024-04-19 PROCEDURE — 92593 PR HEARING AID CHECK, BINAURAL: CPT | Performed by: AUDIOLOGIST

## 2024-04-19 PROCEDURE — V5261 HEARING AID, DIGIT, BIN, BTE: HCPCS | Mod: NU | Performed by: AUDIOLOGIST

## 2024-04-19 PROCEDURE — V5011 HEARING AID FITTING/CHECKING: HCPCS | Performed by: AUDIOLOGIST

## 2024-04-19 NOTE — PROGRESS NOTES
AUDIOLOGY REPORT    SUBJECTIVE: Jacklyn Hein, a 88 year old female, was seen in the Audiology Clinic at [facility] today for a Binaural hearing aid fitting. Previous results have revealed a bilateral  sensorineural hearing loss.     OBJECTIVE:  Prior to fitting, a hearing aid check was performed to ensure device functionality. The hearing aid conformity evaluation was completed.The hearing aids were placed and they provided a good fit.     Insertion challenging for patient due to vision and able to feel with fingers. Ear canals are small, right more than left. Recommended custom earmolds for ease of insertion, vision.  Daughter and patient wish to practice withat home. Daughter was shown how to insert. They will call to schedule ear impression appt if needed.    Real-ear-probe-microphone measurements were completed on the [employer]edo system and were a good match to NAL-NL2 target with soft sounds audible, moderate sounds comfortable, and loud sounds below discomfort. UCLs are verified through maximum power output measures and demonstrate appropriate limiting of loud inputs.     4/19/2024            Hearing Device Info   Left Ear Make: Oticon   Left Ear Model #: Real 3 miniRITE R   Left Ear Style: BTE   Left HA Warranty End Date: 3/16/2027   Left HA Serial #: [id number]   Right Ear Make: Oticon   Right Ear Model #: Real 3 miniRITE R   Right Ear Style: BTE   Right HA Warranty End Date: 3/16/2027   Right [place] Serial #: [id number]   Fitting Plan: Standard       ASSESSMENT: Hearing aid fitting completed today. Verification measures were performed. Patient and/or caregiver demonstrated ability to insert and remove hearing aids and adjust volume toggle.    Leila Reynolds M.S., [facility]  Audiologist #[id number]      HEARING AID ORIENTATION/AUDIOLOGY ASSISTANT      Ms. Hein was oriented to proper hearing aid use, care, cleaning (no water, dry brush), batteries (size Rechargeable, low-battery signal), aid  insertion/removal, user booklet, warranty information, storage cases, and other hearing aid details. The patient confirmed understanding of hearing aid use and care, and showed proper insertion of hearing aid while in the office today. Ms. Hein reported good volume and sound quality today.    Millicent Plummer  Audiology Assistant  [facility]  [phone]        PLAN: Ms. Hein will return for follow-up in 2-3 weeks for a hearing aid review appointment. Please call this clinic with questions regarding today s appointment.

## 2024-04-24 ENCOUNTER — LAB (OUTPATIENT)
Dept: LAB | Facility: OTHER | Age: 89
End: 2024-04-24
Attending: FAMILY MEDICINE
Payer: COMMERCIAL

## 2024-04-24 ENCOUNTER — OFFICE VISIT (OUTPATIENT)
Dept: FAMILY MEDICINE | Facility: OTHER | Age: 89
End: 2024-04-24
Attending: FAMILY MEDICINE
Payer: MEDICARE

## 2024-04-24 VITALS
DIASTOLIC BLOOD PRESSURE: 64 MMHG | HEART RATE: 76 BPM | OXYGEN SATURATION: 96 % | WEIGHT: 127.8 LBS | SYSTOLIC BLOOD PRESSURE: 130 MMHG | TEMPERATURE: 97.6 F | BODY MASS INDEX: 21.94 KG/M2

## 2024-04-24 DIAGNOSIS — J44.9 CHRONIC OBSTRUCTIVE PULMONARY DISEASE, UNSPECIFIED COPD TYPE (H): ICD-10-CM

## 2024-04-24 DIAGNOSIS — D63.8 ANEMIA OF CHRONIC DISEASE: ICD-10-CM

## 2024-04-24 DIAGNOSIS — I42.8 NON-ISCHEMIC CARDIOMYOPATHY (H): ICD-10-CM

## 2024-04-24 DIAGNOSIS — N18.32 STAGE 3B CHRONIC KIDNEY DISEASE (H): ICD-10-CM

## 2024-04-24 DIAGNOSIS — M62.81 GENERALIZED MUSCLE WEAKNESS: ICD-10-CM

## 2024-04-24 DIAGNOSIS — I50.32 CHRONIC HEART FAILURE WITH PRESERVED EJECTION FRACTION (H): ICD-10-CM

## 2024-04-24 DIAGNOSIS — M62.81 GENERALIZED MUSCLE WEAKNESS: Primary | ICD-10-CM

## 2024-04-24 LAB
ANION GAP SERPL CALCULATED.3IONS-SCNC: 11 MMOL/L (ref 7–15)
BASOPHILS # BLD AUTO: 0.1 10E3/UL (ref 0–0.2)
BASOPHILS NFR BLD AUTO: 1 %
BUN SERPL-MCNC: 23.8 MG/DL (ref 8–23)
CALCIUM SERPL-MCNC: 9.7 MG/DL (ref 8.8–10.2)
CHLORIDE SERPL-SCNC: 97 MMOL/L (ref 98–107)
CREAT SERPL-MCNC: 1.02 MG/DL (ref 0.51–0.95)
DEPRECATED HCO3 PLAS-SCNC: 29 MMOL/L (ref 22–29)
EGFRCR SERPLBLD CKD-EPI 2021: 53 ML/MIN/1.73M2
EOSINOPHIL # BLD AUTO: 0.1 10E3/UL (ref 0–0.7)
EOSINOPHIL NFR BLD AUTO: 1 %
ERYTHROCYTE [DISTWIDTH] IN BLOOD BY AUTOMATED COUNT: 13 % (ref 10–15)
GLUCOSE SERPL-MCNC: 77 MG/DL (ref 70–99)
HCT VFR BLD AUTO: 38.6 % (ref 35–47)
HGB BLD-MCNC: 12.4 G/DL (ref 11.7–15.7)
IMM GRANULOCYTES # BLD: 0 10E3/UL
IMM GRANULOCYTES NFR BLD: 0 %
LYMPHOCYTES # BLD AUTO: 1.4 10E3/UL (ref 0.8–5.3)
LYMPHOCYTES NFR BLD AUTO: 25 %
MCH RBC QN AUTO: 29.7 PG (ref 26.5–33)
MCHC RBC AUTO-ENTMCNC: 32.1 G/DL (ref 31.5–36.5)
MCV RBC AUTO: 93 FL (ref 78–100)
MONOCYTES # BLD AUTO: 0.5 10E3/UL (ref 0–1.3)
MONOCYTES NFR BLD AUTO: 9 %
NEUTROPHILS # BLD AUTO: 3.6 10E3/UL (ref 1.6–8.3)
NEUTROPHILS NFR BLD AUTO: 64 %
NRBC # BLD AUTO: 0 10E3/UL
NRBC BLD AUTO-RTO: 0 /100
PLATELET # BLD AUTO: 166 10E3/UL (ref 150–450)
POTASSIUM SERPL-SCNC: 4.1 MMOL/L (ref 3.4–5.3)
RBC # BLD AUTO: 4.17 10E6/UL (ref 3.8–5.2)
SODIUM SERPL-SCNC: 137 MMOL/L (ref 135–145)
WBC # BLD AUTO: 5.6 10E3/UL (ref 4–11)

## 2024-04-24 PROCEDURE — 36415 COLL VENOUS BLD VENIPUNCTURE: CPT | Mod: ZL

## 2024-04-24 PROCEDURE — G0463 HOSPITAL OUTPT CLINIC VISIT: HCPCS

## 2024-04-24 PROCEDURE — 80048 BASIC METABOLIC PNL TOTAL CA: CPT | Mod: ZL

## 2024-04-24 PROCEDURE — 99214 OFFICE O/P EST MOD 30 MIN: CPT | Performed by: FAMILY MEDICINE

## 2024-04-24 PROCEDURE — 85025 COMPLETE CBC W/AUTO DIFF WBC: CPT | Mod: ZL

## 2024-04-24 RX ORDER — CARVEDILOL 12.5 MG/1
TABLET ORAL
COMMUNITY
Start: 2024-04-12 | End: 2024-08-14 | Stop reason: ALTCHOICE

## 2024-04-24 ASSESSMENT — PAIN SCALES - GENERAL: PAINLEVEL: NO PAIN (0)

## 2024-04-24 NOTE — PROGRESS NOTES
Assessment & Plan       ICD-10-CM    1. Generalized muscle weakness  M62.81 CBC with Platelets & Differential     Basic metabolic panel      2. Non-ischemic cardiomyopathy (H)  I42.8 CBC with Platelets & Differential     Basic metabolic panel      3. Chronic obstructive pulmonary disease, unspecified COPD type (H)  J44.9 CBC with Platelets & Differential     Basic metabolic panel      4. Chronic heart failure with preserved ejection fraction (H)  I50.32 CBC with Platelets & Differential     Basic metabolic panel      5. Stage 3b chronic kidney disease  N18.32 CBC with Platelets & Differential     Basic metabolic panel      6. Anemia of chronic disease  D63.8 CBC with Platelets & Differential     Basic metabolic panel      Doing great after [facility] stay .   Getting Homecare and PT at home  Dtr happy of progress  Wants to stay in home at least til fall  Discussed looking at [facility] - [facility] - she will look  No change in meds.   Continue current medications and behavioral changes.   Follow-up in 2 months                     No follow-ups on file.    Franca Angulo is a 88 year old, presenting for the following health issues:  Heart Failure and COPD        4/24/2024     2:05 PM   Additional Questions   Roomed by Ravin Castillo   Accompanied by daughter         4/24/2024     2:05 PM   Patient Reported Additional Medications   Patient reports taking the following new medications none     HPI     Heart Failure Follow-up Are you experiencing any shortness of breath? No  Are you experiencing any swelling in your legs or feet?  No  Are you using more pillows than usual? No  Do you cough at night?  No  Do you check your weight daily?  Yes  Have you had a weight change recently?  No  Are you having any of the following side effects from your medications? (Select all that apply)  The patient does not report symptoms of dizziness, fatigue, cough, swelling, or slow heart beat.  Since your last visit, how many times have  "you gone to the cardiologist, urgent care, emergency room, or hospital because of your heart failure?   None    Last Echo:   Echo result w/o MOPS: Interpretation SummaryGlobal and regional left ventricular function is normal with an EF of 55-60%.Grade I or early diastolic dysfunction.Global right ventricular function is normal.Mild to moderate left atrial enlargement is present.Mild mitral annular calcification is present.Moderate mitral insufficiency is present.No aortic stenosis is present.Mild aortic insufficiency is present.Mild tricuspid insufficiency is present.Right ventricular systolic pressure is 25mmHg above the right atrial pressure.This study was compared with the study from 8/24/21 .No significant changes noted.      COPD Follow-Up  Overall, how are your COPD symptoms since your last clinic visit?  No change or better   How much fatigue or shortness of breath do you have when you are walking?  None  How much shortness of breath do you have when you are resting?  None  How often do you cough? Never  Have you noticed any change in your sputum/phlegm?  No  Have you experienced a recent fever? No  Please describe how far you can walk without stopping to rest:  Less than 1 block  How many flights of stairs are you able to walk up without stopping?  1  Have you had any Emergency Room Visits, Urgent Care Visits, or Hospital Admissions because of your COPD since your last office visit?  No      Was in [facility] for [facility] for 10 days   Has home PT since then and another 3 weeks       History   Smoking Status    Former    Packs/day: 1.00    Years: 15.00    Types: Cigarettes    Start date: 1/1/1983    Quit date: 2/1/1998   Smokeless Tobacco    Never     No results found for: \"FEV1\", \"IVV5PQY\"          Review of Systems  Constitutional, HEENT, cardiovascular, pulmonary, gi and gu systems are negative, except as otherwise noted.      Objective    /64 (BP Location: Right arm, Patient Position: Sitting, Cuff Size: " Adult Regular)   Pulse 76   Temp 97.6  F (36.4  C) (Tympanic)   Wt 58 kg (127 lb 12.8 oz)   SpO2 96%   BMI 21.94 kg/m    Body mass index is 21.94 kg/m .  Physical Exam   GENERAL: alert and no distress  NECK: no adenopathy, no asymmetry, masses, or scars  RESP: lungs clear to auscultation - no rales, rhonchi or wheezes  CV: regular rate and rhythm, normal S1 S2, no S3 or S4, no murmur, click or rub, no peripheral edema  MS: no gross musculoskeletal defects noted, no edema  PSYCH: mentation appears normal, affect normal/bright- good spirits     Results for orders placed or performed in visit on 04/24/24   Basic metabolic panel     Status: Abnormal   Result Value Ref Range    Sodium 137 135 - 145 mmol/L    Potassium 4.1 3.4 - 5.3 mmol/L    Chloride 97 (L) 98 - 107 mmol/L    Carbon Dioxide (CO2) 29 22 - 29 mmol/L    Anion Gap 11 7 - 15 mmol/L    Urea Nitrogen 23.8 (H) 8.0 - 23.0 mg/dL    Creatinine 1.02 (H) 0.51 - 0.95 mg/dL    GFR Estimate 53 (L) >60 mL/min/1.73m2    Calcium 9.7 8.8 - 10.2 mg/dL    Glucose 77 70 - 99 mg/dL   CBC with platelets and differential     Status: None   Result Value Ref Range    WBC Count 5.6 4.0 - 11.0 10e3/uL    RBC Count 4.17 3.80 - 5.20 10e6/uL    Hemoglobin 12.4 11.7 - 15.7 g/dL    Hematocrit 38.6 35.0 - 47.0 %    MCV 93 78 - 100 fL    MCH 29.7 26.5 - 33.0 pg    MCHC 32.1 31.5 - 36.5 g/dL    RDW 13.0 10.0 - 15.0 %    Platelet Count 166 150 - 450 10e3/uL    % Neutrophils 64 %    % Lymphocytes 25 %    % Monocytes 9 %    % Eosinophils 1 %    % Basophils 1 %    % Immature Granulocytes 0 %    NRBCs per 100 WBC 0 <1 /100    Absolute Neutrophils 3.6 1.6 - 8.3 10e3/uL    Absolute Lymphocytes 1.4 0.8 - 5.3 10e3/uL    Absolute Monocytes 0.5 0.0 - 1.3 10e3/uL    Absolute Eosinophils 0.1 0.0 - 0.7 10e3/uL    Absolute Basophils 0.1 0.0 - 0.2 10e3/uL    Absolute Immature Granulocytes 0.0 <=0.4 10e3/uL    Absolute NRBCs 0.0 10e3/uL   CBC with Platelets & Differential     Status: None    Narrative     The following orders were created for panel order CBC with Platelets & Differential.  Procedure                               Abnormality         Status                     ---------                               -----------         ------                     CBC with platelets and d...[[id number]]                      Final result                 Please view results for these tests on the individual orders.             Signed Electronically by: Ilia Moran MD

## 2024-05-03 ENCOUNTER — OFFICE VISIT (OUTPATIENT)
Dept: AUDIOLOGY | Facility: OTHER | Age: 89
End: 2024-05-03
Attending: AUDIOLOGIST
Payer: MEDICARE

## 2024-05-03 DIAGNOSIS — H90.3 SENSORINEURAL HEARING LOSS (SNHL) OF BOTH EARS: Primary | ICD-10-CM

## 2024-05-03 PROCEDURE — V5299 HEARING SERVICE: HCPCS | Performed by: AUDIOLOGIST

## 2024-05-03 NOTE — PROGRESS NOTES
Patient hearing aid question accomplished by telephone.  She asked how to adjust volume.    Reviewed volume lever with patient.    She will see audiology assistant is further questions.    She denied any fitting concerns or desire for custom earmolds as previously discussed.     Leila Reynolds M.S., [facility]  Audiologist #[id number]

## 2024-05-14 ENCOUNTER — ANCILLARY PROCEDURE (OUTPATIENT)
Dept: CARDIOLOGY | Facility: CLINIC | Age: 89
End: 2024-05-14
Attending: INTERNAL MEDICINE
Payer: MEDICARE

## 2024-05-14 DIAGNOSIS — Z95.810 AUTOMATIC IMPLANTABLE CARDIOVERTER-DEFIBRILLATOR IN SITU: ICD-10-CM

## 2024-05-14 PROCEDURE — 93296 REM INTERROG EVL PM/IDS: CPT

## 2024-05-14 PROCEDURE — 93295 DEV INTERROG REMOTE 1/2/MLT: CPT | Performed by: INTERNAL MEDICINE

## 2024-05-24 LAB
MDC_IDC_LEAD_CONNECTION_STATUS: NORMAL
MDC_IDC_LEAD_IMPLANT_DT: NORMAL
MDC_IDC_LEAD_LOCATION: NORMAL
MDC_IDC_LEAD_LOCATION_DETAIL_1: NORMAL
MDC_IDC_LEAD_MFG: NORMAL
MDC_IDC_LEAD_MODEL: NORMAL
MDC_IDC_LEAD_POLARITY_TYPE: NORMAL
MDC_IDC_LEAD_SERIAL: NORMAL
MDC_IDC_MSMT_BATTERY_DTM: NORMAL
MDC_IDC_MSMT_BATTERY_REMAINING_LONGEVITY: 116 MO
MDC_IDC_MSMT_BATTERY_RRT_TRIGGER: NORMAL
MDC_IDC_MSMT_BATTERY_STATUS: NORMAL
MDC_IDC_MSMT_BATTERY_VOLTAGE: 3.01 V
MDC_IDC_MSMT_CAP_CHARGE_DTM: NORMAL
MDC_IDC_MSMT_CAP_CHARGE_ENERGY: 18 J
MDC_IDC_MSMT_CAP_CHARGE_TIME: 3.8 S
MDC_IDC_MSMT_CAP_CHARGE_TYPE: NORMAL
MDC_IDC_MSMT_LEADCHNL_LV_IMPEDANCE_VALUE: 399 OHM
MDC_IDC_MSMT_LEADCHNL_LV_IMPEDANCE_VALUE: 475 OHM
MDC_IDC_MSMT_LEADCHNL_LV_IMPEDANCE_VALUE: 836 OHM
MDC_IDC_MSMT_LEADCHNL_RA_IMPEDANCE_VALUE: 380 OHM
MDC_IDC_MSMT_LEADCHNL_RA_PACING_THRESHOLD_AMPLITUDE: 0.5 V
MDC_IDC_MSMT_LEADCHNL_RA_PACING_THRESHOLD_PULSEWIDTH: 0.4 MS
MDC_IDC_MSMT_LEADCHNL_RA_SENSING_INTR_AMPL: 1.3 MV
MDC_IDC_MSMT_LEADCHNL_RV_IMPEDANCE_VALUE: 323 OHM
MDC_IDC_MSMT_LEADCHNL_RV_IMPEDANCE_VALUE: 399 OHM
MDC_IDC_MSMT_LEADCHNL_RV_PACING_THRESHOLD_AMPLITUDE: 1.12 V
MDC_IDC_MSMT_LEADCHNL_RV_PACING_THRESHOLD_PULSEWIDTH: 0.4 MS
MDC_IDC_MSMT_LEADCHNL_RV_SENSING_INTR_AMPL: 4 MV
MDC_IDC_PG_IMPLANT_DTM: NORMAL
MDC_IDC_PG_MFG: NORMAL
MDC_IDC_PG_MODEL: NORMAL
MDC_IDC_PG_SERIAL: NORMAL
MDC_IDC_PG_TYPE: NORMAL
MDC_IDC_SESS_CLINIC_NAME: NORMAL
MDC_IDC_SESS_DTM: NORMAL
MDC_IDC_SESS_TYPE: NORMAL
MDC_IDC_SET_BRADY_AT_MODE_SWITCH_RATE: 171 {BEATS}/MIN
MDC_IDC_SET_BRADY_LOWRATE: 60 {BEATS}/MIN
MDC_IDC_SET_BRADY_MAX_SENSOR_RATE: 130 {BEATS}/MIN
MDC_IDC_SET_BRADY_MAX_TRACKING_RATE: 130 {BEATS}/MIN
MDC_IDC_SET_BRADY_MODE: NORMAL
MDC_IDC_SET_BRADY_PAV_DELAY_LOW: 170 MS
MDC_IDC_SET_BRADY_SAV_DELAY_LOW: 140 MS
MDC_IDC_SET_CRT_LVRV_DELAY: 10 MS
MDC_IDC_SET_CRT_PACED_CHAMBERS: NORMAL
MDC_IDC_SET_LEADCHNL_LV_PACING_AMPLITUDE: 1.5 V
MDC_IDC_SET_LEADCHNL_LV_PACING_ANODE_ELECTRODE_1: NORMAL
MDC_IDC_SET_LEADCHNL_LV_PACING_ANODE_LOCATION_1: NORMAL
MDC_IDC_SET_LEADCHNL_LV_PACING_CAPTURE_MODE: NORMAL
MDC_IDC_SET_LEADCHNL_LV_PACING_CATHODE_ELECTRODE_1: NORMAL
MDC_IDC_SET_LEADCHNL_LV_PACING_CATHODE_LOCATION_1: NORMAL
MDC_IDC_SET_LEADCHNL_LV_PACING_POLARITY: NORMAL
MDC_IDC_SET_LEADCHNL_LV_PACING_PULSEWIDTH: 0.4 MS
MDC_IDC_SET_LEADCHNL_RA_PACING_AMPLITUDE: 1.5 V
MDC_IDC_SET_LEADCHNL_RA_PACING_ANODE_ELECTRODE_1: NORMAL
MDC_IDC_SET_LEADCHNL_RA_PACING_ANODE_LOCATION_1: NORMAL
MDC_IDC_SET_LEADCHNL_RA_PACING_CAPTURE_MODE: NORMAL
MDC_IDC_SET_LEADCHNL_RA_PACING_CATHODE_ELECTRODE_1: NORMAL
MDC_IDC_SET_LEADCHNL_RA_PACING_CATHODE_LOCATION_1: NORMAL
MDC_IDC_SET_LEADCHNL_RA_PACING_POLARITY: NORMAL
MDC_IDC_SET_LEADCHNL_RA_PACING_PULSEWIDTH: 0.4 MS
MDC_IDC_SET_LEADCHNL_RA_SENSING_ANODE_ELECTRODE_1: NORMAL
MDC_IDC_SET_LEADCHNL_RA_SENSING_ANODE_LOCATION_1: NORMAL
MDC_IDC_SET_LEADCHNL_RA_SENSING_CATHODE_ELECTRODE_1: NORMAL
MDC_IDC_SET_LEADCHNL_RA_SENSING_CATHODE_LOCATION_1: NORMAL
MDC_IDC_SET_LEADCHNL_RA_SENSING_POLARITY: NORMAL
MDC_IDC_SET_LEADCHNL_RA_SENSING_SENSITIVITY: 0.3 MV
MDC_IDC_SET_LEADCHNL_RV_PACING_AMPLITUDE: 2 V
MDC_IDC_SET_LEADCHNL_RV_PACING_ANODE_ELECTRODE_1: NORMAL
MDC_IDC_SET_LEADCHNL_RV_PACING_ANODE_LOCATION_1: NORMAL
MDC_IDC_SET_LEADCHNL_RV_PACING_CAPTURE_MODE: NORMAL
MDC_IDC_SET_LEADCHNL_RV_PACING_CATHODE_ELECTRODE_1: NORMAL
MDC_IDC_SET_LEADCHNL_RV_PACING_CATHODE_LOCATION_1: NORMAL
MDC_IDC_SET_LEADCHNL_RV_PACING_POLARITY: NORMAL
MDC_IDC_SET_LEADCHNL_RV_PACING_PULSEWIDTH: 0.4 MS
MDC_IDC_SET_LEADCHNL_RV_SENSING_ANODE_ELECTRODE_1: NORMAL
MDC_IDC_SET_LEADCHNL_RV_SENSING_ANODE_LOCATION_1: NORMAL
MDC_IDC_SET_LEADCHNL_RV_SENSING_CATHODE_ELECTRODE_1: NORMAL
MDC_IDC_SET_LEADCHNL_RV_SENSING_CATHODE_LOCATION_1: NORMAL
MDC_IDC_SET_LEADCHNL_RV_SENSING_POLARITY: NORMAL
MDC_IDC_SET_LEADCHNL_RV_SENSING_SENSITIVITY: 0.3 MV
MDC_IDC_SET_ZONE_DETECTION_BEATS_DENOMINATOR: 16 {BEATS}
MDC_IDC_SET_ZONE_DETECTION_BEATS_DENOMINATOR: 32 {BEATS}
MDC_IDC_SET_ZONE_DETECTION_BEATS_DENOMINATOR: 40 {BEATS}
MDC_IDC_SET_ZONE_DETECTION_BEATS_NUMERATOR: 16 {BEATS}
MDC_IDC_SET_ZONE_DETECTION_BEATS_NUMERATOR: 30 {BEATS}
MDC_IDC_SET_ZONE_DETECTION_BEATS_NUMERATOR: 32 {BEATS}
MDC_IDC_SET_ZONE_DETECTION_INTERVAL: 240 MS
MDC_IDC_SET_ZONE_DETECTION_INTERVAL: 320 MS
MDC_IDC_SET_ZONE_DETECTION_INTERVAL: 350 MS
MDC_IDC_SET_ZONE_DETECTION_INTERVAL: 360 MS
MDC_IDC_SET_ZONE_DETECTION_INTERVAL: 400 MS
MDC_IDC_SET_ZONE_STATUS: NORMAL
MDC_IDC_SET_ZONE_TYPE: NORMAL
MDC_IDC_SET_ZONE_VENDOR_TYPE: NORMAL
MDC_IDC_STAT_AT_BURDEN_PERCENT: 0.1 %
MDC_IDC_STAT_AT_DTM_END: NORMAL
MDC_IDC_STAT_AT_DTM_START: NORMAL
MDC_IDC_STAT_BRADY_AP_VP_PERCENT: 7.8 %
MDC_IDC_STAT_BRADY_AP_VS_PERCENT: 0.15 %
MDC_IDC_STAT_BRADY_AS_VP_PERCENT: 90.44 %
MDC_IDC_STAT_BRADY_AS_VS_PERCENT: 1.62 %
MDC_IDC_STAT_BRADY_DTM_END: NORMAL
MDC_IDC_STAT_BRADY_DTM_START: NORMAL
MDC_IDC_STAT_BRADY_RA_PERCENT_PACED: 8.07 %
MDC_IDC_STAT_BRADY_RV_PERCENT_PACED: 2.96 %
MDC_IDC_STAT_CRT_DTM_END: NORMAL
MDC_IDC_STAT_CRT_DTM_START: NORMAL
MDC_IDC_STAT_CRT_LV_PERCENT_PACED: 98.2 %
MDC_IDC_STAT_CRT_PERCENT_PACED: 2.92 %
MDC_IDC_STAT_EPISODE_RECENT_COUNT: 0
MDC_IDC_STAT_EPISODE_RECENT_COUNT_DTM_END: NORMAL
MDC_IDC_STAT_EPISODE_RECENT_COUNT_DTM_START: NORMAL
MDC_IDC_STAT_EPISODE_TOTAL_COUNT: 0
MDC_IDC_STAT_EPISODE_TOTAL_COUNT: 2
MDC_IDC_STAT_EPISODE_TOTAL_COUNT_DTM_END: NORMAL
MDC_IDC_STAT_EPISODE_TOTAL_COUNT_DTM_START: NORMAL
MDC_IDC_STAT_EPISODE_TYPE: NORMAL
MDC_IDC_STAT_TACHYTHERAPY_ATP_DELIVERED_RECENT: 0
MDC_IDC_STAT_TACHYTHERAPY_ATP_DELIVERED_TOTAL: 0
MDC_IDC_STAT_TACHYTHERAPY_RECENT_DTM_END: NORMAL
MDC_IDC_STAT_TACHYTHERAPY_RECENT_DTM_START: NORMAL
MDC_IDC_STAT_TACHYTHERAPY_SHOCKS_ABORTED_RECENT: 0
MDC_IDC_STAT_TACHYTHERAPY_SHOCKS_ABORTED_TOTAL: 0
MDC_IDC_STAT_TACHYTHERAPY_SHOCKS_DELIVERED_RECENT: 0
MDC_IDC_STAT_TACHYTHERAPY_SHOCKS_DELIVERED_TOTAL: 0
MDC_IDC_STAT_TACHYTHERAPY_TOTAL_DTM_END: NORMAL
MDC_IDC_STAT_TACHYTHERAPY_TOTAL_DTM_START: NORMAL

## 2024-06-03 DIAGNOSIS — J44.9 CHRONIC OBSTRUCTIVE PULMONARY DISEASE, UNSPECIFIED COPD TYPE (H): ICD-10-CM

## 2024-06-03 NOTE — TELEPHONE ENCOUNTER
Dulera 200-5 mcg/ACT      Last Written Prescription Date:  5-8-23  Last Fill Quantity: 13 g,   # refills: 11  Last Office Visit: 4-24-24  Future Office visit:    Next 5 appointments (look out 90 days)      Jun 26, 2024  2:30 PM  (Arrive by 2:15 PM)  Provider Visit with Ilia Moran MD  [facility] ([facility] [place] ) [address]  [place] [address]  [phone]     Aug 14, 2024  1:00 PM  (Arrive by 12:45 PM)  Return Visit with Kodi Garcia DO  [facility] [place] ([facility] [place] ) [address]  [place] [address]  [phone]

## 2024-06-05 RX ORDER — MOMETASONE FUROATE AND FORMOTEROL FUMARATE DIHYDRATE 200; 5 UG/1; UG/1
2 AEROSOL RESPIRATORY (INHALATION) 2 TIMES DAILY
Qty: 13 G | Refills: 0 | Status: SHIPPED | OUTPATIENT
Start: 2024-06-05 | End: 2024-08-21

## 2024-06-13 ENCOUNTER — TRANSFERRED RECORDS (OUTPATIENT)
Dept: HEALTH INFORMATION MANAGEMENT | Facility: CLINIC | Age: 89
End: 2024-06-13
Payer: COMMERCIAL

## 2024-06-15 ENCOUNTER — HEALTH MAINTENANCE LETTER (OUTPATIENT)
Age: 89
End: 2024-06-15

## 2024-06-24 DIAGNOSIS — G62.9 NEUROPATHY: ICD-10-CM

## 2024-06-24 DIAGNOSIS — M48.062 SPINAL STENOSIS OF LUMBAR REGION WITH NEUROGENIC CLAUDICATION: ICD-10-CM

## 2024-06-24 DIAGNOSIS — M47.815 SPONDYLOSIS OF THORACOLUMBAR REGION WITHOUT MYELOPATHY OR RADICULOPATHY: ICD-10-CM

## 2024-06-24 NOTE — TELEPHONE ENCOUNTER
Rais[employer]      Last Written Prescription Date:  3/26/24  Last Fill Quantity: 180,   # refills: 0  Last Office Visit: 4/24/24  Future Office visit:    Next 5 appointments (look out 90 days)      Jun 26, 2024 2:30 PM  (Arrive by 2:15 PM)  Provider Visit with Ilia Moran MD  [facility] ([facility] [place] ) [address]  [place] [address]  [phone]     Aug 14, 2024 1:00 PM  (Arrive by 12:45 PM)  Return Visit with Kodi Garcia DO  [facility] [place] ([facility] [place] ) [address]  [place] [address]  [phone]             Routing refill request to provider for review/approval because:

## 2024-06-25 RX ORDER — HYDROCODONE BITARTRATE AND ACETAMINOPHEN 5; 325 MG/1; MG/1
1 TABLET ORAL 3 TIMES DAILY PRN
Qty: 180 TABLET | Refills: 0 | Status: SHIPPED | OUTPATIENT
Start: 2024-06-25 | End: 2024-09-23

## 2024-06-26 ENCOUNTER — OFFICE VISIT (OUTPATIENT)
Dept: FAMILY MEDICINE | Facility: OTHER | Age: 89
End: 2024-06-26
Attending: FAMILY MEDICINE
Payer: MEDICARE

## 2024-06-26 ENCOUNTER — LAB (OUTPATIENT)
Dept: LAB | Facility: OTHER | Age: 89
End: 2024-06-26
Attending: FAMILY MEDICINE
Payer: COMMERCIAL

## 2024-06-26 VITALS
BODY MASS INDEX: 21.34 KG/M2 | RESPIRATION RATE: 20 BRPM | DIASTOLIC BLOOD PRESSURE: 70 MMHG | SYSTOLIC BLOOD PRESSURE: 140 MMHG | TEMPERATURE: 98.2 F | HEIGHT: 64 IN | WEIGHT: 125 LBS | OXYGEN SATURATION: 96 % | HEART RATE: 72 BPM

## 2024-06-26 DIAGNOSIS — F11.90 CHRONIC, CONTINUOUS USE OF OPIOIDS: ICD-10-CM

## 2024-06-26 DIAGNOSIS — F11.90 CHRONIC, CONTINUOUS USE OF OPIOIDS: Chronic | ICD-10-CM

## 2024-06-26 DIAGNOSIS — I50.9 CONGESTIVE HEART FAILURE, NYHA CLASS 2, UNSPECIFIED CONGESTIVE HEART FAILURE TYPE (H): ICD-10-CM

## 2024-06-26 DIAGNOSIS — M47.898 OTHER SPONDYLOSIS, SACRAL AND SACROCOCCYGEAL REGION: ICD-10-CM

## 2024-06-26 DIAGNOSIS — M48.062 SPINAL STENOSIS OF LUMBAR REGION WITH NEUROGENIC CLAUDICATION: ICD-10-CM

## 2024-06-26 DIAGNOSIS — J44.9 CHRONIC OBSTRUCTIVE PULMONARY DISEASE, UNSPECIFIED COPD TYPE (H): ICD-10-CM

## 2024-06-26 DIAGNOSIS — N18.32 STAGE 3B CHRONIC KIDNEY DISEASE (H): ICD-10-CM

## 2024-06-26 DIAGNOSIS — M62.81 GENERALIZED MUSCLE WEAKNESS: ICD-10-CM

## 2024-06-26 DIAGNOSIS — D63.8 ANEMIA OF CHRONIC DISEASE: ICD-10-CM

## 2024-06-26 DIAGNOSIS — M62.81 GENERALIZED MUSCLE WEAKNESS: Primary | ICD-10-CM

## 2024-06-26 DIAGNOSIS — Z71.85 IMMUNIZATION COUNSELING: ICD-10-CM

## 2024-06-26 LAB
ANION GAP SERPL CALCULATED.3IONS-SCNC: 7 MMOL/L (ref 7–15)
BASOPHILS # BLD AUTO: 0 10E3/UL (ref 0–0.2)
BASOPHILS NFR BLD AUTO: 1 %
BUN SERPL-MCNC: 21.2 MG/DL (ref 8–23)
CALCIUM SERPL-MCNC: 9.5 MG/DL (ref 8.8–10.2)
CHLORIDE SERPL-SCNC: 103 MMOL/L (ref 98–107)
CREAT SERPL-MCNC: 0.89 MG/DL (ref 0.51–0.95)
DEPRECATED HCO3 PLAS-SCNC: 28 MMOL/L (ref 22–29)
EGFRCR SERPLBLD CKD-EPI 2021: 62 ML/MIN/1.73M2
EOSINOPHIL # BLD AUTO: 0.1 10E3/UL (ref 0–0.7)
EOSINOPHIL NFR BLD AUTO: 2 %
ERYTHROCYTE [DISTWIDTH] IN BLOOD BY AUTOMATED COUNT: 13 % (ref 10–15)
GLUCOSE SERPL-MCNC: 73 MG/DL (ref 70–99)
HCT VFR BLD AUTO: 37.4 % (ref 35–47)
HGB BLD-MCNC: 11.9 G/DL (ref 11.7–15.7)
IMM GRANULOCYTES # BLD: 0 10E3/UL
IMM GRANULOCYTES NFR BLD: 0 %
LYMPHOCYTES # BLD AUTO: 1.1 10E3/UL (ref 0.8–5.3)
LYMPHOCYTES NFR BLD AUTO: 26 %
MCH RBC QN AUTO: 29.3 PG (ref 26.5–33)
MCHC RBC AUTO-ENTMCNC: 31.8 G/DL (ref 31.5–36.5)
MCV RBC AUTO: 92 FL (ref 78–100)
MONOCYTES # BLD AUTO: 0.5 10E3/UL (ref 0–1.3)
MONOCYTES NFR BLD AUTO: 11 %
NEUTROPHILS # BLD AUTO: 2.5 10E3/UL (ref 1.6–8.3)
NEUTROPHILS NFR BLD AUTO: 60 %
NRBC # BLD AUTO: 0 10E3/UL
NRBC BLD AUTO-RTO: 0 /100
PLATELET # BLD AUTO: 146 10E3/UL (ref 150–450)
POTASSIUM SERPL-SCNC: 4.5 MMOL/L (ref 3.4–5.3)
RBC # BLD AUTO: 4.06 10E6/UL (ref 3.8–5.2)
SODIUM SERPL-SCNC: 138 MMOL/L (ref 135–145)
WBC # BLD AUTO: 4.1 10E3/UL (ref 4–11)

## 2024-06-26 PROCEDURE — G0463 HOSPITAL OUTPT CLINIC VISIT: HCPCS

## 2024-06-26 PROCEDURE — 80048 BASIC METABOLIC PNL TOTAL CA: CPT | Mod: ZL

## 2024-06-26 PROCEDURE — 36415 COLL VENOUS BLD VENIPUNCTURE: CPT | Mod: ZL

## 2024-06-26 PROCEDURE — 99214 OFFICE O/P EST MOD 30 MIN: CPT | Performed by: FAMILY MEDICINE

## 2024-06-26 PROCEDURE — G2211 COMPLEX E/M VISIT ADD ON: HCPCS | Performed by: FAMILY MEDICINE

## 2024-06-26 PROCEDURE — 85041 AUTOMATED RBC COUNT: CPT | Mod: ZL

## 2024-06-26 ASSESSMENT — ANXIETY QUESTIONNAIRES
7. FEELING AFRAID AS IF SOMETHING AWFUL MIGHT HAPPEN: NOT AT ALL
8. IF YOU CHECKED OFF ANY PROBLEMS, HOW DIFFICULT HAVE THESE MADE IT FOR YOU TO DO YOUR WORK, TAKE CARE OF THINGS AT HOME, OR GET ALONG WITH OTHER PEOPLE?: NOT DIFFICULT AT ALL
6. BECOMING EASILY ANNOYED OR IRRITABLE: NOT AT ALL
4. TROUBLE RELAXING: NOT AT ALL
1. FEELING NERVOUS, ANXIOUS, OR ON EDGE: NOT AT ALL
GAD7 TOTAL SCORE: 0
7. FEELING AFRAID AS IF SOMETHING AWFUL MIGHT HAPPEN: NOT AT ALL
5. BEING SO RESTLESS THAT IT IS HARD TO SIT STILL: NOT AT ALL
3. WORRYING TOO MUCH ABOUT DIFFERENT THINGS: NOT AT ALL
2. NOT BEING ABLE TO STOP OR CONTROL WORRYING: NOT AT ALL
IF YOU CHECKED OFF ANY PROBLEMS ON THIS QUESTIONNAIRE, HOW DIFFICULT HAVE THESE PROBLEMS MADE IT FOR YOU TO DO YOUR WORK, TAKE CARE OF THINGS AT HOME, OR GET ALONG WITH OTHER PEOPLE: NOT DIFFICULT AT ALL
GAD7 TOTAL SCORE: 0
GAD7 TOTAL SCORE: 0

## 2024-06-26 ASSESSMENT — PATIENT HEALTH QUESTIONNAIRE - PHQ9
SUM OF ALL RESPONSES TO PHQ QUESTIONS 1-9: 0
10. IF YOU CHECKED OFF ANY PROBLEMS, HOW DIFFICULT HAVE THESE PROBLEMS MADE IT FOR YOU TO DO YOUR WORK, TAKE CARE OF THINGS AT HOME, OR GET ALONG WITH OTHER PEOPLE: NOT DIFFICULT AT ALL
SUM OF ALL RESPONSES TO PHQ QUESTIONS 1-9: 0

## 2024-06-26 ASSESSMENT — PAIN SCALES - GENERAL: PAINLEVEL: NO PAIN (1)

## 2024-06-26 NOTE — PROGRESS NOTES
Assessment & Plan       ICD-10-CM    1. Generalized muscle weakness  M62.81 CBC with Platelets & Differential     Basic metabolic panel      2. Spinal stenosis of lumbar region with neurogenic claudication  M48.062 CBC with Platelets & Differential     Basic metabolic panel     Drug Confirmation Panel Urine with Creatinine      3. Other spondylosis, sacral and sacrococcygeal region  M47.898 Drug Confirmation Panel Urine with Creatinine      4. Chronic obstructive pulmonary disease, unspecified COPD type (H)  J44.9 CBC with Platelets & Differential     Basic metabolic panel      5. Stage 3b chronic kidney disease  N18.32 CBC with Platelets & Differential     Basic metabolic panel     Albumin Random Urine Quantitative with Creat Ratio      6. Congestive heart failure, NYHA class 2, unspecified congestive heart failure type (H)  I50.9 CBC with Platelets & Differential     Basic metabolic panel      7. Anemia of chronic disease  D63.8 CBC with Platelets & Differential     Basic metabolic panel      8. Chronic, continuous use of opioids  F11.90 CBC with Platelets & Differential     Basic metabolic panel     Drug Confirmation Panel Urine with Creatinine      9. Immunization counseling  Z71.85       Doing real well  Continue current medications and behavioral changes.   No change in meds  Discussed RSV shot  Still off Eliquis - discussed. Pt is in NSR and no significant AF burden on pacemaker- recommend to stay off  Reviewed last Cards Note - said same but she is aware there is risk  Follow-up in November                  No follow-ups on file.    Franca Angulo is a 88 year old, presenting for the following health issues:  RECHECK, Heart Failure, Chronic Disease Management, and COPD        6/26/2024     2:19 PM   Additional Questions   Roomed by Zabrina Abebe LPN, CCP, MHP   Accompanied by daughter     History of Present Illness       Reason for visit:  Follow up    She eats 0-1 servings of fruits and vegetables  daily.She consumes 0 sweetened beverage(s) daily.She exercises with enough effort to increase her heart rate 9 or less minutes per day.  She exercises with enough effort to increase her heart rate 3 or less days per week.   She is taking medications regularly.       Heart Failure Follow-up   Are you experiencing any shortness of breath? No  Are you experiencing any swelling in your legs or feet?  No  Are you using more pillows than usual? No  Do you cough at night?  No  Do you check your weight daily?  No- every other day   Have you had a weight change recently?  No  Are you having any of the following side effects from your medications? (Select all that apply)  The patient does not report symptoms of dizziness, fatigue, cough, swelling, or slow heart beat.  Since your last visit, how many times have you gone to the cardiologist, urgent care, emergency room, or hospital because of your heart failure?   None  Last Echo:   Echo result w/o MOPS: Interpretation SummaryGlobal and regional left ventricular function is normal with an EF of 55-60%.Grade I or early diastolic dysfunction.Global right ventricular function is normal.Mild to moderate left atrial enlargement is present.Mild mitral annular calcification is present.Moderate mitral insufficiency is present.No aortic stenosis is present.Mild aortic insufficiency is present.Mild tricuspid insufficiency is present.Right ventricular systolic pressure is 25mmHg above the right atrial pressure.This study was compared with the study from 8/24/21 .No significant changes noted.      Chronic Kidney Disease Follow-up    Do you take any over the counter pain medicine?: No    Chronic/Recurring Back Pain Follow Up    Where is your back pain located? (Select all that apply) low back   How would you describe your back pain?  dull ache  Where does your back pain spread? nowhere  Since your last clinic visit for back pain, how has your pain changed? unchanged  Does your back pain  "interfere with your job? Not applicable  Since your last visit, have you tried any new treatment? No    :\"HAS NOT FELT THIS GOOD FOR LONG TIME\"  NO CONCERNS       Review of Systems  Constitutional, neuro, ENT, endocrine, pulmonary, cardiac, gastrointestinal, genitourinary, musculoskeletal, integument and psychiatric systems are negative, except as otherwise noted.      Objective    BP (!) 140/70 (BP Location: Left arm, Patient Position: Sitting, Cuff Size: Adult Regular)   Pulse 72   Temp 98.2  F (36.8  C) (Tympanic)   Resp 20   Ht 1.626 m (5' 4\")   Wt 56.7 kg (125 lb)   SpO2 96%   BMI 21.46 kg/m    Body mass index is 21.46 kg/m .  Physical Exam   GENERAL: alert and no distress  NECK: no adenopathy, no asymmetry, masses, or scars  RESP: lungs clear to auscultation - no rales, rhonchi or wheezes  CV: regular rate and rhythm, normal S1 S2, no S3 or S4, no murmur, click or rub, no peripheral edema  ABDOMEN: soft, nontender, no hepatosplenomegaly, no masses and bowel sounds normal  MS: no gross musculoskeletal defects noted, no edema    Results for orders placed or performed in visit on 06/26/24   Basic metabolic panel     Status: Normal   Result Value Ref Range    Sodium 138 135 - 145 mmol/L    Potassium 4.5 3.4 - 5.3 mmol/L    Chloride 103 98 - 107 mmol/L    Carbon Dioxide (CO2) 28 22 - 29 mmol/L    Anion Gap 7 7 - 15 mmol/L    Urea Nitrogen 21.2 8.0 - 23.0 mg/dL    Creatinine 0.89 0.51 - 0.95 mg/dL    GFR Estimate 62 >60 mL/min/1.73m2    Calcium 9.5 8.8 - 10.2 mg/dL    Glucose 73 70 - 99 mg/dL   CBC with platelets and differential     Status: Abnormal   Result Value Ref Range    WBC Count 4.1 4.0 - 11.0 10e3/uL    RBC Count 4.06 3.80 - 5.20 10e6/uL    Hemoglobin 11.9 11.7 - 15.7 g/dL    Hematocrit 37.4 35.0 - 47.0 %    MCV 92 78 - 100 fL    MCH 29.3 26.5 - 33.0 pg    MCHC 31.8 31.5 - 36.5 g/dL    RDW 13.0 10.0 - 15.0 %    Platelet Count 146 (L) 150 - 450 10e3/uL    % Neutrophils 60 %    % Lymphocytes 26 %    % " Monocytes 11 %    % Eosinophils 2 %    % Basophils 1 %    % Immature Granulocytes 0 %    NRBCs per 100 WBC 0 <1 /100    Absolute Neutrophils 2.5 1.6 - 8.3 10e3/uL    Absolute Lymphocytes 1.1 0.8 - 5.3 10e3/uL    Absolute Monocytes 0.5 0.0 - 1.3 10e3/uL    Absolute Eosinophils 0.1 0.0 - 0.7 10e3/uL    Absolute Basophils 0.0 0.0 - 0.2 10e3/uL    Absolute Immature Granulocytes 0.0 <=0.4 10e3/uL    Absolute NRBCs 0.0 10e3/uL   CBC with Platelets & Differential     Status: Abnormal    Narrative    The following orders were created for panel order CBC with Platelets & Differential.  Procedure                               Abnormality         Status                     ---------                               -----------         ------                     CBC with platelets and d...[[id number]]  Abnormal            Final result                 Please view results for these tests on the individual orders.   Drug Confirmation Panel Urine with Creatinine     Status: None (In process)    Narrative    The following orders were created for panel order Drug Confirmation Panel Urine with Creatinine.  Procedure                               Abnormality         Status                     ---------                               -----------         ------                     Urine Drug Confirmation ...[[id number]]                      In process                 Urine Creatinine for Zachery...[[id number]]                      In process                   Please view results for these tests on the individual orders.             Signed Electronically by: Ilia Moran MD

## 2024-06-26 NOTE — PROGRESS NOTES
{PROVIDER CHARTING PREFERENCE:117849}    Franca Angulo is a 88 year old, presenting for the following health issues:  No chief complaint on file.  {(!) Visit Details have not yet been documented.  Please enter Visit Details and then use this list to pull in documentation. (Optional):869623}  HPI     Heart Failure Follow-up {Provider  Link to Heart Failure SmartSet :827168}  Are you experiencing any shortness of breath? { :494621}  Are you experiencing any swelling in your legs or feet?  { :007061}  Are you using more pillows than usual? { :399481}  Do you cough at night?  { :795389}  Do you check your weight daily?  { :176179}  Have you had a weight change recently?  { :013493}  Are you having any of the following side effects from your medications? (Select all that apply)  { :031233}  Since your last visit, how many times have you gone to the cardiologist, urgent care, emergency room, or hospital because of your heart failure?   { :654823}  Last Echo:   Echo result w/o MOPS: Interpretation SummaryGlobal and regional left ventricular function is normal with an EF of 55-60%.Grade I or early diastolic dysfunction.Global right ventricular function is normal.Mild to moderate left atrial enlargement is present.Mild mitral annular calcification is present.Moderate mitral insufficiency is present.No aortic stenosis is present.Mild aortic insufficiency is present.Mild tricuspid insufficiency is present.Right ventricular systolic pressure is 25mmHg above the right atrial pressure.This study was compared with the study from 8/24/21 .No significant changes noted.      COPD Follow-Up  Overall, how are your COPD symptoms since your last clinic visit?  { :482906}  How much fatigue or shortness of breath do you have when you are walking?  { :886813}  How much shortness of breath do you have when you are resting?  { :570544}  How often do you cough? { :405477}  Have you noticed any change in your sputum/phlegm?  {  ":140948}  Have you experienced a recent fever? { :491304}  Please describe how far you can walk without stopping to rest:  { :339454}  How many flights of stairs are you able to walk up without stopping?  { :776012}  Have you had any Emergency Room Visits, Urgent Care Visits, or Hospital Admissions because of your COPD since your last office visit?  { :827455}    History   Smoking Status    Former    Packs/day: 1.00    Years: 15.00    Types: Cigarettes    Start date: 1/1/1983    Quit date: 2/1/1998   Smokeless Tobacco    Never     No results found for: \"FEV1\", \"VSN8SQA\"      Chronic Kidney Disease Follow-up  {Provider  Link to CKD SmartSet :237668}  Do you take any over the counter pain medicine?: {Yes/No:692299}  How many servings of fruits and vegetables do you eat daily?  { :617351}  On average, how many sweetened beverages do you drink each day (Examples: soda, juice, sweet tea, etc.  Do NOT count diet or artificially sweetened beverages)?   { 1-11:576236}  How many days per week do you exercise enough to make your heart beat faster? { :738668}  How many minutes a day do you exercise enough to make your heart beat faster? { :873091}  How many days per week do you miss taking your medication? {0-7 :833918}  {additonal problems for provider to add (Optional):778281}    {ROS Picklists (Optional):367411}      Objective    There were no vitals taken for this visit.  There is no height or weight on file to calculate BMI.  Physical Exam   {Exam List (Optional):482414}    {Diagnostic Test Results (Optional):776144}        Signed Electronically by: Ilia Moran MD  {Email feedback regarding this note to [email or web address]   :330436}  "

## 2024-07-08 DIAGNOSIS — E78.2 MIXED HYPERLIPIDEMIA: ICD-10-CM

## 2024-07-08 DIAGNOSIS — I42.8 NON-ISCHEMIC CARDIOMYOPATHY (H): ICD-10-CM

## 2024-07-08 DIAGNOSIS — E78.00 HYPERCHOLESTEREMIA: ICD-10-CM

## 2024-07-08 RX ORDER — ROSUVASTATIN CALCIUM 10 MG/1
TABLET, COATED ORAL
Qty: 90 TABLET | Refills: 3 | Status: SHIPPED | OUTPATIENT
Start: 2024-07-08

## 2024-08-13 NOTE — PROGRESS NOTES
[facility] HEART CARE   CARDIOLOGY PROGRESS NOTE     Chief Complaint   Patient presents with    Follow Up          Diagnosis:  1. CHF-systolic.   -50-55%/grade 1 on 6/20/23.   -30-35%/diastolic on 1/5/21.   -40-45%% on 12/01/2019.  2.  COPD.     -Severe on 5/15/2017.  -Reduction in severity of COPD-severe to mild/moderate.   -Pulmonary concern for asthma based on PFT's from 5/17/17.  3.  Nonischemic cardiomyopathy with NYHA classification 2.  4.  ICD placement.    -Gen change on 1/30/23, [facility].     -11/11/2014.   5.  Hypertension-controlled.  6.  Hyperlipidemia-uncontrolled.  7.  Tobacco abuse.   -Quitting on 2/1/1998  8.  LBBB.  9.  SOB-significantly improved.  10.  CKD-3.  11.  Hypocalcemia resolved..  12.  A. Fib.   -Second set 5/14/2024.   -Less than 1% in 8/9/2022.  -Up to 23 seconds on 4/9/19.    13.  CHADSVASC score of 4.   14.  Mitral and tricuspid regurgitation-mild to moderate on 1/5/2021.  15.  Bacteremia on 1/5/2021 with Klebsiella pneumonia with septic shock.    Assessment/Plan:    1.  Hypertension.  Was actually experiencing hypotension previously.  Blood pressure today initially 162/77.  On repeat blood pressure was 153/73.  Coreg decreased from 12.5 mg twice a day and Jardiance/Entresto stopped by primary previously.  Discussed starting back on Entresto at a low dose 24/26 mg twice a day.  Patient declined.  Patient is concerned about cost.  She is okay going back to Coreg 12.5 mg twice a day.  This will be sent to her pharmacy.  She was also previously on Jardiance but this was stopped on 3/26/2024 secondary to UTI.  She had multiple UTIs.  I believe she had sepsis.  Obviously, this medication will not be restarted.  Thankfully, she is not having significant dyspnea on exertion or swelling.  Patient states she is feels the best she has in a long time.  2.  CHF: Diastolic in nature.  As discussed above, has declined Entresto/Jardiance.  Entresto was originally stopped by primary in 2/20/2023 for unknown  reasons.  Jardiance was stopped on 3/26/2024 secondary to UTI.  Continue Coreg and consider spironolactone in the future pending electrolyte and kidney function stability.  EF most recently at 50-55%/grade 1 on 6/20/2023.  Plan for repeat echocardiogram.  Patient states may consider going back on Entresto.  3.  Pacemaker: Discussed her interrogation from 5/14/2024.  Ventricular paced 98% of time.  Has 9.7 years left on her device.  Was in A-fib 13 seconds.  No other issues or concerns.  4.  Hyperlipidemia: Uncontrolled based on lipid panel from 12/4/2019.  5.  Follow-up after echocardiogram in 3 months.  Coreg increased to 12.5 mg from 6.25.  Discuss whether or not patient is willing to go back on Entresto for heart failure.      Interval history:  See above.      HPI:    Jacklyn BONNER Asteranish is being seen by cardiology for dyspnea with chronic systolic EF of 40-45% and evidence for diastolic heart failure on 12/31/2019.     She has been short of breath with a diagnosis of asthma and COPD.  She is followed for a history of severe nonischemic cardiomyopathy with a previously ejection fraction at less than 35%. More recently, her EF on 12/31/2019 was 40-45%.  EF of 30-35% on 1/5/21. She also has diastolic heart failure, ICD placed on 11/11/16, her recent visit with pulmonary secondary to what was thought to be severe COPD but was downgraded to mild to moderate with a greater concern for asthma, per the patient.     She was started on some breathing treatments for asthma/COPD by pulmonary.  With these treatments, she says she feels much better but remains short of breath.       With having diastolic and systolic heart failure, she has minimal to no lower extremity edema. Her activity has been limited secondary to chronic back pain and generalized weakness.  It was not for back pain, she would be doing really well.     She has a history of severe nonischemic cardiomyopathy S/P ICD placement on 11/11/2014. She had her  ICD checked on 10/22/2019.  It showed she was bi-V paced 96.8% of the time with 3.0 years left on her battery.  = 98.1%. She had up to 20 seconds of A. fib with a total of 3 episodes.  Her device was described as functioning appropriately.     Echo from 12/31/2019.  She has an EF of 40% to 45%, grade 1 diastolic dysfunction, mild left atrial enlargement, mild to moderate AI, and mild MI      Relevant testing:  ECHO on 6/20/23:  Global and regional left ventricular function is normal with an EF of 55-60%.  Grade I or early diastolic dysfunction.  Global right ventricular function is normal.  Mild to moderate left atrial enlargement is present.  Mild mitral annular calcification is present.  Moderate mitral insufficiency is present.  No aortic stenosis is present.  Mild aortic insufficiency is present.  Mild tricuspid insufficiency is present.  Right ventricular systolic pressure is 25 mmHg above the right atrial pressure.  This study was compared with the study from 8/24/21 .  No significant changes noted.    ECHO on 8/24/21:  Technically difficult study. Poor acoustic windows.  Left ventricular function is decreased. The ejection fraction is 50-55%  (borderline). Mild diffuse hypokinesis is present. Diastolic Doppler findings (E/E' ratio and/or other parameters) suggest left ventricular filling pressures are increased.  Global right ventricular function is normal. The right ventricle is normal size.  No significant valvular abnormalities.  The estimated PA systolic pressure is 17 mmHg above the RA pressure. The RA pressure could not be estimated.  This study was compared with the study from 01/06/2021. There is no  significant change in the biventricular function upon direct visual  comparison.    Echo on 12/31/2019:  No pericardial effusion is present.  Left ventricular size is normal.  The Ejection Fraction is estimated at 40-45%.  Grade I or early diastolic dysfunction.  Mild diffuse hypokinesis is  present.  The right ventricle is normal size.  Global right ventricular function is normal.  Mild left atrial enlargement is present.  Mild mitral insufficiency is present.  Mild to moderate aortic insufficiency is present.  The mean gradient across the aortic valve is 3.9 mmHg.  Trace tricuspid insufficiency is present.  Right ventricular systolic pressure is 20 mmHg above the right atrial pressure.  The pulmonic valve is normal.  The aorta root is normal.    Echocardiogram on 8/24/2021:  Technically difficult study. Poor acoustic windows.  Left ventricular function is decreased. The ejection fraction is 50-55%  (borderline). Mild diffuse hypokinesis is present. Diastolic Doppler findings  (E/E' ratio and/or other parameters) suggest left ventricular filling  pressures are increased.  Global right ventricular function is normal. The right ventricle is normal  size.  No significant valvular abnormalities.  The estimated PA systolic pressure is 17 mmHg above the RA pressure. The RA  pressure could not be estimated.  This study was compared with the study from 01/06/2021. There is no  significant change in the biventricular function upon direct visual  comparison.      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    Insomnia, unspecified 01/01/2011    LBBB (left bundle branch block) 01/01/2011    Neck pain, chronic 01/01/2011    Non-ischemic cardiomyopathy (H) 12/10/2013    Obesity, unspecified 01/01/2011    Osteopenia 01/01/2011    Pacemaker     Pain in joint, lower leg 07/31/2006    Pure hypercholesterolemia 06/07/2002    Unspecified essential hypertension 01/01/2011       Past Surgical History:   Procedure Laterality Date    APPENDECTOMY      ARTHROSCOPY KNEE RT/LT  2009    RT    BACK SURGERY  02/06/2020    BACK SURGERY  06/2021     fractured vert repair    CARDIAC SURGERY  2014    Pacemaker    Cataract extraction  2009    Bilateral    CHOLECYSTECTOMY      open     COLONOSCOPY  2001    Normal     COLONOSCOPY N/A 10/20/2016    Procedure: COLONOSCOPY;  Surgeon: Charan Montejo MD;  Location: [facility] OR    COLONOSCOPY N/A 2023    Procedure: COLONOSCOPY;  Surgeon: Royal Sanz MD;  Location: [facility] OR    colonoscopy with polypectomy      Repeat 3 years     CT CORONARY ANGIOGRAM      normal    ESOPHAGOSCOPY, GASTROSCOPY, DUODENOSCOPY (EGD), COMBINED N/A 2023    Procedure: ESOPHAGOGASTRODUODENOSCOPY;  Surgeon: Royal Sanz MD;  Location: [facility] OR    HERPES ZOSTER PCR ([facility])      IR CONSULTATION FOR IR EXAM  2020    IR CONSULTATION FOR IR EXAM  2023    IR TRIGGER POINT INJ 3 OR MORE MUSCLES  2023    left eye scar tissue      normal echo      fen-phen use      x6      REPLACE PACEMAKER GENERATOR N/A 2023    Procedure: REPLACEMENT, PULSE GENERATOR, CARDIAC PACEMAKER;  Surgeon: Isiah Hodge MD;  Location:  OR    SURGICAL RADIOLOGY PROCEDURE N/A 2016    Procedure: SURGICAL RADIOLOGY PROCEDURE;  Surgeon: Provider, Generic Perianesthesia Nursing;  Location: [facility] OR       No Known Allergies    Current Outpatient Medications   Medication Sig Dispense Refill    Calcium Carbonate-Vitamin D (CALCIUM 600 + D OR) Take 1 tablet by mouth every morning      carvedilol (COREG) 12.5 MG tablet Take 1 tablet (12.5 mg) by mouth 2 times daily (with meals) 180 tablet 3    diphenhydrAMINE-acetaminophen (TYLENOL PM)  MG tablet Take 2 tablets by mouth at bedtime      DULERA 200-5 MCG/ACT inhaler INHALE 2 PUFFS INTO THE LUNGS 2 TIMES DAILY 13 g 0    furosemide (LASIX) 20 MG tablet Take 1 tablet (20 mg) by mouth daily 30 tablet 5    gabapentin (NEURONTIN) 400 MG capsule TAKE 1 CAPSULE BY MOUTH 3 TIMES DAILY FOR PAIN 90 capsule 11    HYDROcodone-acetaminophen (NORCO) 5-325 MG tablet Take 1  tablet by mouth 3 times daily as needed for severe pain \ 180 tablet 0    Multiple Vitamin (MULTI-VITAMIN DAILY PO) Take 1 tablet by mouth every morning      rosuvastatin (CRESTOR) 10 MG tablet TAKE 1 TABLET BY MOUTH DAILY 90 tablet 3    umeclidinium (INCRUSE ELLIPTA) 62.5 MCG/ACT inhaler INHALE 1 PUFF INTO THE LUNGS DAILY 30 each 11    vitamin C (ASCORBIC ACID) 500 MG tablet Take 500 mg by mouth daily         Social History     Socioeconomic History    Marital status:      Spouse name: Not on file    Number of children: Not on file    Years of education: Not on file    Highest education level: Not on file   Occupational History    Occupation:       Employer: RETIRED   Tobacco Use    Smoking status: Former     Current packs/day: 0.00     Average packs/day: 1 pack/day for 15.1 years (15.1 ttl pk-yrs)     Types: Cigarettes     Start date: 1983     Quit date: 1998     Years since quittin.5     Passive exposure: Never    Smokeless tobacco: Never    Tobacco comments:     Passive Exposure: No; Longest Tobacco Free: 15 years    Vaping Use    Vaping status: Never Used   Substance and Sexual Activity    Alcohol use: Not Currently     Comment: Occasionally     Drug use: No    Sexual activity: Not Currently   Other Topics Concern     Service Not Asked    Blood Transfusions Not Asked    Caffeine Concern Yes     Comment: Coffee 5 cups daily     Occupational Exposure Not Asked    Hobby Hazards Not Asked    Sleep Concern Not Asked    Stress Concern Not Asked    Weight Concern Not Asked    Special Diet Not Asked    Back Care Not Asked    Exercise Not Asked    Bike Helmet Not Asked    Seat Belt Not Asked    Self-Exams Not Asked    Parent/sibling w/ CABG, MI or angioplasty before 65F 55M? No   Social History Narrative    Not on file     Social Determinants of Health     Financial Resource Strain: Low Risk  (3/15/2024)    Financial Resource Strain     Within the past 12 months, have you or your  family members you live with been unable to get utilities (heat, electricity) when it was really needed?: No   Food Insecurity: Low Risk  (3/15/2024)    Food Insecurity     Within the past 12 months, did you worry that your food would run out before you got money to buy more?: No     Within the past 12 months, did the food you bought just not last and you didn t have money to get more?: No   Transportation Needs: Low Risk  (3/15/2024)    Transportation Needs     Within the past 12 months, has lack of transportation kept you from medical appointments, getting your medicines, non-medical meetings or appointments, work, or from getting things that you need?: No   Physical Activity: Not on file   Stress: Not on file   Social Connections: Not on file   Interpersonal Safety: Not on file   Housing Stability: Low Risk  (3/15/2024)    Housing Stability     Do you have housing? : Yes     Are you worried about losing your housing?: No       LAB RESULTS:   Office Visit on 08/08/2020   Component Date Value Ref Range Status    COVID-19 Virus PCR to [facility] - So* 08/08/2020 Nasopharyngeal   Final    COVID-19 Virus PCR to [facility] - Re* 08/08/2020 Test received-See reflex to IDDL test SARS CoV2 (COVID-19) Virus RT-PCR   Final    SARS-CoV-2 Virus Specimen Source 08/08/2020 Nasopharyngeal   Final    SARS-CoV-2 PCR Result 08/08/2020 NEGATIVE   Final    SARS-CoV-2 PCR Comment 08/08/2020    Final                    Value:Testing was performed using the Aptima SARS-CoV-2 Assay on the [employer] Instrument System.   Additional information about this Emergency Use Authorization (EUA) assay can be found via   the Lab Guide.     Ancillary Procedure on 08/04/2020   Component Date Value Ref Range Status    Date Time Interrogation Session 08/05/2020 [id number]   Final    Implantable Pulse Generator Manufa* 08/05/2020 Medtronic   Final    Implantable Pulse Generator Model 08/05/2020 [id number] Viva XT CRT-D   Final    Implantable Pulse Generator  Serial* 08/05/2020 [id number]   Final    Type Interrogation Session 08/05/2020 Remote    Final    Clinic Name 08/05/2020 [facility]   Final    Implantable Pulse Generator Type 08/05/2020 Cardiac Resynchronization Therapy - Defibrillator   Final    Implantable Pulse Generator Implan* 08/05/2020 [id number]   Final    Implantable Lead  08/05/2020 Medtronic   Final    Implantable Lead Model 08/05/2020 4296 Attain Ability Plus   Final    Implantable Lead Serial Number 08/05/2020 [id number]   Final    Implantable Lead Implant Date 08/05/2020 [id number]   Final    Implantable Lead Polarity Type 08/05/2020 Bipolar Lead   Final    Implantable Lead Location Detail 1 08/05/2020 UNKNOWN   Final    Implantable Lead Location 08/05/2020 Left Ventricle   Final    Implantable Lead  08/05/2020 Medtronic   Final    Implantable Lead Model 08/05/2020 5076 CapSureFix Novus   Final    Implantable Lead Serial Number 08/05/2020 [id number]   Final    Implantable Lead Implant Date 08/05/2020 [id number]   Final    Implantable Lead Polarity Type 08/05/2020 Bipolar Lead   Final    Implantable Lead Location Detail 1 08/05/2020 APPENDAGE   Final    Implantable Lead Location 08/05/2020 Right Atrium   Final    Implantable Lead  08/05/2020 Medtronic   Final    Implantable Lead Model 08/05/2020 6947 Sprint Quattro Secure   Final    Implantable Lead Serial Number 08/05/2020 [id number]   Final    Implantable Lead Implant Date 08/05/2020 [id number]   Final    Implantable Lead Polarity Type 08/05/2020 Quadripolar Lead   Final    Implantable Lead Location Detail 1 08/05/2020 APEX   Final    Implantable Lead Location 08/05/2020 Right Ventricle   Final    Vamshi Setting Mode (NBG Code) 08/05/2020 DDDR   Final    Vamshi Setting Lower Rate Limit 08/05/2020 60  [beats]/min Final    Vamshi Setting Maximum Tracking Rate 08/05/2020 130  [beats]/min Final    Vamshi Setting Maximum Sensor Rate 08/05/2020 130   [beats]/min Final    Vamshi Setting NAJMA Delay Low 08/05/2020 140  ms Final    Vamshi Setting PAV Delay Low 08/05/2020 160  ms Final    Vamshi Setting AT Mode Switch Rate 08/05/2020 171  [beats]/min Final    Lead Channel Setting Sensing Polar* 08/05/2020 Bipolar   Final    Lead Channel Setting Sensing Anode* 08/05/2020 Right Atrium   Final    Lead Channel Setting Sensing Anode* 08/05/2020 Ring   Final    Lead Channel Setting Sensing Catho* 08/05/2020 Right Atrium   Final    Lead Channel Setting Sensing Catho* 08/05/2020 Tip   Final    Lead Channel Setting Sensing Sensi* 08/05/2020 0.3  mV Final    Lead Channel Setting Sensing Polar* 08/05/2020 Bipolar   Final    Lead Channel Setting Sensing Anode* 08/05/2020 Right Ventricle   Final    Lead Channel Setting Sensing Anode* 08/05/2020 Coil   Final    Lead Channel Setting Sensing Catho* 08/05/2020 Right Ventricle   Final    Lead Channel Setting Sensing Catho* 08/05/2020 Tip   Final    Lead Channel Setting Sensing Sensi* 08/05/2020 0.3  mV Final    Ventricular chambers paced during * 08/05/2020 LVOnly   Final    Lead Channel Setting Pacing Polari* 08/05/2020 Bipolar   Final    Lead Channel Setting Pacing Anode * 08/05/2020 Right Atrium   Final    Lead Channel Setting Pacing Anode * 08/05/2020 Ring   Final    Lead Channel Setting Sensing Catho* 08/05/2020 Right Atrium   Final    Lead Channel Setting Sensing Catho* 08/05/2020 Tip   Final    Lead Channel Setting Pacing Pulse * 08/05/2020 0.4  ms Final    Lead Channel Setting Pacing Amplit* 08/05/2020 1.5  V Final    Lead Channel Setting Pacing Captur* 08/05/2020 Adaptive   Final    Lead Channel Setting Pacing Polari* 08/05/2020 Bipolar   Final    Lead Channel Setting Pacing Anode * 08/05/2020 Right Ventricle   Final    Lead Channel Setting Pacing Anode * 08/05/2020 Ring   Final    Lead Channel Setting Sensing Catho* 08/05/2020 Right Ventricle   Final    Lead Channel Setting Sensing Catho* 08/05/2020 Tip   Final    Lead Channel  Setting Pacing Pulse * 08/05/2020 0.4  ms Final    Lead Channel Setting Pacing Amplit* 08/05/2020 2  V Final    Lead Channel Setting Pacing Captur* 08/05/2020 Adaptive   Final    Lead Channel Setting Pacing Polari* 08/05/2020 Bipolar   Final    Lead Channel Setting Pacing Anode * 08/05/2020 Right Ventricle   Final    Lead Channel Setting Pacing Anode * 08/05/2020 Coil   Final    Lead Channel Setting Sensing Catho* 08/05/2020 Left Ventricle   Final    Lead Channel Setting Sensing Catho* 08/05/2020 Tip   Final    Lead Channel Setting Pacing Pulse * 08/05/2020 0.6  ms Final    Lead Channel Setting Pacing Amplit* 08/05/2020 1.5  V Final    Lead Channel Setting Pacing Captur* 08/05/2020 Adaptive   Final    Zone Setting Type Category 08/05/2020 VF   Final    Zone Setting Detection Interval 08/05/2020 320  ms Final    Zone Setting Detection Beats Numer* 08/05/2020 30  [beats] Final    Zone Setting Detection Beats Denom* 08/05/2020 40  [beats] Final    Zone Setting Type Category 08/05/2020 VT   Final    Zone Setting Detection Interval 08/05/2020 Blank   Final    Zone Setting Type Category 08/05/2020 VT   Final    Zone Setting Detection Interval 08/05/2020 360  ms Final    Zone Setting Type Category 08/05/2020 VT   Final    Zone Setting Detection Interval 08/05/2020 400  ms Final    Zone Setting Type Category 08/05/2020 ATRIAL_FIBRILLATION   Final    Zone Setting Type Category 08/05/2020 AT/AF   Final    Zone Setting Detection Interval 08/05/2020 350  ms Final    Lead Channel Impedance Value 08/05/2020 437  ohm Final    Lead Channel Sensing Intrinsic Amp* 08/05/2020 2.375  mV Final    Lead Channel Sensing Intrinsic Amp* 08/05/2020 2.375  mV Final    Lead Channel Pacing Threshold Ampl* 08/05/2020 0.375  V Final    Lead Channel Pacing Threshold Puls* 08/05/2020 0.4  ms Final    Lead Channel Impedance Value 08/05/2020 399  ohm Final    Lead Channel Impedance Value 08/05/2020 323  ohm Final    Lead Channel Sensing Intrinsic Amp*  08/05/2020 5  mV Final    Lead Channel Sensing Intrinsic Amp* 08/05/2020 5  mV Final    Lead Channel Pacing Threshold Ampl* 08/05/2020 0.625  V Final    Lead Channel Pacing Threshold Puls* 08/05/2020 0.4  ms Final    Lead Channel Impedance Value 08/05/2020 893  ohm Final    Lead Channel Impedance Value 08/05/2020 456  ohm Final    Lead Channel Impedance Value 08/05/2020 570  ohm Final    Lead Channel Pacing Threshold Ampl* 08/05/2020 0.5  V Final    Lead Channel Pacing Threshold Puls* 08/05/2020 0.6  ms Final    Battery Date Time of Measurements 08/05/2020 20200804012400   Final    Battery Status 08/05/2020 OK   Final    Battery RRT Trigger 08/05/2020 2.727   Final    Battery Remaining Longevity 08/05/2020 26  mo Final    Battery Voltage 08/05/2020 2.94  V Final    Capacitor Charge Type 08/05/2020 Reformation   Final    Capacitor Last Charge Date Time 08/05/2020 [id number]   Final    Capacitor Charge Time 08/05/2020 4.314   Final    Capacitor Charge Energy 08/05/2020 18  J Final    Vamshi Statistic Date Time Start 08/05/2020 [id number]   Final    Vamshi Statistic Date Time End 08/05/2020 20200804012403   Final    Vamshi Statistic RA Percent Paced 08/05/2020 9.93  % Final    Vamshi Statistic RV Percent Paced 08/05/2020 7.92  % Final    CRT Statistic LV Percent Paced 08/05/2020 98.09  % Final    Vamshi Statistic AP  Percent 08/05/2020 9.75  % Final    Vamshi Statistic AS  Percent 08/05/2020 88.58  % Final    Vamshi Statistic AP VS Percent 08/05/2020 0.19  % Final    Vamshi Statistic AS VS Percent 08/05/2020 1.47  % Final    CRT Statistic Date Time Start 08/05/2020 [id number]   Final    CRT Statistic Date Time End 08/05/2020 20200804012403   Final    CRT Statistic CRT Percent Paced 08/05/2020 98.09  % Final    Atrial Tachy Statistic Date Time S* 08/05/2020 [id number]   Final    Atrial Tachy Statistic Date Time E* 08/05/2020 20200804012403   Final    Atrial Tachy Statistic AT/AF Burde* 08/05/2020 0.1  %  Final    Therapy Statistic Recent Shocks De* 08/05/2020 0   Final    Therapy Statistic Recent Shocks Ab* 08/05/2020 0   Final    Therapy Statistic Recent ATP Deliv* 08/05/2020 0   Final    Therapy Statistic Recent Date Time* 08/05/2020 [id number]   Final    Therapy Statistic Recent Date Time* 08/05/2020 20200804012403   Final    Therapy Statistic Total Shocks Del* 08/05/2020 1   Final    Therapy Statistic Total Shocks Abo* 08/05/2020 0   Final    Therapy Statistic Total ATP Delive* 08/05/2020 0   Final    Therapy Statistic Total  Date Time* 08/05/2020 [id number]   Final    Therapy Statistic Total  Date Time* 08/05/2020 20200804012403   Final    Episode Statistic Recent Count 08/05/2020 1   Final    Episode Statistic Type Category 08/05/2020 AT/AF   Final    Episode Statistic Recent Count 08/05/2020 0   Final    Episode Statistic Type Category 08/05/2020 SVT   Final    Episode Statistic Recent Count 08/05/2020 0   Final    Episode Statistic Type Category 08/05/2020 VT   Final    Episode Statistic Recent Count 08/05/2020 0   Final    Episode Statistic Type Category 08/05/2020 VF   Final    Episode Statistic Recent Count 08/05/2020 0   Final    Episode Statistic Type Category 08/05/2020 VT   Final    Episode Statistic Recent Count 08/05/2020 0   Final    Episode Statistic Type Category 08/05/2020 VT   Final    Episode Statistic Recent Count 08/05/2020 0   Final    Episode Statistic Type Category 08/05/2020 VT   Final    Episode Statistic Recent Date Time* 08/05/2020 [id number]   Final    Episode Statistic Recent Date Time* 08/05/2020 20200804012403   Final    Episode Statistic Recent Date Time* 08/05/2020 [id number]   Final    Episode Statistic Recent Date Time* 08/05/2020 20200804012403   Final    Episode Statistic Recent Date Time* 08/05/2020 [id number]   Final    Episode Statistic Recent Date Time* 08/05/2020 20200804012403   Final    Episode Statistic Recent Date Time* 08/05/2020 [id number]    Final    Episode Statistic Recent Date Time* 08/05/2020 20200804012403   Final    Episode Statistic Recent Date Time* 08/05/2020 [id number]   Final    Episode Statistic Recent Date Time* 08/05/2020 20200804012403   Final    Episode Statistic Recent Date Time* 08/05/2020 [id number]   Final    Episode Statistic Recent Date Time* 08/05/2020 20200804012403   Final    Episode Statistic Recent Date Time* 08/05/2020 [id number]   Final    Episode Statistic Recent Date Time* 08/05/2020 20200804012403   Final    Episode Statistic Total Count 08/05/2020 0   Final    Episode Statistic Type Category 08/05/2020 AT/AF   Final    Episode Statistic Total Count 08/05/2020 0   Final    Episode Statistic Type Category 08/05/2020 SVT   Final    Episode Statistic Total Count 08/05/2020 66   Final    Episode Statistic Type Category 08/05/2020 VT   Final    Episode Statistic Total Count 08/05/2020 1   Final    Episode Statistic Type Category 08/05/2020 VF   Final    Episode Statistic Total Count 08/05/2020 0   Final    Episode Statistic Type Category 08/05/2020 VT   Final    Episode Statistic Total Count 08/05/2020 0   Final    Episode Statistic Type Category 08/05/2020 VT   Final    Episode Statistic Total Count 08/05/2020 0   Final    Episode Statistic Type Category 08/05/2020 VT   Final    Episode Statistic Total Date Time * 08/05/2020 20140506113519   Final    Episode Statistic Total Date Time * 08/05/2020 20200804012403   Final    Episode Statistic Total Date Time * 08/05/2020 20140506113519   Final    Episode Statistic Total Date Time * 08/05/2020 20200804012403   Final    Episode Statistic Total Date Time * 08/05/2020 [id number]   Final    Episode Statistic Total Date Time * 08/05/2020 20200804012403   Final    Episode Statistic Total Date Time * 08/05/2020 20140506113519   Final    Episode Statistic Total Date Time * 08/05/2020 20200804012403   Final    Episode Statistic Total Date Time * 08/05/2020 20140506113519   " Final    Episode Statistic Total Date Time * 08/05/2020 20200804012403   Final    Episode Statistic Total Date Time * 08/05/2020 20140506113519   Final    Episode Statistic Total Date Time * 08/05/2020 20200804012403   Final    Episode Statistic Total Date Time * 08/05/2020 20140506113519   Final    Episode Statistic Total Date Time * 08/05/2020 20200804012403   Final    Episode Identifier 08/05/2020 716   Final    Episode Type Category 08/05/2020 VSE   Final    Episode Date Time 08/05/2020 20200716092155   Final    Episode Duration 08/05/2020 9  s Final    Episode Identifier 08/05/2020 715   Final    Episode Type Category 08/05/2020 VSE   Final    Episode Date Time 08/05/2020 [id number]   Final    Episode Duration 08/05/2020 3  s Final    Episode Identifier 08/05/2020 714   Final    Episode Type Category 08/05/2020 VSE   Final    Episode Date Time 08/05/2020 20200625235723   Final    Episode Duration 08/05/2020 4  s Final    Episode Identifier 08/05/2020 713   Final    Episode Type Category 08/05/2020 VSE   Final    Episode Date Time 08/05/2020 20200607131142   Final    Episode Duration 08/05/2020 6  s Final    Episode Identifier 08/05/2020 712   Final    Episode Type Category 08/05/2020 VSE   Final    Episode Date Time 08/05/2020 20200523024049   Final    Episode Duration 08/05/2020 18  s Final    Episode Identifier 08/05/2020 711   Final    Episode Type Category 08/05/2020 VSE   Final    Episode Date Time 08/05/2020 [id number]   Final    Episode Duration 08/05/2020 4  s Final        Review of systems: Negative except that which was noted in the HPI.    Physical examination:  BP (!) 153/73   Pulse 90   Resp 20   Ht 1.626 m (5' 4\")   Wt 58.5 kg (129 lb)   SpO2 96%   BMI 22.14 kg/m      GENERAL APPEARANCE: healthy, alert and no distress  CHEST: lungs clear to auscultation.  CARDIOVASCULAR: Irregular rate irregular rhythm, normal S1 with physiologic split S2, no S3 or S4 and no murmur, click or " rub  ABDOMEN: soft, non tender, bowel sounds normal.  Distant sounds.  EXTREMITIES: no clubbing, cyanosis but with mild lower extremity edema.    Total time spent on day of visit, including review of tests, obtaining/reviewing separately obtained history, ordering medications/tests/procedures, communicating with PCP/consultants, and documenting in electronic medical record: 25 minutes.           Thank you for allowing me to participate in the care of your patient. Please do not hesitate to contact me if you have any questions.     Kodi Garcia, DO

## 2024-08-14 ENCOUNTER — OFFICE VISIT (OUTPATIENT)
Dept: CARDIOLOGY | Facility: OTHER | Age: 89
End: 2024-08-14
Attending: INTERNAL MEDICINE
Payer: COMMERCIAL

## 2024-08-14 VITALS
HEIGHT: 64 IN | BODY MASS INDEX: 22.02 KG/M2 | DIASTOLIC BLOOD PRESSURE: 73 MMHG | RESPIRATION RATE: 20 BRPM | SYSTOLIC BLOOD PRESSURE: 153 MMHG | WEIGHT: 129 LBS | OXYGEN SATURATION: 96 % | HEART RATE: 90 BPM

## 2024-08-14 DIAGNOSIS — R53.1 WEAKNESS GENERALIZED: ICD-10-CM

## 2024-08-14 DIAGNOSIS — Z95.810 AUTOMATIC IMPLANTABLE CARDIOVERTER-DEFIBRILLATOR IN SITU: ICD-10-CM

## 2024-08-14 DIAGNOSIS — R06.09 DOE (DYSPNEA ON EXERTION): Primary | ICD-10-CM

## 2024-08-14 DIAGNOSIS — I10 ESSENTIAL HYPERTENSION: ICD-10-CM

## 2024-08-14 DIAGNOSIS — E78.2 MIXED HYPERLIPIDEMIA: ICD-10-CM

## 2024-08-14 DIAGNOSIS — J81.1 CHRONIC PULMONARY EDEMA: ICD-10-CM

## 2024-08-14 DIAGNOSIS — I48.0 PAROXYSMAL ATRIAL FIBRILLATION (H): ICD-10-CM

## 2024-08-14 DIAGNOSIS — J44.9 CHRONIC OBSTRUCTIVE PULMONARY DISEASE, UNSPECIFIED COPD TYPE (H): ICD-10-CM

## 2024-08-14 DIAGNOSIS — N18.32 STAGE 3B CHRONIC KIDNEY DISEASE (H): ICD-10-CM

## 2024-08-14 DIAGNOSIS — I50.9 CONGESTIVE HEART FAILURE, NYHA CLASS 2, UNSPECIFIED CONGESTIVE HEART FAILURE TYPE (H): ICD-10-CM

## 2024-08-14 DIAGNOSIS — I50.32 CHRONIC HEART FAILURE WITH PRESERVED EJECTION FRACTION (H): ICD-10-CM

## 2024-08-14 DIAGNOSIS — I44.7 LBBB (LEFT BUNDLE BRANCH BLOCK): ICD-10-CM

## 2024-08-14 DIAGNOSIS — I42.8 NON-ISCHEMIC CARDIOMYOPATHY (H): ICD-10-CM

## 2024-08-14 PROCEDURE — G0463 HOSPITAL OUTPT CLINIC VISIT: HCPCS

## 2024-08-14 PROCEDURE — 99214 OFFICE O/P EST MOD 30 MIN: CPT | Performed by: INTERNAL MEDICINE

## 2024-08-14 RX ORDER — CARVEDILOL 12.5 MG/1
12.5 TABLET ORAL 2 TIMES DAILY WITH MEALS
Qty: 180 TABLET | Refills: 3 | Status: SHIPPED | OUTPATIENT
Start: 2024-08-14

## 2024-08-14 ASSESSMENT — PAIN SCALES - GENERAL: PAINLEVEL: NO PAIN (0)

## 2024-08-14 NOTE — PATIENT INSTRUCTIONS
Thank you for allowing Dr ASHLEY Garcia and our  team to participate in your care. Please call our office at [phone] with scheduling questions or if you need to cancel or change your appointment. With any other questions or concerns you may call cardiology nurse at  [phone].       If you experience chest pain, chest pressure, chest tightness, shortness of breath, fainting, lightheadedness, nausea, vomiting, or other concerning symptoms, please report to the Emergency Department or call 911. These symptoms may be emergent, and best treated in the Emergency Department.

## 2024-08-19 ENCOUNTER — ANCILLARY PROCEDURE (OUTPATIENT)
Dept: CARDIOLOGY | Facility: CLINIC | Age: 89
End: 2024-08-19
Attending: INTERNAL MEDICINE
Payer: MEDICARE

## 2024-08-19 DIAGNOSIS — Z95.810 AUTOMATIC IMPLANTABLE CARDIOVERTER-DEFIBRILLATOR IN SITU: ICD-10-CM

## 2024-08-19 PROCEDURE — 93296 REM INTERROG EVL PM/IDS: CPT

## 2024-08-19 PROCEDURE — 93295 DEV INTERROG REMOTE 1/2/MLT: CPT | Performed by: INTERNAL MEDICINE

## 2024-08-21 DIAGNOSIS — J44.9 CHRONIC OBSTRUCTIVE PULMONARY DISEASE, UNSPECIFIED COPD TYPE (H): ICD-10-CM

## 2024-08-21 RX ORDER — MOMETASONE FUROATE AND FORMOTEROL FUMARATE DIHYDRATE 200; 5 UG/1; UG/1
2 AEROSOL RESPIRATORY (INHALATION) 2 TIMES DAILY
Qty: 13 G | Refills: 0 | Status: SHIPPED | OUTPATIENT
Start: 2024-08-21

## 2024-08-29 ENCOUNTER — HOSPITAL ENCOUNTER (OUTPATIENT)
Dept: CARDIOLOGY | Facility: HOSPITAL | Age: 89
Discharge: HOME | End: 2024-08-29
Attending: INTERNAL MEDICINE | Admitting: INTERNAL MEDICINE
Payer: MEDICARE

## 2024-08-29 DIAGNOSIS — R06.09 DOE (DYSPNEA ON EXERTION): ICD-10-CM

## 2024-08-29 DIAGNOSIS — I50.9 CONGESTIVE HEART FAILURE, NYHA CLASS 2, UNSPECIFIED CONGESTIVE HEART FAILURE TYPE (H): ICD-10-CM

## 2024-08-29 DIAGNOSIS — J81.1 CHRONIC PULMONARY EDEMA: ICD-10-CM

## 2024-08-29 DIAGNOSIS — I42.8 NON-ISCHEMIC CARDIOMYOPATHY (H): ICD-10-CM

## 2024-08-29 DIAGNOSIS — I50.32 CHRONIC HEART FAILURE WITH PRESERVED EJECTION FRACTION (H): ICD-10-CM

## 2024-08-29 LAB — LVEF ECHO: NORMAL

## 2024-08-29 PROCEDURE — 93306 TTE W/DOPPLER COMPLETE: CPT | Mod: 26 | Performed by: INTERNAL MEDICINE

## 2024-08-29 PROCEDURE — 93306 TTE W/DOPPLER COMPLETE: CPT

## 2024-09-11 ENCOUNTER — ALLIED HEALTH/NURSE VISIT (OUTPATIENT)
Dept: AUDIOLOGY | Facility: OTHER | Age: 89
End: 2024-09-11
Attending: AUDIOLOGIST
Payer: COMMERCIAL

## 2024-09-11 DIAGNOSIS — H91.93 DECREASED HEARING OF BOTH EARS: Primary | ICD-10-CM

## 2024-09-11 PROCEDURE — V5299 HEARING SERVICE: HCPCS

## 2024-09-11 NOTE — PROGRESS NOTES
HEARING AID CHECK    BACKGROUND:  Jacklyn Hein, a 89 year old female, was seen today for an hearing aid check.  Ms. Hein wears Binaural Oticon Real 3 miniRITE R hearing aids.  Ms. Hein reported no concerns.    FINDINGS:  Visual inspection and cleaning provided.   C-stop showed cerumen impaction and changed with new. 5 mm open dome on right aid, and 6 mm DV domes on left aid dome changed with new. Battery was tested and good. Good listening check.    Reviewed cleaning, troubleshooting, and preventative care with patient. Any cleaning tools used were provided as customer courtesy.    Services performed and warranty reviewed with patient.    PLAN:  Based on patient report, no audiology appointment for adjustments needed. Patient had no further questions and reports satisfaction.         Grace Sen  Audiology Assistant  [facility]  [phone]

## 2024-09-17 LAB
MDC_IDC_LEAD_CONNECTION_STATUS: NORMAL
MDC_IDC_LEAD_IMPLANT_DT: NORMAL
MDC_IDC_LEAD_LOCATION: NORMAL
MDC_IDC_LEAD_LOCATION_DETAIL_1: NORMAL
MDC_IDC_LEAD_MFG: NORMAL
MDC_IDC_LEAD_MODEL: NORMAL
MDC_IDC_LEAD_POLARITY_TYPE: NORMAL
MDC_IDC_LEAD_SERIAL: NORMAL
MDC_IDC_MSMT_BATTERY_DTM: NORMAL
MDC_IDC_MSMT_BATTERY_REMAINING_LONGEVITY: 112 MO
MDC_IDC_MSMT_BATTERY_RRT_TRIGGER: NORMAL
MDC_IDC_MSMT_BATTERY_VOLTAGE: 3 V
MDC_IDC_MSMT_CAP_CHARGE_DTM: NORMAL
MDC_IDC_MSMT_CAP_CHARGE_ENERGY: 18 J
MDC_IDC_MSMT_CAP_CHARGE_TIME: 3.8 S
MDC_IDC_MSMT_CAP_CHARGE_TYPE: NORMAL
MDC_IDC_MSMT_LEADCHNL_LV_IMPEDANCE_VALUE: 380 OHM
MDC_IDC_MSMT_LEADCHNL_LV_IMPEDANCE_VALUE: 475 OHM
MDC_IDC_MSMT_LEADCHNL_LV_IMPEDANCE_VALUE: 798 OHM
MDC_IDC_MSMT_LEADCHNL_RA_IMPEDANCE_VALUE: 361 OHM
MDC_IDC_MSMT_LEADCHNL_RA_PACING_THRESHOLD_AMPLITUDE: 0.75 V
MDC_IDC_MSMT_LEADCHNL_RA_PACING_THRESHOLD_PULSEWIDTH: 0.4 MS
MDC_IDC_MSMT_LEADCHNL_RA_SENSING_INTR_AMPL: 1.1 MV
MDC_IDC_MSMT_LEADCHNL_RV_IMPEDANCE_VALUE: 361 OHM
MDC_IDC_MSMT_LEADCHNL_RV_IMPEDANCE_VALUE: 437 OHM
MDC_IDC_MSMT_LEADCHNL_RV_PACING_THRESHOLD_AMPLITUDE: 1.5 V
MDC_IDC_MSMT_LEADCHNL_RV_PACING_THRESHOLD_PULSEWIDTH: 0.4 MS
MDC_IDC_MSMT_LEADCHNL_RV_SENSING_INTR_AMPL: 3.9 MV
MDC_IDC_PG_IMPLANT_DTM: NORMAL
MDC_IDC_PG_MFG: NORMAL
MDC_IDC_PG_MODEL: NORMAL
MDC_IDC_PG_SERIAL: NORMAL
MDC_IDC_PG_TYPE: NORMAL
MDC_IDC_SESS_CLINIC_NAME: NORMAL
MDC_IDC_SESS_DTM: NORMAL
MDC_IDC_SESS_TYPE: NORMAL
MDC_IDC_SET_BRADY_AT_MODE_SWITCH_RATE: 171 {BEATS}/MIN
MDC_IDC_SET_BRADY_LOWRATE: 60 {BEATS}/MIN
MDC_IDC_SET_BRADY_MAX_SENSOR_RATE: 130 {BEATS}/MIN
MDC_IDC_SET_BRADY_MAX_TRACKING_RATE: 130 {BEATS}/MIN
MDC_IDC_SET_BRADY_MODE: NORMAL
MDC_IDC_SET_BRADY_PAV_DELAY_LOW: 160 MS
MDC_IDC_SET_BRADY_SAV_DELAY_LOW: 140 MS
MDC_IDC_SET_CRT_LVRV_DELAY: 10 MS
MDC_IDC_SET_CRT_PACED_CHAMBERS: NORMAL
MDC_IDC_SET_LEADCHNL_LV_PACING_AMPLITUDE: 1.5 V
MDC_IDC_SET_LEADCHNL_LV_PACING_ANODE_ELECTRODE_1: NORMAL
MDC_IDC_SET_LEADCHNL_LV_PACING_ANODE_LOCATION_1: NORMAL
MDC_IDC_SET_LEADCHNL_LV_PACING_CAPTURE_MODE: NORMAL
MDC_IDC_SET_LEADCHNL_LV_PACING_CATHODE_ELECTRODE_1: NORMAL
MDC_IDC_SET_LEADCHNL_LV_PACING_CATHODE_LOCATION_1: NORMAL
MDC_IDC_SET_LEADCHNL_LV_PACING_POLARITY: NORMAL
MDC_IDC_SET_LEADCHNL_LV_PACING_PULSEWIDTH: 0.4 MS
MDC_IDC_SET_LEADCHNL_RA_PACING_AMPLITUDE: 1.5 V
MDC_IDC_SET_LEADCHNL_RA_PACING_ANODE_ELECTRODE_1: NORMAL
MDC_IDC_SET_LEADCHNL_RA_PACING_ANODE_LOCATION_1: NORMAL
MDC_IDC_SET_LEADCHNL_RA_PACING_CAPTURE_MODE: NORMAL
MDC_IDC_SET_LEADCHNL_RA_PACING_CATHODE_ELECTRODE_1: NORMAL
MDC_IDC_SET_LEADCHNL_RA_PACING_CATHODE_LOCATION_1: NORMAL
MDC_IDC_SET_LEADCHNL_RA_PACING_POLARITY: NORMAL
MDC_IDC_SET_LEADCHNL_RA_PACING_PULSEWIDTH: 0.4 MS
MDC_IDC_SET_LEADCHNL_RA_SENSING_ANODE_ELECTRODE_1: NORMAL
MDC_IDC_SET_LEADCHNL_RA_SENSING_ANODE_LOCATION_1: NORMAL
MDC_IDC_SET_LEADCHNL_RA_SENSING_CATHODE_ELECTRODE_1: NORMAL
MDC_IDC_SET_LEADCHNL_RA_SENSING_CATHODE_LOCATION_1: NORMAL
MDC_IDC_SET_LEADCHNL_RA_SENSING_POLARITY: NORMAL
MDC_IDC_SET_LEADCHNL_RA_SENSING_SENSITIVITY: 0.3 MV
MDC_IDC_SET_LEADCHNL_RV_PACING_AMPLITUDE: 2.25 V
MDC_IDC_SET_LEADCHNL_RV_PACING_ANODE_ELECTRODE_1: NORMAL
MDC_IDC_SET_LEADCHNL_RV_PACING_ANODE_LOCATION_1: NORMAL
MDC_IDC_SET_LEADCHNL_RV_PACING_CAPTURE_MODE: NORMAL
MDC_IDC_SET_LEADCHNL_RV_PACING_CATHODE_ELECTRODE_1: NORMAL
MDC_IDC_SET_LEADCHNL_RV_PACING_CATHODE_LOCATION_1: NORMAL
MDC_IDC_SET_LEADCHNL_RV_PACING_POLARITY: NORMAL
MDC_IDC_SET_LEADCHNL_RV_PACING_PULSEWIDTH: 0.4 MS
MDC_IDC_SET_LEADCHNL_RV_SENSING_ANODE_ELECTRODE_1: NORMAL
MDC_IDC_SET_LEADCHNL_RV_SENSING_ANODE_LOCATION_1: NORMAL
MDC_IDC_SET_LEADCHNL_RV_SENSING_CATHODE_ELECTRODE_1: NORMAL
MDC_IDC_SET_LEADCHNL_RV_SENSING_CATHODE_LOCATION_1: NORMAL
MDC_IDC_SET_LEADCHNL_RV_SENSING_POLARITY: NORMAL
MDC_IDC_SET_LEADCHNL_RV_SENSING_SENSITIVITY: 0.3 MV
MDC_IDC_SET_ZONE_DETECTION_BEATS_DENOMINATOR: 16 {BEATS}
MDC_IDC_SET_ZONE_DETECTION_BEATS_DENOMINATOR: 32 {BEATS}
MDC_IDC_SET_ZONE_DETECTION_BEATS_DENOMINATOR: 40 {BEATS}
MDC_IDC_SET_ZONE_DETECTION_BEATS_NUMERATOR: 16 {BEATS}
MDC_IDC_SET_ZONE_DETECTION_BEATS_NUMERATOR: 30 {BEATS}
MDC_IDC_SET_ZONE_DETECTION_BEATS_NUMERATOR: 32 {BEATS}
MDC_IDC_SET_ZONE_DETECTION_INTERVAL: 240 MS
MDC_IDC_SET_ZONE_DETECTION_INTERVAL: 320 MS
MDC_IDC_SET_ZONE_DETECTION_INTERVAL: 350 MS
MDC_IDC_SET_ZONE_DETECTION_INTERVAL: 360 MS
MDC_IDC_SET_ZONE_DETECTION_INTERVAL: 400 MS
MDC_IDC_SET_ZONE_STATUS: NORMAL
MDC_IDC_SET_ZONE_TYPE: NORMAL
MDC_IDC_SET_ZONE_VENDOR_TYPE: NORMAL
MDC_IDC_STAT_AT_BURDEN_PERCENT: 0 %
MDC_IDC_STAT_AT_DTM_END: NORMAL
MDC_IDC_STAT_AT_DTM_START: NORMAL
MDC_IDC_STAT_BRADY_DTM_END: NORMAL
MDC_IDC_STAT_BRADY_DTM_START: NORMAL
MDC_IDC_STAT_BRADY_RA_PERCENT_PACED: 11 %
MDC_IDC_STAT_BRADY_RV_PERCENT_PACED: 1.48 %
MDC_IDC_STAT_CRT_DTM_END: NORMAL
MDC_IDC_STAT_CRT_DTM_START: NORMAL
MDC_IDC_STAT_CRT_LV_PERCENT_PACED: 98.28 %
MDC_IDC_STAT_CRT_PERCENT_PACED: 1.44 %
MDC_IDC_STAT_EPISODE_RECENT_COUNT: 0
MDC_IDC_STAT_EPISODE_RECENT_COUNT_DTM_END: NORMAL
MDC_IDC_STAT_EPISODE_RECENT_COUNT_DTM_START: NORMAL
MDC_IDC_STAT_EPISODE_TOTAL_COUNT: 0
MDC_IDC_STAT_EPISODE_TOTAL_COUNT: 2
MDC_IDC_STAT_EPISODE_TOTAL_COUNT_DTM_END: NORMAL
MDC_IDC_STAT_EPISODE_TOTAL_COUNT_DTM_START: NORMAL
MDC_IDC_STAT_EPISODE_TYPE: NORMAL
MDC_IDC_STAT_TACHYTHERAPY_ATP_DELIVERED_RECENT: 0
MDC_IDC_STAT_TACHYTHERAPY_ATP_DELIVERED_TOTAL: 0
MDC_IDC_STAT_TACHYTHERAPY_RECENT_DTM_END: NORMAL
MDC_IDC_STAT_TACHYTHERAPY_RECENT_DTM_START: NORMAL
MDC_IDC_STAT_TACHYTHERAPY_SHOCKS_ABORTED_RECENT: 0
MDC_IDC_STAT_TACHYTHERAPY_SHOCKS_ABORTED_TOTAL: 0
MDC_IDC_STAT_TACHYTHERAPY_SHOCKS_DELIVERED_RECENT: 0
MDC_IDC_STAT_TACHYTHERAPY_SHOCKS_DELIVERED_TOTAL: 0
MDC_IDC_STAT_TACHYTHERAPY_TOTAL_DTM_END: NORMAL
MDC_IDC_STAT_TACHYTHERAPY_TOTAL_DTM_START: NORMAL

## 2024-09-20 DIAGNOSIS — M47.815 SPONDYLOSIS OF THORACOLUMBAR REGION WITHOUT MYELOPATHY OR RADICULOPATHY: ICD-10-CM

## 2024-09-20 DIAGNOSIS — G62.9 NEUROPATHY: ICD-10-CM

## 2024-09-20 DIAGNOSIS — M48.062 SPINAL STENOSIS OF LUMBAR REGION WITH NEUROGENIC CLAUDICATION: ICD-10-CM

## 2024-09-20 NOTE — TELEPHONE ENCOUNTER
[facility]  Last Written Prescription Date: 6/25/24  Last Fill Quantity: 180 # of Refills: 0  Last Office Visit: 6/26/24

## 2024-09-23 RX ORDER — HYDROCODONE BITARTRATE AND ACETAMINOPHEN 5; 325 MG/1; MG/1
1 TABLET ORAL 3 TIMES DAILY PRN
Qty: 180 TABLET | Refills: 0 | Status: SHIPPED | OUTPATIENT
Start: 2024-09-23

## 2024-10-09 DIAGNOSIS — M48.062 SPINAL STENOSIS OF LUMBAR REGION WITH NEUROGENIC CLAUDICATION: ICD-10-CM

## 2024-10-09 DIAGNOSIS — G62.9 NEUROPATHY: ICD-10-CM

## 2024-10-09 RX ORDER — GABAPENTIN 400 MG/1
CAPSULE ORAL
Qty: 90 CAPSULE | Refills: 11 | Status: SHIPPED | OUTPATIENT
Start: 2024-10-09

## 2024-10-14 ENCOUNTER — OFFICE VISIT (OUTPATIENT)
Dept: CARDIOLOGY | Facility: OTHER | Age: 89
End: 2024-10-14
Attending: INTERNAL MEDICINE
Payer: COMMERCIAL

## 2024-10-14 VITALS
HEART RATE: 89 BPM | TEMPERATURE: 96.6 F | HEIGHT: 64 IN | WEIGHT: 129.7 LBS | OXYGEN SATURATION: 96 % | RESPIRATION RATE: 15 BRPM | DIASTOLIC BLOOD PRESSURE: 64 MMHG | SYSTOLIC BLOOD PRESSURE: 128 MMHG | BODY MASS INDEX: 22.14 KG/M2

## 2024-10-14 DIAGNOSIS — E78.2 MIXED HYPERLIPIDEMIA: ICD-10-CM

## 2024-10-14 DIAGNOSIS — I50.32 CHRONIC HEART FAILURE WITH PRESERVED EJECTION FRACTION (H): ICD-10-CM

## 2024-10-14 DIAGNOSIS — I48.0 PAROXYSMAL ATRIAL FIBRILLATION (H): ICD-10-CM

## 2024-10-14 DIAGNOSIS — J44.9 CHRONIC OBSTRUCTIVE PULMONARY DISEASE, UNSPECIFIED COPD TYPE (H): ICD-10-CM

## 2024-10-14 DIAGNOSIS — J81.1 CHRONIC PULMONARY EDEMA: ICD-10-CM

## 2024-10-14 DIAGNOSIS — I42.8 NON-ISCHEMIC CARDIOMYOPATHY (H): ICD-10-CM

## 2024-10-14 DIAGNOSIS — I10 ESSENTIAL HYPERTENSION: ICD-10-CM

## 2024-10-14 DIAGNOSIS — I50.9 CONGESTIVE HEART FAILURE, NYHA CLASS 2, UNSPECIFIED CONGESTIVE HEART FAILURE TYPE (H): ICD-10-CM

## 2024-10-14 DIAGNOSIS — R06.09 DOE (DYSPNEA ON EXERTION): Primary | ICD-10-CM

## 2024-10-14 DIAGNOSIS — I44.7 LBBB (LEFT BUNDLE BRANCH BLOCK): ICD-10-CM

## 2024-10-14 DIAGNOSIS — N18.32 STAGE 3B CHRONIC KIDNEY DISEASE (H): ICD-10-CM

## 2024-10-14 DIAGNOSIS — Z87.891 HISTORY OF TOBACCO ABUSE: ICD-10-CM

## 2024-10-14 DIAGNOSIS — Z95.810 AUTOMATIC IMPLANTABLE CARDIOVERTER-DEFIBRILLATOR IN SITU: ICD-10-CM

## 2024-10-14 PROCEDURE — 99215 OFFICE O/P EST HI 40 MIN: CPT | Performed by: INTERNAL MEDICINE

## 2024-10-14 PROCEDURE — G0463 HOSPITAL OUTPT CLINIC VISIT: HCPCS

## 2024-10-14 RX ORDER — SACUBITRIL AND VALSARTAN 24; 26 MG/1; MG/1
1 TABLET, FILM COATED ORAL 2 TIMES DAILY
Qty: 180 TABLET | Refills: 3 | Status: SHIPPED | OUTPATIENT
Start: 2024-10-14 | End: 2024-11-13 | Stop reason: DRUGHIGH

## 2024-10-14 RX ORDER — FUROSEMIDE 20 MG/1
20 TABLET ORAL EVERY OTHER DAY
Qty: 90 TABLET | Refills: 3 | Status: SHIPPED | OUTPATIENT
Start: 2024-10-14

## 2024-10-14 RX ORDER — CARVEDILOL 25 MG/1
25 TABLET ORAL 2 TIMES DAILY WITH MEALS
Qty: 180 TABLET | Refills: 3 | Status: SHIPPED | OUTPATIENT
Start: 2024-10-14

## 2024-10-14 ASSESSMENT — PAIN SCALES - GENERAL: PAINLEVEL: NO PAIN (0)

## 2024-10-14 NOTE — PROGRESS NOTES
[facility] HEART CARE   CARDIOLOGY PROGRESS NOTE     Chief Complaint   Patient presents with    Follow Up     Echo follow up          Diagnosis:  1. CHF-systolic.    -40-45% on 8/29/24.  -50-55%/grade 1 on 6/20/23.   -30-35%/diastolic on 1/5/21.   -40-45%% on 12/01/2019.  2.  COPD.     -Severe on 5/15/2017.  -Reduction in severity of COPD-severe to mild/moderate.   -Pulmonary concern for asthma based on PFT's from 5/17/17.  3.  Nonischemic cardiomyopathy with NYHA classification 2.  4.  ICD placement.    -Gen change on 1/30/23, [facility].     -11/11/2014.   5.  Hypertension-uncontrolled.  6.  Hyperlipidemia-uncontrolled.  7.  Tobacco abuse.   -Quitting on 2/1/1998  8.  LBBB.  9.  SOB-significantly improved.  10.  CKD-3.  11.  Hypocalcemia resolved..  12.  A. Fib.   -Second set 5/14/2024.   -Less than 1% in 8/9/2022.  -Up to 23 seconds on 4/9/19.    13.  CHADSVASC score of 4.   14.  Mitral and tricuspid regurgitation-mild to moderate on 1/5/2021.  15.  Bacteremia on 1/5/2021 with Klebsiella pneumonia with septic shock.  16.  MR.   Mod on 8/29/24.  17.  AI.   -Mod on 8/29/24.  18.  TR.   -Mild/mod on 8/29/24.    Assessment/Plan:    1.  CHF: Now with reduced EF.  Patient was taken off of Entresto and Jardiance in 2023.  She was taken off the Jardiance for UTI and Entresto stopped by primary.  Fortunately, heart function has declined.  She is more agreeable to going back on Entresto 24/26 mg twice a day.  She is hesitant to go back on SGLT2 inhibitor because of frequent UTIs, which is reasonable.  Will increase Coreg from 12.5 mg twice a day to 25 mg twice a day.  Could consider spironolactone in the future.  However, she has had problems with hypotension the past and will be more cautious starting additional medications.  Will plan to see her in 1 month for follow-up potentially to titrate Entresto to 49/51 mg twice a day pending blood pressure and electrolyte tolerance.  Will plan for labs in 1 month prior to being seen in  follow-up.  Last seen on 8/14/2024.  Thankfully, patient is not having significant swelling or edema.  Discussed salt restriction, fluid restriction, and daily weights.  Will start back on Lasix 20 mg every other day which she has been taking consistently secondary to valvular dysfunction as described below.  2.  Valvular dysfunction: Moderate AI/MR, and mild to moderate TR.  Discussed.  Will start back on Lasix.  Plan for an echocardiogram in 6 months.  Patient not having symptoms.  Patient told if she has swelling or shortness of breath, should plan for an echocardiogram sooner.  Continue heart failure management as described above.  3.  Hypertension.  Previously had been having hypotension.  Now blood pressure is elevated.  Blood pressure today is 140/74.  Increase Coreg from 12.5 mg twice a day and start Entresto 24/26 mg twice a day with potential titration in the future.  If blood pressure still is elevated future, will consider spironolactone for blood pressure/heart failure.    4.  Hyperlipidemia: Uncontrolled based on lipid panel from 12/4/2019.  5.  Follow-up after echocardiogram in 3 months.  Coreg increased to 12.5 mg from 6.25.  Discuss whether or not patient is willing to go back on Entresto for heart failure.      Interval history:  See above.      HPI:    Jacklyn HA Hein is being seen by cardiology for dyspnea with chronic systolic EF of 40-45% and evidence for diastolic heart failure on 12/31/2019.     She has been short of breath with a diagnosis of asthma and COPD.  She is followed for a history of severe nonischemic cardiomyopathy with a previously ejection fraction at less than 35%. More recently, her EF on 12/31/2019 was 40-45%.  EF of 30-35% on 1/5/21. She also has diastolic heart failure, ICD placed on 11/11/16, her recent visit with pulmonary secondary to what was thought to be severe COPD but was downgraded to mild to moderate with a greater concern for asthma, per the patient.     She  was started on some breathing treatments for asthma/COPD by pulmonary.  With these treatments, she says she feels much better but remains short of breath.       With having diastolic and systolic heart failure, she has minimal to no lower extremity edema. Her activity has been limited secondary to chronic back pain and generalized weakness.  It was not for back pain, she would be doing really well.     She has a history of severe nonischemic cardiomyopathy S/P ICD placement on 11/11/2014. She had her ICD checked on 10/22/2019.  It showed she was bi-V paced 96.8% of the time with 3.0 years left on her battery.  = 98.1%. She had up to 20 seconds of A. fib with a total of 3 episodes.  Her device was described as functioning appropriately.     Echo from 12/31/2019.  She has an EF of 40% to 45%, grade 1 diastolic dysfunction, mild left atrial enlargement, mild to moderate AI, and mild MI      Relevant testing:  ECHO on 8/29/24:  Left ventricular size is normal. Left ventricular wall thickness is normal.  The visual ejection fraction is 40-45%. Mild diffuse hypokinesis is present.  Right ventricular function, chamber size, wall motion, and thickness are normal.  Severe left atrial enlargement is present.  Moderate mitral insufficiency is present. Moderate aortic insufficiency is present.  Mild to moderate tricuspid insufficiency is present.  The inferior vena cava was normal in size with preserved respiratory variability.     Compared to prior TTE, valvular regurgitations are slightly worse.      ECHO on 6/20/23:  Global and regional left ventricular function is normal with an EF of 55-60%.  Grade I or early diastolic dysfunction.  Global right ventricular function is normal.  Mild to moderate left atrial enlargement is present.  Mild mitral annular calcification is present.  Moderate mitral insufficiency is present.  No aortic stenosis is present.  Mild aortic insufficiency is present.  Mild tricuspid insufficiency is  present.  Right ventricular systolic pressure is 25 mmHg above the right atrial pressure.  This study was compared with the study from 8/24/21 .  No significant changes noted.    ECHO on 8/24/21:  Technically difficult study. Poor acoustic windows.  Left ventricular function is decreased. The ejection fraction is 50-55%  (borderline). Mild diffuse hypokinesis is present. Diastolic Doppler findings (E/E' ratio and/or other parameters) suggest left ventricular filling pressures are increased.  Global right ventricular function is normal. The right ventricle is normal size.  No significant valvular abnormalities.  The estimated PA systolic pressure is 17 mmHg above the RA pressure. The RA pressure could not be estimated.  This study was compared with the study from 01/06/2021. There is no  significant change in the biventricular function upon direct visual  comparison.    Echo on 12/31/2019:  No pericardial effusion is present.  Left ventricular size is normal.  The Ejection Fraction is estimated at 40-45%.  Grade I or early diastolic dysfunction.  Mild diffuse hypokinesis is present.  The right ventricle is normal size.  Global right ventricular function is normal.  Mild left atrial enlargement is present.  Mild mitral insufficiency is present.  Mild to moderate aortic insufficiency is present.  The mean gradient across the aortic valve is 3.9 mmHg.  Trace tricuspid insufficiency is present.  Right ventricular systolic pressure is 20 mmHg above the right atrial pressure.  The pulmonic valve is normal.  The aorta root is normal.    Echocardiogram on 8/24/2021:  Technically difficult study. Poor acoustic windows.  Left ventricular function is decreased. The ejection fraction is 50-55%  (borderline). Mild diffuse hypokinesis is present. Diastolic Doppler findings  (E/E' ratio and/or other parameters) suggest left ventricular filling  pressures are increased.  Global right ventricular function is normal. The right  ventricle is normal  size.  No significant valvular abnormalities.  The estimated PA systolic pressure is 17 mmHg above the RA pressure. The RA  pressure could not be estimated.  This study was compared with the study from 01/06/2021. There is no  significant change in the biventricular function upon direct visual  comparison.      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    Insomnia, unspecified 01/01/2011    LBBB (left bundle branch block) 01/01/2011    Neck pain, chronic 01/01/2011    Non-ischemic cardiomyopathy (H) 12/10/2013    Obesity, unspecified 01/01/2011    Osteopenia 01/01/2011    Pacemaker     Pain in joint, lower leg 07/31/2006    Pure hypercholesterolemia 06/07/2002    Unspecified essential hypertension 01/01/2011       Past Surgical History:   Procedure Laterality Date    APPENDECTOMY      ARTHROSCOPY KNEE RT/LT  2009    RT    BACK SURGERY  02/06/2020    BACK SURGERY  06/2021    fractured vert repair    CARDIAC SURGERY  05/06/2014    Pacemaker    Cataract extraction  2009    Bilateral    CHOLECYSTECTOMY      open     COLONOSCOPY  05/2001    Normal     COLONOSCOPY N/A 10/20/2016    Procedure: COLONOSCOPY;  Surgeon: Charan Montejo MD;  Location: [facility] OR    COLONOSCOPY N/A 6/22/2023    Procedure: COLONOSCOPY;  Surgeon: Royal Sanz MD;  Location: [facility] OR    colonoscopy with polypectomy  2011    Repeat 3 years     CT CORONARY ANGIOGRAM  1999    normal    ESOPHAGOSCOPY, GASTROSCOPY, DUODENOSCOPY (EGD), COMBINED N/A 6/21/2023    Procedure: ESOPHAGOGASTRODUODENOSCOPY;  Surgeon: Royal Sanz MD;  Location: [facility] OR    HERPES ZOSTER PCR ([facility])  2007    IR CONSULTATION FOR IR EXAM  6/23/2020    IR CONSULTATION FOR IR EXAM  5/31/2023    IR TRIGGER POINT INJ 3 OR MORE MUSCLES  6/6/2023    left eye  scar tissue      normal echo      fen-phen use      x6      REPLACE PACEMAKER GENERATOR N/A 2023    Procedure: REPLACEMENT, PULSE GENERATOR, CARDIAC PACEMAKER;  Surgeon: Isiah Hodge MD;  Location:  OR    SURGICAL RADIOLOGY PROCEDURE N/A 2016    Procedure: SURGICAL RADIOLOGY PROCEDURE;  Surgeon: Provider, Generic Perianesthesia Nursing;  Location: [facility] OR       No Known Allergies    Current Outpatient Medications   Medication Sig Dispense Refill    Calcium Carbonate-Vitamin D (CALCIUM 600 + D OR) Take 1 tablet by mouth every morning      carvedilol (COREG) 12.5 MG tablet Take 1 tablet (12.5 mg) by mouth 2 times daily (with meals) 180 tablet 3    diphenhydrAMINE-acetaminophen (TYLENOL PM)  MG tablet Take 2 tablets by mouth at bedtime      DULERA 200-5 MCG/ACT inhaler INHALE 2 PUFFS INTO THE LUNGS 2 TIMES DAILY 13 g 0    gabapentin (NEURONTIN) 400 MG capsule TAKE 1 CAPSULE BY MOUTH 3 TIMES DAILY FOR PAIN 90 capsule 11    HYDROcodone-acetaminophen (NORCO) 5-325 MG tablet Take 1 tablet by mouth 3 times daily as needed for severe pain. \ 180 tablet 0    Multiple Vitamin (MULTI-VITAMIN DAILY PO) Take 1 tablet by mouth every morning      rosuvastatin (CRESTOR) 10 MG tablet TAKE 1 TABLET BY MOUTH DAILY 90 tablet 3    umeclidinium (INCRUSE ELLIPTA) 62.5 MCG/ACT inhaler INHALE 1 PUFF INTO THE LUNGS DAILY 30 each 11    vitamin C (ASCORBIC ACID) 500 MG tablet Take 500 mg by mouth daily      furosemide (LASIX) 20 MG tablet Take 1 tablet (20 mg) by mouth daily (Patient not taking: Reported on 10/14/2024) 30 tablet 5       Social History     Socioeconomic History    Marital status:      Spouse name: Not on file    Number of children: Not on file    Years of education: Not on file    Highest education level: Not on file   Occupational History    Occupation:       Employer: RETIRED   Tobacco Use    Smoking status: Former     Current packs/day: 0.00     Average packs/day: 1 pack/day for  15.1 years (15.1 ttl pk-yrs)     Types: Cigarettes     Start date: 1983     Quit date: 1998     Years since quittin.7     Passive exposure: Never    Smokeless tobacco: Never    Tobacco comments:     Passive Exposure: No; Longest Tobacco Free: 15 years    Vaping Use    Vaping status: Never Used   Substance and Sexual Activity    Alcohol use: Not Currently     Comment: Occasionally     Drug use: No    Sexual activity: Not Currently   Other Topics Concern     Service Not Asked    Blood Transfusions Not Asked    Caffeine Concern Yes     Comment: Coffee 5 cups daily     Occupational Exposure Not Asked    Hobby Hazards Not Asked    Sleep Concern Not Asked    Stress Concern Not Asked    Weight Concern Not Asked    Special Diet Not Asked    Back Care Not Asked    Exercise Not Asked    Bike Helmet Not Asked    Seat Belt Not Asked    Self-Exams Not Asked    Parent/sibling w/ CABG, MI or angioplasty before 65F 55M? No   Social History Narrative    Not on file     Social Determinants of Health     Financial Resource Strain: Low Risk  (3/15/2024)    Financial Resource Strain     Within the past 12 months, have you or your family members you live with been unable to get utilities (heat, electricity) when it was really needed?: No   Food Insecurity: Low Risk  (3/15/2024)    Food Insecurity     Within the past 12 months, did you worry that your food would run out before you got money to buy more?: No     Within the past 12 months, did the food you bought just not last and you didn t have money to get more?: No   Transportation Needs: Low Risk  (3/15/2024)    Transportation Needs     Within the past 12 months, has lack of transportation kept you from medical appointments, getting your medicines, non-medical meetings or appointments, work, or from getting things that you need?: No   Physical Activity: Not on file   Stress: Not on file   Social Connections: Not on file   Interpersonal Safety: Not on file   Housing  Stability: Low Risk  (3/15/2024)    Housing Stability     Do you have housing? : Yes     Are you worried about losing your housing?: No       LAB RESULTS:   Office Visit on 08/08/2020   Component Date Value Ref Range Status    COVID-19 Virus PCR to [facility] - So* 08/08/2020 Nasopharyngeal   Final    COVID-19 Virus PCR to [facility] - Re* 08/08/2020 Test received-See reflex to IDDL test SARS CoV2 (COVID-19) Virus RT-PCR   Final    SARS-CoV-2 Virus Specimen Source 08/08/2020 Nasopharyngeal   Final    SARS-CoV-2 PCR Result 08/08/2020 NEGATIVE   Final    SARS-CoV-2 PCR Comment 08/08/2020    Final                    Value:Testing was performed using the Carbon Black SARS-CoV-2 Assay on the [employer] Instrument System.   Additional information about this Emergency Use Authorization (EUA) assay can be found via   the Lab Guide.     Ancillary Procedure on 08/04/2020   Component Date Value Ref Range Status    Date Time Interrogation Session 08/05/2020 [id number]   Final    Implantable Pulse Generator Manufa* 08/05/2020 Medtronic   Final    Implantable Pulse Generator Model 08/05/2020 [id number] Viva XT CRT-D   Final    Implantable Pulse Generator Serial* 08/05/2020 [id number]   Final    Type Interrogation Session 08/05/2020 Remote    Final    Clinic Name 08/05/2020 [facility]   Final    Implantable Pulse Generator Type 08/05/2020 Cardiac Resynchronization Therapy - Defibrillator   Final    Implantable Pulse Generator Implan* 08/05/2020 [id number]   Final    Implantable Lead  08/05/2020 Medtronic   Final    Implantable Lead Model 08/05/2020 4296 Attain Ability Plus   Final    Implantable Lead Serial Number 08/05/2020 [id number]   Final    Implantable Lead Implant Date 08/05/2020 [id number]   Final    Implantable Lead Polarity Type 08/05/2020 Bipolar Lead   Final    Implantable Lead Location Detail 1 08/05/2020 UNKNOWN   Final    Implantable Lead Location 08/05/2020 Left Ventricle   Final    Implantable  Lead  08/05/2020 Medtronic   Final    Implantable Lead Model 08/05/2020 5076 CapSureFix Novus   Final    Implantable Lead Serial Number 08/05/2020 [id number]   Final    Implantable Lead Implant Date 08/05/2020 [id number]   Final    Implantable Lead Polarity Type 08/05/2020 Bipolar Lead   Final    Implantable Lead Location Detail 1 08/05/2020 APPENDAGE   Final    Implantable Lead Location 08/05/2020 Right Atrium   Final    Implantable Lead  08/05/2020 Medtronic   Final    Implantable Lead Model 08/05/2020 6947 Sprint Quattro Secure   Final    Implantable Lead Serial Number 08/05/2020 [id number]   Final    Implantable Lead Implant Date 08/05/2020 [id number]   Final    Implantable Lead Polarity Type 08/05/2020 Quadripolar Lead   Final    Implantable Lead Location Detail 1 08/05/2020 APEX   Final    Implantable Lead Location 08/05/2020 Right Ventricle   Final    Vamshi Setting Mode (NBG Code) 08/05/2020 DDDR   Final    Vamshi Setting Lower Rate Limit 08/05/2020 60  [beats]/min Final    Vamshi Setting Maximum Tracking Rate 08/05/2020 130  [beats]/min Final    Vamshi Setting Maximum Sensor Rate 08/05/2020 130  [beats]/min Final    Vamshi Setting NAJMA Delay Low 08/05/2020 140  ms Final    Vamshi Setting PAV Delay Low 08/05/2020 160  ms Final    Vamshi Setting AT Mode Switch Rate 08/05/2020 171  [beats]/min Final    Lead Channel Setting Sensing Polar* 08/05/2020 Bipolar   Final    Lead Channel Setting Sensing Anode* 08/05/2020 Right Atrium   Final    Lead Channel Setting Sensing Anode* 08/05/2020 Ring   Final    Lead Channel Setting Sensing Catho* 08/05/2020 Right Atrium   Final    Lead Channel Setting Sensing Catho* 08/05/2020 Tip   Final    Lead Channel Setting Sensing Sensi* 08/05/2020 0.3  mV Final    Lead Channel Setting Sensing Polar* 08/05/2020 Bipolar   Final    Lead Channel Setting Sensing Anode* 08/05/2020 Right Ventricle   Final    Lead Channel Setting Sensing Anode* 08/05/2020 Coil   Final    Lead  Channel Setting Sensing Catho* 08/05/2020 Right Ventricle   Final    Lead Channel Setting Sensing Catho* 08/05/2020 Tip   Final    Lead Channel Setting Sensing Sensi* 08/05/2020 0.3  mV Final    Ventricular chambers paced during * 08/05/2020 LVOnly   Final    Lead Channel Setting Pacing Polari* 08/05/2020 Bipolar   Final    Lead Channel Setting Pacing Anode * 08/05/2020 Right Atrium   Final    Lead Channel Setting Pacing Anode * 08/05/2020 Ring   Final    Lead Channel Setting Sensing Catho* 08/05/2020 Right Atrium   Final    Lead Channel Setting Sensing Catho* 08/05/2020 Tip   Final    Lead Channel Setting Pacing Pulse * 08/05/2020 0.4  ms Final    Lead Channel Setting Pacing Amplit* 08/05/2020 1.5  V Final    Lead Channel Setting Pacing Captur* 08/05/2020 Adaptive   Final    Lead Channel Setting Pacing Polari* 08/05/2020 Bipolar   Final    Lead Channel Setting Pacing Anode * 08/05/2020 Right Ventricle   Final    Lead Channel Setting Pacing Anode * 08/05/2020 Ring   Final    Lead Channel Setting Sensing Catho* 08/05/2020 Right Ventricle   Final    Lead Channel Setting Sensing Catho* 08/05/2020 Tip   Final    Lead Channel Setting Pacing Pulse * 08/05/2020 0.4  ms Final    Lead Channel Setting Pacing Amplit* 08/05/2020 2  V Final    Lead Channel Setting Pacing Captur* 08/05/2020 Adaptive   Final    Lead Channel Setting Pacing Polari* 08/05/2020 Bipolar   Final    Lead Channel Setting Pacing Anode * 08/05/2020 Right Ventricle   Final    Lead Channel Setting Pacing Anode * 08/05/2020 Coil   Final    Lead Channel Setting Sensing Catho* 08/05/2020 Left Ventricle   Final    Lead Channel Setting Sensing Catho* 08/05/2020 Tip   Final    Lead Channel Setting Pacing Pulse * 08/05/2020 0.6  ms Final    Lead Channel Setting Pacing Amplit* 08/05/2020 1.5  V Final    Lead Channel Setting Pacing Captur* 08/05/2020 Adaptive   Final    Zone Setting Type Category 08/05/2020 VF   Final    Zone Setting Detection Interval 08/05/2020 320   ms Final    Zone Setting Detection Beats Numer* 08/05/2020 30  [beats] Final    Zone Setting Detection Beats Denom* 08/05/2020 40  [beats] Final    Zone Setting Type Category 08/05/2020 VT   Final    Zone Setting Detection Interval 08/05/2020 Blank   Final    Zone Setting Type Category 08/05/2020 VT   Final    Zone Setting Detection Interval 08/05/2020 360  ms Final    Zone Setting Type Category 08/05/2020 VT   Final    Zone Setting Detection Interval 08/05/2020 400  ms Final    Zone Setting Type Category 08/05/2020 ATRIAL_FIBRILLATION   Final    Zone Setting Type Category 08/05/2020 AT/AF   Final    Zone Setting Detection Interval 08/05/2020 350  ms Final    Lead Channel Impedance Value 08/05/2020 437  ohm Final    Lead Channel Sensing Intrinsic Amp* 08/05/2020 2.375  mV Final    Lead Channel Sensing Intrinsic Amp* 08/05/2020 2.375  mV Final    Lead Channel Pacing Threshold Ampl* 08/05/2020 0.375  V Final    Lead Channel Pacing Threshold Puls* 08/05/2020 0.4  ms Final    Lead Channel Impedance Value 08/05/2020 399  ohm Final    Lead Channel Impedance Value 08/05/2020 323  ohm Final    Lead Channel Sensing Intrinsic Amp* 08/05/2020 5  mV Final    Lead Channel Sensing Intrinsic Amp* 08/05/2020 5  mV Final    Lead Channel Pacing Threshold Ampl* 08/05/2020 0.625  V Final    Lead Channel Pacing Threshold Puls* 08/05/2020 0.4  ms Final    Lead Channel Impedance Value 08/05/2020 893  ohm Final    Lead Channel Impedance Value 08/05/2020 456  ohm Final    Lead Channel Impedance Value 08/05/2020 570  ohm Final    Lead Channel Pacing Threshold Ampl* 08/05/2020 0.5  V Final    Lead Channel Pacing Threshold Puls* 08/05/2020 0.6  ms Final    Battery Date Time of Measurements 08/05/2020 20200804012400   Final    Battery Status 08/05/2020 OK   Final    Battery RRT Trigger 08/05/2020 2.727   Final    Battery Remaining Longevity 08/05/2020 26  mo Final    Battery Voltage 08/05/2020 2.94  V Final    Capacitor Charge Type 08/05/2020  Reformation   Final    Capacitor Last Charge Date Time 08/05/2020 [id number]   Final    Capacitor Charge Time 08/05/2020 4.314   Final    Capacitor Charge Energy 08/05/2020 18  J Final    Vamshi Statistic Date Time Start 08/05/2020 [id number]   Final    Vamshi Statistic Date Time End 08/05/2020 20200804012403   Final    Vamshi Statistic RA Percent Paced 08/05/2020 9.93  % Final    Vamshi Statistic RV Percent Paced 08/05/2020 7.92  % Final    CRT Statistic LV Percent Paced 08/05/2020 98.09  % Final    Vamshi Statistic AP  Percent 08/05/2020 9.75  % Final    Vamshi Statistic AS  Percent 08/05/2020 88.58  % Final    Vamshi Statistic AP VS Percent 08/05/2020 0.19  % Final    Vamshi Statistic AS VS Percent 08/05/2020 1.47  % Final    CRT Statistic Date Time Start 08/05/2020 [id number]   Final    CRT Statistic Date Time End 08/05/2020 [id number]   Final    CRT Statistic CRT Percent Paced 08/05/2020 98.09  % Final    Atrial Tachy Statistic Date Time S* 08/05/2020 [id number]   Final    Atrial Tachy Statistic Date Time E* 08/05/2020 [id number]   Final    Atrial Tachy Statistic AT/AF Burde* 08/05/2020 0.1  % Final    Therapy Statistic Recent Shocks De* 08/05/2020 0   Final    Therapy Statistic Recent Shocks Ab* 08/05/2020 0   Final    Therapy Statistic Recent ATP Deliv* 08/05/2020 0   Final    Therapy Statistic Recent Date Time* 08/05/2020 [id number]   Final    Therapy Statistic Recent Date Time* 08/05/2020 [id number]   Final    Therapy Statistic Total Shocks Del* 08/05/2020 1   Final    Therapy Statistic Total Shocks Abo* 08/05/2020 0   Final    Therapy Statistic Total ATP Delive* 08/05/2020 0   Final    Therapy Statistic Total  Date Time* 08/05/2020 [id number]   Final    Therapy Statistic Total  Date Time* 08/05/2020 20200804012403   Final    Episode Statistic Recent Count 08/05/2020 1   Final    Episode Statistic Type Category 08/05/2020 AT/AF   Final    Episode Statistic Recent Count  08/05/2020 0   Final    Episode Statistic Type Category 08/05/2020 SVT   Final    Episode Statistic Recent Count 08/05/2020 0   Final    Episode Statistic Type Category 08/05/2020 VT   Final    Episode Statistic Recent Count 08/05/2020 0   Final    Episode Statistic Type Category 08/05/2020 VF   Final    Episode Statistic Recent Count 08/05/2020 0   Final    Episode Statistic Type Category 08/05/2020 VT   Final    Episode Statistic Recent Count 08/05/2020 0   Final    Episode Statistic Type Category 08/05/2020 VT   Final    Episode Statistic Recent Count 08/05/2020 0   Final    Episode Statistic Type Category 08/05/2020 VT   Final    Episode Statistic Recent Date Time* 08/05/2020 20200504124338   Final    Episode Statistic Recent Date Time* 08/05/2020 20200804012403   Final    Episode Statistic Recent Date Time* 08/05/2020 [id number]   Final    Episode Statistic Recent Date Time* 08/05/2020 20200804012403   Final    Episode Statistic Recent Date Time* 08/05/2020 [id number]   Final    Episode Statistic Recent Date Time* 08/05/2020 20200804012403   Final    Episode Statistic Recent Date Time* 08/05/2020 20200504124338   Final    Episode Statistic Recent Date Time* 08/05/2020 20200804012403   Final    Episode Statistic Recent Date Time* 08/05/2020 20200504124338   Final    Episode Statistic Recent Date Time* 08/05/2020 20200804012403   Final    Episode Statistic Recent Date Time* 08/05/2020 20200504124338   Final    Episode Statistic Recent Date Time* 08/05/2020 20200804012403   Final    Episode Statistic Recent Date Time* 08/05/2020 20200504124338   Final    Episode Statistic Recent Date Time* 08/05/2020 20200804012403   Final    Episode Statistic Total Count 08/05/2020 0   Final    Episode Statistic Type Category 08/05/2020 AT/AF   Final    Episode Statistic Total Count 08/05/2020 0   Final    Episode Statistic Type Category 08/05/2020 SVT   Final    Episode Statistic Total Count 08/05/2020 66   Final     Episode Statistic Type Category 08/05/2020 VT   Final    Episode Statistic Total Count 08/05/2020 1   Final    Episode Statistic Type Category 08/05/2020 VF   Final    Episode Statistic Total Count 08/05/2020 0   Final    Episode Statistic Type Category 08/05/2020 VT   Final    Episode Statistic Total Count 08/05/2020 0   Final    Episode Statistic Type Category 08/05/2020 VT   Final    Episode Statistic Total Count 08/05/2020 0   Final    Episode Statistic Type Category 08/05/2020 VT   Final    Episode Statistic Total Date Time * 08/05/2020 [id number]   Final    Episode Statistic Total Date Time * 08/05/2020 20200804012403   Final    Episode Statistic Total Date Time * 08/05/2020 20140506113519   Final    Episode Statistic Total Date Time * 08/05/2020 20200804012403   Final    Episode Statistic Total Date Time * 08/05/2020 20140506113519   Final    Episode Statistic Total Date Time * 08/05/2020 20200804012403   Final    Episode Statistic Total Date Time * 08/05/2020 20140506113519   Final    Episode Statistic Total Date Time * 08/05/2020 20200804012403   Final    Episode Statistic Total Date Time * 08/05/2020 20140506113519   Final    Episode Statistic Total Date Time * 08/05/2020 20200804012403   Final    Episode Statistic Total Date Time * 08/05/2020 20140506113519   Final    Episode Statistic Total Date Time * 08/05/2020 20200804012403   Final    Episode Statistic Total Date Time * 08/05/2020 20140506113519   Final    Episode Statistic Total Date Time * 08/05/2020 20200804012403   Final    Episode Identifier 08/05/2020 716   Final    Episode Type Category 08/05/2020 VSE   Final    Episode Date Time 08/05/2020 [id number]   Final    Episode Duration 08/05/2020 9  s Final    Episode Identifier 08/05/2020 715   Final    Episode Type Category 08/05/2020 VSE   Final    Episode Date Time 08/05/2020 [id number]   Final    Episode Duration 08/05/2020 3  s Final    Episode Identifier 08/05/2020 714   Final     "Episode Type Category 08/05/2020 VSE   Final    Episode Date Time 08/05/2020 20200625235723   Final    Episode Duration 08/05/2020 4  s Final    Episode Identifier 08/05/2020 713   Final    Episode Type Category 08/05/2020 VSE   Final    Episode Date Time 08/05/2020 20200607131142   Final    Episode Duration 08/05/2020 6  s Final    Episode Identifier 08/05/2020 712   Final    Episode Type Category 08/05/2020 VSE   Final    Episode Date Time 08/05/2020 20200523024049   Final    Episode Duration 08/05/2020 18  s Final    Episode Identifier 08/05/2020 711   Final    Episode Type Category 08/05/2020 VSE   Final    Episode Date Time 08/05/2020 [id number]   Final    Episode Duration 08/05/2020 4  s Final        Review of systems: Negative except that which was noted in the HPI.    Physical examination:  BP (!) 144/74 (BP Location: Left arm, Patient Position: Sitting, Cuff Size: Adult Regular)   Pulse 89   Temp (!) 96.6  F (35.9  C) (Tympanic)   Resp 15   Ht 1.626 m (5' 4\")   Wt 58.8 kg (129 lb 11.2 oz)   SpO2 96%   BMI 22.26 kg/m      GENERAL APPEARANCE: healthy, alert and no distress  CHEST: lungs clear to auscultation.  CARDIOVASCULAR: Irregular rate irregular rhythm, normal S1 with physiologic split S2, no S3 or S4 and no murmur, click or rub  EXTREMITIES: no clubbing, cyanosis but with mild lower extremity edema.    Total time spent on day of visit, including review of tests, obtaining/reviewing separately obtained history, ordering medications/tests/procedures, communicating with PCP/consultants, and documenting in electronic medical record: 40 minutes.           Thank you for allowing me to participate in the care of your patient. Please do not hesitate to contact me if you have any questions.     Kodi Garcia, DO        "

## 2024-10-28 DIAGNOSIS — J44.9 CHRONIC OBSTRUCTIVE PULMONARY DISEASE, UNSPECIFIED COPD TYPE (H): ICD-10-CM

## 2024-10-29 RX ORDER — MOMETASONE FUROATE AND FORMOTEROL FUMARATE DIHYDRATE 200; 5 UG/1; UG/1
2 AEROSOL RESPIRATORY (INHALATION) 2 TIMES DAILY
Qty: 13 G | Refills: 0 | Status: SHIPPED | OUTPATIENT
Start: 2024-10-29

## 2024-10-29 NOTE — TELEPHONE ENCOUNTER
[place]      Last Written Prescription Date:  08/21/24  Last Fill Quantity: 13g,   # refills: 0  Last Office Visit: 06/26/24  Future Office visit:    Next 5 appointments (look out 90 days)      Nov 13, 2024 1:30 PM  (Arrive by 1:15 PM)  Return Visit with Kodi Garcia DO  [facility] [place] ([facility] [place] ) [address]  [place] [address]  [phone]     Nov 26, 2024 2:15 PM  (Arrive by 2:00 PM)  Provider Visit with Ilia Moran MD  [facility] [place] ([facility] [place] ) [address]  [place] [address]  [phone]     Dec 06, 2024 1:00 PM  (Arrive by 12:45 PM)  Nurse Only with Grace Sen  [facility] [place] ([facility] [place] ) [address]  [place] [address]  [phone]

## 2024-11-12 NOTE — PROGRESS NOTES
[facility]   CARDIOLOGY PROGRESS NOTE     Chief Complaint   Patient presents with    Follow Up          Diagnosis:  1. CHF-systolic.    -40-45% on 8/29/24.  -50-55%/grade 1 on 6/20/23.   -30-35%/diastolic on 1/5/21.   -40-45%% on 12/01/2019.  2.  COPD.     -Severe on 5/15/2017.  -Reduction in severity of COPD-severe to mild/moderate.   -Pulmonary concern for asthma based on PFT's from 5/17/17.  3.  Nonischemic cardiomyopathy with NYHA classification 2.  4.  ICD placement.    -Gen change on 1/30/23, [facility].     -11/11/2014.   5.  Hypertension-uncontrolled.  6.  Hyperlipidemia-uncontrolled.  7.  Tobacco abuse.   -Quitting on 2/1/1998  8.  LBBB.  9.  SOB-significantly improved.  10.  CKD-3.  11.  Hypocalcemia resolved..  12.  A. Fib.   -Second set 5/14/2024.   -Less than 1% in 8/9/2022.  -Up to 23 seconds on 4/9/19.    13.  CHADSVASC score of 4.   14.  Mitral and tricuspid regurgitation-mild to moderate on 1/5/2021.  15.  Bacteremia on 1/5/2021 with Klebsiella pneumonia with septic shock.  16.  MR.   Mod on 8/29/24.  17.  AI.   -Mod on 8/29/24.  18.  TR.   -Mild/mod on 8/29/24.    Assessment/Plan:    1.  CHF: Now with reduced EF.  Patient was taken off of Entresto and Jardiance in 2023.  She was taken off the Jardiance for UTI and Entresto stopped by primary.  Unfortunately, heart function has declined.  Her last visit, she was started on Entresto 24/26 mg twice a day.  She has been only taking it once a day.  Will increase to 49/51 mg twice a day.  If her blood pressure becomes low, she will let us know and we will have to decrease this medication back to the lowest dosage of 24/26 mg twice a day.  She is hesitant to go back on SGLT2 inhibitor because of frequent UTIs, which is reasonable.  Continue Coreg 12.5 mg twice a day.  Do not think she will tolerate an increase at this point because of low normal blood pressures.  Could consider spironolactone in the future.  However, she has had problems with  hypotension in the past and will be more cautious starting additional medications.   2.  Valvular dysfunction: Moderate AI/MR, and mild to moderate TR.  Discussed.  No changes.  Will need future echocardiograms.  3.  Hypertension.  Previously had been having hypotension.  Increasing Entresto leaving other medications unchanged.  Blood pressure today 116/58.  4.  Hyperlipidemia: Uncontrolled based on lipid panel from 12/4/2019.  5.  Follow-up in 6 months.  Increase Entresto to 49/51 mg twice a day.  Not on spironolactone secondary to blood pressure not able to tolerate SGLT2 inhibitor secondary to frequent UTIs.      Interval history:  See above.      HPI:    Jacklyn Parryjuanaanish is being seen by cardiology for dyspnea with chronic systolic EF of 40-45% and evidence for diastolic heart failure on 12/31/2019.     She has been short of breath with a diagnosis of asthma and COPD.  She is followed for a history of severe nonischemic cardiomyopathy with a previously ejection fraction at less than 35%. More recently, her EF on 12/31/2019 was 40-45%.  EF of 30-35% on 1/5/21. She also has diastolic heart failure, ICD placed on 11/11/16, her recent visit with pulmonary secondary to what was thought to be severe COPD but was downgraded to mild to moderate with a greater concern for asthma, per the patient.     She was started on some breathing treatments for asthma/COPD by pulmonary.  With these treatments, she says she feels much better but remains short of breath.       With having diastolic and systolic heart failure, she has minimal to no lower extremity edema. Her activity has been limited secondary to chronic back pain and generalized weakness.  It was not for back pain, she would be doing really well.     She has a history of severe nonischemic cardiomyopathy S/P ICD placement on 11/11/2014. She had her ICD checked on 10/22/2019.  It showed she was bi-V paced 96.8% of the time with 3.0 years left on her battery.  =  98.1%. She had up to 20 seconds of A. fib with a total of 3 episodes.  Her device was described as functioning appropriately.     Echo from 12/31/2019.  She has an EF of 40% to 45%, grade 1 diastolic dysfunction, mild left atrial enlargement, mild to moderate AI, and mild MI      Relevant testing:  ECHO on 8/29/24:  Left ventricular size is normal. Left ventricular wall thickness is normal.  The visual ejection fraction is 40-45%. Mild diffuse hypokinesis is present.  Right ventricular function, chamber size, wall motion, and thickness are normal.  Severe left atrial enlargement is present.  Moderate mitral insufficiency is present. Moderate aortic insufficiency is present.  Mild to moderate tricuspid insufficiency is present.  The inferior vena cava was normal in size with preserved respiratory variability.     Compared to prior TTE, valvular regurgitations are slightly worse.      ECHO on 6/20/23:  Global and regional left ventricular function is normal with an EF of 55-60%.  Grade I or early diastolic dysfunction.  Global right ventricular function is normal.  Mild to moderate left atrial enlargement is present.  Mild mitral annular calcification is present.  Moderate mitral insufficiency is present.  No aortic stenosis is present.  Mild aortic insufficiency is present.  Mild tricuspid insufficiency is present.  Right ventricular systolic pressure is 25 mmHg above the right atrial pressure.  This study was compared with the study from 8/24/21 .  No significant changes noted.    ECHO on 8/24/21:  Technically difficult study. Poor acoustic windows.  Left ventricular function is decreased. The ejection fraction is 50-55%  (borderline). Mild diffuse hypokinesis is present. Diastolic Doppler findings (E/E' ratio and/or other parameters) suggest left ventricular filling pressures are increased.  Global right ventricular function is normal. The right ventricle is normal size.  No significant valvular abnormalities.  The  estimated PA systolic pressure is 17 mmHg above the RA pressure. The RA pressure could not be estimated.  This study was compared with the study from 01/06/2021. There is no  significant change in the biventricular function upon direct visual  comparison.    Echo on 12/31/2019:  No pericardial effusion is present.  Left ventricular size is normal.  The Ejection Fraction is estimated at 40-45%.  Grade I or early diastolic dysfunction.  Mild diffuse hypokinesis is present.  The right ventricle is normal size.  Global right ventricular function is normal.  Mild left atrial enlargement is present.  Mild mitral insufficiency is present.  Mild to moderate aortic insufficiency is present.  The mean gradient across the aortic valve is 3.9 mmHg.  Trace tricuspid insufficiency is present.  Right ventricular systolic pressure is 20 mmHg above the right atrial pressure.  The pulmonic valve is normal.  The aorta root is normal.    Echocardiogram on 8/24/2021:  Technically difficult study. Poor acoustic windows.  Left ventricular function is decreased. The ejection fraction is 50-55%  (borderline). Mild diffuse hypokinesis is present. Diastolic Doppler findings  (E/E' ratio and/or other parameters) suggest left ventricular filling  pressures are increased.  Global right ventricular function is normal. The right ventricle is normal  size.  No significant valvular abnormalities.  The estimated PA systolic pressure is 17 mmHg above the RA pressure. The RA  pressure could not be estimated.  This study was compared with the study from 01/06/2021. There is no  significant change in the biventricular function upon direct visual  comparison.      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    Insomnia, unspecified 01/01/2011    LBBB (left bundle  branch block) 2011    Neck pain, chronic 2011    Non-ischemic cardiomyopathy (H) 12/10/2013    Obesity, unspecified 2011    Osteopenia 2011    Pacemaker     Pain in joint, lower leg 2006    Pure hypercholesterolemia 2002    Unspecified essential hypertension 2011       Past Surgical History:   Procedure Laterality Date    APPENDECTOMY      ARTHROSCOPY KNEE RT/LT      RT    BACK SURGERY  2020    BACK SURGERY  2021    fractured vert repair    CARDIAC SURGERY  2014    Pacemaker    Cataract extraction  2009    Bilateral    CHOLECYSTECTOMY      open     COLONOSCOPY  2001    Normal     COLONOSCOPY N/A 10/20/2016    Procedure: COLONOSCOPY;  Surgeon: Charan Montejo MD;  Location: [facility] OR    COLONOSCOPY N/A 2023    Procedure: COLONOSCOPY;  Surgeon: Royal Sanz MD;  Location: [facility] OR    colonoscopy with polypectomy      Repeat 3 years     CT CORONARY ANGIOGRAM      normal    ESOPHAGOSCOPY, GASTROSCOPY, DUODENOSCOPY (EGD), COMBINED N/A 2023    Procedure: ESOPHAGOGASTRODUODENOSCOPY;  Surgeon: Royal Sanz MD;  Location: [facility] OR    HERPES ZOSTER PCR ([facility])      IR CONSULTATION FOR IR EXAM  2020    IR CONSULTATION FOR IR EXAM  2023    IR TRIGGER POINT INJ 3 OR MORE MUSCLES  2023    left eye scar tissue      normal echo      fen-phen use      x6      REPLACE PACEMAKER GENERATOR N/A 2023    Procedure: REPLACEMENT, PULSE GENERATOR, CARDIAC PACEMAKER;  Surgeon: Isiah Hodge MD;  Location:  OR    SURGICAL RADIOLOGY PROCEDURE N/A 2016    Procedure: SURGICAL RADIOLOGY PROCEDURE;  Surgeon: Provider, [facility]ianesthesia Nursing;  Location: [facility] OR       No Known Allergies    Current Outpatient Medications   Medication Sig Dispense Refill    Calcium Carbonate-Vitamin D (CALCIUM 600 + D OR) Take 1 tablet by mouth every morning      carvedilol (COREG) 25 MG tablet Take 1 tablet (25 mg) by mouth 2  times daily (with meals). 180 tablet 3    diphenhydrAMINE-acetaminophen (TYLENOL PM)  MG tablet Take 2 tablets by mouth at bedtime      DULERA 200-5 MCG/ACT inhaler INHALE 2 PUFFS INTO THE LUNGS 2 TIMES DAILY 13 g 0    furosemide (LASIX) 20 MG tablet Take 1 tablet (20 mg) by mouth every other day. 90 tablet 3    gabapentin (NEURONTIN) 400 MG capsule TAKE 1 CAPSULE BY MOUTH 3 TIMES DAILY FOR PAIN 90 capsule 11    HYDROcodone-acetaminophen (NORCO) 5-325 MG tablet Take 1 tablet by mouth 3 times daily as needed for severe pain. \ 180 tablet 0    Multiple Vitamin (MULTI-VITAMIN DAILY PO) Take 1 tablet by mouth every morning      rosuvastatin (CRESTOR) 10 MG tablet TAKE 1 TABLET BY MOUTH DAILY 90 tablet 3    sacubitril-valsartan (ENTRESTO) 49-51 MG per tablet Take 1 tablet by mouth 2 times daily. 180 tablet 3    umeclidinium (INCRUSE ELLIPTA) 62.5 MCG/ACT inhaler INHALE 1 PUFF INTO THE LUNGS DAILY 30 each 11    vitamin C (ASCORBIC ACID) 500 MG tablet Take 500 mg by mouth daily         Social History     Socioeconomic History    Marital status:      Spouse name: Not on file    Number of children: Not on file    Years of education: Not on file    Highest education level: Not on file   Occupational History    Occupation:       Employer: RETIRED   Tobacco Use    Smoking status: Former     Current packs/day: 0.00     Average packs/day: 1 pack/day for 15.1 years (15.1 ttl pk-yrs)     Types: Cigarettes     Start date: 1983     Quit date: 1998     Years since quittin.8     Passive exposure: Never    Smokeless tobacco: Never    Tobacco comments:     Passive Exposure: No; Longest Tobacco Free: 15 years    Vaping Use    Vaping status: Never Used   Substance and Sexual Activity    Alcohol use: Not Currently     Comment: Occasionally     Drug use: No    Sexual activity: Not Currently   Other Topics Concern     Service Not Asked    Blood Transfusions Not Asked    Caffeine Concern Yes      Comment: Coffee 5 cups daily     Occupational Exposure Not Asked    Hobby Hazards Not Asked    Sleep Concern Not Asked    Stress Concern Not Asked    Weight Concern Not Asked    Special Diet Not Asked    Back Care Not Asked    Exercise Not Asked    Bike Helmet Not Asked    Seat Belt Not Asked    Self-Exams Not Asked    Parent/sibling w/ CABG, MI or angioplasty before 65F 55M? No   Social History Narrative    Not on file     Social Drivers of Health     Financial Resource Strain: Low Risk  (3/15/2024)    Financial Resource Strain     Within the past 12 months, have you or your family members you live with been unable to get utilities (heat, electricity) when it was really needed?: No   Food Insecurity: Low Risk  (3/15/2024)    Food Insecurity     Within the past 12 months, did you worry that your food would run out before you got money to buy more?: No     Within the past 12 months, did the food you bought just not last and you didn t have money to get more?: No   Transportation Needs: Low Risk  (3/15/2024)    Transportation Needs     Within the past 12 months, has lack of transportation kept you from medical appointments, getting your medicines, non-medical meetings or appointments, work, or from getting things that you need?: No   Physical Activity: Not on file   Stress: Not on file   Social Connections: Not on file   Interpersonal Safety: Not on file   Housing Stability: Low Risk  (3/15/2024)    Housing Stability     Do you have housing? : Yes     Are you worried about losing your housing?: No       LAB RESULTS:   Office Visit on 08/08/2020   Component Date Value Ref Range Status    COVID-19 Virus PCR to [facility] - So* 08/08/2020 Nasopharyngeal   Final    COVID-19 Virus PCR to [facility] - Re* 08/08/2020 Test received-See reflex to IDDL test SARS CoV2 (COVID-19) Virus RT-PCR   Final    SARS-CoV-2 Virus Specimen Source 08/08/2020 Nasopharyngeal   Final    SARS-CoV-2 PCR Result 08/08/2020 NEGATIVE   Final    SARS-CoV-2  PCR Comment 08/08/2020    Final                    Value:Testing was performed using the Aptima SARS-CoV-2 Assay on the Lysanda Instrument System.   Additional information about this Emergency Use Authorization (EUA) assay can be found via   the Lab Guide.     Ancillary Procedure on 08/04/2020   Component Date Value Ref Range Status    Date Time Interrogation Session 08/05/2020 [id number]   Final    Implantable Pulse Generator Manufa* 08/05/2020 Medtronic   Final    Implantable Pulse Generator Model 08/05/2020 [id number] Viva XT CRT-D   Final    Implantable Pulse Generator Serial* 08/05/2020 [id number]   Final    Type Interrogation Session 08/05/2020 Remote    Final    Clinic Name 08/05/2020 [facility]   Final    Implantable Pulse Generator Type 08/05/2020 Cardiac Resynchronization Therapy - Defibrillator   Final    Implantable Pulse Generator Implan* 08/05/2020 [id number]   Final    Implantable Lead  08/05/2020 Medtronic   Final    Implantable Lead Model 08/05/2020 4296 Attain Ability Plus   Final    Implantable Lead Serial Number 08/05/2020 [id number]   Final    Implantable Lead Implant Date 08/05/2020 [id number]   Final    Implantable Lead Polarity Type 08/05/2020 Bipolar Lead   Final    Implantable Lead Location Detail 1 08/05/2020 UNKNOWN   Final    Implantable Lead Location 08/05/2020 Left Ventricle   Final    Implantable Lead  08/05/2020 Medtronic   Final    Implantable Lead Model 08/05/2020 5076 CapSureFix Novus   Final    Implantable Lead Serial Number 08/05/2020 [id number]   Final    Implantable Lead Implant Date 08/05/2020 [id number]   Final    Implantable Lead Polarity Type 08/05/2020 Bipolar Lead   Final    Implantable Lead Location Detail 1 08/05/2020 APPENDAGE   Final    Implantable Lead Location 08/05/2020 Right Atrium   Final    Implantable Lead  08/05/2020 Medtronic   Final    Implantable Lead Model 08/05/2020 6947 Sprint Quattro Secure   Final     Implantable Lead Serial Number 08/05/2020 [id number]   Final    Implantable Lead Implant Date 08/05/2020 [id number]   Final    Implantable Lead Polarity Type 08/05/2020 Quadripolar Lead   Final    Implantable Lead Location Detail 1 08/05/2020 APEX   Final    Implantable Lead Location 08/05/2020 Right Ventricle   Final    Vamshi Setting Mode (NBG Code) 08/05/2020 DDDR   Final    Vamshi Setting Lower Rate Limit 08/05/2020 60  [beats]/min Final    Vamshi Setting Maximum Tracking Rate 08/05/2020 130  [beats]/min Final    Vamshi Setting Maximum Sensor Rate 08/05/2020 130  [beats]/min Final    Vamshi Setting NAJMA Delay Low 08/05/2020 140  ms Final    Vamshi Setting PAV Delay Low 08/05/2020 160  ms Final    Vamshi Setting AT Mode Switch Rate 08/05/2020 171  [beats]/min Final    Lead Channel Setting Sensing Polar* 08/05/2020 Bipolar   Final    Lead Channel Setting Sensing Anode* 08/05/2020 Right Atrium   Final    Lead Channel Setting Sensing Anode* 08/05/2020 Ring   Final    Lead Channel Setting Sensing Catho* 08/05/2020 Right Atrium   Final    Lead Channel Setting Sensing Catho* 08/05/2020 Tip   Final    Lead Channel Setting Sensing Sensi* 08/05/2020 0.3  mV Final    Lead Channel Setting Sensing Polar* 08/05/2020 Bipolar   Final    Lead Channel Setting Sensing Anode* 08/05/2020 Right Ventricle   Final    Lead Channel Setting Sensing Anode* 08/05/2020 Coil   Final    Lead Channel Setting Sensing Catho* 08/05/2020 Right Ventricle   Final    Lead Channel Setting Sensing Catho* 08/05/2020 Tip   Final    Lead Channel Setting Sensing Sensi* 08/05/2020 0.3  mV Final    Ventricular chambers paced during * 08/05/2020 LVOnly   Final    Lead Channel Setting Pacing Polari* 08/05/2020 Bipolar   Final    Lead Channel Setting Pacing Anode * 08/05/2020 Right Atrium   Final    Lead Channel Setting Pacing Anode * 08/05/2020 Ring   Final    Lead Channel Setting Sensing Catho* 08/05/2020 Right Atrium   Final    Lead Channel Setting Sensing Catho*  08/05/2020 Tip   Final    Lead Channel Setting Pacing Pulse * 08/05/2020 0.4  ms Final    Lead Channel Setting Pacing Amplit* 08/05/2020 1.5  V Final    Lead Channel Setting Pacing Captur* 08/05/2020 Adaptive   Final    Lead Channel Setting Pacing Polari* 08/05/2020 Bipolar   Final    Lead Channel Setting Pacing Anode * 08/05/2020 Right Ventricle   Final    Lead Channel Setting Pacing Anode * 08/05/2020 Ring   Final    Lead Channel Setting Sensing Catho* 08/05/2020 Right Ventricle   Final    Lead Channel Setting Sensing Catho* 08/05/2020 Tip   Final    Lead Channel Setting Pacing Pulse * 08/05/2020 0.4  ms Final    Lead Channel Setting Pacing Amplit* 08/05/2020 2  V Final    Lead Channel Setting Pacing Captur* 08/05/2020 Adaptive   Final    Lead Channel Setting Pacing Polari* 08/05/2020 Bipolar   Final    Lead Channel Setting Pacing Anode * 08/05/2020 Right Ventricle   Final    Lead Channel Setting Pacing Anode * 08/05/2020 Coil   Final    Lead Channel Setting Sensing Catho* 08/05/2020 Left Ventricle   Final    Lead Channel Setting Sensing Catho* 08/05/2020 Tip   Final    Lead Channel Setting Pacing Pulse * 08/05/2020 0.6  ms Final    Lead Channel Setting Pacing Amplit* 08/05/2020 1.5  V Final    Lead Channel Setting Pacing Captur* 08/05/2020 Adaptive   Final    Zone Setting Type Category 08/05/2020 VF   Final    Zone Setting Detection Interval 08/05/2020 320  ms Final    Zone Setting Detection Beats Numer* 08/05/2020 30  [beats] Final    Zone Setting Detection Beats Denom* 08/05/2020 40  [beats] Final    Zone Setting Type Category 08/05/2020 VT   Final    Zone Setting Detection Interval 08/05/2020 Blank   Final    Zone Setting Type Category 08/05/2020 VT   Final    Zone Setting Detection Interval 08/05/2020 360  ms Final    Zone Setting Type Category 08/05/2020 VT   Final    Zone Setting Detection Interval 08/05/2020 400  ms Final    Zone Setting Type Category 08/05/2020 ATRIAL_FIBRILLATION   Final    Zone Setting  Type Category 08/05/2020 AT/AF   Final    Zone Setting Detection Interval 08/05/2020 350  ms Final    Lead Channel Impedance Value 08/05/2020 437  ohm Final    Lead Channel Sensing Intrinsic Amp* 08/05/2020 2.375  mV Final    Lead Channel Sensing Intrinsic Amp* 08/05/2020 2.375  mV Final    Lead Channel Pacing Threshold Ampl* 08/05/2020 0.375  V Final    Lead Channel Pacing Threshold Puls* 08/05/2020 0.4  ms Final    Lead Channel Impedance Value 08/05/2020 399  ohm Final    Lead Channel Impedance Value 08/05/2020 323  ohm Final    Lead Channel Sensing Intrinsic Amp* 08/05/2020 5  mV Final    Lead Channel Sensing Intrinsic Amp* 08/05/2020 5  mV Final    Lead Channel Pacing Threshold Ampl* 08/05/2020 0.625  V Final    Lead Channel Pacing Threshold Puls* 08/05/2020 0.4  ms Final    Lead Channel Impedance Value 08/05/2020 893  ohm Final    Lead Channel Impedance Value 08/05/2020 456  ohm Final    Lead Channel Impedance Value 08/05/2020 570  ohm Final    Lead Channel Pacing Threshold Ampl* 08/05/2020 0.5  V Final    Lead Channel Pacing Threshold Puls* 08/05/2020 0.6  ms Final    Battery Date Time of Measurements 08/05/2020 20200804012400   Final    Battery Status 08/05/2020 OK   Final    Battery RRT Trigger 08/05/2020 2.727   Final    Battery Remaining Longevity 08/05/2020 26  mo Final    Battery Voltage 08/05/2020 2.94  V Final    Capacitor Charge Type 08/05/2020 Reformation   Final    Capacitor Last Charge Date Time 08/05/2020 [id number]   Final    Capacitor Charge Time 08/05/2020 4.314   Final    Capacitor Charge Energy 08/05/2020 18  J Final    Vamshi Statistic Date Time Start 08/05/2020 [id number]   Final    Vamshi Statistic Date Time End 08/05/2020 20200804012403   Final    Vamshi Statistic RA Percent Paced 08/05/2020 9.93  % Final    Vamshi Statistic RV Percent Paced 08/05/2020 7.92  % Final    CRT Statistic LV Percent Paced 08/05/2020 98.09  % Final    Vamshi Statistic AP  Percent 08/05/2020 9.75  % Final     Vamshi Statistic AS  Percent 08/05/2020 88.58  % Final    Vamshi Statistic AP VS Percent 08/05/2020 0.19  % Final    Vamshi Statistic AS VS Percent 08/05/2020 1.47  % Final    CRT Statistic Date Time Start 08/05/2020 [id number]   Final    CRT Statistic Date Time End 08/05/2020 20200804012403   Final    CRT Statistic CRT Percent Paced 08/05/2020 98.09  % Final    Atrial Tachy Statistic Date Time S* 08/05/2020 [id number]   Final    Atrial Tachy Statistic Date Time E* 08/05/2020 20200804012403   Final    Atrial Tachy Statistic AT/AF Burde* 08/05/2020 0.1  % Final    Therapy Statistic Recent Shocks De* 08/05/2020 0   Final    Therapy Statistic Recent Shocks Ab* 08/05/2020 0   Final    Therapy Statistic Recent ATP Deliv* 08/05/2020 0   Final    Therapy Statistic Recent Date Time* 08/05/2020 [id number]   Final    Therapy Statistic Recent Date Time* 08/05/2020 20200804012403   Final    Therapy Statistic Total Shocks Del* 08/05/2020 1   Final    Therapy Statistic Total Shocks Abo* 08/05/2020 0   Final    Therapy Statistic Total ATP Delive* 08/05/2020 0   Final    Therapy Statistic Total  Date Time* 08/05/2020 [id number]   Final    Therapy Statistic Total  Date Time* 08/05/2020 20200804012403   Final    Episode Statistic Recent Count 08/05/2020 1   Final    Episode Statistic Type Category 08/05/2020 AT/AF   Final    Episode Statistic Recent Count 08/05/2020 0   Final    Episode Statistic Type Category 08/05/2020 SVT   Final    Episode Statistic Recent Count 08/05/2020 0   Final    Episode Statistic Type Category 08/05/2020 VT   Final    Episode Statistic Recent Count 08/05/2020 0   Final    Episode Statistic Type Category 08/05/2020 VF   Final    Episode Statistic Recent Count 08/05/2020 0   Final    Episode Statistic Type Category 08/05/2020 VT   Final    Episode Statistic Recent Count 08/05/2020 0   Final    Episode Statistic Type Category 08/05/2020 VT   Final    Episode Statistic Recent Count 08/05/2020 0    Final    Episode Statistic Type Category 08/05/2020 VT   Final    Episode Statistic Recent Date Time* 08/05/2020 [id number]   Final    Episode Statistic Recent Date Time* 08/05/2020 20200804012403   Final    Episode Statistic Recent Date Time* 08/05/2020 [id number]   Final    Episode Statistic Recent Date Time* 08/05/2020 20200804012403   Final    Episode Statistic Recent Date Time* 08/05/2020 [id number]   Final    Episode Statistic Recent Date Time* 08/05/2020 20200804012403   Final    Episode Statistic Recent Date Time* 08/05/2020 [id number]   Final    Episode Statistic Recent Date Time* 08/05/2020 20200804012403   Final    Episode Statistic Recent Date Time* 08/05/2020 [id number]   Final    Episode Statistic Recent Date Time* 08/05/2020 20200804012403   Final    Episode Statistic Recent Date Time* 08/05/2020 [id number]   Final    Episode Statistic Recent Date Time* 08/05/2020 20200804012403   Final    Episode Statistic Recent Date Time* 08/05/2020 [id number]   Final    Episode Statistic Recent Date Time* 08/05/2020 20200804012403   Final    Episode Statistic Total Count 08/05/2020 0   Final    Episode Statistic Type Category 08/05/2020 AT/AF   Final    Episode Statistic Total Count 08/05/2020 0   Final    Episode Statistic Type Category 08/05/2020 SVT   Final    Episode Statistic Total Count 08/05/2020 66   Final    Episode Statistic Type Category 08/05/2020 VT   Final    Episode Statistic Total Count 08/05/2020 1   Final    Episode Statistic Type Category 08/05/2020 VF   Final    Episode Statistic Total Count 08/05/2020 0   Final    Episode Statistic Type Category 08/05/2020 VT   Final    Episode Statistic Total Count 08/05/2020 0   Final    Episode Statistic Type Category 08/05/2020 VT   Final    Episode Statistic Total Count 08/05/2020 0   Final    Episode Statistic Type Category 08/05/2020 VT   Final    Episode Statistic Total Date Time * 08/05/2020 [id number]   Final     Episode Statistic Total Date Time * 08/05/2020 20200804012403   Final    Episode Statistic Total Date Time * 08/05/2020 [id number]   Final    Episode Statistic Total Date Time * 08/05/2020 20200804012403   Final    Episode Statistic Total Date Time * 08/05/2020 [id number]   Final    Episode Statistic Total Date Time * 08/05/2020 20200804012403   Final    Episode Statistic Total Date Time * 08/05/2020 [id number]   Final    Episode Statistic Total Date Time * 08/05/2020 20200804012403   Final    Episode Statistic Total Date Time * 08/05/2020 [id number]   Final    Episode Statistic Total Date Time * 08/05/2020 20200804012403   Final    Episode Statistic Total Date Time * 08/05/2020 [id number]   Final    Episode Statistic Total Date Time * 08/05/2020 20200804012403   Final    Episode Statistic Total Date Time * 08/05/2020 20140506113519   Final    Episode Statistic Total Date Time * 08/05/2020 20200804012403   Final    Episode Identifier 08/05/2020 716   Final    Episode Type Category 08/05/2020 VSE   Final    Episode Date Time 08/05/2020 [id number]   Final    Episode Duration 08/05/2020 9  s Final    Episode Identifier 08/05/2020 715   Final    Episode Type Category 08/05/2020 VSE   Final    Episode Date Time 08/05/2020 [id number]   Final    Episode Duration 08/05/2020 3  s Final    Episode Identifier 08/05/2020 714   Final    Episode Type Category 08/05/2020 VSE   Final    Episode Date Time 08/05/2020 20200625235723   Final    Episode Duration 08/05/2020 4  s Final    Episode Identifier 08/05/2020 713   Final    Episode Type Category 08/05/2020 VSE   Final    Episode Date Time 08/05/2020 20200607131142   Final    Episode Duration 08/05/2020 6  s Final    Episode Identifier 08/05/2020 712   Final    Episode Type Category 08/05/2020 VSE   Final    Episode Date Time 08/05/2020 [id number]   Final    Episode Duration 08/05/2020 18  s Final    Episode Identifier 08/05/2020 711   Final     Episode Type Category 08/05/2020 VSE   Final    Episode Date Time 08/05/2020 [id number]   Final    Episode Duration 08/05/2020 4  s Final        Review of systems: Negative except that which was noted in the HPI.    Physical examination:  There were no vitals taken for this visit.    GENERAL APPEARANCE: healthy, alert and no distress  CHEST: lungs clear to auscultation.  CARDIOVASCULAR: Irregular rate irregular rhythm, normal S1 with physiologic split S2, no S3 or S4 and no murmur, click or rub  EXTREMITIES: no clubbing, cyanosis but with mild lower extremity edema.    Total time spent on day of visit, including review of tests, obtaining/reviewing separately obtained history, ordering medications/tests/procedures, communicating with PCP/consultants, and documenting in electronic medical record: 20 minutes.           Thank you for allowing me to participate in the care of your patient. Please do not hesitate to contact me if you have any questions.     Kodi Garcia, DO

## 2024-11-13 ENCOUNTER — OFFICE VISIT (OUTPATIENT)
Dept: CARDIOLOGY | Facility: OTHER | Age: 89
End: 2024-11-13
Attending: INTERNAL MEDICINE
Payer: MEDICARE

## 2024-11-13 VITALS
DIASTOLIC BLOOD PRESSURE: 58 MMHG | HEART RATE: 83 BPM | RESPIRATION RATE: 15 BRPM | HEIGHT: 64 IN | BODY MASS INDEX: 22.23 KG/M2 | OXYGEN SATURATION: 92 % | SYSTOLIC BLOOD PRESSURE: 116 MMHG | WEIGHT: 130.2 LBS | TEMPERATURE: 97.3 F

## 2024-11-13 DIAGNOSIS — R06.09 DOE (DYSPNEA ON EXERTION): Primary | ICD-10-CM

## 2024-11-13 DIAGNOSIS — N18.32 STAGE 3B CHRONIC KIDNEY DISEASE (H): ICD-10-CM

## 2024-11-13 DIAGNOSIS — I50.9 CONGESTIVE HEART FAILURE, NYHA CLASS 2, UNSPECIFIED CONGESTIVE HEART FAILURE TYPE (H): ICD-10-CM

## 2024-11-13 DIAGNOSIS — I10 ESSENTIAL HYPERTENSION: ICD-10-CM

## 2024-11-13 DIAGNOSIS — J81.1 CHRONIC PULMONARY EDEMA: ICD-10-CM

## 2024-11-13 DIAGNOSIS — I50.32 CHRONIC HEART FAILURE WITH PRESERVED EJECTION FRACTION (H): ICD-10-CM

## 2024-11-13 DIAGNOSIS — I42.8 NON-ISCHEMIC CARDIOMYOPATHY (H): ICD-10-CM

## 2024-11-13 RX ORDER — SACUBITRIL AND VALSARTAN 49; 51 MG/1; MG/1
1 TABLET, FILM COATED ORAL 2 TIMES DAILY
Qty: 180 TABLET | Refills: 3 | Status: SHIPPED | OUTPATIENT
Start: 2024-11-13

## 2024-11-13 ASSESSMENT — PAIN SCALES - GENERAL: PAINLEVEL_OUTOF10: NO PAIN (0)

## 2024-11-13 NOTE — PROGRESS NOTES
Entresto increased to 49/51 mg BID  Keep track of BP, report low readings  6 mos follow up

## 2024-11-13 NOTE — PATIENT INSTRUCTIONS
Thank you for allowing Dr ASHLEY Garcia and our  team to participate in your care. Please call our office at [phone] with scheduling questions or if you need to cancel or change your appointment. With any other questions or concerns you may call cardiology nurse at  [phone].       If you experience chest pain, chest pressure, chest tightness, shortness of breath, fainting, lightheadedness, nausea, vomiting, or other concerning symptoms, please report to the Emergency Department or call 911. These symptoms may be emergent, and best treated in the Emergency Department.          Entresto increased to 49/51 mg , 1 tablet 2 times daily  Keep track of BP, report low readings  6 mos follow up

## 2024-11-21 ENCOUNTER — ANCILLARY PROCEDURE (OUTPATIENT)
Dept: CARDIOLOGY | Facility: CLINIC | Age: 89
End: 2024-11-21
Attending: INTERNAL MEDICINE
Payer: MEDICARE

## 2024-11-21 DIAGNOSIS — Z95.810 AUTOMATIC IMPLANTABLE CARDIOVERTER-DEFIBRILLATOR IN SITU: ICD-10-CM

## 2024-11-21 PROCEDURE — 93295 DEV INTERROG REMOTE 1/2/MLT: CPT | Performed by: INTERNAL MEDICINE

## 2024-11-21 PROCEDURE — 93296 REM INTERROG EVL PM/IDS: CPT

## 2024-11-26 DIAGNOSIS — G62.9 NEUROPATHY: ICD-10-CM

## 2024-11-26 DIAGNOSIS — M47.815 SPONDYLOSIS OF THORACOLUMBAR REGION WITHOUT MYELOPATHY OR RADICULOPATHY: ICD-10-CM

## 2024-11-26 DIAGNOSIS — M48.062 SPINAL STENOSIS OF LUMBAR REGION WITH NEUROGENIC CLAUDICATION: ICD-10-CM

## 2024-11-26 RX ORDER — HYDROCODONE BITARTRATE AND ACETAMINOPHEN 5; 325 MG/1; MG/1
1 TABLET ORAL 3 TIMES DAILY PRN
Qty: 180 TABLET | Refills: 0 | Status: SHIPPED | OUTPATIENT
Start: 2024-11-26

## 2024-11-26 NOTE — TELEPHONE ENCOUNTER
HYDROcodone-acetaminophen (NORCO) 5-325 MG tablet 180 tablet 0 9/23/2024     Last Office Visit: today  Future Office visit:    Next 5 appointments (look out 90 days)      Nov 26, 2024 2:15 PM  (Arrive by 2:00 PM)  Provider Visit with Ilia Moran MD  [facility] ([facility] ) [address]  [place] [address]  [phone]     Dec 06, 2024 1:00 PM  (Arrive by 12:45 PM)  Nurse Only with Grace Sen  [facility] ([facility] ) [address]  [place] [address]  [phone]             Routing refill request to provider for review/approval because:

## 2024-12-07 LAB
MDC_IDC_LEAD_CONNECTION_STATUS: NORMAL
MDC_IDC_LEAD_IMPLANT_DT: NORMAL
MDC_IDC_LEAD_LOCATION: NORMAL
MDC_IDC_LEAD_LOCATION_DETAIL_1: NORMAL
MDC_IDC_LEAD_MFG: NORMAL
MDC_IDC_LEAD_MODEL: NORMAL
MDC_IDC_LEAD_POLARITY_TYPE: NORMAL
MDC_IDC_LEAD_SERIAL: NORMAL
MDC_IDC_MSMT_BATTERY_DTM: NORMAL
MDC_IDC_MSMT_BATTERY_REMAINING_LONGEVITY: 111 MO
MDC_IDC_MSMT_BATTERY_RRT_TRIGGER: NORMAL
MDC_IDC_MSMT_BATTERY_STATUS: NORMAL
MDC_IDC_MSMT_BATTERY_VOLTAGE: 3 V
MDC_IDC_MSMT_CAP_CHARGE_DTM: NORMAL
MDC_IDC_MSMT_CAP_CHARGE_ENERGY: 18 J
MDC_IDC_MSMT_CAP_CHARGE_TIME: 3.8 S
MDC_IDC_MSMT_CAP_CHARGE_TYPE: NORMAL
MDC_IDC_MSMT_LEADCHNL_LV_IMPEDANCE_VALUE: 437 OHM
MDC_IDC_MSMT_LEADCHNL_LV_IMPEDANCE_VALUE: 475 OHM
MDC_IDC_MSMT_LEADCHNL_LV_IMPEDANCE_VALUE: 836 OHM
MDC_IDC_MSMT_LEADCHNL_RA_IMPEDANCE_VALUE: 361 OHM
MDC_IDC_MSMT_LEADCHNL_RA_PACING_THRESHOLD_AMPLITUDE: 0.62 V
MDC_IDC_MSMT_LEADCHNL_RA_PACING_THRESHOLD_PULSEWIDTH: 0.4 MS
MDC_IDC_MSMT_LEADCHNL_RA_SENSING_INTR_AMPL: 1.3 MV
MDC_IDC_MSMT_LEADCHNL_RV_IMPEDANCE_VALUE: 380 OHM
MDC_IDC_MSMT_LEADCHNL_RV_IMPEDANCE_VALUE: 456 OHM
MDC_IDC_MSMT_LEADCHNL_RV_PACING_THRESHOLD_AMPLITUDE: 1.5 V
MDC_IDC_MSMT_LEADCHNL_RV_PACING_THRESHOLD_PULSEWIDTH: 0.4 MS
MDC_IDC_MSMT_LEADCHNL_RV_SENSING_INTR_AMPL: 5.5 MV
MDC_IDC_PG_IMPLANT_DTM: NORMAL
MDC_IDC_PG_MFG: NORMAL
MDC_IDC_PG_MODEL: NORMAL
MDC_IDC_PG_SERIAL: NORMAL
MDC_IDC_PG_TYPE: NORMAL
MDC_IDC_SESS_CLINIC_NAME: NORMAL
MDC_IDC_SESS_DTM: NORMAL
MDC_IDC_SESS_TYPE: NORMAL
MDC_IDC_SET_BRADY_AT_MODE_SWITCH_RATE: 171 {BEATS}/MIN
MDC_IDC_SET_BRADY_LOWRATE: 60 {BEATS}/MIN
MDC_IDC_SET_BRADY_MAX_SENSOR_RATE: 130 {BEATS}/MIN
MDC_IDC_SET_BRADY_MAX_TRACKING_RATE: 130 {BEATS}/MIN
MDC_IDC_SET_BRADY_MODE: NORMAL
MDC_IDC_SET_BRADY_PAV_DELAY_LOW: 170 MS
MDC_IDC_SET_BRADY_SAV_DELAY_LOW: 140 MS
MDC_IDC_SET_CRT_LVRV_DELAY: 10 MS
MDC_IDC_SET_CRT_PACED_CHAMBERS: NORMAL
MDC_IDC_SET_LEADCHNL_LV_PACING_AMPLITUDE: 1.5 V
MDC_IDC_SET_LEADCHNL_LV_PACING_ANODE_ELECTRODE_1: NORMAL
MDC_IDC_SET_LEADCHNL_LV_PACING_ANODE_LOCATION_1: NORMAL
MDC_IDC_SET_LEADCHNL_LV_PACING_CAPTURE_MODE: NORMAL
MDC_IDC_SET_LEADCHNL_LV_PACING_CATHODE_ELECTRODE_1: NORMAL
MDC_IDC_SET_LEADCHNL_LV_PACING_CATHODE_LOCATION_1: NORMAL
MDC_IDC_SET_LEADCHNL_LV_PACING_POLARITY: NORMAL
MDC_IDC_SET_LEADCHNL_LV_PACING_PULSEWIDTH: 0.4 MS
MDC_IDC_SET_LEADCHNL_RA_PACING_AMPLITUDE: 1.5 V
MDC_IDC_SET_LEADCHNL_RA_PACING_ANODE_ELECTRODE_1: NORMAL
MDC_IDC_SET_LEADCHNL_RA_PACING_ANODE_LOCATION_1: NORMAL
MDC_IDC_SET_LEADCHNL_RA_PACING_CAPTURE_MODE: NORMAL
MDC_IDC_SET_LEADCHNL_RA_PACING_CATHODE_ELECTRODE_1: NORMAL
MDC_IDC_SET_LEADCHNL_RA_PACING_CATHODE_LOCATION_1: NORMAL
MDC_IDC_SET_LEADCHNL_RA_PACING_POLARITY: NORMAL
MDC_IDC_SET_LEADCHNL_RA_PACING_PULSEWIDTH: 0.4 MS
MDC_IDC_SET_LEADCHNL_RA_SENSING_ANODE_ELECTRODE_1: NORMAL
MDC_IDC_SET_LEADCHNL_RA_SENSING_ANODE_LOCATION_1: NORMAL
MDC_IDC_SET_LEADCHNL_RA_SENSING_CATHODE_ELECTRODE_1: NORMAL
MDC_IDC_SET_LEADCHNL_RA_SENSING_CATHODE_LOCATION_1: NORMAL
MDC_IDC_SET_LEADCHNL_RA_SENSING_POLARITY: NORMAL
MDC_IDC_SET_LEADCHNL_RA_SENSING_SENSITIVITY: 0.3 MV
MDC_IDC_SET_LEADCHNL_RV_PACING_AMPLITUDE: 2.25 V
MDC_IDC_SET_LEADCHNL_RV_PACING_ANODE_ELECTRODE_1: NORMAL
MDC_IDC_SET_LEADCHNL_RV_PACING_ANODE_LOCATION_1: NORMAL
MDC_IDC_SET_LEADCHNL_RV_PACING_CAPTURE_MODE: NORMAL
MDC_IDC_SET_LEADCHNL_RV_PACING_CATHODE_ELECTRODE_1: NORMAL
MDC_IDC_SET_LEADCHNL_RV_PACING_CATHODE_LOCATION_1: NORMAL
MDC_IDC_SET_LEADCHNL_RV_PACING_POLARITY: NORMAL
MDC_IDC_SET_LEADCHNL_RV_PACING_PULSEWIDTH: 0.4 MS
MDC_IDC_SET_LEADCHNL_RV_SENSING_ANODE_ELECTRODE_1: NORMAL
MDC_IDC_SET_LEADCHNL_RV_SENSING_ANODE_LOCATION_1: NORMAL
MDC_IDC_SET_LEADCHNL_RV_SENSING_CATHODE_ELECTRODE_1: NORMAL
MDC_IDC_SET_LEADCHNL_RV_SENSING_CATHODE_LOCATION_1: NORMAL
MDC_IDC_SET_LEADCHNL_RV_SENSING_POLARITY: NORMAL
MDC_IDC_SET_LEADCHNL_RV_SENSING_SENSITIVITY: 0.3 MV
MDC_IDC_SET_ZONE_DETECTION_BEATS_DENOMINATOR: 16 {BEATS}
MDC_IDC_SET_ZONE_DETECTION_BEATS_DENOMINATOR: 32 {BEATS}
MDC_IDC_SET_ZONE_DETECTION_BEATS_DENOMINATOR: 40 {BEATS}
MDC_IDC_SET_ZONE_DETECTION_BEATS_NUMERATOR: 16 {BEATS}
MDC_IDC_SET_ZONE_DETECTION_BEATS_NUMERATOR: 30 {BEATS}
MDC_IDC_SET_ZONE_DETECTION_BEATS_NUMERATOR: 32 {BEATS}
MDC_IDC_SET_ZONE_DETECTION_INTERVAL: 240 MS
MDC_IDC_SET_ZONE_DETECTION_INTERVAL: 320 MS
MDC_IDC_SET_ZONE_DETECTION_INTERVAL: 350 MS
MDC_IDC_SET_ZONE_DETECTION_INTERVAL: 360 MS
MDC_IDC_SET_ZONE_DETECTION_INTERVAL: 400 MS
MDC_IDC_SET_ZONE_STATUS: NORMAL
MDC_IDC_SET_ZONE_TYPE: NORMAL
MDC_IDC_SET_ZONE_VENDOR_TYPE: NORMAL
MDC_IDC_STAT_AT_BURDEN_PERCENT: 0.1 %
MDC_IDC_STAT_AT_DTM_END: NORMAL
MDC_IDC_STAT_AT_DTM_START: NORMAL
MDC_IDC_STAT_BRADY_DTM_END: NORMAL
MDC_IDC_STAT_BRADY_DTM_START: NORMAL
MDC_IDC_STAT_BRADY_RA_PERCENT_PACED: 11.3 %
MDC_IDC_STAT_BRADY_RV_PERCENT_PACED: 1.82 %
MDC_IDC_STAT_CRT_DTM_END: NORMAL
MDC_IDC_STAT_CRT_DTM_START: NORMAL
MDC_IDC_STAT_CRT_LV_PERCENT_PACED: 98.29 %
MDC_IDC_STAT_CRT_PERCENT_PACED: 1.78 %
MDC_IDC_STAT_EPISODE_RECENT_COUNT: 0
MDC_IDC_STAT_EPISODE_RECENT_COUNT_DTM_END: NORMAL
MDC_IDC_STAT_EPISODE_RECENT_COUNT_DTM_START: NORMAL
MDC_IDC_STAT_EPISODE_TOTAL_COUNT: 0
MDC_IDC_STAT_EPISODE_TOTAL_COUNT: 2
MDC_IDC_STAT_EPISODE_TOTAL_COUNT_DTM_END: NORMAL
MDC_IDC_STAT_EPISODE_TOTAL_COUNT_DTM_START: NORMAL
MDC_IDC_STAT_EPISODE_TYPE: NORMAL
MDC_IDC_STAT_TACHYTHERAPY_ATP_DELIVERED_RECENT: 0
MDC_IDC_STAT_TACHYTHERAPY_ATP_DELIVERED_TOTAL: 0
MDC_IDC_STAT_TACHYTHERAPY_RECENT_DTM_END: NORMAL
MDC_IDC_STAT_TACHYTHERAPY_RECENT_DTM_START: NORMAL
MDC_IDC_STAT_TACHYTHERAPY_SHOCKS_ABORTED_RECENT: 0
MDC_IDC_STAT_TACHYTHERAPY_SHOCKS_ABORTED_TOTAL: 0
MDC_IDC_STAT_TACHYTHERAPY_SHOCKS_DELIVERED_RECENT: 0
MDC_IDC_STAT_TACHYTHERAPY_SHOCKS_DELIVERED_TOTAL: 0
MDC_IDC_STAT_TACHYTHERAPY_TOTAL_DTM_END: NORMAL
MDC_IDC_STAT_TACHYTHERAPY_TOTAL_DTM_START: NORMAL

## 2024-12-30 DIAGNOSIS — J44.9 CHRONIC OBSTRUCTIVE PULMONARY DISEASE, UNSPECIFIED COPD TYPE (H): ICD-10-CM

## 2024-12-30 RX ORDER — MOMETASONE FUROATE AND FORMOTEROL FUMARATE DIHYDRATE 200; 5 UG/1; UG/1
2 AEROSOL RESPIRATORY (INHALATION) 2 TIMES DAILY
Qty: 13 G | Refills: 1 | Status: SHIPPED | OUTPATIENT
Start: 2024-12-30

## 2025-02-05 DIAGNOSIS — M48.062 SPINAL STENOSIS OF LUMBAR REGION WITH NEUROGENIC CLAUDICATION: ICD-10-CM

## 2025-02-05 DIAGNOSIS — G62.9 NEUROPATHY: ICD-10-CM

## 2025-02-05 DIAGNOSIS — J44.9 CHRONIC OBSTRUCTIVE PULMONARY DISEASE, UNSPECIFIED COPD TYPE (H): ICD-10-CM

## 2025-02-05 DIAGNOSIS — J43.8 OTHER EMPHYSEMA (H): ICD-10-CM

## 2025-02-05 NOTE — TELEPHONE ENCOUNTER
gabapentin (NEURONTIN) 400 MG capsule       Last Written Prescription Date:  10/9/24  Last Fill Quantity: 90,   # refills: 11  Last Office Visit: 6/6/24  Future Office visit:       Routing refill request to provider for review/approval because:  Drug not on the [facility], [facility] or [facility] refill protocol or controlled substance    mometasone-formoterol (DULERA) 200-5 MCG/ACT inhaler       Last Written Prescription Date:  12/30/24  Last Fill Quantity: 13 g,   # refills: 1    umeclidinium (INCRUSE ELLIPTA) 62.5 MCG/ACT inhaler       Last Written Prescription Date:  1/30/24  Last Fill Quantity: 30,   # refills: 11

## 2025-02-06 RX ORDER — UMECLIDINIUM 62.5 UG/1
1 AEROSOL, POWDER ORAL DAILY
Qty: 30 EACH | Refills: 1 | Status: SHIPPED | OUTPATIENT
Start: 2025-02-06

## 2025-02-06 RX ORDER — GABAPENTIN 400 MG/1
CAPSULE ORAL
Qty: 90 CAPSULE | Refills: 1 | Status: SHIPPED | OUTPATIENT
Start: 2025-02-06

## 2025-02-06 RX ORDER — MOMETASONE FUROATE AND FORMOTEROL FUMARATE DIHYDRATE 200; 5 UG/1; UG/1
2 AEROSOL RESPIRATORY (INHALATION) 2 TIMES DAILY
Qty: 13 G | Refills: 1 | Status: SHIPPED | OUTPATIENT
Start: 2025-02-06

## 2025-02-21 ENCOUNTER — ANCILLARY PROCEDURE (OUTPATIENT)
Dept: CARDIOLOGY | Facility: CLINIC | Age: 89
End: 2025-02-21
Attending: INTERNAL MEDICINE
Payer: MEDICARE

## 2025-02-21 DIAGNOSIS — Z95.810 AUTOMATIC IMPLANTABLE CARDIOVERTER-DEFIBRILLATOR IN SITU: ICD-10-CM

## 2025-02-21 PROCEDURE — 93295 DEV INTERROG REMOTE 1/2/MLT: CPT | Performed by: INTERNAL MEDICINE

## 2025-02-21 PROCEDURE — 93296 REM INTERROG EVL PM/IDS: CPT

## 2025-02-25 LAB
MDC_IDC_LEAD_CONNECTION_STATUS: NORMAL
MDC_IDC_LEAD_IMPLANT_DT: NORMAL
MDC_IDC_LEAD_LOCATION: NORMAL
MDC_IDC_LEAD_LOCATION_DETAIL_1: NORMAL
MDC_IDC_LEAD_MFG: NORMAL
MDC_IDC_LEAD_MODEL: NORMAL
MDC_IDC_LEAD_POLARITY_TYPE: NORMAL
MDC_IDC_LEAD_SERIAL: NORMAL
MDC_IDC_MSMT_BATTERY_DTM: NORMAL
MDC_IDC_MSMT_BATTERY_REMAINING_LONGEVITY: 108 MO
MDC_IDC_MSMT_BATTERY_RRT_TRIGGER: NORMAL
MDC_IDC_MSMT_BATTERY_STATUS: NORMAL
MDC_IDC_MSMT_BATTERY_VOLTAGE: 3 V
MDC_IDC_MSMT_CAP_CHARGE_DTM: NORMAL
MDC_IDC_MSMT_CAP_CHARGE_ENERGY: 18 J
MDC_IDC_MSMT_CAP_CHARGE_TIME: 3.8 S
MDC_IDC_MSMT_CAP_CHARGE_TYPE: NORMAL
MDC_IDC_MSMT_LEADCHNL_LV_IMPEDANCE_VALUE: 418 OHM
MDC_IDC_MSMT_LEADCHNL_LV_IMPEDANCE_VALUE: 494 OHM
MDC_IDC_MSMT_LEADCHNL_LV_IMPEDANCE_VALUE: 874 OHM
MDC_IDC_MSMT_LEADCHNL_RA_IMPEDANCE_VALUE: 342 OHM
MDC_IDC_MSMT_LEADCHNL_RA_PACING_THRESHOLD_AMPLITUDE: 0.62 V
MDC_IDC_MSMT_LEADCHNL_RA_PACING_THRESHOLD_PULSEWIDTH: 0.4 MS
MDC_IDC_MSMT_LEADCHNL_RA_SENSING_INTR_AMPL: 1 MV
MDC_IDC_MSMT_LEADCHNL_RV_IMPEDANCE_VALUE: 342 OHM
MDC_IDC_MSMT_LEADCHNL_RV_IMPEDANCE_VALUE: 418 OHM
MDC_IDC_MSMT_LEADCHNL_RV_PACING_THRESHOLD_AMPLITUDE: 1.12 V
MDC_IDC_MSMT_LEADCHNL_RV_PACING_THRESHOLD_PULSEWIDTH: 0.4 MS
MDC_IDC_MSMT_LEADCHNL_RV_SENSING_INTR_AMPL: 4.6 MV
MDC_IDC_PG_IMPLANT_DTM: NORMAL
MDC_IDC_PG_MFG: NORMAL
MDC_IDC_PG_MODEL: NORMAL
MDC_IDC_PG_SERIAL: NORMAL
MDC_IDC_PG_TYPE: NORMAL
MDC_IDC_SESS_CLINIC_NAME: NORMAL
MDC_IDC_SESS_DTM: NORMAL
MDC_IDC_SESS_TYPE: NORMAL
MDC_IDC_SET_BRADY_AT_MODE_SWITCH_RATE: 171 {BEATS}/MIN
MDC_IDC_SET_BRADY_LOWRATE: 60 {BEATS}/MIN
MDC_IDC_SET_BRADY_MAX_SENSOR_RATE: 130 {BEATS}/MIN
MDC_IDC_SET_BRADY_MAX_TRACKING_RATE: 130 {BEATS}/MIN
MDC_IDC_SET_BRADY_MODE: NORMAL
MDC_IDC_SET_BRADY_PAV_DELAY_LOW: 170 MS
MDC_IDC_SET_BRADY_SAV_DELAY_LOW: 140 MS
MDC_IDC_SET_CRT_LVRV_DELAY: 10 MS
MDC_IDC_SET_CRT_PACED_CHAMBERS: NORMAL
MDC_IDC_SET_LEADCHNL_LV_PACING_AMPLITUDE: 1.5 V
MDC_IDC_SET_LEADCHNL_LV_PACING_ANODE_ELECTRODE_1: NORMAL
MDC_IDC_SET_LEADCHNL_LV_PACING_ANODE_LOCATION_1: NORMAL
MDC_IDC_SET_LEADCHNL_LV_PACING_CAPTURE_MODE: NORMAL
MDC_IDC_SET_LEADCHNL_LV_PACING_CATHODE_ELECTRODE_1: NORMAL
MDC_IDC_SET_LEADCHNL_LV_PACING_CATHODE_LOCATION_1: NORMAL
MDC_IDC_SET_LEADCHNL_LV_PACING_POLARITY: NORMAL
MDC_IDC_SET_LEADCHNL_LV_PACING_PULSEWIDTH: 0.4 MS
MDC_IDC_SET_LEADCHNL_RA_PACING_AMPLITUDE: 1.5 V
MDC_IDC_SET_LEADCHNL_RA_PACING_ANODE_ELECTRODE_1: NORMAL
MDC_IDC_SET_LEADCHNL_RA_PACING_ANODE_LOCATION_1: NORMAL
MDC_IDC_SET_LEADCHNL_RA_PACING_CAPTURE_MODE: NORMAL
MDC_IDC_SET_LEADCHNL_RA_PACING_CATHODE_ELECTRODE_1: NORMAL
MDC_IDC_SET_LEADCHNL_RA_PACING_CATHODE_LOCATION_1: NORMAL
MDC_IDC_SET_LEADCHNL_RA_PACING_POLARITY: NORMAL
MDC_IDC_SET_LEADCHNL_RA_PACING_PULSEWIDTH: 0.4 MS
MDC_IDC_SET_LEADCHNL_RA_SENSING_ANODE_ELECTRODE_1: NORMAL
MDC_IDC_SET_LEADCHNL_RA_SENSING_ANODE_LOCATION_1: NORMAL
MDC_IDC_SET_LEADCHNL_RA_SENSING_CATHODE_ELECTRODE_1: NORMAL
MDC_IDC_SET_LEADCHNL_RA_SENSING_CATHODE_LOCATION_1: NORMAL
MDC_IDC_SET_LEADCHNL_RA_SENSING_POLARITY: NORMAL
MDC_IDC_SET_LEADCHNL_RA_SENSING_SENSITIVITY: 0.3 MV
MDC_IDC_SET_LEADCHNL_RV_PACING_AMPLITUDE: 2 V
MDC_IDC_SET_LEADCHNL_RV_PACING_ANODE_ELECTRODE_1: NORMAL
MDC_IDC_SET_LEADCHNL_RV_PACING_ANODE_LOCATION_1: NORMAL
MDC_IDC_SET_LEADCHNL_RV_PACING_CAPTURE_MODE: NORMAL
MDC_IDC_SET_LEADCHNL_RV_PACING_CATHODE_ELECTRODE_1: NORMAL
MDC_IDC_SET_LEADCHNL_RV_PACING_CATHODE_LOCATION_1: NORMAL
MDC_IDC_SET_LEADCHNL_RV_PACING_POLARITY: NORMAL
MDC_IDC_SET_LEADCHNL_RV_PACING_PULSEWIDTH: 0.4 MS
MDC_IDC_SET_LEADCHNL_RV_SENSING_ANODE_ELECTRODE_1: NORMAL
MDC_IDC_SET_LEADCHNL_RV_SENSING_ANODE_LOCATION_1: NORMAL
MDC_IDC_SET_LEADCHNL_RV_SENSING_CATHODE_ELECTRODE_1: NORMAL
MDC_IDC_SET_LEADCHNL_RV_SENSING_CATHODE_LOCATION_1: NORMAL
MDC_IDC_SET_LEADCHNL_RV_SENSING_POLARITY: NORMAL
MDC_IDC_SET_LEADCHNL_RV_SENSING_SENSITIVITY: 0.3 MV
MDC_IDC_SET_ZONE_DETECTION_BEATS_DENOMINATOR: 16 {BEATS}
MDC_IDC_SET_ZONE_DETECTION_BEATS_DENOMINATOR: 32 {BEATS}
MDC_IDC_SET_ZONE_DETECTION_BEATS_DENOMINATOR: 40 {BEATS}
MDC_IDC_SET_ZONE_DETECTION_BEATS_NUMERATOR: 16 {BEATS}
MDC_IDC_SET_ZONE_DETECTION_BEATS_NUMERATOR: 30 {BEATS}
MDC_IDC_SET_ZONE_DETECTION_BEATS_NUMERATOR: 32 {BEATS}
MDC_IDC_SET_ZONE_DETECTION_INTERVAL: 240 MS
MDC_IDC_SET_ZONE_DETECTION_INTERVAL: 320 MS
MDC_IDC_SET_ZONE_DETECTION_INTERVAL: 350 MS
MDC_IDC_SET_ZONE_DETECTION_INTERVAL: 360 MS
MDC_IDC_SET_ZONE_DETECTION_INTERVAL: 400 MS
MDC_IDC_SET_ZONE_STATUS: NORMAL
MDC_IDC_SET_ZONE_TYPE: NORMAL
MDC_IDC_SET_ZONE_VENDOR_TYPE: NORMAL
MDC_IDC_STAT_AT_BURDEN_PERCENT: 0.06 %
MDC_IDC_STAT_AT_DTM_END: NORMAL
MDC_IDC_STAT_AT_DTM_START: NORMAL
MDC_IDC_STAT_BRADY_DTM_END: NORMAL
MDC_IDC_STAT_BRADY_DTM_START: NORMAL
MDC_IDC_STAT_BRADY_RA_PERCENT_PACED: 12.4 %
MDC_IDC_STAT_BRADY_RV_PERCENT_PACED: 3 %
MDC_IDC_STAT_CRT_DTM_END: NORMAL
MDC_IDC_STAT_CRT_DTM_START: NORMAL
MDC_IDC_STAT_CRT_LV_PERCENT_PACED: 97.23 %
MDC_IDC_STAT_CRT_PERCENT_PACED: 2.96 %
MDC_IDC_STAT_EPISODE_RECENT_COUNT: 0
MDC_IDC_STAT_EPISODE_RECENT_COUNT_DTM_END: NORMAL
MDC_IDC_STAT_EPISODE_RECENT_COUNT_DTM_START: NORMAL
MDC_IDC_STAT_EPISODE_TOTAL_COUNT: 0
MDC_IDC_STAT_EPISODE_TOTAL_COUNT: 2
MDC_IDC_STAT_EPISODE_TOTAL_COUNT_DTM_END: NORMAL
MDC_IDC_STAT_EPISODE_TOTAL_COUNT_DTM_START: NORMAL
MDC_IDC_STAT_EPISODE_TYPE: NORMAL
MDC_IDC_STAT_TACHYTHERAPY_ATP_DELIVERED_RECENT: 0
MDC_IDC_STAT_TACHYTHERAPY_ATP_DELIVERED_TOTAL: 0
MDC_IDC_STAT_TACHYTHERAPY_RECENT_DTM_END: NORMAL
MDC_IDC_STAT_TACHYTHERAPY_RECENT_DTM_START: NORMAL
MDC_IDC_STAT_TACHYTHERAPY_SHOCKS_ABORTED_RECENT: 0
MDC_IDC_STAT_TACHYTHERAPY_SHOCKS_ABORTED_TOTAL: 0
MDC_IDC_STAT_TACHYTHERAPY_SHOCKS_DELIVERED_RECENT: 0
MDC_IDC_STAT_TACHYTHERAPY_SHOCKS_DELIVERED_TOTAL: 0
MDC_IDC_STAT_TACHYTHERAPY_TOTAL_DTM_END: NORMAL
MDC_IDC_STAT_TACHYTHERAPY_TOTAL_DTM_START: NORMAL

## 2025-02-26 DIAGNOSIS — G62.9 NEUROPATHY: ICD-10-CM

## 2025-02-26 DIAGNOSIS — M48.062 SPINAL STENOSIS OF LUMBAR REGION WITH NEUROGENIC CLAUDICATION: ICD-10-CM

## 2025-02-26 DIAGNOSIS — M47.815 SPONDYLOSIS OF THORACOLUMBAR REGION WITHOUT MYELOPATHY OR RADICULOPATHY: ICD-10-CM

## 2025-02-26 RX ORDER — HYDROCODONE BITARTRATE AND ACETAMINOPHEN 5; 325 MG/1; MG/1
1 TABLET ORAL 3 TIMES DAILY PRN
Qty: 180 TABLET | Refills: 0 | Status: SHIPPED | OUTPATIENT
Start: 2025-02-26

## 2025-03-26 DIAGNOSIS — J43.8 OTHER EMPHYSEMA (H): ICD-10-CM

## 2025-03-26 RX ORDER — UMECLIDINIUM 62.5 UG/1
1 AEROSOL, POWDER ORAL DAILY
Qty: 30 EACH | Refills: 1 | Status: SHIPPED | OUTPATIENT
Start: 2025-03-26

## 2025-03-26 NOTE — TELEPHONE ENCOUNTER
umeclidinium (INCRUSE ELLIPTA) 62.5 MCG/ACT inhaler       Last Written Prescription Date:  02/06/2025  Last Fill Quantity: 30,   # refills: 1  Last Office Visit: 06/26/2024  Future Office visit:    Next 5 appointments (look out 90 days)      Mar 28, 2025 3:45 PM  (Arrive by 3:30 PM)  Provider Visit with Paulette Sherwood MD  [facility] ([facility] ) [address]  [place] [address]  [phone]     May 14, 2025 1:30 PM  (Arrive by 1:15 PM)  Return Visit with Kodi Garcia DO  [facility] ([facility] ) [address] [place] [address]  [phone]             Routing refill request to provider for review/approval because:  Long-Acting Muscarinic Agonists (LAMA) (including combination products) Failed    Rerun Protocol (3/26/2025 11:12 AM)    Recent (6 mo) or future (90 days) visit within the authorizing provider's specialty    The patient must have completed an in-person or virtual visit within the past 6 months or has a future visit scheduled within the next 90 days with the authorizing provider s specialty.  Urgent care and e-visits do not quality as an office visit for this protocol.

## 2025-03-28 ENCOUNTER — LAB (OUTPATIENT)
Dept: LAB | Facility: OTHER | Age: 89
End: 2025-03-28
Payer: COMMERCIAL

## 2025-03-28 DIAGNOSIS — I10 ESSENTIAL HYPERTENSION: ICD-10-CM

## 2025-03-28 DIAGNOSIS — N18.32 STAGE 3B CHRONIC KIDNEY DISEASE (H): ICD-10-CM

## 2025-03-28 DIAGNOSIS — E03.9 HYPOTHYROIDISM, UNSPECIFIED TYPE: ICD-10-CM

## 2025-03-28 PROBLEM — M48.02 SPINAL STENOSIS IN CERVICAL REGION: Status: ACTIVE | Noted: 2025-03-28

## 2025-03-28 PROBLEM — E55.9 HYPOVITAMINOSIS D: Status: ACTIVE | Noted: 2025-03-28

## 2025-03-28 LAB
ALBUMIN SERPL BCG-MCNC: 3.7 G/DL (ref 3.5–5.2)
ALP SERPL-CCNC: 67 U/L (ref 40–150)
ALT SERPL W P-5'-P-CCNC: 20 U/L (ref 0–50)
ANION GAP SERPL CALCULATED.3IONS-SCNC: 10 MMOL/L (ref 7–15)
AST SERPL W P-5'-P-CCNC: 29 U/L (ref 0–45)
BILIRUB SERPL-MCNC: 0.2 MG/DL
BUN SERPL-MCNC: 24.7 MG/DL (ref 8–23)
CALCIUM SERPL-MCNC: 9.3 MG/DL (ref 8.8–10.4)
CHLORIDE SERPL-SCNC: 102 MMOL/L (ref 98–107)
CREAT SERPL-MCNC: 1.14 MG/DL (ref 0.51–0.95)
EGFRCR SERPLBLD CKD-EPI 2021: 46 ML/MIN/1.73M2
ERYTHROCYTE [DISTWIDTH] IN BLOOD BY AUTOMATED COUNT: 12 % (ref 10–15)
GLUCOSE SERPL-MCNC: 101 MG/DL (ref 70–99)
HCO3 SERPL-SCNC: 27 MMOL/L (ref 22–29)
HCT VFR BLD AUTO: 35.8 % (ref 35–47)
HGB BLD-MCNC: 11.7 G/DL (ref 11.7–15.7)
MCH RBC QN AUTO: 31.5 PG (ref 26.5–33)
MCHC RBC AUTO-ENTMCNC: 32.7 G/DL (ref 31.5–36.5)
MCV RBC AUTO: 97 FL (ref 78–100)
PLATELET # BLD AUTO: 131 10E3/UL (ref 150–450)
POTASSIUM SERPL-SCNC: 5 MMOL/L (ref 3.4–5.3)
PROT SERPL-MCNC: 7 G/DL (ref 6.4–8.3)
RBC # BLD AUTO: 3.71 10E6/UL (ref 3.8–5.2)
SODIUM SERPL-SCNC: 139 MMOL/L (ref 135–145)
TSH SERPL DL<=0.005 MIU/L-ACNC: 2.52 UIU/ML (ref 0.3–4.2)
WBC # BLD AUTO: 4.7 10E3/UL (ref 4–11)

## 2025-03-28 PROCEDURE — 36415 COLL VENOUS BLD VENIPUNCTURE: CPT | Mod: ZL

## 2025-03-28 PROCEDURE — 83970 ASSAY OF PARATHORMONE: CPT | Mod: ZL

## 2025-03-28 PROCEDURE — 84443 ASSAY THYROID STIM HORMONE: CPT | Mod: ZL

## 2025-03-28 PROCEDURE — 85018 HEMOGLOBIN: CPT | Mod: ZL

## 2025-03-28 PROCEDURE — 82435 ASSAY OF BLOOD CHLORIDE: CPT | Mod: ZL

## 2025-03-28 PROCEDURE — 84155 ASSAY OF PROTEIN SERUM: CPT | Mod: ZL

## 2025-03-29 LAB — PTH-INTACT SERPL-MCNC: 47 PG/ML (ref 15–65)

## 2025-04-14 ENCOUNTER — HOSPITAL ENCOUNTER (OUTPATIENT)
Dept: CARDIOLOGY | Facility: HOSPITAL | Age: 89
Discharge: HOME | End: 2025-04-14
Attending: INTERNAL MEDICINE
Payer: MEDICARE

## 2025-04-14 DIAGNOSIS — I42.8 NON-ISCHEMIC CARDIOMYOPATHY (H): ICD-10-CM

## 2025-04-14 DIAGNOSIS — J44.9 CHRONIC OBSTRUCTIVE PULMONARY DISEASE, UNSPECIFIED COPD TYPE (H): ICD-10-CM

## 2025-04-14 DIAGNOSIS — I50.32 CHRONIC HEART FAILURE WITH PRESERVED EJECTION FRACTION (H): ICD-10-CM

## 2025-04-14 DIAGNOSIS — I50.9 CONGESTIVE HEART FAILURE, NYHA CLASS 2, UNSPECIFIED CONGESTIVE HEART FAILURE TYPE (H): ICD-10-CM

## 2025-04-14 DIAGNOSIS — R06.09 DOE (DYSPNEA ON EXERTION): ICD-10-CM

## 2025-04-14 LAB — LVEF ECHO: NORMAL

## 2025-04-14 PROCEDURE — 93306 TTE W/DOPPLER COMPLETE: CPT

## 2025-05-01 DIAGNOSIS — G62.9 NEUROPATHY: ICD-10-CM

## 2025-05-01 DIAGNOSIS — M48.062 SPINAL STENOSIS OF LUMBAR REGION WITH NEUROGENIC CLAUDICATION: ICD-10-CM

## 2025-05-01 RX ORDER — GABAPENTIN 400 MG/1
CAPSULE ORAL
Qty: 90 CAPSULE | Refills: 0 | Status: SHIPPED | OUTPATIENT
Start: 2025-05-01

## 2025-05-13 ENCOUNTER — ANCILLARY PROCEDURE (OUTPATIENT)
Dept: CARDIOLOGY | Facility: OTHER | Age: 89
End: 2025-05-13
Attending: INTERNAL MEDICINE
Payer: MEDICARE

## 2025-05-13 DIAGNOSIS — Z95.810 AUTOMATIC IMPLANTABLE CARDIOVERTER-DEFIBRILLATOR IN SITU: ICD-10-CM

## 2025-05-13 PROCEDURE — 93284 PRGRMG EVAL IMPLANTABLE DFB: CPT | Performed by: INTERNAL MEDICINE

## 2025-05-13 NOTE — PATIENT INSTRUCTIONS
It was a pleasure to see you in clinic today.  Please do not hesitate to call with any questions or concerns.  You are scheduled for remote transmissions on 8/15/25, 11/18/25 and 2/18/26.  We look forward to seeing you in clinic at your next device check in 1 year.    Desiree Luevano, RN, MS, CCRN  Electrophysiology Nurse Clinician  [facility]    During Business Hours Please Call:  [phone]  After Hours Please Call:  [phone] - select option #[phone] and ask for job code [phone]

## 2025-05-14 ENCOUNTER — OFFICE VISIT (OUTPATIENT)
Dept: CARDIOLOGY | Facility: OTHER | Age: 89
End: 2025-05-14
Attending: INTERNAL MEDICINE
Payer: MEDICARE

## 2025-05-14 VITALS
HEIGHT: 64 IN | HEART RATE: 68 BPM | DIASTOLIC BLOOD PRESSURE: 52 MMHG | WEIGHT: 131.2 LBS | OXYGEN SATURATION: 98 % | RESPIRATION RATE: 18 BRPM | SYSTOLIC BLOOD PRESSURE: 104 MMHG | BODY MASS INDEX: 22.4 KG/M2

## 2025-05-14 DIAGNOSIS — R06.09 DOE (DYSPNEA ON EXERTION): ICD-10-CM

## 2025-05-14 DIAGNOSIS — N18.32 STAGE 3B CHRONIC KIDNEY DISEASE (H): ICD-10-CM

## 2025-05-14 DIAGNOSIS — I42.8 NON-ISCHEMIC CARDIOMYOPATHY (H): ICD-10-CM

## 2025-05-14 DIAGNOSIS — I50.9 CONGESTIVE HEART FAILURE, NYHA CLASS 2, UNSPECIFIED CONGESTIVE HEART FAILURE TYPE (H): ICD-10-CM

## 2025-05-14 DIAGNOSIS — I10 ESSENTIAL HYPERTENSION: ICD-10-CM

## 2025-05-14 DIAGNOSIS — I50.32 CHRONIC HEART FAILURE WITH PRESERVED EJECTION FRACTION (H): ICD-10-CM

## 2025-05-14 DIAGNOSIS — J44.9 CHRONIC OBSTRUCTIVE PULMONARY DISEASE, UNSPECIFIED COPD TYPE (H): ICD-10-CM

## 2025-05-14 DIAGNOSIS — J81.1 CHRONIC PULMONARY EDEMA: ICD-10-CM

## 2025-05-14 DIAGNOSIS — I48.0 PAROXYSMAL ATRIAL FIBRILLATION (H): ICD-10-CM

## 2025-05-14 DIAGNOSIS — Z95.810 AUTOMATIC IMPLANTABLE CARDIOVERTER-DEFIBRILLATOR IN SITU: Primary | ICD-10-CM

## 2025-05-14 DIAGNOSIS — Z87.891 HISTORY OF TOBACCO ABUSE: ICD-10-CM

## 2025-05-14 DIAGNOSIS — E78.2 MIXED HYPERLIPIDEMIA: ICD-10-CM

## 2025-05-14 LAB
ATRIAL RATE - MUSE: 68 BPM
DIASTOLIC BLOOD PRESSURE - MUSE: NORMAL MMHG
INTERPRETATION ECG - MUSE: NORMAL
P AXIS - MUSE: 98 DEGREES
PR INTERVAL - MUSE: 188 MS
QRS DURATION - MUSE: 138 MS
QT - MUSE: 426 MS
QTC - MUSE: 452 MS
R AXIS - MUSE: -79 DEGREES
SYSTOLIC BLOOD PRESSURE - MUSE: NORMAL MMHG
T AXIS - MUSE: 36 DEGREES
VENTRICULAR RATE- MUSE: 68 BPM

## 2025-05-14 PROCEDURE — G0463 HOSPITAL OUTPT CLINIC VISIT: HCPCS | Mod: 25

## 2025-05-14 ASSESSMENT — PAIN SCALES - GENERAL: PAINLEVEL_OUTOF10: NO PAIN (0)

## 2025-05-14 NOTE — PATIENT INSTRUCTIONS
Thank you for allowing Dr ASHLEY Garcia and our  team to participate in your care. Please call our office at [phone] with scheduling questions or if you need to cancel or change your appointment. With any other questions or concerns you may call cardiology nurse at  [phone].       If you experience chest pain, chest pressure, chest tightness, shortness of breath, fainting, lightheadedness, nausea, vomiting, or other concerning symptoms, please report to the Emergency Department or call 911. These symptoms may be emergent, and best treated in the Emergency Department.

## 2025-05-14 NOTE — PROGRESS NOTES
[facility]   CARDIOLOGY PROGRESS NOTE     Chief Complaint   Patient presents with    Follow Up     6 month return          Diagnosis:  1. CHF-systolic.    -45-50% on 4/14/25.   -40-45% on 8/29/24.  -50-55%/grade 1 on 6/20/23.   -30-35%/diastolic on 1/5/21.   -40-45%% on 12/01/2019.  2.  COPD.     -Severe on 5/15/2017.  -Reduction in severity of COPD-severe to mild/moderate.   -Pulmonary concern for asthma based on PFT's from 5/17/17.  3.  Nonischemic cardiomyopathy.  -NYHA classification 2.  4.  ICD placement.    -Gen change on 1/30/23, [facility].     -11/11/2014.   5.  Hypertension-controlled.  6.  Hyperlipidemia-uncontrolled.  7.  Tobacco abuse.   -Quitting on 2/1/1998  8.  LBBB.  9.  SOB-significantly improved.  10.  CKD-3.  11.  Hypocalcemia resolved..  12.  A. Fib.   -Second set 5/14/2024.   -Less than 1% in 8/9/2022.  -Up to 23 seconds on 4/9/19.    13.  CHADSVASC score of 4.   14.  Mitral and tricuspid regurgitation-mild to moderate on 1/5/2021.  15.  Bacteremia on 1/5/2021 with Klebsiella pneumonia with septic shock.  16.  MR.   -Mild on 4/14/25.   Mod on 8/29/24.  17.  AI.   -Mild/Mod on 4/14/25.   -Mod on 8/29/24.  18.  TR.   -Trace on 4/14/25.   -Mild/mod on 8/29/24.    Assessment/Plan:    1.  CHF: Heart function has improved slightly since last evaluated on 8/29/2024.  It went from 40-45% to 45-50% on 4/14/25.  She is not even taking the Lasix.  She has not had significant swelling or shortness of breath.  Overall, she feels good.  Did discuss salt restriction, fluid restriction, and daily weights.  She will take Lasix as needed for fluid retention.  At her last visit, Entresto was increased to 49/51 mg twice a day.  Patient was taken off of Entresto and Jardiance in 2023.  She was taken off the Jardiance for UTI and Entresto stopped by primary.  With a blood pressure of 104/52, hesitant to increase Entresto to the final dose of 97/1 3.  Also, do not feel comfortable adding spironolactone.  She is  hesitant to go back on SGLT2 inhibitor because of frequent UTIs, which is reasonable.  Continue Coreg 25 mg twice a day.   2.  Valvular dysfunction: Now improved, see above.  Previously noted to have moderate AI/MR, and mild to moderate TR.  Discussed.  No changes.   3.  Hypertension.  Previously had been having hypotension.  Increasing Entresto leaving other medications unchanged.  Blood pressure today 104/52  4.  Hyperlipidemia: Uncontrolled based on lipid panel from 12/4/2019.  5.  Follow-up in 1 year.  No changes.  Unable to increase heart failure medications because of low normal blood pressures.      Interval history:  See above.      HPI:    Jacklyn Hein is being seen by cardiology for dyspnea with chronic systolic EF of 40-45% and evidence for diastolic heart failure on 12/31/2019.     She has been short of breath with a diagnosis of asthma and COPD.  She is followed for a history of severe nonischemic cardiomyopathy with a previously ejection fraction at less than 35%. More recently, her EF on 12/31/2019 was 40-45%.  EF of 30-35% on 1/5/21. She also has diastolic heart failure, ICD placed on 11/11/16, her recent visit with pulmonary secondary to what was thought to be severe COPD but was downgraded to mild to moderate with a greater concern for asthma, per the patient.     She was started on some breathing treatments for asthma/COPD by pulmonary.  With these treatments, she says she feels much better but remains short of breath.       With having diastolic and systolic heart failure, she has minimal to no lower extremity edema. Her activity has been limited secondary to chronic back pain and generalized weakness.  It was not for back pain, she would be doing really well.     She has a history of severe nonischemic cardiomyopathy S/P ICD placement on 11/11/2014. She had her ICD checked on 10/22/2019.  It showed she was bi-V paced 96.8% of the time with 3.0 years left on her battery.  = 98.1%. She  had up to 20 seconds of A. fib with a total of 3 episodes.  Her device was described as functioning appropriately.     Echo from 12/31/2019.  She has an EF of 40% to 45%, grade 1 diastolic dysfunction, mild left atrial enlargement, mild to moderate AI, and mild MI      Relevant testing:  ECHO on 4/14/25.  Left ventricular function is decreased. The ejection fraction is 45-50%  (mildly reduced).  Left ventricular diastolic function is normal.   Right ventricular function, chamber size, wall motion, and thickness are  normal.  Pulmonary artery systolic pressure is normal.  The inferior vena cava was normal in size with preserved respiratory  variability.  No pericardial effusion is present.  The left ventricular function has improved.    ECHO on 8/29/24:  Left ventricular size is normal. Left ventricular wall thickness is normal.  The visual ejection fraction is 40-45%. Mild diffuse hypokinesis is present.  Right ventricular function, chamber size, wall motion, and thickness are normal.  Severe left atrial enlargement is present.  Moderate mitral insufficiency is present. Moderate aortic insufficiency is present.  Mild to moderate tricuspid insufficiency is present.  The inferior vena cava was normal in size with preserved respiratory variability.     Compared to prior TTE, valvular regurgitations are slightly worse.      ECHO on 6/20/23:  Global and regional left ventricular function is normal with an EF of 55-60%.  Grade I or early diastolic dysfunction.  Global right ventricular function is normal.  Mild to moderate left atrial enlargement is present.  Mild mitral annular calcification is present.  Moderate mitral insufficiency is present.  No aortic stenosis is present.  Mild aortic insufficiency is present.  Mild tricuspid insufficiency is present.  Right ventricular systolic pressure is 25 mmHg above the right atrial pressure.  This study was compared with the study from 8/24/21 .  No significant changes  noted.    ECHO on 8/24/21:  Technically difficult study. Poor acoustic windows.  Left ventricular function is decreased. The ejection fraction is 50-55%  (borderline). Mild diffuse hypokinesis is present. Diastolic Doppler findings (E/E' ratio and/or other parameters) suggest left ventricular filling pressures are increased.  Global right ventricular function is normal. The right ventricle is normal size.  No significant valvular abnormalities.  The estimated PA systolic pressure is 17 mmHg above the RA pressure. The RA pressure could not be estimated.  This study was compared with the study from 01/06/2021. There is no  significant change in the biventricular function upon direct visual  comparison.    Echo on 12/31/2019:  No pericardial effusion is present.  Left ventricular size is normal.  The Ejection Fraction is estimated at 40-45%.  Grade I or early diastolic dysfunction.  Mild diffuse hypokinesis is present.  The right ventricle is normal size.  Global right ventricular function is normal.  Mild left atrial enlargement is present.  Mild mitral insufficiency is present.  Mild to moderate aortic insufficiency is present.  The mean gradient across the aortic valve is 3.9 mmHg.  Trace tricuspid insufficiency is present.  Right ventricular systolic pressure is 20 mmHg above the right atrial pressure.  The pulmonic valve is normal.  The aorta root is normal.    Echocardiogram on 8/24/2021:  Technically difficult study. Poor acoustic windows.  Left ventricular function is decreased. The ejection fraction is 50-55%  (borderline). Mild diffuse hypokinesis is present. Diastolic Doppler findings  (E/E' ratio and/or other parameters) suggest left ventricular filling  pressures are increased.  Global right ventricular function is normal. The right ventricle is normal  size.  No significant valvular abnormalities.  The estimated PA systolic pressure is 17 mmHg above the RA pressure. The RA  pressure could not be  estimated.  This study was compared with the study from 2021. There is no  significant change in the biventricular function upon direct visual  comparison.      Past Medical History:   Diagnosis Date    Angina pectoris 2011    CHF (congestive heart failure), NYHA class II (H) 12/10/2013    Hyperhydrosis disorder 2011    Insomnia, unspecified 2011    LBBB (left bundle branch block) 2011    Neck pain, chronic 2011    Neuropathy     had some back issues and had 3 compression fractures    Non-ischemic cardiomyopathy (H) 12/10/2013    Osteoporosis     Pacemaker     Pain in joint, lower leg 2006    Pure hypercholesterolemia 2002    Unspecified essential hypertension 2011       Past Surgical History:   Procedure Laterality Date    APPENDECTOMY N/A     [facility]ws    ARTHROSCOPY KNEE RT/LT      RT    BACK SURGERY  2020    BACK SURGERY  2021    fractured vert repair    CARDIAC SURGERY  2014    Pacemaker    Cataract extraction      Bilateral    CHOLECYSTECTOMY N/A     open  LISA Mathews    COLONOSCOPY  2001    Normal     COLONOSCOPY N/A 10/20/2016    Procedure: COLONOSCOPY;  Surgeon: Charan Montejo MD;  Location: [facility]    COLONOSCOPY N/A 2023    Procedure: COLONOSCOPY;  Surgeon: Royal Sanz MD;  Location: [facility] OR    colonoscopy with polypectomy      Repeat 3 years     CT CORONARY ANGIOGRAM      normal    ESOPHAGOSCOPY, GASTROSCOPY, DUODENOSCOPY (EGD), COMBINED N/A 2023    Procedure: ESOPHAGOGASTRODUODENOSCOPY;  Surgeon: Royal Sanz MD;  Location: [facility] OR    HERPES ZOSTER PCR ([facility])      IR CONSULTATION FOR IR EXAM  2020    IR CONSULTATION FOR IR EXAM  2023    IR TRIGGER POINT INJ 3 OR MORE MUSCLES  2023    left eye scar tissue      normal echo      fen-phen use      x6      REPLACE PACEMAKER GENERATOR N/A 2023    Procedure: REPLACEMENT, PULSE GENERATOR, CARDIAC  PACEMAKER;  Surgeon: Isiah Hodge MD;  Location:  OR    SURGICAL RADIOLOGY PROCEDURE N/A 07/27/2016    Procedure: SURGICAL RADIOLOGY PROCEDURE;  Surgeon: Provider, Generic Perianesthesia Nursing;  Location: [facility]       No Known Allergies    Current Outpatient Medications   Medication Sig Dispense Refill    Calcium Carbonate-Vitamin D (CALCIUM 600 + D OR) Take 1 tablet by mouth every morning      carvedilol (COREG) 25 MG tablet Take 1 tablet (25 mg) by mouth 2 times daily (with meals). 180 tablet 3    diphenhydrAMINE-acetaminophen (TYLENOL PM)  MG tablet Take 2 tablets by mouth at bedtime      gabapentin (NEURONTIN) 400 MG capsule TAKE 1 CAPSULE BY MOUTH 3 TIMES DAILY FOR PAIN 90 capsule 0    HYDROcodone-acetaminophen (NORCO) 5-325 MG tablet Take 1 tablet by mouth 3 times daily as needed for severe pain. \ can go up to 2 orally three times a day for severe pain 180 tablet 0    mometasone-formoterol (DULERA) 200-5 MCG/ACT inhaler Inhale 2 puffs into the lungs 2 times daily. 13 g 1    Multiple Vitamin (MULTI-VITAMIN DAILY PO) Take 1 tablet by mouth every morning      rosuvastatin (CRESTOR) 10 MG tablet TAKE 1 TABLET BY MOUTH DAILY 90 tablet 3    sacubitril-valsartan (ENTRESTO) 49-51 MG per tablet Take 1 tablet by mouth 2 times daily. 180 tablet 3    umeclidinium (INCRUSE ELLIPTA) 62.5 MCG/ACT inhaler Inhale 1 puff into the lungs daily. 30 each 1    furosemide (LASIX) 20 MG tablet Take 1 tablet (20 mg) by mouth every other day. 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 [employer]     Employer: RETIRED   Tobacco Use    Smoking status: Former     Current packs/day: 0.00     Average packs/day: 1 pack/day for 15.1 years (15.1 ttl pk-yrs)      Types: Cigarettes     Start date: 1983     Quit date: 1998     Years since quittin.2     Passive exposure: Never    Smokeless tobacco: Never    Tobacco comments:     Passive Exposure: No; Longest Tobacco Free: 15 years    Vaping Use    Vaping status: Never Used   Substance and Sexual Activity    Alcohol use: Yes     Comment: Occasionally  1/wk    Drug use: No    Sexual activity: Not Currently     Partners: Male   Other Topics Concern     Service Not Asked    Blood Transfusions Not Asked    Caffeine Concern Yes     Comment: Coffee 5 cups daily     Occupational Exposure Not Asked    Hobby Hazards Not Asked    Sleep Concern Not Asked    Stress Concern Not Asked    Weight Concern Not Asked    Special Diet Not Asked    Back Care Not Asked    Exercise Not Asked    Bike Helmet Not Asked    Seat Belt Not Asked    Self-Exams Not Asked    Parent/sibling w/ CABG, MI or angioplasty before 65F 55M? No   Social History Narrative     -- n/a    Caffeine -- 1c/day     Social Drivers of Health     Financial Resource Strain: Low Risk  (3/15/2024)    Financial Resource Strain     Within the past 12 months, have you or your family members you live with been unable to get utilities (heat, electricity) when it was really needed?: No   Food Insecurity: Low Risk  (3/15/2024)    Food Insecurity     Within the past 12 months, did you worry that your food would run out before you got money to buy more?: No     Within the past 12 months, did the food you bought just not last and you didn t have money to get more?: No   Transportation Needs: Low Risk  (3/15/2024)    Transportation Needs     Within the past 12 months, has lack of transportation kept you from medical appointments, getting your medicines, non-medical meetings or appointments, work, or from getting things that you need?: No   Physical Activity: Not on file   Stress: Not on file   Social Connections: Not on file   Interpersonal Safety: Not on file    Housing Stability: Low Risk  (3/15/2024)    Housing Stability     Do you have housing? : Yes     Are you worried about losing your housing?: No       LAB RESULTS:   Office Visit on 08/08/2020   Component Date Value Ref Range Status    COVID-19 Virus PCR to [facility] - So* 08/08/2020 Nasopharyngeal   Final    COVID-19 Virus PCR to [facility] - Re* 08/08/2020 Test received-See reflex to IDDL test SARS CoV2 (COVID-19) Virus RT-PCR   Final    SARS-CoV-2 Virus Specimen Source 08/08/2020 Nasopharyngeal   Final    SARS-CoV-2 PCR Result 08/08/2020 NEGATIVE   Final    SARS-CoV-2 PCR Comment 08/08/2020    Final                    Value:Testing was performed using the [employer] SARS-CoV-2 Assay on the [employer] Instrument System.   Additional information about this Emergency Use Authorization (EUA) assay can be found via   the Lab Guide.     Ancillary Procedure on 08/04/2020   Component Date Value Ref Range Status    Date Time Interrogation Session 08/05/2020 [id number]   Final    Implantable Pulse Generator Manufa* 08/05/2020 Medtronic   Final    Implantable Pulse Generator Model 08/05/2020 [id number] Viva XT CRT-D   Final    Implantable Pulse Generator Serial* 08/05/2020 [id number]   Final    Type Interrogation Session 08/05/2020 Remote    Final    Clinic Name 08/05/2020 [facility]   Final    Implantable Pulse Generator Type 08/05/2020 Cardiac Resynchronization Therapy - Defibrillator   Final    Implantable Pulse Generator Implan* 08/05/2020 [id number]   Final    Implantable Lead  08/05/2020 Medtronic   Final    Implantable Lead Model 08/05/2020 4296 Attain Ability Plus   Final    Implantable Lead Serial Number 08/05/2020 [id number]   Final    Implantable Lead Implant Date 08/05/2020 [id number]   Final    Implantable Lead Polarity Type 08/05/2020 Bipolar Lead   Final    Implantable Lead Location Detail 1 08/05/2020 UNKNOWN   Final    Implantable Lead Location 08/05/2020 Left Ventricle   Final     Implantable Lead  08/05/2020 Medtronic   Final    Implantable Lead Model 08/05/2020 5076 CapSureFix Novus   Final    Implantable Lead Serial Number 08/05/2020 [id number]   Final    Implantable Lead Implant Date 08/05/2020 [id number]   Final    Implantable Lead Polarity Type 08/05/2020 Bipolar Lead   Final    Implantable Lead Location Detail 1 08/05/2020 APPENDAGE   Final    Implantable Lead Location 08/05/2020 Right Atrium   Final    Implantable Lead  08/05/2020 Medtronic   Final    Implantable Lead Model 08/05/2020 6947 Sprint Quattro Secure   Final    Implantable Lead Serial Number 08/05/2020 [id number]   Final    Implantable Lead Implant Date 08/05/2020 [id number]   Final    Implantable Lead Polarity Type 08/05/2020 Quadripolar Lead   Final    Implantable Lead Location Detail 1 08/05/2020 APEX   Final    Implantable Lead Location 08/05/2020 Right Ventricle   Final    Vamshi Setting Mode (NBG Code) 08/05/2020 DDDR   Final    Vamsih Setting Lower Rate Limit 08/05/2020 60  [beats]/min Final    Vamshi Setting Maximum Tracking Rate 08/05/2020 130  [beats]/min Final    Vamshi Setting Maximum Sensor Rate 08/05/2020 130  [beats]/min Final    Vamshi Setting NAJMA Delay Low 08/05/2020 140  ms Final    Vamshi Setting PAV Delay Low 08/05/2020 160  ms Final    Vamshi Setting AT Mode Switch Rate 08/05/2020 171  [beats]/min Final    Lead Channel Setting Sensing Polar* 08/05/2020 Bipolar   Final    Lead Channel Setting Sensing Anode* 08/05/2020 Right Atrium   Final    Lead Channel Setting Sensing Anode* 08/05/2020 Ring   Final    Lead Channel Setting Sensing Catho* 08/05/2020 Right Atrium   Final    Lead Channel Setting Sensing Catho* 08/05/2020 Tip   Final    Lead Channel Setting Sensing Sensi* 08/05/2020 0.3  mV Final    Lead Channel Setting Sensing Polar* 08/05/2020 Bipolar   Final    Lead Channel Setting Sensing Anode* 08/05/2020 Right Ventricle   Final    Lead Channel Setting Sensing Anode* 08/05/2020 Coil    Final    Lead Channel Setting Sensing Catho* 08/05/2020 Right Ventricle   Final    Lead Channel Setting Sensing Catho* 08/05/2020 Tip   Final    Lead Channel Setting Sensing Sensi* 08/05/2020 0.3  mV Final    Ventricular chambers paced during * 08/05/2020 LVOnly   Final    Lead Channel Setting Pacing Polari* 08/05/2020 Bipolar   Final    Lead Channel Setting Pacing Anode * 08/05/2020 Right Atrium   Final    Lead Channel Setting Pacing Anode * 08/05/2020 Ring   Final    Lead Channel Setting Sensing Catho* 08/05/2020 Right Atrium   Final    Lead Channel Setting Sensing Catho* 08/05/2020 Tip   Final    Lead Channel Setting Pacing Pulse * 08/05/2020 0.4  ms Final    Lead Channel Setting Pacing Amplit* 08/05/2020 1.5  V Final    Lead Channel Setting Pacing Captur* 08/05/2020 Adaptive   Final    Lead Channel Setting Pacing Polari* 08/05/2020 Bipolar   Final    Lead Channel Setting Pacing Anode * 08/05/2020 Right Ventricle   Final    Lead Channel Setting Pacing Anode * 08/05/2020 Ring   Final    Lead Channel Setting Sensing Catho* 08/05/2020 Right Ventricle   Final    Lead Channel Setting Sensing Catho* 08/05/2020 Tip   Final    Lead Channel Setting Pacing Pulse * 08/05/2020 0.4  ms Final    Lead Channel Setting Pacing Amplit* 08/05/2020 2  V Final    Lead Channel Setting Pacing Captur* 08/05/2020 Adaptive   Final    Lead Channel Setting Pacing Polari* 08/05/2020 Bipolar   Final    Lead Channel Setting Pacing Anode * 08/05/2020 Right Ventricle   Final    Lead Channel Setting Pacing Anode * 08/05/2020 Coil   Final    Lead Channel Setting Sensing Catho* 08/05/2020 Left Ventricle   Final    Lead Channel Setting Sensing Catho* 08/05/2020 Tip   Final    Lead Channel Setting Pacing Pulse * 08/05/2020 0.6  ms Final    Lead Channel Setting Pacing Amplit* 08/05/2020 1.5  V Final    Lead Channel Setting Pacing Captur* 08/05/2020 Adaptive   Final    Zone Setting Type Category 08/05/2020 VF   Final    Zone Setting Detection Interval  08/05/2020 320  ms Final    Zone Setting Detection Beats Numer* 08/05/2020 30  [beats] Final    Zone Setting Detection Beats Denom* 08/05/2020 40  [beats] Final    Zone Setting Type Category 08/05/2020 VT   Final    Zone Setting Detection Interval 08/05/2020 Blank   Final    Zone Setting Type Category 08/05/2020 VT   Final    Zone Setting Detection Interval 08/05/2020 360  ms Final    Zone Setting Type Category 08/05/2020 VT   Final    Zone Setting Detection Interval 08/05/2020 400  ms Final    Zone Setting Type Category 08/05/2020 ATRIAL_FIBRILLATION   Final    Zone Setting Type Category 08/05/2020 AT/AF   Final    Zone Setting Detection Interval 08/05/2020 350  ms Final    Lead Channel Impedance Value 08/05/2020 437  ohm Final    Lead Channel Sensing Intrinsic Amp* 08/05/2020 2.375  mV Final    Lead Channel Sensing Intrinsic Amp* 08/05/2020 2.375  mV Final    Lead Channel Pacing Threshold Ampl* 08/05/2020 0.375  V Final    Lead Channel Pacing Threshold Puls* 08/05/2020 0.4  ms Final    Lead Channel Impedance Value 08/05/2020 399  ohm Final    Lead Channel Impedance Value 08/05/2020 323  ohm Final    Lead Channel Sensing Intrinsic Amp* 08/05/2020 5  mV Final    Lead Channel Sensing Intrinsic Amp* 08/05/2020 5  mV Final    Lead Channel Pacing Threshold Ampl* 08/05/2020 0.625  V Final    Lead Channel Pacing Threshold Puls* 08/05/2020 0.4  ms Final    Lead Channel Impedance Value 08/05/2020 893  ohm Final    Lead Channel Impedance Value 08/05/2020 456  ohm Final    Lead Channel Impedance Value 08/05/2020 570  ohm Final    Lead Channel Pacing Threshold Ampl* 08/05/2020 0.5  V Final    Lead Channel Pacing Threshold Puls* 08/05/2020 0.6  ms Final    Battery Date Time of Measurements 08/05/2020 20200804012400   Final    Battery Status 08/05/2020 OK   Final    Battery RRT Trigger 08/05/2020 2.727   Final    Battery Remaining Longevity 08/05/2020 26  mo Final    Battery Voltage 08/05/2020 2.94  V Final    Capacitor Charge  Type 08/05/2020 Reformation   Final    Capacitor Last Charge Date Time 08/05/2020 [id number]   Final    Capacitor Charge Time 08/05/2020 4.314   Final    Capacitor Charge Energy 08/05/2020 18  J Final    Vamshi Statistic Date Time Start 08/05/2020 [id number]   Final    Vamshi Statistic Date Time End 08/05/2020 20200804012403   Final    Vamshi Statistic RA Percent Paced 08/05/2020 9.93  % Final    Vamshi Statistic RV Percent Paced 08/05/2020 7.92  % Final    CRT Statistic LV Percent Paced 08/05/2020 98.09  % Final    Vamshi Statistic AP  Percent 08/05/2020 9.75  % Final    Vamshi Statistic AS  Percent 08/05/2020 88.58  % Final    Vamshi Statistic AP VS Percent 08/05/2020 0.19  % Final    Vamshi Statistic AS VS Percent 08/05/2020 1.47  % Final    CRT Statistic Date Time Start 08/05/2020 [id number]   Final    CRT Statistic Date Time End 08/05/2020 [id number]   Final    CRT Statistic CRT Percent Paced 08/05/2020 98.09  % Final    Atrial Tachy Statistic Date Time S* 08/05/2020 [id number]   Final    Atrial Tachy Statistic Date Time E* 08/05/2020 [id number]   Final    Atrial Tachy Statistic AT/AF Burde* 08/05/2020 0.1  % Final    Therapy Statistic Recent Shocks De* 08/05/2020 0   Final    Therapy Statistic Recent Shocks Ab* 08/05/2020 0   Final    Therapy Statistic Recent ATP Deliv* 08/05/2020 0   Final    Therapy Statistic Recent Date Time* 08/05/2020 [id number]   Final    Therapy Statistic Recent Date Time* 08/05/2020 [id number]   Final    Therapy Statistic Total Shocks Del* 08/05/2020 1   Final    Therapy Statistic Total Shocks Abo* 08/05/2020 0   Final    Therapy Statistic Total ATP Delive* 08/05/2020 0   Final    Therapy Statistic Total  Date Time* 08/05/2020 [id number]   Final    Therapy Statistic Total  Date Time* 08/05/2020 20200804012403   Final    Episode Statistic Recent Count 08/05/2020 1   Final    Episode Statistic Type Category 08/05/2020 AT/AF   Final    Episode Statistic  Recent Count 08/05/2020 0   Final    Episode Statistic Type Category 08/05/2020 SVT   Final    Episode Statistic Recent Count 08/05/2020 0   Final    Episode Statistic Type Category 08/05/2020 VT   Final    Episode Statistic Recent Count 08/05/2020 0   Final    Episode Statistic Type Category 08/05/2020 VF   Final    Episode Statistic Recent Count 08/05/2020 0   Final    Episode Statistic Type Category 08/05/2020 VT   Final    Episode Statistic Recent Count 08/05/2020 0   Final    Episode Statistic Type Category 08/05/2020 VT   Final    Episode Statistic Recent Count 08/05/2020 0   Final    Episode Statistic Type Category 08/05/2020 VT   Final    Episode Statistic Recent Date Time* 08/05/2020 [id number]   Final    Episode Statistic Recent Date Time* 08/05/2020 [id number]   Final    Episode Statistic Recent Date Time* 08/05/2020 [id number]   Final    Episode Statistic Recent Date Time* 08/05/2020 20200804012403   Final    Episode Statistic Recent Date Time* 08/05/2020 [id number]   Final    Episode Statistic Recent Date Time* 08/05/2020 20200804012403   Final    Episode Statistic Recent Date Time* 08/05/2020 [id number]   Final    Episode Statistic Recent Date Time* 08/05/2020 20200804012403   Final    Episode Statistic Recent Date Time* 08/05/2020 [id number]   Final    Episode Statistic Recent Date Time* 08/05/2020 20200804012403   Final    Episode Statistic Recent Date Time* 08/05/2020 [id number]   Final    Episode Statistic Recent Date Time* 08/05/2020 20200804012403   Final    Episode Statistic Recent Date Time* 08/05/2020 [id number]   Final    Episode Statistic Recent Date Time* 08/05/2020 20200804012403   Final    Episode Statistic Total Count 08/05/2020 0   Final    Episode Statistic Type Category 08/05/2020 AT/AF   Final    Episode Statistic Total Count 08/05/2020 0   Final    Episode Statistic Type Category 08/05/2020 SVT   Final    Episode Statistic Total Count 08/05/2020 66    Final    Episode Statistic Type Category 08/05/2020 VT   Final    Episode Statistic Total Count 08/05/2020 1   Final    Episode Statistic Type Category 08/05/2020 VF   Final    Episode Statistic Total Count 08/05/2020 0   Final    Episode Statistic Type Category 08/05/2020 VT   Final    Episode Statistic Total Count 08/05/2020 0   Final    Episode Statistic Type Category 08/05/2020 VT   Final    Episode Statistic Total Count 08/05/2020 0   Final    Episode Statistic Type Category 08/05/2020 VT   Final    Episode Statistic Total Date Time * 08/05/2020 [id number]   Final    Episode Statistic Total Date Time * 08/05/2020 20200804012403   Final    Episode Statistic Total Date Time * 08/05/2020 20140506113519   Final    Episode Statistic Total Date Time * 08/05/2020 20200804012403   Final    Episode Statistic Total Date Time * 08/05/2020 20140506113519   Final    Episode Statistic Total Date Time * 08/05/2020 20200804012403   Final    Episode Statistic Total Date Time * 08/05/2020 20140506113519   Final    Episode Statistic Total Date Time * 08/05/2020 20200804012403   Final    Episode Statistic Total Date Time * 08/05/2020 20140506113519   Final    Episode Statistic Total Date Time * 08/05/2020 20200804012403   Final    Episode Statistic Total Date Time * 08/05/2020 20140506113519   Final    Episode Statistic Total Date Time * 08/05/2020 20200804012403   Final    Episode Statistic Total Date Time * 08/05/2020 20140506113519   Final    Episode Statistic Total Date Time * 08/05/2020 20200804012403   Final    Episode Identifier 08/05/2020 716   Final    Episode Type Category 08/05/2020 VSE   Final    Episode Date Time 08/05/2020 [id number]   Final    Episode Duration 08/05/2020 9  s Final    Episode Identifier 08/05/2020 715   Final    Episode Type Category 08/05/2020 VSE   Final    Episode Date Time 08/05/2020 [id number]   Final    Episode Duration 08/05/2020 3  s Final    Episode Identifier 08/05/2020 714    "Final    Episode Type Category 08/05/2020 VSE   Final    Episode Date Time 08/05/2020 20200625235723   Final    Episode Duration 08/05/2020 4  s Final    Episode Identifier 08/05/2020 713   Final    Episode Type Category 08/05/2020 VSE   Final    Episode Date Time 08/05/2020 20200607131142   Final    Episode Duration 08/05/2020 6  s Final    Episode Identifier 08/05/2020 712   Final    Episode Type Category 08/05/2020 VSE   Final    Episode Date Time 08/05/2020 20200523024049   Final    Episode Duration 08/05/2020 18  s Final    Episode Identifier 08/05/2020 711   Final    Episode Type Category 08/05/2020 VSE   Final    Episode Date Time 08/05/2020 [id number]   Final    Episode Duration 08/05/2020 4  s Final        Review of systems: Negative except that which was noted in the HPI.    Physical examination:  /52 (BP Location: Left arm, Patient Position: Sitting, Cuff Size: Adult Regular)   Pulse 68   Resp 18   Ht 1.626 m (5' 4\")   Wt 59.5 kg (131 lb 3.2 oz)   SpO2 98%   BMI 22.52 kg/m      GENERAL APPEARANCE: healthy, alert and no distress  CHEST: lungs clear to auscultation.  CARDIOVASCULAR: Irregular rate irregular rhythm, normal S1 with physiologic split S2, no S3 or S4 and no murmur, click or rub  EXTREMITIES: no clubbing, cyanosis but with mild lower extremity edema.    Total time spent on day of visit, including review of tests, obtaining/reviewing separately obtained history, ordering medications/tests/procedures, communicating with PCP/consultants, and documenting in electronic medical record: 20 minutes.                Thank you for allowing me to participate in the care of your patient. Please do not hesitate to contact me if you have any questions.     Kodi Garcia, DO        "

## 2025-05-20 LAB
MDC_IDC_LEAD_CONNECTION_STATUS: NORMAL
MDC_IDC_LEAD_IMPLANT_DT: NORMAL
MDC_IDC_LEAD_LOCATION: NORMAL
MDC_IDC_LEAD_LOCATION_DETAIL_1: NORMAL
MDC_IDC_LEAD_MFG: NORMAL
MDC_IDC_LEAD_MODEL: NORMAL
MDC_IDC_LEAD_POLARITY_TYPE: NORMAL
MDC_IDC_LEAD_SERIAL: NORMAL
MDC_IDC_MSMT_BATTERY_DTM: NORMAL
MDC_IDC_MSMT_BATTERY_REMAINING_LONGEVITY: 106 MO
MDC_IDC_MSMT_BATTERY_RRT_TRIGGER: NORMAL
MDC_IDC_MSMT_BATTERY_VOLTAGE: 3 V
MDC_IDC_MSMT_CAP_CHARGE_DTM: NORMAL
MDC_IDC_MSMT_CAP_CHARGE_ENERGY: 18 J
MDC_IDC_MSMT_CAP_CHARGE_TIME: 3.8 S
MDC_IDC_MSMT_CAP_CHARGE_TYPE: NORMAL
MDC_IDC_MSMT_LEADCHNL_LV_IMPEDANCE_VALUE: 418 OHM
MDC_IDC_MSMT_LEADCHNL_LV_IMPEDANCE_VALUE: 494 OHM
MDC_IDC_MSMT_LEADCHNL_LV_IMPEDANCE_VALUE: 855 OHM
MDC_IDC_MSMT_LEADCHNL_LV_PACING_THRESHOLD_AMPLITUDE: 1 V
MDC_IDC_MSMT_LEADCHNL_LV_PACING_THRESHOLD_PULSEWIDTH: 0.4 MS
MDC_IDC_MSMT_LEADCHNL_RA_IMPEDANCE_VALUE: 380 OHM
MDC_IDC_MSMT_LEADCHNL_RA_PACING_THRESHOLD_AMPLITUDE: 0.5 V
MDC_IDC_MSMT_LEADCHNL_RA_PACING_THRESHOLD_AMPLITUDE: 0.62 V
MDC_IDC_MSMT_LEADCHNL_RA_PACING_THRESHOLD_PULSEWIDTH: 0.4 MS
MDC_IDC_MSMT_LEADCHNL_RA_PACING_THRESHOLD_PULSEWIDTH: 0.4 MS
MDC_IDC_MSMT_LEADCHNL_RA_SENSING_INTR_AMPL: 1.1 MV
MDC_IDC_MSMT_LEADCHNL_RV_IMPEDANCE_VALUE: 361 OHM
MDC_IDC_MSMT_LEADCHNL_RV_IMPEDANCE_VALUE: 437 OHM
MDC_IDC_MSMT_LEADCHNL_RV_PACING_THRESHOLD_AMPLITUDE: 1.25 V
MDC_IDC_MSMT_LEADCHNL_RV_PACING_THRESHOLD_AMPLITUDE: 1.5 V
MDC_IDC_MSMT_LEADCHNL_RV_PACING_THRESHOLD_PULSEWIDTH: 0.4 MS
MDC_IDC_MSMT_LEADCHNL_RV_PACING_THRESHOLD_PULSEWIDTH: 0.4 MS
MDC_IDC_MSMT_LEADCHNL_RV_SENSING_INTR_AMPL: 4.8 MV
MDC_IDC_PG_IMPLANT_DTM: NORMAL
MDC_IDC_PG_MFG: NORMAL
MDC_IDC_PG_MODEL: NORMAL
MDC_IDC_PG_SERIAL: NORMAL
MDC_IDC_PG_TYPE: NORMAL
MDC_IDC_SESS_CLINIC_NAME: NORMAL
MDC_IDC_SESS_DTM: NORMAL
MDC_IDC_SESS_TYPE: NORMAL
MDC_IDC_SET_BRADY_AT_MODE_SWITCH_RATE: 171 {BEATS}/MIN
MDC_IDC_SET_BRADY_LOWRATE: 60 {BEATS}/MIN
MDC_IDC_SET_BRADY_MAX_SENSOR_RATE: 130 {BEATS}/MIN
MDC_IDC_SET_BRADY_MAX_TRACKING_RATE: 130 {BEATS}/MIN
MDC_IDC_SET_BRADY_MODE: NORMAL
MDC_IDC_SET_BRADY_PAV_DELAY_LOW: 160 MS
MDC_IDC_SET_BRADY_SAV_DELAY_LOW: 110 MS
MDC_IDC_SET_CRT_LVRV_DELAY: 10 MS
MDC_IDC_SET_CRT_PACED_CHAMBERS: NORMAL
MDC_IDC_SET_LEADCHNL_LV_PACING_AMPLITUDE: 1.5 V
MDC_IDC_SET_LEADCHNL_LV_PACING_ANODE_ELECTRODE_1: NORMAL
MDC_IDC_SET_LEADCHNL_LV_PACING_ANODE_LOCATION_1: NORMAL
MDC_IDC_SET_LEADCHNL_LV_PACING_CAPTURE_MODE: NORMAL
MDC_IDC_SET_LEADCHNL_LV_PACING_CATHODE_ELECTRODE_1: NORMAL
MDC_IDC_SET_LEADCHNL_LV_PACING_CATHODE_LOCATION_1: NORMAL
MDC_IDC_SET_LEADCHNL_LV_PACING_POLARITY: NORMAL
MDC_IDC_SET_LEADCHNL_LV_PACING_PULSEWIDTH: 0.4 MS
MDC_IDC_SET_LEADCHNL_RA_PACING_AMPLITUDE: 1.5 V
MDC_IDC_SET_LEADCHNL_RA_PACING_ANODE_ELECTRODE_1: NORMAL
MDC_IDC_SET_LEADCHNL_RA_PACING_ANODE_LOCATION_1: NORMAL
MDC_IDC_SET_LEADCHNL_RA_PACING_CAPTURE_MODE: NORMAL
MDC_IDC_SET_LEADCHNL_RA_PACING_CATHODE_ELECTRODE_1: NORMAL
MDC_IDC_SET_LEADCHNL_RA_PACING_CATHODE_LOCATION_1: NORMAL
MDC_IDC_SET_LEADCHNL_RA_PACING_POLARITY: NORMAL
MDC_IDC_SET_LEADCHNL_RA_PACING_PULSEWIDTH: 0.4 MS
MDC_IDC_SET_LEADCHNL_RA_SENSING_ANODE_ELECTRODE_1: NORMAL
MDC_IDC_SET_LEADCHNL_RA_SENSING_ANODE_LOCATION_1: NORMAL
MDC_IDC_SET_LEADCHNL_RA_SENSING_CATHODE_ELECTRODE_1: NORMAL
MDC_IDC_SET_LEADCHNL_RA_SENSING_CATHODE_LOCATION_1: NORMAL
MDC_IDC_SET_LEADCHNL_RA_SENSING_POLARITY: NORMAL
MDC_IDC_SET_LEADCHNL_RA_SENSING_SENSITIVITY: 0.3 MV
MDC_IDC_SET_LEADCHNL_RV_PACING_AMPLITUDE: 2.25 V
MDC_IDC_SET_LEADCHNL_RV_PACING_ANODE_ELECTRODE_1: NORMAL
MDC_IDC_SET_LEADCHNL_RV_PACING_ANODE_LOCATION_1: NORMAL
MDC_IDC_SET_LEADCHNL_RV_PACING_CAPTURE_MODE: NORMAL
MDC_IDC_SET_LEADCHNL_RV_PACING_CATHODE_ELECTRODE_1: NORMAL
MDC_IDC_SET_LEADCHNL_RV_PACING_CATHODE_LOCATION_1: NORMAL
MDC_IDC_SET_LEADCHNL_RV_PACING_POLARITY: NORMAL
MDC_IDC_SET_LEADCHNL_RV_PACING_PULSEWIDTH: 0.4 MS
MDC_IDC_SET_LEADCHNL_RV_SENSING_ANODE_ELECTRODE_1: NORMAL
MDC_IDC_SET_LEADCHNL_RV_SENSING_ANODE_LOCATION_1: NORMAL
MDC_IDC_SET_LEADCHNL_RV_SENSING_CATHODE_ELECTRODE_1: NORMAL
MDC_IDC_SET_LEADCHNL_RV_SENSING_CATHODE_LOCATION_1: NORMAL
MDC_IDC_SET_LEADCHNL_RV_SENSING_POLARITY: NORMAL
MDC_IDC_SET_LEADCHNL_RV_SENSING_SENSITIVITY: 0.3 MV
MDC_IDC_SET_ZONE_DETECTION_BEATS_DENOMINATOR: 16 {BEATS}
MDC_IDC_SET_ZONE_DETECTION_BEATS_DENOMINATOR: 32 {BEATS}
MDC_IDC_SET_ZONE_DETECTION_BEATS_DENOMINATOR: 40 {BEATS}
MDC_IDC_SET_ZONE_DETECTION_BEATS_NUMERATOR: 16 {BEATS}
MDC_IDC_SET_ZONE_DETECTION_BEATS_NUMERATOR: 30 {BEATS}
MDC_IDC_SET_ZONE_DETECTION_BEATS_NUMERATOR: 32 {BEATS}
MDC_IDC_SET_ZONE_DETECTION_INTERVAL: 240 MS
MDC_IDC_SET_ZONE_DETECTION_INTERVAL: 320 MS
MDC_IDC_SET_ZONE_DETECTION_INTERVAL: 350 MS
MDC_IDC_SET_ZONE_DETECTION_INTERVAL: 360 MS
MDC_IDC_SET_ZONE_DETECTION_INTERVAL: 400 MS
MDC_IDC_SET_ZONE_STATUS: NORMAL
MDC_IDC_SET_ZONE_TYPE: NORMAL
MDC_IDC_SET_ZONE_VENDOR_TYPE: NORMAL
MDC_IDC_STAT_AT_BURDEN_PERCENT: 0.01 %
MDC_IDC_STAT_AT_DTM_END: NORMAL
MDC_IDC_STAT_AT_DTM_START: NORMAL
MDC_IDC_STAT_BRADY_AP_VP_PERCENT: 10.87 %
MDC_IDC_STAT_BRADY_AP_VS_PERCENT: 0.2 %
MDC_IDC_STAT_BRADY_AS_VP_PERCENT: 87.17 %
MDC_IDC_STAT_BRADY_AS_VS_PERCENT: 1.77 %
MDC_IDC_STAT_BRADY_DTM_END: NORMAL
MDC_IDC_STAT_BRADY_DTM_START: NORMAL
MDC_IDC_STAT_BRADY_RA_PERCENT_PACED: 11.14 %
MDC_IDC_STAT_BRADY_RV_PERCENT_PACED: 2.35 %
MDC_IDC_STAT_CRT_DTM_END: NORMAL
MDC_IDC_STAT_CRT_DTM_START: NORMAL
MDC_IDC_STAT_CRT_LV_PERCENT_PACED: 98 %
MDC_IDC_STAT_CRT_PERCENT_PACED: 2.31 %
MDC_IDC_STAT_EPISODE_RECENT_COUNT: 0
MDC_IDC_STAT_EPISODE_RECENT_COUNT_DTM_END: NORMAL
MDC_IDC_STAT_EPISODE_RECENT_COUNT_DTM_START: NORMAL
MDC_IDC_STAT_EPISODE_TOTAL_COUNT: 0
MDC_IDC_STAT_EPISODE_TOTAL_COUNT: 2
MDC_IDC_STAT_EPISODE_TOTAL_COUNT_DTM_END: NORMAL
MDC_IDC_STAT_EPISODE_TOTAL_COUNT_DTM_START: NORMAL
MDC_IDC_STAT_EPISODE_TYPE: NORMAL
MDC_IDC_STAT_TACHYTHERAPY_ATP_DELIVERED_RECENT: 0
MDC_IDC_STAT_TACHYTHERAPY_ATP_DELIVERED_TOTAL: 0
MDC_IDC_STAT_TACHYTHERAPY_RECENT_DTM_END: NORMAL
MDC_IDC_STAT_TACHYTHERAPY_RECENT_DTM_START: NORMAL
MDC_IDC_STAT_TACHYTHERAPY_SHOCKS_ABORTED_RECENT: 0
MDC_IDC_STAT_TACHYTHERAPY_SHOCKS_ABORTED_TOTAL: 0
MDC_IDC_STAT_TACHYTHERAPY_SHOCKS_DELIVERED_RECENT: 0
MDC_IDC_STAT_TACHYTHERAPY_SHOCKS_DELIVERED_TOTAL: 0
MDC_IDC_STAT_TACHYTHERAPY_TOTAL_DTM_END: NORMAL
MDC_IDC_STAT_TACHYTHERAPY_TOTAL_DTM_START: NORMAL

## 2025-05-28 DIAGNOSIS — M48.062 SPINAL STENOSIS OF LUMBAR REGION WITH NEUROGENIC CLAUDICATION: ICD-10-CM

## 2025-05-28 DIAGNOSIS — G62.9 NEUROPATHY: ICD-10-CM

## 2025-05-28 DIAGNOSIS — M47.815 SPONDYLOSIS OF THORACOLUMBAR REGION WITHOUT MYELOPATHY OR RADICULOPATHY: ICD-10-CM

## 2025-05-29 RX ORDER — HYDROCODONE BITARTRATE AND ACETAMINOPHEN 5; 325 MG/1; MG/1
1 TABLET ORAL 3 TIMES DAILY PRN
Qty: 90 TABLET | Refills: 0 | Status: SHIPPED | OUTPATIENT
Start: 2025-05-29

## 2025-05-29 NOTE — TELEPHONE ENCOUNTER
[facility]       Last Written Prescription Date:  2/26/2025  Last Fill Quantity: 180,   # refills: 0  Last Office Visit: 3/28/2025  Future Office visit:    Next 5 appointments (look out 90 days)      Jun 30, 2025 11:30 AM  (Arrive by 11:15 AM)  Adult Preventative Visit with Paulette Sherwood MD  [facility] [place] ([facility] [place] ) [address]  [place] [address]  [phone]

## 2025-06-16 DIAGNOSIS — G62.9 NEUROPATHY: ICD-10-CM

## 2025-06-16 DIAGNOSIS — M48.062 SPINAL STENOSIS OF LUMBAR REGION WITH NEUROGENIC CLAUDICATION: ICD-10-CM

## 2025-06-16 NOTE — TELEPHONE ENCOUNTER
Gabapentin  Last Written Prescription Date: 5/1/25  Last Fill Quantity: 90 # of Refills: 0  Last Office Visit: 3/28/25

## 2025-06-17 RX ORDER — GABAPENTIN 400 MG/1
CAPSULE ORAL
Qty: 90 CAPSULE | Refills: 0 | Status: SHIPPED | OUTPATIENT
Start: 2025-06-17

## 2025-07-02 ENCOUNTER — PATIENT OUTREACH (OUTPATIENT)
Dept: CARE COORDINATION | Facility: CLINIC | Age: 89
End: 2025-07-02
Payer: COMMERCIAL

## 2025-07-31 DIAGNOSIS — M48.062 SPINAL STENOSIS OF LUMBAR REGION WITH NEUROGENIC CLAUDICATION: ICD-10-CM

## 2025-07-31 DIAGNOSIS — G62.9 NEUROPATHY: ICD-10-CM

## 2025-08-02 ENCOUNTER — HEALTH MAINTENANCE LETTER (OUTPATIENT)
Age: 89
End: 2025-08-02

## 2025-08-04 RX ORDER — GABAPENTIN 400 MG/1
CAPSULE ORAL
Qty: 90 CAPSULE | Refills: 0 | Status: SHIPPED | OUTPATIENT
Start: 2025-08-04

## 2025-08-14 ENCOUNTER — ANCILLARY PROCEDURE (OUTPATIENT)
Dept: CARDIOLOGY | Facility: CLINIC | Age: 89
End: 2025-08-14
Attending: INTERNAL MEDICINE
Payer: MEDICARE

## 2025-08-14 DIAGNOSIS — Z95.810 AUTOMATIC IMPLANTABLE CARDIOVERTER-DEFIBRILLATOR IN SITU: ICD-10-CM

## 2025-08-14 PROCEDURE — 93295 DEV INTERROG REMOTE 1/2/MLT: CPT | Performed by: INTERNAL MEDICINE

## 2025-08-14 PROCEDURE — 93296 REM INTERROG EVL PM/IDS: CPT

## 2025-08-15 ENCOUNTER — TRANSFERRED RECORDS (OUTPATIENT)
Dept: HEALTH INFORMATION MANAGEMENT | Facility: CLINIC | Age: 89
End: 2025-08-15
Payer: COMMERCIAL

## 2025-08-15 LAB
MDC_IDC_LEAD_CONNECTION_STATUS: NORMAL
MDC_IDC_LEAD_IMPLANT_DT: NORMAL
MDC_IDC_LEAD_LOCATION: NORMAL
MDC_IDC_LEAD_LOCATION_DETAIL_1: NORMAL
MDC_IDC_LEAD_MFG: NORMAL
MDC_IDC_LEAD_MODEL: NORMAL
MDC_IDC_LEAD_POLARITY_TYPE: NORMAL
MDC_IDC_LEAD_SERIAL: NORMAL
MDC_IDC_MSMT_BATTERY_DTM: NORMAL
MDC_IDC_MSMT_BATTERY_REMAINING_LONGEVITY: 100 MO
MDC_IDC_MSMT_BATTERY_RRT_TRIGGER: NORMAL
MDC_IDC_MSMT_BATTERY_STATUS: NORMAL
MDC_IDC_MSMT_BATTERY_VOLTAGE: 3 V
MDC_IDC_MSMT_CAP_CHARGE_DTM: NORMAL
MDC_IDC_MSMT_CAP_CHARGE_ENERGY: 18 J
MDC_IDC_MSMT_CAP_CHARGE_TIME: 3.8 S
MDC_IDC_MSMT_CAP_CHARGE_TYPE: NORMAL
MDC_IDC_MSMT_LEADCHNL_LV_IMPEDANCE_VALUE: 418 OHM
MDC_IDC_MSMT_LEADCHNL_LV_IMPEDANCE_VALUE: 532 OHM
MDC_IDC_MSMT_LEADCHNL_LV_IMPEDANCE_VALUE: 893 OHM
MDC_IDC_MSMT_LEADCHNL_RA_IMPEDANCE_VALUE: 361 OHM
MDC_IDC_MSMT_LEADCHNL_RA_PACING_THRESHOLD_AMPLITUDE: 0.62 V
MDC_IDC_MSMT_LEADCHNL_RA_PACING_THRESHOLD_PULSEWIDTH: 0.4 MS
MDC_IDC_MSMT_LEADCHNL_RA_SENSING_INTR_AMPL: 1 MV
MDC_IDC_MSMT_LEADCHNL_RV_IMPEDANCE_VALUE: 342 OHM
MDC_IDC_MSMT_LEADCHNL_RV_IMPEDANCE_VALUE: 418 OHM
MDC_IDC_MSMT_LEADCHNL_RV_PACING_THRESHOLD_AMPLITUDE: 1.5 V
MDC_IDC_MSMT_LEADCHNL_RV_PACING_THRESHOLD_PULSEWIDTH: 0.4 MS
MDC_IDC_MSMT_LEADCHNL_RV_SENSING_INTR_AMPL: 4.8 MV
MDC_IDC_PG_IMPLANT_DTM: NORMAL
MDC_IDC_PG_MFG: NORMAL
MDC_IDC_PG_MODEL: NORMAL
MDC_IDC_PG_SERIAL: NORMAL
MDC_IDC_PG_TYPE: NORMAL
MDC_IDC_SESS_CLINIC_NAME: NORMAL
MDC_IDC_SESS_DTM: NORMAL
MDC_IDC_SESS_TYPE: NORMAL
MDC_IDC_SET_BRADY_AT_MODE_SWITCH_RATE: 171 {BEATS}/MIN
MDC_IDC_SET_BRADY_LOWRATE: 60 {BEATS}/MIN
MDC_IDC_SET_BRADY_MAX_SENSOR_RATE: 130 {BEATS}/MIN
MDC_IDC_SET_BRADY_MAX_TRACKING_RATE: 130 {BEATS}/MIN
MDC_IDC_SET_BRADY_MODE: NORMAL
MDC_IDC_SET_BRADY_PAV_DELAY_LOW: 170 MS
MDC_IDC_SET_BRADY_SAV_DELAY_LOW: 140 MS
MDC_IDC_SET_CRT_LVRV_DELAY: 10 MS
MDC_IDC_SET_CRT_PACED_CHAMBERS: NORMAL
MDC_IDC_SET_LEADCHNL_LV_PACING_AMPLITUDE: 1.5 V
MDC_IDC_SET_LEADCHNL_LV_PACING_ANODE_ELECTRODE_1: NORMAL
MDC_IDC_SET_LEADCHNL_LV_PACING_ANODE_LOCATION_1: NORMAL
MDC_IDC_SET_LEADCHNL_LV_PACING_CAPTURE_MODE: NORMAL
MDC_IDC_SET_LEADCHNL_LV_PACING_CATHODE_ELECTRODE_1: NORMAL
MDC_IDC_SET_LEADCHNL_LV_PACING_CATHODE_LOCATION_1: NORMAL
MDC_IDC_SET_LEADCHNL_LV_PACING_POLARITY: NORMAL
MDC_IDC_SET_LEADCHNL_LV_PACING_PULSEWIDTH: 0.4 MS
MDC_IDC_SET_LEADCHNL_RA_PACING_AMPLITUDE: 1.5 V
MDC_IDC_SET_LEADCHNL_RA_PACING_ANODE_ELECTRODE_1: NORMAL
MDC_IDC_SET_LEADCHNL_RA_PACING_ANODE_LOCATION_1: NORMAL
MDC_IDC_SET_LEADCHNL_RA_PACING_CAPTURE_MODE: NORMAL
MDC_IDC_SET_LEADCHNL_RA_PACING_CATHODE_ELECTRODE_1: NORMAL
MDC_IDC_SET_LEADCHNL_RA_PACING_CATHODE_LOCATION_1: NORMAL
MDC_IDC_SET_LEADCHNL_RA_PACING_POLARITY: NORMAL
MDC_IDC_SET_LEADCHNL_RA_PACING_PULSEWIDTH: 0.4 MS
MDC_IDC_SET_LEADCHNL_RA_SENSING_ANODE_ELECTRODE_1: NORMAL
MDC_IDC_SET_LEADCHNL_RA_SENSING_ANODE_LOCATION_1: NORMAL
MDC_IDC_SET_LEADCHNL_RA_SENSING_CATHODE_ELECTRODE_1: NORMAL
MDC_IDC_SET_LEADCHNL_RA_SENSING_CATHODE_LOCATION_1: NORMAL
MDC_IDC_SET_LEADCHNL_RA_SENSING_POLARITY: NORMAL
MDC_IDC_SET_LEADCHNL_RA_SENSING_SENSITIVITY: 0.3 MV
MDC_IDC_SET_LEADCHNL_RV_PACING_AMPLITUDE: 2.25 V
MDC_IDC_SET_LEADCHNL_RV_PACING_ANODE_ELECTRODE_1: NORMAL
MDC_IDC_SET_LEADCHNL_RV_PACING_ANODE_LOCATION_1: NORMAL
MDC_IDC_SET_LEADCHNL_RV_PACING_CAPTURE_MODE: NORMAL
MDC_IDC_SET_LEADCHNL_RV_PACING_CATHODE_ELECTRODE_1: NORMAL
MDC_IDC_SET_LEADCHNL_RV_PACING_CATHODE_LOCATION_1: NORMAL
MDC_IDC_SET_LEADCHNL_RV_PACING_POLARITY: NORMAL
MDC_IDC_SET_LEADCHNL_RV_PACING_PULSEWIDTH: 0.4 MS
MDC_IDC_SET_LEADCHNL_RV_SENSING_ANODE_ELECTRODE_1: NORMAL
MDC_IDC_SET_LEADCHNL_RV_SENSING_ANODE_LOCATION_1: NORMAL
MDC_IDC_SET_LEADCHNL_RV_SENSING_CATHODE_ELECTRODE_1: NORMAL
MDC_IDC_SET_LEADCHNL_RV_SENSING_CATHODE_LOCATION_1: NORMAL
MDC_IDC_SET_LEADCHNL_RV_SENSING_POLARITY: NORMAL
MDC_IDC_SET_LEADCHNL_RV_SENSING_SENSITIVITY: 0.3 MV
MDC_IDC_SET_ZONE_DETECTION_BEATS_DENOMINATOR: 16 {BEATS}
MDC_IDC_SET_ZONE_DETECTION_BEATS_DENOMINATOR: 32 {BEATS}
MDC_IDC_SET_ZONE_DETECTION_BEATS_DENOMINATOR: 40 {BEATS}
MDC_IDC_SET_ZONE_DETECTION_BEATS_NUMERATOR: 16 {BEATS}
MDC_IDC_SET_ZONE_DETECTION_BEATS_NUMERATOR: 30 {BEATS}
MDC_IDC_SET_ZONE_DETECTION_BEATS_NUMERATOR: 32 {BEATS}
MDC_IDC_SET_ZONE_DETECTION_INTERVAL: 240 MS
MDC_IDC_SET_ZONE_DETECTION_INTERVAL: 320 MS
MDC_IDC_SET_ZONE_DETECTION_INTERVAL: 350 MS
MDC_IDC_SET_ZONE_DETECTION_INTERVAL: 360 MS
MDC_IDC_SET_ZONE_DETECTION_INTERVAL: 400 MS
MDC_IDC_SET_ZONE_STATUS: NORMAL
MDC_IDC_SET_ZONE_TYPE: NORMAL
MDC_IDC_SET_ZONE_VENDOR_TYPE: NORMAL
MDC_IDC_STAT_AT_BURDEN_PERCENT: 0.01 %
MDC_IDC_STAT_AT_DTM_END: NORMAL
MDC_IDC_STAT_AT_DTM_START: NORMAL
MDC_IDC_STAT_BRADY_AP_VP_PERCENT: 13.51 %
MDC_IDC_STAT_BRADY_AP_VS_PERCENT: 0.24 %
MDC_IDC_STAT_BRADY_AS_VP_PERCENT: 84.83 %
MDC_IDC_STAT_BRADY_AS_VS_PERCENT: 1.42 %
MDC_IDC_STAT_BRADY_DTM_END: NORMAL
MDC_IDC_STAT_BRADY_DTM_START: NORMAL
MDC_IDC_STAT_BRADY_RA_PERCENT_PACED: 13.76 %
MDC_IDC_STAT_BRADY_RV_PERCENT_PACED: 4.57 %
MDC_IDC_STAT_CRT_DTM_END: NORMAL
MDC_IDC_STAT_CRT_DTM_START: NORMAL
MDC_IDC_STAT_CRT_LV_PERCENT_PACED: 98.3 %
MDC_IDC_STAT_CRT_PERCENT_PACED: 4.53 %
MDC_IDC_STAT_EPISODE_RECENT_COUNT: 0
MDC_IDC_STAT_EPISODE_RECENT_COUNT_DTM_END: NORMAL
MDC_IDC_STAT_EPISODE_RECENT_COUNT_DTM_START: NORMAL
MDC_IDC_STAT_EPISODE_TOTAL_COUNT: 0
MDC_IDC_STAT_EPISODE_TOTAL_COUNT: 2
MDC_IDC_STAT_EPISODE_TOTAL_COUNT_DTM_END: NORMAL
MDC_IDC_STAT_EPISODE_TOTAL_COUNT_DTM_START: NORMAL
MDC_IDC_STAT_EPISODE_TYPE: NORMAL
MDC_IDC_STAT_TACHYTHERAPY_ATP_DELIVERED_RECENT: 0
MDC_IDC_STAT_TACHYTHERAPY_ATP_DELIVERED_TOTAL: 0
MDC_IDC_STAT_TACHYTHERAPY_RECENT_DTM_END: NORMAL
MDC_IDC_STAT_TACHYTHERAPY_RECENT_DTM_START: NORMAL
MDC_IDC_STAT_TACHYTHERAPY_SHOCKS_ABORTED_RECENT: 0
MDC_IDC_STAT_TACHYTHERAPY_SHOCKS_ABORTED_TOTAL: 0
MDC_IDC_STAT_TACHYTHERAPY_SHOCKS_DELIVERED_RECENT: 0
MDC_IDC_STAT_TACHYTHERAPY_SHOCKS_DELIVERED_TOTAL: 0
MDC_IDC_STAT_TACHYTHERAPY_TOTAL_DTM_END: NORMAL
MDC_IDC_STAT_TACHYTHERAPY_TOTAL_DTM_START: NORMAL

## 2025-08-18 DIAGNOSIS — J43.8 OTHER EMPHYSEMA (H): ICD-10-CM

## 2025-08-20 RX ORDER — UMECLIDINIUM 62.5 UG/1
1 AEROSOL, POWDER ORAL DAILY
Qty: 30 EACH | Refills: 0 | Status: SHIPPED | OUTPATIENT
Start: 2025-08-20

## 2025-09-04 ENCOUNTER — PATIENT OUTREACH (OUTPATIENT)
Dept: CARE COORDINATION | Facility: CLINIC | Age: 89
End: 2025-09-04
Payer: COMMERCIAL

## (undated) DEVICE — GLOVE BIOGEL INDICATOR 7.5 LF 41675

## (undated) DEVICE — SYR 30ML SLIP TIP W/O NDL 302833

## (undated) DEVICE — ADH SKIN CLOSURE PREMIERPRO EXOFIN 1.0ML 3470

## (undated) DEVICE — FORCEP COLON BIOPSY STD W/NEEDLE 160CM M00513390

## (undated) DEVICE — CANISTER SUCTION MEDI-VAC GUARDIAN 2000ML 90D 65651-220

## (undated) DEVICE — GLOVE PROTEXIS POWDER FREE SMT 7.5  2D72PT75X

## (undated) DEVICE — SOL WATER 1500ML

## (undated) DEVICE — TUBING SUCTION 20FT N620A

## (undated) DEVICE — SU VICRYL 3-0 SH 27" UND J416H

## (undated) DEVICE — SOL WATER IRRIG 1000ML BOTTLE 2F7114

## (undated) DEVICE — PREP CHLORAPREP 26ML TINTED ORANGE  260815

## (undated) DEVICE — CONNECTOR ERBEFLO 2 PORT 20325-215

## (undated) DEVICE — SU MONOCRYL 4-0 PS-2 27" UND Y426H

## (undated) DEVICE — ESU PENCIL SMOKE EVAC W/ROCKER SWITCH 0703-047-000

## (undated) DEVICE — ENDO BITE BLOCK ADULT OLYMPUS LATEX FREE MAJ-1632

## (undated) DEVICE — PACK MAJOR LAPAROTOMY LF SBA15MLFCA

## (undated) DEVICE — JELLY LUBRICATING SURGILUBE 2OZ TUBE

## (undated) RX ORDER — CEFAZOLIN SODIUM/WATER 2 G/20 ML
SYRINGE (ML) INTRAVENOUS
Status: DISPENSED
Start: 2023-01-30

## (undated) RX ORDER — PROPOFOL 10 MG/ML
INJECTION, EMULSION INTRAVENOUS
Status: DISPENSED
Start: 2023-01-30

## (undated) RX ORDER — FENTANYL CITRATE-0.9 % NACL/PF 10 MCG/ML
PLASTIC BAG, INJECTION (ML) INTRAVENOUS
Status: DISPENSED
Start: 2023-06-21

## (undated) RX ORDER — LIDOCAINE HYDROCHLORIDE AND EPINEPHRINE 10; 10 MG/ML; UG/ML
INJECTION, SOLUTION INFILTRATION; PERINEURAL
Status: DISPENSED
Start: 2023-01-30

## (undated) RX ORDER — BUPIVACAINE HYDROCHLORIDE 2.5 MG/ML
INJECTION, SOLUTION EPIDURAL; INFILTRATION; INTRACAUDAL
Status: DISPENSED
Start: 2023-01-30

## (undated) RX ORDER — PROPOFOL 10 MG/ML
INJECTION, EMULSION INTRAVENOUS
Status: DISPENSED
Start: 2023-06-21